# Patient Record
Sex: FEMALE | Race: ASIAN | NOT HISPANIC OR LATINO | Employment: STUDENT | URBAN - METROPOLITAN AREA
[De-identification: names, ages, dates, MRNs, and addresses within clinical notes are randomized per-mention and may not be internally consistent; named-entity substitution may affect disease eponyms.]

---

## 2017-06-13 ENCOUNTER — OFFICE VISIT (OUTPATIENT)
Dept: INTERNAL MEDICINE | Facility: CLINIC | Age: 22
End: 2017-06-13

## 2017-06-13 VITALS
DIASTOLIC BLOOD PRESSURE: 68 MMHG | WEIGHT: 148 LBS | SYSTOLIC BLOOD PRESSURE: 104 MMHG | BODY MASS INDEX: 27.23 KG/M2 | HEIGHT: 62 IN

## 2017-06-13 DIAGNOSIS — Z00.00 HEALTH CARE MAINTENANCE: Primary | ICD-10-CM

## 2017-06-13 DIAGNOSIS — Z23 NEED FOR TETANUS BOOSTER: ICD-10-CM

## 2017-06-13 PROBLEM — E78.6 LOW HDL (UNDER 40): Status: ACTIVE | Noted: 2017-06-13

## 2017-06-13 PROBLEM — M21.612 BUNION OF GREAT TOE OF LEFT FOOT: Status: ACTIVE | Noted: 2017-06-13

## 2017-06-13 LAB
ALBUMIN SERPL-MCNC: 3.82 G/DL (ref 3.4–4.6)
ALBUMIN/GLOB SERPL: 1.2 G/DL
ALP SERPL-CCNC: 40 U/L (ref 46–116)
ALT SERPL W P-5'-P-CCNC: 28 U/L (ref 14–59)
ANION GAP SERPL CALCULATED.3IONS-SCNC: 9 MMOL/L
AST SERPL-CCNC: 27 U/L (ref 7–37)
BILIRUB SERPL-MCNC: 0.4 MG/DL (ref 0.2–1)
BUN BLD-MCNC: 8 MG/DL (ref 6–22)
BUN/CREAT SERPL: 11 (ref 7–25)
CALCIUM SPEC-SCNC: 8.5 MG/DL (ref 8.6–10.5)
CHLORIDE SERPL-SCNC: 102 MMOL/L (ref 95–107)
CHOLEST SERPL-MCNC: 118 MG/DL (ref 0–200)
CO2 SERPL-SCNC: 29 MMOL/L (ref 23–32)
CREAT BLD-MCNC: 0.73 MG/DL (ref 0.55–1.02)
GFR SERPL CREATININE-BSD FRML MDRD: 100 ML/MIN/1.73
GFR SERPL CREATININE-BSD FRML MDRD: 121 ML/MIN/1.73
GLOBULIN UR ELPH-MCNC: 3.1 GM/DL
GLUCOSE BLD-MCNC: 80 MG/DL (ref 70–100)
HDLC SERPL-MCNC: 36 MG/DL (ref 40–81)
LDLC SERPL CALC-MCNC: 69 MG/DL (ref 0–100)
LDLC/HDLC SERPL: 1.92 {RATIO}
POTASSIUM BLD-SCNC: 4 MMOL/L (ref 3.3–5.3)
PROT SERPL-MCNC: 6.9 G/DL (ref 6.3–8.4)
SODIUM BLD-SCNC: 140 MMOL/L (ref 136–145)
TRIGL SERPL-MCNC: 65 MG/DL (ref 0–150)
TSH SERPL DL<=0.05 MIU/L-ACNC: 1.27 MIU/ML (ref 0.4–4.2)
VLDLC SERPL-MCNC: 13 MG/DL

## 2017-06-13 PROCEDURE — 80053 COMPREHEN METABOLIC PANEL: CPT | Performed by: INTERNAL MEDICINE

## 2017-06-13 PROCEDURE — 84443 ASSAY THYROID STIM HORMONE: CPT | Performed by: INTERNAL MEDICINE

## 2017-06-13 PROCEDURE — 99385 PREV VISIT NEW AGE 18-39: CPT | Performed by: INTERNAL MEDICINE

## 2017-06-13 PROCEDURE — 93000 ELECTROCARDIOGRAM COMPLETE: CPT | Performed by: INTERNAL MEDICINE

## 2017-06-13 PROCEDURE — 80061 LIPID PANEL: CPT | Performed by: INTERNAL MEDICINE

## 2017-06-13 RX ORDER — TETANUS AND DIPHTHERIA TOXOIDS ADSORBED 2; 2 [LF]/.5ML; [LF]/.5ML
0.5 INJECTION INTRAMUSCULAR ONCE
Qty: 0.5 ML | Refills: 0 | Status: SHIPPED | OUTPATIENT
Start: 2017-06-13 | End: 2017-06-13

## 2017-06-13 NOTE — PATIENT INSTRUCTIONS
Risk evaluation:  1. Cardiovascular risk factors: none. Regular exercise recommended.  2. Diabetes risk factors: FH of diabetes, overweight (BMI 27.5). Regular exercise and low starch/low carb diet recommended. Ideally needs to loose 20 lbs, but at least 10 lbs.  3. Cancer risk factors: no risk factors for cancer.  4. Risky behavior: none. Use of seat belts: regular. Use of sunscreens: regular. SPF 30+.   Tattoos: none. H/o blood transfusions/organ transplants before 1992 or clotting factor transfusion before 1987: none.     Prevention:  Cholesterol test  will check.  Blood sugar test will check.   Hep C testing (for patients born [birth year]): not needed, patient is not in the age group, and has no risk factors..  Mammogram not recommended yet.. Breast self exams recommended once a month.  Colonoscopy: not recommended till the age of 50.  PAP smear : no recommended till the age of 26.  DEXA : not indicated yet..       Bunion left foot - recommended to wear wide shoes and to try to use spacer between great and 2nd toes.

## 2017-06-13 NOTE — PROGRESS NOTES
"Subjective   Vikki Durham is a 22 y.o. female. Here to establish care, as a new patient. Here for CPE    History of Present Illness   Vikki Durham 22 y.o. female who is here to establish as a new patient.  Past medical and surgical history, family and social history was obtained by me in the interview and recorded in the EHR./68  Ht 61.5\" (156.2 cm)  Wt 148 lb (67.1 kg)  LMP 06/12/2017  BMI 27.51 kg/m2     Patient is here for yearly CPE.    Patient rates her own health as: good.    Tobacco use: none.  Alcohol use: none.  Recreational drugs use: none  Medication list : none.  Diet: well balanced.  Exercise: 2 times a week, aerobic exercise and swimming.  Marital status: single.   Employment: full time student.  Patient rates her stress level as: usuyally high during the school year.  Dental health: good. Brushes teeth 2 times a day, flosses 1 time a day . Dental visits: 2 times a year .  Vision correction: glasses  Hearing: normal.    Recent vaccinations:   Flu  is up to date and recommended yearly  Tdap  is up to date  Zostavax not recommended at this age    Patient is premenopausal. Past pregnancies: none. Currently patient is on no contraception.    No current outpatient prescriptions on file.            /68  Ht 61.5\" (156.2 cm)  Wt 148 lb (67.1 kg)  LMP 06/12/2017  BMI 27.51 kg/m2    No current outpatient prescriptions on file.  The following portions of the patient's history were reviewed and updated as appropriate: allergies, current medications, past family history, past medical history, past social history, past surgical history and problem list.    Review of Systems   Constitutional: Negative for chills and fever.   HENT: Negative for postnasal drip, sinus pressure and sore throat.    Eyes: Negative for pain and itching.   Respiratory: Positive for shortness of breath. Negative for cough and chest tightness.    Cardiovascular: Negative for chest pain and leg swelling. "   Gastrointestinal: Positive for diarrhea (while traveling). Negative for abdominal pain and blood in stool.   Endocrine: Negative for cold intolerance and heat intolerance.   Genitourinary: Negative for difficulty urinating and flank pain.   Musculoskeletal: Negative for back pain and neck pain.   Skin: Negative for color change and rash.   Neurological: Negative for dizziness, weakness and headaches.   Hematological: Negative for adenopathy. Does not bruise/bleed easily.   Psychiatric/Behavioral: Negative for sleep disturbance. The patient is not nervous/anxious.        Objective   Physical Exam   Constitutional: She is oriented to person, place, and time. She appears well-developed and well-nourished. No distress.   HENT:   Head: Normocephalic and atraumatic.   Right Ear: External ear normal.   Left Ear: External ear normal.   Nose: Nose normal.   Mouth/Throat: Oropharynx is clear and moist. No oropharyngeal exudate.   Eyes: Conjunctivae and EOM are normal. Pupils are equal, round, and reactive to light. Right eye exhibits no discharge. Left eye exhibits no discharge. No scleral icterus.   Neck: Normal range of motion. Neck supple. No JVD present. No thyromegaly present.   Cardiovascular: Normal rate, regular rhythm, S1 normal, S2 normal, normal heart sounds and intact distal pulses.  Exam reveals no gallop and no friction rub.    No murmur heard.  Pulmonary/Chest: Effort normal and breath sounds normal. No respiratory distress. She has no decreased breath sounds. She has no wheezes. She has no rhonchi. She has no rales. Right breast exhibits no inverted nipple, no mass, no nipple discharge, no skin change and no tenderness. Left breast exhibits no inverted nipple, no mass, no nipple discharge, no skin change and no tenderness. Breasts are symmetrical.   Abdominal: Soft. Bowel sounds are normal. She exhibits no distension and no mass. There is no tenderness. There is no rebound and no guarding.   Musculoskeletal:  She exhibits no edema.      Foot Structure and Biomechanics -   Her left foot exhibits hallux valgus.     Lymphadenopathy:        Head (right side): No submental, no submandibular, no preauricular, no posterior auricular and no occipital adenopathy present.        Head (left side): No submental, no submandibular, no preauricular, no posterior auricular and no occipital adenopathy present.     She has no cervical adenopathy.        Right cervical: No superficial cervical, no deep cervical and no posterior cervical adenopathy present.       Left cervical: No superficial cervical, no deep cervical and no posterior cervical adenopathy present.     She has no axillary adenopathy.        Right: No supraclavicular adenopathy present.        Left: No supraclavicular adenopathy present.   Neurological: She is alert and oriented to person, place, and time. She has normal reflexes. She displays normal reflexes. No cranial nerve deficit. She exhibits normal muscle tone. Coordination normal.   Reflex Scores:       Bicep reflexes are 2+ on the right side and 2+ on the left side.       Patellar reflexes are 2+ on the right side and 2+ on the left side.  Skin: Skin is warm. No rash noted. She is not diaphoretic. No erythema.   Psychiatric: She has a normal mood and affect. Her behavior is normal. Thought content normal.   Vitals reviewed.    Reason for ECG:  screening  Rhythm: sinus  Arterial rate:55  AR interval: nl  P waves:nl  QRS:nl  ST: nl  QTc:440   Comparison: unavailable   Impression: normal ECG    Assessment/Plan   Diagnoses and all orders for this visit:    Health care maintenance      Risk evaluation:  1. Cardiovascular risk factors: none. Regular exercise recommended.  2. Diabetes risk factors: FH of diabetes, overweight (BMI 27.5). Regular exercise and low starch/low carb diet recommended. Ideally needs to loose 20 lbs, but at least 10 lbs.  3. Cancer risk factors: no risk factors for cancer.  4. Risky behavior: none.  Use of seat belts: regular. Use of sunscreens: regular. SPF 30+.   Tattoos: none. H/o blood transfusions/organ transplants before 1992 or clotting factor transfusion before 1987: none.     Prevention:  Cholesterol test  will check.  Blood sugar test will check.   Hep C testing (for patients born [birth year]65[birth year]71): not needed, patient is not in the age group, and has no risk factors..  Mammogram not recommended yet.. Breast self exams recommended once a month.  Colonoscopy: not recommended till the age of 50.  PAP smear : no recommended till the age of 26.  DEXA : not indicated yet..                      Bunion left foot - recommended to wear wide shoes and to try to use spacer between great and 2nd toes.

## 2017-06-14 LAB
BASOPHILS # BLD AUTO: 0.1 X10E3/UL (ref 0–0.2)
BASOPHILS NFR BLD AUTO: 3 %
EOSINOPHIL # BLD AUTO: 0 X10E3/UL (ref 0–0.4)
EOSINOPHIL # BLD AUTO: 1 %
ERYTHROCYTE [DISTWIDTH] IN BLOOD BY AUTOMATED COUNT: 12.6 % (ref 12.3–15.4)
HCT VFR BLD AUTO: 41.6 % (ref 34–46.6)
HGB BLD-MCNC: 13.8 G/DL (ref 11.1–15.9)
IMM GRANULOCYTES # BLD: 0 X10E3/UL (ref 0–0.1)
IMM GRANULOCYTES NFR BLD: 0 %
LYMPHOCYTES # BLD AUTO: 1.2 X10E3/UL (ref 0.7–3.1)
LYMPHOCYTES NFR BLD AUTO: 38 %
MCH RBC QN AUTO: 30.9 PG (ref 26.6–33)
MCHC RBC AUTO-ENTMCNC: 33.2 G/DL (ref 31.5–35.7)
MCV RBC AUTO: 93 FL (ref 79–97)
MONOCYTES # BLD AUTO: 0.5 X10E3/UL (ref 0.1–0.9)
MONOCYTES NFR BLD AUTO: 15 %
MORPHOLOGY BLD-IMP: ABNORMAL
NEUTROPHILS # BLD AUTO: 1.4 X10E3/UL (ref 1.4–7)
NEUTROPHILS NFR BLD AUTO: 43 %
PLATELET # BLD AUTO: 198 X10E3/UL (ref 150–379)
RBC # BLD AUTO: 4.47 X10E6/UL (ref 3.77–5.28)
WBC # BLD AUTO: 3.3 X10E3/UL (ref 3.4–10.8)

## 2018-03-15 ENCOUNTER — TELEPHONE (OUTPATIENT)
Dept: INTERNAL MEDICINE | Facility: CLINIC | Age: 23
End: 2018-03-15

## 2018-03-15 NOTE — TELEPHONE ENCOUNTER
----- Message from Roopa Gottlieb sent at 3/15/2018  2:29 PM EDT -----  Contact: Pt  Pt is not feeling well, wanted to be seen next week, could not find an opening for next week.  Would you like to work Ms Durham in, or call something in.    Symptom list:  Fever/Achy? No   Sore throat? yes  Coughing? yes  Productive/color? No dry cough  Chest congestion?  Yes   Sinus pressure?  no  Nausea/diarrhea?  no  How long has this been going on? Over a month  Have you tried taking anything?   Has tried OTC cough medication    Caller#[phone]    Please advise, recommended acute or urgent care.     Pharmacy    [facility] - [facility] [address] - [phone] [facility] [phone] FX

## 2018-03-20 ENCOUNTER — OFFICE VISIT (OUTPATIENT)
Dept: INTERNAL MEDICINE | Facility: CLINIC | Age: 23
End: 2018-03-20

## 2018-03-20 VITALS
SYSTOLIC BLOOD PRESSURE: 106 MMHG | BODY MASS INDEX: 27.79 KG/M2 | WEIGHT: 151 LBS | HEART RATE: 58 BPM | DIASTOLIC BLOOD PRESSURE: 62 MMHG | TEMPERATURE: 98.1 F | HEIGHT: 62 IN | OXYGEN SATURATION: 97 %

## 2018-03-20 DIAGNOSIS — J30.9 ALLERGIC SINUSITIS: ICD-10-CM

## 2018-03-20 DIAGNOSIS — R05.3 PERSISTENT COUGH FOR 3 WEEKS OR LONGER: Primary | ICD-10-CM

## 2018-03-20 PROCEDURE — 99213 OFFICE O/P EST LOW 20 MIN: CPT | Performed by: INTERNAL MEDICINE

## 2018-03-20 RX ORDER — LEVOCETIRIZINE DIHYDROCHLORIDE 5 MG/1
5 TABLET, FILM COATED ORAL EVERY EVENING
Qty: 30 TABLET | Refills: 5 | Status: SHIPPED | OUTPATIENT
Start: 2018-03-20 | End: 2018-06-11

## 2018-03-20 RX ORDER — MONTELUKAST SODIUM 10 MG/1
10 TABLET ORAL NIGHTLY
Qty: 30 TABLET | Refills: 5 | Status: SHIPPED | OUTPATIENT
Start: 2018-03-20 | End: 2018-06-08 | Stop reason: SDUPTHER

## 2018-03-20 NOTE — PATIENT INSTRUCTIONS
1. Persistent cough - due mendoza postanasal drainage from allergic sinusitis. Reassured.  2. Allergic sinusitis - will treat with Xyzal and Montelukast, both to be taken at bedtime. If Xyzal too expensive, take Allegra or Claritin over the counter once a day.

## 2018-03-20 NOTE — PROGRESS NOTES
"Janine Durham is a 23 y.o. female.     History of Present Illness   /62 (BP Location: Right arm, Patient Position: Sitting, Cuff Size: Adult)   Pulse 58   Temp 98.1 °F (36.7 °C) (Oral)   Ht 156.2 cm (61.5\")   Wt 68.5 kg (151 lb)   SpO2 97%   BMI 28.07 kg/m²   Patient complains of nasal congestion. S-ms started a month ago ago.Patient describes cough as productive of white sputum. Associated symptoms include post nasal drip and sneezing. Symptoms get worse  laying down. Patient has fatigue. Patient has had no wheezing.   Patient has had no ID contacts.   Overall s-ms had been fluctuating a bit. Patient had tried to use homeoapathic medicine w/o improvement.     No current outpatient prescriptions on file.  The following portions of the patient's history were reviewed and updated as appropriate: allergies, current medications, past family history, past medical history, past social history, past surgical history and problem list.    Review of Systems   Constitutional: Negative for chills and fever.   Eyes: Negative for pain and redness.   Respiratory: Positive for cough. Negative for shortness of breath.    Cardiovascular: Negative for chest pain and leg swelling.   Neurological: Positive for headaches. Negative for dizziness.       Objective   Physical Exam   Constitutional: She is oriented to person, place, and time. She appears well-developed and well-nourished.   HENT:   Head: Normocephalic and atraumatic.   Right Ear: Tympanic membrane, external ear and ear canal normal.   Left Ear: Tympanic membrane, external ear and ear canal normal.   Nose: Mucosal edema present. Right sinus exhibits no maxillary sinus tenderness and no frontal sinus tenderness. Left sinus exhibits no maxillary sinus tenderness and no frontal sinus tenderness.   Mouth/Throat: Uvula is midline, oropharynx is clear and moist and mucous membranes are normal.   Eyes: Conjunctivae and EOM are normal. Pupils are equal, round, " and reactive to light. Right eye exhibits no discharge. Left eye exhibits no discharge. No scleral icterus.   Neck: Neck supple. No JVD present.   Cardiovascular: Normal rate, regular rhythm and normal heart sounds.  Exam reveals no gallop and no friction rub.    No murmur heard.  Pulmonary/Chest: Effort normal and breath sounds normal. She has no wheezes. She has no rales.   Musculoskeletal: She exhibits no edema.   Lymphadenopathy:     She has no cervical adenopathy.   Neurological: She is alert and oriented to person, place, and time. No cranial nerve deficit.   Skin: Skin is warm and dry. No rash noted.   Psychiatric: She has a normal mood and affect. Her behavior is normal.   Vitals reviewed.      Assessment/Plan   Vikki was seen today for cough.    Diagnoses and all orders for this visit:    Persistent cough for 3 weeks or longer    Allergic sinusitis  -     montelukast (SINGULAIR) 10 MG tablet; Take 1 tablet by mouth Every Night.  -     levocetirizine (XYZAL) 5 MG tablet; Take 1 tablet by mouth Every Evening.      1. Persistent cough - due mendoza postanasal drainage from allergic sinusitis. Reassured.  2. Allergic sinusitis - will treat with Xyzal and Montelukast, both to be taken at bedtime. If Xyzal too expensive, take Allegra or Claritin over the counter once a day.

## 2018-06-08 ENCOUNTER — HOSPITAL ENCOUNTER (OUTPATIENT)
Dept: CARDIOLOGY | Facility: HOSPITAL | Age: 23
Discharge: HOME | End: 2018-06-08
Admitting: NURSE PRACTITIONER

## 2018-06-08 ENCOUNTER — OFFICE VISIT (OUTPATIENT)
Dept: FAMILY MEDICINE CLINIC | Facility: CLINIC | Age: 23
End: 2018-06-08

## 2018-06-08 ENCOUNTER — HOSPITAL ENCOUNTER (OUTPATIENT)
Dept: CT IMAGING | Facility: HOSPITAL | Age: 23
Discharge: HOME | End: 2018-06-08
Attending: INTERNAL MEDICINE

## 2018-06-08 ENCOUNTER — HOSPITAL ENCOUNTER (OUTPATIENT)
Dept: CARDIOLOGY | Facility: HOSPITAL | Age: 23
Discharge: HOME | End: 2018-06-08
Attending: INTERNAL MEDICINE

## 2018-06-08 VITALS
DIASTOLIC BLOOD PRESSURE: 64 MMHG | SYSTOLIC BLOOD PRESSURE: 96 MMHG | HEART RATE: 74 BPM | OXYGEN SATURATION: 95 % | RESPIRATION RATE: 16 BRPM

## 2018-06-08 VITALS
WEIGHT: 147 LBS | TEMPERATURE: 98.7 F | HEIGHT: 62 IN | HEART RATE: 75 BPM | BODY MASS INDEX: 27.05 KG/M2 | SYSTOLIC BLOOD PRESSURE: 110 MMHG | OXYGEN SATURATION: 96 % | DIASTOLIC BLOOD PRESSURE: 64 MMHG

## 2018-06-08 DIAGNOSIS — Z76.89 ENCOUNTER TO ESTABLISH CARE: ICD-10-CM

## 2018-06-08 DIAGNOSIS — J30.2 SEASONAL ALLERGIC RHINITIS, UNSPECIFIED CHRONICITY, UNSPECIFIED TRIGGER: ICD-10-CM

## 2018-06-08 DIAGNOSIS — R59.1 LYMPHADENOPATHY: Primary | ICD-10-CM

## 2018-06-08 DIAGNOSIS — R06.02 SHORTNESS OF BREATH: ICD-10-CM

## 2018-06-08 DIAGNOSIS — R91.8 LUNG MASS: ICD-10-CM

## 2018-06-08 DIAGNOSIS — R93.89 ABNORMAL CHEST X-RAY: ICD-10-CM

## 2018-06-08 DIAGNOSIS — R05.9 COUGH: ICD-10-CM

## 2018-06-08 DIAGNOSIS — J30.9 ALLERGIC SINUSITIS: ICD-10-CM

## 2018-06-08 DIAGNOSIS — I31.39 PERICARDIAL EFFUSION: ICD-10-CM

## 2018-06-08 DIAGNOSIS — R93.89 ABNORMAL CT OF THE CHEST: ICD-10-CM

## 2018-06-08 DIAGNOSIS — R05.3 PERSISTENT COUGH FOR 3 WEEKS OR LONGER: Primary | ICD-10-CM

## 2018-06-08 DIAGNOSIS — R93.89 ABNORMAL CXR: ICD-10-CM

## 2018-06-08 DIAGNOSIS — R06.02 SOB (SHORTNESS OF BREATH): ICD-10-CM

## 2018-06-08 DIAGNOSIS — R06.02 SHORTNESS OF BREATH: Primary | ICD-10-CM

## 2018-06-08 LAB
25(OH)D3 SERPL-MCNC: 30 NG/ML (ref 30–100)
ALBUMIN SERPL-MCNC: 3.7 G/DL (ref 3.5–5.2)
ALBUMIN SERPL-MCNC: 4 G/DL (ref 3.5–5.2)
ALBUMIN/GLOB SERPL: 1.1 G/DL
ALBUMIN/GLOB SERPL: 1.2 G/DL
ALP SERPL-CCNC: 55 U/L (ref 39–117)
ALP SERPL-CCNC: 57 U/L (ref 39–117)
ALT SERPL W P-5'-P-CCNC: 25 U/L (ref 1–33)
ALT SERPL W P-5'-P-CCNC: 26 U/L (ref 1–33)
ANION GAP SERPL CALCULATED.3IONS-SCNC: 13.4 MMOL/L
ANION GAP SERPL CALCULATED.3IONS-SCNC: 14.1 MMOL/L
ASCENDING AORTA: 2.5 CM
AST SERPL-CCNC: 31 U/L (ref 1–32)
AST SERPL-CCNC: 35 U/L (ref 1–32)
BASOPHILS # BLD AUTO: 0.02 10*3/MM3 (ref 0–0.2)
BASOPHILS NFR BLD AUTO: 0.6 % (ref 0–1.5)
BH CV ECHO MEAS - ACS: 1.7 CM
BH CV ECHO MEAS - AO MAX PG (FULL): 3.6 MMHG
BH CV ECHO MEAS - AO MAX PG: 6.4 MMHG
BH CV ECHO MEAS - AO MEAN PG (FULL): 1.9 MMHG
BH CV ECHO MEAS - AO MEAN PG: 3.5 MMHG
BH CV ECHO MEAS - AO ROOT AREA (BSA CORRECTED): 1.7
BH CV ECHO MEAS - AO ROOT AREA: 6.4 CM^2
BH CV ECHO MEAS - AO ROOT DIAM: 2.9 CM
BH CV ECHO MEAS - AO V2 MAX: 126.7 CM/SEC
BH CV ECHO MEAS - AO V2 MEAN: 87.7 CM/SEC
BH CV ECHO MEAS - AO V2 VTI: 24.5 CM
BH CV ECHO MEAS - AVA(I,A): 1.4 CM^2
BH CV ECHO MEAS - AVA(I,D): 1.4 CM^2
BH CV ECHO MEAS - AVA(V,A): 1.5 CM^2
BH CV ECHO MEAS - AVA(V,D): 1.5 CM^2
BH CV ECHO MEAS - BSA(HAYCOCK): 1.7 M^2
BH CV ECHO MEAS - BSA: 1.7 M^2
BH CV ECHO MEAS - BZI_BMI: 26.9 KILOGRAMS/M^2
BH CV ECHO MEAS - BZI_METRIC_HEIGHT: 157.5 CM
BH CV ECHO MEAS - BZI_METRIC_WEIGHT: 66.7 KG
BH CV ECHO MEAS - CONTRAST EF (2CH): 60.6 ML/M^2
BH CV ECHO MEAS - CONTRAST EF 4CH: 54.7 ML/M^2
BH CV ECHO MEAS - EDV(MOD-SP2): 66 ML
BH CV ECHO MEAS - EDV(MOD-SP4): 75 ML
BH CV ECHO MEAS - EDV(TEICH): 98.2 ML
BH CV ECHO MEAS - EF(CUBED): 62.5 %
BH CV ECHO MEAS - EF(MOD-BP): 58 %
BH CV ECHO MEAS - EF(MOD-SP2): 60.6 %
BH CV ECHO MEAS - EF(MOD-SP4): 54.7 %
BH CV ECHO MEAS - EF(TEICH): 54.1 %
BH CV ECHO MEAS - ESV(MOD-SP2): 26 ML
BH CV ECHO MEAS - ESV(MOD-SP4): 34 ML
BH CV ECHO MEAS - ESV(TEICH): 45.1 ML
BH CV ECHO MEAS - FS: 27.9 %
BH CV ECHO MEAS - IVS/LVPW: 1
BH CV ECHO MEAS - IVSD: 1 CM
BH CV ECHO MEAS - LAT PEAK E' VEL: 14 CM/SEC
BH CV ECHO MEAS - LV DIASTOLIC VOL/BSA (35-75): 44.7 ML/M^2
BH CV ECHO MEAS - LV MASS(C)D: 164.2 GRAMS
BH CV ECHO MEAS - LV MASS(C)DI: 97.9 GRAMS/M^2
BH CV ECHO MEAS - LV MAX PG: 2.8 MMHG
BH CV ECHO MEAS - LV MEAN PG: 1.6 MMHG
BH CV ECHO MEAS - LV SYSTOLIC VOL/BSA (12-30): 20.3 ML/M^2
BH CV ECHO MEAS - LV V1 MAX: 83.9 CM/SEC
BH CV ECHO MEAS - LV V1 MEAN: 58.6 CM/SEC
BH CV ECHO MEAS - LV V1 VTI: 15.9 CM
BH CV ECHO MEAS - LVIDD: 4.6 CM
BH CV ECHO MEAS - LVIDS: 3.3 CM
BH CV ECHO MEAS - LVLD AP2: 7.6 CM
BH CV ECHO MEAS - LVLD AP4: 7.1 CM
BH CV ECHO MEAS - LVLS AP2: 6.2 CM
BH CV ECHO MEAS - LVLS AP4: 6.1 CM
BH CV ECHO MEAS - LVOT AREA (M): 2.3 CM^2
BH CV ECHO MEAS - LVOT AREA: 2.2 CM^2
BH CV ECHO MEAS - LVOT DIAM: 1.7 CM
BH CV ECHO MEAS - LVPWD: 1 CM
BH CV ECHO MEAS - MED PEAK E' VEL: 12 CM/SEC
BH CV ECHO MEAS - MV A DUR: 0.08 SEC
BH CV ECHO MEAS - MV A MAX VEL: 44.6 CM/SEC
BH CV ECHO MEAS - MV DEC SLOPE: 579.8 CM/SEC^2
BH CV ECHO MEAS - MV DEC TIME: 0.15 SEC
BH CV ECHO MEAS - MV E MAX VEL: 82.5 CM/SEC
BH CV ECHO MEAS - MV E/A: 1.8
BH CV ECHO MEAS - MV MAX PG: 3.3 MMHG
BH CV ECHO MEAS - MV MEAN PG: 0.87 MMHG
BH CV ECHO MEAS - MV P1/2T MAX VEL: 81 CM/SEC
BH CV ECHO MEAS - MV P1/2T: 40.9 MSEC
BH CV ECHO MEAS - MV V2 MAX: 90.5 CM/SEC
BH CV ECHO MEAS - MV V2 MEAN: 42.4 CM/SEC
BH CV ECHO MEAS - MV V2 VTI: 24.6 CM
BH CV ECHO MEAS - MVA P1/2T LCG: 2.7 CM^2
BH CV ECHO MEAS - MVA(P1/2T): 5.4 CM^2
BH CV ECHO MEAS - MVA(VTI): 1.4 CM^2
BH CV ECHO MEAS - PA ACC TIME: 0.13 SEC
BH CV ECHO MEAS - PA PR(ACCEL): 20.8 MMHG
BH CV ECHO MEAS - PI END-D VEL: 134.7 CM/SEC
BH CV ECHO MEAS - PULM A REVS DUR: 0.08 SEC
BH CV ECHO MEAS - PULM A REVS VEL: 28.6 CM/SEC
BH CV ECHO MEAS - PULM DIAS VEL: 57.7 CM/SEC
BH CV ECHO MEAS - PULM S/D: 1
BH CV ECHO MEAS - PULM SYS VEL: 60.1 CM/SEC
BH CV ECHO MEAS - QP/QS: 0.63
BH CV ECHO MEAS - RAP SYSTOLE: 8 MMHG
BH CV ECHO MEAS - RV MAX PG: 1.9 MMHG
BH CV ECHO MEAS - RV MEAN PG: 1.2 MMHG
BH CV ECHO MEAS - RV V1 MAX: 68.4 CM/SEC
BH CV ECHO MEAS - RV V1 MEAN: 52.8 CM/SEC
BH CV ECHO MEAS - RV V1 VTI: 16 CM
BH CV ECHO MEAS - RVOT AREA: 1.4 CM^2
BH CV ECHO MEAS - RVOT DIAM: 1.3 CM
BH CV ECHO MEAS - RVSP: 28 MMHG
BH CV ECHO MEAS - SI(AO): 93.4 ML/M^2
BH CV ECHO MEAS - SI(CUBED): 36.7 ML/M^2
BH CV ECHO MEAS - SI(LVOT): 20.8 ML/M^2
BH CV ECHO MEAS - SI(MOD-SP2): 23.9 ML/M^2
BH CV ECHO MEAS - SI(MOD-SP4): 24.4 ML/M^2
BH CV ECHO MEAS - SI(TEICH): 31.7 ML/M^2
BH CV ECHO MEAS - SUP REN AO DIAM: 1.7 CM
BH CV ECHO MEAS - SV(AO): 156.7 ML
BH CV ECHO MEAS - SV(CUBED): 61.6 ML
BH CV ECHO MEAS - SV(LVOT): 34.9 ML
BH CV ECHO MEAS - SV(MOD-SP2): 40 ML
BH CV ECHO MEAS - SV(MOD-SP4): 41 ML
BH CV ECHO MEAS - SV(RVOT): 21.9 ML
BH CV ECHO MEAS - SV(TEICH): 53.1 ML
BH CV ECHO MEAS - TAPSE (>1.6): 1.6 CM2
BH CV ECHO MEAS - TR MAX VEL: 222 CM/SEC
BH CV ECHO MEASUREMENTS AVERAGE E/E' RATIO: 6.35
BH CV XLRA - RV BASE: 2.4 CM
BH CV XLRA - TDI S': 11 CM/SEC
BILIRUB SERPL-MCNC: 0.3 MG/DL (ref 0.1–1.2)
BILIRUB SERPL-MCNC: 0.3 MG/DL (ref 0.1–1.2)
BUN BLD-MCNC: 10 MG/DL (ref 6–20)
BUN BLD-MCNC: 11 MG/DL (ref 6–20)
BUN/CREAT SERPL: 13.2 (ref 7–25)
BUN/CREAT SERPL: 13.6 (ref 7–25)
CALCIUM SPEC-SCNC: 9.5 MG/DL (ref 8.6–10.5)
CALCIUM SPEC-SCNC: 9.6 MG/DL (ref 8.6–10.5)
CHLORIDE SERPL-SCNC: 101 MMOL/L (ref 98–107)
CHLORIDE SERPL-SCNC: 102 MMOL/L (ref 98–107)
CHOLEST SERPL-MCNC: 114 MG/DL (ref 0–200)
CO2 SERPL-SCNC: 24.6 MMOL/L (ref 22–29)
CO2 SERPL-SCNC: 26.9 MMOL/L (ref 22–29)
CREAT BLD-MCNC: 0.76 MG/DL (ref 0.57–1)
CREAT BLD-MCNC: 0.81 MG/DL (ref 0.57–1)
D DIMER PPP FEU-MCNC: 0.73 MCGFEU/ML (ref 0–0.49)
DEPRECATED RDW RBC AUTO: 46.4 FL (ref 37–54)
EOSINOPHIL # BLD AUTO: 0.05 10*3/MM3 (ref 0–0.7)
EOSINOPHIL NFR BLD AUTO: 1.5 % (ref 0.3–6.2)
ERYTHROCYTE [DISTWIDTH] IN BLOOD BY AUTOMATED COUNT: 13.4 % (ref 4.5–15)
ERYTHROCYTE [DISTWIDTH] IN BLOOD BY AUTOMATED COUNT: 13.8 % (ref 11.7–13)
GFR SERPL CREATININE-BSD FRML MDRD: 106 ML/MIN/1.73
GFR SERPL CREATININE-BSD FRML MDRD: 114 ML/MIN/1.73
GFR SERPL CREATININE-BSD FRML MDRD: 88 ML/MIN/1.73
GFR SERPL CREATININE-BSD FRML MDRD: 94 ML/MIN/1.73
GLOBULIN UR ELPH-MCNC: 3.3 GM/DL
GLOBULIN UR ELPH-MCNC: 3.5 GM/DL
GLUCOSE BLD-MCNC: 91 MG/DL (ref 65–99)
GLUCOSE BLD-MCNC: 96 MG/DL (ref 65–99)
HCT VFR BLD AUTO: 38.5 % (ref 31–42)
HCT VFR BLD AUTO: 38.7 % (ref 35.6–45.5)
HDLC SERPL-MCNC: 19 MG/DL (ref 40–60)
HGB BLD-MCNC: 12.6 G/DL (ref 11.9–15.5)
HGB BLD-MCNC: 13.3 G/DL (ref 12–18)
IMM GRANULOCYTES # BLD: 0 10*3/MM3 (ref 0–0.03)
IMM GRANULOCYTES NFR BLD: 0 % (ref 0–0.5)
LDLC SERPL CALC-MCNC: 77 MG/DL (ref 0–100)
LDLC/HDLC SERPL: 4.05 {RATIO}
LEFT ATRIUM VOLUME INDEX: 14 ML/M2
LV EF 2D ECHO EST: 58 %
LYMPHOCYTES # BLD AUTO: 0.6 10*3/MM3 (ref 1.2–3.4)
LYMPHOCYTES # BLD AUTO: 0.8 10*3/MM3 (ref 0.9–4.8)
LYMPHOCYTES NFR BLD AUTO: 14.4 % (ref 21–51)
LYMPHOCYTES NFR BLD AUTO: 23.5 % (ref 19.6–45.3)
MCH RBC QN AUTO: 30.1 PG (ref 26.9–32)
MCH RBC QN AUTO: 30.2 PG (ref 26.1–33.1)
MCHC RBC AUTO-ENTMCNC: 32.6 G/DL (ref 32.4–36.3)
MCHC RBC AUTO-ENTMCNC: 34.6 G/DL (ref 33–37)
MCV RBC AUTO: 87.1 FL (ref 80–99)
MCV RBC AUTO: 92.6 FL (ref 80.5–98.2)
MONOCYTES # BLD AUTO: 0.17 10*3/MM3 (ref 0.2–1.2)
MONOCYTES # BLD AUTO: 0.5 10*3/MM3 (ref 0.1–0.6)
MONOCYTES NFR BLD AUTO: 11.1 % (ref 2–9)
MONOCYTES NFR BLD AUTO: 5 % (ref 5–12)
NEUTROPHILS # BLD AUTO: 2.37 10*3/MM3 (ref 1.9–8.1)
NEUTROPHILS # BLD AUTO: 3.2 10*3/MM3 (ref 1.4–6.5)
NEUTROPHILS NFR BLD AUTO: 69.4 % (ref 42.7–76)
NEUTROPHILS NFR BLD AUTO: 74.5 % (ref 42–75)
NRBC BLD MANUAL-RTO: 0 /100 WBC (ref 0–0)
NT-PROBNP SERPL-MCNC: 61 PG/ML (ref 0–450)
NT-PROBNP SERPL-MCNC: 62.9 PG/ML (ref 0–450)
PLATELET # BLD AUTO: 115 10*3/MM3 (ref 140–500)
PLATELET # BLD AUTO: 123 10*3/MM3 (ref 150–450)
PMV BLD AUTO: 11.4 FL (ref 6–12)
PMV BLD AUTO: 8.1 FL (ref 7.1–10.5)
POTASSIUM BLD-SCNC: 4.3 MMOL/L (ref 3.5–5.2)
POTASSIUM BLD-SCNC: 4.4 MMOL/L (ref 3.5–5.2)
PROT SERPL-MCNC: 7.2 G/DL (ref 6–8.5)
PROT SERPL-MCNC: 7.3 G/DL (ref 6–8.5)
RBC # BLD AUTO: 4.18 10*6/MM3 (ref 3.9–5.2)
RBC # BLD AUTO: 4.42 10*6/MM3 (ref 4–6)
SINUS: 2.6 CM
SODIUM BLD-SCNC: 140 MMOL/L (ref 136–145)
SODIUM BLD-SCNC: 142 MMOL/L (ref 136–145)
STJ: 1.9 CM
TRIGL SERPL-MCNC: 90 MG/DL (ref 0–150)
TROPONIN T SERPL-MCNC: <0.01 NG/ML (ref 0–0.03)
TSH SERPL DL<=0.05 MIU/L-ACNC: 1.18 MIU/ML (ref 0.27–4.2)
VLDLC SERPL-MCNC: 18 MG/DL (ref 5–40)
WBC NRBC COR # BLD: 3.41 10*3/MM3 (ref 4.5–10.7)
WBC NRBC COR # BLD: 4.3 10*3/MM3 (ref 4.5–10)

## 2018-06-08 PROCEDURE — 83880 ASSAY OF NATRIURETIC PEPTIDE: CPT | Performed by: INTERNAL MEDICINE

## 2018-06-08 PROCEDURE — 84443 ASSAY THYROID STIM HORMONE: CPT | Performed by: NURSE PRACTITIONER

## 2018-06-08 PROCEDURE — 84484 ASSAY OF TROPONIN QUANT: CPT | Performed by: INTERNAL MEDICINE

## 2018-06-08 PROCEDURE — 93306 TTE W/DOPPLER COMPLETE: CPT | Performed by: INTERNAL MEDICINE

## 2018-06-08 PROCEDURE — 80053 COMPREHEN METABOLIC PANEL: CPT | Performed by: NURSE PRACTITIONER

## 2018-06-08 PROCEDURE — 80061 LIPID PANEL: CPT | Performed by: NURSE PRACTITIONER

## 2018-06-08 PROCEDURE — 0 IOPAMIDOL PER 1 ML: Performed by: INTERNAL MEDICINE

## 2018-06-08 PROCEDURE — 82306 VITAMIN D 25 HYDROXY: CPT | Performed by: NURSE PRACTITIONER

## 2018-06-08 PROCEDURE — 85379 FIBRIN DEGRADATION QUANT: CPT | Performed by: INTERNAL MEDICINE

## 2018-06-08 PROCEDURE — 93306 TTE W/DOPPLER COMPLETE: CPT

## 2018-06-08 PROCEDURE — 93000 ELECTROCARDIOGRAM COMPLETE: CPT | Performed by: NURSE PRACTITIONER

## 2018-06-08 PROCEDURE — 36415 COLL VENOUS BLD VENIPUNCTURE: CPT | Performed by: NURSE PRACTITIONER

## 2018-06-08 PROCEDURE — 94760 N-INVAS EAR/PLS OXIMETRY 1: CPT

## 2018-06-08 PROCEDURE — 80053 COMPREHEN METABOLIC PANEL: CPT | Performed by: INTERNAL MEDICINE

## 2018-06-08 PROCEDURE — 93005 ELECTROCARDIOGRAM TRACING: CPT

## 2018-06-08 PROCEDURE — 99203 OFFICE O/P NEW LOW 30 MIN: CPT | Performed by: NURSE PRACTITIONER

## 2018-06-08 PROCEDURE — 83880 ASSAY OF NATRIURETIC PEPTIDE: CPT | Performed by: NURSE PRACTITIONER

## 2018-06-08 PROCEDURE — 71275 CT ANGIOGRAPHY CHEST: CPT

## 2018-06-08 PROCEDURE — 85025 COMPLETE CBC W/AUTO DIFF WBC: CPT | Performed by: NURSE PRACTITIONER

## 2018-06-08 PROCEDURE — 85025 COMPLETE CBC W/AUTO DIFF WBC: CPT | Performed by: INTERNAL MEDICINE

## 2018-06-08 PROCEDURE — 99204 OFFICE O/P NEW MOD 45 MIN: CPT | Performed by: INTERNAL MEDICINE

## 2018-06-08 PROCEDURE — 36415 COLL VENOUS BLD VENIPUNCTURE: CPT

## 2018-06-08 RX ORDER — NITROGLYCERIN 0.4 MG/1
0.4 TABLET SUBLINGUAL
Status: DISCONTINUED | OUTPATIENT
Start: 2018-06-08 | End: 2018-06-11

## 2018-06-08 RX ORDER — SODIUM CHLORIDE 0.9 % (FLUSH) 0.9 %
10 SYRINGE (ML) INJECTION AS NEEDED
Status: DISCONTINUED | OUTPATIENT
Start: 2018-06-08 | End: 2018-06-21 | Stop reason: HOSPADM

## 2018-06-08 RX ORDER — MONTELUKAST SODIUM 10 MG/1
10 TABLET ORAL NIGHTLY
Qty: 30 TABLET | Refills: 5 | Status: SHIPPED | OUTPATIENT
Start: 2018-06-08 | End: 2018-06-11

## 2018-06-08 RX ORDER — FLUTICASONE PROPIONATE 50 MCG
2 SPRAY, SUSPENSION (ML) NASAL DAILY
COMMUNITY
End: 2018-06-11

## 2018-06-08 RX ADMIN — IOPAMIDOL 95 ML: 755 INJECTION, SOLUTION INTRAVENOUS at 14:00

## 2018-06-08 NOTE — PATIENT INSTRUCTIONS
Food Choices for Gastroesophageal Reflux Disease, Adult  When you have gastroesophageal reflux disease (GERD), the foods you eat and your eating habits are very important. Choosing the right foods can help ease your discomfort.  What guidelines do I need to follow?  · Choose fruits, vegetables, whole grains, and low-fat dairy products.  · Choose low-fat meat, fish, and poultry.  · Limit fats such as oils, salad dressings, butter, nuts, and avocado.  · Keep a food diary. This helps you identify foods that cause symptoms.  · Avoid foods that cause symptoms. These may be different for everyone.  · Eat small meals often instead of 3 large meals a day.  · Eat your meals slowly, in a place where you are relaxed.  · Limit fried foods.  · Cook foods using methods other than frying.  · Avoid drinking alcohol.  · Avoid drinking large amounts of liquids with your meals.  · Avoid bending over or lying down until 2-3 hours after eating.  What foods are not recommended?  These are some foods and drinks that may make your symptoms worse:  Vegetables   Tomatoes. Tomato juice. Tomato and spaghetti sauce. Chili peppers. Onion and garlic. Horseradish.  Fruits   Oranges, grapefruit, and lemon (fruit and juice).  Meats   High-fat meats, fish, and poultry. This includes hot dogs, ribs, ham, sausage, salami, and florence.  Dairy   Whole milk and chocolate milk. Sour cream. Cream. Butter. Ice cream. Cream cheese.  Drinks   Coffee and tea. Bubbly (carbonated) drinks or energy drinks.  Condiments   Hot sauce. Barbecue sauce.  Sweets/Desserts   Chocolate and cocoa. Donuts. Peppermint and spearmint.  Fats and Oils   High-fat foods. This includes French fries and potato chips.  Other   Vinegar. Strong spices. This includes black pepper, white pepper, red pepper, cayenne, ibarra powder, cloves, ginger, and chili powder.  The items listed above may not be a complete list of foods and drinks to avoid. Contact your dietitian for more information.    This information is not intended to replace advice given to you by your health care provider. Make sure you discuss any questions you have with your health care provider.  Document Released: 06/18/2013 Document Revised: 05/25/2017 Document Reviewed: 10/22/2014  [employer] Patient Education © 2017 [employer]        Stat cxr today,   Cbc,cmp, tsh today will call with results.   ekg in office WNL.   Patient sent to chest pain unit at [facility] for abnormal cxr, SOA,copy of ekg given ,report called.   Low acid diet,sit upright 1 hour after meals.   Recommend pap, over due, she will call her mother's GYN for appt.   Cont f/u with allergist.   Increase fluid intake, get plenty of rest.   Patient agrees with plan of care and understands instructions. Call if worsening symptoms or any problems or concerns.

## 2018-06-08 NOTE — PROGRESS NOTES
"Patient Name: Vikki Durham  :1995  23 y.o.    Date of Admission: 2018  Encounter Provider: Arina Cohen MD  Date of Encounter Visit: 18  Place of Service: [facility] CARDIOLOGY  Referring Provider: BRANNON Maier  Patient Care Team:  Carla Zhao MD as PCP - General (Internal Medicine)      Chief complaint :Patient has had cough since February, no chest pain.  States some soa with exertion.  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 She has noted a cough since February. She was initially told it was allergies. She did go see an allergist and he did diagnose her with allergies but no asthma. The cough has persisted. It is worse if she tries to lie down flat. In the last few weeks, she has been sleeping upright. She did have 1 episode where she woke from sleep coughing. Her mother said she was \"suffocating\" but the patient says she was just coughing. She does exercise on a regular basis and she feels like running is a little bit harder. She has not had any chest pain. She denies fevers or chills. She generally seems to be feeling okay with the exception of cough. She went to see her primary provider today and they checked a chest x-ray which was markedly abnormal with what appeared to be cardiomegaly, and she was referred here for further evaluation. The radiology read is concerning for non-Hodgkin's lymphoma.      Past Medical History:   Diagnosis Date   • Fracture of lower leg     L       History reviewed. No pertinent surgical history.      Prior to Admission medications    Medication Sig Start Date End Date Taking? Authorizing Provider   fluticasone (FLONASE) 50 MCG/ACT nasal spray 2 sprays into each nostril Daily.   Yes Historical Provider, MD   levocetirizine (XYZAL) 5 MG tablet Take 1 tablet by mouth Every Evening. 3/20/18  Yes Carla PUGA " MD Pavel   montelukast (SINGULAIR) 10 MG tablet Take 1 tablet by mouth Every Night. 6/8/18   BRANNON Maier   montelukast (SINGULAIR) 10 MG tablet Take 1 tablet by mouth Every Night. 3/20/18 6/8/18  Carla Zhao MD       No Known Allergies    Social History     Social History   • Marital status: Single     Social History Main Topics   • Smoking status: Never Smoker   • Smokeless tobacco: Never Used   • Alcohol use No   • Drug use: No     Other Topics Concern   • Not on file       Family History   Problem Relation Age of Onset   • Thyroid disease Mother    • Glaucoma Mother    • Lung cancer Maternal Grandmother    • Hypertension Paternal Grandmother    • Diabetes Paternal Grandmother        REVIEW OF SYSTEMS:   All other systems reviewed and negative.        Objective:     Vitals:    06/08/18 1025 06/08/18 1027   BP: 98/58 96/64   BP Location: Right arm Left arm   Pulse: 74    Resp:  16   SpO2: 95%      There is no height or weight on file to calculate BMI.  No intake or output data in the 24 hours ending 06/08/18 1159    Constitutional: She is oriented to person, place, and time. She appears well-developed. She does not appear ill.   HENT:   Head: Normocephalic and atraumatic. Head is without contusion.   Right Ear: Hearing normal. No drainage.   Left Ear: Hearing normal. No drainage.   Nose: No nasal deformity. No epistaxis.   Eyes: Lids are normal. Right eye exhibits no exudate. Left eye exhibits no exudate.  Neck: No JVD present. Carotid bruit is not present. No tracheal deviation present. No thyroid mass and no thyromegaly present.   Cardiovascular: Normal rate, regular rhythm and normal heart sounds.    Pulses:       Posterior tibial pulses are 2+ on the right side, and 2+ on the left side.   Pulmonary/Chest: Effort normal and breath sounds normal.   Abdominal: Soft. Normal appearance and bowel sounds are normal. There is no tenderness.   Musculoskeletal: Normal range of motion.        Right  shoulder: She exhibits no deformity.        Left shoulder: She exhibits no deformity.   Neurological: She is alert and oriented to person, place, and time. She has normal strength.   Skin: Skin is warm, dry and intact. No rash noted.   Psychiatric: She has a normal mood and affect. Her behavior is normal. Thought content normal.   Vitals reviewed      Lab Review:       Results from last 7 days  Lab Units 06/08/18  1039   SODIUM mmol/L 140   POTASSIUM mmol/L 4.3   CHLORIDE mmol/L 102   CO2 mmol/L 24.6   BUN mg/dL 10   CREATININE mg/dL 0.76   CALCIUM mg/dL 9.5   BILIRUBIN mg/dL 0.3   ALK PHOS U/L 55   ALT (SGPT) U/L 26   AST (SGOT) U/L 35*   GLUCOSE mg/dL 91       Results from last 7 days  Lab Units 06/08/18  1039   TROPONIN T ng/mL <0.010       Results from last 7 days  Lab Units 06/08/18  1039   WBC 10*3/mm3 3.41*   HEMOGLOBIN g/dL 12.6   HEMATOCRIT % 38.7   PLATELETS 10*3/mm3 115*               Results from last 7 days  Lab Units 06/08/18  0903   CHOLESTEROL mg/dL 114   TRIGLYCERIDES mg/dL 90   HDL CHOL mg/dL 19*   LDL CHOL mg/dL 77       EKG from her primary care provider shows normal sinus rhythm and is a normal EKG      Assessment and Plan:       1.  Cough.  I reviewed her echocardiogram.  She has a large pericardial effusion but no tamponade physiology.  Otherwise her heart is normal.  Her CT scan shows a large lung mass consistent with non-Hodgkin's lymphoma.  I spoke with the radiologist about it.  I had her come back over to the office and spoke with her about it.  Her nurse practitioner is going to try to get her in to see an oncologist as soon as possible.  I spoke with Clarita Cox about it as well.  2.  Pericardial effusion  3.  Lung mass    I don't think that the pericardial effusion needs to be drained at this point in time.  If she becomes symptomatic though we could attempted.  The heart is pretty well encased in this mass or the pericardium is thickened.  I believe this would make accessing the  fluid challenging.    Addendum: I spoke with Dr. Barlow and she recommends that she be admitted to the hospital for inpatient workup to expedite care.  I spoke with the patient and she is agreeable.  I will call A to admit.     Apparently there is some problem and she has not going to be admitted.  I told her to come to the emergency room if she has symptoms.  She will see Dr. Barlow on Monday    Arina Cohen MD  06/08/18  11:59 AM

## 2018-06-08 NOTE — NURSING NOTE
Randy Boo read the CT Chest.  He spoke with Dr. Cohen and patient was advised to go back to Dr. Sandoval office.  IV pulled.

## 2018-06-08 NOTE — PROGRESS NOTES
Procedure     ECG 12 Lead  Date/Time: 6/8/2018 10:12 AM  Performed by: EUGENIO TREVIÑO  Authorized by: EUGENIO TREVIÑO   Comparison: compared with previous ECG from 6/13/2017  Comparison to previous ECG: Sinus melissa  Rhythm: sinus rhythm  Rate: normal  QRS axis: normal  Clinical impression: normal ECG

## 2018-06-08 NOTE — PROGRESS NOTES
Subjective   Vikki Durham is a 23 y.o. female.     History of Present Illness   Here today to establish care, she has prev seen pcp Dr. Zhao, changing to this office. She c/o cough, started in  has gotten worse, she states cough loud, cough more at night, she has had trouble sleeping and breathing d/t cough, she has noticed wheezing, she denies asthma, she has seen allergist Dr. Lux, states asthma tests were normal, she has not yet followed up for cough, she saw allergist 1 month ago. She has not yet had cxr. She takes flonase and xyzal, takes daily, she was started on singular but did not begin. She has not be given inhaler. She denies sinus pressure, sore throat, eat pain, she does have HA, she does have seasonal allergies. She did have allergy testing. She has allergies to trees. She has productive cough, clear in color. She has not tried abx. She denies fever. She has had sweating. She is fasting today would like labs checked. She denies smoking. She does have night sweats at times. She is . She has noticed increasing belching.   She states menses is normal. She has never had pap, she does not take OCP. 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     The following portions of the patient's history were reviewed and updated as appropriate: allergies, current medications, past family history, past medical history, past social history, past surgical history and problem list.    Review of Systems   Constitutional: Positive for diaphoresis. Negative for chills and fever.   HENT: Negative for congestion, ear pain, sinus pressure and sore throat.    Eyes: Negative for photophobia and visual disturbance.   Respiratory: Positive for cough, shortness of breath and wheezing.    Cardiovascular: Negative for chest pain.   Musculoskeletal: Negative for arthralgias and myalgias.   Skin: Negative for pallor.   Allergic/Immunologic: Negative for environmental allergies.   Neurological: Negative for dizziness,  seizures, syncope, weakness, light-headedness and headache.   All other systems reviewed and are negative.      Objective   Physical Exam   Constitutional: She is oriented to person, place, and time. She appears well-developed and well-nourished.   HENT:   Head: Normocephalic.   Right Ear: Tympanic membrane and ear canal normal.   Left Ear: Tympanic membrane and ear canal normal.   Nose: Nose normal.   Mouth/Throat: Uvula is midline, oropharynx is clear and moist and mucous membranes are normal.   Eyes: Pupils are equal, round, and reactive to light.   Neck: Normal range of motion.   Cardiovascular: Normal rate, regular rhythm, normal heart sounds and intact distal pulses.    Pulses:       Radial pulses are 2+ on the right side, and 2+ on the left side.        Dorsalis pedis pulses are 2+ on the right side, and 2+ on the left side.        Posterior tibial pulses are 2+ on the right side, and 2+ on the left side.   Pulmonary/Chest: Effort normal and breath sounds normal. No respiratory distress. She has no decreased breath sounds. She has no wheezes. She has no rhonchi.   Abdominal: Soft. Normal appearance and bowel sounds are normal. There is no hepatosplenomegaly. There is no tenderness.   Musculoskeletal: Normal range of motion.   Lymphadenopathy:     She has no cervical adenopathy.     She has no axillary adenopathy.   Neurological: She is alert and oriented to person, place, and time.   Skin: Skin is warm and dry.   Psychiatric: She has a normal mood and affect. Her behavior is normal.   Nursing note and vitals reviewed.    cxr 2v in office for persistent cough, shows obscurity of left lung, possible cardiomegaly, no comparison, awaiting stat radiology over read.     Assessment/Plan   Vikki was seen today for cough.    Diagnoses and all orders for this visit:    Persistent cough for 3 weeks or longer  -     CBC & Differential  -     Comprehensive Metabolic Panel  -     TSH  -     Lipid Panel  -     Vitamin D  25 Hydroxy  -     XR Chest 2 View  -     CBC Auto Differential  -     proBNP  -     CT chest w contrast; Future  -     ECG 12 Lead    Encounter to establish care  -     CBC & Differential  -     Comprehensive Metabolic Panel  -     TSH  -     Lipid Panel  -     Vitamin D 25 Hydroxy  -     XR Chest 2 View  -     CBC Auto Differential    Seasonal allergic rhinitis, unspecified chronicity, unspecified trigger  -     CBC & Differential  -     Comprehensive Metabolic Panel  -     TSH  -     Lipid Panel  -     Vitamin D 25 Hydroxy  -     XR Chest 2 View  -     CBC Auto Differential    Allergic sinusitis  -     montelukast (SINGULAIR) 10 MG tablet; Take 1 tablet by mouth Every Night.    Abnormal CXR  -     Adult Transthoracic Echo Limited W/ Cont if Necessary Per Protocol; Future  -     proBNP  -     CT chest w contrast; Future  -     ECG 12 Lead    SOB (shortness of breath)  -     CT chest w contrast; Future  -     ECG 12 Lead      Stat cxr today,   Cbc,cmp, tsh today will call with results.   ekg in office WNL.   CT chest with contrast will call with appt.   Patient sent to chest pain unit at [facility] for abnormal cxr, SOA,copy of ekg given ,report called.   Low acid diet,sit upright 1 hour after meals.   Recommend pap, over due, she will call her mother's GYN for appt.   Cont f/u with allergist.   Increase fluid intake, get plenty of rest.   She was advised to go to the ER with any worsening symptoms, cough, SOA.   Patient agrees with plan of care and understands instructions. Call if worsening symptoms or any problems or concerns.       Call from Dr. Padilla ,radiologist cxr shows lymphadenopathy, recommend stat CT chest. Patient already at chest pain unit, Called Dr. Noel durán cardiology informed about patient's cxr prelim, she will make CT chest stat order, pending results will refer to oncology if needed.

## 2018-06-11 ENCOUNTER — APPOINTMENT (OUTPATIENT)
Dept: MRI IMAGING | Facility: HOSPITAL | Age: 23
End: 2018-06-11

## 2018-06-11 ENCOUNTER — TELEPHONE (OUTPATIENT)
Dept: FAMILY MEDICINE CLINIC | Facility: CLINIC | Age: 23
End: 2018-06-11

## 2018-06-11 ENCOUNTER — HOSPITAL ENCOUNTER (INPATIENT)
Facility: HOSPITAL | Age: 23
LOS: 10 days | Discharge: HOME | End: 2018-06-21
Attending: INTERNAL MEDICINE | Admitting: INTERNAL MEDICINE

## 2018-06-11 ENCOUNTER — APPOINTMENT (OUTPATIENT)
Dept: CT IMAGING | Facility: HOSPITAL | Age: 23
End: 2018-06-11

## 2018-06-11 ENCOUNTER — APPOINTMENT (OUTPATIENT)
Dept: ONCOLOGY | Facility: CLINIC | Age: 23
End: 2018-06-11

## 2018-06-11 ENCOUNTER — LAB (OUTPATIENT)
Dept: LAB | Facility: HOSPITAL | Age: 23
End: 2018-06-11

## 2018-06-11 ENCOUNTER — CONSULT (OUTPATIENT)
Dept: ONCOLOGY | Facility: CLINIC | Age: 23
End: 2018-06-11

## 2018-06-11 VITALS
DIASTOLIC BLOOD PRESSURE: 52 MMHG | TEMPERATURE: 98.3 F | HEART RATE: 72 BPM | HEIGHT: 62 IN | OXYGEN SATURATION: 96 % | WEIGHT: 146.4 LBS | BODY MASS INDEX: 26.94 KG/M2 | RESPIRATION RATE: 16 BRPM | SYSTOLIC BLOOD PRESSURE: 98 MMHG

## 2018-06-11 DIAGNOSIS — J98.59 MEDIASTINAL MASS: Primary | ICD-10-CM

## 2018-06-11 DIAGNOSIS — R79.89 ABNORMAL CBC: Primary | ICD-10-CM

## 2018-06-11 DIAGNOSIS — C85.28 MEDIASTINAL LARGE B-CELL LYMPHOMA OF LYMPH NODES OF MULTIPLE REGIONS (HCC): ICD-10-CM

## 2018-06-11 DIAGNOSIS — R59.1 LYMPHADENOPATHY: Primary | ICD-10-CM

## 2018-06-11 PROBLEM — R91.8 LUNG MASS: Status: ACTIVE | Noted: 2018-06-11

## 2018-06-11 PROBLEM — M54.6 THORACIC BACK PAIN: Status: ACTIVE | Noted: 2018-06-11

## 2018-06-11 PROBLEM — R06.09 DYSPNEA ON EXERTION: Status: ACTIVE | Noted: 2018-06-11

## 2018-06-11 PROBLEM — R00.2 PALPITATION: Status: ACTIVE | Noted: 2018-06-11

## 2018-06-11 LAB
ALBUMIN SERPL-MCNC: 3.9 G/DL (ref 3.5–5.2)
ALBUMIN/GLOB SERPL: 1.3 G/DL
ALP SERPL-CCNC: 58 U/L (ref 39–117)
ALT SERPL W P-5'-P-CCNC: 24 U/L (ref 1–33)
ANION GAP SERPL CALCULATED.3IONS-SCNC: 15 MMOL/L
AST SERPL-CCNC: 28 U/L (ref 1–32)
BASOPHILS # BLD AUTO: 0.02 10*3/MM3 (ref 0–0.2)
BASOPHILS # BLD AUTO: 0.03 10*3/MM3 (ref 0–0.1)
BASOPHILS NFR BLD AUTO: 0.4 % (ref 0–1.5)
BASOPHILS NFR BLD AUTO: 0.6 % (ref 0–1.1)
BILIRUB SERPL-MCNC: 0.4 MG/DL (ref 0.1–1.2)
BUN BLD-MCNC: 15 MG/DL (ref 6–20)
BUN/CREAT SERPL: 20 (ref 7–25)
CALCIUM SPEC-SCNC: 10.3 MG/DL (ref 8.6–10.5)
CHLORIDE SERPL-SCNC: 99 MMOL/L (ref 98–107)
CO2 SERPL-SCNC: 26 MMOL/L (ref 22–29)
CREAT BLD-MCNC: 0.75 MG/DL (ref 0.57–1)
DEPRECATED RDW RBC AUTO: 43 FL (ref 37–49)
DEPRECATED RDW RBC AUTO: 44.7 FL (ref 37–54)
EOSINOPHIL # BLD AUTO: 0.07 10*3/MM3 (ref 0–0.7)
EOSINOPHIL # BLD AUTO: 0.11 10*3/MM3 (ref 0–0.36)
EOSINOPHIL NFR BLD AUTO: 1.5 % (ref 0.3–6.2)
EOSINOPHIL NFR BLD AUTO: 2.3 % (ref 1–5)
ERYTHROCYTE [DISTWIDTH] IN BLOOD BY AUTOMATED COUNT: 13.2 % (ref 11.7–14.5)
ERYTHROCYTE [DISTWIDTH] IN BLOOD BY AUTOMATED COUNT: 13.6 % (ref 11.7–13)
GFR SERPL CREATININE-BSD FRML MDRD: 116 ML/MIN/1.73
GFR SERPL CREATININE-BSD FRML MDRD: 96 ML/MIN/1.73
GLOBULIN UR ELPH-MCNC: 3 GM/DL
GLUCOSE BLD-MCNC: 82 MG/DL (ref 65–99)
HCT VFR BLD AUTO: 40.8 % (ref 34–45)
HCT VFR BLD AUTO: 41.9 % (ref 35.6–45.5)
HGB BLD-MCNC: 13.7 G/DL (ref 11.5–14.9)
HGB BLD-MCNC: 13.7 G/DL (ref 11.9–15.5)
IMM GRANULOCYTES # BLD: 0.02 10*3/MM3 (ref 0–0.03)
IMM GRANULOCYTES # BLD: 0.04 10*3/MM3 (ref 0–0.03)
IMM GRANULOCYTES NFR BLD: 0.4 % (ref 0–0.5)
IMM GRANULOCYTES NFR BLD: 0.8 % (ref 0–0.5)
INR PPP: 1.06 (ref 0.9–1.1)
LYMPHOCYTES # BLD AUTO: 0.28 10*3/MM3 (ref 1–3.5)
LYMPHOCYTES # BLD AUTO: 0.61 10*3/MM3 (ref 0.9–4.8)
LYMPHOCYTES NFR BLD AUTO: 13.2 % (ref 19.6–45.3)
LYMPHOCYTES NFR BLD AUTO: 5.8 % (ref 20–49)
MCH RBC QN AUTO: 29.5 PG (ref 26.9–32)
MCH RBC QN AUTO: 29.7 PG (ref 27–33)
MCHC RBC AUTO-ENTMCNC: 32.7 G/DL (ref 32.4–36.3)
MCHC RBC AUTO-ENTMCNC: 33.6 G/DL (ref 32–35)
MCV RBC AUTO: 88.5 FL (ref 83–97)
MCV RBC AUTO: 90.3 FL (ref 80.5–98.2)
MONOCYTES # BLD AUTO: 0.53 10*3/MM3 (ref 0.2–1.2)
MONOCYTES # BLD AUTO: 0.8 10*3/MM3 (ref 0.25–0.8)
MONOCYTES NFR BLD AUTO: 11.5 % (ref 5–12)
MONOCYTES NFR BLD AUTO: 16.6 % (ref 4–12)
NEUTROPHILS # BLD AUTO: 3.39 10*3/MM3 (ref 1.9–8.1)
NEUTROPHILS # BLD AUTO: 3.56 10*3/MM3 (ref 1.5–7)
NEUTROPHILS NFR BLD AUTO: 73.4 % (ref 42.7–76)
NEUTROPHILS NFR BLD AUTO: 73.9 % (ref 39–75)
NRBC BLD MANUAL-RTO: 0 /100 WBC (ref 0–0)
PLATELET # BLD AUTO: 110 10*3/MM3 (ref 150–375)
PLATELET # BLD AUTO: 125 10*3/MM3 (ref 140–500)
PMV BLD AUTO: 11.2 FL (ref 6–12)
PMV BLD AUTO: 11.5 FL (ref 8.9–12.1)
POTASSIUM BLD-SCNC: 3.9 MMOL/L (ref 3.5–5.2)
PROT SERPL-MCNC: 6.9 G/DL (ref 6–8.5)
PROTHROMBIN TIME: 13.6 SECONDS (ref 11.7–14.2)
RBC # BLD AUTO: 4.61 10*6/MM3 (ref 3.9–5)
RBC # BLD AUTO: 4.64 10*6/MM3 (ref 3.9–5.2)
SODIUM BLD-SCNC: 140 MMOL/L (ref 136–145)
TROPONIN T SERPL-MCNC: <0.01 NG/ML (ref 0–0.03)
WBC NRBC COR # BLD: 4.62 10*3/MM3 (ref 4.5–10.7)
WBC NRBC COR # BLD: 4.82 10*3/MM3 (ref 4–10)

## 2018-06-11 PROCEDURE — 93010 ELECTROCARDIOGRAM REPORT: CPT | Performed by: INTERNAL MEDICINE

## 2018-06-11 PROCEDURE — 85610 PROTHROMBIN TIME: CPT | Performed by: INTERNAL MEDICINE

## 2018-06-11 PROCEDURE — 80053 COMPREHEN METABOLIC PANEL: CPT | Performed by: INTERNAL MEDICINE

## 2018-06-11 PROCEDURE — 0 GADOBENATE DIMEGLUMINE 529 MG/ML SOLUTION: Performed by: INTERNAL MEDICINE

## 2018-06-11 PROCEDURE — 72157 MRI CHEST SPINE W/O & W/DYE: CPT

## 2018-06-11 PROCEDURE — 99223 1ST HOSP IP/OBS HIGH 75: CPT | Performed by: NURSE PRACTITIONER

## 2018-06-11 PROCEDURE — 99214 OFFICE O/P EST MOD 30 MIN: CPT | Performed by: INTERNAL MEDICINE

## 2018-06-11 PROCEDURE — 0WBC3ZX EXCISION OF MEDIASTINUM, PERCUTANEOUS APPROACH, DIAGNOSTIC: ICD-10-PCS | Performed by: RADIOLOGY

## 2018-06-11 PROCEDURE — 25010000002 IOPAMIDOL 61 % SOLUTION: Performed by: INTERNAL MEDICINE

## 2018-06-11 PROCEDURE — 74177 CT ABD & PELVIS W/CONTRAST: CPT

## 2018-06-11 PROCEDURE — 36415 COLL VENOUS BLD VENIPUNCTURE: CPT | Performed by: INTERNAL MEDICINE

## 2018-06-11 PROCEDURE — 93005 ELECTROCARDIOGRAM TRACING: CPT | Performed by: INTERNAL MEDICINE

## 2018-06-11 PROCEDURE — A9577 INJ MULTIHANCE: HCPCS | Performed by: INTERNAL MEDICINE

## 2018-06-11 PROCEDURE — 85025 COMPLETE CBC W/AUTO DIFF WBC: CPT | Performed by: INTERNAL MEDICINE

## 2018-06-11 PROCEDURE — 84484 ASSAY OF TROPONIN QUANT: CPT | Performed by: INTERNAL MEDICINE

## 2018-06-11 RX ORDER — ONDANSETRON 2 MG/ML
4 INJECTION INTRAMUSCULAR; INTRAVENOUS EVERY 6 HOURS PRN
Status: DISCONTINUED | OUTPATIENT
Start: 2018-06-11 | End: 2018-06-21 | Stop reason: HOSPADM

## 2018-06-11 RX ORDER — DIAZEPAM 5 MG/1
5 TABLET ORAL
Status: DISCONTINUED | OUTPATIENT
Start: 2018-06-11 | End: 2018-06-14

## 2018-06-11 RX ORDER — DIAZEPAM 5 MG/ML
5 INJECTION, SOLUTION INTRAMUSCULAR; INTRAVENOUS ONCE
Status: DISCONTINUED | OUTPATIENT
Start: 2018-06-11 | End: 2018-06-11

## 2018-06-11 RX ORDER — ONDANSETRON 4 MG/1
4 TABLET, ORALLY DISINTEGRATING ORAL EVERY 6 HOURS PRN
Status: DISCONTINUED | OUTPATIENT
Start: 2018-06-11 | End: 2018-06-21 | Stop reason: HOSPADM

## 2018-06-11 RX ORDER — ALBUTEROL SULFATE 2.5 MG/3ML
2.5 SOLUTION RESPIRATORY (INHALATION) EVERY 6 HOURS PRN
Status: DISCONTINUED | OUTPATIENT
Start: 2018-06-11 | End: 2018-06-21 | Stop reason: HOSPADM

## 2018-06-11 RX ORDER — SODIUM CHLORIDE 0.9 % (FLUSH) 0.9 %
1-10 SYRINGE (ML) INJECTION AS NEEDED
Status: DISCONTINUED | OUTPATIENT
Start: 2018-06-11 | End: 2018-06-21 | Stop reason: HOSPADM

## 2018-06-11 RX ORDER — SENNA AND DOCUSATE SODIUM 50; 8.6 MG/1; MG/1
2 TABLET, FILM COATED ORAL 2 TIMES DAILY PRN
Status: DISCONTINUED | OUTPATIENT
Start: 2018-06-11 | End: 2018-06-21 | Stop reason: HOSPADM

## 2018-06-11 RX ORDER — NITROGLYCERIN 0.4 MG/1
0.4 TABLET SUBLINGUAL
Status: DISCONTINUED | OUTPATIENT
Start: 2018-06-11 | End: 2018-06-13

## 2018-06-11 RX ORDER — SODIUM CHLORIDE 9 MG/ML
100 INJECTION, SOLUTION INTRAVENOUS CONTINUOUS
Status: DISCONTINUED | OUTPATIENT
Start: 2018-06-12 | End: 2018-06-12

## 2018-06-11 RX ORDER — HYDROCODONE BITARTRATE AND ACETAMINOPHEN 5; 325 MG/1; MG/1
1 TABLET ORAL EVERY 4 HOURS PRN
Status: DISCONTINUED | OUTPATIENT
Start: 2018-06-11 | End: 2018-06-21 | Stop reason: HOSPADM

## 2018-06-11 RX ORDER — ONDANSETRON 4 MG/1
4 TABLET, FILM COATED ORAL EVERY 6 HOURS PRN
Status: DISCONTINUED | OUTPATIENT
Start: 2018-06-11 | End: 2018-06-21 | Stop reason: HOSPADM

## 2018-06-11 RX ORDER — ACETAMINOPHEN 325 MG/1
650 TABLET ORAL EVERY 4 HOURS PRN
Status: DISCONTINUED | OUTPATIENT
Start: 2018-06-11 | End: 2018-06-21 | Stop reason: HOSPADM

## 2018-06-11 RX ADMIN — DIAZEPAM 5 MG: 5 TABLET ORAL at 15:36

## 2018-06-11 RX ADMIN — GADOBENATE DIMEGLUMINE 13 ML: 529 INJECTION, SOLUTION INTRAVENOUS at 16:27

## 2018-06-11 RX ADMIN — IOPAMIDOL 85 ML: 612 INJECTION, SOLUTION INTRAVENOUS at 17:10

## 2018-06-11 NOTE — CONSULTS
Consults    Patient Care Team:  Carla Zhao MD as PCP - General (Internal Medicine)  BRANNON Maier as Nurse Practitioner (Nurse Practitioner)  Millie Padilla MD as Consulting Physician (Hematology and Oncology)  Arina Cohen MD as Referring Physician (Cardiology)    No chief complaint on file.      Subjective     History of Present Illness     Patient is a 23-year-old female with a history of progressive dyspnea with exertion.  She was directly admitted to [facility] today by Dr. Millie Padilla from [facility] outpatient clinic, after a CT scan demonstrated large mediastinal mass with left bronchus narrowing.  She reports dyspnea on a on exertion that has been worsening and a nonproductive cough.  She denies a fever, but reports night sweats and chills.  She has occasional heart palpitations which occur with exertion.  She also complains of occasional chest pressure which resolves spontaneously and is not related to dyspnea or diaphoresis.  She denies arm or neck pain.  She does report back pain in her thoracic region that does not radiate.  There is no numbness tingling or weakness in her arms and legs.    We have been consulted to see this patient and evaluate best possible method for tissue diagnosis.    Review of Systems   Constitutional: Positive for chills. Negative for activity change, appetite change, diaphoresis, fever and unexpected weight change.   HENT: Negative.    Eyes: Negative.    Respiratory: Positive for cough, chest tightness and shortness of breath. Negative for apnea, choking and wheezing.    Cardiovascular: Positive for chest pain and palpitations. Negative for leg swelling.   Gastrointestinal: Negative.    Genitourinary: Negative.    Musculoskeletal: Negative.    Skin: Negative.    Neurological: Negative.    Psychiatric/Behavioral: Negative.         Patient Active Problem List   Diagnosis   • Health care maintenance   • Bunion of great toe of left foot   • Low  HDL (under 40)   • Persistent cough for 3 weeks or longer   • Allergic sinusitis   • Mediastinal mass   • Lymphadenopathy   • Thoracic back pain   • Dyspnea on exertion   • Palpitation     Past Medical History:   Diagnosis Date   • Fracture of lower leg 2000    L     History reviewed. No pertinent surgical history.  Family History   Problem Relation Age of Onset   • Thyroid disease Mother    • Glaucoma Mother    • Lung cancer Maternal Grandmother    • Hypertension Paternal Grandmother    • Diabetes Paternal Grandmother      Social History     Social History   • Marital status: Single     Spouse name: N/A   • Number of children: N/A   • Years of education: N/A     Occupational History   • Not on file.     Social History Main Topics   • Smoking status: Never Smoker   • Smokeless tobacco: Never Used   • Alcohol use No   • Drug use: No   • Sexual activity: No     Other Topics Concern   • Not on file     Social History Narrative   • No narrative on file     Facility-Administered Medications Prior to Admission   Medication Dose Route Frequency Provider Last Rate Last Dose   • sodium chloride 0.9 % flush 10 mL  10 mL Intravenous PRN Arina Cohen MD       • [DISCONTINUED] nitroglycerin (NITROSTAT) SL tablet 0.4 mg  0.4 mg Sublingual Q5 Min PRN Arina Cohen MD         No prescriptions prior to admission.     No Known Allergies    Objective      Vital Signs  Temp:  [98.2 °F (36.8 °C)-98.3 °F (36.8 °C)] 98.2 °F (36.8 °C)  Heart Rate:  [72-79] 79  Resp:  [16] 16  BP: ()/(52-71) 117/71    Intake & Output (last day)     None          Physical Exam   Constitutional: She is oriented to person, place, and time. She appears well-developed and well-nourished. No distress.   HENT:   Head: Normocephalic and atraumatic.   Eyes: Conjunctivae and EOM are normal. Pupils are equal, round, and reactive to light.   Neck: Normal range of motion. Neck supple.   Cardiovascular: Normal rate, regular rhythm, normal heart sounds and  intact distal pulses.    No murmur heard.  Pulmonary/Chest: Effort normal. No respiratory distress. She has decreased breath sounds in the left upper field and the left lower field. She has no wheezes. She has no rhonchi. She has no rales.   Abdominal: Soft. She exhibits no distension and no mass.   Musculoskeletal: Normal range of motion.   Neurological: She is alert and oriented to person, place, and time.   Skin: Skin is warm and dry. She is not diaphoretic.   Psychiatric: She has a normal mood and affect. Her behavior is normal. Judgment and thought content normal.       Results Review:    I reviewed the patient's new clinical results.  I reviewed the patient's new imaging results and agree with the interpretation.  Discussed with the patient and her mother, the RN at the bedside and Dr. Aragon.    Imaging Results (last 24 hours)     ** No results found for the last 24 hours. **          Lab Results:  Lab Results (last 24 hours)     Procedure Component Value Units Date/Time    Protime-INR [[id number]]  (Normal) Collected:  06/11/18 1508    Specimen:  Blood Updated:  06/11/18 1533     Protime 13.6 Seconds      INR 1.06    CBC Auto Differential [[id number]]  (Abnormal) Collected:  06/11/18 1508    Specimen:  Blood Updated:  06/11/18 1523     WBC 4.62 10*3/mm3      RBC 4.64 10*6/mm3      Hemoglobin 13.7 g/dL      Hematocrit 41.9 %      MCV 90.3 fL      MCH 29.5 pg      MCHC 32.7 g/dL      RDW 13.6 (H) %      RDW-SD 44.7 fl      MPV 11.2 fL      Platelets 125 (L) 10*3/mm3      Neutrophil % 73.4 %      Lymphocyte % 13.2 (L) %      Monocyte % 11.5 %      Eosinophil % 1.5 %      Basophil % 0.4 %      Immature Grans % 0.4 %      Neutrophils, Absolute 3.39 10*3/mm3      Lymphocytes, Absolute 0.61 (L) 10*3/mm3      Monocytes, Absolute 0.53 10*3/mm3      Eosinophils, Absolute 0.07 10*3/mm3      Basophils, Absolute 0.02 10*3/mm3      Immature Grans, Absolute 0.02 10*3/mm3     Comprehensive Metabolic Panel [[id number]]  Collected:  06/11/18 1508    Specimen:  Blood Updated:  06/11/18 1522    Troponin [[id number]] Collected:  06/11/18 1508    Specimen:  Blood Updated:  06/11/18 1522              Assessment/Plan     Principal Problem:    Mediastinal mass  Active Problems:    Thoracic back pain    Dyspnea on exertion    Palpitation      Assessment:    Condition: In stable condition.       Plan:   CT abdomen (CT guided needle biopsy of mediastinum, left lung).  Administer medications as ordered.       Patient appears comfortable and is sitting up in bed, but does exhibit some anxiety, as does her mother at the bedside.    I have discussed the CT chest imaging with Dr. Aragon, and I have ordered a CT-guided needle biopsy of the mediastinum left lung, for further evaluation, labs and tissue pathology.      I discussed the patients findings and our recommendations with patient, family, nursing staff and Dr. Aragon.    Thank you for this consult and allowing us to participate in the care of your patient.  We will follow along with you during this hospitalization.       BRANNON Walton  [facility]  06/11/18  3:36 PM

## 2018-06-11 NOTE — H&P
Name: Vikki Durham ADMIT: 2018   : 1995  PCP: Malorie Zhao MD    MRN: [id number] LOS: 0 days   AGE/SEX: 23 y.o. female  ROOM: [facility]/     No chief complaint on file.  lung mass/dyspnea    Subjective   Ms. Durham is a 23 y.o. female with a history of progressive dyspnea with exertion and found to have large mediastinal mass with left bronchus narrowing who is directly admitted from [facility] clinic to [facility]. She reports dyspnea when walking about 5 minutes and has been worsening. She has nonproductive cough. No orthpnea, pnd, or edema. She has not had measured fever but has had night sweats and chills. No LAD noticed and no weight loss. She reports occasional brief chest pressure which resolves spontaneously and is no related with dyspnea or diaphoresis. She has no arm or neck pain. SHe has occasional palpitations which occur when she exerts herself. She also reports back pain in thoracic area, non-radiating. No arm or leg numbness/paresthesias. No weakness.    History of Present Illness    Past Medical History:   Diagnosis Date   • Fracture of lower leg     L     History reviewed. No pertinent surgical history.  Family History   Problem Relation Age of Onset   • Thyroid disease Mother    • Glaucoma Mother    • Lung cancer Maternal Grandmother    • Hypertension Paternal Grandmother    • Diabetes Paternal Grandmother      Social History   Substance Use Topics   • Smoking status: Never Smoker   • Smokeless tobacco: Never Used   • Alcohol use No     Facility-Administered Medications Prior to Admission   Medication Dose Route Frequency Provider Last Rate Last Dose   • sodium chloride 0.9 % flush 10 mL  10 mL Intravenous PRN Arina Cohen MD       • [DISCONTINUED] nitroglycerin (NITROSTAT) SL tablet 0.4 mg  0.4 mg Sublingual Q5 Min PRN Arina Cohen MD         No prescriptions prior to admission.     Allergies:  No Known Allergies    Review of Systems   Constitutional: Positive for  chills. Negative for fever.   HENT: Negative for sore throat and trouble swallowing.    Eyes: Negative for pain and visual disturbance.   Respiratory: Positive for cough and shortness of breath. Negative for wheezing.    Cardiovascular: Positive for chest pain and palpitations. Negative for leg swelling.   Gastrointestinal: Negative for abdominal distention, abdominal pain, constipation, diarrhea, nausea and vomiting.   Endocrine: Negative for cold intolerance and heat intolerance.   Genitourinary: Negative for difficulty urinating and dysuria.   Musculoskeletal: Negative for neck pain and neck stiffness.   Skin: Negative for pallor and rash.   Allergic/Immunologic: Negative for environmental allergies and food allergies.   Neurological: Negative for seizures and syncope.   Hematological: Negative for adenopathy. Does not bruise/bleed easily.   Psychiatric/Behavioral: Negative for agitation and confusion.        Objective    Vital Signs  Temp:  [98.2 °F (36.8 °C)-98.3 °F (36.8 °C)] 98.2 °F (36.8 °C)  Heart Rate:  [72-79] 79  Resp:  [16] 16  BP: ()/(52-71) 117/71  SpO2:  [95 %-96 %] 95 %  on   ;      There is no height or weight on file to calculate BMI.    Physical Exam   Constitutional: She is oriented to person, place, and time. She appears well-developed. No distress.   HENT:   Head: Normocephalic and atraumatic.   Nose: Nose normal.   Eyes: Conjunctivae and EOM are normal. No scleral icterus.   Neck: Normal range of motion. Neck supple.   Cardiovascular: Normal rate, regular rhythm and intact distal pulses.    No murmur heard.  Pulmonary/Chest: Effort normal. She has no wheezes. She has rhonchi. She has rales (left sided).   Abdominal: Soft. Bowel sounds are normal. She exhibits no distension and no mass. There is no tenderness.   Musculoskeletal: Normal range of motion. She exhibits no edema.   Neurological: She is alert and oriented to person, place, and time. No cranial nerve deficit.   Skin: Skin is  warm and dry. She is not diaphoretic.   Psychiatric: She has a normal mood and affect. Her behavior is normal.   Nursing note and vitals reviewed.      Results Review:   I reviewed the patient's new clinical results. Reviewed imaging, agree with interpretation. Reviewed telemetry, sinus rhythm. Reviewed prior records.    Lab Results (last 24 hours)     Procedure Component Value Units Date/Time    CBC & Differential [[id number]] Collected:  06/11/18 1112    Specimen:  Blood Updated:  06/11/18 1126    Narrative:       The following orders were created for panel order CBC & Differential.  Procedure                               Abnormality         Status                     ---------                               -----------         ------                     CBC Auto Differential[[id number]]        Abnormal            Final result                 Please view results for these tests on the individual orders.    CBC Auto Differential [[id number]]  (Abnormal) Collected:  06/11/18 1112    Specimen:  Blood Updated:  06/11/18 1126     WBC 4.82 10*3/mm3      RBC 4.61 10*6/mm3      Hemoglobin 13.7 g/dL      Hematocrit 40.8 %      MCV 88.5 fL      MCH 29.7 pg      MCHC 33.6 g/dL      RDW 13.2 %      RDW-SD 43.0 fl      MPV 11.5 fL      Platelets 110 (L) 10*3/mm3      Neutrophil % 73.9 %      Lymphocyte % 5.8 (L) %      Monocyte % 16.6 (H) %      Eosinophil % 2.3 %      Basophil % 0.6 %      Immature Grans % 0.8 (H) %      Neutrophils, Absolute 3.56 10*3/mm3      Lymphocytes, Absolute 0.28 (L) 10*3/mm3      Monocytes, Absolute 0.80 10*3/mm3      Eosinophils, Absolute 0.11 10*3/mm3      Basophils, Absolute 0.03 10*3/mm3      Immature Grans, Absolute 0.04 (H) 10*3/mm3      nRBC 0.0 /100 WBC           CT Abdomen Pelvis With Contrast    (Results Pending)   MRI Thoracic Spine With & Without Contrast    (Results Pending)     Assessment/Plan      Active Hospital Problems (** Indicates Principal Problem)    Diagnosis Date Noted   •  **Mediastinal mass [J98.59] 06/11/2018   • Thoracic back pain [M54.6] 06/11/2018   • Dyspnea on exertion [R06.09] 06/11/2018   • Palpitation [R00.2] 06/11/2018      Resolved Hospital Problems    Diagnosis Date Noted Date Resolved   No resolved problems to display.     - Mediastinal Mass: Left bronchial compression and LAD on CT chest. Discussed with Dr Padilla and will obtain CT abdomen/pelvis for staging and MRI thoracic spine to investigate her back pain. Monitor on telemetry atleast overnight. Consult Oncology and Thoracic Surgery.  - Palpitations/CP: She has pericardial effusion but no tamponade which could be contributing. Will check EKG and check troponin. Consult Cardiology.  - PPx MADDY/SCD  - Full Code    I discussed the patients findings and my recommendations with patient, family, nursing staff and consulting provider.    Miguel Cerrato MD  [facility]  06/11/18  2:35 PM

## 2018-06-11 NOTE — TELEPHONE ENCOUNTER
----- Message from BRANNON Maier sent at 6/11/2018  9:28 AM EDT -----  Please call patient with results. Tried to call patient this morning, I can speak with her if she would like, BNP normal. Vit D low end of normal, begin MV daily OTC, thyroid normal, BS, kidney and liver func is normal, CHOL normal, blood count low, cont [facility] at [facility]. F/u as needed and call with any questions.    LMTRC

## 2018-06-12 ENCOUNTER — APPOINTMENT (OUTPATIENT)
Dept: CT IMAGING | Facility: HOSPITAL | Age: 23
End: 2018-06-12

## 2018-06-12 ENCOUNTER — DOCUMENTATION (OUTPATIENT)
Dept: ONCOLOGY | Facility: CLINIC | Age: 23
End: 2018-06-12

## 2018-06-12 LAB
ANION GAP SERPL CALCULATED.3IONS-SCNC: 14 MMOL/L
B-HCG UR QL: NEGATIVE
BASOPHILS # BLD AUTO: 0.01 10*3/MM3 (ref 0–0.2)
BASOPHILS NFR BLD AUTO: 0.2 % (ref 0–1.5)
BUN BLD-MCNC: 14 MG/DL (ref 6–20)
BUN/CREAT SERPL: 17.3 (ref 7–25)
CALCIUM SPEC-SCNC: 10.1 MG/DL (ref 8.6–10.5)
CHLORIDE SERPL-SCNC: 99 MMOL/L (ref 98–107)
CO2 SERPL-SCNC: 27 MMOL/L (ref 22–29)
CREAT BLD-MCNC: 0.81 MG/DL (ref 0.57–1)
CRP SERPL-MCNC: 1.23 MG/DL (ref 0–0.5)
DEPRECATED RDW RBC AUTO: 45.1 FL (ref 37–54)
EOSINOPHIL # BLD AUTO: 0.11 10*3/MM3 (ref 0–0.7)
EOSINOPHIL NFR BLD AUTO: 2.7 % (ref 0.3–6.2)
ERYTHROCYTE [DISTWIDTH] IN BLOOD BY AUTOMATED COUNT: 13.4 % (ref 11.7–13)
ERYTHROCYTE [SEDIMENTATION RATE] IN BLOOD: 12 MM/HR (ref 0–20)
GFR SERPL CREATININE-BSD FRML MDRD: 106 ML/MIN/1.73
GFR SERPL CREATININE-BSD FRML MDRD: 88 ML/MIN/1.73
GLUCOSE BLD-MCNC: 91 MG/DL (ref 65–99)
HCT VFR BLD AUTO: 40.9 % (ref 35.6–45.5)
HGB BLD-MCNC: 13.2 G/DL (ref 11.9–15.5)
IMM GRANULOCYTES # BLD: 0 10*3/MM3 (ref 0–0.03)
IMM GRANULOCYTES NFR BLD: 0 % (ref 0–0.5)
LDH SERPL-CCNC: 1099 U/L (ref 135–214)
LYMPHOCYTES # BLD AUTO: 0.92 10*3/MM3 (ref 0.9–4.8)
LYMPHOCYTES NFR BLD AUTO: 22.3 % (ref 19.6–45.3)
MCH RBC QN AUTO: 29.7 PG (ref 26.9–32)
MCHC RBC AUTO-ENTMCNC: 32.3 G/DL (ref 32.4–36.3)
MCV RBC AUTO: 92.1 FL (ref 80.5–98.2)
MONOCYTES # BLD AUTO: 0.32 10*3/MM3 (ref 0.2–1.2)
MONOCYTES NFR BLD AUTO: 7.7 % (ref 5–12)
NEUTROPHILS # BLD AUTO: 2.77 10*3/MM3 (ref 1.9–8.1)
NEUTROPHILS NFR BLD AUTO: 67.1 % (ref 42.7–76)
PLATELET # BLD AUTO: 119 10*3/MM3 (ref 140–500)
PMV BLD AUTO: 11.2 FL (ref 6–12)
POTASSIUM BLD-SCNC: 4.4 MMOL/L (ref 3.5–5.2)
RBC # BLD AUTO: 4.44 10*6/MM3 (ref 3.9–5.2)
SODIUM BLD-SCNC: 140 MMOL/L (ref 136–145)
URATE SERPL-MCNC: 7 MG/DL (ref 2.4–5.7)
WBC NRBC COR # BLD: 4.13 10*3/MM3 (ref 4.5–10.7)

## 2018-06-12 PROCEDURE — 88305 TISSUE EXAM BY PATHOLOGIST: CPT | Performed by: NURSE PRACTITIONER

## 2018-06-12 PROCEDURE — 99233 SBSQ HOSP IP/OBS HIGH 50: CPT | Performed by: INTERNAL MEDICINE

## 2018-06-12 PROCEDURE — 88342 IMHCHEM/IMCYTCHM 1ST ANTB: CPT | Performed by: NURSE PRACTITIONER

## 2018-06-12 PROCEDURE — 77012 CT SCAN FOR NEEDLE BIOPSY: CPT

## 2018-06-12 PROCEDURE — 80048 BASIC METABOLIC PNL TOTAL CA: CPT | Performed by: INTERNAL MEDICINE

## 2018-06-12 PROCEDURE — 86140 C-REACTIVE PROTEIN: CPT | Performed by: INTERNAL MEDICINE

## 2018-06-12 PROCEDURE — 84550 ASSAY OF BLOOD/URIC ACID: CPT | Performed by: INTERNAL MEDICINE

## 2018-06-12 PROCEDURE — 85652 RBC SED RATE AUTOMATED: CPT | Performed by: INTERNAL MEDICINE

## 2018-06-12 PROCEDURE — 88341 IMHCHEM/IMCYTCHM EA ADD ANTB: CPT | Performed by: NURSE PRACTITIONER

## 2018-06-12 PROCEDURE — 85025 COMPLETE CBC W/AUTO DIFF WBC: CPT | Performed by: INTERNAL MEDICINE

## 2018-06-12 PROCEDURE — 83615 LACTATE (LD) (LDH) ENZYME: CPT | Performed by: INTERNAL MEDICINE

## 2018-06-12 PROCEDURE — 81025 URINE PREGNANCY TEST: CPT | Performed by: INTERNAL MEDICINE

## 2018-06-12 PROCEDURE — 99232 SBSQ HOSP IP/OBS MODERATE 35: CPT | Performed by: NURSE PRACTITIONER

## 2018-06-12 PROCEDURE — 99232 SBSQ HOSP IP/OBS MODERATE 35: CPT | Performed by: INTERNAL MEDICINE

## 2018-06-12 RX ORDER — SODIUM CHLORIDE 0.9 % (FLUSH) 0.9 %
1-10 SYRINGE (ML) INJECTION AS NEEDED
Status: DISCONTINUED | OUTPATIENT
Start: 2018-06-12 | End: 2018-06-13

## 2018-06-12 RX ORDER — DIAZEPAM 5 MG/1
5 TABLET ORAL ONCE
Status: COMPLETED | OUTPATIENT
Start: 2018-06-12 | End: 2018-06-12

## 2018-06-12 RX ORDER — LIDOCAINE HYDROCHLORIDE 10 MG/ML
10 INJECTION, SOLUTION INFILTRATION; PERINEURAL ONCE
Status: COMPLETED | OUTPATIENT
Start: 2018-06-12 | End: 2018-06-12

## 2018-06-12 RX ADMIN — DIAZEPAM 5 MG: 5 TABLET ORAL at 09:22

## 2018-06-12 RX ADMIN — LIDOCAINE HYDROCHLORIDE 10 ML: 10 INJECTION, SOLUTION INFILTRATION; PERINEURAL at 10:50

## 2018-06-12 RX ADMIN — SODIUM CHLORIDE 100 ML/HR: 9 INJECTION, SOLUTION INTRAVENOUS at 01:26

## 2018-06-12 NOTE — PLAN OF CARE
Problem: Patient Care Overview  Goal: Plan of Care Review  Outcome: Ongoing (interventions implemented as appropriate)   [id number]   Coping/Psychosocial   Plan of Care Reviewed With patient   Plan of Care Review   Progress no change   OTHER   Outcome Summary Pt w/ no c/o of pain or SOB. Some mild anxiety about procedures, able to cope and relax. CT guided biopsy scheduled for this AM. Mother at bedside. Rested well during night. NS @ 100ml/hr. Will continue to monitor.       Problem: Anxiety (Adult)  Goal: Reduction/Resolution  Outcome: Ongoing (interventions implemented as appropriate)      Problem: Coping, Compromised Family (Adult,NICU,[place],Obstetrics,Pediatric)  Goal: Effective Family Coping  Outcome: Ongoing (interventions implemented as appropriate)      Problem: Pain, Acute (Adult)  Goal: Acceptable Pain Control/Comfort Level  Outcome: Ongoing (interventions implemented as appropriate)

## 2018-06-12 NOTE — PROGRESS NOTES
Subjective HUGE MEDIASTINAL MASS, ALMOST STRIDOR, TREPOPNEA, LOW GRADE FEVER, NIGHT SWEATS, FATIGUE, LIKELY HODGKIN'S DISEASE.        History of Present Illness  This patient was admitted yesterday to the hospital in almost trepopnea and stridor, even a huge mediastinal mass that is arising and compromising the heart. She has been having nocturnal sweats for the last month associated with low-grade temperature, profound fatigue and persistent cough and dyspnea with minimal exertion. When the patient lies in bed, she feels like she is asphyxiating herself; that is the main reason for admission. When she feels in this way, her O2 saturation drops. In any event, the patient has undergone in the interim, an MRI scan of the spine and a CT scan of the abdomen and pelvis that will be discussed below. Also the patient had a CT-guided biopsy of the mass yesterday and we are hopefully pending for pathological analysis tomorrow.     Physically, the patient remains about the same with above symptomatology and I discussed this in length with her and her mother. The patient has not too much of an appetite. Her bowel activity is normal. Urination is normal. She has no pain. She denies any neurological symptomatology and she has not had any swelling in her face, neck, mouth or upper extremities.        Past Medical History:   Diagnosis Date   • Fracture of lower leg 2000    L     Social History     Social History   • Marital status: Single     Spouse name: N/A   • Number of children: N/A   • Years of education: N/A     Occupational History   • Not on file.     Social History Main Topics   • Smoking status: Never Smoker   • Smokeless tobacco: Never Used   • Alcohol use No   • Drug use: No   • Sexual activity: No     Other Topics Concern   • Not on file     Social History Narrative   • No narrative on file     Family History   Problem Relation Age of Onset   • Thyroid disease Mother    • Glaucoma Mother    • Lung cancer Maternal  Grandmother    • Hypertension Paternal Grandmother    • Diabetes Paternal Grandmother      Review of Systems   General: low grade fever, chills, fatigue, weight changes, or lack of appetite.  Eyes: no epiphora, xerophthalmia,conjunctivitis, pain, glaucoma, blurred vision, blindness, secretion, photophobia, proptosis, diplopia.  Ears: no otorrhea, tinnitus, otorrhagia, deafness, pain, vertigo.  Nose: no rhinorrhea, epistaxis, alteration in perception of odors, sinuses pressure.  Mouth: no alteration in gums or teeth,  ulcers, no difficulty with mastication or deglut ion, no odynophagia.  Neck: no masses or pain, no thyroid alterations, no pain in muscles or arteries, no carotid odynia, no crepitation.  Respiratory: chronic cough,no sputum production, significant dyspnea,and  trepopnea, no pleuritic pain, hemoptysis.  Heart: no syncope, irregularity, palpitations, angina, orthopnea, paroxysmal nocturnal dyspnea.  Vascular Venous: no tenderness,edema, palpable cords, postphlebitic syndrome, skin changes or ulcerations.  Vascular Arterial: no distal ischemia, claudication, gangrene, neuropathic ischemic pain, skin ulcers, paleness or cyanosis.  GI: no dysphagia, odynophagia, no regurgitation, no heartburn,no indigestion,no nausea,no vomiting,no hematemesis ,no melena,no jaundice,no distention, no obstipation,no enterorrhagia,no proctalgia,no anal  lesions, nochanges in bowel habits.  : no frequency, hesitancy, hematuria, discharge, pain.  Musculoskeletal: no muscle or tendon pain or inflammation, joint pain, edema, functional limitation, fasciculations, mass.  Neurologic: no headache, seizures, alterations on Craneal nerves, no motor or senssory deficit, normal coordination, no alteration in memory, orientation, calculation,writting, verbal or written language.  Skin: no rashes, pruritus or localized lesions.  Psychiatric: no anxiety, depression, agitation, delusions, proper insight.  A comprehensive 14 point review  of systems was performed and was negative except as mentioned.    Medications:  The current medication list was reviewed in the EMR    ALLERGIES:  No Known Allergies    Objective      Vitals:    06/12/18 1110 06/12/18 1155 06/12/18 1335 06/12/18 1503   BP: 107/67 106/63 112/65    BP Location: Left arm Right arm Right arm    Patient Position: Lying Lying Lying    Pulse: 72 74 77    Resp: 16 16 16    Temp:  97.7 °F (36.5 °C) 97.8 °F (36.6 °C)    TempSrc:  Oral Oral    SpO2: 97% 96% 95% 94%   Weight:       Height:         Current Status 6/11/2018   ECOG score 0       Physical Exam    GENERAL:  Well-developed, well-nourished  Patient  in no acute distress.   SKIN:  Warm, dry without rashes, purpura or petechiae.  HEENT:  Pupils were equal and reactive to light and accomodation, conjunctivas non injected, no pterigion, normal extraocular movements, normal visual acuity.   Mouth mucosa was moist, no exudates in oropharynx, normal gum line, normal roof of the mouth and pillars, normal papillations of the tongue.  NECK:  Supple with good range of motion; no thyromegaly or masses, no JVD or bruits, no cervical adenopathies.No carotid arteries pain, no carotid abnormal pulsation or arterial dance.  LYMPHATICS:  No cervical, supraclavicular, axillary, epitrochlear or inguinal adenopathy.  CHEST:  Normal excursion of both sarah beth thoraces, normal voice fremitus, no subcutaneous emphysema, normal axillas, no rashes or acanthosis nigricans. Lungs clear to percussion and auscultation, normal breath sounds bilaterally, no wheezing, crackles or ronchi, suggestion of  Stridor  Upon inspiration, no rubs.no breath sounds anteriorly on the left hemithorax  CARDIAC AND VASCULAR: normal rate and regular rhythm, without murmurs, rubs or S3 or S4 right or left sided gallops. Normal femoral, popliteal, pedis, brachial and carotid pulses.  ABDOMEN:  Soft, nontender with no organomegaly or masses, no ascites, no collateral circulation,no  distention,no Bristow sign, no abdominal pain, no inguinal hernias,no umbilical hernias, no abdominal bruits. Normal bowel sounds.  GENITAL: Not  Performed.  EXTREMITIES  AND SPINE:  No clubbing, cyanosis or edema, no deformities or pain .No kyphosis, scoliosis, deformities or pain in spine, ribs or pelvic bone.  NEUROLOGICAL:  Patient was awake, alert, oriented to time, person and place,normal gait and coordination,     RECENT LABS:  Hematology WBC   Date Value Ref Range Status   06/12/2018 4.13 (L) 4.50 - 10.70 10*3/mm3 Final     RBC   Date Value Ref Range Status   06/12/2018 4.44 3.90 - 5.20 10*6/mm3 Final     Hemoglobin   Date Value Ref Range Status   06/12/2018 13.2 11.9 - 15.5 g/dL Final     Hematocrit   Date Value Ref Range Status   06/12/2018 40.9 35.6 - 45.5 % Final     Platelets   Date Value Ref Range Status   06/12/2018 119 (L) 140 - 500 10*3/mm3 Final      ABDOMEN AND PELVIS CT WITH CONTRAST     HISTORY: Large chest mass. Evaluate for tumor in the abdomen or pelvis.     TECHNIQUE: Radiation dose reduction techniques were utilized, including  automated exposure control and exposure modulation based on body size.  Abdomen and pelvis CT was performed using IV contrast. This is  correlated with CT arteriogram chest performed a few days ago. Thoracic  MRI was performed today and is reported separately.     FINDINGS: There is a small left pleural effusion and a pericardial  effusion as seen previously. The spleen measures 10.3 x 6.6 cm axial and  10.7 cm long. The liver appears normal. Parenchyma of the pancreas,  adrenals, and kidneys appears normal. Uterus and ovaries are in situ and  have a normal CT appearance.     The stomach, small bowel, and large bowel appears normal. No bowel wall  thickening or bowel mass is identified.     There is no lymphadenopathy or mass identified anywhere in the abdomen  or pelvis. There is a tiny, normal volume of pelvic free fluid in the  cul-de-sac. Urinary bladder has a  normal CT appearance. The bones appear  normal. No retroperitoneal or body wall mass is present.     IMPRESSION:  Negative abdomen and pelvis CT.     This report was finalized on 6/12/2018 12:01 PM by Dr. Rodger Zhao M.D.  THORACIC SPINE MRI WITH AND WITHOUT CONTRAST     HISTORY: Back pain. Lung mass.     TECHNIQUE: Thoracic MRI with and without contrast is provided and  correlated with chest CT performed a few days ago.     FINDINGS: Thoracic vertebral body height, marrow signal, alignment, disc  height, and disc signal are normal. Axial images partially demonstrate  the large tumor in the mediastinum centered to the left of midline and  surrounding the aortic arch. The posterior edge of the lesion abuts  anterior left side of the spinal column in the upper thoracic region but  the cortex maintains normal low signal is no loss of distinctness of the  anterior longitudinal ligament. No abnormal enhancement is appreciated  in any of the vertebrae or the discs. The areas that had appeared as  sclerotic along the anterior and posterior edges of the lower cervical  and upper thoracic vertebrae, fairly uniform in appearance at multiple  levels, does not have a typical morphologic appearance for tumor on CT  and there is no corresponding abnormality on MRI. There is no marrow  replacement or enhancement at those levels.     The spinal cord is normal in caliber and signal. There is no abnormal  enhancement within the spinal canal. The discs appear normal at every  level in the canal and foramina are patent at all levels. There is no  tumor extending into the foramina.     IMPRESSION:  No abnormality is identified in the thoracic spine. Large  mediastinal mass is again observed.     This report was finalized on 6/11/2018 9:18 PM by Dr. Rodger Zhao  Assessment/Plan  I do believe that the clinical picture in this patient is consistent with Hodgkin disease. Obviously, impossible to rule out like mediastinal non-Hodgkin  lymphoma for example. A 3rd differential diagnosis would be a huge thymoma. The patient has mild degree of anemia, mild degree of thrombocytopenia, and this is not surprising. The patient has significant degree of fatigue and most importantly, she has trepopnea and almost stridor. When she lies in certain positions in bed, she becomes almost asphyxiated and this is not surprising. This tumor is also wrapping around her heart and producing almost a cardiac tamponade as well. She has no typical symptoms of this and she is not improving symptoms lying forward but it is almost to the point that that is where she feels in regard to symptomatology. There is no way that she can go home under these circumstances without a diagnosis and having the respiratory and cardiac complaints that we are facing.     I think we need to wait for definitive pathological analysis. Hopefully will be happening as soon as tomorrow and then we will decide what to do. I have discussed the case with Dr. Padilla. I have discussed the case with Dr. Aragon. The patient has indeed Hodgkin disease or a non-Hodgkin lymphoma; a port will be placed and appropriate chemotherapy will be initiated while the patient is in the hospital.     I had a lengthy discussion with the patient and her mother today in regard to all these issues and they are ready to proceed.     I am also going to order a PET scan to be done tomorrow if possible to be able to put this altogether. If insurance will not approve this to be done as an inpatient, we will do it after she receives the 1st round of chemotherapy as an outpatient.     I am going to go ahead and order a uric acid, an LDH and I am going to order as well a C-reactive protein and sedimentation rate. The pregnancy test on this patient was negative. I discussed with the mother and the patient the negative MRI scan of the spine for any metastasis or compromise of this anatomical structure and also I discussed with  them after reviewing the CT scan of the abdomen and pelvis that it shows no intraabdominal or pelvic abnormality.     I personally reviewed the CT scans in the PACS [facility].                        6/12/2018      CC:

## 2018-06-12 NOTE — PROGRESS NOTES
Name: Vikki Durham ADMIT: 2018   : 1995  PCP: Malorie Zhao MD    MRN: [id number] LOS: 1 days   AGE/SEX: 23 y.o. female  ROOM: [facility]   Subjective   Tolerated the procedure well. Still with dyspnea and nonproductive cough but that is unchanged. Having migraine which is typical for her at home. Says she remains in dark and usually does not need medication. No NVD. No CP or palpitations.    Objective   Vital Signs  Temp:  [97.7 °F (36.5 °C)-98.2 °F (36.8 °C)] 97.7 °F (36.5 °C)  Heart Rate:  [72-79] 74  Resp:  [16-18] 16  BP: (103-117)/(63-71) 106/63  SpO2:  [93 %-97 %] 96 %  on  Flow (L/min):  [1-2] 2;   Device (Oxygen Therapy): nasal cannula  Body mass index is 26.7 kg/m².    Physical Exam   Constitutional: She is oriented to person, place, and time. She appears well-developed. No distress.   HENT:   Head: Normocephalic and atraumatic.   Nose: Nose normal.   Eyes: Conjunctivae and EOM are normal.   Neck: Neck supple.   Cardiovascular: Normal rate, regular rhythm and intact distal pulses.    Pulmonary/Chest: Effort normal. No stridor. She has decreased breath sounds (Left sided). She has no wheezes. She has rales.   Abdominal: Soft. She exhibits no distension. There is no tenderness.   Musculoskeletal: Normal range of motion. She exhibits no edema.   Neurological: She is alert and oriented to person, place, and time.   Skin: Skin is warm and dry. She is not diaphoretic.   Psychiatric: She has a normal mood and affect. Her behavior is normal.   Nursing note and vitals reviewed.      Results Review:       I reviewed the patient's new clinical results. Reviewed imaging, agree with interpretation. Reviewed telemetry, sinus rhythm.    Results from last 7 days  Lab Units 18  0605 18  1508 18  1112 18  1039   WBC 10*3/mm3 4.13* 4.62 4.82 3.41*   HEMOGLOBIN g/dL 13.2 13.7 13.7 12.6   PLATELETS 10*3/mm3 119* 125* 110* 115*     Results from last 7 days  Lab Units 18  0605  06/11/18  1508 06/08/18  1039 06/08/18  0903   SODIUM mmol/L 140 140 140 142   POTASSIUM mmol/L 4.4 3.9 4.3 4.4   CHLORIDE mmol/L 99 99 102 101   CO2 mmol/L 27.0 26.0 24.6 26.9   BUN mg/dL 14 15 10 11   CREATININE mg/dL 0.81 0.75 0.76 0.81   GLUCOSE mg/dL 91 82 91 96   Estimated Creatinine Clearance: 96.3 mL/min (by C-G formula based on SCr of 0.81 mg/dL).  Results from last 7 days  Lab Units 06/12/18  0605 06/11/18  1508 06/08/18  1039 06/08/18  0903   CALCIUM mg/dL 10.1 10.3 9.5 9.6   ALBUMIN g/dL  --  3.90 3.70 4.00         diazePAM 5 mg Oral 30 Min Pre-Op       sodium chloride 100 mL/hr Last Rate: 100 mL/hr (06/12/18 0126)   Diet Regular      Assessment/Plan      Active Hospital Problems (** Indicates Principal Problem)    Diagnosis Date Noted   • **Mediastinal mass [J98.59] 06/11/2018   • Thoracic back pain [M54.6] 06/11/2018   • Dyspnea on exertion [R06.09] 06/11/2018   • Palpitation [R00.2] 06/11/2018      Resolved Hospital Problems    Diagnosis Date Noted Date Resolved   No resolved problems to display.       - Mediastinal Mass: sp CT guided biopsy today. Respiratory status is unchanged and she has remained CV stable overnight. Thoracic MRI and CT Abdomen/Pelvis were negative for metastatic process. Biopsy results pending. Thoracic Surgery following. Oncology consulted.  - Palpitations: Stable overnight. Cardiology following.  - Disposition: Oncology to see and establish plan. We can probably transfer her to [facility] and monitor with continuous pulse ox if she is going to remain inpatient.    Miguel Cerrato MD  [facility]  06/12/18  12:28 PM

## 2018-06-12 NOTE — PLAN OF CARE
Problem: Patient Care Overview  Goal: Plan of Care Review  Outcome: Ongoing (interventions implemented as appropriate)   18 1800   Coping/Psychosocial   Plan of Care Reviewed With patient;mother   Plan of Care Review   Progress no change   OTHER   Outcome Summary Admitted to 4S Telemetry w/ worsening SOA over past few months--put on 2L upon arrival d/t SpO2 dropping. Dx: Mediastinal Lung Mass. CT chest on  shows very large mass displacing trachea & left bronchus, small pericardial effusions. CT-guided needle biopsy to be performed  per Dr. Aragon (thoracic sx). : MRI w/ contrast & CT abd/pelvis results pending. [facility] admitting. Oncology following--bone marrow biopsy TBD. Comfort & reassurance provided to pt, mother--both experiencing mild anxiety, worry. Pt given x1 Valium for anxiety prior to MRI--this dose is to be repeated 30min before biopsy tomorrow.      Goal: Individualization and Mutuality  Outcome: Ongoing (interventions implemented as appropriate)    Goal: Discharge Needs Assessment  Outcome: Ongoing (interventions implemented as appropriate)    Goal: Interprofessional Rounds/Family Conf  Outcome: Ongoing (interventions implemented as appropriate)      Problem: Anxiety (Adult)  Goal: Identify Related Risk Factors and Signs and Symptoms  Outcome: Ongoing (interventions implemented as appropriate)    Goal: Reduction/Resolution  Outcome: Ongoing (interventions implemented as appropriate)      Problem: Coping, Compromised Family (Adult,NICU,,Obstetrics,Pediatric)  Goal: Identify Related Risk Factors and Signs and Symptoms  Outcome: Ongoing (interventions implemented as appropriate)    Goal: Effective Family Coping  Outcome: Ongoing (interventions implemented as appropriate)      Problem: Pain, Acute (Adult)  Goal: Identify Related Risk Factors and Signs and Symptoms  Outcome: Ongoing (interventions implemented as appropriate)    Goal: Acceptable Pain Control/Comfort Level  Outcome:  Ongoing (interventions implemented as appropriate)

## 2018-06-12 NOTE — PROGRESS NOTES
"    Chief Complaint: Progressive Dyspnea with cough  S/P: directed needle biopsy of the left lung mediastinum  POD # 0    Subjective:  Symptoms:  Stable.  She reports shortness of breath.  No chest pain or chest pressure.    Diet:  Adequate intake.  No nausea or vomiting.    Activity level: Normal.    Pain:  She complains of pain that is mild.  Pain is well controlled.        Vital Signs:  Temp:  [97.7 °F (36.5 °C)-98.1 °F (36.7 °C)] 97.8 °F (36.6 °C)  Heart Rate:  [72-78] 77  Resp:  [16-18] 16  BP: (103-112)/(63-67) 112/65    Intake & Output (last day)       06/11 0701 - 06/12 0700 06/12 0701 - 06/13 0700    I.V. (mL/kg) 1000 (15.1)     Total Intake(mL/kg) 1000 (15.1)     Net +1000                  Objective:  General Appearance:  Comfortable, well-appearing, in no acute distress and not in pain.    Vital signs: (most recent): Blood pressure 112/65, pulse 77, temperature 97.8 °F (36.6 °C), temperature source Oral, resp. rate 16, height 157.5 cm (62\"), weight 66.2 kg (146 lb), SpO2 95 %.  Vital signs are normal.  No fever.    Output: Producing urine and producing stool.    HEENT: Normal HEENT exam.    Lungs:  Normal effort and normal respiratory rate.  She is not in respiratory distress.  There are decreased breath sounds.  (Diminished breath sounds in left lung fields)  Heart: Normal rate.  Regular rhythm.  S1 normal and S2 normal.  No murmur.   Abdomen: Abdomen is soft and non-distended.  Bowel sounds are normal.   There is no abdominal tenderness.   There is no mass.   Extremities: Normal range of motion.    Pulses: Distal pulses are intact.    Neurological: Patient is alert and oriented to person, place and time.  Normal strength.    Pupils:  Pupils are equal, round, and reactive to light.    Skin:  Warm and dry.          Results Review:     I reviewed the patient's new clinical results.  I reviewed the patient's new imaging results and agree with the interpretation.  Discussed with Patient and with " Linker.    Imaging Results (last 24 hours)     Procedure Component Value Units Date/Time    CT Biopsy Lung Mediastinum [[id number]] Collected:  06/12/18 1413     Updated:  06/12/18 1413    Narrative:       CT-GUIDED BIOPSY OF MEDIASTINAL MASS. 06/12/2018      HISTORY: Mediastinal mass.     After signed informed consent was obtained patient was prepped and  draped in the supine position. Lidocaine was used for local anesthesia..     An 18-gauge biopsy device was used to obtain several core specimens of  the large mediastinal mass through an anterior approach through the left  hemithorax.     Confirmatory images were obtained. Patient tolerated the procedure well  with no complications.     Radiation dose reduction techniques were utilized, including automated  exposure control and exposure modulation based on body size.          CT Abdomen Pelvis With Contrast [[id number]] Collected:  06/11/18 2121     Updated:  06/12/18 1204    Narrative:       ABDOMEN AND PELVIS CT WITH CONTRAST     HISTORY: Large chest mass. Evaluate for tumor in the abdomen or pelvis.     TECHNIQUE: Radiation dose reduction techniques were utilized, including  automated exposure control and exposure modulation based on body size.  Abdomen and pelvis CT was performed using IV contrast. This is  correlated with CT arteriogram chest performed a few days ago. Thoracic  MRI was performed today and is reported separately.     FINDINGS: There is a small left pleural effusion and a pericardial  effusion as seen previously. The spleen measures 10.3 x 6.6 cm axial and  10.7 cm long. The liver appears normal. Parenchyma of the pancreas,  adrenals, and kidneys appears normal. Uterus and ovaries are in situ and  have a normal CT appearance.     The stomach, small bowel, and large bowel appears normal. No bowel wall  thickening or bowel mass is identified.     There is no lymphadenopathy or mass identified anywhere in the abdomen  or pelvis. There is a tiny,  normal volume of pelvic free fluid in the  cul-de-sac. Urinary bladder has a normal CT appearance. The bones appear  normal. No retroperitoneal or body wall mass is present.       Impression:       Negative abdomen and pelvis CT.     This report was finalized on 6/12/2018 12:01 PM by Dr. Rodger Zhao M.D.       MRI Thoracic Spine With & Without Contrast [[id number]] Collected:  06/11/18 2106     Updated:  06/11/18 2121    Narrative:       THORACIC SPINE MRI WITH AND WITHOUT CONTRAST     HISTORY: Back pain. Lung mass.     TECHNIQUE: Thoracic MRI with and without contrast is provided and  correlated with chest CT performed a few days ago.     FINDINGS: Thoracic vertebral body height, marrow signal, alignment, disc  height, and disc signal are normal. Axial images partially demonstrate  the large tumor in the mediastinum centered to the left of midline and  surrounding the aortic arch. The posterior edge of the lesion abuts  anterior left side of the spinal column in the upper thoracic region but  the cortex maintains normal low signal is no loss of distinctness of the  anterior longitudinal ligament. No abnormal enhancement is appreciated  in any of the vertebrae or the discs. The areas that had appeared as  sclerotic along the anterior and posterior edges of the lower cervical  and upper thoracic vertebrae, fairly uniform in appearance at multiple  levels, does not have a typical morphologic appearance for tumor on CT  and there is no corresponding abnormality on MRI. There is no marrow  replacement or enhancement at those levels.     The spinal cord is normal in caliber and signal. There is no abnormal  enhancement within the spinal canal. The discs appear normal at every  level in the canal and foramina are patent at all levels. There is no  tumor extending into the foramina.       Impression:       No abnormality is identified in the thoracic spine. Large  mediastinal mass is again observed.     This report was  finalized on 6/11/2018 9:18 PM by Dr. Rodger Zhao M.D.             Lab Results:     Lab Results (last 24 hours)     Procedure Component Value Units Date/Time    Tissue Pathology Exam [[id number]] Collected:  06/12/18 1055    Specimen:  Tissue from Lung, Left Upper Lobe Updated:  06/12/18 1210    Pregnancy, Urine - Urine, Clean Catch [[id number]]  (Normal) Collected:  06/12/18 0922    Specimen:  Urine from Urine, Clean Catch Updated:  06/12/18 0942     HCG, Urine QL Negative    Basic Metabolic Panel [[id number]] Collected:  06/12/18 0605    Specimen:  Blood Updated:  06/12/18 0702     Glucose 91 mg/dL      BUN 14 mg/dL      Creatinine 0.81 mg/dL      Sodium 140 mmol/L      Potassium 4.4 mmol/L      Chloride 99 mmol/L      CO2 27.0 mmol/L      Calcium 10.1 mg/dL      eGFR   Amer 106 mL/min/1.73      eGFR Non African Amer 88 mL/min/1.73      BUN/Creatinine Ratio 17.3     Anion Gap 14.0 mmol/L     Narrative:       GFR Normal >60  Chronic Kidney Disease <60  Kidney Failure <15    CBC Auto Differential [[id number]]  (Abnormal) Collected:  06/12/18 0605    Specimen:  Blood Updated:  06/12/18 0639     WBC 4.13 (L) 10*3/mm3      RBC 4.44 10*6/mm3      Hemoglobin 13.2 g/dL      Hematocrit 40.9 %      MCV 92.1 fL      MCH 29.7 pg      MCHC 32.3 (L) g/dL      RDW 13.4 (H) %      RDW-SD 45.1 fl      MPV 11.2 fL      Platelets 119 (L) 10*3/mm3      Neutrophil % 67.1 %      Lymphocyte % 22.3 %      Monocyte % 7.7 %      Eosinophil % 2.7 %      Basophil % 0.2 %      Immature Grans % 0.0 %      Neutrophils, Absolute 2.77 10*3/mm3      Lymphocytes, Absolute 0.92 10*3/mm3      Monocytes, Absolute 0.32 10*3/mm3      Eosinophils, Absolute 0.11 10*3/mm3      Basophils, Absolute 0.01 10*3/mm3      Immature Grans, Absolute 0.00 10*3/mm3     Comprehensive Metabolic Panel [[id number]] Collected:  06/11/18 1508    Specimen:  Blood Updated:  06/11/18 1559     Glucose 82 mg/dL      BUN 15 mg/dL      Creatinine 0.75 mg/dL      Sodium 140  mmol/L      Potassium 3.9 mmol/L      Chloride 99 mmol/L      CO2 26.0 mmol/L      Calcium 10.3 mg/dL      Total Protein 6.9 g/dL      Albumin 3.90 g/dL      ALT (SGPT) 24 U/L      AST (SGOT) 28 U/L      Alkaline Phosphatase 58 U/L      Total Bilirubin 0.4 mg/dL      eGFR Non African Amer 96 mL/min/1.73      eGFR  African Amer 116 mL/min/1.73      Globulin 3.0 gm/dL      A/G Ratio 1.3 g/dL      BUN/Creatinine Ratio 20.0     Anion Gap 15.0 mmol/L     Troponin [[id number]]  (Normal) Collected:  06/11/18 1508    Specimen:  Blood Updated:  06/11/18 1558     Troponin T <0.010 ng/mL     Narrative:       Troponin T Reference Ranges:  Less than 0.03 ng/mL:    Negative for AMI  0.03 to 0.09 ng/mL:      Indeterminant for AMI  Greater than 0.09 ng/mL: Positive for AMI    Protime-INR [[id number]]  (Normal) Collected:  06/11/18 1508    Specimen:  Blood Updated:  06/11/18 1533     Protime 13.6 Seconds      INR 1.06    CBC Auto Differential [[id number]]  (Abnormal) Collected:  06/11/18 1508    Specimen:  Blood Updated:  06/11/18 1523     WBC 4.62 10*3/mm3      RBC 4.64 10*6/mm3      Hemoglobin 13.7 g/dL      Hematocrit 41.9 %      MCV 90.3 fL      MCH 29.5 pg      MCHC 32.7 g/dL      RDW 13.6 (H) %      RDW-SD 44.7 fl      MPV 11.2 fL      Platelets 125 (L) 10*3/mm3      Neutrophil % 73.4 %      Lymphocyte % 13.2 (L) %      Monocyte % 11.5 %      Eosinophil % 1.5 %      Basophil % 0.4 %      Immature Grans % 0.4 %      Neutrophils, Absolute 3.39 10*3/mm3      Lymphocytes, Absolute 0.61 (L) 10*3/mm3      Monocytes, Absolute 0.53 10*3/mm3      Eosinophils, Absolute 0.07 10*3/mm3      Basophils, Absolute 0.02 10*3/mm3      Immature Grans, Absolute 0.02 10*3/mm3            Assessment/Plan     Principal Problem:    Mediastinal mass  Active Problems:    Thoracic back pain    Dyspnea on exertion    Palpitation       Assessment:    Condition: In stable condition.       Plan:   Regular diet.  Administer medications as ordered.        Mediastinal Mass: Concerning for non-Hodgkin's lymphoma.  Patient underwent CT needle biopsy today.  Pathology is still pending.  No micro labs as tissue was not collected.     Pericardial effusion: Cardiology following.  Presently no evidence of tamponade.    Patient appears to be resting comfortably in bed and is ambulating ad rikki.  Plan of care will be further developed once pathology is back.    We will continue to follow along during this patient's hospitalization.    BRANNON Walton  [facility]  06/12/18  2:15 PM

## 2018-06-12 NOTE — PLAN OF CARE
Problem: Patient Care Overview  Goal: Plan of Care Review  Outcome: Ongoing (interventions implemented as appropriate)   18 8001   Coping/Psychosocial   Plan of Care Reviewed With patient   Plan of Care Review   Progress no change   OTHER   Outcome Summary ct guided biopsy today- awaiting results, pt has had no c/o pain or sob, may be d/c later today or trasnfered to [facility] depending on oncology recs, iv fluids d/c, will continue to monitor closely      Goal: Individualization and Mutuality  Outcome: Ongoing (interventions implemented as appropriate)    Goal: Discharge Needs Assessment  Outcome: Ongoing (interventions implemented as appropriate)    Goal: Interprofessional Rounds/Family Conf  Outcome: Ongoing (interventions implemented as appropriate)      Problem: Anxiety (Adult)  Goal: Identify Related Risk Factors and Signs and Symptoms  Outcome: Outcome(s) achieved Date Met: 18    Goal: Reduction/Resolution  Outcome: Ongoing (interventions implemented as appropriate)      Problem: Coping, Compromised Family (Adult,NICU,[place],Obstetrics,Pediatric)  Goal: Identify Related Risk Factors and Signs and Symptoms  Outcome: Outcome(s) achieved Date Met: 18    Goal: Effective Family Coping  Outcome: Ongoing (interventions implemented as appropriate)      Problem: Pain, Acute (Adult)  Goal: Identify Related Risk Factors and Signs and Symptoms  Outcome: Outcome(s) achieved Date Met: 18    Goal: Acceptable Pain Control/Comfort Level  Outcome: Ongoing (interventions implemented as appropriate)

## 2018-06-12 NOTE — PROGRESS NOTES
On 6/11/18, BAMBIW met with the patient and her mother prior to her consultation with Dr. Padilla. Patient learned last Friday that she possibly has lymphoma. She has had a cough for 3 months and thought it was due to allergies.     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 She lives at home with her mother and father. Her mother recently completed chemotherapy for liver cancer, which was Stage 1. Mom reports doing well. She is now a stay-at-home mother and is available to bring the patient to her appointments.     Pt states that her mother is her main support at this time. She has a few close friends from school that she can confide in also. Their Christianity group will be praying for her, as they did for her mother.     Pt was rather quiet, and seems somewhat shy. She likely is quite overwhelmed with her very recent news. She is alert and oriented x 3, and likely above average in intelligence. SW discussed support groups and provided her with [employer] information. SW services were explained and assistance offered as needed in the future.

## 2018-06-12 NOTE — CONSULTS
Patient Name: Vikki Durham  :1995  23 y.o.    Date of Admission: 2018  Date of Consultation:  18  Encounter Provider: Shena Fitzgerald MD  Place of Service: [facility] CARDIOLOGY  Referring Provider: Miguel Cerrato MD  Patient Care Team:  Carla Zhao MD as PCP - General (Internal Medicine)  BRANNON Maier as Nurse Practitioner (Nurse Practitioner)  Millie Padilla MD as Consulting Physician (Hematology and Oncology)  Arina Cohen MD as Referring Physician (Cardiology)      Chief complaint: Dyspnea     History of Present Illness: Ms. Vikki Durham is a 23 year old female patient of Dr. Arina Cohen with no significant past medical history who is admitted with a mediastinal mass.      She presented to her primary care provider after noting a cough since February. The cough worsened while lying flat. She had seen an allergist and was diagnosed with allergies but the cough persisted. She then underwent a CXR that showed a large mass concerning for non-Hodgkin's lymphoma and evidence of cardiomegaly.  She was then sent to our office for an echocardiogram on 2018 that showed normal LV systolic function with an EF of 58%, large (>2cm) pericardial effusion with no tamponade physiology, right ventricular systolic pressure from tricuspid regurgitation was 28 mmHg and a thickened pericardium.  Following her echocardiogram she was evaluated by Dr. Cohen the same day.  Her main symptom at that time was cough and some dyspnea with walking upstairs or walking for 5 minutes.  Due to the lack of tamponade or apparent symptoms, drainage was not recommended at that time unless she became symptomatic from effusion.  Urgent evaluation with Oncology was arranged at that office visit.       She was evaluated by Dr. Padilla yesterday after being evaluated in the office and having a CT scan that showed a large mediastinal mass (19.8 cm in size) with compression  on surrounding structures. Since admission she has had a CT abdomen/pelvis showed a small left pleural effusion, pericardial effusion and splenomegaly.   MRI of the thoracic spine was negative. Thoracic surgery was consulted, CT-guided needle biopsy of the mediastinum left lung is planned for today.    She reports that she is breathing comfortably at rest.  She reports intermittent palpitations that have worsened some lately since she has been undergoing all this work up.  She reports episodes of chest heaviness that occur randomly and resolve on its own.  She has had none today.  Following admission she was placed on 1 L nasal cannula in order to keep or O2 sats up above 90%.  She is understandably anxious about upcoming testing.      Previous Testing:    Echo 6/8/18      Past Medical History:   Diagnosis Date   • Fracture of lower leg 2000    L       History reviewed. No pertinent surgical history.      Prior to Admission medications    Not on File       No Known Allergies    Social History     Social History   • Marital status: Single     Social History Main Topics   • Smoking status: Never Smoker   • Smokeless tobacco: Never Used   • Alcohol use No   • Drug use: No   • Sexual activity: No     Other Topics Concern   • Not on file       Family History   Problem Relation Age of Onset   • Thyroid disease Mother    • Glaucoma Mother    • Lung cancer Maternal Grandmother    • Hypertension Paternal Grandmother    • Diabetes Paternal Grandmother        REVIEW OF SYSTEMS:   All systems reviewed.  Pertinent positives identified in HPI.  All other systems are negative.      Objective:     Vitals:    06/11/18 1407 06/11/18 1900 06/12/18 0100 06/12/18 0809   BP: 117/71  106/65 107/64   BP Location: Right arm  Right arm Right arm   Patient Position: Lying  Lying Lying   Pulse: 79   78   Resp: 16 18 18   Temp: 98.2 °F (36.8 °C)  97.7 °F (36.5 °C) 98.1 °F (36.7 °C)   TempSrc: Oral  Oral Oral   SpO2: 95% 93%  94%   Weight: 66.2  "kg (146 lb)      Height: 157.5 cm (62\")        Body mass index is 26.7 kg/m².    General Appearance:    Alert, cooperative, in no acute distress   Head:    Normocephalic, without obvious abnormality, atraumatic   Eyes:            Lids and lashes normal, conjunctivae and sclerae normal, no   icterus, no pallor, corneas clear, PERRLA   Ears:    Ears appear intact with no abnormalities noted   Neck:   No adenopathy, supple, trachea midline, no thyromegaly, no   carotid bruit, no JVD   Lungs:     Decreased breath sound on left,     Heart:    Regular rhythm and normal rate, normal S1 and S2, no murmur, no gallop, no rub, no click   Chest Wall:    No abnormalities observed   Abdomen:     Normal bowel sounds, no masses, no organomegaly, soft        non-tender, non-distended, no guarding, no rebound  tenderness   Extremities:   Moves all extremities well, no edema, no cyanosis, no redness   Pulses:   Pulses palpable and equal bilaterally. Normal radial, carotid, femoral, dorsalis pedis and posterior tibial pulses bilaterally. Normal abdominal aorta   Skin:  Psychiatric:   No bleeding, bruising or rash    Alert and oriented x 3, normal mood and affect   Lab Review:       Results from last 7 days  Lab Units 06/12/18  0605 06/11/18  1508   SODIUM mmol/L 140 140   POTASSIUM mmol/L 4.4 3.9   CHLORIDE mmol/L 99 99   CO2 mmol/L 27.0 26.0   BUN mg/dL 14 15   CREATININE mg/dL 0.81 0.75   CALCIUM mg/dL 10.1 10.3   BILIRUBIN mg/dL  --  0.4   ALK PHOS U/L  --  58   ALT (SGPT) U/L  --  24   AST (SGOT) U/L  --  28   GLUCOSE mg/dL 91 82       Results from last 7 days  Lab Units 06/11/18  1508 06/08/18  1039   TROPONIN T ng/mL <0.010 <0.010       Results from last 7 days  Lab Units 06/12/18  0605   WBC 10*3/mm3 4.13*   HEMOGLOBIN g/dL 13.2   HEMATOCRIT % 40.9   PLATELETS 10*3/mm3 119*       Results from last 7 days  Lab Units 06/11/18  1508   INR  1.06           Results from last 7 days  Lab Units 06/08/18  0903   CHOLESTEROL mg/dL 114 "   TRIGLYCERIDES mg/dL 90   HDL CHOL mg/dL 19*   LDL CHOL mg/dL 77       EKG 6/11/2018      EKG 6/8/2018          I personally viewed and interpreted the patient's EKG/Telemetry data.        Assessment and Plan:       1. Pericardial effusion.  I reviewed both the echocardiogram and CT of the chest images.  On echocardiogram it appears the majority of the effusion is posterior and appears large primarily near left ventricle and apex and only small to moderate near anteriorly near right ventricle.  There is no evidence of tamponade.  Her pericardium appears thickened pointing to involvement of the pericardium with the mediastinal process.  Her CT images show that the mediastinal mass partially covers the anterior aspect of the heart which would make percutaneous access to the pericardial space for a pericardiocentesis difficult in addition to the fact that most of the effusion again is posterior rather than anterior.  At this time she is not exhibiting any symptoms or findings that I can attribute to the pericardial effusion.   2. Mediastinal mass.  Concerning for non-hodgkin's lymphoma.  Appears to be encasing the left main pulmonary artery, the SVC and pulmonary veins.  RV appears normal in size and function.  No evidence of SVC syndrome.  For CT guided biopsy today.   3. Thrombocytopenia.  Mild.  Plan for bone marrow biopsy per Hematology once tissue diagnosis from biopsy obtained.    4.  Splenomegaly    -  Will continue to follow along with you    Shena Fitzgerald MD  06/12/18  9:08 AM

## 2018-06-12 NOTE — CONSULTS
Oncology Social Work    Consult received to see for patient who presents with extensive lymphadenopathy in the mediastinum. Patient is undergoing workup for possible lymphoma - type and staging pending; awaiting tissue diagnosis.     Spoke to patient on [address] in room 413.  Patient is a 23 yr old  female, alert and oriented x 3.  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  Patient obtained her undergraduate degree at .  Patient lives at home with her parents and 21 yr old brother.  She has [facility] health insurance through her [employer] student resources.  Patient with concerns over how long her insurance will be effective if she is not actively in school.  Discussed other health insurance options.  Patient engaged in conversation.     Explored emotional responses to cancer diagnosis and appropriateness to all that she is currently experiencing.    OSW reviewed role of OSW and services we can assist with.  Explained that there are many community resources for her once final diagnosis is determined.  My contact information given.  Will cont to follow and offer practical and emotional support while in the hospital.    Thank you,  Faina Santiago, MSSW, CSW, OSW-C

## 2018-06-13 ENCOUNTER — APPOINTMENT (OUTPATIENT)
Dept: PET IMAGING | Facility: HOSPITAL | Age: 23
End: 2018-06-13

## 2018-06-13 LAB
DEPRECATED RDW RBC AUTO: 45.7 FL (ref 37–54)
ERYTHROCYTE [DISTWIDTH] IN BLOOD BY AUTOMATED COUNT: 13.5 % (ref 11.7–13)
GLUCOSE BLDC GLUCOMTR-MCNC: 81 MG/DL (ref 70–130)
HCT VFR BLD AUTO: 40.4 % (ref 35.6–45.5)
HGB BLD-MCNC: 12.8 G/DL (ref 11.9–15.5)
MCH RBC QN AUTO: 29.3 PG (ref 26.9–32)
MCHC RBC AUTO-ENTMCNC: 31.7 G/DL (ref 32.4–36.3)
MCV RBC AUTO: 92.4 FL (ref 80.5–98.2)
PLATELET # BLD AUTO: 118 10*3/MM3 (ref 140–500)
PMV BLD AUTO: 11 FL (ref 6–12)
RBC # BLD AUTO: 4.37 10*6/MM3 (ref 3.9–5.2)
WBC NRBC COR # BLD: 4.29 10*3/MM3 (ref 4.5–10.7)

## 2018-06-13 PROCEDURE — 0 FLUDEOXYGLUCOSE F18 SOLUTION: Performed by: HOSPITALIST

## 2018-06-13 PROCEDURE — 99232 SBSQ HOSP IP/OBS MODERATE 35: CPT | Performed by: INTERNAL MEDICINE

## 2018-06-13 PROCEDURE — A9552 F18 FDG: HCPCS | Performed by: HOSPITALIST

## 2018-06-13 PROCEDURE — 78815 PET IMAGE W/CT SKULL-THIGH: CPT

## 2018-06-13 PROCEDURE — 99233 SBSQ HOSP IP/OBS HIGH 50: CPT | Performed by: INTERNAL MEDICINE

## 2018-06-13 PROCEDURE — 85027 COMPLETE CBC AUTOMATED: CPT | Performed by: INTERNAL MEDICINE

## 2018-06-13 PROCEDURE — 82962 GLUCOSE BLOOD TEST: CPT

## 2018-06-13 RX ORDER — ALLOPURINOL 300 MG/1
300 TABLET ORAL DAILY
Status: DISCONTINUED | OUTPATIENT
Start: 2018-06-13 | End: 2018-06-13

## 2018-06-13 RX ORDER — SODIUM CHLORIDE 9 MG/ML
125 INJECTION, SOLUTION INTRAVENOUS CONTINUOUS
Status: DISCONTINUED | OUTPATIENT
Start: 2018-06-13 | End: 2018-06-16

## 2018-06-13 RX ORDER — ALLOPURINOL 300 MG/1
300 TABLET ORAL 2 TIMES DAILY
Status: DISCONTINUED | OUTPATIENT
Start: 2018-06-13 | End: 2018-06-21 | Stop reason: HOSPADM

## 2018-06-13 RX ADMIN — FLUDEOXYGLUCOSE F18 1 DOSE: 300 INJECTION INTRAVENOUS at 13:37

## 2018-06-13 RX ADMIN — SODIUM CHLORIDE 125 ML/HR: 9 INJECTION, SOLUTION INTRAVENOUS at 15:48

## 2018-06-13 RX ADMIN — ALLOPURINOL 300 MG: 300 TABLET ORAL at 15:48

## 2018-06-13 RX ADMIN — ALLOPURINOL 300 MG: 300 TABLET ORAL at 20:42

## 2018-06-13 NOTE — PROGRESS NOTES
Discharge Planning Assessment  [facility]     Patient Name: Vikki Durham  MRN: [id number]  Today's Date: 6/13/2018    Admit Date: 6/11/2018          Discharge Needs Assessment     Row Name 06/13/18 1428       Living Environment    Lives With parent(s)    Name(s) of Who Lives With Patient Emma Durham/scout  [phone]     Current Living Arrangements home/apartment/condo    Primary Care Provided by self;parent(s)    Provides Primary Care For no one    Family Caregiver if Needed parent(s)    Family Caregiver Names Emma Durham/parents  [phone]     Quality of Family Relationships helpful;involved;supportive    Able to Return to Prior Arrangements yes    Living Arrangement Comments Lives with parents in a multi-level house (3 steps to enter/handrail right side). Bed & bath on 2nd floor (10-12 steps/handrail right side.       Resource/Environmental Concerns    Resource/Environmental Concerns none       Transition Planning    Patient/Family Anticipates Transition to home with family    Patient/Family Anticipated Services at Transition medical specialist;outpatient care    Transportation Anticipated family or friend will provide       Discharge Needs Assessment    Readmission Within the Last 30 Days no previous admission in last 30 days    Concerns to be Addressed discharge planning;adjustment to diagnosis/illness;employment/school;coping/stress    Equipment Currently Used at Home none    Anticipated Changes Related to Illness inability to return to school    Equipment Needed After Discharge none    Outpatient/Agency/Support Group Needs support group(s);clinic(s)    Offered/Gave Vendor List no    Patient's Choice of Community Agency(s) Denies using HH or SNF in the past.    Discharge Coordination/Progress Spoke with patient/family at bedside. Confirmed information on facesheet.            Discharge Plan     Row Name 06/13/18 143       Plan    Plan Plans dc home with parents.     Patient/Family in  Agreement with Plan yes    Plan Comments No needs identified at this time. [facility] following for psychosocial support and resources. Continue to follow.        Destination     No service coordination in this encounter.      Durable Medical Equipment     No service coordination in this encounter.      Dialysis/Infusion     No service coordination in this encounter.      Home Medical Care     No service coordination in this encounter.      Social Care     No service coordination in this encounter.                Demographic Summary     Row Name 06/13/18 1428       General Information    Admission Type inpatient    Referral Source admission list    Reason for Consult discharge planning            Functional Status     Row Name 06/13/18 1428       Functional Status    Usual Activity Tolerance good    Current Activity Tolerance good       Functional Status, IADL    Medications independent    Meal Preparation independent    Housekeeping independent    Laundry independent    Shopping independent            Psychosocial    No documentation.           Abuse/Neglect    No documentation.           Legal    No documentation.           Substance Abuse    No documentation.           Patient Forms    No documentation.         Lien Tate RN

## 2018-06-13 NOTE — PROGRESS NOTES
Hospital Follow Up        Chief Complaint: Follow up pericardial effusion, mediastinal mass    Interval History: On and off chest pressure yesterday.  Breathing normally at rest in an upright position.  Still with cough that seemed worse yesterday.  Able to ambulate yesterday without significant issues.     Objective:     Objective:  Temp:  [97.7 °F (36.5 °C)-98.7 °F (37.1 °C)] 98.7 °F (37.1 °C)  Heart Rate:  [72-92] 76  Resp:  [16-18] 16  BP: (103-115)/(63-68) 115/65   No intake or output data in the 24 hours ending 06/13/18 0725  Body mass index is 26.7 kg/m².  1    06/11/18  1407   Weight: 66.2 kg (146 lb)     Weight change:       Physical Exam:   General : Alert, cooperative, in no acute distress.  Neuro: alert,cooperative and oriented  Lungs: decreased breath sounds on the left, especially upper and lower lobes  CV:: Regular rate and rhythm, normal S1 and S2, no murmurs, gallops or rubs.  ABD: Soft, nontender, non-distended. positive bowel sounds  Extr: No edema or cyanosis, moves all extremities    Lab Review:     Results from last 7 days  Lab Units 06/12/18  0605 06/11/18  1508 06/08/18  1039   SODIUM mmol/L 140 140 140   POTASSIUM mmol/L 4.4 3.9 4.3   CHLORIDE mmol/L 99 99 102   CO2 mmol/L 27.0 26.0 24.6   BUN mg/dL 14 15 10   CREATININE mg/dL 0.81 0.75 0.76   GLUCOSE mg/dL 91 82 91   CALCIUM mg/dL 10.1 10.3 9.5   AST (SGOT) U/L  --  28 35*   ALT (SGPT) U/L  --  24 26       Results from last 7 days  Lab Units 06/11/18  1508 06/08/18  1039   TROPONIN T ng/mL <0.010 <0.010       Results from last 7 days  Lab Units 06/13/18  0637 06/12/18  0605   WBC 10*3/mm3 4.29* 4.13*   HEMOGLOBIN g/dL 12.8 13.2   HEMATOCRIT % 40.4 40.9   PLATELETS 10*3/mm3 118* 119*       Results from last 7 days  Lab Units 06/11/18  1508   INR  1.06           Results from last 7 days  Lab Units 06/08/18  0903   CHOLESTEROL mg/dL 114   TRIGLYCERIDES mg/dL 90   HDL CHOL mg/dL 19*       Results from last 7 days  Lab Units 06/08/18  1039  06/08/18  0903   PROBNP pg/mL 61.0 62.9       Results from last 7 days  Lab Units 06/08/18  0903   TSH mIU/mL 1.180     I reviewed the patient's new clinical results.  I personally viewed and interpreted the patient's EKG  Current Medications:   Scheduled Meds:  diazePAM 5 mg Oral 30 Min Pre-Op     Continuous Infusions:     Allergies:  No Known Allergies    Assessment/Plan:     1. Pericardial effusion.  I reviewed both the echocardiogram and CT of the chest images from 6/8. On echocardiogram it appears the majority of the effusion is posterior and appears large primarily near left ventricle and apex and only small to moderate near anteriorly near right ventricle.  There is no evidence of tamponade.  Her pericardium appears thickened pointing to involvement of the pericardium with the mediastinal process.  Her CT images show that the mediastinal mass partially covers the anterior aspect of the heart which would make percutaneous access to the pericardial space and effusion for a pericardiocentesis difficult along with the fact that most of the effusion is posterior rather than anterior.  At this time she is not exhibiting any symptoms or findings that I can attribute to the pericardial effusion.   2. Mediastinal mass.  Concerning for Hodgkin's disease or non-hodgkin's lymphoma per Oncology.  Awaiting path results from CT guided biopsy from yesterday.  Appears to be encasing the left main pulmonary artery, the SVC and pulmonary veins.  RV appears normal in size and function.  No evidence of SVC syndrome or again tamponade.  3.  Trepopnea.  Due to narrowing of trachea and left mainstem bronchus by mediastinal mass.  Asymptomatic as long as lays at an upright angle.  4. Thrombocytopenia.  Mild.  Plan for bone marrow biopsy per Hematology once tissue diagnosis from biopsy obtained.    5.  Leukopenia  6.  Splenomegaly      -  Will continue to follow along with you and monitor for cardiac compromise from pericardial  effusion or mass.    Shena Fitzgerald MD  06/13/18  7:25 AM

## 2018-06-13 NOTE — PLAN OF CARE
Problem: Patient Care Overview  Goal: Plan of Care Review  Outcome: Ongoing (interventions implemented as appropriate)   18 8073   Coping/Psychosocial   Plan of Care Reviewed With patient   Plan of Care Review   Progress no change   OTHER   Outcome Summary VSS. PET Scan today. IVF & Allopurinol Started. No c/o pain or SOA throughout shift. Intermittent Dry Hoarse Cough. Bone Marrow Biopsy - informed consent signed and in chart. Plan is to await path report, place port and start chemo during hospitalization.      Goal: Individualization and Mutuality  Outcome: Ongoing (interventions implemented as appropriate)    Goal: Discharge Needs Assessment  Outcome: Ongoing (interventions implemented as appropriate)    Goal: Interprofessional Rounds/Family Conf  Outcome: Ongoing (interventions implemented as appropriate)      Problem: Anxiety (Adult)  Goal: Reduction/Resolution  Outcome: Ongoing (interventions implemented as appropriate)      Problem: Coping, Compromised Family (Adult,NICU,,Obstetrics,Pediatric)  Goal: Effective Family Coping  Outcome: Ongoing (interventions implemented as appropriate)      Problem: Pain, Acute (Adult)  Goal: Acceptable Pain Control/Comfort Level  Outcome: Ongoing (interventions implemented as appropriate)

## 2018-06-13 NOTE — CONSULTS
Spiritual Care:  Saw pt Mica,  and mother Lizzeth.  Pt's amanda, prayer practice and Sikh family with a very caring and inspiring  are significant resources for her and her mother.  Used prayer and conscious deep breathing paired with spiritual imagery to deepen pt's sense of hope and connection to a loving experience of God.  mitchell Gross

## 2018-06-13 NOTE — PLAN OF CARE
Problem: Patient Care Overview  Goal: Plan of Care Review  Outcome: Ongoing (interventions implemented as appropriate)   18 0600   Coping/Psychosocial   Plan of Care Reviewed With patient;mother   Plan of Care Review   Progress no change   OTHER   Outcome Summary Intermittant dry hoarse cough through shift. Mother at bedside. No c/o pain. Sleeps in a 40-50 degree incline.        Problem: Anxiety (Adult)  Goal: Reduction/Resolution  Outcome: Ongoing (interventions implemented as appropriate)      Problem: Coping, Compromised Family (Adult,NICU,[place],Obstetrics,Pediatric)  Goal: Effective Family Coping  Outcome: Ongoing (interventions implemented as appropriate)      Problem: Pain, Acute (Adult)  Goal: Acceptable Pain Control/Comfort Level  Outcome: Ongoing (interventions implemented as appropriate)

## 2018-06-13 NOTE — PROGRESS NOTES
Subjective     CHIEF COMPLAINT:     Mediastinal mass    HISTORY OF PRESENT ILLNESS:    The patient continues to have SOB. It is more when she is in the supine position. Some right sided chest pain. No LE swelling.       Past Medical History:   Diagnosis Date   • Fracture of lower leg 2000    L       History reviewed. No pertinent surgical history.    SCHEDULED MEDS:    diazePAM 5 mg Oral 30 Min Pre-Op     INFUSIONS:     PRN MEDS:  •  acetaminophen  •  albuterol  •  HYDROcodone-acetaminophen  •  ondansetron **OR** ondansetron ODT **OR** ondansetron  •  sennosides-docusate sodium  •  sodium chloride    REVIEW OF SYSTEMS:  GENERAL:  No Fever or chills. No weight change.  No Fatigue.   SKIN:  No skin rashes or lesions.  HEME/LYMPH: Low platelets.  EYES:  No Vision changes. No Diplopia.  ENT:  No Mouth bleeding. No Epistaxis. No Hoarseness.  RESPIRATORY: Shortness of breath. Dry Cough. No Hemoptysis.   CVS:  Chest pain. No Lower extremity swelling.   GI:  No Abdominal pain. No Nausea. No Vomiting.   MUSCULOSKELETAL:  No Back pain. No Joint pain or stiffness.  NEUROLOGICAL:  No Headaches. No Dizziness.   PSYCHIATRIC: Anxiety. No Depression.        Objective   VITAL SIGNS:  Temp:  [97.5 °F (36.4 °C)-98.7 °F (37.1 °C)] 97.5 °F (36.4 °C)  Heart Rate:  [74-92] 83  Resp:  [16] 16  BP: (104-115)/(63-68) 115/65    Wt Readings from Last 3 Encounters:   06/11/18 66.2 kg (146 lb)   06/11/18 66.4 kg (146 lb 6.4 oz)   06/08/18 66.7 kg (147 lb)       PHYSICAL EXAMINATION:  GENERAL:  The patient appears anxious but not in acute distress.  SKIN:  No skin rashes or lesions. No ecchymosis or petechiae.  HEAD:  Normocephalic.  EYES:  No Jaundice. No Pallor.   NECK:  Supple with Good ROM. No Thyromegaly. No Masses.  LYMPHATICS:  No cervical or supraclavicular Lymphadenopathy.  CHEST:  Decreased breath sounds at left lung.  CARDIAC:  Normal S1 & S2. No murmurs.  ABDOMEN:  Soft. No tenderness. No Hepatomegaly. No Splenomegaly. No  masses.  EXTREMITIES:  No Edema. No Cyanosis. No Calf tenderness.  NEUROLOGICAL:  No Focal neurological deficits.      RESULT REVIEW:     Results from last 7 days  Lab Units 06/13/18  0637 06/12/18  0605 06/11/18  1508 06/11/18  1112 06/08/18  1039 06/08/18  0903   WBC 10*3/mm3 4.29* 4.13* 4.62 4.82 3.41* 4.30*   NEUTROS ABS 10*3/mm3  --  2.77 3.39 3.56 2.37 3.20   HEMOGLOBIN g/dL 12.8 13.2 13.7 13.7 12.6 13.3   HEMATOCRIT % 40.4 40.9 41.9 40.8 38.7 38.5   PLATELETS 10*3/mm3 118* 119* 125* 110* 115* 123*       Results from last 7 days  Lab Units 06/12/18  0605 06/11/18  1508 06/08/18  1039 06/08/18  0903   SODIUM mmol/L 140 140 140 142   POTASSIUM mmol/L 4.4 3.9 4.3 4.4   CHLORIDE mmol/L 99 99 102 101   CO2 mmol/L 27.0 26.0 24.6 26.9   BUN mg/dL 14 15 10 11   CREATININE mg/dL 0.81 0.75 0.76 0.81   CALCIUM mg/dL 10.1 10.3 9.5 9.6   ALBUMIN g/dL  --  3.90 3.70 4.00   BILIRUBIN mg/dL  --  0.4 0.3 0.3   ALK PHOS U/L  --  58 55 57   ALT (SGPT) U/L  --  24 26 25   AST (SGOT) U/L  --  28 35* 31     LDH 1,099  ESR 12  Uric acid 7.0      ABDOMEN AND PELVIS CT WITH CONTRAST     HISTORY: Large chest mass. Evaluate for tumor in the abdomen or pelvis.     TECHNIQUE: Radiation dose reduction techniques were utilized, including  automated exposure control and exposure modulation based on body size.  Abdomen and pelvis CT was performed using IV contrast. This is  correlated with CT arteriogram chest performed a few days ago. Thoracic  MRI was performed today and is reported separately.     FINDINGS: There is a small left pleural effusion and a pericardial  effusion as seen previously. The spleen measures 10.3 x 6.6 cm axial and  10.7 cm long. The liver appears normal. Parenchyma of the pancreas,  adrenals, and kidneys appears normal. Uterus and ovaries are in situ and  have a normal CT appearance.     The stomach, small bowel, and large bowel appears normal. No bowel wall  thickening or bowel mass is identified.     There is no  lymphadenopathy or mass identified anywhere in the abdomen  or pelvis. There is a tiny, normal volume of pelvic free fluid in the  cul-de-sac. Urinary bladder has a normal CT appearance. The bones appear  normal. No retroperitoneal or body wall mass is present.     IMPRESSION:  Negative abdomen and pelvis CT.     This report was finalized on 6/12/2018 12:01 PM by Dr. Rodger Zhao M.D.    THORACIC SPINE MRI WITH AND WITHOUT CONTRAST     HISTORY: Back pain. Lung mass.     TECHNIQUE: Thoracic MRI with and without contrast is provided and  correlated with chest CT performed a few days ago.     FINDINGS: Thoracic vertebral body height, marrow signal, alignment, disc  height, and disc signal are normal. Axial images partially demonstrate  the large tumor in the mediastinum centered to the left of midline and  surrounding the aortic arch. The posterior edge of the lesion abuts  anterior left side of the spinal column in the upper thoracic region but  the cortex maintains normal low signal is no loss of distinctness of the  anterior longitudinal ligament. No abnormal enhancement is appreciated  in any of the vertebrae or the discs. The areas that had appeared as  sclerotic along the anterior and posterior edges of the lower cervical  and upper thoracic vertebrae, fairly uniform in appearance at multiple  levels, does not have a typical morphologic appearance for tumor on CT  and there is no corresponding abnormality on MRI. There is no marrow  replacement or enhancement at those levels.     The spinal cord is normal in caliber and signal. There is no abnormal  enhancement within the spinal canal. The discs appear normal at every  level in the canal and foramina are patent at all levels. There is no  tumor extending into the foramina.     IMPRESSION:  No abnormality is identified in the thoracic spine. Large  mediastinal mass is again observed.     This report was finalized on 6/11/2018 9:18 PM by Dr. Soares  JOSEFINA Zhao    CT ANGIOGRAM CHEST W WO CONTRAST-     CLINICAL HISTORY: Cough. Abnormal chest x-ray. Elevated d-dimer.  Dyspnea.     TECHNIQUE: Spiral CT images were obtained through the chest during rapid  IV injection of contrast and were reconstructed in 2 mm thick slices.  Multiple coronal and sagittal and 3-D reconstructions were obtained.     Radiation dose reduction techniques were utilized, including automated  exposure control and exposure modulation based on body size.     COMPARISON: Chest x-ray performed earlier today.     FINDINGS: As suspected on the chest x-ray, there is a large conglomerate  mass encases multiple vascular structures of the mediastinum and left  hilar region that is most likely extensive confluent lymphadenopathy.  The primary differential diagnostic considerations would be lymphoma.  The tumor posteriorly displaces the pulmonary arteries and the left and  moderately narrows the main pulmonary artery. The tumor also posteriorly  displaces the trachea and markedly narrows the left mainstem bronchus.  The pulmonary veins in the left are also encased and narrowed. The mass  encases and markedly narrows the SVC. The large edy mass measures  approximately 19.8 x 13.7 x 14.0 cm in diameter. Enlarged lymph nodes  are also present in the left supraclavicular region where they appear to  produce occlusion of the left internal jugular vein. There is no  axillary lymphadenopathy. There are no filling defects within the  pulmonary arteries. There is no CT evidence of pulmonary embolism. There  is a small left pleural effusion. A small pericardial effusion measuring  8 mm in maximum thickness is also present. There is compressive  atelectasis of the left lung. Patchy airspace opacities are also present  in the superior aspect of the left upper lobe. These are most likely due  to direct tumor infiltration of the lung parenchyma, but could also  represent postobstructive pneumonia. The right  lung is well expanded and  clear. Images through the upper abdomen partially show what could be a  edy mass in the retroperitoneum posterior and inferior to the  pancreas. Further evaluation with a CT scan of the abdomen and pelvis is  recommended. Bone window images demonstrate patchy sclerosis in the C7  and T1 and T2 and T3 and T4 vertebral body suspicious for bone  involvement with lymphoma.     IMPRESSION:  Very large tumor mass in the mediastinum encasing multiple  vascular structures and also moderately narrowing the left mainstem  bronchus as described in more detail above. Direct tumor infiltration of  the left upper lobe is also suspected. Small left pleural effusion and a  small pericardial effusion. There may also be partially visualized  lymphadenopathy in the upper abdomen. Patchy areas of sclerosis are also  noted in the C7 and T1 and T2 and T3 and T4 vertebral bodies suspicious  for osseous metastatic disease. The findings are consistent with  Lymphoma.     This report was finalized on 6/8/2018 2:05 PM by Dr. David Padilla M.D.    · Left ventricular systolic function is normal. Estimated EF = 58%.  · There is a large (>2cm) pericardial effusion. There is no tamponade physiology.  · Calculated right ventricular systolic pressure from tricuspid regurgitation is 28 mmHg.  · Estimated right ventricular systolic pressure from tricuspid regurgitation is normal (<35 mmHg).  · The pericardium appears to be thickened.      Assessment/Plan   1.  Large mediastinal mass. The mass is encasing the vascular structures and narrowing the left mainstem bronchus.  There is suspicion of direct tumor infiltration of the left upper lobe.    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    2.  Pericardial effusion. Patient was seen by Cardiology. She is not considered to be in tamponade.     3.   Hyperuricemia. LDH is very high. 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     PLAN:    1.  PET scan later today    2.  I recommended a bone marrow biopsy to evaluate for bone marrow involvement.  I explained the procedure to the patient and she agrees to proceed.     3.  Start Allopurinol.    4.  Start IV fluids after she returns from the PET scan.          Kimberley Melgar MD  06/13/18

## 2018-06-13 NOTE — PROGRESS NOTES
[facility]     LOS: 2 days     Subjective:  Eating ok  +bm today  +coughing stable  No excessive pain    Objective:    Vital Signs:  Temp:  [97.5 °F (36.4 °C)-98.7 °F (37.1 °C)] 97.5 °F (36.4 °C)  Heart Rate:  [76-92] 83  Resp:  [16] 16  BP: (104-115)/(65-68) 115/65    SpO2:  [94 %-98 %] 94 %  on  Flow (L/min):  [1] 1;   Device (Oxygen Therapy): room air  Body mass index is 26.7 kg/m².    Physical Exam   Constitutional: She appears well-developed and well-nourished.   HENT:   Head: Normocephalic and atraumatic.   Cardiovascular: Normal rate and regular rhythm.    No murmur heard.  Pulmonary/Chest: Effort normal and breath sounds normal.   Abdominal: Soft. Bowel sounds are normal. She exhibits no distension. There is no tenderness.   Neurological: She is alert.   Skin: Skin is warm and dry.       Results Review:    Glucose   Date Value Ref Range Status   06/12/2018 91 65 - 99 mg/dL Final   06/11/2018 82 65 - 99 mg/dL Final       Results from last 7 days  Lab Units 06/13/18  0637   WBC 10*3/mm3 4.29*   HEMOGLOBIN g/dL 12.8   HEMATOCRIT % 40.4   PLATELETS 10*3/mm3 118*       Results from last 7 days  Lab Units 06/12/18  0605 06/11/18  1508   SODIUM mmol/L 140 140   POTASSIUM mmol/L 4.4 3.9   CHLORIDE mmol/L 99 99   CO2 mmol/L 27.0 26.0   BUN mg/dL 14 15   CREATININE mg/dL 0.81 0.75   CALCIUM mg/dL 10.1 10.3   BILIRUBIN mg/dL  --  0.4   ALK PHOS U/L  --  58   ALT (SGPT) U/L  --  24   AST (SGOT) U/L  --  28   GLUCOSE mg/dL 91 82       Results from last 7 days  Lab Units 06/11/18  1508   INR  1.06           Results from last 7 days  Lab Units 06/11/18  1508 06/08/18  1039   TROPONIN T ng/mL <0.010 <0.010     Cultures:       I have reviewed daily medications and changes in CPOE    Scheduled meds    allopurinol 300 mg Oral BID   diazePAM 5 mg Oral 30 Min Pre-Op         sodium chloride 125 mL/hr Last Rate: 125 mL/hr (06/13/18 5374)     PRN meds  •  acetaminophen  •  albuterol  •   HYDROcodone-acetaminophen  •  ondansetron **OR** ondansetron ODT **OR** ondansetron  •  sennosides-docusate sodium  •  sodium chloride      Principal Problem:    Mediastinal mass  Active Problems:    Thoracic back pain    Dyspnea on exertion    Palpitation        Assessment/Plan:  - Mediastinal Mass: sp CT guided biopsy today. PET scan reviewed. Reviewed biopsy  + Thoracic MRI and CT Abdomen/Pelvis were negative for metastatic process.   +Biopsy results prelim is back.  Oncology is following    - Palpitations: Stable overnight. Cardiology following.    - Disposition: Oncology to see and establish plan. We can probably transfer her to [facility] and monitor with continuous pulse ox if she is going to remain inpatient.          Arie Wolf MD  06/13/18  5:14 PM

## 2018-06-14 ENCOUNTER — APPOINTMENT (OUTPATIENT)
Dept: CT IMAGING | Facility: HOSPITAL | Age: 23
End: 2018-06-14
Attending: INTERNAL MEDICINE

## 2018-06-14 LAB
ALBUMIN SERPL-MCNC: 3.6 G/DL (ref 3.5–5.2)
ALBUMIN/GLOB SERPL: 1.3 G/DL
ALP SERPL-CCNC: 55 U/L (ref 39–117)
ALT SERPL W P-5'-P-CCNC: 16 U/L (ref 1–33)
ANION GAP SERPL CALCULATED.3IONS-SCNC: 13.2 MMOL/L
AST SERPL-CCNC: 25 U/L (ref 1–32)
BASOPHILS # BLD AUTO: 0.02 10*3/MM3 (ref 0–0.2)
BASOPHILS NFR BLD AUTO: 0.5 % (ref 0–1.5)
BILIRUB SERPL-MCNC: 0.5 MG/DL (ref 0.1–1.2)
BUN BLD-MCNC: 11 MG/DL (ref 6–20)
BUN/CREAT SERPL: 16.4 (ref 7–25)
CALCIUM SPEC-SCNC: 9.6 MG/DL (ref 8.6–10.5)
CHLORIDE SERPL-SCNC: 99 MMOL/L (ref 98–107)
CO2 SERPL-SCNC: 26.8 MMOL/L (ref 22–29)
CREAT BLD-MCNC: 0.67 MG/DL (ref 0.57–1)
DEPRECATED RDW RBC AUTO: 45 FL (ref 37–54)
EOSINOPHIL # BLD AUTO: 0.1 10*3/MM3 (ref 0–0.7)
EOSINOPHIL NFR BLD AUTO: 2.7 % (ref 0.3–6.2)
ERYTHROCYTE [DISTWIDTH] IN BLOOD BY AUTOMATED COUNT: 13.4 % (ref 11.7–13)
GFR SERPL CREATININE-BSD FRML MDRD: 109 ML/MIN/1.73
GFR SERPL CREATININE-BSD FRML MDRD: 132 ML/MIN/1.73
GLOBULIN UR ELPH-MCNC: 2.8 GM/DL
GLUCOSE BLD-MCNC: 93 MG/DL (ref 65–99)
HCT VFR BLD AUTO: 40.2 % (ref 35.6–45.5)
HGB BLD-MCNC: 12.7 G/DL (ref 11.9–15.5)
IMM GRANULOCYTES # BLD: 0 10*3/MM3 (ref 0–0.03)
IMM GRANULOCYTES NFR BLD: 0 % (ref 0–0.5)
LDH SERPL-CCNC: 982 U/L (ref 135–214)
LYMPHOCYTES # BLD AUTO: 0.74 10*3/MM3 (ref 0.9–4.8)
LYMPHOCYTES NFR BLD AUTO: 20 % (ref 19.6–45.3)
MCH RBC QN AUTO: 29.1 PG (ref 26.9–32)
MCHC RBC AUTO-ENTMCNC: 31.6 G/DL (ref 32.4–36.3)
MCV RBC AUTO: 92.2 FL (ref 80.5–98.2)
MONOCYTES # BLD AUTO: 0.34 10*3/MM3 (ref 0.2–1.2)
MONOCYTES NFR BLD AUTO: 9.2 % (ref 5–12)
NEUTROPHILS # BLD AUTO: 2.5 10*3/MM3 (ref 1.9–8.1)
NEUTROPHILS NFR BLD AUTO: 67.6 % (ref 42.7–76)
PLATELET # BLD AUTO: 109 10*3/MM3 (ref 140–500)
PMV BLD AUTO: 10.8 FL (ref 6–12)
POTASSIUM BLD-SCNC: 4.1 MMOL/L (ref 3.5–5.2)
PROT SERPL-MCNC: 6.4 G/DL (ref 6–8.5)
RBC # BLD AUTO: 4.36 10*6/MM3 (ref 3.9–5.2)
SODIUM BLD-SCNC: 139 MMOL/L (ref 136–145)
URATE SERPL-MCNC: 3.7 MG/DL (ref 2.4–5.7)
WBC NRBC COR # BLD: 3.7 10*3/MM3 (ref 4.5–10.7)

## 2018-06-14 PROCEDURE — 80053 COMPREHEN METABOLIC PANEL: CPT | Performed by: INTERNAL MEDICINE

## 2018-06-14 PROCEDURE — 07DR3ZX EXTRACTION OF ILIAC BONE MARROW, PERCUTANEOUS APPROACH, DIAGNOSTIC: ICD-10-PCS | Performed by: RADIOLOGY

## 2018-06-14 PROCEDURE — 86707 HEPATITIS BE ANTIBODY: CPT | Performed by: INTERNAL MEDICINE

## 2018-06-14 PROCEDURE — 83615 LACTATE (LD) (LDH) ENZYME: CPT | Performed by: INTERNAL MEDICINE

## 2018-06-14 PROCEDURE — 99233 SBSQ HOSP IP/OBS HIGH 50: CPT | Performed by: NURSE PRACTITIONER

## 2018-06-14 PROCEDURE — 85025 COMPLETE CBC W/AUTO DIFF WBC: CPT | Performed by: INTERNAL MEDICINE

## 2018-06-14 PROCEDURE — 77012 CT SCAN FOR NEEDLE BIOPSY: CPT

## 2018-06-14 PROCEDURE — 84550 ASSAY OF BLOOD/URIC ACID: CPT | Performed by: INTERNAL MEDICINE

## 2018-06-14 PROCEDURE — 99233 SBSQ HOSP IP/OBS HIGH 50: CPT | Performed by: INTERNAL MEDICINE

## 2018-06-14 PROCEDURE — 88305 TISSUE EXAM BY PATHOLOGIST: CPT | Performed by: INTERNAL MEDICINE

## 2018-06-14 PROCEDURE — 86706 HEP B SURFACE ANTIBODY: CPT | Performed by: INTERNAL MEDICINE

## 2018-06-14 PROCEDURE — 99232 SBSQ HOSP IP/OBS MODERATE 35: CPT | Performed by: INTERNAL MEDICINE

## 2018-06-14 PROCEDURE — 88311 DECALCIFY TISSUE: CPT | Performed by: INTERNAL MEDICINE

## 2018-06-14 PROCEDURE — 86704 HEP B CORE ANTIBODY TOTAL: CPT | Performed by: INTERNAL MEDICINE

## 2018-06-14 PROCEDURE — 80074 ACUTE HEPATITIS PANEL: CPT | Performed by: INTERNAL MEDICINE

## 2018-06-14 PROCEDURE — 87350 HEPATITIS BE AG IA: CPT | Performed by: INTERNAL MEDICINE

## 2018-06-14 RX ORDER — SODIUM CHLORIDE 9 MG/ML
75 INJECTION, SOLUTION INTRAVENOUS ONCE
Status: COMPLETED | OUTPATIENT
Start: 2018-06-15 | End: 2018-06-14

## 2018-06-14 RX ORDER — GUAIFENESIN AND DEXTROMETHORPHAN HYDROBROMIDE 600; 30 MG/1; MG/1
1 TABLET, EXTENDED RELEASE ORAL 2 TIMES DAILY PRN
Status: DISCONTINUED | OUTPATIENT
Start: 2018-06-14 | End: 2018-06-21 | Stop reason: HOSPADM

## 2018-06-14 RX ORDER — LIDOCAINE HYDROCHLORIDE 10 MG/ML
20 INJECTION, SOLUTION INFILTRATION; PERINEURAL ONCE
Status: COMPLETED | OUTPATIENT
Start: 2018-06-14 | End: 2018-06-14

## 2018-06-14 RX ADMIN — ALLOPURINOL 300 MG: 300 TABLET ORAL at 09:35

## 2018-06-14 RX ADMIN — SODIUM CHLORIDE 75 ML/HR: 9 INJECTION, SOLUTION INTRAVENOUS at 23:56

## 2018-06-14 RX ADMIN — SODIUM CHLORIDE 125 ML/HR: 9 INJECTION, SOLUTION INTRAVENOUS at 12:54

## 2018-06-14 RX ADMIN — ALLOPURINOL 300 MG: 300 TABLET ORAL at 20:49

## 2018-06-14 RX ADMIN — SODIUM CHLORIDE 125 ML/HR: 9 INJECTION, SOLUTION INTRAVENOUS at 04:27

## 2018-06-14 RX ADMIN — LIDOCAINE HYDROCHLORIDE 20 ML: 10 INJECTION, SOLUTION INFILTRATION; PERINEURAL at 09:15

## 2018-06-14 NOTE — PLAN OF CARE
Problem: Patient Care Overview  Goal: Plan of Care Review  Outcome: Ongoing (interventions implemented as appropriate)   18 7980   Coping/Psychosocial   Plan of Care Reviewed With patient;family   Plan of Care Review   Progress no change   OTHER   Outcome Summary intermittent dry hoarse cough; no c/o pain or SOA although 2LPM O2 to maintain O2sats >92%; bone marrow biopsy ; awaiting path report, port placement, probable chemo during hospitalization       Problem: Anxiety (Adult)  Goal: Reduction/Resolution  Outcome: Ongoing (interventions implemented as appropriate)      Problem: Coping, Compromised Family (Adult,NICU,,Obstetrics,Pediatric)  Goal: Effective Family Coping  Outcome: Ongoing (interventions implemented as appropriate)      Problem: Pain, Acute (Adult)  Goal: Acceptable Pain Control/Comfort Level  Outcome: Ongoing (interventions implemented as appropriate)

## 2018-06-14 NOTE — PLAN OF CARE
Problem: Patient Care Overview  Goal: Plan of Care Review  Outcome: Ongoing (interventions implemented as appropriate)   18 1749   Coping/Psychosocial   Plan of Care Reviewed With patient;family   Plan of Care Review   Progress no change   OTHER   Outcome Summary Pt. has continuous cough. Lung sounds are very diminished on left side. Remains on room air. Likely to have a PICC placed tomorrow. Awaiting tissue biopsy. Had a bone marrow biopsy today. Seems to be very blown away by the diagnosis and in somewhat of a shock, which is very understandable.      Goal: Individualization and Mutuality  Outcome: Ongoing (interventions implemented as appropriate)    Goal: Discharge Needs Assessment  Outcome: Ongoing (interventions implemented as appropriate)    Goal: Interprofessional Rounds/Family Conf  Outcome: Ongoing (interventions implemented as appropriate)      Problem: Anxiety (Adult)  Goal: Reduction/Resolution  Outcome: Ongoing (interventions implemented as appropriate)      Problem: Coping, Compromised Family (Adult,NICU,,Obstetrics,Pediatric)  Goal: Effective Family Coping  Outcome: Ongoing (interventions implemented as appropriate)      Problem: Pain, Acute (Adult)  Goal: Acceptable Pain Control/Comfort Level  Outcome: Ongoing (interventions implemented as appropriate)

## 2018-06-14 NOTE — PROGRESS NOTES
[facility]     LOS: 3 days     Subjective:  Eating ok  +coughing stable  No excessive pain    Objective:    Vital Signs:  Temp:  [97.8 °F (36.6 °C)-98.3 °F (36.8 °C)] 98.2 °F (36.8 °C)  Heart Rate:  [73-85] 80  Resp:  [16-19] 19  BP: ()/(58-67) 108/58    SpO2:  [92 %-95 %] 92 %  on   ;   Device (Oxygen Therapy): room air  Body mass index is 26.7 kg/m².    Physical Exam   Constitutional: She appears well-developed and well-nourished.   HENT:   Head: Normocephalic and atraumatic.   Cardiovascular: Normal rate and regular rhythm.    No murmur heard.  Pulmonary/Chest: Effort normal and breath sounds normal.   Abdominal: Soft. Bowel sounds are normal. She exhibits no distension. There is no tenderness.   Neurological: She is alert.   Skin: Skin is warm and dry.       Results Review:    Glucose   Date Value Ref Range Status   06/14/2018 93 65 - 99 mg/dL Final   06/12/2018 91 65 - 99 mg/dL Final   06/11/2018 82 65 - 99 mg/dL Final       Results from last 7 days  Lab Units 06/14/18  0629   WBC 10*3/mm3 3.70*   HEMOGLOBIN g/dL 12.7   HEMATOCRIT % 40.2   PLATELETS 10*3/mm3 109*       Results from last 7 days  Lab Units 06/14/18  0629   SODIUM mmol/L 139   POTASSIUM mmol/L 4.1   CHLORIDE mmol/L 99   CO2 mmol/L 26.8   BUN mg/dL 11   CREATININE mg/dL 0.67   CALCIUM mg/dL 9.6   BILIRUBIN mg/dL 0.5   ALK PHOS U/L 55   ALT (SGPT) U/L 16   AST (SGOT) U/L 25   GLUCOSE mg/dL 93       Results from last 7 days  Lab Units 06/11/18  1508   INR  1.06           Results from last 7 days  Lab Units 06/11/18  1508 06/08/18  1039   TROPONIN T ng/mL <0.010 <0.010     Cultures:       I have reviewed daily medications and changes in CPOE    Scheduled meds    allopurinol 300 mg Oral BID   diazePAM 5 mg Oral 30 Min Pre-Op         sodium chloride 125 mL/hr Last Rate: 125 mL/hr (06/14/18 0427)     PRN meds  •  acetaminophen  •  albuterol  •  HYDROcodone-acetaminophen  •  ondansetron **OR** ondansetron ODT **OR**  ondansetron  •  sennosides-docusate sodium  •  sodium chloride      Principal Problem:    Mediastinal mass  Active Problems:    Thoracic back pain    Dyspnea on exertion    Palpitation        Assessment/Plan:  - Mediastinal Mass: sp CT guided biopsy today. PET scan reviewed. Reviewed biopsy  + Thoracic MRI and CT Abdomen/Pelvis were negative for metastatic process.   +Biopsy results prelim is back.  Oncology is following    - Palpitations: Stable overnight. Cardiology following.    - Disposition: Oncology Cardiothoracic surgery following     Plan:  Will transfer care to hematology/oncology  Please call if needed    >25 minutes spent, > 1/2 time spent counseling and coordination of care   Arie Wolf MD  06/14/18  11:55 AM

## 2018-06-14 NOTE — PROGRESS NOTES
"    Chief Complaint:  follow-up for mediastinal mass  S/P: directed needle biopsy of the left lung mediastinum  POD # 2    Subjective:  Symptoms:  Stable.  She reports shortness of breath, chest pain, chest pressure and anxiety.    Diet:  Adequate intake.  No nausea or vomiting.    Activity level: Normal.    Pain:  She complains of pain that is mild.        Vital Signs:  Temp:  [97.5 °F (36.4 °C)-98.9 °F (37.2 °C)] 97.5 °F (36.4 °C)  Heart Rate:  [73-95] 95  Resp:  [16-19] 18  BP: ()/(58-74) 106/65    Intake & Output (last day)       06/13 0701 - 06/14 0700 06/14 0701 - 06/15 0700    P.O. 480 360    I.V. (mL/kg) 1416 (21.4)     Total Intake(mL/kg) 1896 (28.6) 360 (5.4)    Urine (mL/kg/hr) 2000 (1.3) 650 (1)    Stool  0 (0)    Total Output 2000 650    Net -104 -290          Unmeasured Urine Occurrence  2 x    Unmeasured Stool Occurrence  1 x          Objective:  General Appearance:  Comfortable, well-appearing and in no acute distress.    Vital signs: (most recent): Blood pressure 106/65, pulse 95, temperature 97.5 °F (36.4 °C), temperature source Oral, resp. rate 18, height 157.5 cm (62\"), weight 66.2 kg (146 lb), SpO2 95 %.  Vital signs are normal.  No fever.    Output: Producing urine and producing stool.    HEENT: Normal HEENT exam.    Lungs:  Normal effort and normal respiratory rate.  She is not in respiratory distress.  There are decreased breath sounds.  (Diminished breath sounds on the left)  Heart: Normal rate.  Regular rhythm.  S1 normal.  No murmur.   Abdomen: Abdomen is soft and non-distended.  Bowel sounds are normal.   There is no abdominal tenderness.   There is no mass.   Extremities: Normal range of motion.  There is no dependent edema.    Pulses: Distal pulses are intact.    Neurological: Patient is alert and oriented to person, place and time.  Normal strength.    Pupils:  Pupils are equal, round, and reactive to light.    Skin:  Warm and dry.          Results Review:     I reviewed the " patient's new clinical results.  I reviewed the patient's new imaging results and agree with the interpretation.  Discussed with the patient, Dr. Luevano, Dr. Melgar and Dr. Aragon.    Imaging Results (last 24 hours)     Procedure Component Value Units Date/Time    CT Guided Biopsy Bone Marrow [[id number]] Collected:  06/14/18 1605     Updated:  06/14/18 1605    Narrative:       CT-GUIDED BONE MARROW ASPIRATION AND CT-GUIDED CORE BIOPSY OF POSTERIOR  RIGHT ILIAC BONE 06/14/2018     HISTORY: Lymphoma.     After signed informed consent was obtained, patient was prepped and  draped in the prone position. Lidocaine was used for local anesthesia.     The Futuretec biopsy device was introduced into the posterior right  iliac bone. Bone marrow aspiration was performed. This was followed by  core biopsy of the posterior right iliac bone.     Confirmatory images were obtained.     Patient tolerated the procedure well with no complications.     Radiation dose reduction techniques were utilized, including automated  exposure control and exposure modulation based on body size.          NM Pet Skull Base To Mid Thigh [[id number]] Collected:  06/13/18 1913     Updated:  06/13/18 1924    Narrative:        FDG PET/CT IMAGING SKULL BASE TO MID THIGH      HISTORY: 22-year-old female with newly diagnosed lymphoma. Initial  staging.     TECHNIQUE: Blood glucose level at time of injection of FDG was 84 mg/dl.   5.8 mCi of FDG was injected. Approximately 90 minutes later, PET images  were obtained. CT images were acquired for attenuation correction and  anatomic localization.     COMPARISON: CTA of the chest dated 6/8/2018. CT scan of the abdomen and  pelvis dated 6/11/2018.     FINDINGS: The very large infiltrative edy mass involving the anterior  mediastinum and left hilar region that nearly completely fills the left  hemithorax shows intense hypermetabolism with a maximum measured SUV in  the range of 19.8 g/mL. No foci of pathologic  hypermetabolism are  identified elsewhere within the chest. There is physiologic distribution  of the radiopharmaceutical within the neck, abdomen and pelvis. In  particular, there is no hypermetabolic lymphadenopathy.       Impression:       Large chest mass showing intense hypermetabolism. There is  no metabolic evidence of metastatic lymphoma elsewhere within the neck,  chest, abdomen and pelvis.     This report was finalized on 6/13/2018 7:21 PM by Dr. David Padilla M.D.             Lab Results:     Lab Results (last 24 hours)     Procedure Component Value Units Date/Time    Bone Marrow Comprehensive (Int Onc) [[id number]] Collected:  06/14/18 0840    Specimen:  Bone Marrow from Iliac Crest, Right - Aspirate Updated:  06/14/18 1230    Tissue Pathology Exam [[id number]] Collected:  06/14/18 0840    Specimen:  Bone Marrow Aspirate from Iliac Crest, Right - Biopsy Updated:  06/14/18 0940    Comprehensive Metabolic Panel [[id number]] Collected:  06/14/18 0629    Specimen:  Blood Updated:  06/14/18 0739     Glucose 93 mg/dL      BUN 11 mg/dL      Creatinine 0.67 mg/dL      Sodium 139 mmol/L      Potassium 4.1 mmol/L      Chloride 99 mmol/L      CO2 26.8 mmol/L      Calcium 9.6 mg/dL      Total Protein 6.4 g/dL      Albumin 3.60 g/dL      ALT (SGPT) 16 U/L      AST (SGOT) 25 U/L      Alkaline Phosphatase 55 U/L      Total Bilirubin 0.5 mg/dL      eGFR Non African Amer 109 mL/min/1.73      eGFR  African Amer 132 mL/min/1.73      Globulin 2.8 gm/dL      A/G Ratio 1.3 g/dL      BUN/Creatinine Ratio 16.4     Anion Gap 13.2 mmol/L     Lactate Dehydrogenase [[id number]]  (Abnormal) Collected:  06/14/18 0629    Specimen:  Blood Updated:  06/14/18 0739      (H) U/L     Uric Acid [[id number]]  (Normal) Collected:  06/14/18 0629    Specimen:  Blood Updated:  06/14/18 0739     Uric Acid 3.7 mg/dL     CBC & Differential [[id number]] Collected:  06/14/18 0629    Specimen:  Blood Updated:  06/14/18 0659    Narrative:       The  following orders were created for panel order CBC & Differential.  Procedure                               Abnormality         Status                     ---------                               -----------         ------                     CBC Auto Differential[[id number]]        Abnormal            Final result                 Please view results for these tests on the individual orders.    CBC Auto Differential [[id number]]  (Abnormal) Collected:  06/14/18 0629    Specimen:  Blood Updated:  06/14/18 0659     WBC 3.70 (L) 10*3/mm3      RBC 4.36 10*6/mm3      Hemoglobin 12.7 g/dL      Hematocrit 40.2 %      MCV 92.2 fL      MCH 29.1 pg      MCHC 31.6 (L) g/dL      RDW 13.4 (H) %      RDW-SD 45.0 fl      MPV 10.8 fL      Platelets 109 (L) 10*3/mm3      Neutrophil % 67.6 %      Lymphocyte % 20.0 %      Monocyte % 9.2 %      Eosinophil % 2.7 %      Basophil % 0.5 %      Immature Grans % 0.0 %      Neutrophils, Absolute 2.50 10*3/mm3      Lymphocytes, Absolute 0.74 (L) 10*3/mm3      Monocytes, Absolute 0.34 10*3/mm3      Eosinophils, Absolute 0.10 10*3/mm3      Basophils, Absolute 0.02 10*3/mm3      Immature Grans, Absolute 0.00 10*3/mm3     Hepatitis B & C Profile [[id number]] Collected:  06/14/18 0629    Specimen:  Blood Updated:  06/14/18 0646           Assessment/Plan     Principal Problem:    Mediastinal mass  Active Problems:    Thoracic back pain    Dyspnea on exertion    Palpitation       Assessment:    Condition: In stable condition.       Plan:   Encourage ambulation.  Start/continue incentive spirometry.  Regular diet and NPO (Nothing by mouth at midnight).  X-rays as ordered.  Administer medications as ordered.       This patient's case was presented at our thoracic multidisciplinary conference this morning.  Dr. Luevano and Dr. Aragon had an extensive discussion around obtaining the patient's tissue pathology as quickly as possible so that she could begin treatment.  In light of this, Dr. Aragon plans a  Bryant procedure and mediastinotomy with biopsy tomorrow.  We will confirm that tissue pathology report has not been received by [facility] prior to the procedure.  Both Dr. Aragon and myself have discussed the procedure at length with the patient and answered any questions that she may have.  She agrees to proceed with the biopsy procedure tomorrow.  Preoperative orders have been placed.    BRANNON Walton  [facility]  06/14/18  5:16 PM

## 2018-06-14 NOTE — PROGRESS NOTES
Hospital Follow Up        Chief Complaint: Follow up pericardial effusion, mediastinal mass    Interval History: Cough and breathing are stable.  She is status post bone marrow biopsy today.  She reports she was dyspneic and uncomfortable during bone  biopsy since they had to lay her flat on her stomach.  She reports she felt better as soon as they sat her up.     Objective:     Objective:  Temp:  [97.8 °F (36.6 °C)-98.3 °F (36.8 °C)] 98.2 °F (36.8 °C)  Heart Rate:  [73-85] 80  Resp:  [16-19] 19  BP: ()/(58-67) 108/58     Intake/Output Summary (Last 24 hours) at 06/14/18 1220  Last data filed at 06/14/18 1030   Gross per 24 hour   Intake             1896 ml   Output             2000 ml   Net             -104 ml     Body mass index is 26.7 kg/m².  1    06/11/18  1407   Weight: 66.2 kg (146 lb)     Weight change:       Physical Exam:   General : Alert, cooperative, in no acute distress.  Neuro: alert,cooperative and oriented  Lungs: CTAB. Normal respiratory effort and rate.  CV:: Regular rate and rhythm, normal S1 and S2, no murmurs, gallops or rubs.  ABD: Soft, nontender, non-distended. positive bowel sounds  Extr: No edema or cyanosis, moves all extremities    Lab Review:     Results from last 7 days  Lab Units 06/14/18  0629 06/12/18  0605 06/11/18  1508   SODIUM mmol/L 139 140 140   POTASSIUM mmol/L 4.1 4.4 3.9   CHLORIDE mmol/L 99 99 99   CO2 mmol/L 26.8 27.0 26.0   BUN mg/dL 11 14 15   CREATININE mg/dL 0.67 0.81 0.75   GLUCOSE mg/dL 93 91 82   CALCIUM mg/dL 9.6 10.1 10.3   AST (SGOT) U/L 25  --  28   ALT (SGPT) U/L 16  --  24       Results from last 7 days  Lab Units 06/11/18  1508 06/08/18  1039   TROPONIN T ng/mL <0.010 <0.010       Results from last 7 days  Lab Units 06/14/18  0629 06/13/18  0637   WBC 10*3/mm3 3.70* 4.29*   HEMOGLOBIN g/dL 12.7 12.8   HEMATOCRIT % 40.2 40.4   PLATELETS 10*3/mm3 109* 118*       Results from last 7 days  Lab Units 06/11/18  1508   INR  1.06           Results  from last 7 days  Lab Units 06/08/18  0903   CHOLESTEROL mg/dL 114   TRIGLYCERIDES mg/dL 90   HDL CHOL mg/dL 19*       Results from last 7 days  Lab Units 06/08/18  1039 06/08/18  0903   PROBNP pg/mL 61.0 62.9       Results from last 7 days  Lab Units 06/08/18  0903   TSH mIU/mL 1.180     I reviewed the patient's new clinical results.  I personally viewed and interpreted the patient's EKG  Current Medications:   Scheduled Meds:  allopurinol 300 mg Oral BID   diazePAM 5 mg Oral 30 Min Pre-Op     Continuous Infusions:  sodium chloride 125 mL/hr Last Rate: 125 mL/hr (06/14/18 0427)       Allergies:  No Known Allergies    Assessment/Plan:     1. Pericardial effusion.  I reviewed both the echocardiogram and CT of the chest images from 6/8. On echocardiogram it appears the majority of the effusion is posterior and appears large primarily near left ventricle and apex and only small to moderate near anteriorly near right ventricle.  There is no evidence of tamponade.  Her pericardium appears thickened pointing to involvement of the pericardium with the mediastinal process.  Her CT images show that the mediastinal mass partially covers the anterior aspect of the heart which would make percutaneous access to the pericardial space and effusion for a pericardiocentesis difficult along with the fact that most of the effusion is posterior rather than anterior.  At this time she is not exhibiting any symptoms or findings that I can attribute to the pericardial effusion.   2. Mediastinal mass.  Concerning for Hodgkin's disease or non-hodgkin's lymphoma per Oncology.  Awaiting path results from CT guided biopsy from 6/12.  Appears to be encasing the left main pulmonary artery, the SVC and pulmonary veins.  RV appears normal in size and function.  No evidence of SVC syndrome or again tamponade.  3.  Trepopnea.  Due to narrowing of trachea and left mainstem bronchus by mediastinal mass.  Asymptomatic as long as lays at an upright  angle.  4. Thrombocytopenia.  Mild.  Status post bone marrow biopsy to look for involvement today.   5.  Leukopenia  6.  Splenomegaly       -  Will continue to follow along with you and monitor for cardiac compromise from pericardial effusion or mass.          Shena Fitzgerald MD  06/14/18  12:20 PM

## 2018-06-14 NOTE — PROGRESS NOTES
Subjective     CHIEF COMPLAINT:     Mediastinal mass    HISTORY OF PRESENT ILLNESS:    Shortness of breath. Cough is dry but occasionally feels there are thick secretions.      Past Medical History:   Diagnosis Date   • Fracture of lower leg 2000    L       History reviewed. No pertinent surgical history.    SCHEDULED MEDS:    allopurinol 300 mg Oral BID   diazePAM 5 mg Oral 30 Min Pre-Op     INFUSIONS:    sodium chloride 125 mL/hr Last Rate: 125 mL/hr (06/14/18 0427)     PRN MEDS:  •  acetaminophen  •  albuterol  •  HYDROcodone-acetaminophen  •  ondansetron **OR** ondansetron ODT **OR** ondansetron  •  sennosides-docusate sodium  •  sodium chloride    REVIEW OF SYSTEMS:  GENERAL:  No Fever or chills. No weight change.  No Fatigue.   SKIN:  No skin rashes or lesions.  HEME/LYMPH:  No Easy bruisability.   RESPIRATORY:  Shortness of breath. Cough.   CVS:  Right sided chest pain.   GI:  No Abdominal pain. No Nausea. No Vomiting. .  MUSCULOSKELETAL:  No Back pain.   NEUROLOGICAL:  No Headaches. No Dizziness.   PSYCHIATRIC:  No Anxiety. No Depression.        Objective   VITAL SIGNS:  Temp:  [97.8 °F (36.6 °C)-98.3 °F (36.8 °C)] 98.2 °F (36.8 °C)  Heart Rate:  [73-85] 80  Resp:  [16-19] 19  BP: ()/(58-67) 108/58    Wt Readings from Last 3 Encounters:   06/11/18 66.2 kg (146 lb)   06/11/18 66.4 kg (146 lb 6.4 oz)   06/08/18 66.7 kg (147 lb)       PHYSICAL EXAMINATION:  GENERAL:  The patient appears in good general condition, not in acute distress.  SKIN:  No skin rashes or lesions. No Ecchymosis or Petechiae.  HEAD:  Normocephalic.  EYES:  No Pallor. No Jaundice.   NECK: Left neck base mass/lymphadenopathy.  LYMPHATICS:  Left supraclavicular/scalene lymphadenopathy.  CHEST:  Left lung with decreased breath sounds.   CARDIAC:  Normal S1 & S2. No Murmurs.  ABDOMEN:  Soft. No tenderness. No Hepatomegaly. No Splenomegaly. No masses.  EXTREMITIES:  No Edema. Biopsy site over the right posterior iliac crest is benign.    NEUROLOGICAL:  No Focal neurological deficits.       RESULT REVIEW:     Results from last 7 days  Lab Units 06/14/18  0629 06/13/18  0637 06/12/18  0605 06/11/18  1508 06/11/18  1112 06/08/18  1039   WBC 10*3/mm3 3.70* 4.29* 4.13* 4.62 4.82 3.41*   NEUTROS ABS 10*3/mm3 2.50  --  2.77 3.39 3.56 2.37   HEMOGLOBIN g/dL 12.7 12.8 13.2 13.7 13.7 12.6   HEMATOCRIT % 40.2 40.4 40.9 41.9 40.8 38.7   PLATELETS 10*3/mm3 109* 118* 119* 125* 110* 115*       Results from last 7 days  Lab Units 06/14/18  0629 06/12/18  0605 06/11/18  1508 06/08/18  1039   SODIUM mmol/L 139 140 140 140   POTASSIUM mmol/L 4.1 4.4 3.9 4.3   CHLORIDE mmol/L 99 99 99 102   CO2 mmol/L 26.8 27.0 26.0 24.6   BUN mg/dL 11 14 15 10   CREATININE mg/dL 0.67 0.81 0.75 0.76   CALCIUM mg/dL 9.6 10.1 10.3 9.5   ALBUMIN g/dL 3.60  --  3.90 3.70   BILIRUBIN mg/dL 0.5  --  0.4 0.3   ALK PHOS U/L 55  --  58 55   ALT (SGPT) U/L 16  --  24 26   AST (SGOT) U/L 25  --  28 35*     LDH 1,099  ESR 12  Uric acid 7.0      FDG PET/CT IMAGING SKULL BASE TO MID THIGH 6/13/18:     HISTORY: 22-year-old female with newly diagnosed lymphoma. Initial  staging.     TECHNIQUE: Blood glucose level at time of injection of FDG was 84 mg/dl.   5.8 mCi of FDG was injected. Approximately 90 minutes later, PET images  were obtained. CT images were acquired for attenuation correction and  anatomic localization.     COMPARISON: CTA of the chest dated 6/8/2018. CT scan of the abdomen and  pelvis dated 6/11/2018.     FINDINGS: The very large infiltrative edy mass involving the anterior  mediastinum and left hilar region that nearly completely fills the left  hemithorax shows intense hypermetabolism with a maximum measured SUV in  the range of 19.8 g/mL. No foci of pathologic hypermetabolism are  identified elsewhere within the chest. There is physiologic distribution  of the radiopharmaceutical within the neck, abdomen and pelvis. In  particular, there is no hypermetabolic lymphadenopathy.      IMPRESSION:  Large chest mass showing intense hypermetabolism. There is  no metabolic evidence of metastatic lymphoma elsewhere within the neck,  chest, abdomen and pelvis.    I personally reviewed the PET scan with the patient and I concur with the findings.    Left lung mass   Preliminary Diagnosis   1: LEFT UPPER LUNG MASS BIOPSY:               ATYPICAL MONONUCLEAR INFILTRATE SUSPICIOUS FOR LYMPHOPROLIFERATIVE DISORDER.                 2: LEFT UPPER LUNG MASS:               ATYPICAL MONONUCLEAR INFILTRATE SUSPICIOUS FOR LYMPHOPROLIFERATIVE DISORDER.     COMMENT: Immunoperoxidase stains have been performed.  Lesional cells are positive for CD45 and negative for Pancytokeratin.  Both blocks have been forwarded to [facility] for additional studies to further classify the infiltrate.  Final report will follow.              Assessment/Plan   1.  Large mediastinal mass. The mass is encasing the vascular structures and narrowing the left mainstem bronchus.  There is suspicion of direct tumor infiltration of the left upper lobe. 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 Prelim path shows lymphoma. Additional testing being done at [facility]. Pathologist will notify us in AM and if not adequate, Dr. Aragon will obtain additional biopsy.     I explained to the patient the initial findings of the pathology exam and the imaging studies. BM is pending. If it is negative, the state will be Bulky stage II disease.     2.  Pericardial effusion. Patient was seen by Cardiology. She is not considered to be in tamponade.     3.  Hyperuricemia. LDH is very high. Uric and LDH improved with Fluids and Allopurinol.     4.  Fertility preservation. The patient is 23 years old. I discussed the options of harvesting eggs vs. Zoladex + OCCP vs. Zoladex alone. Due to the very large size of the tumor and need for rapid initiation of treatment, patient will not be able to have egg harvest done. She is at increased risk  for thrombosis with the use of OCCP (due to malignancy and vascular encasement by the tumor) and I did not recommend this approach. I recommended Zoladex alone. I explained the side effects including hot flashes and cessation of menses. The patient is interested in pursuing this.     5.  IV access. The patient will be at a high risk for thrombosis if she has a port placed. I recommended a PICC line to be placed with cycles 1 and 2. The patient agrees with this plan.    PLAN:    1.  Start Zoladex tomorrow 3.6 mg SQ.    2.  PICC line placement tomorrow.    3.  Mucinex DM.    4.  Continue IVF and Allopurinol.       Kimberley Melgar MD  06/14/18

## 2018-06-15 ENCOUNTER — APPOINTMENT (OUTPATIENT)
Dept: GENERAL RADIOLOGY | Facility: HOSPITAL | Age: 23
End: 2018-06-15

## 2018-06-15 ENCOUNTER — ANESTHESIA (OUTPATIENT)
Dept: PERIOP | Facility: HOSPITAL | Age: 23
End: 2018-06-15

## 2018-06-15 ENCOUNTER — ANESTHESIA EVENT (OUTPATIENT)
Dept: PERIOP | Facility: HOSPITAL | Age: 23
End: 2018-06-15

## 2018-06-15 LAB
ABO GROUP BLD: NORMAL
ALBUMIN SERPL-MCNC: 3.9 G/DL (ref 3.5–5.2)
ALBUMIN/GLOB SERPL: 1.4 G/DL
ALP SERPL-CCNC: 57 U/L (ref 39–117)
ALT SERPL W P-5'-P-CCNC: 17 U/L (ref 1–33)
ANION GAP SERPL CALCULATED.3IONS-SCNC: 14.5 MMOL/L
APTT PPP: 26.7 SECONDS (ref 22.7–35.4)
AST SERPL-CCNC: 30 U/L (ref 1–32)
BASOPHILS # BLD AUTO: 0.01 10*3/MM3 (ref 0–0.2)
BASOPHILS NFR BLD AUTO: 0.3 % (ref 0–1.5)
BILIRUB SERPL-MCNC: 0.6 MG/DL (ref 0.1–1.2)
BILIRUB UR QL STRIP: NEGATIVE
BLD GP AB SCN SERPL QL: NEGATIVE
BUN BLD-MCNC: 10 MG/DL (ref 6–20)
BUN/CREAT SERPL: 16.7 (ref 7–25)
CALCIUM SPEC-SCNC: 9.8 MG/DL (ref 8.6–10.5)
CHLORIDE SERPL-SCNC: 102 MMOL/L (ref 98–107)
CLARITY UR: CLEAR
CO2 SERPL-SCNC: 26.5 MMOL/L (ref 22–29)
COLOR UR: YELLOW
CREAT BLD-MCNC: 0.6 MG/DL (ref 0.57–1)
DEPRECATED RDW RBC AUTO: 44.4 FL (ref 37–54)
EOSINOPHIL # BLD AUTO: 0.08 10*3/MM3 (ref 0–0.7)
EOSINOPHIL NFR BLD AUTO: 2.6 % (ref 0.3–6.2)
ERYTHROCYTE [DISTWIDTH] IN BLOOD BY AUTOMATED COUNT: 13.3 % (ref 11.7–13)
GFR SERPL CREATININE-BSD FRML MDRD: 124 ML/MIN/1.73
GFR SERPL CREATININE-BSD FRML MDRD: 150 ML/MIN/1.73
GLOBULIN UR ELPH-MCNC: 2.8 GM/DL
GLUCOSE BLD-MCNC: 91 MG/DL (ref 65–99)
GLUCOSE UR STRIP-MCNC: NEGATIVE MG/DL
HBV CORE AB SER DONR QL IA: NEGATIVE
HBV CORE IGM SERPL QL IA: NEGATIVE
HBV E AB SERPL QL IA: NEGATIVE
HBV E AG SERPL QL IA: NEGATIVE
HBV SURFACE AB SER QL: REACTIVE
HBV SURFACE AG SERPL QL IA: NEGATIVE
HCT VFR BLD AUTO: 39.4 % (ref 35.6–45.5)
HCV AB S/CO SERPL IA: <0.1 S/CO RATIO (ref 0–0.9)
HGB BLD-MCNC: 12.7 G/DL (ref 11.9–15.5)
HGB UR QL STRIP.AUTO: NEGATIVE
IMM GRANULOCYTES # BLD: 0 10*3/MM3 (ref 0–0.03)
IMM GRANULOCYTES NFR BLD: 0 % (ref 0–0.5)
INR PPP: 1.05 (ref 0.9–1.1)
KETONES UR QL STRIP: NEGATIVE
LABORATORY COMMENT REPORT: NORMAL
LDH SERPL-CCNC: 1085 U/L (ref 135–214)
LEUKOCYTE ESTERASE UR QL STRIP.AUTO: NEGATIVE
LYMPHOCYTES # BLD AUTO: 0.55 10*3/MM3 (ref 0.9–4.8)
LYMPHOCYTES NFR BLD AUTO: 17.9 % (ref 19.6–45.3)
MCH RBC QN AUTO: 29.5 PG (ref 26.9–32)
MCHC RBC AUTO-ENTMCNC: 32.2 G/DL (ref 32.4–36.3)
MCV RBC AUTO: 91.4 FL (ref 80.5–98.2)
MONOCYTES # BLD AUTO: 0.3 10*3/MM3 (ref 0.2–1.2)
MONOCYTES NFR BLD AUTO: 9.8 % (ref 5–12)
NEUTROPHILS # BLD AUTO: 2.13 10*3/MM3 (ref 1.9–8.1)
NEUTROPHILS NFR BLD AUTO: 69.4 % (ref 42.7–76)
NITRITE UR QL STRIP: NEGATIVE
PH UR STRIP.AUTO: 6 [PH] (ref 5–8)
PLATELET # BLD AUTO: 117 10*3/MM3 (ref 140–500)
PMV BLD AUTO: 11.4 FL (ref 6–12)
POTASSIUM BLD-SCNC: 3.9 MMOL/L (ref 3.5–5.2)
PROT SERPL-MCNC: 6.7 G/DL (ref 6–8.5)
PROT UR QL STRIP: NEGATIVE
PROTHROMBIN TIME: 13.5 SECONDS (ref 11.7–14.2)
RBC # BLD AUTO: 4.31 10*6/MM3 (ref 3.9–5.2)
REF LAB TEST METHOD: NORMAL
RH BLD: POSITIVE
SODIUM BLD-SCNC: 143 MMOL/L (ref 136–145)
SP GR UR STRIP: 1.01 (ref 1–1.03)
T&S EXPIRATION DATE: NORMAL
URATE SERPL-MCNC: 2.2 MG/DL (ref 2.4–5.7)
UROBILINOGEN UR QL STRIP: NORMAL
WBC NRBC COR # BLD: 3.07 10*3/MM3 (ref 4.5–10.7)

## 2018-06-15 PROCEDURE — 81003 URINALYSIS AUTO W/O SCOPE: CPT | Performed by: INTERNAL MEDICINE

## 2018-06-15 PROCEDURE — 86901 BLOOD TYPING SEROLOGIC RH(D): CPT | Performed by: NURSE PRACTITIONER

## 2018-06-15 PROCEDURE — 84550 ASSAY OF BLOOD/URIC ACID: CPT | Performed by: INTERNAL MEDICINE

## 2018-06-15 PROCEDURE — 99233 SBSQ HOSP IP/OBS HIGH 50: CPT | Performed by: INTERNAL MEDICINE

## 2018-06-15 PROCEDURE — 86850 RBC ANTIBODY SCREEN: CPT | Performed by: NURSE PRACTITIONER

## 2018-06-15 PROCEDURE — 99232 SBSQ HOSP IP/OBS MODERATE 35: CPT | Performed by: INTERNAL MEDICINE

## 2018-06-15 PROCEDURE — 02HV33Z INSERTION OF INFUSION DEVICE INTO SUPERIOR VENA CAVA, PERCUTANEOUS APPROACH: ICD-10-PCS | Performed by: INTERNAL MEDICINE

## 2018-06-15 PROCEDURE — 86900 BLOOD TYPING SEROLOGIC ABO: CPT | Performed by: NURSE PRACTITIONER

## 2018-06-15 PROCEDURE — 99233 SBSQ HOSP IP/OBS HIGH 50: CPT | Performed by: NURSE PRACTITIONER

## 2018-06-15 PROCEDURE — 83615 LACTATE (LD) (LDH) ENZYME: CPT | Performed by: INTERNAL MEDICINE

## 2018-06-15 PROCEDURE — 85610 PROTHROMBIN TIME: CPT | Performed by: NURSE PRACTITIONER

## 2018-06-15 PROCEDURE — 80053 COMPREHEN METABOLIC PANEL: CPT | Performed by: INTERNAL MEDICINE

## 2018-06-15 PROCEDURE — 87040 BLOOD CULTURE FOR BACTERIA: CPT | Performed by: INTERNAL MEDICINE

## 2018-06-15 PROCEDURE — 85025 COMPLETE CBC W/AUTO DIFF WBC: CPT | Performed by: INTERNAL MEDICINE

## 2018-06-15 PROCEDURE — C1751 CATH, INF, PER/CENT/MIDLINE: HCPCS

## 2018-06-15 PROCEDURE — 71045 X-RAY EXAM CHEST 1 VIEW: CPT

## 2018-06-15 PROCEDURE — 25010000002 GOSERELIN PER 3.6 MG: Performed by: INTERNAL MEDICINE

## 2018-06-15 PROCEDURE — 25010000002 FONDAPARINUX PER 0.5 MG: Performed by: INTERNAL MEDICINE

## 2018-06-15 PROCEDURE — 85730 THROMBOPLASTIN TIME PARTIAL: CPT | Performed by: NURSE PRACTITIONER

## 2018-06-15 RX ORDER — FONDAPARINUX SODIUM 2.5 MG/.5ML
2.5 INJECTION SUBCUTANEOUS
Status: DISCONTINUED | OUTPATIENT
Start: 2018-06-15 | End: 2018-06-20

## 2018-06-15 RX ORDER — SODIUM CHLORIDE 0.9 % (FLUSH) 0.9 %
20 SYRINGE (ML) INJECTION AS NEEDED
Status: DISCONTINUED | OUTPATIENT
Start: 2018-06-15 | End: 2018-06-21 | Stop reason: HOSPADM

## 2018-06-15 RX ORDER — SODIUM CHLORIDE 0.9 % (FLUSH) 0.9 %
10 SYRINGE (ML) INJECTION AS NEEDED
Status: DISCONTINUED | OUTPATIENT
Start: 2018-06-15 | End: 2018-06-21 | Stop reason: HOSPADM

## 2018-06-15 RX ADMIN — FONDAPARINUX SODIUM 2.5 MG: 2.5 INJECTION, SOLUTION SUBCUTANEOUS at 21:04

## 2018-06-15 RX ADMIN — SODIUM CHLORIDE 125 ML/HR: 9 INJECTION, SOLUTION INTRAVENOUS at 21:48

## 2018-06-15 RX ADMIN — GUAIFENESIN AND DEXTROMETHORPHAN HYDROBROMIDE 1 TABLET: 600; 30 TABLET, EXTENDED RELEASE ORAL at 18:06

## 2018-06-15 RX ADMIN — ALLOPURINOL 300 MG: 300 TABLET ORAL at 11:56

## 2018-06-15 RX ADMIN — SODIUM CHLORIDE 125 ML/HR: 9 INJECTION, SOLUTION INTRAVENOUS at 12:56

## 2018-06-15 RX ADMIN — ALLOPURINOL 300 MG: 300 TABLET ORAL at 21:04

## 2018-06-15 RX ADMIN — GOSERELIN ACETATE 3.6 MG: 3.6 IMPLANT SUBCUTANEOUS at 16:55

## 2018-06-15 NOTE — PROGRESS NOTES
Oncology Social Work    Supportive counseling with patient and her mother this morning.  Will cont to follow and assist with practical needs, community resources and emotional needs.    Thank you,  Faina Santiago, EZEKIELW, CSW, OSW-C

## 2018-06-15 NOTE — PROGRESS NOTES
Subjective     CHIEF COMPLAINT:     Mediastinal mass    HISTORY OF PRESENT ILLNESS:    Shortness of breath. intermittent pain in the left side of the chest.       Past Medical History:   Diagnosis Date   • Fracture of lower leg 2000    L       History reviewed. No pertinent surgical history.    SCHEDULED MEDS:    allopurinol 300 mg Oral BID   goserelin 3.6 mg Subcutaneous Once     INFUSIONS:    sodium chloride 125 mL/hr Last Rate: 125 mL/hr (06/14/18 1429)     PRN MEDS:  •  acetaminophen  •  albuterol  •  guaifenesin-dextromethorphan  •  HYDROcodone-acetaminophen  •  ondansetron **OR** ondansetron ODT **OR** ondansetron  •  sennosides-docusate sodium  •  sodium chloride    REVIEW OF SYSTEMS:  GENERAL:  No Fever or chills. No weight change.  No Fatigue.   SKIN:  No skin rashes or lesions.  HEME/LYMPH: No Anemia. No Easy bruisability.   RESPIRATORY:  Shortness of breath. Cough. No Hemoptysis.   CVS:  Chest pain.    GI:  No Abdominal pain. No Nausea. No Vomiting.   MUSCULOSKELETAL:  No Back pain.   NEUROLOGICAL:  No Headaches. No Dizziness.   PSYCHIATRIC:  No Anxiety. No Depression.        Objective   VITAL SIGNS:  Temp:  [97.2 °F (36.2 °C)-98.9 °F (37.2 °C)] 97.2 °F (36.2 °C)  Heart Rate:  [70-95] 70  Resp:  [16-18] 18  BP: ()/(57-74) 102/66    Wt Readings from Last 3 Encounters:   06/11/18 66.2 kg (146 lb)   06/11/18 66.4 kg (146 lb 6.4 oz)   06/08/18 66.7 kg (147 lb)       PHYSICAL EXAMINATION:  GENERAL:  The patient appears in good general condition, not in acute distress.  SKIN:  No skin rashes or lesions. No Ecchymosis or Petechiae.  HEAD:  Normocephalic.  EYES:  No Pallor. No Jaundice.   NECK:  Supple with no Thyromegaly or Masses.  LYMPHATICS:  Left supraclavicular/scalene Lymphadenopathy.  CHEST:  Decreased breath sounds on the left.    CARDIAC:  Normal S1 & S2. No Murmurs.  ABDOMEN:  Soft. No tenderness. No Hepatomegaly. No Splenomegaly. No masses.  EXTREMITIES:  No Edema. No Calf tenderness. No  Cyanosis.   NEUROLOGICAL:  No Focal neurological deficits.       RESULT REVIEW:     Results from last 7 days  Lab Units 06/15/18  0717 06/14/18  0629 06/13/18  0637 06/12/18  0605 06/11/18  1508 06/11/18  1112   WBC 10*3/mm3 3.07* 3.70* 4.29* 4.13* 4.62 4.82   NEUTROS ABS 10*3/mm3 2.13 2.50  --  2.77 3.39 3.56   HEMOGLOBIN g/dL 12.7 12.7 12.8 13.2 13.7 13.7   HEMATOCRIT % 39.4 40.2 40.4 40.9 41.9 40.8   PLATELETS 10*3/mm3 117* 109* 118* 119* 125* 110*       Results from last 7 days  Lab Units 06/15/18  0717 06/14/18  0629 06/12/18  0605 06/11/18  1508   SODIUM mmol/L 143 139 140 140   POTASSIUM mmol/L 3.9 4.1 4.4 3.9   CHLORIDE mmol/L 102 99 99 99   CO2 mmol/L 26.5 26.8 27.0 26.0   BUN mg/dL 10 11 14 15   CREATININE mg/dL 0.60 0.67 0.81 0.75   CALCIUM mg/dL 9.8 9.6 10.1 10.3   ALBUMIN g/dL 3.90 3.60  --  3.90   BILIRUBIN mg/dL 0.6 0.5  --  0.4   ALK PHOS U/L 57 55  --  58   ALT (SGPT) U/L 17 16  --  24   AST (SGOT) U/L 30 25  --  28     LDH 1,085  ESR 12  Uric acid 2.2    Bone marrow flow - Negative.       Assessment/Plan   1.  Large mediastinal mass. The mass is encasing the vascular structures and narrowing the left mainstem bronchus.  There is suspicion of direct tumor infiltration of the left upper lobe. 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 Prelim path shows lymphoma. Additional path from [facility] is pending.     Preliminary result of bone marrow is negative.     2.  Pericardial effusion. Patient was seen by Cardiology. No evidence of cardiac tamponade.      3.  Hyperuricemia. LDH is very high. Uric and LDH improved with Fluids and Allopurinol.     4.  Fertility preservation. The patient is 23 years old. I discussed the options of harvesting eggs vs. Zoladex + OCCP vs. Zoladex alone. Due to the very large size of the tumor and need for rapid initiation of treatment, patient will not be able to have egg harvest done. She is at increased risk for thrombosis with the use of OCCP (due to  malignancy and vascular encasement by the tumor) and I did not recommend this approach. I recommended Zoladex alone. I explained the side effects including hot flashes and cessation of menses. The patient is interested in pursuing this. We will start Zoladex today.    5.  IV access.     PLAN:    1.  Zoladex today 3.6 mg SQ today.    2.  PICC line today.    3.  Continue IV Fluids and Allopurinol.     If pathology exam of the CT guided Bx specimen does not result reaching the diagnosis, Dr. Aragon will preform Effie procedure on Monday 6/18/18.    Discussed with the patient's mother at bedside.    Discussed with Dr. Luevano and BRANNON Koroma.           Kimberley Melgar MD  06/15/18

## 2018-06-15 NOTE — PROGRESS NOTES
"    Chief Complaint: Follow-up mediastinal mass  S/P: CT Directed needle biopsy of the left lung mediastinum  POD # 3    Subjective:  Symptoms:  Stable.  She reports shortness of breath, cough and anxiety.  No chest pain or chest pressure.    Diet:  Adequate intake.  No nausea or vomiting.    Activity level: Normal with assistance.    Pain:  She complains of pain that is mild.  Pain is well controlled.        Vital Signs:  Temp:  [97.2 °F (36.2 °C)-100.2 °F (37.9 °C)] 100.2 °F (37.9 °C)  Heart Rate:  [70-97] 97  Resp:  [16-18] 18  BP: ()/(57-67) 100/60    Intake & Output (last day)       06/14 0701 - 06/15 0700 06/15 0701 - 06/16 0700    P.O. 720 360    I.V. (mL/kg) 2400 (36.3)     Total Intake(mL/kg) 3120 (47.1) 360 (5.4)    Urine (mL/kg/hr) 1150 (0.7) 350 (0.5)    Stool 0 (0) 0 (0)    Total Output 1150 350    Net +1970 +10          Unmeasured Urine Occurrence 4 x     Unmeasured Stool Occurrence 2 x 1 x          Objective:  General Appearance:  Comfortable, well-appearing, in no acute distress and not in pain.    Vital signs: (most recent): Blood pressure 100/60, pulse 97, temperature 100.2 °F (37.9 °C), temperature source Oral, resp. rate 18, height 157.5 cm (62\"), weight 66.2 kg (146 lb), SpO2 95 %.  Vital signs are normal.  No fever.    Output: Producing urine and producing stool.    HEENT: Normal HEENT exam.    Lungs:  Normal effort and normal respiratory rate.  She is not in respiratory distress.  There are decreased breath sounds.  (Diminished breath sounds in left lung fields)  Heart: Normal rate.  Regular rhythm.  S1 normal.  No murmur.   Chest: No chest wall tenderness.    Abdomen: Abdomen is soft and non-distended.  Bowel sounds are normal.   There is no abdominal tenderness.   There is no mass.   Extremities: Normal range of motion.    Pulses: Distal pulses are intact.    Neurological: Patient is alert and oriented to person, place and time.  Normal strength.    Pupils:  Pupils are equal, round, " and reactive to light.    Skin:  Warm and dry.          Results Review:     I reviewed the patient's new clinical results.  I reviewed the patient's new imaging results and agree with the interpretation.  Discussed with Patient, her mother, RN, Dr. Aragon, and Dr. Melgar.    Imaging Results (last 24 hours)     Procedure Component Value Units Date/Time    XR Chest 1 View [[id number]] Collected:  06/15/18 0721     Updated:  06/15/18 0721    Narrative:       PORTABLE CHEST X-RAY     HISTORY: Preop testing; J98.59-Other diseases of mediastinum, not  elsewhere classified.     COMPARISON: None.     FINDINGS: Portable AP view of the chest was obtained. The lungs are well  inflated. There is a very large mediastinal mass which occupies the  majority of the left hemithorax. It is compatible with neoplastic  process and better delineated on previous chest CT. There is some left  lung atelectasis along its periphery. Right lung grossly clear. Cardiac  margins are obscured. Vascularity is normal.             Impression:       Very large mediastinal mass with some adjacent atelectasis  on the left side, please see previous chest CT.                Lab Results:     Lab Results (last 24 hours)     Procedure Component Value Units Date/Time    Hepatitis B & C Profile [[id number]] Collected:  06/14/18 0629    Specimen:  Blood Updated:  06/15/18 1517     Hepatitis B Surface Ag Negative     Hep B E Ag Negative     Hep B Core IgM Negative     Hep B Core Total Ab Negative     Hep B E Ab Negative     Hep B S Ab Reactive     Comment:               Non Reactive: Inconsistent with immunity,                              less than 10 mIU/mL                Reactive:     Consistent with immunity,                              greater than 9.9 mIU/mL        Hepatitis C Ab <0.1 s/co ratio     Narrative:       Performed at:  [address] [place] [address]  [id number]  : Nikunj Parisi PhD, Phone:  [phone]     "Comment: [[id number]] Collected:  06/14/18 0629    Specimen:  Blood Updated:  06/15/18 1517     Comment Comment     Comment: Non reactive HCV antibody screen is consistent with no HCV infection,  unless recent infection is suspected or other evidence exists to  indicate HCV infection.       Narrative:       Performed at:  [address]  [id number]  : Nikunj Parisi PhD, Phone:  [phone]    Tissue Pathology Exam [[id number]] Collected:  06/14/18 0840    Specimen:  Bone Marrow Aspirate from Iliac Crest, Right - Biopsy Updated:  06/15/18 1401     Case Report --     Surgical Pathology Report                         Case: [id number]                                  Authorizing Provider:  Kimberley Melgar MD      Collected:           06/14/2018 08:40 AM          Ordering Location:     [facility]  Received:            06/14/2018 09:40 AM                                 [address]                                                                       Pathologist:           Eliseo Simon MD                                                       Specimens:   1) - Iliac Crest, Right - Biopsy, right illiac                                                      2) - Iliac Crest, Right - Aspirate, bm asp recd, 5 smears made and slides sent to                   [facility]                                                                                      3) - Iliac Crest, Right - Aspirate, recd 2 green, 1 purple top tubes, sent all tubes                to [facility] for flow cytometry.                                                                 Final Diagnosis --     1. \"RIGHT ILIAC CREST\", CORE BIOPSY:          PRELIMINARY H&E IMPRESSION:   CELLULAR MARROW WITH EVIDENCE OF TRILINEAGE MATURATION.               NO EVIDENCE OF METASTATIC CARCINOMA, GRANULOMA, OR OVERT LYMPHOMA.     PENDING: IMMUNOHISTOCHEMISTRY ON FORMALIN FIXED PARAFFIN EMBEDDED CORE BIOPSY,        " "                 FLOW CYTOMETRY, CYTOGENETICS, ADDITIONAL STUDIES.    COMMENT: The final diagnosis will be represented by the Comprehensive Case Summary as reported by [facility] upon completion of special studies.    2.  \"RIGHT ILIAC CREST\", ASPIRATE:   SUBMITTED TO [facility].    3. \"RIGHT ILIAC CREST\", ASPIRATE:   SUBMITTED TO [facility].    THM/pkm IHC/a/CMK    CPT CODES:  1. 88806, 01826         Intradepartmental Consult --     Dr. JODEE Hargrove who concurs.         Gross Description --     Received in formalin labeled \"right iliac bone marrow biopsy\" is a 1.0 cm long and up to 0.3 cm across tan to red needle core biopsy of bone that is submitted entirely in a single block labeled 1A after decalcification. Also received with this case are two green top tubes and one purple top tube of blood each labeled with the patient's name, identifying information, along with right iliac bone marrow biopsy. The tubes of blood are forwarded for further study. [facility]/        Microscopic Description --     Performed, incorporated in diagnosis.       Embedded Images --    Bone Marrow Comprehensive ([facility]) [[id number]] Collected:  06/14/18 0840    Specimen:  Bone Marrow from Iliac Crest, Right - Aspirate Updated:  06/15/18 1109     Reference Lab Report See addendum Pathology report    Lactate Dehydrogenase [[id number]]  (Abnormal) Collected:  06/15/18 0717    Specimen:  Blood Updated:  06/15/18 0951     LDH 1,085 (H) U/L      Comment: Specimen hemolyzed.  Results may be affected.       Comprehensive Metabolic Panel [[id number]] Collected:  06/15/18 0717    Specimen:  Blood Updated:  06/15/18 0932     Glucose 91 mg/dL      BUN 10 mg/dL      Creatinine 0.60 mg/dL      Sodium 143 mmol/L      Potassium 3.9 mmol/L      Chloride 102 mmol/L      CO2 26.5 mmol/L      Calcium 9.8 mg/dL      Total Protein 6.7 g/dL      Albumin 3.90 g/dL      ALT (SGPT) 17 U/L      AST (SGOT) 30 U/L      Alkaline Phosphatase " 57 U/L      Total Bilirubin 0.6 mg/dL      eGFR Non African Amer 124 mL/min/1.73      eGFR  African Amer 150 mL/min/1.73      Globulin 2.8 gm/dL      A/G Ratio 1.4 g/dL      BUN/Creatinine Ratio 16.7     Anion Gap 14.5 mmol/L     Uric Acid [[id number]]  (Abnormal) Collected:  06/15/18 0717    Specimen:  Blood Updated:  06/15/18 0932     Uric Acid 2.2 (L) mg/dL     Protime-INR [[id number]]  (Normal) Collected:  06/15/18 0717    Specimen:  Blood Updated:  06/15/18 0904     Protime 13.5 Seconds      INR 1.05    aPTT [[id number]]  (Normal) Collected:  06/15/18 0717    Specimen:  Blood Updated:  06/15/18 0904     PTT 26.7 seconds     CBC & Differential [[id number]] Collected:  06/15/18 0717    Specimen:  Blood Updated:  06/15/18 0852    Narrative:       The following orders were created for panel order CBC & Differential.  Procedure                               Abnormality         Status                     ---------                               -----------         ------                     CBC Auto Differential[[id number]]        Abnormal            Final result                 Please view results for these tests on the individual orders.    CBC Auto Differential [[id number]]  (Abnormal) Collected:  06/15/18 0717    Specimen:  Blood Updated:  06/15/18 0852     WBC 3.07 (L) 10*3/mm3      RBC 4.31 10*6/mm3      Hemoglobin 12.7 g/dL      Hematocrit 39.4 %      MCV 91.4 fL      MCH 29.5 pg      MCHC 32.2 (L) g/dL      RDW 13.3 (H) %      RDW-SD 44.4 fl      MPV 11.4 fL      Platelets 117 (L) 10*3/mm3      Neutrophil % 69.4 %      Lymphocyte % 17.9 (L) %      Monocyte % 9.8 %      Eosinophil % 2.6 %      Basophil % 0.3 %      Immature Grans % 0.0 %      Neutrophils, Absolute 2.13 10*3/mm3      Lymphocytes, Absolute 0.55 (L) 10*3/mm3      Monocytes, Absolute 0.30 10*3/mm3      Eosinophils, Absolute 0.08 10*3/mm3      Basophils, Absolute 0.01 10*3/mm3      Immature Grans, Absolute 0.00 10*3/mm3            Assessment/Plan      Principal Problem:    Mediastinal mass  Active Problems:    Thoracic back pain    Dyspnea on exertion    Palpitation       Assessment:    Condition: In stable condition.       Plan:   Regular diet.  Administer medications as ordered.       Still awaiting final pathology from [facility], so that patient may begin treatment regimen per medical oncology.    In speaking with Dr. Melgar, and the patient and her mother, it has become apparent that the patient would like to forego the planned incisional biopsy with Dr. Aragon today, in favor of waiting for the final pathology results.  We have postponed the surgical procedure until Monday, until we determine if there is adequate tissue for the final path report from [facility].    I have discussed with the family and length, as has Dr. Aragon.    We will continue to follow along, and plan accordingly based upon the pathology results.    I have resumed the patient's regular diet, and discontinued unnecessary preoperative orders, although I will place her nothing by mouth at midnight on Sunday.    BRANNON Walton  [facility]  06/15/18  4:38 PM

## 2018-06-15 NOTE — PROGRESS NOTES
Hospital Follow Up      Chief Complaint: Follow up pericardial effusion, mediastinal mass    Interval History:  Plans for PICC and possible open biopsy today.  No new complaints this morning.  Dyspnea and cough are unchanged.    Objective:     Objective:  Temp:  [97.5 °F (36.4 °C)-98.9 °F (37.2 °C)] 97.9 °F (36.6 °C)  Heart Rate:  [73-95] 81  Resp:  [16-19] 16  BP: ()/(57-74) 120/67     Intake/Output Summary (Last 24 hours) at 06/15/18 0717  Last data filed at 06/15/18 0536   Gross per 24 hour   Intake             3120 ml   Output             1150 ml   Net             1970 ml     Body mass index is 26.7 kg/m².  1    06/11/18  1407   Weight: 66.2 kg (146 lb)     Weight change:       Physical Exam:   General : Alert, cooperative, in no acute distress.  Neuro: alert,cooperative and oriented  Lungs: CTAB. Normal respiratory effort and rate.  CV:: Regular rate and rhythm, normal S1 and S2, no murmurs, gallops or rubs.  ABD: Soft, nontender, non-distended. positive bowel sounds  Extr: No edema or cyanosis, moves all extremities    Lab Review:     Results from last 7 days  Lab Units 06/14/18  0629 06/12/18  0605 06/11/18  1508   SODIUM mmol/L 139 140 140   POTASSIUM mmol/L 4.1 4.4 3.9   CHLORIDE mmol/L 99 99 99   CO2 mmol/L 26.8 27.0 26.0   BUN mg/dL 11 14 15   CREATININE mg/dL 0.67 0.81 0.75   GLUCOSE mg/dL 93 91 82   CALCIUM mg/dL 9.6 10.1 10.3   AST (SGOT) U/L 25  --  28   ALT (SGPT) U/L 16  --  24       Results from last 7 days  Lab Units 06/11/18  1508 06/08/18  1039   TROPONIN T ng/mL <0.010 <0.010       Results from last 7 days  Lab Units 06/14/18  0629 06/13/18  0637   WBC 10*3/mm3 3.70* 4.29*   HEMOGLOBIN g/dL 12.7 12.8   HEMATOCRIT % 40.2 40.4   PLATELETS 10*3/mm3 109* 118*       Results from last 7 days  Lab Units 06/11/18  1508   INR  1.06           Results from last 7 days  Lab Units 06/08/18  0903   CHOLESTEROL mg/dL 114   TRIGLYCERIDES mg/dL 90   HDL CHOL mg/dL 19*       Results from last 7  days  Lab Units 06/08/18  1039 06/08/18  0903   PROBNP pg/mL 61.0 62.9       Results from last 7 days  Lab Units 06/08/18  0903   TSH mIU/mL 1.180     I reviewed the patient's new clinical results.  I personally viewed and interpreted the patient's EKG  Current Medications:   Scheduled Meds:  allopurinol 300 mg Oral BID   goserelin 3.6 mg Subcutaneous Once     Continuous Infusions:  sodium chloride 125 mL/hr Last Rate: 125 mL/hr (06/14/18 1429)       Allergies:  No Known Allergies    Assessment/Plan:     1. Pericardial effusion.  I reviewed both the echocardiogram and CT of the chest images from 6/8. On echocardiogram it appears the majority of the effusion is posterior and appears large primarily near left ventricle and apex and only small to moderate near anteriorly near right ventricle.  There is no evidence of tamponade.  Her pericardium appears thickened pointing to involvement of the pericardium with the mediastinal process.  Her CT images show that the mediastinal mass partially covers the anterior aspect of the heart which would make percutaneous access to the pericardial space and effusion for a pericardiocentesis difficult along with the fact that most of the effusion is posterior rather than anterior.  At this time she is not exhibiting any symptoms or findings that I can attribute to the pericardial effusion.   2. Mediastinal mass.  Appears to be encasing the left main pulmonary artery, the SVC and pulmonary veins.  RV appears normal in size and function.  No evidence of SVC syndrome or again tamponade.  Biopsy shows evidence of lymphoma but may need more tissue for further testing.  Plan for left mediastinotomy with biopsy today.  PICC line to be placed today and initiation of treatment per Oncology.  3. Thrombocytopenia.  Mild.  Bone marrow biopsy results pending.   5.  Leukopenia  6.  Splenomegaly        -  Will follow peripherally over the weekend.    Shena Fitzgerald MD  06/15/18  7:17 AM

## 2018-06-15 NOTE — PLAN OF CARE
Supportive Oncology Services    Supportive visit to pt given new dx and prior interactions with Faina Santiago OSW, of supportive oncology team. I discussed with pt my role in cancer resource center and ability to meet with her as necessary. Pt does have hx of anxiety and panic attacks in undergraduate program, untreated with medication. Pt was interested in discussing sx of depression, as well as understanding of when medication would be appropriate. At this time, patient and I agree awaiting ca dx and treatment plan is best plan of action. Pt and mother have my card and agree to call clinic for any assistance.

## 2018-06-16 PROBLEM — C85.28: Status: ACTIVE | Noted: 2018-06-16

## 2018-06-16 LAB
ALBUMIN SERPL-MCNC: 3.9 G/DL (ref 3.5–5.2)
ALBUMIN/GLOB SERPL: 1.4 G/DL
ALP SERPL-CCNC: 57 U/L (ref 39–117)
ALT SERPL W P-5'-P-CCNC: 12 U/L (ref 1–33)
ANION GAP SERPL CALCULATED.3IONS-SCNC: 15.5 MMOL/L
AST SERPL-CCNC: 29 U/L (ref 1–32)
BASOPHILS # BLD AUTO: 0.01 10*3/MM3 (ref 0–0.2)
BASOPHILS NFR BLD AUTO: 0.2 % (ref 0–1.5)
BILIRUB SERPL-MCNC: 0.7 MG/DL (ref 0.1–1.2)
BUN BLD-MCNC: 14 MG/DL (ref 6–20)
BUN/CREAT SERPL: 18.9 (ref 7–25)
CALCIUM SPEC-SCNC: 9.7 MG/DL (ref 8.6–10.5)
CHLORIDE SERPL-SCNC: 96 MMOL/L (ref 98–107)
CO2 SERPL-SCNC: 24.5 MMOL/L (ref 22–29)
CREAT BLD-MCNC: 0.74 MG/DL (ref 0.57–1)
DEPRECATED RDW RBC AUTO: 44.5 FL (ref 37–54)
EOSINOPHIL # BLD AUTO: 0.02 10*3/MM3 (ref 0–0.7)
EOSINOPHIL NFR BLD AUTO: 0.4 % (ref 0.3–6.2)
ERYTHROCYTE [DISTWIDTH] IN BLOOD BY AUTOMATED COUNT: 13.4 % (ref 11.7–13)
GFR SERPL CREATININE-BSD FRML MDRD: 118 ML/MIN/1.73
GFR SERPL CREATININE-BSD FRML MDRD: 97 ML/MIN/1.73
GLOBULIN UR ELPH-MCNC: 2.7 GM/DL
GLUCOSE BLD-MCNC: 95 MG/DL (ref 65–99)
HCT VFR BLD AUTO: 37.6 % (ref 35.6–45.5)
HGB BLD-MCNC: 12.4 G/DL (ref 11.9–15.5)
IMM GRANULOCYTES # BLD: 0 10*3/MM3 (ref 0–0.03)
IMM GRANULOCYTES NFR BLD: 0 % (ref 0–0.5)
LDH SERPL-CCNC: 990 U/L (ref 135–214)
LYMPHOCYTES # BLD AUTO: 0.79 10*3/MM3 (ref 0.9–4.8)
LYMPHOCYTES NFR BLD AUTO: 17.2 % (ref 19.6–45.3)
MAGNESIUM SERPL-MCNC: 1.7 MG/DL (ref 1.6–2.6)
MCH RBC QN AUTO: 29.9 PG (ref 26.9–32)
MCHC RBC AUTO-ENTMCNC: 33 G/DL (ref 32.4–36.3)
MCV RBC AUTO: 90.6 FL (ref 80.5–98.2)
MONOCYTES # BLD AUTO: 0.34 10*3/MM3 (ref 0.2–1.2)
MONOCYTES NFR BLD AUTO: 7.4 % (ref 5–12)
NEUTROPHILS # BLD AUTO: 3.43 10*3/MM3 (ref 1.9–8.1)
NEUTROPHILS NFR BLD AUTO: 74.8 % (ref 42.7–76)
PHOSPHATE SERPL-MCNC: 3.4 MG/DL (ref 2.5–4.5)
PLATELET # BLD AUTO: 123 10*3/MM3 (ref 140–500)
PMV BLD AUTO: 11.4 FL (ref 6–12)
POTASSIUM BLD-SCNC: 3.8 MMOL/L (ref 3.5–5.2)
PROT SERPL-MCNC: 6.6 G/DL (ref 6–8.5)
RBC # BLD AUTO: 4.15 10*6/MM3 (ref 3.9–5.2)
SODIUM BLD-SCNC: 136 MMOL/L (ref 136–145)
URATE SERPL-MCNC: 1.4 MG/DL (ref 2.4–5.7)
WBC NRBC COR # BLD: 4.59 10*3/MM3 (ref 4.5–10.7)

## 2018-06-16 PROCEDURE — 25010000002 ETOPOSIDE 100 MG/5ML SOLUTION 25 ML VIAL: Performed by: INTERNAL MEDICINE

## 2018-06-16 PROCEDURE — 83735 ASSAY OF MAGNESIUM: CPT | Performed by: INTERNAL MEDICINE

## 2018-06-16 PROCEDURE — 25010000002 HYDROCORTISONE SODIUM SUCCINATE 100 MG RECONSTITUTED SOLUTION: Performed by: INTERNAL MEDICINE

## 2018-06-16 PROCEDURE — 83615 LACTATE (LD) (LDH) ENZYME: CPT | Performed by: INTERNAL MEDICINE

## 2018-06-16 PROCEDURE — 25010000002 RITUXIMAB 10 MG/ML SOLUTION 50 ML VIAL: Performed by: INTERNAL MEDICINE

## 2018-06-16 PROCEDURE — 25010000002 FONDAPARINUX PER 0.5 MG: Performed by: INTERNAL MEDICINE

## 2018-06-16 PROCEDURE — 84550 ASSAY OF BLOOD/URIC ACID: CPT | Performed by: INTERNAL MEDICINE

## 2018-06-16 PROCEDURE — 80053 COMPREHEN METABOLIC PANEL: CPT | Performed by: INTERNAL MEDICINE

## 2018-06-16 PROCEDURE — 25010000002 ONDANSETRON PER 1 MG: Performed by: INTERNAL MEDICINE

## 2018-06-16 PROCEDURE — 63710000001 PREDNISONE PER 5 MG: Performed by: INTERNAL MEDICINE

## 2018-06-16 PROCEDURE — 94799 UNLISTED PULMONARY SVC/PX: CPT

## 2018-06-16 PROCEDURE — 25010000002 DOXORUBICIN PER 10 MG: Performed by: INTERNAL MEDICINE

## 2018-06-16 PROCEDURE — 25010000002 RITUXIMAB 10 MG/ML SOLUTION 10 ML VIAL: Performed by: INTERNAL MEDICINE

## 2018-06-16 PROCEDURE — 85025 COMPLETE CBC W/AUTO DIFF WBC: CPT | Performed by: INTERNAL MEDICINE

## 2018-06-16 PROCEDURE — 84100 ASSAY OF PHOSPHORUS: CPT | Performed by: INTERNAL MEDICINE

## 2018-06-16 PROCEDURE — 99232 SBSQ HOSP IP/OBS MODERATE 35: CPT | Performed by: INTERNAL MEDICINE

## 2018-06-16 PROCEDURE — 25010000002 DIPHENHYDRAMINE PER 50 MG: Performed by: INTERNAL MEDICINE

## 2018-06-16 PROCEDURE — 25010000002 VINCRISTINE PER 1 MG: Performed by: INTERNAL MEDICINE

## 2018-06-16 RX ORDER — DIPHENHYDRAMINE HYDROCHLORIDE 50 MG/ML
50 INJECTION INTRAMUSCULAR; INTRAVENOUS AS NEEDED
Status: COMPLETED | OUTPATIENT
Start: 2018-06-16 | End: 2018-06-16

## 2018-06-16 RX ORDER — FAMOTIDINE 10 MG/ML
20 INJECTION, SOLUTION INTRAVENOUS AS NEEDED
Status: COMPLETED | OUTPATIENT
Start: 2018-06-16 | End: 2018-06-16

## 2018-06-16 RX ORDER — MEPERIDINE HYDROCHLORIDE 50 MG/ML
25 INJECTION INTRAMUSCULAR; INTRAVENOUS; SUBCUTANEOUS
Status: DISPENSED | OUTPATIENT
Start: 2018-06-16 | End: 2018-06-17

## 2018-06-16 RX ORDER — ONDANSETRON 2 MG/ML
4 INJECTION INTRAMUSCULAR; INTRAVENOUS EVERY 6 HOURS PRN
Status: DISCONTINUED | OUTPATIENT
Start: 2018-06-16 | End: 2018-06-21 | Stop reason: HOSPADM

## 2018-06-16 RX ORDER — ACETAMINOPHEN 325 MG/1
650 TABLET ORAL ONCE
Status: COMPLETED | OUTPATIENT
Start: 2018-06-16 | End: 2018-06-16

## 2018-06-16 RX ORDER — PROCHLORPERAZINE MALEATE 10 MG
10 TABLET ORAL EVERY 6 HOURS PRN
Status: DISCONTINUED | OUTPATIENT
Start: 2018-06-16 | End: 2018-06-21 | Stop reason: HOSPADM

## 2018-06-16 RX ORDER — PREDNISONE 50 MG/1
100 TABLET ORAL 2 TIMES DAILY
Status: COMPLETED | OUTPATIENT
Start: 2018-06-16 | End: 2018-06-20

## 2018-06-16 RX ORDER — PANTOPRAZOLE SODIUM 40 MG/1
40 TABLET, DELAYED RELEASE ORAL
Status: DISCONTINUED | OUTPATIENT
Start: 2018-06-16 | End: 2018-06-21 | Stop reason: HOSPADM

## 2018-06-16 RX ADMIN — FAMOTIDINE 20 MG: 10 INJECTION INTRAVENOUS at 16:45

## 2018-06-16 RX ADMIN — ALLOPURINOL 300 MG: 300 TABLET ORAL at 10:56

## 2018-06-16 RX ADMIN — ETOPOSIDE: 20 INJECTION, SOLUTION, CONCENTRATE INTRAVENOUS at 20:55

## 2018-06-16 RX ADMIN — SODIUM CHLORIDE 125 ML/HR: 9 INJECTION, SOLUTION INTRAVENOUS at 23:23

## 2018-06-16 RX ADMIN — SODIUM CHLORIDE 125 ML/HR: 9 INJECTION, SOLUTION INTRAVENOUS at 05:36

## 2018-06-16 RX ADMIN — PREDNISONE 100 MG: 50 TABLET ORAL at 14:15

## 2018-06-16 RX ADMIN — HYDROCORTISONE SODIUM SUCCINATE 100 MG: 100 INJECTION, POWDER, FOR SOLUTION INTRAMUSCULAR; INTRAVENOUS at 16:38

## 2018-06-16 RX ADMIN — DIPHENHYDRAMINE HYDROCHLORIDE 50 MG: 50 INJECTION, SOLUTION INTRAMUSCULAR; INTRAVENOUS at 14:17

## 2018-06-16 RX ADMIN — PANTOPRAZOLE SODIUM 40 MG: 40 TABLET, DELAYED RELEASE ORAL at 14:15

## 2018-06-16 RX ADMIN — FONDAPARINUX SODIUM 2.5 MG: 2.5 INJECTION, SOLUTION SUBCUTANEOUS at 10:56

## 2018-06-16 RX ADMIN — DIPHENHYDRAMINE HYDROCHLORIDE 50 MG: 50 INJECTION, SOLUTION INTRAMUSCULAR; INTRAVENOUS at 16:41

## 2018-06-16 RX ADMIN — SODIUM CHLORIDE 125 ML/HR: 9 INJECTION, SOLUTION INTRAVENOUS at 14:30

## 2018-06-16 RX ADMIN — SODIUM CHLORIDE 125 ML/HR: 9 INJECTION, SOLUTION INTRAVENOUS at 14:43

## 2018-06-16 RX ADMIN — ACETAMINOPHEN 650 MG: 325 TABLET ORAL at 14:15

## 2018-06-16 RX ADMIN — ALLOPURINOL 300 MG: 300 TABLET ORAL at 20:43

## 2018-06-16 RX ADMIN — ONDANSETRON 16 MG: 2 INJECTION INTRAMUSCULAR; INTRAVENOUS at 20:41

## 2018-06-16 RX ADMIN — RITUXIMAB 630 MG: 10 INJECTION, SOLUTION INTRAVENOUS at 15:00

## 2018-06-16 RX ADMIN — PREDNISONE 100 MG: 50 TABLET ORAL at 20:43

## 2018-06-16 NOTE — PLAN OF CARE
Problem: Patient Care Overview  Goal: Plan of Care Review  Outcome: Ongoing (interventions implemented as appropriate)   18 0649   Coping/Psychosocial   Plan of Care Reviewed With patient   Plan of Care Review   Progress no change   OTHER   Outcome Summary Pt has strong dry cough. Breath sounds extremely diminished through the whole left lung. Pt is overwhelmed. Low grade fever. Blood cultures and urine cultures done last night. C/o palpitations with ambulation. Left sided mild chest and back pain. BP in 90s/low hundreds. Possible chemo today. Continue to monitor.       Problem: Anxiety (Adult)  Goal: Reduction/Resolution  Outcome: Ongoing (interventions implemented as appropriate)      Problem: Coping, Compromised Family (Adult,NICU,,Obstetrics,Pediatric)  Goal: Effective Family Coping  Outcome: Ongoing (interventions implemented as appropriate)      Problem: Pain, Acute (Adult)  Goal: Acceptable Pain Control/Comfort Level  Outcome: Ongoing (interventions implemented as appropriate)

## 2018-06-16 NOTE — PROGRESS NOTES
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  A large cells are positive for CD20, CD30, PAX 5, BCL 6.  It's negative for BCL 2 and MUM1.  Bone marrow flow cytometry is negative.  Morphology is pending.    HISTORY OF PRESENT ILLNESS:    Shortness of breath. intermittent pain in the left side of the chest.  Patient had a low-grade fever of 100.9 yesterday.  This morning the temperature is 98.5.  Her blood pressure has been stable around 95 and 59.  She is slightly tachycardic.  Cardiology is on the case.  So far urine analysis is normal.  Blood cultures are pending.    Patient today continues to have cough and shortness of breath.  She feels a little tight in her right upper extremity likely because of superior vena caval syndrome.  She has a PICC line in place and we will plan to start her on chemotherapy with R EPOCH .  I have explained the side effects of chemotherapy in length with the patient.  In order to preserve her fertility she has received Zoladex injection yesterday.  She is now here to start chemotherapy today.    We will have pharmacy educate her about chemotherapy and get started.      Past Medical History:   Diagnosis Date   • Fracture of lower leg 2000    L       History reviewed. No pertinent surgical history.    SCHEDULED MEDS:    allopurinol 300 mg Oral BID   fondaparinux 2.5 mg Subcutaneous Q24H     INFUSIONS:    sodium chloride 125 mL/hr Last Rate: 125 mL/hr (06/16/18 0536)     PRN MEDS:  •  acetaminophen  •  albuterol  •  guaifenesin-dextromethorphan  •  HYDROcodone-acetaminophen  •  ondansetron **OR** ondansetron ODT **OR** ondansetron  •  sennosides-docusate sodium  •  sodium chloride  •  sodium chloride  •  sodium chloride  •  sodium chloride  •  sodium chloride  •  sodium chloride  •  sodium chloride    REVIEW OF SYSTEMS:  GENERAL:  No Fever or  chills. No weight change.  No Fatigue.   SKIN:  No skin rashes or lesions.  HEME/LYMPH: No Anemia. No Easy bruisability.   RESPIRATORY:  Shortness of breath. Cough. No Hemoptysis.   CVS:  Chest pain.    GI:  No Abdominal pain. No Nausea. No Vomiting.   MUSCULOSKELETAL:  No Back pain.   NEUROLOGICAL:  No Headaches. No Dizziness.   PSYCHIATRIC:  No Anxiety. No Depression.      Patient continues to have cough and shortness of breath.  She slightly tachycardic.    Objective   VITAL SIGNS:  Temp:  [98.4 °F (36.9 °C)-100.9 °F (38.3 °C)] 98.5 °F (36.9 °C)  Heart Rate:  [] 101  Resp:  [16-18] 16  BP: ()/(44-78) 95/59    Wt Readings from Last 3 Encounters:   06/11/18 66.2 kg (146 lb)   06/11/18 66.4 kg (146 lb 6.4 oz)   06/08/18 66.7 kg (147 lb)       PHYSICAL EXAMINATION:  GENERAL:  The patient appears in good general condition, not in acute distress.  SKIN:  No skin rashes or lesions. No Ecchymosis or Petechiae.  HEAD:  Normocephalic.  EYES:  No Pallor. No Jaundice.   NECK:  Supple with no Thyromegaly or Masses.  LYMPHATICS:  Left supraclavicular/scalene Lymphadenopathy.  CHEST:  Decreased breath sounds on the left.    CARDIAC:  Normal S1 & S2. No Murmurs.Tachycardia with heart rate of 109   ABDOMEN:  Soft. No tenderness. No Hepatomegaly. No Splenomegaly. No masses.  EXTREMITIES:  No Edema. No Calf tenderness. No Cyanosis. Status post right sided PICC line.    NEUROLOGICAL:  No Focal neurological deficits.       RESULT REVIEW:     Results from last 7 days  Lab Units 06/16/18  0414 06/15/18  0717 06/14/18  0629 06/13/18  0637 06/12/18  0605 06/11/18  1508   WBC 10*3/mm3 4.59 3.07* 3.70* 4.29* 4.13* 4.62   NEUTROS ABS 10*3/mm3 3.43 2.13 2.50  --  2.77 3.39   HEMOGLOBIN g/dL 12.4 12.7 12.7 12.8 13.2 13.7   HEMATOCRIT % 37.6 39.4 40.2 40.4 40.9 41.9   PLATELETS 10*3/mm3 123* 117* 109* 118* 119* 125*       Results from last 7 days  Lab Units 06/16/18  0414 06/15/18  0717 06/14/18  0629   SODIUM mmol/L 136 143 139    POTASSIUM mmol/L 3.8 3.9 4.1   CHLORIDE mmol/L 96* 102 99   CO2 mmol/L 24.5 26.5 26.8   BUN mg/dL 14 10 11   CREATININE mg/dL 0.74 0.60 0.67   CALCIUM mg/dL 9.7 9.8 9.6   ALBUMIN g/dL 3.90 3.90 3.60   BILIRUBIN mg/dL 0.7 0.6 0.5   ALK PHOS U/L 57 57 55   ALT (SGPT) U/L 12 17 16   AST (SGOT) U/L 29 30 25   MAGNESIUM mg/dL 1.7  --   --      LDH 1,085  ESR 12  Uric acid 2.2    Bone marrow flow - Negative.       Assessment/Plan   1.  Large mediastinal mass. The mass is encasing the vascular structures and narrowing the left mainstem bronchus.  There is suspicion of direct tumor infiltration of the left upper lobe. 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 Prelim path shows lymphoma. Additional path from [facility] is pending.     Preliminary result of bone marrow is negative.   · Review of pathology is consistent with diffuse large B-cell lymphoma with features suggestive of primary mediastinal B-cell lymphoma.  · We'll plan to start EPOCH/R  today    2.  Pericardial effusion. Patient was seen by Cardiology. No evidence of cardiac tamponade.      3.  Hyperuricemia. LDH is very high. Uric and LDH improved with Fluids and Allopurinol.     4.  Fertility preservation. The patient is 23 years old. I discussed the options of harvesting eggs vs. Zoladex + OCCP vs. Zoladex alone. Due to the very large size of the tumor and need for rapid initiation of treatment, patient will not be able to have egg harvest done. She is at increased risk for thrombosis with the use of OCCP (due to malignancy and vascular encasement by the tumor) and I did not recommend this approach. I recommended Zoladex alone. I explained the side effects including hot flashes and cessation of menses. The patient is interested in pursuing this.  Patient received Zoladex injection yesterday for fertility preservation.      5.  IV access.     PLAN:    · 1.  Start chemotherapy with EPOCH/R  Today  · We will check daily labs  · Continue IV  fluids  · Continue allopurinol   ·  pharmacy to educate her regarding chemotherapy prior to starting.        Millie Padilla MD  06/16/18

## 2018-06-16 NOTE — PLAN OF CARE
Problem: Patient Care Overview  Goal: Plan of Care Review   06/16/18 6497   Coping/Psychosocial   Plan of Care Reviewed With patient   OTHER   Outcome Summary started 1st cycle chemotherapy; Rituxan started 45 late due to drug not available and then at 150mgm/hr; developed reaction with itching throat, ears, back and top of head; Rituxan stopped and Pepcid, solucortef and benadryl administered with immediate resolution of all complaints. Ritaxan restatrted after 45 mins and is to infuse at this rate until complete. To  kishore, etoposide, and vincristine this pm. Denies pain, Still with periodic dry hacky cough; js soa.

## 2018-06-17 LAB
ALBUMIN SERPL-MCNC: 3.5 G/DL (ref 3.5–5.2)
ALBUMIN/GLOB SERPL: 1.4 G/DL
ALP SERPL-CCNC: 49 U/L (ref 39–117)
ALT SERPL W P-5'-P-CCNC: 22 U/L (ref 1–33)
ANION GAP SERPL CALCULATED.3IONS-SCNC: 12.9 MMOL/L
AST SERPL-CCNC: 30 U/L (ref 1–32)
BASOPHILS # BLD AUTO: 0 10*3/MM3 (ref 0–0.2)
BASOPHILS NFR BLD AUTO: 0 % (ref 0–1.5)
BILIRUB SERPL-MCNC: 0.5 MG/DL (ref 0.1–1.2)
BUN BLD-MCNC: 13 MG/DL (ref 6–20)
BUN/CREAT SERPL: 21.3 (ref 7–25)
CALCIUM SPEC-SCNC: 8.8 MG/DL (ref 8.6–10.5)
CHLORIDE SERPL-SCNC: 104 MMOL/L (ref 98–107)
CO2 SERPL-SCNC: 24.1 MMOL/L (ref 22–29)
CREAT BLD-MCNC: 0.61 MG/DL (ref 0.57–1)
DEPRECATED RDW RBC AUTO: 45.2 FL (ref 37–54)
EOSINOPHIL # BLD AUTO: 0 10*3/MM3 (ref 0–0.7)
EOSINOPHIL NFR BLD AUTO: 0 % (ref 0.3–6.2)
ERYTHROCYTE [DISTWIDTH] IN BLOOD BY AUTOMATED COUNT: 13.6 % (ref 11.7–13)
GFR SERPL CREATININE-BSD FRML MDRD: 122 ML/MIN/1.73
GFR SERPL CREATININE-BSD FRML MDRD: 147 ML/MIN/1.73
GLOBULIN UR ELPH-MCNC: 2.5 GM/DL
GLUCOSE BLD-MCNC: 139 MG/DL (ref 65–99)
HCT VFR BLD AUTO: 34.7 % (ref 35.6–45.5)
HGB BLD-MCNC: 11.2 G/DL (ref 11.9–15.5)
IMM GRANULOCYTES # BLD: 0 10*3/MM3 (ref 0–0.03)
IMM GRANULOCYTES NFR BLD: 0 % (ref 0–0.5)
LDH SERPL-CCNC: 953 U/L (ref 135–214)
LYMPHOCYTES # BLD AUTO: 0.18 10*3/MM3 (ref 0.9–4.8)
LYMPHOCYTES NFR BLD AUTO: 5.2 % (ref 19.6–45.3)
MAGNESIUM SERPL-MCNC: 1.8 MG/DL (ref 1.6–2.6)
MCH RBC QN AUTO: 29.6 PG (ref 26.9–32)
MCHC RBC AUTO-ENTMCNC: 32.3 G/DL (ref 32.4–36.3)
MCV RBC AUTO: 91.6 FL (ref 80.5–98.2)
MONOCYTES # BLD AUTO: 0.35 10*3/MM3 (ref 0.2–1.2)
MONOCYTES NFR BLD AUTO: 10 % (ref 5–12)
NEUTROPHILS # BLD AUTO: 2.96 10*3/MM3 (ref 1.9–8.1)
NEUTROPHILS NFR BLD AUTO: 84.8 % (ref 42.7–76)
PHOSPHATE SERPL-MCNC: 4 MG/DL (ref 2.5–4.5)
PLATELET # BLD AUTO: 82 10*3/MM3 (ref 140–500)
PMV BLD AUTO: 11.3 FL (ref 6–12)
POTASSIUM BLD-SCNC: 3.5 MMOL/L (ref 3.5–5.2)
PROT SERPL-MCNC: 6 G/DL (ref 6–8.5)
RBC # BLD AUTO: 3.79 10*6/MM3 (ref 3.9–5.2)
SODIUM BLD-SCNC: 141 MMOL/L (ref 136–145)
URATE SERPL-MCNC: 0.8 MG/DL (ref 2.4–5.7)
WBC NRBC COR # BLD: 3.49 10*3/MM3 (ref 4.5–10.7)

## 2018-06-17 PROCEDURE — 63710000001 PREDNISONE PER 5 MG: Performed by: INTERNAL MEDICINE

## 2018-06-17 PROCEDURE — 25010000002 DOXORUBICIN PER 10 MG: Performed by: INTERNAL MEDICINE

## 2018-06-17 PROCEDURE — 80053 COMPREHEN METABOLIC PANEL: CPT | Performed by: INTERNAL MEDICINE

## 2018-06-17 PROCEDURE — 25010000002 VINCRISTINE PER 1 MG: Performed by: INTERNAL MEDICINE

## 2018-06-17 PROCEDURE — 84100 ASSAY OF PHOSPHORUS: CPT | Performed by: INTERNAL MEDICINE

## 2018-06-17 PROCEDURE — 25010000002 ETOPOSIDE 100 MG/5ML SOLUTION 25 ML VIAL: Performed by: INTERNAL MEDICINE

## 2018-06-17 PROCEDURE — 83615 LACTATE (LD) (LDH) ENZYME: CPT | Performed by: INTERNAL MEDICINE

## 2018-06-17 PROCEDURE — 84550 ASSAY OF BLOOD/URIC ACID: CPT | Performed by: INTERNAL MEDICINE

## 2018-06-17 PROCEDURE — 85025 COMPLETE CBC W/AUTO DIFF WBC: CPT | Performed by: INTERNAL MEDICINE

## 2018-06-17 PROCEDURE — 99232 SBSQ HOSP IP/OBS MODERATE 35: CPT | Performed by: INTERNAL MEDICINE

## 2018-06-17 PROCEDURE — 25010000002 ONDANSETRON PER 1 MG: Performed by: INTERNAL MEDICINE

## 2018-06-17 PROCEDURE — 25010000002 FONDAPARINUX PER 0.5 MG: Performed by: INTERNAL MEDICINE

## 2018-06-17 PROCEDURE — 83735 ASSAY OF MAGNESIUM: CPT | Performed by: INTERNAL MEDICINE

## 2018-06-17 RX ORDER — SODIUM CHLORIDE 9 MG/ML
125 INJECTION, SOLUTION INTRAVENOUS CONTINUOUS
Status: DISCONTINUED | OUTPATIENT
Start: 2018-06-17 | End: 2018-06-21 | Stop reason: HOSPADM

## 2018-06-17 RX ADMIN — FONDAPARINUX SODIUM 2.5 MG: 2.5 INJECTION, SOLUTION SUBCUTANEOUS at 08:36

## 2018-06-17 RX ADMIN — DOCUSATE SODIUM -SENNOSIDES 2 TABLET: 50; 8.6 TABLET, COATED ORAL at 20:54

## 2018-06-17 RX ADMIN — SODIUM CHLORIDE 125 ML/HR: 9 INJECTION, SOLUTION INTRAVENOUS at 17:47

## 2018-06-17 RX ADMIN — PANTOPRAZOLE SODIUM 40 MG: 40 TABLET, DELAYED RELEASE ORAL at 05:23

## 2018-06-17 RX ADMIN — SODIUM CHLORIDE 125 ML/HR: 9 INJECTION, SOLUTION INTRAVENOUS at 08:38

## 2018-06-17 RX ADMIN — PREDNISONE 100 MG: 50 TABLET ORAL at 08:35

## 2018-06-17 RX ADMIN — ONDANSETRON 16 MG: 2 INJECTION INTRAMUSCULAR; INTRAVENOUS at 23:06

## 2018-06-17 RX ADMIN — PREDNISONE 100 MG: 50 TABLET ORAL at 23:04

## 2018-06-17 RX ADMIN — ALLOPURINOL 300 MG: 300 TABLET ORAL at 08:35

## 2018-06-17 RX ADMIN — ALLOPURINOL 300 MG: 300 TABLET ORAL at 20:49

## 2018-06-17 RX ADMIN — ETOPOSIDE: 20 INJECTION, SOLUTION, CONCENTRATE INTRAVENOUS at 23:32

## 2018-06-17 NOTE — PLAN OF CARE
Problem: Patient Care Overview  Goal: Plan of Care Review  Outcome: Ongoing (interventions implemented as appropriate)   18 [id number]   Coping/Psychosocial   Plan of Care Reviewed With patient;mother   Plan of Care Review   Progress improving   OTHER   Outcome Summary Pt had much better night. VSS. Tolerating chemo well, going at 23.5 cc/hr. Up ad rikki. Mother at bedside. Continue to monitor.       Problem: Anxiety (Adult)  Goal: Reduction/Resolution  Outcome: Ongoing (interventions implemented as appropriate)      Problem: Coping, Compromised Family (Adult,NICU,,Obstetrics,Pediatric)  Goal: Effective Family Coping  Outcome: Ongoing (interventions implemented as appropriate)      Problem: Pain, Acute (Adult)  Goal: Acceptable Pain Control/Comfort Level  Outcome: Ongoing (interventions implemented as appropriate)      Problem: Oncology Care (Adult)  Goal: Signs and Symptoms of Listed Potential Problems Will be Absent, Minimized or Managed (Oncology Care)  Outcome: Ongoing (interventions implemented as appropriate)

## 2018-06-17 NOTE — NURSING NOTE
"To begin R-EPOCH today and was presented 06- with drug information printed handouts on Rituxan, Etoposide, Adriamycin, Oncovin, Cytoxan, and oral prednisone. Has no previous chemotherapy experience.   Reviewed treatment plan as well as the listed side effects. Discussed the function of the bone marrow, arminda, importance of open communication with the \"team\" with s/s of changes noticed; ie, sore throat, burning with urination, fatigue, infection risk, bleeding risk, n/v.  Educated about hospital policy for environment r/t chemotherapy contaminated waste.    Explained that staff always available for questions. Informed that oncology clinical pharmacist would f/u on Monday.   Consent signed and states ready to proceed with treatment.  "

## 2018-06-17 NOTE — NURSING NOTE
"Nursing Chemotherapy Verification    Chemotherapy Regimen:   Treatment Plans     Name Type Plan dates Plan Provider         Active    IP LYMPHOMA R-EPOCH (Dose Adjusted) RiTUXimab / Etoposide / DOXOrubicin / VinCRIStine / Cyclophosphamide / PredniSONE ONCOLOGY TREATMENT  6/16/2018 - Present Kimberley Melgar MD                     Current height and weight: 157.5 cm (62\") 66.2 kg (146 lb)  Calculated BSA from current height and weight (Zahira): 1.67    Relevant Labs    Results from last 7 days  Lab Units 06/16/18  0414 06/15/18  0717 06/14/18  0629   WBC 10*3/mm3 4.59 3.07* 3.70*   HEMOGLOBIN g/dL 12.4 12.7 12.7   HEMATOCRIT % 37.6 39.4 40.2   PLATELETS 10*3/mm3 123* 117* 109*     Lab Results   Component Value Date    NEUTROABS 3.43 06/16/2018         Results from last 7 days  Lab Units 06/16/18  0414 06/15/18  0717 06/14/18  0629   CREATININE mg/dL 0.74 0.60 0.67       Serum creatinine: 0.74 mg/dL 06/16/18 0414  Estimated creatinine clearance: 105.5 mL/min    Lab Results   Component Value Date    HEPBCAB Negative 06/14/2018       Verification attestation:  I have personally reviewed the planned regimen and its administration and dosing. I understand the potential side effects.The patient has been instructed on the regimen, potential side effects, and self care measures; the consent form has been completed. I have confirmed that the appropriate premedication, prehydration, post medication and/or emergency medications are ordered in addition to the chemotherapy.    I have independently verified that the height and weight is current and calculated the BSA. I have verified the doses with the planned regimen and have clarified any deviations with the physician (). I have confirmed the route of administration and patient IV access.    Nurse: Marianna Donnelly RN  2nd verification Nurse: Darin Martinez RN    "

## 2018-06-17 NOTE — NURSING NOTE
"Nursing Chemotherapy Verification    Chemotherapy Regimen:   Treatment Plans     Name Type Plan dates Plan Provider         Active    IP LYMPHOMA R-EPOCH (Dose Adjusted) RiTUXimab / Etoposide / DOXOrubicin / VinCRIStine / Cyclophosphamide / PredniSONE ONCOLOGY TREATMENT  6/16/2018 - Present Kimberley Melgar MD                     Current height and weight: 157.5 cm (62\") 66.2 kg (146 lb)  Calculated BSA from current height and weight 1.67.    Relevant Labs    Results from last 7 days  Lab Units 06/16/18  0414 06/15/18  0717 06/14/18  0629   WBC 10*3/mm3 4.59 3.07* 3.70*   HEMOGLOBIN g/dL 12.4 12.7 12.7   HEMATOCRIT % 37.6 39.4 40.2   PLATELETS 10*3/mm3 123* 117* 109*     Lab Results   Component Value Date    NEUTROABS 3.43 06/16/2018         Results from last 7 days  Lab Units 06/16/18  0414 06/15/18  0717 06/14/18  0629   CREATININE mg/dL 0.74 0.60 0.67       Serum creatinine: 0.74 mg/dL 06/16/18 0414  Estimated creatinine clearance: 105.5 mL/min    Lab Results   Component Value Date    HEPBCAB Negative 06/14/2018       Verification attestation:  I have personally reviewed the planned regimen and its administration and dosing. I understand the potential side effects.The patient has been instructed on the regimen, potential side effects, and self care measures; the consent form has been completed. I have confirmed that the appropriate premedication, prehydration, post medication and/or emergency medications are ordered in addition to the chemotherapy.    I have independently verified that the height and weight is current and calculated the BSA. I have verified the doses with the planned regimen and have clarified any deviations with the physician Dr. WIL Padilla. I have confirmed the route of administration and patient IV access. Dual lumen port with excellent blood return . Rituxan to be added to red port.    Nurse: Katherine Dai RN  2nd verification Nurse: Divine Woods R.N.    "

## 2018-06-17 NOTE — PROGRESS NOTES
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  A large cells are positive for CD20, CD30, PAX 5, BCL 6.  It's negative for BCL 2 and MUM1.  Bone marrow flow cytometry is negative.  Morphology is pending.    Day 2 of EPOCH/R    HISTORY OF PRESENT ILLNESS:    June 16, 2018: Shortness of breath. intermittent pain in the left side of the chest.  Patient had a low-grade fever of 100.9 yesterday.  This morning the temperature is 98.5.  Her blood pressure has been stable around 95 and 59.  She is slightly tachycardic.  Cardiology is on the case.  So far urine analysis is normal.  Blood cultures are pending.    Patient today continues to have cough and shortness of breath.  She feels a little tight in her right upper extremity likely because of superior vena caval syndrome.  She has a PICC line in place and we will plan to start her on chemotherapy with R EPOCH .  I have explained the side effects of chemotherapy in length with the patient.  In order to preserve her fertility she has received Zoladex injection yesterday.  She is now here to start chemotherapy today.    We will have pharmacy educate her about chemotherapy and get started.    June 17, 2018: Patient started EPOCH/R yesterday.  Patient had a reaction to Rituxan yesterday.  She started having itching on her years, sore throat and itching on the back.  The nurse stopped the Rituxan and gave her IV steroids, Benadryl and Pepcid following which she did fine.  The restarted but the treatment was at a slower rate and she completed it.  She has no nausea or vomiting.  No fever or chills.  Her white count has slightly decreased to 3.49.  Her platelets are down to 82 today.  Patient is in Arixtra 2.5 mg subcutaneous daily.      Past Medical History:   Diagnosis Date   • Fracture of lower leg 2000    L       History reviewed.  No pertinent surgical history.    SCHEDULED MEDS:    allopurinol 300 mg Oral BID   DOXOrubicin (ADRIAMYCIN), etoposide, and vincristine chemo IVPB  Intravenous Once   fondaparinux 2.5 mg Subcutaneous Q24H   pantoprazole 40 mg Oral Q AM   predniSONE 100 mg Oral BID     INFUSIONS:    custom IV infusion builder + additives 125 mL/hr Last Rate: 125 mL/hr (06/16/18 2323)   sodium chloride 125 mL/hr      PRN MEDS:  •  acetaminophen  •  albuterol  •  guaifenesin-dextromethorphan  •  HYDROcodone-acetaminophen  •  meperidine  •  ondansetron **OR** ondansetron ODT **OR** ondansetron  •  ondansetron  •  prochlorperazine  •  sennosides-docusate sodium  •  sodium chloride  •  sodium chloride  •  sodium chloride  •  sodium chloride  •  sodium chloride  •  sodium chloride  •  sodium chloride    REVIEW OF SYSTEMS:  GENERAL:  No Fever or chills. No weight change.  No Fatigue.   SKIN:  No skin rashes or lesions.  HEME/LYMPH: No Anemia. No Easy bruisability.   RESPIRATORY:  Shortness of breath. Cough. No Hemoptysis.   CVS:  Chest pain.    GI:  No Abdominal pain. No Nausea. No Vomiting.   MUSCULOSKELETAL:  No Back pain.   NEUROLOGICAL:  No Headaches. No Dizziness.   PSYCHIATRIC:  No Anxiety. No Depression.      Patient continues to have cough and shortness of breath.  She slightly tachycardic.  Patient's says  that she feels better .  She continues to have the cough.  She had a reaction yesterday with itching secondary to Rituxan.  No nausea or vomiting.  No change in her rest of the review of systems.    Objective   VITAL SIGNS:  Temp:  [96.8 °F (36 °C)-98.7 °F (37.1 °C)] 98.3 °F (36.8 °C)  Heart Rate:  [] 73  Resp:  [16-28] 16  BP: ()/(56-62) 99/59    Wt Readings from Last 3 Encounters:   06/11/18 66.2 kg (146 lb)   06/11/18 66.4 kg (146 lb 6.4 oz)   06/08/18 66.7 kg (147 lb)       PHYSICAL EXAMINATION:  GENERAL:  The patient appears in good general condition, not in acute distress.But is uncomfortable secondary to the  cough  SKIN:  No skin rashes or lesions. No Ecchymosis or Petechiae.  There is no evidence of any bruising  HEAD:  Normocephalic.  EYES:  No Pallor. No Jaundice.   NECK:  Supple with no Thyromegaly or Masses.  LYMPHATICS:  Left supraclavicular/scalene Lymphadenopathy.  Still appreciated  CHEST:  Decreased breath sounds on the left.    CARDIAC:  Normal S1 & S2. No Murmurs.no evidence of tachycardia today.  ABDOMEN:  Soft. No tenderness. No Hepatomegaly. No Splenomegaly. No masses.  EXTREMITIES:  No Edema. No Calf tenderness. No Cyanosis. Status post right sided PICC line.    NEUROLOGICAL:  No Focal neurological deficits.       RESULT REVIEW:     Results from last 7 days  Lab Units 06/17/18  0450 06/16/18  0414 06/15/18  0717 06/14/18  0629 06/13/18  0637 06/12/18  0605   WBC 10*3/mm3 3.49* 4.59 3.07* 3.70* 4.29* 4.13*   NEUTROS ABS 10*3/mm3 2.96 3.43 2.13 2.50  --  2.77   HEMOGLOBIN g/dL 11.2* 12.4 12.7 12.7 12.8 13.2   HEMATOCRIT % 34.7* 37.6 39.4 40.2 40.4 40.9   PLATELETS 10*3/mm3 82* 123* 117* 109* 118* 119*       Results from last 7 days  Lab Units 06/17/18  0450 06/16/18  0414 06/15/18  0717   SODIUM mmol/L 141 136 143   POTASSIUM mmol/L 3.5 3.8 3.9   CHLORIDE mmol/L 104 96* 102   CO2 mmol/L 24.1 24.5 26.5   BUN mg/dL 13 14 10   CREATININE mg/dL 0.61 0.74 0.60   CALCIUM mg/dL 8.8 9.7 9.8   ALBUMIN g/dL 3.50 3.90 3.90   BILIRUBIN mg/dL 0.5 0.7 0.6   ALK PHOS U/L 49 57 57   ALT (SGPT) U/L 22 12 17   AST (SGOT) U/L 30 29 30   MAGNESIUM mg/dL 1.8 1.7  --    Magnesium 1.8  LDH 1,085-953  ESR 12  Uric acid 0.8  Phosphorus 4.0  Bone marrow flow - Negative.   Bone marrow morphology pending    Assessment/Plan   1.  Large mediastinal mass. The mass is encasing the vascular structures and narrowing the left mainstem bronchus.  There is suspicion of direct tumor infiltration of the left upper lobe. 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 Prelim path shows lymphoma. Additional path from [facility] is pending.      Preliminary result of bone marrow is negative.   · Review of pathology is consistent with diffuse large B-cell lymphoma with features suggestive of primary mediastinal B-cell lymphoma.  · EPOCH/R date 2  · Reaction to Rituxan which improved with steroids, Benadryl and Pepcid    2.  Pericardial effusion. Patient was seen by Cardiology. No evidence of cardiac tamponade.      3.  Hyperuricemia. LDH is very high. Uric and LDH improved with Fluids and Allopurinol.     4.  Fertility preservation. The patient is 23 years old. I discussed the options of harvesting eggs vs. Zoladex + OCCP vs. Zoladex alone. Due to the very large size of the tumor and need for rapid initiation of treatment, patient will not be able to have egg harvest done. She is at increased risk for thrombosis with the use of OCCP (due to malignancy and vascular encasement by the tumor) and I did not recommend this approach. I recommended Zoladex alone. I explained the side effects including hot flashes and cessation of menses. The patient is interested in pursuing this.  Patient received Zoladex injection yesterday for fertility preservation.      5.  IV access.     PLAN:    · 1.  Okay to continue day 2 chemotherapy today  · We will check daily labs  · Continue IV fluids  · Continue allopurinol   · Patient will require IV steroids, Benadryl and Pepcid prior to every Rituxan.  · Okay to continue chemotherapy.      Millie Padilla MD  06/17/18

## 2018-06-18 ENCOUNTER — APPOINTMENT (OUTPATIENT)
Dept: CARDIOLOGY | Facility: HOSPITAL | Age: 23
End: 2018-06-18
Attending: INTERNAL MEDICINE

## 2018-06-18 LAB
ALBUMIN SERPL-MCNC: 3.3 G/DL (ref 3.5–5.2)
ALBUMIN/GLOB SERPL: 1.4 G/DL
ALP SERPL-CCNC: 47 U/L (ref 39–117)
ALT SERPL W P-5'-P-CCNC: 21 U/L (ref 1–33)
ANION GAP SERPL CALCULATED.3IONS-SCNC: 12.4 MMOL/L
AST SERPL-CCNC: 28 U/L (ref 1–32)
BASOPHILS # BLD AUTO: 0 10*3/MM3 (ref 0–0.2)
BASOPHILS NFR BLD AUTO: 0 % (ref 0–1.5)
BH CV UPPER VENOUS LEFT INTERNAL JUGULAR AUGMENT: NORMAL
BH CV UPPER VENOUS LEFT INTERNAL JUGULAR COMPETENT: NORMAL
BH CV UPPER VENOUS LEFT INTERNAL JUGULAR COMPRESS: NORMAL
BH CV UPPER VENOUS LEFT INTERNAL JUGULAR PHASIC: NORMAL
BH CV UPPER VENOUS LEFT INTERNAL JUGULAR SPONT: NORMAL
BH CV UPPER VENOUS LEFT SUBCLAVIAN AUGMENT: NORMAL
BH CV UPPER VENOUS LEFT SUBCLAVIAN COMPETENT: NORMAL
BH CV UPPER VENOUS LEFT SUBCLAVIAN COMPRESS: NORMAL
BH CV UPPER VENOUS LEFT SUBCLAVIAN PHASIC: NORMAL
BH CV UPPER VENOUS LEFT SUBCLAVIAN SPONT: NORMAL
BH CV UPPER VENOUS RIGHT AXILLARY AUGMENT: NORMAL
BH CV UPPER VENOUS RIGHT AXILLARY COMPETENT: NORMAL
BH CV UPPER VENOUS RIGHT AXILLARY COMPRESS: NORMAL
BH CV UPPER VENOUS RIGHT AXILLARY PHASIC: NORMAL
BH CV UPPER VENOUS RIGHT AXILLARY SPONT: NORMAL
BH CV UPPER VENOUS RIGHT BASILIC FOREARM COMPRESS: NORMAL
BH CV UPPER VENOUS RIGHT BASILIC UPPER COLOR: 1
BH CV UPPER VENOUS RIGHT BASILIC UPPER COMPRESS: NORMAL
BH CV UPPER VENOUS RIGHT BASILIC UPPER THROMBUS: NORMAL
BH CV UPPER VENOUS RIGHT BRACHIAL COMPRESS: NORMAL
BH CV UPPER VENOUS RIGHT CEPHALIC FOREARM COMPRESS: NORMAL
BH CV UPPER VENOUS RIGHT CEPHALIC UPPER COMPRESS: NORMAL
BH CV UPPER VENOUS RIGHT INTERNAL JUGULAR AUGMENT: NORMAL
BH CV UPPER VENOUS RIGHT INTERNAL JUGULAR COMPETENT: NORMAL
BH CV UPPER VENOUS RIGHT INTERNAL JUGULAR COMPRESS: NORMAL
BH CV UPPER VENOUS RIGHT INTERNAL JUGULAR PHASIC: NORMAL
BH CV UPPER VENOUS RIGHT INTERNAL JUGULAR SPONT: NORMAL
BH CV UPPER VENOUS RIGHT RADIAL COMPRESS: NORMAL
BH CV UPPER VENOUS RIGHT SUBCLAVIAN AUGMENT: NORMAL
BH CV UPPER VENOUS RIGHT SUBCLAVIAN COMPETENT: NORMAL
BH CV UPPER VENOUS RIGHT SUBCLAVIAN COMPRESS: NORMAL
BH CV UPPER VENOUS RIGHT SUBCLAVIAN PHASIC: NORMAL
BH CV UPPER VENOUS RIGHT SUBCLAVIAN SPONT: NORMAL
BH CV UPPER VENOUS RIGHT ULNAR COMPRESS: NORMAL
BILIRUB SERPL-MCNC: 0.4 MG/DL (ref 0.1–1.2)
BUN BLD-MCNC: 11 MG/DL (ref 6–20)
BUN/CREAT SERPL: 19 (ref 7–25)
CALCIUM SPEC-SCNC: 8.8 MG/DL (ref 8.6–10.5)
CHLORIDE SERPL-SCNC: 105 MMOL/L (ref 98–107)
CO2 SERPL-SCNC: 24.6 MMOL/L (ref 22–29)
CREAT BLD-MCNC: 0.58 MG/DL (ref 0.57–1)
DEPRECATED RDW RBC AUTO: 45.6 FL (ref 37–54)
EOSINOPHIL # BLD AUTO: 0 10*3/MM3 (ref 0–0.7)
EOSINOPHIL NFR BLD AUTO: 0 % (ref 0.3–6.2)
ERYTHROCYTE [DISTWIDTH] IN BLOOD BY AUTOMATED COUNT: 13.7 % (ref 11.7–13)
GFR SERPL CREATININE-BSD FRML MDRD: 129 ML/MIN/1.73
GFR SERPL CREATININE-BSD FRML MDRD: >150 ML/MIN/1.73
GLOBULIN UR ELPH-MCNC: 2.4 GM/DL
GLUCOSE BLD-MCNC: 129 MG/DL (ref 65–99)
HCT VFR BLD AUTO: 33.5 % (ref 35.6–45.5)
HGB BLD-MCNC: 10.8 G/DL (ref 11.9–15.5)
IMM GRANULOCYTES # BLD: 0.02 10*3/MM3 (ref 0–0.03)
IMM GRANULOCYTES NFR BLD: 0.3 % (ref 0–0.5)
LDH SERPL-CCNC: 974 U/L (ref 135–214)
LYMPHOCYTES # BLD AUTO: 0.35 10*3/MM3 (ref 0.9–4.8)
LYMPHOCYTES NFR BLD AUTO: 5 % (ref 19.6–45.3)
MAGNESIUM SERPL-MCNC: 1.9 MG/DL (ref 1.6–2.6)
MCH RBC QN AUTO: 29.8 PG (ref 26.9–32)
MCHC RBC AUTO-ENTMCNC: 32.2 G/DL (ref 32.4–36.3)
MCV RBC AUTO: 92.3 FL (ref 80.5–98.2)
MONOCYTES # BLD AUTO: 0.33 10*3/MM3 (ref 0.2–1.2)
MONOCYTES NFR BLD AUTO: 4.7 % (ref 5–12)
NEUTROPHILS # BLD AUTO: 6.31 10*3/MM3 (ref 1.9–8.1)
NEUTROPHILS NFR BLD AUTO: 90 % (ref 42.7–76)
PHOSPHATE SERPL-MCNC: 2.9 MG/DL (ref 2.5–4.5)
PLATELET # BLD AUTO: 98 10*3/MM3 (ref 140–500)
PMV BLD AUTO: 11.6 FL (ref 6–12)
POTASSIUM BLD-SCNC: 3.5 MMOL/L (ref 3.5–5.2)
PROT SERPL-MCNC: 5.7 G/DL (ref 6–8.5)
RBC # BLD AUTO: 3.63 10*6/MM3 (ref 3.9–5.2)
SODIUM BLD-SCNC: 142 MMOL/L (ref 136–145)
URATE SERPL-MCNC: 0.9 MG/DL (ref 2.4–5.7)
WBC NRBC COR # BLD: 7.01 10*3/MM3 (ref 4.5–10.7)

## 2018-06-18 PROCEDURE — 25010000002 VINCRISTINE PER 1 MG: Performed by: INTERNAL MEDICINE

## 2018-06-18 PROCEDURE — 84550 ASSAY OF BLOOD/URIC ACID: CPT | Performed by: INTERNAL MEDICINE

## 2018-06-18 PROCEDURE — 99233 SBSQ HOSP IP/OBS HIGH 50: CPT | Performed by: INTERNAL MEDICINE

## 2018-06-18 PROCEDURE — 83615 LACTATE (LD) (LDH) ENZYME: CPT | Performed by: INTERNAL MEDICINE

## 2018-06-18 PROCEDURE — 99232 SBSQ HOSP IP/OBS MODERATE 35: CPT | Performed by: NURSE PRACTITIONER

## 2018-06-18 PROCEDURE — 80053 COMPREHEN METABOLIC PANEL: CPT | Performed by: INTERNAL MEDICINE

## 2018-06-18 PROCEDURE — 85025 COMPLETE CBC W/AUTO DIFF WBC: CPT | Performed by: INTERNAL MEDICINE

## 2018-06-18 PROCEDURE — 25010000002 ETOPOSIDE 100 MG/5ML SOLUTION 25 ML VIAL: Performed by: INTERNAL MEDICINE

## 2018-06-18 PROCEDURE — 93971 EXTREMITY STUDY: CPT

## 2018-06-18 PROCEDURE — 25010000002 DOXORUBICIN PER 10 MG: Performed by: INTERNAL MEDICINE

## 2018-06-18 PROCEDURE — 83735 ASSAY OF MAGNESIUM: CPT | Performed by: INTERNAL MEDICINE

## 2018-06-18 PROCEDURE — 63710000001 PREDNISONE PER 5 MG: Performed by: INTERNAL MEDICINE

## 2018-06-18 PROCEDURE — 84100 ASSAY OF PHOSPHORUS: CPT | Performed by: INTERNAL MEDICINE

## 2018-06-18 PROCEDURE — 25010000002 ONDANSETRON PER 1 MG: Performed by: INTERNAL MEDICINE

## 2018-06-18 RX ORDER — ACYCLOVIR 400 MG/1
400 TABLET ORAL 2 TIMES DAILY
Status: DISCONTINUED | OUTPATIENT
Start: 2018-06-18 | End: 2018-06-21 | Stop reason: HOSPADM

## 2018-06-18 RX ORDER — SULFAMETHOXAZOLE AND TRIMETHOPRIM 800; 160 MG/1; MG/1
1 TABLET ORAL 3 TIMES WEEKLY
Status: DISCONTINUED | OUTPATIENT
Start: 2018-06-18 | End: 2018-06-21 | Stop reason: HOSPADM

## 2018-06-18 RX ORDER — FLUCONAZOLE 200 MG/1
400 TABLET ORAL EVERY 24 HOURS
Status: DISCONTINUED | OUTPATIENT
Start: 2018-06-18 | End: 2018-06-21 | Stop reason: HOSPADM

## 2018-06-18 RX ADMIN — SULFAMETHOXAZOLE AND TRIMETHOPRIM 160 MG: 800; 160 TABLET ORAL at 13:23

## 2018-06-18 RX ADMIN — ACYCLOVIR 400 MG: 400 TABLET ORAL at 13:23

## 2018-06-18 RX ADMIN — ACYCLOVIR 400 MG: 400 TABLET ORAL at 21:14

## 2018-06-18 RX ADMIN — SODIUM CHLORIDE 125 ML/HR: 9 INJECTION, SOLUTION INTRAVENOUS at 11:13

## 2018-06-18 RX ADMIN — ONDANSETRON 16 MG: 2 INJECTION, SOLUTION INTRAMUSCULAR; INTRAVENOUS at 22:08

## 2018-06-18 RX ADMIN — SODIUM CHLORIDE 125 ML/HR: 9 INJECTION, SOLUTION INTRAVENOUS at 20:43

## 2018-06-18 RX ADMIN — DOXORUBICIN HYDROCHLORIDE: 2 INJECTION, SOLUTION INTRAVENOUS at 22:35

## 2018-06-18 RX ADMIN — ALLOPURINOL 300 MG: 300 TABLET ORAL at 21:14

## 2018-06-18 RX ADMIN — SODIUM CHLORIDE 125 ML/HR: 9 INJECTION, SOLUTION INTRAVENOUS at 02:10

## 2018-06-18 RX ADMIN — FLUCONAZOLE 400 MG: 200 TABLET ORAL at 13:23

## 2018-06-18 RX ADMIN — PREDNISONE 100 MG: 50 TABLET ORAL at 09:15

## 2018-06-18 RX ADMIN — PANTOPRAZOLE SODIUM 40 MG: 40 TABLET, DELAYED RELEASE ORAL at 06:15

## 2018-06-18 RX ADMIN — PREDNISONE 100 MG: 50 TABLET ORAL at 22:09

## 2018-06-18 RX ADMIN — ALLOPURINOL 300 MG: 300 TABLET ORAL at 09:15

## 2018-06-18 NOTE — PROGRESS NOTES
2018          To Whom It May Concern:      Vikki Durham ( 1995) has been diagnosed with primary mediastinal diffuse large B-cell lymphoma.  This will require the patient to undergo several cycles of multi-agent chemotherapy, each of which will require hospitalization for administration.  The patient is likely to experience fatigue, low blood counts with increased risk of infections, and other side effects of chemotherapy.  She will require frequent office visits for lab monitoring and toxicity checks.    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      Please contact our office if we can provide further information regarding Vikki's care, [phone].      Sincerely,        Ramón Camp  Consulting in Blood Disorders and Cancer  [facility]

## 2018-06-18 NOTE — PROGRESS NOTES
Adult Nutrition  Assessment/PES    Patient Name:  Vikki Durham  YOB: 1995  MRN: [id number]  Admit Date:  6/11/2018    Assessment Date:  6/18/2018    I was asked to see from Pharmacy re: nutritional supplements. Patient and patient's mother not available at time of visit.  Left healing well guide in room. Will follow up 6/19.           Reason for Assessment     Row Name 06/18/18 1502          Reason for Assessment    Reason For Assessment nurse/nurse practitioner consult     Diagnosis cancer diagnosis/related complications   Primary Problem:  Mediastinal mass                Anthropometrics     Row Name 06/18/18 1503          Body Mass Index (BMI)    BMI Assessment BMI 25-29.9: overweight             Labs/Tests/Procedures/Meds     Row Name 06/18/18 1503          Labs/Procedures/Meds    Lab Results Reviewed reviewed, pertinent        Diagnostic Tests/Procedures    Diagnostic Test/Procedure Reviewed reviewed, pertinent        Medications    Pertinent Medications Reviewed reviewed, pertinent             Physical Findings     Row Name 06/18/18 1503          Physical Findings    Overall Physical Appearance --   B=22             Estimated/Assessed Needs     Row Name 06/18/18 1504          Calculation Measurements    Weight Used For Calculations 66.2 kg (145 lb 15.1 oz)        Estimated/Assessed Needs    Additional Documentation KCAL/KG (Group);Protein Requirements (Group);Fluid Requirements (Group)        KCAL/KG                                                                kcal/kg (Specify) --   6552-3321        Rosebud-St. Jeor Equation    RMR (Rosebud-St. Jeor Equation) 1370.25        Protein Requirements    Est Protein Requirement Amount (gms/kg) 1.2 gm protein     Estimated Protein Requirements (gms/day) 79.44        Fluid Requirements    Estimated Fluid Requirements (mL/day) 1650     RDA Method (mL) 1650     Niraj-Segar Method (over 20 kg) 2824             Nutrition Prescription Ordered     Row  Name 06/18/18 1504          Nutrition Prescription PO    Current PO Diet NPO             Evaluation of Received Nutrient/Fluid Intake     Row Name 06/18/18 1504          Calculation Measurements    Weight Used For Calculations 66.2 kg (145 lb 15.1 oz)        PO Evaluation    Number of Meals 5     % PO Intake 75             Evaluation of Prescribed Nutrient/Fluid Intake     Row Name 06/18/18 1504          Calculation Measurements    Weight Used For Calculations 66.2 kg (145 lb 15.1 oz)           Problem/Interventions:        Problem 1     Row Name 06/18/18 1505          Nutrition Diagnoses Problem 1    Problem 1 Nutrition Appropriate for Condition at this Time                     Intervention Goal     Row Name 06/18/18 1505          Intervention Goal    General Maintain nutrition;Meet nutritional needs for age/condition     PO Tolerate PO     Weight Maintain weight             Nutrition Intervention     Row Name 06/18/18 1505          Nutrition Intervention    RD/Tech Action Care plan reviewd;Follow Tx progress               Education/Evaluation     Row Name 06/18/18 1505          Education    Education Provided education regarding     Provided education regarding --   healing guide during treatment        Monitor/Evaluation    Monitor Per protocol     Education Follow-up Reinforce PRN         Electronically signed by:  Sarai Jefferson RD  06/18/18 3:05 PM

## 2018-06-18 NOTE — PROGRESS NOTES
"    Chief Complaint: Follow-up mediastinal mass  S/P: CT Directed needle biopsy of the left lung mediastinum  POD # 6    Subjective:  Symptoms:  Improved.  She reports shortness of breath and chest pain.  No chest pressure or anxiety.    Diet:  Adequate intake.  No nausea or vomiting.    Activity level: Impaired due to weakness.    Pain:  She reports no pain.        Vital Signs:  Temp:  [96.8 °F (36 °C)-97.7 °F (36.5 °C)] 96.8 °F (36 °C)  Heart Rate:  [67-77] 70  Resp:  [18] 18  BP: ()/(52-63) 102/61    Intake & Output (last day)       06/17 0701 - 06/18 0700 06/18 0701 - 06/19 0700    P.O. 1080 240    I.V. (mL/kg) 2556.8 (38.6) 1131.3 (17.1)    IV Piggyback 118.7 620    Total Intake(mL/kg) 3755.5 (56.7) 1991.3 (30.1)    Urine (mL/kg/hr) 3200 (2) 2600 (4)    Stool  0 (0)    Total Output 3200 2600    Net +555.5 -608.8          Unmeasured Stool Occurrence  1 x          Objective:  General Appearance:  Comfortable, well-appearing, in no acute distress and not in pain.    Vital signs: (most recent): Blood pressure 102/61, pulse 70, temperature 96.8 °F (36 °C), temperature source Oral, resp. rate 18, height 157.5 cm (62\"), weight 66.2 kg (146 lb), SpO2 94 %.  Vital signs are normal.  No fever.    Output: Producing urine and producing stool.    HEENT: Normal HEENT exam.    Lungs:  Normal effort and normal respiratory rate.  She is not in respiratory distress.  There are decreased breath sounds.  (Breasts sounds diminished in upper, anterior left lung fields)  Heart: Normal rate.  Regular rhythm.  S1 normal and S2 normal.    Chest: No chest wall tenderness.    Abdomen: Abdomen is soft and non-distended.  Bowel sounds are normal.   There is no abdominal tenderness.   There is no mass.   Extremities: Normal range of motion.  There is local swelling.  (RUE swelling)  Pulses: Distal pulses are intact.    Neurological: Patient is alert and oriented to person, place and time.  Normal strength.    Pupils:  Pupils are equal, " round, and reactive to light.    Skin:  Warm and dry.          Results Review:     I reviewed the patient's new clinical results.  I reviewed the patient's new imaging results and agree with the interpretation.  Discussed with patient, RN and Dr. Aragon.    Imaging Results (last 24 hours)     Procedure Component Value Units Date/Time    CT Guided Biopsy Bone Marrow [[id number]] Collected:  06/14/18 1605     Updated:  06/18/18 1442    Narrative:       CT-GUIDED BONE MARROW ASPIRATION AND CT-GUIDED CORE BIOPSY OF POSTERIOR  RIGHT ILIAC BONE 06/14/2018     HISTORY: Lymphoma.     After signed informed consent was obtained, patient was prepped and  draped in the prone position. Lidocaine was used for local anesthesia.     The aWhere biopsy device was introduced into the posterior right  iliac bone. Bone marrow aspiration was performed. This was followed by  core biopsy of the posterior right iliac bone.     Confirmatory images were obtained.     Patient tolerated the procedure well with no complications.     Radiation dose reduction techniques were utilized, including automated  exposure control and exposure modulation based on body size.     This report was finalized on 6/18/2018 2:39 PM by Dr. Eliseo Templeton M.D.             Lab Results:     Lab Results (last 24 hours)     Procedure Component Value Units Date/Time    Tissue Pathology Exam [[id number]] Collected:  06/12/18 1055    Specimen:  Tissue from Lung, Left Upper Lobe Updated:  06/18/18 1032     Case Report --     Surgical Pathology Report                         Case: [id number]                                  Authorizing Provider:  BRANNON Walton       Collected:           06/12/2018 10:55 AM          Ordering Location:     [facility]  Received:            06/12/2018 12:10 PM                                 [address] [place] [address]                                                                      Pathologist:           Alex Hargrove       "                                                                      MD                                                                           Specimens:   1) - Lung, Left Upper Lobe, CT Guided Biopsy Left Upper Lung Mass                                   2) - Lung, Left Upper Lobe, CT Guided Biopsy Left Upper Lung Mass                           Clinical Information --     Left lung mass       Final Diagnosis --     1: LEFT UPPER LUNG MASS BIOPSY:   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    COMMENT: This case has been referred to [facility].  [facility] has rendered the above diagnosis.  Please see their entire scanned report including comment section.      CMK/pkm    CPT CODES:  1: 37608, 79867, 68330            Preliminary Diagnosis --     1: LEFT UPPER LUNG MASS BIOPSY:   ATYPICAL MONONUCLEAR INFILTRATE SUSPICIOUS FOR LYMPHOPROLIFERATIVE DISORDER.     2: LEFT UPPER LUNG MASS:   ATYPICAL MONONUCLEAR INFILTRATE SUSPICIOUS FOR LYMPHOPROLIFERATIVE DISORDER.    COMMENT: Immunoperoxidase stains have been performed.  Lesional cells are positive for CD45 and negative for Pancytokeratin.  Both blocks have been forwarded to [facility] for additional studies to further classify the infiltrate.  Final report will follow.      IHC/THM  CMK/jse     CPT CODES:  1: 67741, 04105, 87812          Gross Description --     1. Received in formalin labeled \"CT guided biopsy left upper lung mass\" are multiple tan needle core biopsies up to 1 cm long and less than 0.1 cm across.  The tissue is entirely submitted in a single block labeled 1A.  CC/USO/MAGDALENE/pkm     2. Received in transport media labeled \"CT guided biopsy left upper lung mass\" is a 1.1 cm long and less than 0.1 cm across tan " "friable needle core biopsy that is entirely submitted in a single block labeled 2A for routine histiologic examination.  CC/USO/MAGDALENE/pkm        Microscopic Description --     Performed, incorporated in diagnosis.        Embedded Images --    Tissue Pathology Exam [[id number]] Collected:  06/14/18 0840    Specimen:  Bone Marrow Aspirate from Iliac Crest, Right - Biopsy Updated:  06/18/18 0714     Addendum --     Please see the completely scanned Flow Cytometry Analysis from [facility] below.        Case Report --     Surgical Pathology Report                         Case: [id number]                                  Authorizing Provider:  Kimberley Melgar MD      Collected:           06/14/2018 08:40 AM          Ordering Location:     [facility]  Received:            06/14/2018 09:40 AM                                 [address]                                                                       Pathologist:           Eliseo Simon MD                                                       Specimens:   1) - Iliac Crest, Right - Biopsy, right illiac                                                      2) - Iliac Crest, Right - Aspirate, bm asp recd, 5 smears made and slides sent to                   [facility]                                                                                      3) - Iliac Crest, Right - Aspirate, recd 2 green, 1 purple top tubes, sent all tubes                to I.[facility]. for flow cytometry.                                                                 Final Diagnosis --     1. \"RIGHT ILIAC CREST\", CORE BIOPSY:          PRELIMINARY H&E IMPRESSION:   CELLULAR MARROW WITH EVIDENCE OF TRILINEAGE MATURATION.               NO EVIDENCE OF METASTATIC CARCINOMA, GRANULOMA, OR OVERT LYMPHOMA.     PENDING: IMMUNOHISTOCHEMISTRY ON FORMALIN FIXED PARAFFIN EMBEDDED CORE BIOPSY,                         FLOW CYTOMETRY, CYTOGENETICS, ADDITIONAL STUDIES.    COMMENT: The final " "diagnosis will be represented by the Comprehensive Case Summary as reported by [facility] upon completion of special studies.    2.  \"RIGHT ILIAC CREST\", ASPIRATE:   SUBMITTED TO [facility].    3. \"RIGHT ILIAC CREST\", ASPIRATE:   SUBMITTED TO [facility].    THM/pkm IHC/a/CMK    CPT CODES:  1. 23764, 36966         Intradepartmental Consult --     Dr. JODEE Hargrove who concurs.         Gross Description --     Received in formalin labeled \"right iliac bone marrow biopsy\" is a 1.0 cm long and up to 0.3 cm across tan to red needle core biopsy of bone that is submitted entirely in a single block labeled 1A after decalcification. Also received with this case are two green top tubes and one purple top tube of blood each labeled with the patient's name, identifying information, along with right iliac bone marrow biopsy. The tubes of blood are forwarded for further study. CC/USO/M/        Microscopic Description --     Performed, incorporated in diagnosis.       Embedded Images --    Phosphorus [[id number]]  (Normal) Collected:  06/18/18 0405    Specimen:  Blood Updated:  06/18/18 0600     Phosphorus 2.9 mg/dL     Lactate Dehydrogenase [[id number]]  (Abnormal) Collected:  06/18/18 0405    Specimen:  Blood Updated:  06/18/18 0600      (H) U/L      Comment: Specimen hemolyzed.  Results may be affected.       Comprehensive Metabolic Panel [[id number]]  (Abnormal) Collected:  06/18/18 0405    Specimen:  Blood Updated:  06/18/18 0552     Glucose 129 (H) mg/dL      BUN 11 mg/dL      Creatinine 0.58 mg/dL      Sodium 142 mmol/L      Potassium 3.5 mmol/L      Chloride 105 mmol/L      CO2 24.6 mmol/L      Calcium 8.8 mg/dL      Total Protein 5.7 (L) g/dL      Albumin 3.30 (L) g/dL      ALT (SGPT) 21 U/L      AST (SGOT) 28 U/L      Alkaline Phosphatase 47 U/L      Total Bilirubin 0.4 mg/dL      eGFR Non African Amer 129 mL/min/1.73      eGFR  African Amer >150 mL/min/1.73      Globulin 2.4 gm/dL      " A/G Ratio 1.4 g/dL      BUN/Creatinine Ratio 19.0     Anion Gap 12.4 mmol/L     Uric Acid [[id number]]  (Abnormal) Collected:  06/18/18 0405    Specimen:  Blood Updated:  06/18/18 0552     Uric Acid 0.9 (L) mg/dL     Magnesium [[id number]]  (Normal) Collected:  06/18/18 0405    Specimen:  Blood from Cannula Updated:  06/18/18 0544     Magnesium 1.9 mg/dL     CBC & Differential [[id number]] Collected:  06/18/18 0405    Specimen:  Blood Updated:  06/18/18 0531    Narrative:       The following orders were created for panel order CBC & Differential.  Procedure                               Abnormality         Status                     ---------                               -----------         ------                     CBC Auto Differential[[id number]]        Abnormal            Final result                 Please view results for these tests on the individual orders.    CBC Auto Differential [[id number]]  (Abnormal) Collected:  06/18/18 0405    Specimen:  Blood Updated:  06/18/18 0531     WBC 7.01 10*3/mm3      RBC 3.63 (L) 10*6/mm3      Hemoglobin 10.8 (L) g/dL      Hematocrit 33.5 (L) %      MCV 92.3 fL      MCH 29.8 pg      MCHC 32.2 (L) g/dL      RDW 13.7 (H) %      RDW-SD 45.6 fl      MPV 11.6 fL      Platelets 98 (L) 10*3/mm3      Neutrophil % 90.0 (H) %      Lymphocyte % 5.0 (L) %      Monocyte % 4.7 (L) %      Eosinophil % 0.0 (L) %      Basophil % 0.0 %      Immature Grans % 0.3 %      Neutrophils, Absolute 6.31 10*3/mm3      Lymphocytes, Absolute 0.35 (L) 10*3/mm3      Monocytes, Absolute 0.33 10*3/mm3      Eosinophils, Absolute 0.00 10*3/mm3      Basophils, Absolute 0.00 10*3/mm3      Immature Grans, Absolute 0.02 10*3/mm3     Blood Culture - Blood, [[id number]]  (Normal) Collected:  06/15/18 2052    Specimen:  Blood from Arm, Left Updated:  06/17/18 2130     Blood Culture No growth at 2 days    Blood Culture - Blood, [[id number]]  (Normal) Collected:  06/15/18 0070    Specimen:  Blood from Blood, Venous Line  Updated:  06/17/18 1815     Blood Culture No growth at 2 days           Assessment/Plan     Principal Problem:    Mediastinal mass  Active Problems:    Thoracic back pain    Dyspnea on exertion    Palpitation    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  She is stable and states she feels better since beginning treatment.     Her biopsy report was received late Friday and she was able to begin chemotherapy over the weekend.     At this point there is no need for thoracic surgical intervention.  We will be available if needed in the future.    BRANNON Walton  [facility]  06/18/18  4:56 PM

## 2018-06-18 NOTE — PROGRESS NOTES
Subjective     CHIEF COMPLAINT:   Primary mediastinal DLBCL; Day 3 of DA-EPOCH/R    HISTORY OF PRESENT ILLNESS:  The patient is doing well with chemotherapy so far.  She denies nausea, vomiting or diarrhea.  She complains of constipation.  The patient notices mild swelling of the right arm which is been present since the PICC line was placed.  She denies significant changes in chest pain or shortness of breath.  She complains of mild swelling around both knees.        Past Medical History:   Diagnosis Date   • Fracture of lower leg 2000    L       History reviewed. No pertinent surgical history.    SCHEDULED MEDS:    allopurinol 300 mg Oral BID   DOXOrubicin (ADRIAMYCIN), etoposide and vinCRIStine chemo IVPB (pediatric)  Intravenous Once   fondaparinux 2.5 mg Subcutaneous Q24H   pantoprazole 40 mg Oral Q AM   predniSONE 100 mg Oral BID     INFUSIONS:    custom IV infusion builder + additives 125 mL/hr Last Rate: 125 mL/hr (06/16/18 5833)   sodium chloride 125 mL/hr Last Rate: 125 mL/hr (06/18/18 0210)     PRN MEDS:  •  acetaminophen  •  albuterol  •  guaifenesin-dextromethorphan  •  HYDROcodone-acetaminophen  •  ondansetron **OR** ondansetron ODT **OR** ondansetron  •  ondansetron  •  prochlorperazine  •  sennosides-docusate sodium  •  sodium chloride  •  sodium chloride  •  sodium chloride  •  sodium chloride  •  sodium chloride  •  sodium chloride  •  sodium chloride    REVIEW OF SYSTEMS:  GENERAL:  No Fever or chills. No weight change.  No Fatigue.   SKIN:  No skin rashes or lesions.  HEME/LYMPH: No Anemia. No Easy bruisability.   RESPIRATORY:  Stable shortness of breath, mild cough nonproductive  CVS:  Chest pain.    GI:  No Abdominal pain. No Nausea. No Vomiting.   MUSCULOSKELETAL:  No Back pain.  Swelling right upper extremity  NEUROLOGICAL:  No Headaches. No Dizziness.   PSYCHIATRIC:  No Anxiety. No Depression.          Objective   VITAL SIGNS:  Temp:  [96.8 °F (36 °C)-98.2 °F (36.8 °C)] 96.8 °F (36  °C)  Heart Rate:  [67-80] 70  Resp:  [18] 18  BP: ()/(52-72) 102/61    Wt Readings from Last 3 Encounters:   06/11/18 66.2 kg (146 lb)   06/11/18 66.4 kg (146 lb 6.4 oz)   06/08/18 66.7 kg (147 lb)       PHYSICAL EXAMINATION:  GENERAL:  Well-developed young woman  SKIN:  No skin rashes or lesions. No Ecchymosis or Petechiae.  There is no evidence of any bruising  HEAD:  Normocephalic.  EYES:  No Pallor. No Jaundice.   NECK:  Supple with no Thyromegaly or Masses.  LYMPHATICS:  Left supraclavicular/scalene Lymphadenopathy.   CHEST:  Decreased breath sounds on the left.    CARDIAC:  Normal S1 & S2. No Murmurs. regular rate  ABDOMEN:  Soft. No tenderness. No Hepatomegaly. No Splenomegaly. No masses.  EXTREMITIES:  No Edema. No Calf tenderness. No Cyanosis.  Is a right upper extremity PICC line with mild right arm swelling noted  NEUROLOGICAL:  No Focal neurological deficits.   SKIN: no petechiae or bruising      RESULT REVIEW:     Results from last 7 days  Lab Units 06/18/18  0405 06/17/18  0450 06/16/18  0414 06/15/18  0717 06/14/18  0629   WBC 10*3/mm3 7.01 3.49* 4.59 3.07* 3.70*   NEUTROS ABS 10*3/mm3 6.31 2.96 3.43 2.13 2.50   HEMOGLOBIN g/dL 10.8* 11.2* 12.4 12.7 12.7   HEMATOCRIT % 33.5* 34.7* 37.6 39.4 40.2   PLATELETS 10*3/mm3 98* 82* 123* 117* 109*       Results from last 7 days  Lab Units 06/18/18  0405 06/17/18  0450 06/16/18  0414   SODIUM mmol/L 142 141 136   POTASSIUM mmol/L 3.5 3.5 3.8   CHLORIDE mmol/L 105 104 96*   CO2 mmol/L 24.6 24.1 24.5   BUN mg/dL 11 13 14   CREATININE mg/dL 0.58 0.61 0.74   CALCIUM mg/dL 8.8 8.8 9.7   ALBUMIN g/dL 3.30* 3.50 3.90   BILIRUBIN mg/dL 0.4 0.5 0.7   ALK PHOS U/L 47 49 57   ALT (SGPT) U/L 21 22 12   AST (SGOT) U/L 28 30 29   MAGNESIUM mg/dL 1.9 1.8 1.7       Assessment/Plan   1.  Primary mediastinal large B-cell lymphoma: The patient has initiated systemic chemotherapy with DA-EPOCH-R, day 3 today.  She had an infusion reaction to rituximab but otherwise  tolerating chemotherapy well.  Patient will require IV steroids, Benadryl and Pepcid prior to every Rituxan.  Final bone marrow biopsy report is pending.    · Proceed with day 3 chemotherapy today    2.  Pericardial effusion. Patient was seen by Cardiology. No evidence of cardiac tamponade.      3.  The patient is being monitored closely for tumor lysis syndrome.  Creatinine is currently normal.  The uric acid is low 0.9 and magnesium and phosphorus are normal.  Continue IV fluids and allopurinol.  Monitor    4.  Fertility preservation:  Patient received Zoladex injection  for fertility preservation.      5.  Right upper extremity swelling: This may be from central tumor compression but I recommended and ordered a right upper extremity Doppler ultrasound to make sure there has not been a PICC line associated thrombosis    6.  Consitpation:  maxine-s prn    7.  DVT prophylaxis:  Receiving Arixtra 2.5 mg daily    7.  The patient will need twice weekly labs and Neulasta after discharge      Ramón Camp MD  06/18/18

## 2018-06-18 NOTE — NURSING NOTE
Discussed fatigue, when to notify CBC service of fever 100.5 or >, neutropenia. Informed of Cancer Center and services. Appropriate questions; verbalized understanding. Will f/u tomorrow with  Cancer Center to ascertain where and how to donate hair as per her request.

## 2018-06-18 NOTE — PLAN OF CARE
Problem: Patient Care Overview  Goal: Plan of Care Review  Outcome: Ongoing (interventions implemented as appropriate)   06/18/18 7938   Coping/Psychosocial   Plan of Care Reviewed With patient   Plan of Care Review   Progress no change   OTHER   Outcome Summary Tolerating chemotherapy. BM this am. doppler of edematous rt. arm + for new superficial vein thrombosis around PICC with ne extension into deep system. Dr. EDD Camp notified; instructions to keep rue elevated on two pillows; Instructions followed and pt. informed.

## 2018-06-18 NOTE — PLAN OF CARE
Problem: Patient Care Overview  Goal: Plan of Care Review  Outcome: Ongoing (interventions implemented as appropriate)   18 0354   Coping/Psychosocial   Plan of Care Reviewed With patient   Plan of Care Review   Progress improving   OTHER   Outcome Summary Chemo infusing at 25 cc hour. New chemo hung at 2300. zofran and po Steriod given premed. Good blood return from Picc line. Up ad rikki in room. Family at bedside. Denies pain. Did verbalize night sweat x 1       Problem: Anxiety (Adult)  Goal: Reduction/Resolution  Outcome: Ongoing (interventions implemented as appropriate)      Problem: Coping, Compromised Family (Adult,NICU,,Obstetrics,Pediatric)  Goal: Effective Family Coping  Outcome: Ongoing (interventions implemented as appropriate)      Problem: Pain, Acute (Adult)  Goal: Acceptable Pain Control/Comfort Level  Outcome: Ongoing (interventions implemented as appropriate)      Problem: Oncology Care (Adult)  Goal: Signs and Symptoms of Listed Potential Problems Will be Absent, Minimized or Managed (Oncology Care)  Outcome: Ongoing (interventions implemented as appropriate)

## 2018-06-18 NOTE — PROGRESS NOTES
Vikki Durham is a 23 y.o. y.o. female admitted with a mediastinal mass, here to start treatment with DA-EPOCH-R. Reviewed regimen with patient and mother. Pt to receive cycle during this admission. Pt without previous experience of chemotherapy, however did start infusion on Sat 6/16.    D/W patient expected effects including but not limited to significant risk of pancytopenia, nausea/vomiting, mucositis, hair loss, cardiotoxicity, hemorraghic cystitis, neuropathy, and constipation. Pt had previously reviewed written materials regarding chemotherapy and d/w nursing staff - reviewed her specific questions regarding infection prevention, bleeding/bruising risk, exercise tolerance, hemorrhagic cystitis, constipation, hair loss, oral hygiene, nausea management, skin care (sunscreen). Pt's mother with questions regarding nutritional supplements - reviewed the risk/benefits of these with concurrent chemo and encouraged whole food dietary provision of vitamins/minerals when possible - have consulted dietary to also speak with patient regarding this topic.    Patient very receptive to counseling; very participatory in counseling and asked appropriate questions to confirm understanding. Patient with mother at bedside - she too confirmed understanding. Educational handouts regarding medications provided; patient also given calendar outlining chemotherapy scheduled. Patient with no further questions at this time.     Thank you for this opportunity to  this patient and her family,    Nevaeh Tillman, Pharm.D., [facility]  Oncology Pharmacy Specialist  [phone]

## 2018-06-18 NOTE — PROGRESS NOTES
MARY doppler completed.  Preliminary results:  RT arm is positive for new superficial vein thrombosis around PICC line with no extension into the deep system.  Results given to PAUL Augustin.

## 2018-06-19 LAB
ALBUMIN SERPL-MCNC: 3.5 G/DL (ref 3.5–5.2)
ALBUMIN/GLOB SERPL: 1.3 G/DL
ALP SERPL-CCNC: 48 U/L (ref 39–117)
ALT SERPL W P-5'-P-CCNC: 19 U/L (ref 1–33)
ANION GAP SERPL CALCULATED.3IONS-SCNC: 12.7 MMOL/L
AST SERPL-CCNC: 27 U/L (ref 1–32)
BASOPHILS # BLD AUTO: 0 10*3/MM3 (ref 0–0.2)
BASOPHILS NFR BLD AUTO: 0 % (ref 0–1.5)
BILIRUB SERPL-MCNC: 0.5 MG/DL (ref 0.1–1.2)
BUN BLD-MCNC: 16 MG/DL (ref 6–20)
BUN/CREAT SERPL: 25 (ref 7–25)
CALCIUM SPEC-SCNC: 9.1 MG/DL (ref 8.6–10.5)
CHLORIDE SERPL-SCNC: 103 MMOL/L (ref 98–107)
CO2 SERPL-SCNC: 27.3 MMOL/L (ref 22–29)
CREAT BLD-MCNC: 0.64 MG/DL (ref 0.57–1)
DEPRECATED RDW RBC AUTO: 44.5 FL (ref 37–54)
EOSINOPHIL # BLD AUTO: 0 10*3/MM3 (ref 0–0.7)
EOSINOPHIL NFR BLD AUTO: 0 % (ref 0.3–6.2)
ERYTHROCYTE [DISTWIDTH] IN BLOOD BY AUTOMATED COUNT: 13.5 % (ref 11.7–13)
GFR SERPL CREATININE-BSD FRML MDRD: 115 ML/MIN/1.73
GFR SERPL CREATININE-BSD FRML MDRD: 139 ML/MIN/1.73
GLOBULIN UR ELPH-MCNC: 2.6 GM/DL
GLUCOSE BLD-MCNC: 122 MG/DL (ref 65–99)
HCT VFR BLD AUTO: 34 % (ref 35.6–45.5)
HGB BLD-MCNC: 10.9 G/DL (ref 11.9–15.5)
IMM GRANULOCYTES # BLD: 0 10*3/MM3 (ref 0–0.03)
IMM GRANULOCYTES NFR BLD: 0 % (ref 0–0.5)
LDH SERPL-CCNC: 1078 U/L (ref 135–214)
LYMPHOCYTES # BLD AUTO: 0.37 10*3/MM3 (ref 0.9–4.8)
LYMPHOCYTES NFR BLD AUTO: 6.1 % (ref 19.6–45.3)
MAGNESIUM SERPL-MCNC: 2.3 MG/DL (ref 1.6–2.6)
MCH RBC QN AUTO: 29.1 PG (ref 26.9–32)
MCHC RBC AUTO-ENTMCNC: 32.1 G/DL (ref 32.4–36.3)
MCV RBC AUTO: 90.9 FL (ref 80.5–98.2)
MONOCYTES # BLD AUTO: 0.12 10*3/MM3 (ref 0.2–1.2)
MONOCYTES NFR BLD AUTO: 2 % (ref 5–12)
NEUTROPHILS # BLD AUTO: 5.57 10*3/MM3 (ref 1.9–8.1)
NEUTROPHILS NFR BLD AUTO: 91.9 % (ref 42.7–76)
PHOSPHATE SERPL-MCNC: 3.7 MG/DL (ref 2.5–4.5)
PLATELET # BLD AUTO: 133 10*3/MM3 (ref 140–500)
PMV BLD AUTO: 11.3 FL (ref 6–12)
POTASSIUM BLD-SCNC: 3.2 MMOL/L (ref 3.5–5.2)
PROT SERPL-MCNC: 6.1 G/DL (ref 6–8.5)
RBC # BLD AUTO: 3.74 10*6/MM3 (ref 3.9–5.2)
SODIUM BLD-SCNC: 143 MMOL/L (ref 136–145)
URATE SERPL-MCNC: 1 MG/DL (ref 2.4–5.7)
WBC NRBC COR # BLD: 6.06 10*3/MM3 (ref 4.5–10.7)

## 2018-06-19 PROCEDURE — 25010000002 DOXORUBICIN PER 10 MG: Performed by: INTERNAL MEDICINE

## 2018-06-19 PROCEDURE — 25010000002 ETOPOSIDE 100 MG/5ML SOLUTION 25 ML VIAL: Performed by: INTERNAL MEDICINE

## 2018-06-19 PROCEDURE — 99233 SBSQ HOSP IP/OBS HIGH 50: CPT | Performed by: INTERNAL MEDICINE

## 2018-06-19 PROCEDURE — 63710000001 PREDNISONE PER 5 MG: Performed by: INTERNAL MEDICINE

## 2018-06-19 PROCEDURE — 84100 ASSAY OF PHOSPHORUS: CPT | Performed by: INTERNAL MEDICINE

## 2018-06-19 PROCEDURE — 84550 ASSAY OF BLOOD/URIC ACID: CPT | Performed by: INTERNAL MEDICINE

## 2018-06-19 PROCEDURE — 25010000002 FONDAPARINUX PER 0.5 MG: Performed by: INTERNAL MEDICINE

## 2018-06-19 PROCEDURE — 83735 ASSAY OF MAGNESIUM: CPT | Performed by: INTERNAL MEDICINE

## 2018-06-19 PROCEDURE — 25010000002 ONDANSETRON PER 1 MG: Performed by: INTERNAL MEDICINE

## 2018-06-19 PROCEDURE — 80053 COMPREHEN METABOLIC PANEL: CPT | Performed by: INTERNAL MEDICINE

## 2018-06-19 PROCEDURE — 25010000002 VINCRISTINE PER 1 MG: Performed by: INTERNAL MEDICINE

## 2018-06-19 PROCEDURE — 85025 COMPLETE CBC W/AUTO DIFF WBC: CPT | Performed by: INTERNAL MEDICINE

## 2018-06-19 PROCEDURE — 83615 LACTATE (LD) (LDH) ENZYME: CPT | Performed by: INTERNAL MEDICINE

## 2018-06-19 RX ORDER — POTASSIUM CHLORIDE 750 MG/1
40 CAPSULE, EXTENDED RELEASE ORAL ONCE
Status: COMPLETED | OUTPATIENT
Start: 2018-06-19 | End: 2018-06-19

## 2018-06-19 RX ADMIN — ACYCLOVIR 400 MG: 400 TABLET ORAL at 20:36

## 2018-06-19 RX ADMIN — POTASSIUM CHLORIDE 40 MEQ: 750 CAPSULE, EXTENDED RELEASE ORAL at 13:29

## 2018-06-19 RX ADMIN — ONDANSETRON 16 MG: 2 INJECTION, SOLUTION INTRAMUSCULAR; INTRAVENOUS at 20:33

## 2018-06-19 RX ADMIN — FLUCONAZOLE 400 MG: 200 TABLET ORAL at 11:10

## 2018-06-19 RX ADMIN — SODIUM CHLORIDE 125 ML/HR: 9 INJECTION, SOLUTION INTRAVENOUS at 13:25

## 2018-06-19 RX ADMIN — PREDNISONE 100 MG: 50 TABLET ORAL at 11:09

## 2018-06-19 RX ADMIN — SODIUM CHLORIDE 125 ML/HR: 9 INJECTION, SOLUTION INTRAVENOUS at 23:08

## 2018-06-19 RX ADMIN — PREDNISONE 100 MG: 50 TABLET ORAL at 20:36

## 2018-06-19 RX ADMIN — ALLOPURINOL 300 MG: 300 TABLET ORAL at 20:35

## 2018-06-19 RX ADMIN — SODIUM CHLORIDE 125 ML/HR: 9 INJECTION, SOLUTION INTRAVENOUS at 05:16

## 2018-06-19 RX ADMIN — ALLOPURINOL 300 MG: 300 TABLET ORAL at 09:31

## 2018-06-19 RX ADMIN — PANTOPRAZOLE SODIUM 40 MG: 40 TABLET, DELAYED RELEASE ORAL at 05:19

## 2018-06-19 RX ADMIN — ACYCLOVIR 400 MG: 400 TABLET ORAL at 09:31

## 2018-06-19 RX ADMIN — DOXORUBICIN HYDROCHLORIDE: 2 INJECTION, SOLUTION INTRAVENOUS at 20:58

## 2018-06-19 RX ADMIN — ONDANSETRON 4 MG: 2 INJECTION INTRAMUSCULAR; INTRAVENOUS at 09:31

## 2018-06-19 RX ADMIN — FONDAPARINUX SODIUM 2.5 MG: 2.5 INJECTION, SOLUTION SUBCUTANEOUS at 09:31

## 2018-06-19 NOTE — PLAN OF CARE
Problem: Patient Care Overview  Goal: Plan of Care Review  Outcome: Ongoing (interventions implemented as appropriate)   18 161   Coping/Psychosocial   Plan of Care Reviewed With patient;mother   Plan of Care Review   Progress no change   OTHER   Outcome Summary Pt cont. current course of chemotherapy, tolerating well. c/o slight nausea this AM, took IV zofran, effective. Pt planning on meeting with rudi tomorrow to discuss further school plans, Dr. Prince note given to mother. potassium replaced with 40meq. R arm remains elevated. VSS, will continue to monitor.      Goal: Individualization and Mutuality  Outcome: Ongoing (interventions implemented as appropriate)   18   Individualization   Patient Specific Preferences goes by liam   Patient Specific Goals (Include Timeframe) cont. chemo, visit with people from school   Patient Specific Interventions multiple visitors, plan to meet with school tomorrow       Problem: Coping, Compromised Family (Adult,NICU,,Obstetrics,Pediatric)  Goal: Identify Related Risk Factors and Signs and Symptoms  Outcome: Ongoing (interventions implemented as appropriate)    Goal: Effective Family Coping  Outcome: Ongoing (interventions implemented as appropriate)      Problem: Oncology Care (Adult)  Goal: Signs and Symptoms of Listed Potential Problems Will be Absent, Minimized or Managed (Oncology Care)  Outcome: Ongoing (interventions implemented as appropriate)   18 161   Goal/Outcome Evaluation   Problems Assessed (Oncology Care) all   Problems Present (Oncology Care) situational response

## 2018-06-19 NOTE — PROGRESS NOTES
Oncology Social Work    OSW saw patient and her mother at bedside today - Supportive counseling provided along with folder of local and national resources.  Encouraged patient to connect with the [facility] program through [facility] where she can be matched with a young adult who has the same type of Lymphoma that she has.  Also encouraged her to contact OSW for continued psychosocial needs.  Offered outpatient counseling along with integrative therapies in the cancer resource center.  Patient aware and will contact OSW when needed.    Thank you,  Faina Santiago, EZEKIELW, CSW, OSW-C

## 2018-06-19 NOTE — PROGRESS NOTES
Subjective     CHIEF COMPLAINT:   Primary mediastinal DLBCL; Day 4 of DA-EPOCH/R    HISTORY OF PRESENT ILLNESS:  She is doing well this morning.  She denies uncontrolled nausea, vomiting, no diarrhea.  No fever reported.  She has no worsening shortness of breath or cough.  The right arm remains mildly swollen.  She has increased urine output secondary to IV fluids.        Past Medical History:   Diagnosis Date   • Fracture of lower leg 2000    L       History reviewed. No pertinent surgical history.    SCHEDULED MEDS:    acyclovir 400 mg Oral BID   allopurinol 300 mg Oral BID   DOXOrubicin (ADRIAMYCIN), etoposide and vinCRIStine chemo IVPB (pediatric)  Intravenous Once   fluconazole 400 mg Oral Q24H   fondaparinux 2.5 mg Subcutaneous Q24H   pantoprazole 40 mg Oral Q AM   predniSONE 100 mg Oral BID   sulfamethoxazole-trimethoprim 1 tablet Oral Once per day on Mon Wed Fri     INFUSIONS:    custom IV infusion builder + additives 125 mL/hr Last Rate: 125 mL/hr (06/16/18 1683)   sodium chloride 125 mL/hr Last Rate: 125 mL/hr (06/19/18 4237)     PRN MEDS:  •  acetaminophen  •  albuterol  •  guaifenesin-dextromethorphan  •  HYDROcodone-acetaminophen  •  ondansetron **OR** ondansetron ODT **OR** ondansetron  •  ondansetron  •  prochlorperazine  •  sennosides-docusate sodium  •  sodium chloride  •  sodium chloride  •  sodium chloride  •  sodium chloride  •  sodium chloride  •  sodium chloride  •  sodium chloride    REVIEW OF SYSTEMS:  GENERAL:  No Fever or chills. No weight change.  No Fatigue.   SKIN:  No skin rashes or lesions.  HEME/LYMPH: No Anemia. No Easy bruisability.   RESPIRATORY:  Stable shortness of breath, mild cough nonproductive  CVS:  Chest pain.    GI:  No Abdominal pain. No Nausea. No Vomiting.   MUSCULOSKELETAL:  No Back pain.  Swelling right upper extremity  NEUROLOGICAL:  No Headaches. No Dizziness.   PSYCHIATRIC:  No Anxiety. No Depression.   ROS performed and unchanged from above-6/19/18       Objective    VITAL SIGNS:  Temp:  [97.2 °F (36.2 °C)-98.2 °F (36.8 °C)] 97.4 °F (36.3 °C)  Heart Rate:  [53-68] 58  Resp:  [16-18] 18  BP: (100-105)/(61-66) 105/63    Wt Readings from Last 3 Encounters:   06/11/18 66.2 kg (146 lb)   06/11/18 66.4 kg (146 lb 6.4 oz)   06/08/18 66.7 kg (147 lb)       PHYSICAL EXAMINATION:  GENERAL:  Well-developed young woman  SKIN:  No skin rashes or lesions. No Ecchymosis or Petechiae.  There is no evidence of any bruising  HEAD:  Normocephalic.  EYES:  No Pallor. No Jaundice.   NECK:  Supple with no Thyromegaly or Masses.  LYMPHATICS:  Left supraclavicular/scalene Lymphadenopathy.   CHEST:  Decreased breath sounds on the left.    CARDIAC:  Normal S1 & S2. No Murmurs. regular rate  ABDOMEN:  Soft. No tenderness. No Hepatomegaly. No Splenomegaly. No masses.  EXTREMITIES:  No Edema. No Calf tenderness. No Cyanosis.  Is a right upper extremity PICC line with mild right arm swelling noted  NEUROLOGICAL:  No Focal neurological deficits.   SKIN: no petechiae or bruising  Exam performed and unchanged from above-6/19/18    RESULT REVIEW:     Results from last 7 days  Lab Units 06/19/18  0514 06/18/18  0405 06/17/18  0450 06/16/18  0414 06/15/18  0717   WBC 10*3/mm3 6.06 7.01 3.49* 4.59 3.07*   NEUTROS ABS 10*3/mm3 5.57 6.31 2.96 3.43 2.13   HEMOGLOBIN g/dL 10.9* 10.8* 11.2* 12.4 12.7   HEMATOCRIT % 34.0* 33.5* 34.7* 37.6 39.4   PLATELETS 10*3/mm3 133* 98* 82* 123* 117*       Results from last 7 days  Lab Units 06/19/18  0514 06/18/18  0405 06/17/18  0450   SODIUM mmol/L 143 142 141   POTASSIUM mmol/L 3.2* 3.5 3.5   CHLORIDE mmol/L 103 105 104   CO2 mmol/L 27.3 24.6 24.1   BUN mg/dL 16 11 13   CREATININE mg/dL 0.64 0.58 0.61   CALCIUM mg/dL 9.1 8.8 8.8   ALBUMIN g/dL 3.50 3.30* 3.50   BILIRUBIN mg/dL 0.5 0.4 0.5   ALK PHOS U/L 48 47 49   ALT (SGPT) U/L 19 21 22   AST (SGOT) U/L 27 28 30   MAGNESIUM mg/dL 2.3 1.9 1.8     Doppler ultrasound of the right upper extremity 6/18/18: Acute right upper  extremity superficial thrombophlebitis in the basilic vein associated with catheter    Assessment/Plan   1.  Primary mediastinal large B-cell lymphoma: The patient has initiated systemic chemotherapy with DA-EPOCH-R, day 3 today.  She had an infusion reaction to rituximab but otherwise tolerating chemotherapy well.  Patient will require IV steroids, Benadryl and Pepcid prior to every Rituxan.  Bone marrow 6/14/18 morphology report reviewed, negative for lymphoma    · Proceed with day 4 chemotherapy today    2.  Pericardial effusion:   Patient was seen by Cardiology. No evidence of cardiac tamponade.      3.  The patient is being monitored closely for tumor lysis syndrome.  Creatinine is currently normal.  The uric acid is low 1.0 and magnesium and phosphorus are normal.  Continue IV fluids and allopurinol.      4.  Fertility preservation:  Patient received Zoladex injection  for fertility preservation.      5.  Right upper extremity swelling: Doppler ultrasound showed a superficial thrombophlebitis in the basilic vein associated with a PICC line.  We will continue prophylactic dose Arixtra, keep the arm elevated, repeat the Doppler tomorrow to make sure no extension and remove the PICC line as soon as chemotherapy is complete    6.  Consitpation:  maxine-s prn    7.  DVT prophylaxis:  Receiving Arixtra 2.5 mg daily    8.  Mild hypokalemia: I will give the patient 40 mEq of by mouth potassium today and repeat in the morning    9.  Prophylaxis: The patient is receiving acyclovir, fluconazole, and Bactrim    10.  The patient will need twice weekly labs and Neulasta after discharge      Ramón Camp MD  06/19/18

## 2018-06-20 ENCOUNTER — APPOINTMENT (OUTPATIENT)
Dept: CARDIOLOGY | Facility: HOSPITAL | Age: 23
End: 2018-06-20
Attending: INTERNAL MEDICINE

## 2018-06-20 DIAGNOSIS — C85.28 MEDIASTINAL LARGE B-CELL LYMPHOMA OF LYMPH NODES OF MULTIPLE REGIONS (HCC): Primary | ICD-10-CM

## 2018-06-20 LAB
ALBUMIN SERPL-MCNC: 3.1 G/DL (ref 3.5–5.2)
ALBUMIN/GLOB SERPL: 1.1 G/DL
ALP SERPL-CCNC: 43 U/L (ref 39–117)
ALT SERPL W P-5'-P-CCNC: 18 U/L (ref 1–33)
ANION GAP SERPL CALCULATED.3IONS-SCNC: 10.3 MMOL/L
AST SERPL-CCNC: 25 U/L (ref 1–32)
BACTERIA SPEC AEROBE CULT: NORMAL
BACTERIA SPEC AEROBE CULT: NORMAL
BASOPHILS # BLD AUTO: 0 10*3/MM3 (ref 0–0.2)
BASOPHILS NFR BLD AUTO: 0 % (ref 0–1.5)
BH CV UPPER VENOUS LEFT INTERNAL JUGULAR AUGMENT: NORMAL
BH CV UPPER VENOUS LEFT INTERNAL JUGULAR COMPETENT: NORMAL
BH CV UPPER VENOUS LEFT INTERNAL JUGULAR COMPRESS: NORMAL
BH CV UPPER VENOUS LEFT INTERNAL JUGULAR PHASIC: NORMAL
BH CV UPPER VENOUS LEFT INTERNAL JUGULAR SPONT: NORMAL
BH CV UPPER VENOUS LEFT SUBCLAVIAN AUGMENT: NORMAL
BH CV UPPER VENOUS LEFT SUBCLAVIAN COMPETENT: NORMAL
BH CV UPPER VENOUS LEFT SUBCLAVIAN COMPRESS: NORMAL
BH CV UPPER VENOUS LEFT SUBCLAVIAN PHASIC: NORMAL
BH CV UPPER VENOUS LEFT SUBCLAVIAN SPONT: NORMAL
BH CV UPPER VENOUS RIGHT AXILLARY AUGMENT: NORMAL
BH CV UPPER VENOUS RIGHT AXILLARY COLOR: 1
BH CV UPPER VENOUS RIGHT AXILLARY COMPETENT: NORMAL
BH CV UPPER VENOUS RIGHT AXILLARY COMPRESS: NORMAL
BH CV UPPER VENOUS RIGHT AXILLARY PHASIC: NORMAL
BH CV UPPER VENOUS RIGHT AXILLARY SPONT: NORMAL
BH CV UPPER VENOUS RIGHT AXILLARY THROMBUS: NORMAL
BH CV UPPER VENOUS RIGHT BASILIC FOREARM COLOR: 1
BH CV UPPER VENOUS RIGHT BASILIC FOREARM COMPRESS: NORMAL
BH CV UPPER VENOUS RIGHT BASILIC FOREARM THROMBUS: NORMAL
BH CV UPPER VENOUS RIGHT BASILIC UPPER COLOR: 1
BH CV UPPER VENOUS RIGHT BASILIC UPPER COMPRESS: NORMAL
BH CV UPPER VENOUS RIGHT BASILIC UPPER THROMBUS: NORMAL
BH CV UPPER VENOUS RIGHT BRACHIAL COLOR: 1
BH CV UPPER VENOUS RIGHT BRACHIAL COMPRESS: NORMAL
BH CV UPPER VENOUS RIGHT BRACHIAL THROMBUS: NORMAL
BH CV UPPER VENOUS RIGHT CEPHALIC FOREARM COMPRESS: NORMAL
BH CV UPPER VENOUS RIGHT CEPHALIC UPPER COMPRESS: NORMAL
BH CV UPPER VENOUS RIGHT INTERNAL JUGULAR AUGMENT: NORMAL
BH CV UPPER VENOUS RIGHT INTERNAL JUGULAR COMPETENT: NORMAL
BH CV UPPER VENOUS RIGHT INTERNAL JUGULAR COMPRESS: NORMAL
BH CV UPPER VENOUS RIGHT INTERNAL JUGULAR PHASIC: NORMAL
BH CV UPPER VENOUS RIGHT INTERNAL JUGULAR SPONT: NORMAL
BH CV UPPER VENOUS RIGHT RADIAL COMPRESS: NORMAL
BH CV UPPER VENOUS RIGHT SUBCLAVIAN AUGMENT: NORMAL
BH CV UPPER VENOUS RIGHT SUBCLAVIAN COMPETENT: NORMAL
BH CV UPPER VENOUS RIGHT SUBCLAVIAN COMPRESS: NORMAL
BH CV UPPER VENOUS RIGHT SUBCLAVIAN PHASIC: NORMAL
BH CV UPPER VENOUS RIGHT SUBCLAVIAN SPONT: NORMAL
BH CV UPPER VENOUS RIGHT ULNAR COMPRESS: NORMAL
BILIRUB SERPL-MCNC: 0.6 MG/DL (ref 0.1–1.2)
BUN BLD-MCNC: 13 MG/DL (ref 6–20)
BUN/CREAT SERPL: 23.6 (ref 7–25)
CALCIUM SPEC-SCNC: 8.9 MG/DL (ref 8.6–10.5)
CHLORIDE SERPL-SCNC: 100 MMOL/L (ref 98–107)
CO2 SERPL-SCNC: 28.7 MMOL/L (ref 22–29)
CREAT BLD-MCNC: 0.55 MG/DL (ref 0.57–1)
DEPRECATED RDW RBC AUTO: 43.4 FL (ref 37–54)
EOSINOPHIL # BLD AUTO: 0 10*3/MM3 (ref 0–0.7)
EOSINOPHIL NFR BLD AUTO: 0 % (ref 0.3–6.2)
ERYTHROCYTE [DISTWIDTH] IN BLOOD BY AUTOMATED COUNT: 13.2 % (ref 11.7–13)
GFR SERPL CREATININE-BSD FRML MDRD: 137 ML/MIN/1.73
GFR SERPL CREATININE-BSD FRML MDRD: >150 ML/MIN/1.73
GLOBULIN UR ELPH-MCNC: 2.7 GM/DL
GLUCOSE BLD-MCNC: 126 MG/DL (ref 65–99)
HCT VFR BLD AUTO: 33.5 % (ref 35.6–45.5)
HGB BLD-MCNC: 10.8 G/DL (ref 11.9–15.5)
IMM GRANULOCYTES # BLD: 0 10*3/MM3 (ref 0–0.03)
IMM GRANULOCYTES NFR BLD: 0 % (ref 0–0.5)
LDH SERPL-CCNC: 856 U/L (ref 135–214)
LYMPHOCYTES # BLD AUTO: 0.18 10*3/MM3 (ref 0.9–4.8)
LYMPHOCYTES NFR BLD AUTO: 5.3 % (ref 19.6–45.3)
MAGNESIUM SERPL-MCNC: 2.2 MG/DL (ref 1.6–2.6)
MCH RBC QN AUTO: 29 PG (ref 26.9–32)
MCHC RBC AUTO-ENTMCNC: 32.2 G/DL (ref 32.4–36.3)
MCV RBC AUTO: 90.1 FL (ref 80.5–98.2)
MONOCYTES # BLD AUTO: 0.12 10*3/MM3 (ref 0.2–1.2)
MONOCYTES NFR BLD AUTO: 3.5 % (ref 5–12)
NEUTROPHILS # BLD AUTO: 3.12 10*3/MM3 (ref 1.9–8.1)
NEUTROPHILS NFR BLD AUTO: 91.2 % (ref 42.7–76)
PHOSPHATE SERPL-MCNC: 3.6 MG/DL (ref 2.5–4.5)
PLATELET # BLD AUTO: 127 10*3/MM3 (ref 140–500)
PMV BLD AUTO: 11.6 FL (ref 6–12)
POTASSIUM BLD-SCNC: 2.8 MMOL/L (ref 3.5–5.2)
PROT SERPL-MCNC: 5.8 G/DL (ref 6–8.5)
RBC # BLD AUTO: 3.72 10*6/MM3 (ref 3.9–5.2)
SODIUM BLD-SCNC: 139 MMOL/L (ref 136–145)
URATE SERPL-MCNC: 1.1 MG/DL (ref 2.4–5.7)
WBC NRBC COR # BLD: 3.42 10*3/MM3 (ref 4.5–10.7)

## 2018-06-20 PROCEDURE — 25010000003 POTASSIUM CHLORIDE 10 MEQ/100ML SOLUTION: Performed by: INTERNAL MEDICINE

## 2018-06-20 PROCEDURE — 99232 SBSQ HOSP IP/OBS MODERATE 35: CPT | Performed by: INTERNAL MEDICINE

## 2018-06-20 PROCEDURE — 83735 ASSAY OF MAGNESIUM: CPT | Performed by: INTERNAL MEDICINE

## 2018-06-20 PROCEDURE — 25010000002 FONDAPARINUX PER 0.5 MG: Performed by: INTERNAL MEDICINE

## 2018-06-20 PROCEDURE — 36592 COLLECT BLOOD FROM PICC: CPT

## 2018-06-20 PROCEDURE — 84100 ASSAY OF PHOSPHORUS: CPT | Performed by: INTERNAL MEDICINE

## 2018-06-20 PROCEDURE — 80053 COMPREHEN METABOLIC PANEL: CPT | Performed by: INTERNAL MEDICINE

## 2018-06-20 PROCEDURE — 84550 ASSAY OF BLOOD/URIC ACID: CPT | Performed by: INTERNAL MEDICINE

## 2018-06-20 PROCEDURE — 25010000002 CYCLOPHOSPHAMIDE PER 100 MG: Performed by: INTERNAL MEDICINE

## 2018-06-20 PROCEDURE — 25010000002 ONDANSETRON PER 1 MG: Performed by: INTERNAL MEDICINE

## 2018-06-20 PROCEDURE — 85025 COMPLETE CBC W/AUTO DIFF WBC: CPT | Performed by: INTERNAL MEDICINE

## 2018-06-20 PROCEDURE — 83615 LACTATE (LD) (LDH) ENZYME: CPT | Performed by: INTERNAL MEDICINE

## 2018-06-20 PROCEDURE — 63710000001 PREDNISONE PER 5 MG: Performed by: INTERNAL MEDICINE

## 2018-06-20 PROCEDURE — 93971 EXTREMITY STUDY: CPT

## 2018-06-20 RX ORDER — POTASSIUM CHLORIDE 7.45 MG/ML
10 INJECTION INTRAVENOUS
Status: DISCONTINUED | OUTPATIENT
Start: 2018-06-20 | End: 2018-06-21 | Stop reason: HOSPADM

## 2018-06-20 RX ADMIN — POTASSIUM CHLORIDE 10 MEQ: 10 INJECTION, SOLUTION INTRAVENOUS at 10:48

## 2018-06-20 RX ADMIN — FONDAPARINUX SODIUM 2.5 MG: 2.5 INJECTION, SOLUTION SUBCUTANEOUS at 09:11

## 2018-06-20 RX ADMIN — ALLOPURINOL 300 MG: 300 TABLET ORAL at 09:11

## 2018-06-20 RX ADMIN — POTASSIUM CHLORIDE 10 MEQ: 10 INJECTION, SOLUTION INTRAVENOUS at 14:42

## 2018-06-20 RX ADMIN — POTASSIUM CHLORIDE 10 MEQ: 10 INJECTION, SOLUTION INTRAVENOUS at 17:45

## 2018-06-20 RX ADMIN — SODIUM CHLORIDE 125 ML/HR: 9 INJECTION, SOLUTION INTRAVENOUS at 11:58

## 2018-06-20 RX ADMIN — POTASSIUM CHLORIDE 10 MEQ: 10 INJECTION, SOLUTION INTRAVENOUS at 16:25

## 2018-06-20 RX ADMIN — CYCLOPHOSPHAMIDE 1250 MG: 500 INJECTION, POWDER, FOR SOLUTION INTRAVENOUS; ORAL at 21:17

## 2018-06-20 RX ADMIN — POTASSIUM CHLORIDE 10 MEQ: 10 INJECTION, SOLUTION INTRAVENOUS at 13:40

## 2018-06-20 RX ADMIN — PREDNISONE 100 MG: 50 TABLET ORAL at 20:44

## 2018-06-20 RX ADMIN — ONDANSETRON 16 MG: 2 INJECTION, SOLUTION INTRAMUSCULAR; INTRAVENOUS at 20:35

## 2018-06-20 RX ADMIN — ACYCLOVIR 400 MG: 400 TABLET ORAL at 09:10

## 2018-06-20 RX ADMIN — PREDNISONE 100 MG: 50 TABLET ORAL at 09:10

## 2018-06-20 RX ADMIN — SULFAMETHOXAZOLE AND TRIMETHOPRIM 160 MG: 800; 160 TABLET ORAL at 09:11

## 2018-06-20 RX ADMIN — FLUCONAZOLE 400 MG: 200 TABLET ORAL at 11:58

## 2018-06-20 RX ADMIN — PANTOPRAZOLE SODIUM 40 MG: 40 TABLET, DELAYED RELEASE ORAL at 06:45

## 2018-06-20 RX ADMIN — ALLOPURINOL 300 MG: 300 TABLET ORAL at 20:42

## 2018-06-20 RX ADMIN — ACYCLOVIR 400 MG: 400 TABLET ORAL at 20:42

## 2018-06-20 RX ADMIN — RIVAROXABAN 15 MG: 15 TABLET, FILM COATED ORAL at 23:27

## 2018-06-20 RX ADMIN — RIVAROXABAN 15 MG: 15 TABLET, FILM COATED ORAL at 14:41

## 2018-06-20 RX ADMIN — POTASSIUM CHLORIDE 10 MEQ: 10 INJECTION, SOLUTION INTRAVENOUS at 18:59

## 2018-06-20 NOTE — PROGRESS NOTES
Continued Stay Note  [facility]     Patient Name: Vikki Durham  MRN: [id number]  Today's Date: 6/20/2018    Admit Date: 6/11/2018          Discharge Plan     Row Name 06/20/18 1652       Plan    Plan Plans home with assist of family.    Patient/Family in Agreement with Plan yes    Plan Comments Received request to check cost of Arixtra 2.5 mg daily/30 days. Call placed to [facility] pharmacy ([phone]). Spoke with pharmacist who quoted $15.00. Informed patient at bedside. Continue to follow.              Discharge Codes    No documentation.           Lien Tate RN

## 2018-06-20 NOTE — PROGRESS NOTES
Subjective     CHIEF COMPLAINT:   Primary mediastinal DLBCL; Day 5 of DA-EPOCH/R    HISTORY OF PRESENT ILLNESS:  She continues to do well with chemotherapy.  She denies nausea, vomiting or diarrhea.  Her night sweats have resolved and shortness of breath has improved.  Her right arm swelling has also improved after elevation.        Past Medical History:   Diagnosis Date   • Fracture of lower leg 2000    L       History reviewed. No pertinent surgical history.    SCHEDULED MEDS:    acyclovir 400 mg Oral BID   allopurinol 300 mg Oral BID   DOXOrubicin (ADRIAMYCIN), etoposide and vinCRIStine chemo IVPB (pediatric)  Intravenous Once   fluconazole 400 mg Oral Q24H   fondaparinux 2.5 mg Subcutaneous Q24H   pantoprazole 40 mg Oral Q AM   predniSONE 100 mg Oral BID   sulfamethoxazole-trimethoprim 1 tablet Oral Once per day on Mon Wed Fri     INFUSIONS:    custom IV infusion builder + additives 125 mL/hr Last Rate: 125 mL/hr (06/16/18 4364)   sodium chloride 125 mL/hr Last Rate: 125 mL/hr (06/19/18 0404)     PRN MEDS:  •  acetaminophen  •  albuterol  •  guaifenesin-dextromethorphan  •  HYDROcodone-acetaminophen  •  ondansetron **OR** ondansetron ODT **OR** ondansetron  •  ondansetron  •  potassium chloride  •  prochlorperazine  •  sennosides-docusate sodium  •  sodium chloride  •  sodium chloride  •  sodium chloride  •  sodium chloride  •  sodium chloride  •  sodium chloride  •  sodium chloride    REVIEW OF SYSTEMS:  GENERAL:  No Fever or chills. No weight change.  No Fatigue.   SKIN:  No skin rashes or lesions.  HEME/LYMPH: No Anemia. No Easy bruisability.   RESPIRATORY:  Improved soa  CVS:  No cp  GI:  No Abdominal pain. No Nausea. No Vomiting.   MUSCULOSKELETAL:  No Back pain.  Swelling right upper extremity--improved  NEUROLOGICAL:  No Headaches. No Dizziness.   PSYCHIATRIC:  No Anxiety. No Depression.          Objective   VITAL SIGNS:  Temp:  [96.6 °F (35.9 °C)-97.5 °F (36.4 °C)] 97.2 °F (36.2 °C)  Heart Rate:  [52-76]  54  Resp:  [16-18] 16  BP: (105-108)/(56-65) 105/61    Wt Readings from Last 3 Encounters:   06/11/18 66.2 kg (146 lb)   06/11/18 66.4 kg (146 lb 6.4 oz)   06/08/18 66.7 kg (147 lb)       PHYSICAL EXAMINATION:  GENERAL:  Well-developed young woman  SKIN:  No skin rashes or lesions. No Ecchymosis or Petechiae.  Mild bruising right arm  HEAD:  Normocephalic.  EYES:  No Pallor. No Jaundice.   NECK:  Supple with no Thyromegaly or Masses.  LYMPHATICS:  Left supraclavicular/scalene Lymphadenopathy.   CHEST:  Improved bs   CARDIAC:  Normal S1 & S2. No Murmurs. regular rate  ABDOMEN:  Soft. No tenderness. No Hepatomegaly. No Splenomegaly. No masses.  EXTREMITIES:  No Edema. No Calf tenderness. No Cyanosis.   right upper extremity PICC line with mild right arm swelling noted--improved  NEUROLOGICAL:  No Focal neurological deficits.   SKIN: no petechiae     RESULT REVIEW:     Results from last 7 days  Lab Units 06/20/18  0406 06/19/18  0514 06/18/18  0405 06/17/18  0450 06/16/18  0414   WBC 10*3/mm3 3.42* 6.06 7.01 3.49* 4.59   NEUTROS ABS 10*3/mm3 3.12 5.57 6.31 2.96 3.43   HEMOGLOBIN g/dL 10.8* 10.9* 10.8* 11.2* 12.4   HEMATOCRIT % 33.5* 34.0* 33.5* 34.7* 37.6   PLATELETS 10*3/mm3 127* 133* 98* 82* 123*       Results from last 7 days  Lab Units 06/20/18  0406 06/19/18  0514 06/18/18  0405   SODIUM mmol/L 139 143 142   POTASSIUM mmol/L 2.8* 3.2* 3.5   CHLORIDE mmol/L 100 103 105   CO2 mmol/L 28.7 27.3 24.6   BUN mg/dL 13 16 11   CREATININE mg/dL 0.55* 0.64 0.58   CALCIUM mg/dL 8.9 9.1 8.8   ALBUMIN g/dL 3.10* 3.50 3.30*   BILIRUBIN mg/dL 0.6 0.5 0.4   ALK PHOS U/L 43 48 47   ALT (SGPT) U/L 18 19 21   AST (SGOT) U/L 25 27 28   MAGNESIUM mg/dL 2.2 2.3 1.9     Doppler ultrasound of the right upper extremity 6/18/18: Acute right upper extremity superficial thrombophlebitis in the basilic vein associated with catheter    Assessment/Plan   1.  Primary mediastinal large B-cell lymphoma: The patient has initiated systemic  chemotherapy with DA-EPOCH-R, day 3 today.  She had an infusion reaction to rituximab but otherwise tolerating chemotherapy well.  Patient will require IV steroids, Benadryl and Pepcid prior to every Rituxan.  Bone marrow 6/14/18 morphology report reviewed, negative for lymphoma    · Proceed with day 5 chemotherapy today    2.  Pericardial effusion:   Patient was seen by Cardiology. No evidence of cardiac tamponade.      3.  The patient is being monitored closely for tumor lysis syndrome.  Creatinine is currently normal.  The uric acid is low 1.0 and magnesium and phosphorus are normal.  Continue IV fluids and allopurinol.      4.  Fertility preservation:  Patient received Zoladex injection  for fertility preservation.      5.  Right upper extremity swelling: Doppler ultrasound showed a superficial thrombophlebitis in the basilic vein associated with a PICC line.  We will continue prophylactic dose Arixtra, keep the arm elevated, repeat the Doppler today to make sure no extension and remove the PICC line at d/c    6.  Consitpation:  maxine-s prn    7.  DVT prophylaxis:  Receiving Arixtra 2.5 mg daily    8.  Hypokalemia:  Worse.  Begin IV replacement protocol    9.  Prophylaxis: The patient is receiving acyclovir, fluconazole, and Bactrim    10.  The patient will need twice weekly labs and Neulasta after discharge      Ramón Camp MD  06/20/18

## 2018-06-20 NOTE — PROGRESS NOTES
MARY doppler completed.  Preliminary results:  RT arm is positive for new deep and superficial vein thrombosis around PICC line.  Results given to PAUL Crenshaw.

## 2018-06-20 NOTE — PLAN OF CARE
Problem: Patient Care Overview  Goal: Plan of Care Review  Outcome: Ongoing (interventions implemented as appropriate)   18 0625   Coping/Psychosocial   Plan of Care Reviewed With patient   Plan of Care Review   Progress no change   OTHER   Outcome Summary tolerating chemo treatment with minimal side effects; no c/o of nausea overnight; voiding without difficulty; coping effectively and remaining positive re: new diagnosis/treatment; [employer] to visit with patient today to discuss school options; continue to keep right arm elevated       Problem: Anxiety (Adult)  Goal: Reduction/Resolution  Outcome: Ongoing (interventions implemented as appropriate)      Problem: Coping, Compromised Family (Adult,NICU,[facility],Obstetrics,Pediatric)  Goal: Effective Family Coping  Outcome: Ongoing (interventions implemented as appropriate)      Problem: Pain, Acute (Adult)  Goal: Acceptable Pain Control/Comfort Level  Outcome: Ongoing (interventions implemented as appropriate)      Problem: Oncology Care (Adult)  Goal: Signs and Symptoms of Listed Potential Problems Will be Absent, Minimized or Managed (Oncology Care)  Outcome: Ongoing (interventions implemented as appropriate)

## 2018-06-20 NOTE — PLAN OF CARE
Problem: Patient Care Overview  Goal: Plan of Care Review   18 1701   Coping/Psychosocial   Plan of Care Reviewed With patient;mother   Plan of Care Review   Progress no change   OTHER   Outcome Summary tolerating chemo, cytoxan due after 24 hr infusion complete, PICC must be removed asap after chemo completed, doppler indicated DVT in rue, arixtra dc'd, started on xarelto bid, keeping arm elevated, replaced potassium this shift     Goal: Individualization and Mutuality  Outcome: Ongoing (interventions implemented as appropriate)    Goal: Discharge Needs Assessment  Outcome: Ongoing (interventions implemented as appropriate)    Goal: Interprofessional Rounds/Family Conf  Outcome: Ongoing (interventions implemented as appropriate)      Problem: Anxiety (Adult)  Goal: Reduction/Resolution  Outcome: Ongoing (interventions implemented as appropriate)      Problem: Coping, Compromised Family (Adult,NICU,,Obstetrics,Pediatric)  Goal: Identify Related Risk Factors and Signs and Symptoms  Outcome: Outcome(s) achieved Date Met: 18    Goal: Effective Family Coping  Outcome: Ongoing (interventions implemented as appropriate)      Problem: Pain, Acute (Adult)  Goal: Acceptable Pain Control/Comfort Level  Outcome: Ongoing (interventions implemented as appropriate)      Problem: Oncology Care (Adult)  Goal: Signs and Symptoms of Listed Potential Problems Will be Absent, Minimized or Managed (Oncology Care)  Outcome: Ongoing (interventions implemented as appropriate)

## 2018-06-21 VITALS
SYSTOLIC BLOOD PRESSURE: 117 MMHG | DIASTOLIC BLOOD PRESSURE: 78 MMHG | WEIGHT: 146 LBS | RESPIRATION RATE: 16 BRPM | OXYGEN SATURATION: 94 % | HEIGHT: 62 IN | HEART RATE: 61 BPM | TEMPERATURE: 97.8 F | BODY MASS INDEX: 26.87 KG/M2

## 2018-06-21 LAB
ALBUMIN SERPL-MCNC: 3.5 G/DL (ref 3.5–5.2)
ALBUMIN/GLOB SERPL: 1.3 G/DL
ALP SERPL-CCNC: 46 U/L (ref 39–117)
ALT SERPL W P-5'-P-CCNC: 30 U/L (ref 1–33)
ANION GAP SERPL CALCULATED.3IONS-SCNC: 14.3 MMOL/L
AST SERPL-CCNC: 33 U/L (ref 1–32)
BASOPHILS # BLD AUTO: 0 10*3/MM3 (ref 0–0.2)
BASOPHILS NFR BLD AUTO: 0 % (ref 0–1.5)
BILIRUB SERPL-MCNC: 0.7 MG/DL (ref 0.1–1.2)
BUN BLD-MCNC: 14 MG/DL (ref 6–20)
BUN/CREAT SERPL: 27.5 (ref 7–25)
CALCIUM SPEC-SCNC: 9.3 MG/DL (ref 8.6–10.5)
CHLORIDE SERPL-SCNC: 98 MMOL/L (ref 98–107)
CO2 SERPL-SCNC: 27.7 MMOL/L (ref 22–29)
CREAT BLD-MCNC: 0.51 MG/DL (ref 0.57–1)
DEPRECATED RDW RBC AUTO: 40.9 FL (ref 37–54)
EOSINOPHIL # BLD AUTO: 0 10*3/MM3 (ref 0–0.7)
EOSINOPHIL NFR BLD AUTO: 0 % (ref 0.3–6.2)
ERYTHROCYTE [DISTWIDTH] IN BLOOD BY AUTOMATED COUNT: 12.7 % (ref 11.7–13)
GFR SERPL CREATININE-BSD FRML MDRD: 149 ML/MIN/1.73
GFR SERPL CREATININE-BSD FRML MDRD: >150 ML/MIN/1.73
GLOBULIN UR ELPH-MCNC: 2.7 GM/DL
GLUCOSE BLD-MCNC: 117 MG/DL (ref 65–99)
HCT VFR BLD AUTO: 35.6 % (ref 35.6–45.5)
HGB BLD-MCNC: 11.9 G/DL (ref 11.9–15.5)
IMM GRANULOCYTES # BLD: 0 10*3/MM3 (ref 0–0.03)
IMM GRANULOCYTES NFR BLD: 0 % (ref 0–0.5)
LDH SERPL-CCNC: 751 U/L (ref 135–214)
LYMPHOCYTES # BLD AUTO: 0.17 10*3/MM3 (ref 0.9–4.8)
LYMPHOCYTES NFR BLD AUTO: 4.9 % (ref 19.6–45.3)
MAGNESIUM SERPL-MCNC: 2.4 MG/DL (ref 1.6–2.6)
MCH RBC QN AUTO: 29.2 PG (ref 26.9–32)
MCHC RBC AUTO-ENTMCNC: 33.4 G/DL (ref 32.4–36.3)
MCV RBC AUTO: 87.5 FL (ref 80.5–98.2)
MONOCYTES # BLD AUTO: 0.08 10*3/MM3 (ref 0.2–1.2)
MONOCYTES NFR BLD AUTO: 2.3 % (ref 5–12)
NEUTROPHILS # BLD AUTO: 3.22 10*3/MM3 (ref 1.9–8.1)
NEUTROPHILS NFR BLD AUTO: 92.8 % (ref 42.7–76)
PHOSPHATE SERPL-MCNC: 4 MG/DL (ref 2.5–4.5)
PLATELET # BLD AUTO: 150 10*3/MM3 (ref 140–500)
PMV BLD AUTO: 10.6 FL (ref 6–12)
POTASSIUM BLD-SCNC: 3.3 MMOL/L (ref 3.5–5.2)
PROT SERPL-MCNC: 6.2 G/DL (ref 6–8.5)
RBC # BLD AUTO: 4.07 10*6/MM3 (ref 3.9–5.2)
SODIUM BLD-SCNC: 140 MMOL/L (ref 136–145)
URATE SERPL-MCNC: 1.2 MG/DL (ref 2.4–5.7)
WBC NRBC COR # BLD: 3.47 10*3/MM3 (ref 4.5–10.7)

## 2018-06-21 PROCEDURE — 80053 COMPREHEN METABOLIC PANEL: CPT | Performed by: INTERNAL MEDICINE

## 2018-06-21 PROCEDURE — 83615 LACTATE (LD) (LDH) ENZYME: CPT | Performed by: INTERNAL MEDICINE

## 2018-06-21 PROCEDURE — 83735 ASSAY OF MAGNESIUM: CPT | Performed by: INTERNAL MEDICINE

## 2018-06-21 PROCEDURE — 25010000003 POTASSIUM CHLORIDE 10 MEQ/100ML SOLUTION: Performed by: INTERNAL MEDICINE

## 2018-06-21 PROCEDURE — 99239 HOSP IP/OBS DSCHRG MGMT >30: CPT | Performed by: INTERNAL MEDICINE

## 2018-06-21 PROCEDURE — 84100 ASSAY OF PHOSPHORUS: CPT | Performed by: INTERNAL MEDICINE

## 2018-06-21 PROCEDURE — 84550 ASSAY OF BLOOD/URIC ACID: CPT | Performed by: INTERNAL MEDICINE

## 2018-06-21 PROCEDURE — 85025 COMPLETE CBC W/AUTO DIFF WBC: CPT | Performed by: INTERNAL MEDICINE

## 2018-06-21 RX ORDER — POTASSIUM CHLORIDE 750 MG/1
20 CAPSULE, EXTENDED RELEASE ORAL DAILY
Status: DISCONTINUED | OUTPATIENT
Start: 2018-06-21 | End: 2018-06-21 | Stop reason: HOSPADM

## 2018-06-21 RX ORDER — POTASSIUM CHLORIDE 1500 MG/1
20 TABLET, FILM COATED, EXTENDED RELEASE ORAL DAILY
Qty: 30 TABLET | Refills: 0 | Status: SHIPPED | OUTPATIENT
Start: 2018-06-21 | End: 2018-07-15 | Stop reason: HOSPADM

## 2018-06-21 RX ORDER — SENNA AND DOCUSATE SODIUM 50; 8.6 MG/1; MG/1
2 TABLET, FILM COATED ORAL 2 TIMES DAILY PRN
Qty: 60 TABLET | Refills: 1 | Status: SHIPPED | OUTPATIENT
Start: 2018-06-21 | End: 2019-12-17

## 2018-06-21 RX ORDER — SULFAMETHOXAZOLE AND TRIMETHOPRIM 800; 160 MG/1; MG/1
1 TABLET ORAL 3 TIMES WEEKLY
Qty: 12 TABLET | Refills: 3 | Status: ON HOLD | OUTPATIENT
Start: 2018-06-22 | End: 2018-08-04

## 2018-06-21 RX ORDER — ALLOPURINOL 300 MG/1
300 TABLET ORAL 2 TIMES DAILY
Qty: 60 TABLET | Refills: 0 | Status: SHIPPED | OUTPATIENT
Start: 2018-06-21 | End: 2018-08-06 | Stop reason: SDUPTHER

## 2018-06-21 RX ORDER — ACYCLOVIR 400 MG/1
400 TABLET ORAL 2 TIMES DAILY
Qty: 60 TABLET | Refills: 3 | Status: SHIPPED | OUTPATIENT
Start: 2018-06-21 | End: 2018-09-25

## 2018-06-21 RX ORDER — ONDANSETRON 4 MG/1
4 TABLET, FILM COATED ORAL EVERY 6 HOURS PRN
Qty: 30 TABLET | Refills: 2 | Status: SHIPPED | OUTPATIENT
Start: 2018-06-21 | End: 2019-05-07

## 2018-06-21 RX ORDER — FLUCONAZOLE 200 MG/1
400 TABLET ORAL EVERY 24 HOURS
Qty: 60 TABLET | Refills: 3 | Status: SHIPPED | OUTPATIENT
Start: 2018-06-21 | End: 2018-09-25

## 2018-06-21 RX ADMIN — SODIUM CHLORIDE 125 ML/HR: 9 INJECTION, SOLUTION INTRAVENOUS at 04:08

## 2018-06-21 RX ADMIN — POTASSIUM CHLORIDE 10 MEQ: 10 INJECTION, SOLUTION INTRAVENOUS at 09:55

## 2018-06-21 RX ADMIN — POTASSIUM CHLORIDE 20 MEQ: 750 CAPSULE, EXTENDED RELEASE ORAL at 09:55

## 2018-06-21 RX ADMIN — RIVAROXABAN 15 MG: 15 TABLET, FILM COATED ORAL at 08:42

## 2018-06-21 RX ADMIN — ALLOPURINOL 300 MG: 300 TABLET ORAL at 08:42

## 2018-06-21 RX ADMIN — ACYCLOVIR 400 MG: 400 TABLET ORAL at 08:41

## 2018-06-21 RX ADMIN — PANTOPRAZOLE SODIUM 40 MG: 40 TABLET, DELAYED RELEASE ORAL at 06:39

## 2018-06-21 NOTE — DISCHARGE SUMMARY
Date of Discharge:  6/21/2018    Discharge Diagnosis:   1.  Primary mediastinal diffuse large B-cell lymphoma  2.  Administration of systemic chemotherapy with cycle 1 dose adjusted EPOCH-R  3.  Right upper extremity/axillary deep venous thrombosis, PICC associated  4.  Hypokalemia  5.  Pericardial effusion    Presenting Problem/History of Present Illness  Lung mass [R91.8]  Mediastinal mass [J98.59]   Ms. Durham is a 23 y.o. female with a history of progressive dyspnea with exertion and found to have large mediastinal mass with left bronchus narrowing who is directly admitted from [facility] clinic to [facility]. She reports dyspnea when walking about 5 minutes and has been worsening. She has nonproductive cough. No orthpnea, pnd, or edema. She has not had measured fever but has had night sweats and chills. No LAD noticed and no weight loss. She reports occasional brief chest pressure which resolves spontaneously and is no related with dyspnea or diaphoresis. She has no arm or neck pain. SHe has occasional palpitations which occur when she exerts herself. She also reports back pain in thoracic area, non-radiating. No arm or leg numbness/paresthesias. No weakness    Hospital Course   1.  Primary mediastinal large B-cell lymphoma: The patient presented with dyspnea, cough, and chest pain.  A CT angiogram of the chest 6/8/18 showed a very large tumor mass in the mediastinum encasing multiple vascular structures and narrowing the left mainstem bronchus.  There was direct tumor infiltration in the left upper lobe.  There was a small left pleural effusion.  She underwent a CT-guided biopsy of the mediastinal mass on 6/12/18 and pathology reviewed at [facility] oncology showed diffuse large B-cell lymphoma consistent with a primary diffuse large B-cell lymphoma.  A bone marrow exam was performed on 6/14/18 which was negative for lymphoma.  She underwent a PET scan 6/13/18 which showed a large hypermetabolic mass in  the chest involving the anterior mediastinum, left hilum, nearly completely filling the left hemothorax with SUV 19.8.  There was physiologic distribution elsewhere.    The patient was started on IV fluid hydration and allopurinol for prevention of hyperuricemia.  She initiated systemic chemotherapy with DA-EPOCH-R on 6/16/18 and completed the evening of 6/21/18.  She had a infusion reaction to rituximab but was able to complete with additional medications and otherwise tolerated chemotherapy well.  She will require additional premedications with additional rituximab.  Plan is to monitor her blood counts twice per week outpatient and proceed with cycle 2 of chemotherapy day 21.    The patient had significant improvement in shortness of breath, cough, and chest pain by the time of discharge.       2.  Pericardial effusion:   a 2-D echocardiogram 6/8/18 showed a left ventricular systolic function 58%.  There was a 2 cm pericardial effusion with no tamponade.  The pericardium appeared thickened.  She had no evidence of volume overload or Nod throughout the hospital stay     3.   FEN: She was monitored very carefully for any evidence of tumor lysis.  She was maintained on IV fluids and allopurinol throughout hospitalization.    her uric acid at discharge was 1.2 but I recommended that she stay on allopurinol twice daily until her next cycle of chemotherapy to prevent hyperuricemia.  She has mild hypokalemia and will be discharged on potassium 20 mEq once per day.  Have ordered an outpatient BMP weekly for monitoring of her renal function and electrolytes     4.  Fertility preservation:  Patient received Zoladex injection  for fertility preservation; this should be continued once monthly during her chemotherapy.  Next dose due 7/12/18     5.  Right upper extremity swelling:  The patient had a PIC line placed in the right upper extremity for administration of chemotherapy.  Her arm was noted to be swollen.  A Doppler  ultrasound initially showed superficial thrombophlebitis.  She was on prophylactic Arixtra.  A Doppler was repeated 2 days later on 6/20/18  which showed extension of thrombus into the axillary and brachial veins.  She was therefore started on Xarelto 15 mg one every 12 hours and the PICC line was removed as soon as chemotherapy completed on 6/20/18.  She will take 15 mg of Xarelto every 12 hours for 21 days and then transition to 20 mg once a day.  The patient will have CBC performed twice a week.  If the platelet count drops below 50,000, anticoagulation will need to be temporarily held     6.  ID: The patient will receive Neulasta 24 hours after completion of chemotherapy for reduction of neutropenic infection.  She will be discharged on prophylactic antibiotics including acyclovir, Diflucan, and Bactrim.  She may require bacterial prophylaxis if the ANC drops below 1000.    Procedures Performed  Procedure(s):  CHAMBERLAIN PROCEDURE AND MEDIASTINOTOMY  MEDIASTINOTOMY       Consults:   Consults     Date and Time Order Name Status Description    6/11/2018 1435 Inpatient Cardiology Consult Completed     6/11/2018 1431 Inpatient Thoracic Surgery Consult Completed     6/11/2018 1431 Hematology & Oncology Inpatient Consult            Pertinent Test Results: see imaging studies and path above    Condition on Discharge:  stable    Review of Systems    Physical Exam      Discharge Disposition  Home or Self Care    Discharge Medications     Discharge Medications      New Medications      Instructions Start Date   acyclovir 400 MG tablet  Commonly known as:  ZOVIRAX   400 mg, Oral, 2 Times Daily, Take no more than 5 doses a day.      allopurinol 300 MG tablet  Commonly known as:  ZYLOPRIM   300 mg, Oral, 2 Times Daily      fluconazole 200 MG tablet  Commonly known as:  DIFLUCAN   400 mg, Oral, Every 24 Hours      ondansetron 4 MG tablet  Commonly known as:  ZOFRAN   4 mg, Oral, Every 6 Hours PRN      potassium chloride ER  20 MEQ tablet controlled-release ER tablet  Commonly known as:  K-TAB   20 mEq, Oral, Daily      rivaroxaban 15 MG tablet  Commonly known as:  XARELTO   15 mg, Oral, 2 Times Daily With Meals      rivaroxaban 15 MG tablet  Commonly known as:  XARELTO   15 mg, Oral, 2 Times Daily With Meals      sennosides-docusate sodium 8.6-50 MG tablet  Commonly known as:  SENOKOT-S   2 tablets, Oral, 2 Times Daily PRN      sulfamethoxazole-trimethoprim 800-160 MG per tablet  Commonly known as:  BACTRIM DS,SEPTRA DS   1 tablet, Oral, 3 Times Weekly   Start Date:  6/22/2018            Discharge Diet: regular    Activity at Discharge: as tolerated    Follow-up Appointments  Future Appointments  Date Time Provider Department Center   6/22/2018 10:10 AM LAB CHAIR 3 [facility]  LAB [facility]   6/22/2018 10:40 AM BRANNON Wade [facility]   6/22/2018 11:30 AM INJECTION CHAIR [facility]  INFUS [facility]   6/25/2018 12:30 PM LAB [facility]IR [facility]   6/25/2018 1:00 PM RN [facility] INFUS [facility]   6/27/2018 10:30 AM LAB CHAIR 4 [facility]  LAB [facility]   6/27/2018 11:00 AM BRANNON Serna [facility]   6/29/2018 12:30 PM LAB CHAIR 4 [facility]  LAB [facility]   6/29/2018 1:00 PM CHAIR 17 [facility] - SM BH INFUS [facility]   7/2/2018 7:30 AM LAB CHAIR 5 [facility]  LAB [facility]   7/2/2018 8:00 AM Millie Padilla MD [facility]  [facility]         Test Results Pending at Discharge       Ramón Camp MD  06/21/18  9:36 AM    Time: 45 minutes spent discharging the patient including writing prescriptions, discussing follow-up testing and care etc.

## 2018-06-21 NOTE — NURSING NOTE
"Nursing Chemotherapy Verification    Chemotherapy Regimen:   Treatment Plans     Name Type Plan dates Plan Provider         Active    OP Goserelin 3.6 mg Q28D ONCOLOGY SUPPORTIVE CARE 1  6/20/2018 - Present Ramón Camp MD     IP LYMPHOMA R-EPOCH (Dose Adjusted) RiTUXimab / Etoposide / DOXOrubicin / VinCRIStine / Cyclophosphamide / PredniSONE ONCOLOGY TREATMENT  6/16/2018 - Present Kimberley Melgar MD                     Current height and weight: 157.5 cm (62\") 66.2 kg (146 lb)  Calculated BSA from current height and weight (Zahira): 1.67    Relevant Labs    Results from last 7 days  Lab Units 06/20/18  0406 06/19/18  0514 06/18/18  0405   WBC 10*3/mm3 3.42* 6.06 7.01   HEMOGLOBIN g/dL 10.8* 10.9* 10.8*   HEMATOCRIT % 33.5* 34.0* 33.5*   PLATELETS 10*3/mm3 127* 133* 98*     Lab Results   Component Value Date    NEUTROABS 3.12 06/20/2018         Results from last 7 days  Lab Units 06/20/18  0406 06/19/18  0514 06/18/18  0405   CREATININE mg/dL 0.55* 0.64 0.58       Serum creatinine: 0.55 mg/dL (L) 06/20/18 0406  Estimated creatinine clearance: 141.9 mL/min (A)    Lab Results   Component Value Date    HEPBCAB Negative 06/14/2018       Verification attestation:  I have personally reviewed the planned regimen and its administration and dosing. I understand the potential side effects.The patient has been instructed on the regimen, potential side effects, and self care measures; the consent form has been completed. I have confirmed that the appropriate premedication, prehydration, post medication and/or emergency medications are ordered in addition to the chemotherapy.    I have independently verified that the height and weight is current and calculated the BSA. I have verified the doses with the planned regimen and have clarified any deviations with the physician (Dr Ramón Camp). I have confirmed the route of administration and patient IV access.    Nurse: Belle Andrew RN  2nd verification Nurse:Christopher " PAUL Willoughby

## 2018-06-21 NOTE — PROGRESS NOTES
Continued Stay Note  [facility]     Patient Name: Vikki Durham  MRN: [id number]  Today's Date: 6/21/2018    Admit Date: 6/11/2018          Discharge Plan     Row Name 06/21/18 1006       Plan    Plan Comments Ramón/pharmacist informed CCP patient to receive Xarelto 15 mg one every 12 hours x21 days. He called [facility] and was quoted co-pay $30.00. No additional needs noted.              Discharge Codes    No documentation.       Expected Discharge Date and Time     Expected Discharge Date Expected Discharge Time    Jun 21, 2018             Lien Tate RN

## 2018-06-21 NOTE — PROGRESS NOTES
Case Management Discharge Note    Final Note: Orders received to dc home. Family to transport per private auto.     Destination     No service coordination in this encounter.      Durable Medical Equipment     No service coordination in this encounter.      Dialysis/Infusion     No service coordination in this encounter.      Home Medical Care     No service coordination in this encounter.      Social Care     No service coordination in this encounter.             Final Discharge Disposition Code: 01 - home or self-care

## 2018-06-21 NOTE — NURSING NOTE
"Nursing Chemotherapy Verification    Chemotherapy Regimen:   Treatment Plans     Name Type Plan dates Plan Provider         Active    OP Goserelin 3.6 mg Q28D ONCOLOGY SUPPORTIVE CARE 1  6/20/2018 - Present Ramón Camp MD     IP LYMPHOMA R-EPOCH (Dose Adjusted) RiTUXimab / Etoposide / DOXOrubicin / VinCRIStine / Cyclophosphamide / PredniSONE ONCOLOGY TREATMENT  6/16/2018 - Present Kimberley Melgar MD                     Current height and weight: 157.5 cm (62\") 66.2 kg (146 lb)  Calculated BSA from current height and weight (Zahira):1.67  Relevant Labs    Results from last 7 days  Lab Units 06/20/18  0406 06/19/18  0514 06/18/18  0405   WBC 10*3/mm3 3.42* 6.06 7.01   HEMOGLOBIN g/dL 10.8* 10.9* 10.8*   HEMATOCRIT % 33.5* 34.0* 33.5*   PLATELETS 10*3/mm3 127* 133* 98*     Lab Results   Component Value Date    NEUTROABS 3.12 06/20/2018         Results from last 7 days  Lab Units 06/20/18  0406 06/19/18  0514 06/18/18  0405   CREATININE mg/dL 0.55* 0.64 0.58       Serum creatinine: 0.55 mg/dL (L) 06/20/18 0406  Estimated creatinine clearance: 141.9 mL/min (A)    Lab Results   Component Value Date    HEPBCAB Negative 06/14/2018       Verification attestation:  I have personally reviewed the planned regimen and its administration and dosing. I understand the potential side effects.The patient has been instructed on the regimen, potential side effects, and self care measures; the consent form has been completed. I have confirmed that the appropriate premedication, prehydration, post medication and/or emergency medications are ordered in addition to the chemotherapy.    I have independently verified that the height and weight is current and calculated the BSA. I have verified the doses with the planned regimen and have clarified any deviations with the physician (Dr Ramón Camp. I have confirmed the route of administration and patient IV access.    Nurse: Belle Andrew RN  2nd verification Nurse: Christopher " PAUL Willoughby

## 2018-06-21 NOTE — PLAN OF CARE
Problem: Patient Care Overview  Goal: Plan of Care Review  Outcome: Ongoing (interventions implemented as appropriate)   18 0611   Coping/Psychosocial   Plan of Care Reviewed With patient;mother   Plan of Care Review   Progress improving   OTHER   Outcome Summary Cytoxan completed and picc line DC (tip intact); IVF continue via peripheral line; monitoring labs; Xarelto BID for DVT in right upper arm; cough Much improved       Problem: Anxiety (Adult)  Goal: Reduction/Resolution  Outcome: Ongoing (interventions implemented as appropriate)      Problem: Coping, Compromised Family (Adult,NICU,,Obstetrics,Pediatric)  Goal: Effective Family Coping  Outcome: Ongoing (interventions implemented as appropriate)      Problem: Pain, Acute (Adult)  Goal: Acceptable Pain Control/Comfort Level  Outcome: Ongoing (interventions implemented as appropriate)      Problem: Oncology Care (Adult)  Goal: Signs and Symptoms of Listed Potential Problems Will be Absent, Minimized or Managed (Oncology Care)  Outcome: Ongoing (interventions implemented as appropriate)

## 2018-06-22 ENCOUNTER — INFUSION (OUTPATIENT)
Dept: ONCOLOGY | Facility: HOSPITAL | Age: 23
End: 2018-06-22

## 2018-06-22 ENCOUNTER — LAB (OUTPATIENT)
Dept: LAB | Facility: HOSPITAL | Age: 23
End: 2018-06-22

## 2018-06-22 ENCOUNTER — OFFICE VISIT (OUTPATIENT)
Dept: ONCOLOGY | Facility: CLINIC | Age: 23
End: 2018-06-22

## 2018-06-22 VITALS
WEIGHT: 141.4 LBS | HEIGHT: 62 IN | DIASTOLIC BLOOD PRESSURE: 64 MMHG | RESPIRATION RATE: 12 BRPM | SYSTOLIC BLOOD PRESSURE: 98 MMHG | OXYGEN SATURATION: 98 % | BODY MASS INDEX: 26.02 KG/M2 | HEART RATE: 66 BPM | TEMPERATURE: 98.6 F

## 2018-06-22 DIAGNOSIS — J98.59 MEDIASTINAL MASS: ICD-10-CM

## 2018-06-22 DIAGNOSIS — C85.28 MEDIASTINAL LARGE B-CELL LYMPHOMA OF LYMPH NODES OF MULTIPLE REGIONS (HCC): Primary | ICD-10-CM

## 2018-06-22 DIAGNOSIS — R06.09 DYSPNEA ON EXERTION: ICD-10-CM

## 2018-06-22 DIAGNOSIS — C85.28 MEDIASTINAL LARGE B-CELL LYMPHOMA OF LYMPH NODES OF MULTIPLE REGIONS (HCC): ICD-10-CM

## 2018-06-22 LAB
BASOPHILS # BLD AUTO: 0.01 10*3/MM3 (ref 0–0.1)
BASOPHILS NFR BLD AUTO: 0.3 % (ref 0–1.1)
DEPRECATED RDW RBC AUTO: 40.1 FL (ref 37–49)
EOSINOPHIL # BLD AUTO: 0.17 10*3/MM3 (ref 0–0.36)
EOSINOPHIL NFR BLD AUTO: 5.6 % (ref 1–5)
ERYTHROCYTE [DISTWIDTH] IN BLOOD BY AUTOMATED COUNT: 12.7 % (ref 11.7–14.5)
HCT VFR BLD AUTO: 38.4 % (ref 34–45)
HGB BLD-MCNC: 13 G/DL (ref 11.5–14.9)
IMM GRANULOCYTES # BLD: 0.03 10*3/MM3 (ref 0–0.03)
IMM GRANULOCYTES NFR BLD: 1 % (ref 0–0.5)
LYMPHOCYTES # BLD AUTO: 0.14 10*3/MM3 (ref 1–3.5)
LYMPHOCYTES NFR BLD AUTO: 4.7 % (ref 20–49)
MCH RBC QN AUTO: 29.3 PG (ref 27–33)
MCHC RBC AUTO-ENTMCNC: 33.9 G/DL (ref 32–35)
MCV RBC AUTO: 86.7 FL (ref 83–97)
MONOCYTES # BLD AUTO: 0.03 10*3/MM3 (ref 0.25–0.8)
MONOCYTES NFR BLD AUTO: 1 % (ref 4–12)
NEUTROPHILS # BLD AUTO: 2.63 10*3/MM3 (ref 1.5–7)
NEUTROPHILS NFR BLD AUTO: 87.4 % (ref 39–75)
NRBC BLD MANUAL-RTO: 0 /100 WBC (ref 0–0)
PLATELET # BLD AUTO: 186 10*3/MM3 (ref 150–375)
PMV BLD AUTO: 10.7 FL (ref 8.9–12.1)
RBC # BLD AUTO: 4.43 10*6/MM3 (ref 3.9–5)
WBC NRBC COR # BLD: 3.01 10*3/MM3 (ref 4–10)

## 2018-06-22 PROCEDURE — 99214 OFFICE O/P EST MOD 30 MIN: CPT | Performed by: NURSE PRACTITIONER

## 2018-06-22 PROCEDURE — 85025 COMPLETE CBC W/AUTO DIFF WBC: CPT | Performed by: INTERNAL MEDICINE

## 2018-06-22 PROCEDURE — 96372 THER/PROPH/DIAG INJ SC/IM: CPT | Performed by: NURSE PRACTITIONER

## 2018-06-22 PROCEDURE — 36415 COLL VENOUS BLD VENIPUNCTURE: CPT | Performed by: INTERNAL MEDICINE

## 2018-06-22 PROCEDURE — 25010000002 PEGFILGRASTIM 6 MG/0.6ML SOLUTION PREFILLED SYRINGE: Performed by: INTERNAL MEDICINE

## 2018-06-22 RX ADMIN — PEGFILGRASTIM 6 MG: 6 INJECTION SUBCUTANEOUS at 11:38

## 2018-06-22 NOTE — PROGRESS NOTES
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    Mrs. Durham is a 23-year-old female recently diagnosed with B-cell lymphoma.  She did initiate EPOCH-R  in the hospital on 06/16/2018.  She is here today for scheduled lab review and Neulasta injection.    He reports feeling relatively well today.  She does report body aches particularly of the back and chest.  This is been ongoing since diagnosis.  She does feel that her shortness of breath is improved.  She denies cough, wheezing, stridor.  She does report a mild scratchy throat but denies pain or congestion.     She denies fever or chills.  She denies swelling.    Her CBC is stable.  Appetite is fair.  Vital signs stable.    Her bowels are without issue.  She denies dysuria.  Denies bleeding or bruising.    Past Medical History, Past Surgical History, Social History, Family History have been reviewed and are without significant changes except as mentioned.    Review of Systems   Constitutional: Positive for fatigue.   HENT: Sore throat: ee history of present illness.    Respiratory:        See history of present illness   Cardiovascular: Negative.    Gastrointestinal: Negative.    Endocrine: Negative.    Genitourinary: Negative.    Musculoskeletal: Positive for back pain and myalgias.   Skin: Negative.    Allergic/Immunologic: Negative.    Neurological: Negative.    Hematological: Negative.       A comprehensive 14 point review of systems was performed and was negative except as mentioned.    Medications:  The current medication list was reviewed in the EMR    ALLERGIES:  No Known Allergies    Objective      Vitals:    06/22/18 1047   BP: 98/64   Pulse: 66   Resp: 12   Temp: 98.6 °F (37 °C)   TempSrc: Oral   SpO2: 98%   Weight: 64.1 kg (141 lb 6.4 oz)   Height: 157 cm (61.81\")   PainSc: 2  Comment: LT back and front chest pain     Current Status 6/22/2018   ECOG score " 0       Physical Exam   Constitutional: She is oriented to person, place, and time. She appears well-developed and well-nourished.   She is accompanied by her mother today.   HENT:   Head: Normocephalic.   Eyes: Pupils are equal, round, and reactive to light.   Neck: Normal range of motion.   Cardiovascular: Normal rate, regular rhythm and normal heart sounds.    Pulmonary/Chest: Effort normal and breath sounds normal. No respiratory distress. She has no wheezes. She has no rales.   Abdominal: Soft.   Neurological: She is alert and oriented to person, place, and time.   Skin: Skin is warm and dry.   Psychiatric: She has a normal mood and affect.         RECENT LABS:  Hematology WBC   Date Value Ref Range Status   06/22/2018 3.01 (L) 4.00 - 10.00 10*3/mm3 Final     RBC   Date Value Ref Range Status   06/22/2018 4.43 3.90 - 5.00 10*6/mm3 Final     Hemoglobin   Date Value Ref Range Status   06/22/2018 13.0 11.5 - 14.9 g/dL Final     Hematocrit   Date Value Ref Range Status   06/22/2018 38.4 34.0 - 45.0 % Final     Platelets   Date Value Ref Range Status   06/22/2018 186 150 - 375 10*3/mm3 Final              Assessment/Plan   1.  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 She started EPOCH-R in the hospital on 6/16/2018 and has tolerated relatively well. She is here today for scheduled Neulasta.    2.  Chest and back pain related to tumor.  This has improved since treatment.      PLAN:  1.  We will proceed with Neulasta as scheduled today.  I did review labs with the patient and her mother and educate them regarding potential side effects of Neulasta.  I did advise that she start to take Claritin 2-3 days before Neulasta injection in 2-3 days following.  We did also review the expected side effects of her chemotherapy regimen and proper management.    2.  He is scheduled to return next week 06/27/2018 to see BRANNON Tirado, we'll then see Dr. Padilla as already scheduled on  07/02/2018.    3.  I have instructed the patient to call the office should she experience increased chest or back pain.  I have advised the patient to seek immediate medical attention should she experience tachycardia, shortness of breath, chest pain.  He and her mother verbalize understanding.                6/22/2018      CC:

## 2018-06-24 LAB
CYTO UR: NORMAL
LAB AP CASE REPORT: NORMAL
LAB AP INTRADEPARTMENTAL CONSULT: NORMAL
Lab: NORMAL
PATH REPORT.ADDENDUM SPEC: NORMAL
PATH REPORT.FINAL DX SPEC: NORMAL
PATH REPORT.GROSS SPEC: NORMAL

## 2018-06-25 ENCOUNTER — CLINICAL SUPPORT (OUTPATIENT)
Dept: ONCOLOGY | Facility: HOSPITAL | Age: 23
End: 2018-06-25

## 2018-06-25 ENCOUNTER — LAB (OUTPATIENT)
Dept: LAB | Facility: HOSPITAL | Age: 23
End: 2018-06-25

## 2018-06-25 DIAGNOSIS — C85.28 MEDIASTINAL LARGE B-CELL LYMPHOMA OF LYMPH NODES OF MULTIPLE REGIONS (HCC): ICD-10-CM

## 2018-06-25 LAB
ANION GAP SERPL CALCULATED.3IONS-SCNC: 12.5 MMOL/L
BASOPHILS # BLD AUTO: 0.01 10*3/MM3 (ref 0–0.1)
BASOPHILS NFR BLD AUTO: 0.7 % (ref 0–1.1)
BUN BLD-MCNC: 19 MG/DL (ref 6–20)
BUN/CREAT SERPL: 27.1 (ref 7.3–30)
CALCIUM SPEC-SCNC: 10.1 MG/DL (ref 8.5–10.2)
CHLORIDE SERPL-SCNC: 98 MMOL/L (ref 98–107)
CO2 SERPL-SCNC: 28.5 MMOL/L (ref 22–29)
CREAT BLD-MCNC: 0.7 MG/DL (ref 0.6–1.1)
DEPRECATED RDW RBC AUTO: 40.6 FL (ref 37–49)
EOSINOPHIL # BLD AUTO: 0.07 10*3/MM3 (ref 0–0.36)
EOSINOPHIL NFR BLD AUTO: 4.6 % (ref 1–5)
ERYTHROCYTE [DISTWIDTH] IN BLOOD BY AUTOMATED COUNT: 12.4 % (ref 11.7–14.5)
GFR SERPL CREATININE-BSD FRML MDRD: 104 ML/MIN/1.73
GFR SERPL CREATININE-BSD FRML MDRD: 126 ML/MIN/1.73
GLUCOSE BLD-MCNC: 95 MG/DL (ref 74–124)
HCT VFR BLD AUTO: 38.5 % (ref 34–45)
HGB BLD-MCNC: 12.8 G/DL (ref 11.5–14.9)
IMM GRANULOCYTES # BLD: 0.21 10*3/MM3 (ref 0–0.03)
IMM GRANULOCYTES NFR BLD: 13.7 % (ref 0–0.5)
LYMPHOCYTES # BLD AUTO: 0.17 10*3/MM3 (ref 1–3.5)
LYMPHOCYTES NFR BLD AUTO: 11.1 % (ref 20–49)
MCH RBC QN AUTO: 29.5 PG (ref 27–33)
MCHC RBC AUTO-ENTMCNC: 33.2 G/DL (ref 32–35)
MCV RBC AUTO: 88.7 FL (ref 83–97)
MONOCYTES # BLD AUTO: 0.06 10*3/MM3 (ref 0.25–0.8)
MONOCYTES NFR BLD AUTO: 3.9 % (ref 4–12)
NEUTROPHILS # BLD AUTO: 1.01 10*3/MM3 (ref 1.5–7)
NEUTROPHILS NFR BLD AUTO: 66 % (ref 39–75)
NRBC BLD MANUAL-RTO: 0 /100 WBC (ref 0–0)
PLATELET # BLD AUTO: 181 10*3/MM3 (ref 150–375)
PMV BLD AUTO: 9.9 FL (ref 8.9–12.1)
POTASSIUM BLD-SCNC: 4.4 MMOL/L (ref 3.5–4.7)
RBC # BLD AUTO: 4.34 10*6/MM3 (ref 3.9–5)
SODIUM BLD-SCNC: 139 MMOL/L (ref 134–145)
WBC NRBC COR # BLD: 1.53 10*3/MM3 (ref 4–10)

## 2018-06-25 PROCEDURE — 85025 COMPLETE CBC W/AUTO DIFF WBC: CPT | Performed by: INTERNAL MEDICINE

## 2018-06-25 PROCEDURE — 36415 COLL VENOUS BLD VENIPUNCTURE: CPT | Performed by: INTERNAL MEDICINE

## 2018-06-25 PROCEDURE — 80048 BASIC METABOLIC PNL TOTAL CA: CPT | Performed by: INTERNAL MEDICINE

## 2018-06-25 NOTE — PROGRESS NOTES
CBC reviewed with pt today.  She denies complaints.  ANC dropped to 1.01, but pt on preventative abx already.  Had 1st cycle of chemo 6/16/18.  She is scheduled to return on Wednesday for CBC and NP.  CMP ordered today bc of low K level.  She is currently taking KCL 20meq daily.  This lab is running stat and I informed her I would watch for results and call her if oral KCL needs to be increased.  Pt and mother v/u.  Copy of labs given and she will call with any questions or concerns.

## 2018-06-27 ENCOUNTER — LAB (OUTPATIENT)
Dept: LAB | Facility: HOSPITAL | Age: 23
End: 2018-06-27

## 2018-06-27 ENCOUNTER — OFFICE VISIT (OUTPATIENT)
Dept: ONCOLOGY | Facility: CLINIC | Age: 23
End: 2018-06-27

## 2018-06-27 VITALS
HEART RATE: 74 BPM | OXYGEN SATURATION: 97 % | RESPIRATION RATE: 16 BRPM | TEMPERATURE: 99.1 F | BODY MASS INDEX: 25.76 KG/M2 | DIASTOLIC BLOOD PRESSURE: 58 MMHG | SYSTOLIC BLOOD PRESSURE: 96 MMHG | WEIGHT: 140 LBS | HEIGHT: 62 IN

## 2018-06-27 DIAGNOSIS — C85.28 MEDIASTINAL LARGE B-CELL LYMPHOMA OF LYMPH NODES OF MULTIPLE REGIONS (HCC): ICD-10-CM

## 2018-06-27 DIAGNOSIS — C85.28 MEDIASTINAL LARGE B-CELL LYMPHOMA OF LYMPH NODES OF MULTIPLE REGIONS (HCC): Primary | ICD-10-CM

## 2018-06-27 DIAGNOSIS — D70.1 CHEMOTHERAPY INDUCED NEUTROPENIA (HCC): ICD-10-CM

## 2018-06-27 DIAGNOSIS — T45.1X5A CHEMOTHERAPY INDUCED NEUTROPENIA (HCC): ICD-10-CM

## 2018-06-27 LAB
BASOPHILS # BLD AUTO: 0.03 10*3/MM3 (ref 0–0.1)
BASOPHILS NFR BLD AUTO: 3.8 % (ref 0–1.1)
DEPRECATED RDW RBC AUTO: 38.5 FL (ref 37–49)
EOSINOPHIL # BLD AUTO: 0.03 10*3/MM3 (ref 0–0.36)
EOSINOPHIL NFR BLD AUTO: 3.8 % (ref 1–5)
ERYTHROCYTE [DISTWIDTH] IN BLOOD BY AUTOMATED COUNT: 12.1 % (ref 11.7–14.5)
HCT VFR BLD AUTO: 35.6 % (ref 34–45)
HGB BLD-MCNC: 12.2 G/DL (ref 11.5–14.9)
IMM GRANULOCYTES # BLD: 0.03 10*3/MM3 (ref 0–0.03)
IMM GRANULOCYTES NFR BLD: 3.8 % (ref 0–0.5)
LYMPHOCYTES # BLD AUTO: 0.19 10*3/MM3 (ref 1–3.5)
LYMPHOCYTES NFR BLD AUTO: 23.8 % (ref 20–49)
MCH RBC QN AUTO: 29.5 PG (ref 27–33)
MCHC RBC AUTO-ENTMCNC: 34.3 G/DL (ref 32–35)
MCV RBC AUTO: 86.2 FL (ref 83–97)
MONOCYTES # BLD AUTO: 0.38 10*3/MM3 (ref 0.25–0.8)
MONOCYTES NFR BLD AUTO: 47.5 % (ref 4–12)
NEUTROPHILS # BLD AUTO: 0.14 10*3/MM3 (ref 1.5–7)
NEUTROPHILS NFR BLD AUTO: 17.3 % (ref 39–75)
NRBC BLD MANUAL-RTO: 0 /100 WBC (ref 0–0)
PLATELET # BLD AUTO: 148 10*3/MM3 (ref 150–375)
PMV BLD AUTO: 10.5 FL (ref 8.9–12.1)
RBC # BLD AUTO: 4.13 10*6/MM3 (ref 3.9–5)
WBC NRBC COR # BLD: 0.8 10*3/MM3 (ref 4–10)

## 2018-06-27 PROCEDURE — 36416 COLLJ CAPILLARY BLOOD SPEC: CPT | Performed by: INTERNAL MEDICINE

## 2018-06-27 PROCEDURE — 99213 OFFICE O/P EST LOW 20 MIN: CPT | Performed by: NURSE PRACTITIONER

## 2018-06-27 PROCEDURE — 85025 COMPLETE CBC W/AUTO DIFF WBC: CPT | Performed by: INTERNAL MEDICINE

## 2018-06-27 RX ORDER — LEVOFLOXACIN 500 MG/1
500 TABLET, FILM COATED ORAL DAILY
Qty: 7 TABLET | Refills: 0 | Status: SHIPPED | OUTPATIENT
Start: 2018-06-27 | End: 2018-08-04 | Stop reason: HOSPADM

## 2018-06-27 NOTE — PROGRESS NOTES
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 She did initiate EPOCH-R  in the hospital on 06/16/2018.    History of Present Illness      The patient is a 23 y.o. female with the above-mentioned history, who returns today for scheduled follow-up and lab review.  She was recently discharged from the hospital after receiving her first cycle of EPOCH-R, initiated 6/16/2018.  She did receive growth factor support with Neulasta 6/22/2018.   Additionally, the patient reports tolerating chemotherapy reasonably well.  She denies nausea or vomiting.  She denies any changes in her bowel or bladder habits.  She notes overall improvement in her shortness of breath and cough since beginning chemotherapy.  She has not experienced significant arthralgias related to Neulasta.  Currently, she denies fevers or chills, signs or symptoms of infection.  She has noted improvement in her night sweats since initiation of chemotherapy.  She denies dysuria, productive sputum, head congestion, headaches.  She does continue on prophylaxis including acyclovir, fluconazole and Bactrim.  She denies signs or symptoms of bleeding aside from menstrual spotting.  She did initiate Lupron, though has continued to have menstrual irregularities since beginning chemotherapy.    Past Medical History, Past Surgical History, Social History, Family History have been reviewed and are without significant changes except as mentioned.    I have reviewed the patient's medical history in detail and updated the computerized patient record.    Review of Systems   Constitutional: Positive for fatigue. Negative for activity change, appetite change, chills and fever.   HENT: Negative for mouth sores.    Eyes: Negative for visual disturbance.   Respiratory: Positive for cough (improved) and shortness of breath (improved).    Cardiovascular: Negative.    Gastrointestinal: Negative.  Negative for abdominal  "pain, diarrhea, nausea and vomiting.   Endocrine: Negative.    Genitourinary: Positive for menstrual problem. Negative for dysuria and frequency.   Musculoskeletal: Negative for arthralgias, back pain, joint swelling and myalgias.   Skin: Negative.    Allergic/Immunologic: Negative.    Neurological: Negative.  Negative for dizziness and weakness.   Hematological: Negative.  Does not bruise/bleed easily.   Psychiatric/Behavioral: The patient is not nervous/anxious.    All other systems reviewed and are negative.  Review of systems unchanged except as updated.    Medications:  The current medication list was reviewed in the EMR    ALLERGIES:  No Known Allergies    Objective      Vitals:    06/27/18 1047   BP: 96/58   Pulse: 74   Resp: 16   Temp: 99.1 °F (37.3 °C)   TempSrc: Oral   SpO2: 97%   Weight: 63.5 kg (140 lb)   Height: 157 cm (61.81\")   PainSc: 1  Comment: Aching all over.   PainLoc: Generalized     Current Status 6/27/2018   ECOG score 0       Physical Exam   Constitutional: She is oriented to person, place, and time. She appears well-developed and well-nourished.   She is accompanied by her mother today.   HENT:   Head: Normocephalic.   Eyes: Pupils are equal, round, and reactive to light.   Neck: Normal range of motion.   Cardiovascular: Normal rate, regular rhythm and normal heart sounds.    Pulmonary/Chest: Effort normal and breath sounds normal. No respiratory distress. She has no wheezes. She has no rales.   Abdominal: Soft.   Lymphadenopathy:     She has no cervical adenopathy.   Neurological: She is alert and oriented to person, place, and time.   Skin: Skin is warm and dry.   Psychiatric: She has a normal mood and affect.   physical exam unchanged from previous except as updated.    RECENT LABS:    Results from last 7 days  Lab Units 06/27/18  1039 06/25/18  1237 06/22/18  1034   WBC 10*3/mm3 0.80* 1.53* 3.01*   NEUTROS ABS 10*3/mm3 0.14* 1.01* 2.63   HEMOGLOBIN g/dL 12.2 12.8 13.0   HEMATOCRIT % " 35.6 38.5 38.4   PLATELETS 10*3/mm3 148* 181 186       Results from last 7 days  Lab Units 06/25/18  1237 06/21/18  0745   SODIUM mmol/L 139 140   POTASSIUM mmol/L 4.4 3.3*   CHLORIDE mmol/L 98 98   CO2 mmol/L 28.5 27.7   BUN mg/dL 19 14   CREATININE mg/dL 0.70 0.51*   CALCIUM mg/dL 10.1 9.3   ALBUMIN g/dL  --  3.50   BILIRUBIN mg/dL  --  0.7   ALK PHOS U/L  --  46   ALT (SGPT) U/L  --  30   AST (SGOT) U/L  --  33*   GLUCOSE mg/dL 95 117*   MAGNESIUM mg/dL  --  2.4           Assessment/Plan   1. 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 She started EPOCH-R in the hospital on 6/16/2018 and has tolerated relatively well. She did recieve Neulasta support.    2.  Chest and back pain related to tumor.  This has improved since treatment. Cough and shortness of breath are imp    3.  Neutropenia secondary to chemotherapy despite Neulasta.  Afebrile, without signs or symptoms of infection.  Begin prophylaxis with Levaquin 500 mg daily.    4. Prophylaxis.  The patient continues on acyclovir, fluconazole, and Bactrim for PCP prophylaxis.  She also continues on allopurinol.    5.  Hypokalemia.  Taking potassium 20 mEq daily.  Repeat BMP on Monday, 7/2/2018.    6.  Menstrual irregularities.  The patient did receive Lupron injection, though continues to have irregular bleeding.  We have reviewed this is normal following cycle 1 of chemotherapy.  Typically, menstrual cycles, slow, even stop following chemotherapy and Lupron.    PLAN:  1. Begin Levaquin 500 mg daily for prophylaxis.  2. Continue acyclovir, allopurinol, fluconazole and Bactrim.  3. Return Friday, 6/29/2018 for CBC with nurse review to evaluate neutropenia. If ANC >1000 we can stop Levaquin.  4. M.D. follow up with Dr. Padilla 7/2/2018 with CBC, BMP.  5. We have extensively reviewed the importance of monitoring for signs or symptoms of infection including fever greater than or equal to 100.5 or chills.  Patient understands to call if she  were to develop new issues.    Linda Varela, APRN   6/27/2018      CC:

## 2018-06-29 ENCOUNTER — INFUSION (OUTPATIENT)
Dept: ONCOLOGY | Facility: HOSPITAL | Age: 23
End: 2018-06-29

## 2018-06-29 ENCOUNTER — LAB (OUTPATIENT)
Dept: LAB | Facility: HOSPITAL | Age: 23
End: 2018-06-29

## 2018-06-29 DIAGNOSIS — C85.28 MEDIASTINAL LARGE B-CELL LYMPHOMA OF LYMPH NODES OF MULTIPLE REGIONS (HCC): ICD-10-CM

## 2018-06-29 LAB
BASOPHILS # BLD AUTO: 0.08 10*3/MM3 (ref 0–0.1)
BASOPHILS NFR BLD AUTO: 0.5 % (ref 0–1.1)
DEPRECATED RDW RBC AUTO: 39 FL (ref 37–49)
EOSINOPHIL # BLD AUTO: 0.07 10*3/MM3 (ref 0–0.36)
EOSINOPHIL NFR BLD AUTO: 0.4 % (ref 1–5)
ERYTHROCYTE [DISTWIDTH] IN BLOOD BY AUTOMATED COUNT: 12.2 % (ref 11.7–14.5)
HCT VFR BLD AUTO: 35.2 % (ref 34–45)
HGB BLD-MCNC: 11.9 G/DL (ref 11.5–14.9)
IMM GRANULOCYTES # BLD: 3.48 10*3/MM3 (ref 0–0.03)
IMM GRANULOCYTES NFR BLD: 22 % (ref 0–0.5)
LYMPHOCYTES # BLD AUTO: 0.32 10*3/MM3 (ref 1–3.5)
LYMPHOCYTES NFR BLD AUTO: 2 % (ref 20–49)
MCH RBC QN AUTO: 29.5 PG (ref 27–33)
MCHC RBC AUTO-ENTMCNC: 33.8 G/DL (ref 32–35)
MCV RBC AUTO: 87.1 FL (ref 83–97)
MONOCYTES # BLD AUTO: 3.35 10*3/MM3 (ref 0.25–0.8)
MONOCYTES NFR BLD AUTO: 21.2 % (ref 4–12)
NEUTROPHILS # BLD AUTO: 8.51 10*3/MM3 (ref 1.5–7)
NEUTROPHILS NFR BLD AUTO: 53.9 % (ref 39–75)
NRBC BLD MANUAL-RTO: 1.9 /100 WBC (ref 0–0)
PLATELET # BLD AUTO: 130 10*3/MM3 (ref 150–375)
PMV BLD AUTO: 10.7 FL (ref 8.9–12.1)
RBC # BLD AUTO: 4.04 10*6/MM3 (ref 3.9–5)
WBC NRBC COR # BLD: 15.81 10*3/MM3 (ref 4–10)

## 2018-06-29 PROCEDURE — 85025 COMPLETE CBC W/AUTO DIFF WBC: CPT | Performed by: INTERNAL MEDICINE

## 2018-06-29 PROCEDURE — 36415 COLL VENOUS BLD VENIPUNCTURE: CPT | Performed by: INTERNAL MEDICINE

## 2018-06-29 NOTE — PROGRESS NOTES
Lab Results   Component Value Date    WBC 15.81 (H) 06/29/2018    HGB 11.9 06/29/2018    HCT 35.2 06/29/2018    MCV 87.1 06/29/2018     (L) 06/29/2018   afebrile no complaints. Instructed to stop levaquin per NP notes. Verbalized understanding.

## 2018-07-02 ENCOUNTER — OFFICE VISIT (OUTPATIENT)
Dept: ONCOLOGY | Facility: CLINIC | Age: 23
End: 2018-07-02

## 2018-07-02 ENCOUNTER — LAB (OUTPATIENT)
Dept: LAB | Facility: HOSPITAL | Age: 23
End: 2018-07-02

## 2018-07-02 VITALS
DIASTOLIC BLOOD PRESSURE: 60 MMHG | OXYGEN SATURATION: 96 % | SYSTOLIC BLOOD PRESSURE: 90 MMHG | HEART RATE: 63 BPM | WEIGHT: 138.8 LBS | TEMPERATURE: 97.9 F | BODY MASS INDEX: 25.54 KG/M2 | HEIGHT: 62 IN | RESPIRATION RATE: 16 BRPM

## 2018-07-02 DIAGNOSIS — C85.28 MEDIASTINAL LARGE B-CELL LYMPHOMA OF LYMPH NODES OF MULTIPLE REGIONS (HCC): ICD-10-CM

## 2018-07-02 DIAGNOSIS — R59.1 LYMPHADENOPATHY: Primary | ICD-10-CM

## 2018-07-02 LAB
ANION GAP SERPL CALCULATED.3IONS-SCNC: 12.3 MMOL/L
BASOPHILS # BLD AUTO: 0.04 10*3/MM3 (ref 0–0.1)
BASOPHILS NFR BLD AUTO: 0.3 % (ref 0–1.1)
BUN BLD-MCNC: 13 MG/DL (ref 6–20)
BUN/CREAT SERPL: 17.8 (ref 7.3–30)
CALCIUM SPEC-SCNC: 9.8 MG/DL (ref 8.5–10.2)
CHLORIDE SERPL-SCNC: 100 MMOL/L (ref 98–107)
CO2 SERPL-SCNC: 26.7 MMOL/L (ref 22–29)
CREAT BLD-MCNC: 0.73 MG/DL (ref 0.6–1.1)
DEPRECATED RDW RBC AUTO: 41.1 FL (ref 37–49)
EOSINOPHIL # BLD AUTO: 0.08 10*3/MM3 (ref 0–0.36)
EOSINOPHIL NFR BLD AUTO: 0.6 % (ref 1–5)
ERYTHROCYTE [DISTWIDTH] IN BLOOD BY AUTOMATED COUNT: 13.1 % (ref 11.7–14.5)
GFR SERPL CREATININE-BSD FRML MDRD: 120 ML/MIN/1.73
GFR SERPL CREATININE-BSD FRML MDRD: 99 ML/MIN/1.73
GLUCOSE BLD-MCNC: 94 MG/DL (ref 74–124)
HCT VFR BLD AUTO: 38.7 % (ref 34–45)
HGB BLD-MCNC: 12.7 G/DL (ref 11.5–14.9)
IMM GRANULOCYTES # BLD: 3.85 10*3/MM3 (ref 0–0.03)
IMM GRANULOCYTES NFR BLD: 26.6 % (ref 0–0.5)
LYMPHOCYTES # BLD AUTO: 0.33 10*3/MM3 (ref 1–3.5)
LYMPHOCYTES NFR BLD AUTO: 2.3 % (ref 20–49)
MCH RBC QN AUTO: 29.4 PG (ref 27–33)
MCHC RBC AUTO-ENTMCNC: 32.8 G/DL (ref 32–35)
MCV RBC AUTO: 89.6 FL (ref 83–97)
MONOCYTES # BLD AUTO: 2.83 10*3/MM3 (ref 0.25–0.8)
MONOCYTES NFR BLD AUTO: 19.5 % (ref 4–12)
NEUTROPHILS # BLD AUTO: 7.36 10*3/MM3 (ref 1.5–7)
NEUTROPHILS NFR BLD AUTO: 50.7 % (ref 39–75)
NRBC BLD MANUAL-RTO: 0.6 /100 WBC (ref 0–0)
PLATELET # BLD AUTO: 136 10*3/MM3 (ref 150–375)
PMV BLD AUTO: 10.8 FL (ref 8.9–12.1)
POTASSIUM BLD-SCNC: 4.3 MMOL/L (ref 3.5–4.7)
RBC # BLD AUTO: 4.32 10*6/MM3 (ref 3.9–5)
SODIUM BLD-SCNC: 139 MMOL/L (ref 134–145)
WBC NRBC COR # BLD: 14.49 10*3/MM3 (ref 4–10)

## 2018-07-02 PROCEDURE — 36415 COLL VENOUS BLD VENIPUNCTURE: CPT | Performed by: INTERNAL MEDICINE

## 2018-07-02 PROCEDURE — 99215 OFFICE O/P EST HI 40 MIN: CPT | Performed by: INTERNAL MEDICINE

## 2018-07-02 PROCEDURE — 85025 COMPLETE CBC W/AUTO DIFF WBC: CPT | Performed by: INTERNAL MEDICINE

## 2018-07-02 PROCEDURE — 80048 BASIC METABOLIC PNL TOTAL CA: CPT | Performed by: INTERNAL MEDICINE

## 2018-07-09 ENCOUNTER — HOSPITAL ENCOUNTER (INPATIENT)
Facility: HOSPITAL | Age: 23
LOS: 6 days | Discharge: HOME | End: 2018-07-15
Attending: INTERNAL MEDICINE | Admitting: INTERNAL MEDICINE

## 2018-07-09 ENCOUNTER — APPOINTMENT (OUTPATIENT)
Dept: CT IMAGING | Facility: HOSPITAL | Age: 23
End: 2018-07-09

## 2018-07-09 ENCOUNTER — APPOINTMENT (OUTPATIENT)
Dept: GENERAL RADIOLOGY | Facility: HOSPITAL | Age: 23
End: 2018-07-09

## 2018-07-09 ENCOUNTER — LAB (OUTPATIENT)
Dept: LAB | Facility: HOSPITAL | Age: 23
End: 2018-07-09

## 2018-07-09 ENCOUNTER — OFFICE VISIT (OUTPATIENT)
Dept: ONCOLOGY | Facility: CLINIC | Age: 23
End: 2018-07-09

## 2018-07-09 VITALS
DIASTOLIC BLOOD PRESSURE: 62 MMHG | TEMPERATURE: 98.9 F | HEIGHT: 62 IN | OXYGEN SATURATION: 97 % | RESPIRATION RATE: 16 BRPM | SYSTOLIC BLOOD PRESSURE: 98 MMHG | WEIGHT: 143 LBS | BODY MASS INDEX: 26.31 KG/M2 | HEART RATE: 63 BPM

## 2018-07-09 DIAGNOSIS — I31.39 PERICARDIAL EFFUSION: ICD-10-CM

## 2018-07-09 DIAGNOSIS — C85.28 MEDIASTINAL LARGE B-CELL LYMPHOMA OF LYMPH NODES OF MULTIPLE REGIONS (HCC): ICD-10-CM

## 2018-07-09 DIAGNOSIS — C85.28 MEDIASTINAL LARGE B-CELL LYMPHOMA OF LYMPH NODES OF MULTIPLE REGIONS (HCC): Primary | ICD-10-CM

## 2018-07-09 PROBLEM — C83.30 DIFFUSE LARGE B-CELL LYMPHOMA (HCC): Status: ACTIVE | Noted: 2018-07-09

## 2018-07-09 LAB
ANION GAP SERPL CALCULATED.3IONS-SCNC: 13.4 MMOL/L
BASOPHILS # BLD AUTO: 0.06 10*3/MM3 (ref 0–0.1)
BASOPHILS NFR BLD AUTO: 1.2 % (ref 0–1.1)
BUN BLD-MCNC: 13 MG/DL (ref 6–20)
BUN/CREAT SERPL: 21.3 (ref 7.3–30)
CALCIUM SPEC-SCNC: 9.7 MG/DL (ref 8.5–10.2)
CHLORIDE SERPL-SCNC: 103 MMOL/L (ref 98–107)
CO2 SERPL-SCNC: 24.6 MMOL/L (ref 22–29)
CREAT BLD-MCNC: 0.61 MG/DL (ref 0.6–1.1)
DEPRECATED RDW RBC AUTO: 40.2 FL (ref 37–49)
EOSINOPHIL # BLD AUTO: 0.09 10*3/MM3 (ref 0–0.36)
EOSINOPHIL NFR BLD AUTO: 1.9 % (ref 1–5)
ERYTHROCYTE [DISTWIDTH] IN BLOOD BY AUTOMATED COUNT: 13.5 % (ref 11.7–14.5)
GFR SERPL CREATININE-BSD FRML MDRD: 122 ML/MIN/1.73
GFR SERPL CREATININE-BSD FRML MDRD: 147 ML/MIN/1.73
GLUCOSE BLD-MCNC: 93 MG/DL (ref 74–124)
HCT VFR BLD AUTO: 34.9 % (ref 34–45)
HGB BLD-MCNC: 11.6 G/DL (ref 11.5–14.9)
IMM GRANULOCYTES # BLD: 0.31 10*3/MM3 (ref 0–0.03)
IMM GRANULOCYTES NFR BLD: 6.4 % (ref 0–0.5)
LYMPHOCYTES # BLD AUTO: 0.49 10*3/MM3 (ref 1–3.5)
LYMPHOCYTES NFR BLD AUTO: 10.1 % (ref 20–49)
MCH RBC QN AUTO: 29.7 PG (ref 27–33)
MCHC RBC AUTO-ENTMCNC: 33.2 G/DL (ref 32–35)
MCV RBC AUTO: 89.5 FL (ref 83–97)
MONOCYTES # BLD AUTO: 0.57 10*3/MM3 (ref 0.25–0.8)
MONOCYTES NFR BLD AUTO: 11.8 % (ref 4–12)
NEUTROPHILS # BLD AUTO: 3.33 10*3/MM3 (ref 1.5–7)
NEUTROPHILS NFR BLD AUTO: 68.6 % (ref 39–75)
NRBC BLD MANUAL-RTO: 0.6 /100 WBC (ref 0–0)
PLATELET # BLD AUTO: 179 10*3/MM3 (ref 150–375)
PMV BLD AUTO: 11 FL (ref 8.9–12.1)
POTASSIUM BLD-SCNC: 4.1 MMOL/L (ref 3.5–4.7)
RBC # BLD AUTO: 3.9 10*6/MM3 (ref 3.9–5)
SODIUM BLD-SCNC: 141 MMOL/L (ref 134–145)
WBC NRBC COR # BLD: 4.85 10*3/MM3 (ref 4–10)

## 2018-07-09 PROCEDURE — 25010000002 IOPAMIDOL 61 % SOLUTION: Performed by: INTERNAL MEDICINE

## 2018-07-09 PROCEDURE — 36415 COLL VENOUS BLD VENIPUNCTURE: CPT | Performed by: INTERNAL MEDICINE

## 2018-07-09 PROCEDURE — 99223 1ST HOSP IP/OBS HIGH 75: CPT | Performed by: NURSE PRACTITIONER

## 2018-07-09 PROCEDURE — 25010000002 DIPHENHYDRAMINE PER 50 MG: Performed by: INTERNAL MEDICINE

## 2018-07-09 PROCEDURE — 71045 X-RAY EXAM CHEST 1 VIEW: CPT

## 2018-07-09 PROCEDURE — 71260 CT THORAX DX C+: CPT

## 2018-07-09 PROCEDURE — 80048 BASIC METABOLIC PNL TOTAL CA: CPT | Performed by: INTERNAL MEDICINE

## 2018-07-09 PROCEDURE — 63710000001 PREDNISONE PER 5 MG: Performed by: INTERNAL MEDICINE

## 2018-07-09 PROCEDURE — 25010000002 RITUXIMAB 10 MG/ML SOLUTION 50 ML VIAL: Performed by: INTERNAL MEDICINE

## 2018-07-09 PROCEDURE — 85025 COMPLETE CBC W/AUTO DIFF WBC: CPT | Performed by: INTERNAL MEDICINE

## 2018-07-09 RX ORDER — FAMOTIDINE 10 MG/ML
20 INJECTION, SOLUTION INTRAVENOUS AS NEEDED
Status: DISCONTINUED | OUTPATIENT
Start: 2018-07-09 | End: 2018-07-15 | Stop reason: HOSPADM

## 2018-07-09 RX ORDER — POTASSIUM CHLORIDE 750 MG/1
20 CAPSULE, EXTENDED RELEASE ORAL DAILY
Status: DISCONTINUED | OUTPATIENT
Start: 2018-07-09 | End: 2018-07-14

## 2018-07-09 RX ORDER — ACYCLOVIR 400 MG/1
400 TABLET ORAL 2 TIMES DAILY
Status: DISCONTINUED | OUTPATIENT
Start: 2018-07-09 | End: 2018-07-15 | Stop reason: HOSPADM

## 2018-07-09 RX ORDER — PANTOPRAZOLE SODIUM 40 MG/1
40 TABLET, DELAYED RELEASE ORAL
Status: COMPLETED | OUTPATIENT
Start: 2018-07-09 | End: 2018-07-13

## 2018-07-09 RX ORDER — ALLOPURINOL 300 MG/1
300 TABLET ORAL 2 TIMES DAILY
Status: DISCONTINUED | OUTPATIENT
Start: 2018-07-09 | End: 2018-07-15 | Stop reason: HOSPADM

## 2018-07-09 RX ORDER — MEPERIDINE HYDROCHLORIDE 50 MG/ML
25 INJECTION INTRAMUSCULAR; INTRAVENOUS; SUBCUTANEOUS
Status: ACTIVE | OUTPATIENT
Start: 2018-07-09 | End: 2018-07-10

## 2018-07-09 RX ORDER — SULFAMETHOXAZOLE AND TRIMETHOPRIM 800; 160 MG/1; MG/1
1 TABLET ORAL 3 TIMES WEEKLY
Status: DISCONTINUED | OUTPATIENT
Start: 2018-07-09 | End: 2018-07-15 | Stop reason: HOSPADM

## 2018-07-09 RX ORDER — ACETAMINOPHEN 325 MG/1
650 TABLET ORAL ONCE
Status: COMPLETED | OUTPATIENT
Start: 2018-07-09 | End: 2018-07-09

## 2018-07-09 RX ORDER — DIPHENHYDRAMINE HYDROCHLORIDE 50 MG/ML
50 INJECTION INTRAMUSCULAR; INTRAVENOUS AS NEEDED
Status: DISCONTINUED | OUTPATIENT
Start: 2018-07-09 | End: 2018-07-15 | Stop reason: HOSPADM

## 2018-07-09 RX ORDER — SENNA AND DOCUSATE SODIUM 50; 8.6 MG/1; MG/1
2 TABLET, FILM COATED ORAL 2 TIMES DAILY PRN
Status: DISCONTINUED | OUTPATIENT
Start: 2018-07-09 | End: 2018-07-15 | Stop reason: HOSPADM

## 2018-07-09 RX ORDER — PREDNISONE 50 MG/1
100 TABLET ORAL 2 TIMES DAILY WITH MEALS
Status: COMPLETED | OUTPATIENT
Start: 2018-07-09 | End: 2018-07-14

## 2018-07-09 RX ORDER — FLUCONAZOLE 200 MG/1
400 TABLET ORAL EVERY 24 HOURS
Status: DISCONTINUED | OUTPATIENT
Start: 2018-07-09 | End: 2018-07-15 | Stop reason: HOSPADM

## 2018-07-09 RX ORDER — ONDANSETRON 4 MG/1
4 TABLET, FILM COATED ORAL EVERY 6 HOURS PRN
Status: DISCONTINUED | OUTPATIENT
Start: 2018-07-09 | End: 2018-07-15 | Stop reason: HOSPADM

## 2018-07-09 RX ADMIN — ACETAMINOPHEN 650 MG: 325 TABLET, FILM COATED ORAL at 17:11

## 2018-07-09 RX ADMIN — PREDNISONE 100 MG: 50 TABLET ORAL at 18:21

## 2018-07-09 RX ADMIN — IOPAMIDOL 75 ML: 612 INJECTION, SOLUTION INTRAVENOUS at 21:36

## 2018-07-09 RX ADMIN — DIPHENHYDRAMINE HYDROCHLORIDE 25 MG: 50 INJECTION, SOLUTION INTRAMUSCULAR; INTRAVENOUS at 17:11

## 2018-07-09 RX ADMIN — FLUCONAZOLE 400 MG: 200 TABLET ORAL at 18:21

## 2018-07-09 RX ADMIN — ACYCLOVIR 400 MG: 400 TABLET ORAL at 20:54

## 2018-07-09 RX ADMIN — PANTOPRAZOLE SODIUM 40 MG: 40 TABLET, DELAYED RELEASE ORAL at 17:11

## 2018-07-09 RX ADMIN — RITUXIMAB 600 MG: 10 INJECTION, SOLUTION INTRAVENOUS at 18:03

## 2018-07-09 RX ADMIN — RIVAROXABAN 15 MG: 15 TABLET, FILM COATED ORAL at 18:21

## 2018-07-09 RX ADMIN — ALLOPURINOL 300 MG: 300 TABLET ORAL at 20:54

## 2018-07-09 NOTE — NURSING NOTE
3 attempts were made to place picc to left arm. I was able to cannulate the vein,and pass the guidewire without any difficulties. Yet when approaching the the clavicle area, PICC would not advance and lose the ability to give blood return or able to flush.Pt will need placement per radiology.

## 2018-07-09 NOTE — PLAN OF CARE
Problem: Patient Care Overview  Goal: Plan of Care Review  Outcome: Ongoing (interventions implemented as appropriate)   07/09/18 1830   Coping/Psychosocial   Plan of Care Reviewed With patient;mother   Plan of Care Review   Progress no change   OTHER   Outcome Summary received pt direct admit for chemo cycle 2. unable to get picc insertion, dr. islas notified. still able to give rituxan through periph. iv site and will do picc under floro. tomorrow to start dox/vincristine/etoposide afterwards. having pain in L back after picc attempts will call dr. islas to see if there are any interventions that need to be done. vss, will continue to monitor.      Goal: Individualization and Mutuality   07/09/18 1830   Individualization   Patient Specific Preferences goes by liam   Patient Specific Goals (Include Timeframe) start chemo   Patient Specific Interventions day one started

## 2018-07-09 NOTE — H&P
Subjective .     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 She did initiate EPOCH-R  in the hospital on 06/16/2018 with Neulasta support.      HISTORY OF PRESENT ILLNESS:  The patient is a 23 y.o. year old female who is here for admission for Cycle 2 EPOCH-R.    History of Present Illness she comes in today ready to proceed with cycle 2 of EPOCH-R.  She states she feels well today.  She denies fevers, nausea, trouble with her bowels, shortness of breath, or pain.  She does report fatigue however it is improved as compared to last week.  She does continue on prophylaxis with fluconazole, Bactrim, and acyclovir.  She also continues on Xarelto 15 mg twice daily.  This was initiated on 6/21/2018.    She is due for her next Zoladex injection on July 12.  Dr. Padilla did discuss with both she and her mother that we do plan a dose escalation with cycle 2 x 20% as discussed with Dr. Fox at [facility].    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  She saw Dr. Arina Cohen on last Friday.  The reason the patient was referred to Dr. Cohen was because they thought she had cardiomegaly on chest x-ray.  However a chest x-ray was ordered which showedThe chest x-ray showed extensive abnormal increased density throughout the anterior mediastinum extending into the left hilar region and left perihilar region and is most likely due to confluent lymphadenopathy.     CT angiogram of the chest did not show pulmonary embolism but showed     there is a large conglomerate  mass encases multiple vascular structures of the mediastinum and left  hilar region that is most likely extensive confluent lymphadenopathy.  The primary  differential diagnostic considerations would be lymphoma.  The tumor posteriorly displaces the pulmonary arteries and the left and  moderately narrows the main pulmonary artery. The tumor also posteriorly  displaces the trachea and markedly narrows the left mainstem bronchus.  The pulmonary veins in the left are also encased and narrowed. The mass  encases and markedly narrows the SVC. The large edy mass measures  approximately 19.8 x 13.7 x 14.0 cm in diameter. Enlarged lymph nodes  are also present in the left supraclavicular region where they appear to  produce occlusion of the left internal jugular vein. There is no  axillary lymphadenopathy. There are no filling defects within the  pulmonary arteries. There is no CT evidence of pulmonary embolism. There  is a small left pleural effusion. A small pericardial effusion measuring  8 mm in maximum thickness is also present. There is compressive  atelectasis of the left lung. Patchy airspace opacities are also present  in the superior aspect of the left upper lobe. These are most likely due  to direct tumor infiltration of the lung parenchyma, but could also  represent postobstructive pneumonia. The right lung is well expanded and  clear. Images through the upper abdomen partially show what could be a  edy mass in the retroperitoneum posterior and inferior to the  pancreas. Further evaluation with a CT scan of the abdomen and pelvis is  recommended. Bone window images demonstrate patchy sclerosis in the C7  and T1 and T2 and T3 and T4 vertebral body suspicious for bone  involvement with lymphoma.     IMPRESSION:  Very large tumor mass in the mediastinum encasing multiple  vascular structures and also moderately narrowing the left mainstem  bronchus as described in more detail above. Direct tumor infiltration of  the left upper lobe is also suspected. Small left pleural effusion and a  small pericardial effusion. There may also be partially  visualized  lymphadenopathy in the upper abdomen. Patchy areas of sclerosis are also  noted in the C7 and T1 and T2 and T3 and T4 vertebral bodies suspicious  for osseous metastatic disease. The findings are consistent with  Lymphoma.     Dr. Cohen felt that she had a small pericardial effusion.  Patient has been symptomatic with cough which is worsening which started back in February 2018 and worsened over the last 4 months.  Patient also has some shortness of breath with walking up the steps.  She has not lost weight.  She say she is reasonable appetite.  She does have fever kind of low-grade fever and night sweats.  She is more fatigued compared to before.  She was referred to us because of concern that this could be lymphoma.  She does not have any tissue diagnosis yet.  Patient does complain of back pain.     Echo done on admission June 12, 2018 by Dr. Fitzgerald showed that the patient's posterior and appears large primary leukemia the left ventricle and apex.  There is no tamponade.  The pericardium appears thickened pointing to involvement of the pericardium into the mediastinal process.  Dr. Fitzgerald felt that it would be difficult to do pericardial synthesis as the mediastinal mass covers the anterior aspect of the heart.  Ejection fraction is 58%  CT-guided needle biopsy June 12, 2018 was negative  LDH is elevated.  Uric acid is high.  MRI thoracic spine, negative  CT abdomen pelvis negative  Scan July 13, 2018 shows large infiltrative edy mass in the anterior mediastinum and left hilar region that nearly completely fills the left hemithorax and shows intense hypermetabolism with an SUV of 19.8.  No foci of pathologic hypermetabolism are identified elsewhere in the chest, neck abdomen or pelvis.     Pathology was consistent with primary mediastinal large B-cell lymphoma.     Patient was seen by Dr. Melgar.  He discussed harvesting eggs versus Zoladex plus oral contraceptives versus Zoladex alone for fertility  preservation.  Due to large size of the tumor and rapid initiation of treatment needed the patient was not able to have aches harvested done.  Because she is at increased risk of thrombosis with the use of oral contraceptives secondary to malignancy he recommended Zoladex alone.  Patient received first dose of Zoladex 3.6 mg subcutaneous on Belia 15, 2018.     Patient started on EPOCH/R on June 16, 2018     Patient had a reaction to Rituxan which improved with steroids, Benadryl and Pepcid.  She had to receive it slow.  She started having itching over her EARS , sore throat.  She was given IV steroids Benadryl and Pepcid and following that she was started at a slower rate and she completed it.     Right upper extremity Doppler ultrasound showed new superficial vein thrombosis around the PICC line with no extension into the deep system.  Repeat Doppler was ordered.    A Doppler ultrasound initially showed superficial thrombophlebitis.  She was on prophylactic Arixtra.  A Doppler was repeated 2 days later on 6/20/18  which showed extension of thrombus into the axillary and brachial veins.  She was therefore started on Xarelto 15 mg one every 12 hours and the PICC line was removed as soon as chemotherapy completed on 6/20/18.  She will take 15 mg of Xarelto every 12 hours for 21 days and then transition to 20 mg once a day.  The patient will have CBC performed twice a week.  If the platelet count drops below 50,000, anticoagulation will need to be temporarily held  Patient was started on acyclovir/fluconazole/Bactrim  Bone marrow done June 14, 2018, morphology was negative for lymphoma     Fertility preservation, Zoladex is next due July 12, 2018.    Current Outpatient Prescriptions on File Prior to Visit   Medication Sig Dispense Refill   • acyclovir (ZOVIRAX) 400 MG tablet Take 1 tablet by mouth 2 (Two) Times a Day for 191 doses. Take no more than 5 doses a day. 60 tablet 3   • allopurinol (ZYLOPRIM) 300 MG tablet  Take 1 tablet by mouth 2 (Two) Times a Day. 60 tablet 0   • fluconazole (DIFLUCAN) 200 MG tablet Take 2 tablets by mouth Daily for 96 doses. 60 tablet 3   • ondansetron (ZOFRAN) 4 MG tablet Take 1 tablet by mouth Every 6 (Six) Hours As Needed for Nausea or Vomiting. 30 tablet 2   • potassium chloride ER (K-TAB) 20 MEQ tablet controlled-release ER tablet Take 1 tablet by mouth Daily. 30 tablet 0   • rivaroxaban (XARELTO) 15 MG tablet Take 1 tablet by mouth 2 (Two) Times a Day With Meals. 42 tablet    • sennosides-docusate sodium (SENOKOT-S) 8.6-50 MG tablet Take 2 tablets by mouth 2 (Two) Times a Day As Needed for Constipation. 60 tablet 1   • sulfamethoxazole-trimethoprim (BACTRIM DS,SEPTRA DS) 800-160 MG per tablet Take 1 tablet by mouth 3 (Three) Times a Week for 40 doses. 12 tablet 3   • levoFLOXacin (LEVAQUIN) 500 MG tablet Take 1 tablet by mouth Daily. 7 tablet 0     No current facility-administered medications on file prior to visit.        ALLERGIES:   No Known Allergies    Social History     Social History   • Marital status: Single     Spouse name: N/A   • Number of children: N/A   • Years of education: N/A     Occupational History   • Dental student      [employer]     Social History Main Topics   • Smoking status: Never Smoker   • Smokeless tobacco: Never Used   • Alcohol use No   • Drug use: No   • Sexual activity: No     Other Topics Concern   • Not on file     Social History Narrative   • No narrative on file         Cancer-related family history includes Lung cancer in her maternal grandmother.     Review of Systems   Constitutional: Positive for fatigue. Negative for activity change, chills and fever.   HENT: Negative for facial swelling, mouth sores, nosebleeds and sore throat.    Respiratory: Negative for cough, shortness of breath and wheezing.    Cardiovascular: Negative for chest pain and leg swelling.   Gastrointestinal: Negative for abdominal pain, blood in stool, constipation,  diarrhea and nausea.   Genitourinary: Negative for difficulty urinating and dysuria.   Skin: Negative for color change and rash.   Neurological: Negative for dizziness, light-headedness and numbness.   Hematological: Negative for adenopathy.   Psychiatric/Behavioral: Negative.        Objective      Vitals:    07/09/18 0910   BP: 98/62   Pulse: 63   Resp: 16   Temp: 98.9 °F (37.2 °C)   SpO2: 97%      Current Status 7/2/2018   ECOG score 0       Physical Exam   Constitutional: She is oriented to person, place, and time. She appears well-developed and well-nourished.   HENT:   Head: Normocephalic.   Nose: Nose normal.   Eyes: Conjunctivae and EOM are normal. Pupils are equal, round, and reactive to light.   Neck: Normal range of motion. Neck supple. No JVD present.   Cardiovascular: Normal rate and regular rhythm.    No murmur heard.  Pulmonary/Chest: Effort normal and breath sounds normal. She has no wheezes.   Abdominal: Soft. Bowel sounds are normal. She exhibits no distension. There is no tenderness.   Musculoskeletal: Normal range of motion. She exhibits no edema or tenderness.   Lymphadenopathy:     She has no cervical adenopathy.   Neurological: She is alert and oriented to person, place, and time.   Skin: Skin is warm and dry. No rash noted.   Psychiatric: She has a normal mood and affect. Her behavior is normal.   Vitals reviewed.      RECENT LABS:  Hematology WBC   Date Value Ref Range Status   07/09/2018 4.85 4.00 - 10.00 10*3/mm3 Final     RBC   Date Value Ref Range Status   07/09/2018 3.90 3.90 - 5.00 10*6/mm3 Final     Hemoglobin   Date Value Ref Range Status   07/09/2018 11.6 11.5 - 14.9 g/dL Final     Hematocrit   Date Value Ref Range Status   07/09/2018 34.9 34.0 - 45.0 % Final     Platelets   Date Value Ref Range Status   07/09/2018 179 150 - 375 10*3/mm3 Final        Assessment/Plan   1.  Primary mediastinal large B-cell lymphoma: The patient presented with dyspnea, cough, and chest pain.  A CT  angiogram 6/8/2018 showed a large tumor mass in the mediastinum encasing multiple vascular structures and narrowing the left mainstem bronchus.  There was direct tumor infiltration of the right upper lobe.  There was a small pleural effusion.  She underwent CT-guided biopsy of the mediastinal mass on 6/12/2018 and pathology reviewed at [facility] oncology showed diffuse large B-cell lymphoma consistent with a primary diffuse large B-cell lymphoma.  A bone marrow exam was performed on 6/14/2018 which was negative for lymphoma.  She noted a PET scan on 6/13/2018 that showed a large hypermetabolic mass in the chest involving the anterior mediastinum, left hilum, nearly complete filling of the left hemothorax with SUV 19.8.  His physiologic distribution elsewhere.    The patient was started on IV fluid hydration and allopurinol for prevention of hyperuricemia.  She initiated systemic chemotherapy with DA-EPOCH-R on 6/16/18 and completed the evening of 6/21/18.  She had a infusion reaction to rituximab but was able to complete with additional medications and otherwise tolerated, this was discussed with Nevaeh Tillman today and no premedication changes necessary.   · She did have improvement in shortness of breath, cough, and chest pain at the time of discharge.    · She received Neulasta support at the day following discharge in our office.    · Her white count did decline to as low as 0.8 she had a low-grade temperature.  She is placed on Levaquin ×5 days starting on June 27, 2018.  · Platelets arminda of 82,000.  · Discussed with Dr. Fox at the [facility] and his suggestion was to do the CT scan and PET scan after 2 cycles and discuss the results with him.  These scans have been ordered.   If she has an excellent response early on she would not require any further treatments after completion of 6 cycles of EPOCH/R.    · We will proceed with cycle 2 of EPOCH- R starting today.  The patient will go to [facility] for  admission.  Dr. Villa is aware and Dr. Padilla has discussed treatment plan with an Nevaeh Tillman pharmacist.  Dose adjustments have been made to increase this cycle by 20%.     2.  Pericardial effusion:   a 2-D echocardiogram 6/8/18 showed a left ventricular systolic function 58%.  There was a 2 cm pericardial effusion with no tamponade.  The pericardium appeared thickened.  She had no evidence of volume overload throughout the hospital stay.  We will repeat an echocardiogram 2 weeks post discharge.    3.   FEN: She was monitored very carefully for any evidence of tumor lysis.  She was maintained on IV fluids and allopurinol throughout hospitalization.   It was recommended that she stay on allopurinol twice daily until her next cycle of chemotherapy to prevent hyperuricemia.  We monitored BMP weekly postdischarge.      4.  Fertility preservation:  Patient received Zoladex injection  for fertility preservation; this should be continued once monthly during her chemotherapy.  Next dose due 7/12/18.     5.  Right upper extremity swelling:  The patient had a PIC line placed in the right upper extremity for administration of chemotherapy.  Her arm was noted to be swollen.  A Doppler ultrasound initially showed superficial thrombophlebitis.  She was on prophylactic Arixtra.  A Doppler was repeated 2 days later on 6/20/18  which showed extension of thrombus into the axillary and brachial veins.  She was therefore started on Xarelto 15 mg one every 12 hours and the PICC line was removed as soon as chemotherapy completed on 6/20/18.  She will take 15 mg of Xarelto every 12 hours for 21 days and then transition to 20 mg once a day, this was I nitiated on 6/21/18.   If the platelet count drops below 50,000, anticoagulation will need to be temporarily held.    6.  ID: The patient will receive Neulasta 24 hours after completion of chemotherapy for reduction of neutropenic infection.  She will be discharged on prophylactic  antibiotics including acyclovir, Diflucan, and Bactrim.  She may require bacterial prophylaxis if the ANC drops below 1000.    7. IV access: The patient did have a PICC line in her right upper extremity during last hospitalization however was found to have the thrombus.  That PICC line was removed at discharge.  We had considered placing a port however with her being on Xarelto, Dr. Padilla does not with this held since it is early in the treatment of her thrombus, unless her platelets were to decline below 50 as discussed above.  Therefore we will continue with PICC line placement with each admission.  I will ask for the PICC line be placed in the left upper extremity upon this admission.               Cc:  Angela Cox, BRANNON

## 2018-07-10 ENCOUNTER — APPOINTMENT (OUTPATIENT)
Dept: GENERAL RADIOLOGY | Facility: HOSPITAL | Age: 23
End: 2018-07-10

## 2018-07-10 LAB
ALBUMIN SERPL-MCNC: 4.4 G/DL (ref 3.5–5.2)
ALBUMIN/GLOB SERPL: 1.7 G/DL
ALP SERPL-CCNC: 62 U/L (ref 39–117)
ALT SERPL W P-5'-P-CCNC: 50 U/L (ref 1–33)
ANION GAP SERPL CALCULATED.3IONS-SCNC: 15.4 MMOL/L
AST SERPL-CCNC: 28 U/L (ref 1–32)
BASOPHILS # BLD AUTO: 0.01 10*3/MM3 (ref 0–0.2)
BASOPHILS NFR BLD AUTO: 0.1 % (ref 0–1.5)
BILIRUB SERPL-MCNC: 0.2 MG/DL (ref 0.1–1.2)
BUN BLD-MCNC: 11 MG/DL (ref 6–20)
BUN/CREAT SERPL: 19 (ref 7–25)
CALCIUM SPEC-SCNC: 9.2 MG/DL (ref 8.6–10.5)
CHLORIDE SERPL-SCNC: 100 MMOL/L (ref 98–107)
CO2 SERPL-SCNC: 22.6 MMOL/L (ref 22–29)
CREAT BLD-MCNC: 0.58 MG/DL (ref 0.57–1)
DEPRECATED RDW RBC AUTO: 41.1 FL (ref 37–54)
EOSINOPHIL # BLD AUTO: 0 10*3/MM3 (ref 0–0.7)
EOSINOPHIL NFR BLD AUTO: 0 % (ref 0.3–6.2)
ERYTHROCYTE [DISTWIDTH] IN BLOOD BY AUTOMATED COUNT: 13.6 % (ref 11.7–13)
GFR SERPL CREATININE-BSD FRML MDRD: 129 ML/MIN/1.73
GFR SERPL CREATININE-BSD FRML MDRD: >150 ML/MIN/1.73
GLOBULIN UR ELPH-MCNC: 2.6 GM/DL
GLUCOSE BLD-MCNC: 118 MG/DL (ref 65–99)
HCT VFR BLD AUTO: 35.3 % (ref 35.6–45.5)
HGB BLD-MCNC: 11.6 G/DL (ref 11.9–15.5)
IMM GRANULOCYTES # BLD: 0.18 10*3/MM3 (ref 0–0.03)
IMM GRANULOCYTES NFR BLD: 2.1 % (ref 0–0.5)
LYMPHOCYTES # BLD AUTO: 0.4 10*3/MM3 (ref 0.9–4.8)
LYMPHOCYTES NFR BLD AUTO: 4.6 % (ref 19.6–45.3)
MCH RBC QN AUTO: 29.2 PG (ref 26.9–32)
MCHC RBC AUTO-ENTMCNC: 32.9 G/DL (ref 32.4–36.3)
MCV RBC AUTO: 88.9 FL (ref 80.5–98.2)
MONOCYTES # BLD AUTO: 0.28 10*3/MM3 (ref 0.2–1.2)
MONOCYTES NFR BLD AUTO: 3.2 % (ref 5–12)
NEUTROPHILS # BLD AUTO: 7.78 10*3/MM3 (ref 1.9–8.1)
NEUTROPHILS NFR BLD AUTO: 90 % (ref 42.7–76)
PLATELET # BLD AUTO: 209 10*3/MM3 (ref 140–500)
PMV BLD AUTO: 10.8 FL (ref 6–12)
POTASSIUM BLD-SCNC: 3.9 MMOL/L (ref 3.5–5.2)
PROT SERPL-MCNC: 7 G/DL (ref 6–8.5)
RBC # BLD AUTO: 3.97 10*6/MM3 (ref 3.9–5.2)
SODIUM BLD-SCNC: 138 MMOL/L (ref 136–145)
WBC NRBC COR # BLD: 8.65 10*3/MM3 (ref 4.5–10.7)

## 2018-07-10 PROCEDURE — 99233 SBSQ HOSP IP/OBS HIGH 50: CPT | Performed by: INTERNAL MEDICINE

## 2018-07-10 PROCEDURE — C1751 CATH, INF, PER/CENT/MIDLINE: HCPCS

## 2018-07-10 PROCEDURE — 3E04305 INTRODUCTION OF OTHER ANTINEOPLASTIC INTO CENTRAL VEIN, PERCUTANEOUS APPROACH: ICD-10-PCS | Performed by: INTERNAL MEDICINE

## 2018-07-10 PROCEDURE — 25010000002 VINCRISTINE PER 1 MG: Performed by: INTERNAL MEDICINE

## 2018-07-10 PROCEDURE — 02HV33Z INSERTION OF INFUSION DEVICE INTO SUPERIOR VENA CAVA, PERCUTANEOUS APPROACH: ICD-10-PCS | Performed by: RADIOLOGY

## 2018-07-10 PROCEDURE — 25010000002 ETOPOSIDE 100 MG/5ML SOLUTION 5 ML VIAL: Performed by: INTERNAL MEDICINE

## 2018-07-10 PROCEDURE — 80053 COMPREHEN METABOLIC PANEL: CPT | Performed by: INTERNAL MEDICINE

## 2018-07-10 PROCEDURE — 76937 US GUIDE VASCULAR ACCESS: CPT

## 2018-07-10 PROCEDURE — 25010000002 DOXORUBICIN PER 10 MG: Performed by: INTERNAL MEDICINE

## 2018-07-10 PROCEDURE — 63710000001 PREDNISONE PER 5 MG: Performed by: INTERNAL MEDICINE

## 2018-07-10 PROCEDURE — 25010000002 ONDANSETRON PER 1 MG: Performed by: INTERNAL MEDICINE

## 2018-07-10 PROCEDURE — 85025 COMPLETE CBC W/AUTO DIFF WBC: CPT | Performed by: INTERNAL MEDICINE

## 2018-07-10 PROCEDURE — 77001 FLUOROGUIDE FOR VEIN DEVICE: CPT

## 2018-07-10 RX ORDER — LIDOCAINE HYDROCHLORIDE 10 MG/ML
10 INJECTION, SOLUTION INFILTRATION; PERINEURAL ONCE
Status: COMPLETED | OUTPATIENT
Start: 2018-07-10 | End: 2018-07-10

## 2018-07-10 RX ADMIN — VINCRISTINE SULFATE: 1 INJECTION, SOLUTION INTRAVENOUS at 13:13

## 2018-07-10 RX ADMIN — PREDNISONE 100 MG: 50 TABLET ORAL at 17:21

## 2018-07-10 RX ADMIN — ACYCLOVIR 400 MG: 400 TABLET ORAL at 21:04

## 2018-07-10 RX ADMIN — PANTOPRAZOLE SODIUM 40 MG: 40 TABLET, DELAYED RELEASE ORAL at 07:17

## 2018-07-10 RX ADMIN — FLUCONAZOLE 400 MG: 200 TABLET ORAL at 17:21

## 2018-07-10 RX ADMIN — ALLOPURINOL 300 MG: 300 TABLET ORAL at 08:46

## 2018-07-10 RX ADMIN — POTASSIUM CHLORIDE 20 MEQ: 750 CAPSULE, EXTENDED RELEASE ORAL at 08:46

## 2018-07-10 RX ADMIN — PREDNISONE 100 MG: 50 TABLET ORAL at 08:46

## 2018-07-10 RX ADMIN — ACYCLOVIR 400 MG: 400 TABLET ORAL at 08:46

## 2018-07-10 RX ADMIN — RIVAROXABAN 15 MG: 15 TABLET, FILM COATED ORAL at 12:31

## 2018-07-10 RX ADMIN — LIDOCAINE HYDROCHLORIDE 6 ML: 10 INJECTION, SOLUTION INFILTRATION; PERINEURAL at 11:40

## 2018-07-10 RX ADMIN — RIVAROXABAN 15 MG: 15 TABLET, FILM COATED ORAL at 17:21

## 2018-07-10 RX ADMIN — ALLOPURINOL 300 MG: 300 TABLET ORAL at 21:04

## 2018-07-10 RX ADMIN — ONDANSETRON HYDROCHLORIDE 16 MG: 2 SOLUTION INTRAMUSCULAR; INTRAVENOUS at 12:30

## 2018-07-10 NOTE — PLAN OF CARE
Problem: Patient Care Overview  Goal: Plan of Care Review  Outcome: Ongoing (interventions implemented as appropriate)   07/10/18 0327   Coping/Psychosocial   Plan of Care Reviewed With patient   Plan of Care Review   Progress no change   OTHER   Outcome Summary Cycle 2 chemo this admission. Tolerated Rituxan tonight with no reaction. Plan is to get PICC placed today in CT. IV RN was unable to place picc at bedside. Plan is to receive chemo after picc is placed. Dox/ 16 and Vincristine. C/O pain in lumbar spine. Had CT scan tonight. History of blood clot in right arm from old PICC. Up ad rikki. Denies nausea       Problem: Oncology Care (Adult)  Goal: Signs and Symptoms of Listed Potential Problems Will be Absent, Minimized or Managed (Oncology Care)  Outcome: Ongoing (interventions implemented as appropriate)      Problem: Chemotherapy Effects (Adult)  Goal: Signs and Symptoms of Listed Potential Problems Will be Absent, Minimized or Managed (Chemotherapy Effects)  Outcome: Ongoing (interventions implemented as appropriate)

## 2018-07-10 NOTE — PATIENT CARE CONFERENCE
Inpatient 3 [facility] Oncology Interdisciplinary Team Conference Note  Date: 7/10/2018   Time: 2:14 PM       Patient Name:  Vikki Durham       Medical Record Number: [id number]   YOB: 1995  Sex: Female          Room/Bed:  [facility]  Payor Info:  Payor: [employer] / Plan: [employer] STUDENT RESOURCES / Product Type: *No Product type* /      Admitting Diagnosis: Diffuse large B-cell lymphoma (CMS/HCC) [C83.30]   Admit Date/Time:  7/9/2018 10:10 AM  Admission Comments: No comment available     Primary Diagnosis:  <principal problem not specified>  Principal Problem: <principal problem not specified>       Patient Active Problem List    Diagnosis   • Diffuse large B-cell lymphoma (CMS/HCC) [C83.30]   • 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 (CMS/HCC) [C85.28]   • Mediastinal mass [J98.59]   • Lymphadenopathy [R59.1]   • Thoracic back pain [M54.6]   • Dyspnea on exertion [R06.09]   • Palpitation [R00.2]   • Persistent cough for 3 weeks or longer [R05]   • Allergic sinusitis [J30.9]   • Health care maintenance [Z00.00]   • Bunion of great toe of left foot [M21.612]   • Low HDL (under 40) [E78.6]     Overview Note:     36 06/2017           Team Members Present: Paula Esquivel, RN - Oncology Staff Nurse; BRANONN Carrington - Oncology CNS; Faina Santiago - Oncology Social Work; Chema Gross - Pastoral Care; Nevaeh Hamilton - Physical Therapy; Sarai Jefferson - Nutrition Therapy; Nevaeh Tillman - Oncology Pharmacist       Team Discussion/Recommendations: PICC being placed in radiology now.  Plans for EPOCH therapy today.  No anticipated discharge needs.

## 2018-07-10 NOTE — CONSULTS
"Spiritual Care Consult:  Shared poetry and devotional reflection with pt, as well as prayer.  Pt uses her amanda to envision a hopeful future story for her self and her sense of vocation.  Pt able to frame \"this pause, not of my choosing,\" will be used for good things.  Chaplain Chema Gross  "

## 2018-07-10 NOTE — PLAN OF CARE
Problem: Patient Care Overview  Goal: Plan of Care Review  Outcome: Ongoing (interventions implemented as appropriate)   07/10/18 3760   OTHER   Outcome Summary Pt had PICC line placed under fluoro today-had to use right arm in radiology Current DVT in right arm and on xarelt. EPOCH today for 24 hours. VSS, no c/o pain or nausea. UP ad rikki. Will continue to monitlor.     Goal: Individualization and Mutuality  Outcome: Ongoing (interventions implemented as appropriate)    Goal: Discharge Needs Assessment  Outcome: Ongoing (interventions implemented as appropriate)    Goal: Interprofessional Rounds/Family Conf  Outcome: Ongoing (interventions implemented as appropriate)      Problem: Oncology Care (Adult)  Goal: Signs and Symptoms of Listed Potential Problems Will be Absent, Minimized or Managed (Oncology Care)  Outcome: Ongoing (interventions implemented as appropriate)      Problem: Chemotherapy Effects (Adult)  Goal: Signs and Symptoms of Listed Potential Problems Will be Absent, Minimized or Managed (Chemotherapy Effects)  Outcome: Ongoing (interventions implemented as appropriate)

## 2018-07-10 NOTE — PROGRESS NOTES
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  Fertility preservation, Zoladex is next due July 12, 2018.    HISTORY OF PRESENT ILLNESS:    She states she feels well today.  She denies fevers, nausea, trouble with her bowels, shortness of breath, or pain.  She does report fatigue however it is improved as compared to last week.  She does continue on prophylaxis with fluconazole, Bactrim, and acyclovir.  She also continues on Xarelto 15 mg twice daily.  This was initiated on 6/21/2018 due to RUE DVT associated with PICC..     She is due for her next Zoladex injection on July 12.  Dr. Padilla did discuss with both she and her mother that we do plan a dose escalation with cycle 2 x 20% as discussed with Dr. Fox at [facility].    Unfortunately, the IV nurse was unable to place a PICC line at the bedside yesterday.  The patient received Rituxan through a peripheral IV and will be undergoing PICC line placement in interventional radiology this morning to facilitate administration of her further chemotherapy.    History of Present Illness      Past Medical History, Past Surgical History, Social History, Family History have been reviewed and are without significant changes except as mentioned.    Review of Systems   She had back spasms last night which have now resolved.  Chest x-ray and CT chest and not reveal any acute findings.    A comprehensive 14 point review of systems was performed and was negative except as mentioned.    Medications:  The current medication list was reviewed in the EMR    ALLERGIES:  No Known Allergies    Objective      Vitals:    07/09/18 2249 07/10/18 0011 07/10/18 0502 07/10/18 0809   BP: 101/69 109/65 96/56 111/84   BP Location: Right leg Left arm Left arm Left arm   Patient Position: Lying Lying Lying Sitting   Pulse: 58 62 61 58   Resp: 16 16 16 16   Temp: 97.7 °F (36.5 °C) 97 °F (36.1 °C) 97.3 °F  (36.3 °C) 98 °F (36.7 °C)   TempSrc: Oral Oral Oral Oral   SpO2: 98% 98% 96% 96%     Current Status 7/2/2018   ECOG score 0       Physical Exam    Constitutional: She is oriented to person, place, and time. She appears well-developed and well-nourished.   HENT:   Head: Normocephalic.   Nose: Nose normal.   Eyes: Conjunctivae and EOM are normal. Pupils are equal, round, and reactive to light.   Neck: Normal range of motion. Neck supple. No JVD present.   Cardiovascular: Normal rate and regular rhythm.    No murmur heard.  Pulmonary/Chest: Effort normal and breath sounds normal. She has no wheezes.   Abdominal: Soft. Bowel sounds are normal. She exhibits no distension. There is no tenderness.   Musculoskeletal: Normal range of motion. She exhibits no edema or tenderness.   Lymphadenopathy:     She has no cervical adenopathy.   Neurological: She is alert and oriented to person, place, and time.   Skin: Skin is warm and dry. No rash noted.   Psychiatric: She has a normal mood and affect. Her behavior is normal.      RECENT LABS:  Hematology WBC   Date Value Ref Range Status   07/10/2018 8.65 4.50 - 10.70 10*3/mm3 Final     RBC   Date Value Ref Range Status   07/10/2018 3.97 3.90 - 5.20 10*6/mm3 Final     Hemoglobin   Date Value Ref Range Status   07/10/2018 11.6 (L) 11.9 - 15.5 g/dL Final     Hematocrit   Date Value Ref Range Status   07/10/2018 35.3 (L) 35.6 - 45.5 % Final     Platelets   Date Value Ref Range Status   07/10/2018 209 140 - 500 10*3/mm3 Final            Lab Results   Component Value Date    GLUCOSE 118 (H) 07/10/2018    BUN 11 07/10/2018    CREATININE 0.58 07/10/2018    EGFRIFNONA 129 07/10/2018    EGFRIFAFRI >150 07/10/2018    BCR 19.0 07/10/2018    K 3.9 07/10/2018    CO2 22.6 07/10/2018    CALCIUM 9.2 07/10/2018    ALBUMIN 4.40 07/10/2018    LABIL2 1.7 07/10/2018    AST 28 07/10/2018    ALT 50 (H) 07/10/2018     CT CHEST WITH IV CONTRAST 7/9/2018  IMPRESSION:  1.  Redemonstration of a large anterior  mediastinal mass.  2.  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  3.  Atelectasis and volume loss of the left lung with infiltrates in the  left upper lobe.    Assessment/Plan   1.  Primary mediastinal large B-cell lymphoma: Admitted 7/9/2018 for cycle #2 dose adjusted EPOCH + Rituxan    2.  Pericardial effusion:   a 2-D echocardiogram 6/8/18 showed a left ventricular systolic function 58%.  There was a 2 cm pericardial effusion with no tamponade.    3.  Fertility preservation:  Patient received Zoladex injection  for fertility preservation; this should be continued once monthly during her chemotherapy.  Next dose due 7/12/18.    4. IV access: The patient did have a PICC line in her right upper extremity during last hospitalization however was found to have the thrombus.  That PICC line was removed at discharge.  We had considered placing a port however with her being on Xarelto, Dr. Padilla does not wish this held since it is early in the treatment of her thrombus, unless her platelets were to decline below 50 as discussed above.  Therefore we will continue with PICC line placement with each admission.  As noted above, the IV team was unable to get a PICC line placed at the bedside yesterday and she will need to be scheduled for interventional radiology to place the PICC line under fluoroscopy today.    Plan  1.  She will undergo placement of a PICC line in the left upper extremity by interventional radiology under fluoroscopy this morning.  2.  Once the PICC line's in place she will proceed with day #1 EPO CH with dose escalation.  She did well with the Rituxan treatment yesterday.  3.  She will continue on Xarelto 15 mg twice a day for her right upper extremity DVT.  4.  She will be due for another Zoladex injection later this week on 7/12/2018.              7/10/2018      CC:

## 2018-07-10 NOTE — NURSING NOTE
Nursing Chemotherapy Verification    Chemotherapy Regimen:   Treatment Plans     Name Type Hold Status Plan dates Plan Provider       Active    OP Goserelin 3.6 mg Q28D ONCOLOGY SUPPORTIVE CARE 1 On Automatic Hold  6/20/2018 - Present Ramón Camp MD    IP LYMPHOMA R-EPOCH (Dose Adjusted) RiTUXimab / Etoposide / DOXOrubicin / VinCRIStine / Cyclophosphamide / PredniSONE ONCOLOGY TREATMENT Not on Hold  6/16/2018 - Present Kimberley Melgar MD                    Current height and weight:      Calculated BSA from current height and weight (Zahira): 1.67    Relevant Labs    Results from last 7 days  Lab Units 07/10/18  0727 07/09/18  0839   WBC 10*3/mm3 8.65 4.85   HEMOGLOBIN g/dL 11.6* 11.6   HEMATOCRIT % 35.3* 34.9   PLATELETS 10*3/mm3 209 179     Lab Results   Component Value Date    NEUTROABS 7.78 07/10/2018         Results from last 7 days  Lab Units 07/10/18  0727 07/09/18  0839   CREATININE mg/dL 0.58 0.61       Serum creatinine: 0.58 mg/dL 07/10/18 0727  Estimated creatinine clearance: 132.9 mL/min    Lab Results   Component Value Date    HEPBCAB Negative 06/14/2018       Verification attestation:  I have personally reviewed the planned regimen and its administration and dosing. I understand the potential side effects.The patient has been instructed on the regimen, potential side effects, and self care measures; the consent form has been completed. I have confirmed that the appropriate premedication, prehydration, post medication and/or emergency medications are ordered in addition to the chemotherapy.    I have independently verified that the height and weight is current and calculated the BSA. I have verified the doses with the planned regimen and have clarified any deviations with the physician (Allen). I have confirmed the route of administration and patient IV access.    Nurse: Paula Esquivel RN  2nd verification Nurse: Zenaida Love RN

## 2018-07-11 LAB
ALBUMIN SERPL-MCNC: 4.1 G/DL (ref 3.5–5.2)
ALBUMIN/GLOB SERPL: 1.6 G/DL
ALP SERPL-CCNC: 54 U/L (ref 39–117)
ALT SERPL W P-5'-P-CCNC: 53 U/L (ref 1–33)
ANION GAP SERPL CALCULATED.3IONS-SCNC: 12.4 MMOL/L
AST SERPL-CCNC: 29 U/L (ref 1–32)
BASOPHILS # BLD AUTO: 0 10*3/MM3 (ref 0–0.2)
BASOPHILS NFR BLD AUTO: 0 % (ref 0–1.5)
BILIRUB SERPL-MCNC: <0.2 MG/DL (ref 0.1–1.2)
BUN BLD-MCNC: 10 MG/DL (ref 6–20)
BUN/CREAT SERPL: 19.6 (ref 7–25)
CALCIUM SPEC-SCNC: 8.9 MG/DL (ref 8.6–10.5)
CHLORIDE SERPL-SCNC: 104 MMOL/L (ref 98–107)
CO2 SERPL-SCNC: 25.6 MMOL/L (ref 22–29)
CREAT BLD-MCNC: 0.51 MG/DL (ref 0.57–1)
DEPRECATED RDW RBC AUTO: 42.2 FL (ref 37–54)
EOSINOPHIL # BLD AUTO: 0 10*3/MM3 (ref 0–0.7)
EOSINOPHIL NFR BLD AUTO: 0 % (ref 0.3–6.2)
ERYTHROCYTE [DISTWIDTH] IN BLOOD BY AUTOMATED COUNT: 13.9 % (ref 11.7–13)
GFR SERPL CREATININE-BSD FRML MDRD: 149 ML/MIN/1.73
GFR SERPL CREATININE-BSD FRML MDRD: >150 ML/MIN/1.73
GLOBULIN UR ELPH-MCNC: 2.6 GM/DL
GLUCOSE BLD-MCNC: 146 MG/DL (ref 65–99)
HCT VFR BLD AUTO: 31.9 % (ref 35.6–45.5)
HGB BLD-MCNC: 10.5 G/DL (ref 11.9–15.5)
IMM GRANULOCYTES # BLD: 0.1 10*3/MM3 (ref 0–0.03)
IMM GRANULOCYTES NFR BLD: 0.8 % (ref 0–0.5)
LYMPHOCYTES # BLD AUTO: 0.34 10*3/MM3 (ref 0.9–4.8)
LYMPHOCYTES NFR BLD AUTO: 2.7 % (ref 19.6–45.3)
MCH RBC QN AUTO: 29.7 PG (ref 26.9–32)
MCHC RBC AUTO-ENTMCNC: 32.9 G/DL (ref 32.4–36.3)
MCV RBC AUTO: 90.1 FL (ref 80.5–98.2)
MONOCYTES # BLD AUTO: 0.55 10*3/MM3 (ref 0.2–1.2)
MONOCYTES NFR BLD AUTO: 4.3 % (ref 5–12)
NEUTROPHILS # BLD AUTO: 11.75 10*3/MM3 (ref 1.9–8.1)
NEUTROPHILS NFR BLD AUTO: 92.2 % (ref 42.7–76)
PLATELET # BLD AUTO: 191 10*3/MM3 (ref 140–500)
PMV BLD AUTO: 11 FL (ref 6–12)
POTASSIUM BLD-SCNC: 3.8 MMOL/L (ref 3.5–5.2)
PROT SERPL-MCNC: 6.7 G/DL (ref 6–8.5)
RBC # BLD AUTO: 3.54 10*6/MM3 (ref 3.9–5.2)
SODIUM BLD-SCNC: 142 MMOL/L (ref 136–145)
WBC NRBC COR # BLD: 12.74 10*3/MM3 (ref 4.5–10.7)

## 2018-07-11 PROCEDURE — 25010000002 DOXORUBICIN PER 10 MG: Performed by: INTERNAL MEDICINE

## 2018-07-11 PROCEDURE — 25010000002 VINCRISTINE PER 1 MG: Performed by: INTERNAL MEDICINE

## 2018-07-11 PROCEDURE — 25010000002 ONDANSETRON PER 1 MG: Performed by: INTERNAL MEDICINE

## 2018-07-11 PROCEDURE — 63710000001 PREDNISONE PER 5 MG: Performed by: INTERNAL MEDICINE

## 2018-07-11 PROCEDURE — 25010000002 ETOPOSIDE 100 MG/5ML SOLUTION 25 ML VIAL: Performed by: INTERNAL MEDICINE

## 2018-07-11 PROCEDURE — 80053 COMPREHEN METABOLIC PANEL: CPT | Performed by: INTERNAL MEDICINE

## 2018-07-11 PROCEDURE — 99232 SBSQ HOSP IP/OBS MODERATE 35: CPT | Performed by: INTERNAL MEDICINE

## 2018-07-11 PROCEDURE — 85025 COMPLETE CBC W/AUTO DIFF WBC: CPT | Performed by: INTERNAL MEDICINE

## 2018-07-11 RX ORDER — SODIUM CHLORIDE 9 MG/ML
75 INJECTION, SOLUTION INTRAVENOUS CONTINUOUS
Status: DISCONTINUED | OUTPATIENT
Start: 2018-07-11 | End: 2018-07-15 | Stop reason: HOSPADM

## 2018-07-11 RX ADMIN — RIVAROXABAN 15 MG: 15 TABLET, FILM COATED ORAL at 08:53

## 2018-07-11 RX ADMIN — SODIUM CHLORIDE 75 ML/HR: 9 INJECTION, SOLUTION INTRAVENOUS at 13:23

## 2018-07-11 RX ADMIN — ACYCLOVIR 400 MG: 400 TABLET ORAL at 08:53

## 2018-07-11 RX ADMIN — PREDNISONE 100 MG: 50 TABLET ORAL at 17:59

## 2018-07-11 RX ADMIN — DOXORUBICIN HYDROCHLORIDE: 2 INJECTION, SOLUTION INTRAVENOUS at 13:53

## 2018-07-11 RX ADMIN — FLUCONAZOLE 400 MG: 200 TABLET ORAL at 17:59

## 2018-07-11 RX ADMIN — PREDNISONE 100 MG: 50 TABLET ORAL at 08:53

## 2018-07-11 RX ADMIN — SULFAMETHOXAZOLE AND TRIMETHOPRIM 160 MG: 800; 160 TABLET ORAL at 08:52

## 2018-07-11 RX ADMIN — RIVAROXABAN 15 MG: 15 TABLET, FILM COATED ORAL at 17:59

## 2018-07-11 RX ADMIN — POTASSIUM CHLORIDE 20 MEQ: 750 CAPSULE, EXTENDED RELEASE ORAL at 08:53

## 2018-07-11 RX ADMIN — SODIUM CHLORIDE 75 ML/HR: 9 INJECTION, SOLUTION INTRAVENOUS at 18:01

## 2018-07-11 RX ADMIN — ACYCLOVIR 400 MG: 400 TABLET ORAL at 21:04

## 2018-07-11 RX ADMIN — ONDANSETRON 4 MG: 4 TABLET, FILM COATED ORAL at 18:06

## 2018-07-11 RX ADMIN — ALLOPURINOL 300 MG: 300 TABLET ORAL at 21:04

## 2018-07-11 RX ADMIN — PANTOPRAZOLE SODIUM 40 MG: 40 TABLET, DELAYED RELEASE ORAL at 06:04

## 2018-07-11 RX ADMIN — ONDANSETRON 16 MG: 2 INJECTION INTRAMUSCULAR; INTRAVENOUS at 13:23

## 2018-07-11 RX ADMIN — ALLOPURINOL 300 MG: 300 TABLET ORAL at 08:52

## 2018-07-11 NOTE — PROGRESS NOTES
Discharge Planning Assessment  [facility]     Patient Name: Vikki Durham  MRN: [id number]  Today's Date: 7/11/2018    Admit Date: 7/9/2018          Discharge Needs Assessment     Row Name 07/11/18 1545       Living Environment    Lives With parent(s)    Name(s) of Who Lives With Patient Emma Durham/scout  [phone]    Current Living Arrangements home/apartment/condo    Primary Care Provided by self;parent(s)    Provides Primary Care For no one    Family Caregiver if Needed parent(s)    Family Caregiver Names Emma Durham/parents  [phone]    Quality of Family Relationships helpful;involved;supportive    Able to Return to Prior Arrangements yes    Living Arrangement Comments Lives with parents in a multi-level house. Bed & bath 2nd level (12 steps). Three steps to enter residence.       Resource/Environmental Concerns    Resource/Environmental Concerns none       Transition Planning    Patient/Family Anticipates Transition to home with family    Patient/Family Anticipated Services at Transition none    Transportation Anticipated family or friend will provide       Discharge Needs Assessment    Readmission Within the Last 30 Days planned readmission    Concerns to be Addressed no discharge needs identified;denies needs/concerns at this time    Equipment Currently Used at Home none    Anticipated Changes Related to Illness inability to return to school    Equipment Needed After Discharge none    Offered/Gave Vendor List no    Patient's Choice of Community Agency(s) No HH or SNF used previously.    Discharge Coordination/Progress Spoke with patient in [facility]. States no changes since last admission.            Discharge Plan     Row Name 07/11/18 7865       Plan    Plan Plans dc home with parents. No needs identified at this time. Continue to follow.    Patient/Family in Agreement with Plan yes        Destination     No service coordination in this encounter.      Durable Medical Equipment      No service coordination in this encounter.      Dialysis/Infusion     No service coordination in this encounter.      Home Medical Care     No service coordination in this encounter.      Social Care     No service coordination in this encounter.                Demographic Summary     Row Name 07/11/18 1542       General Information    Admission Type inpatient    Referral Source admission list    Reason for Consult discharge planning            Functional Status     Row Name 07/11/18 1542       Functional Status    Usual Activity Tolerance good    Current Activity Tolerance good       Functional Status, IADL    Medications independent    Meal Preparation independent    Housekeeping independent    Laundry independent    Shopping independent            Psychosocial    No documentation.           Abuse/Neglect    No documentation.           Legal    No documentation.           Substance Abuse    No documentation.           Patient Forms    No documentation.         Lien Tate RN

## 2018-07-11 NOTE — PLAN OF CARE
Problem: Patient Care Overview  Goal: Plan of Care Review  Outcome: Ongoing (interventions implemented as appropriate)   07/11/18 1626   Coping/Psychosocial   Plan of Care Reviewed With patient;mother   Plan of Care Review   Progress no change   OTHER   Outcome Summary Pt continued chemo today, day 3 of cycle 2. tolerating well. ambulated in [place]. PICC line with good blood return. checking about q6hr per recommendation of rowan Leon. elevating RUE d/t hx of clot, remains on xarelto. VSS, will continue monitor.      Goal: Individualization and Mutuality  Outcome: Ongoing (interventions implemented as appropriate)   07/11/18 1626   Individualization   Patient Specific Preferences have mom at bedside   Patient Specific Goals (Include Timeframe) walk in halls, get day 3 chemo   Patient Specific Interventions day three started, ambulated in halls with mom       Problem: Oncology Care (Adult)  Goal: Signs and Symptoms of Listed Potential Problems Will be Absent, Minimized or Managed (Oncology Care)  Outcome: Ongoing (interventions implemented as appropriate)      Problem: Chemotherapy Effects (Adult)  Goal: Signs and Symptoms of Listed Potential Problems Will be Absent, Minimized or Managed (Chemotherapy Effects)  Outcome: Ongoing (interventions implemented as appropriate)   07/11/18 1626   Goal/Outcome Evaluation   Problems Assessed (Chemotherapy Effects) all   Problems Present (Chemotherapy Effects) other (see comments)  (neuropathy toes and fingers)

## 2018-07-11 NOTE — PLAN OF CARE
Problem: Patient Care Overview  Goal: Plan of Care Review  Outcome: Ongoing (interventions implemented as appropriate)   07/11/18 0457   Coping/Psychosocial   Plan of Care Reviewed With patient   Plan of Care Review   Progress improving   OTHER   Outcome Summary no c/o pain or nausea; tolerating chemo with minimal difficulty (fatigue); very pleasant; xarelto continues BID r/t prev diagnosis of DVT in R arm; up ad rikki       Problem: Oncology Care (Adult)  Goal: Signs and Symptoms of Listed Potential Problems Will be Absent, Minimized or Managed (Oncology Care)  Outcome: Ongoing (interventions implemented as appropriate)      Problem: Chemotherapy Effects (Adult)  Goal: Signs and Symptoms of Listed Potential Problems Will be Absent, Minimized or Managed (Chemotherapy Effects)  Outcome: Ongoing (interventions implemented as appropriate)

## 2018-07-11 NOTE — NURSING NOTE
"Nursing Chemotherapy Verification    Chemotherapy Regimen:   Treatment Plans     Name Type Hold Status Plan dates Plan Provider       Active    OP Goserelin 3.6 mg Q28D ONCOLOGY SUPPORTIVE CARE 1 On Automatic Hold  6/20/2018 - Present Ramón Camp MD    IP LYMPHOMA R-EPOCH (Dose Adjusted) RiTUXimab / Etoposide / DOXOrubicin / VinCRIStine / Cyclophosphamide / PredniSONE ONCOLOGY TREATMENT Not on Hold  6/16/2018 - Present Kimberley Melgar MD                    Current height and weight: 157.5 cm (62\") 64.5 kg (142 lb 3.2 oz)  Calculated BSA from current height and weight (Zahira): 1.65      Relevant Labs    Results from last 7 days  Lab Units 07/11/18  0409 07/10/18  0727 07/09/18  0839   WBC 10*3/mm3 12.74* 8.65 4.85   HEMOGLOBIN g/dL 10.5* 11.6* 11.6   HEMATOCRIT % 31.9* 35.3* 34.9   PLATELETS 10*3/mm3 191 209 179     Lab Results   Component Value Date    NEUTROABS 11.75 (H) 07/11/2018         Results from last 7 days  Lab Units 07/11/18  0409 07/10/18  0727 07/09/18  0839   CREATININE mg/dL 0.51* 0.58 0.61       Serum creatinine: 0.51 mg/dL (L) 07/11/18 0409  Estimated creatinine clearance: 151.4 mL/min (A)    Lab Results   Component Value Date    HEPBCAB Negative 06/14/2018       Verification attestation:  I have personally reviewed the planned regimen and its administration and dosing. I understand the potential side effects.The patient has been instructed on the regimen, potential side effects, and self care measures; the consent form has been completed. I have confirmed that the appropriate premedication, prehydration, post medication and/or emergency medications are ordered in addition to the chemotherapy.    I have independently verified that the height and weight is current and calculated the BSA. I have verified the doses with the planned regimen and have clarified any deviations with the physician (dr. islas). I have confirmed the route of administration and patient IV access.    Nurse: Rylee" Ebony, RN  2nd verification Nurse: sandra FUNK Rn

## 2018-07-11 NOTE — PROGRESS NOTES
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  Fertility preservation, Zoladex is next due July 12, 2018.    HISTORY OF PRESENT ILLNESS:    She states she feels well today.  She denies fevers, nausea, trouble with her bowels, shortness of breath, or pain.  She does report fatigue however it is improved as compared to last week.  She does continue on prophylaxis with fluconazole, Bactrim, and acyclovir.  She also continues on Xarelto 15 mg twice daily.  This was initiated on 6/21/2018 due to RUE DVT associated with PICC..     She is due for her next Zoladex injection on July 12.  Dr. Padilla did discuss with both she and her mother that we do plan a dose escalation with cycle 2 x 20% as discussed with Dr. Fox at [facility].    Unfortunately, the IV nurse was unable to place a PICC line at the bedside yesterday.  The patient received Rituxan through a peripheral IV and underwent PICC line placement in interventional radiology in the RUE on 7/10/2018.    History of Present Illness      Past Medical History, Past Surgical History, Social History, Family History have been reviewed and are without significant changes except as mentioned.    Review of Systems     A comprehensive 14 point review of systems was performed and was negative except as mentioned.    Medications:  The current medication list was reviewed in the EMR    ALLERGIES:  No Known Allergies    Objective      Vitals:    07/10/18 1654 07/10/18 2042 07/11/18 0406 07/11/18 0731   BP: 99/59 125/86 108/59 94/52   BP Location: Left arm Left arm Left arm Left arm   Patient Position: Sitting Lying Lying Lying   Pulse: 59 61 57 50   Resp: 18 18 16 16   Temp: 97 °F (36.1 °C) 97.8 °F (36.6 °C) 98 °F (36.7 °C) 97.9 °F (36.6 °C)   TempSrc: Oral Oral Oral Oral   SpO2: 94% 96% 98% 97%   Weight:   64.5 kg (142 lb 3.2 oz)    Height:   157.5 cm (62\")      Current Status 7/2/2018 "   ECOG score 0       Physical Exam    Constitutional: She is oriented to person, place, and time. She appears well-developed and well-nourished.   HENT:   Head: Normocephalic.   Nose: Nose normal.   Eyes: Conjunctivae and EOM are normal. Pupils are equal, round, and reactive to light.   Neck: Normal range of motion. Neck supple. No JVD present.   Cardiovascular: Normal rate and regular rhythm.    No murmur heard.  Pulmonary/Chest: Effort normal and breath sounds normal. She has no wheezes.   Abdominal: Soft. Bowel sounds are normal. She exhibits no distension. There is no tenderness.   Musculoskeletal: Normal range of motion. She exhibits no edema or tenderness.   Lymphadenopathy:     She has no cervical adenopathy.   Neurological: She is alert and oriented to person, place, and time.   Skin: Skin is warm and dry. No rash noted.   Psychiatric: She has a normal mood and affect. Her behavior is normal.      RECENT LABS:  Hematology WBC   Date Value Ref Range Status   07/11/2018 12.74 (H) 4.50 - 10.70 10*3/mm3 Final     RBC   Date Value Ref Range Status   07/11/2018 3.54 (L) 3.90 - 5.20 10*6/mm3 Final     Hemoglobin   Date Value Ref Range Status   07/11/2018 10.5 (L) 11.9 - 15.5 g/dL Final     Hematocrit   Date Value Ref Range Status   07/11/2018 31.9 (L) 35.6 - 45.5 % Final     Platelets   Date Value Ref Range Status   07/11/2018 191 140 - 500 10*3/mm3 Final            Lab Results   Component Value Date    GLUCOSE 146 (H) 07/11/2018    BUN 10 07/11/2018    CREATININE 0.51 (L) 07/11/2018    EGFRIFNONA 149 07/11/2018    EGFRIFAFRI >150 07/11/2018    BCR 19.6 07/11/2018    K 3.8 07/11/2018    CO2 25.6 07/11/2018    CALCIUM 8.9 07/11/2018    ALBUMIN 4.10 07/11/2018    LABIL2 1.6 07/11/2018    AST 29 07/11/2018    ALT 53 (H) 07/11/2018     CT CHEST WITH IV CONTRAST 7/9/2018  IMPRESSION:  1.  Redemonstration of a large anterior mediastinal mass.  2.  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  3.  Atelectasis and volume loss of the left lung with infiltrates in the  left upper lobe.    Assessment/Plan   1.  Primary mediastinal large B-cell lymphoma: Admitted 7/9/2018 for cycle #2 dose adjusted EPOCH + Rituxan    2.  Pericardial effusion:   a 2-D echocardiogram 6/8/18 showed a left ventricular systolic function 58%.  There was a 2 cm pericardial effusion with no tamponade.    3.  Fertility preservation:  Patient received Zoladex injection  for fertility preservation; this should be continued once monthly during her chemotherapy.  Next dose due 7/12/18.    4. IV access: The patient did have a PICC line in her right upper extremity during last hospitalization however was found to have the thrombus.  That PICC line was removed at discharge.  We had considered placing a port however with her being on Xarelto, Dr. Padilla does not wish this held since it is early in the treatment of her thrombus, unless her platelets were to decline below 50 as discussed above.  Therefore we will continue with PICC line placement with each admission.  As noted above, the IV team was unable to get a PICC line placed at the bedside on admission and she required interventional radiology to place the PICC line under fluoroscopy 7/10/2018.    Plan  1.  She will undergo placement of a PICC line in the left upper extremity by interventional radiology under fluoroscopy this morning.  2.  Once the PICC line's in place she will proceed with day #1 EPO CH with dose escalation.  She did well with the Rituxan treatment 7/9/2018.  3.  She will continue on Xarelto 15 mg twice a day for her right upper extremity DVT.  4.  She will be due for another Zoladex injection on 7/12/2018.              7/11/2018      CC:

## 2018-07-11 NOTE — PROGRESS NOTES
Continued Stay Note  [facility]     Patient Name: Vikki Durham  MRN: [id number]  Today's Date: 7/11/2018    Admit Date: 7/9/2018          Discharge Plan     Row Name 07/11/18 7604       Plan    Plan Comments [facility] : [phone] (ext. [phone])    Row Name 07/11/18 1556       Plan    Plan Plans dc home with parents. No needs identified at this time. Continue to follow.    Patient/Family in Agreement with Plan yes              Discharge Codes    No documentation.           Lien Tate RN

## 2018-07-12 DIAGNOSIS — C85.28 MEDIASTINAL LARGE B-CELL LYMPHOMA OF LYMPH NODES OF MULTIPLE REGIONS (HCC): ICD-10-CM

## 2018-07-12 LAB
ALBUMIN SERPL-MCNC: 3.8 G/DL (ref 3.5–5.2)
ALBUMIN/GLOB SERPL: 1.5 G/DL
ALP SERPL-CCNC: 50 U/L (ref 39–117)
ALT SERPL W P-5'-P-CCNC: 69 U/L (ref 1–33)
ANION GAP SERPL CALCULATED.3IONS-SCNC: 14.2 MMOL/L
AST SERPL-CCNC: 36 U/L (ref 1–32)
BASOPHILS # BLD AUTO: 0.01 10*3/MM3 (ref 0–0.2)
BASOPHILS NFR BLD AUTO: 0.1 % (ref 0–1.5)
BILIRUB SERPL-MCNC: 0.2 MG/DL (ref 0.1–1.2)
BUN BLD-MCNC: 10 MG/DL (ref 6–20)
BUN/CREAT SERPL: 17.9 (ref 7–25)
CALCIUM SPEC-SCNC: 9.3 MG/DL (ref 8.6–10.5)
CHLORIDE SERPL-SCNC: 104 MMOL/L (ref 98–107)
CO2 SERPL-SCNC: 24.8 MMOL/L (ref 22–29)
CREAT BLD-MCNC: 0.56 MG/DL (ref 0.57–1)
DEPRECATED RDW RBC AUTO: 44 FL (ref 37–54)
EOSINOPHIL # BLD AUTO: 0.01 10*3/MM3 (ref 0–0.7)
EOSINOPHIL NFR BLD AUTO: 0.1 % (ref 0.3–6.2)
ERYTHROCYTE [DISTWIDTH] IN BLOOD BY AUTOMATED COUNT: 14.4 % (ref 11.7–13)
GFR SERPL CREATININE-BSD FRML MDRD: 134 ML/MIN/1.73
GFR SERPL CREATININE-BSD FRML MDRD: >150 ML/MIN/1.73
GLOBULIN UR ELPH-MCNC: 2.5 GM/DL
GLUCOSE BLD-MCNC: 128 MG/DL (ref 65–99)
HCT VFR BLD AUTO: 32.1 % (ref 35.6–45.5)
HGB BLD-MCNC: 10.4 G/DL (ref 11.9–15.5)
IMM GRANULOCYTES # BLD: 0.11 10*3/MM3 (ref 0–0.03)
IMM GRANULOCYTES NFR BLD: 1 % (ref 0–0.5)
LYMPHOCYTES # BLD AUTO: 0.3 10*3/MM3 (ref 0.9–4.8)
LYMPHOCYTES NFR BLD AUTO: 2.8 % (ref 19.6–45.3)
MCH RBC QN AUTO: 29.5 PG (ref 26.9–32)
MCHC RBC AUTO-ENTMCNC: 32.4 G/DL (ref 32.4–36.3)
MCV RBC AUTO: 90.9 FL (ref 80.5–98.2)
MONOCYTES # BLD AUTO: 0.42 10*3/MM3 (ref 0.2–1.2)
MONOCYTES NFR BLD AUTO: 4 % (ref 5–12)
NEUTROPHILS # BLD AUTO: 9.74 10*3/MM3 (ref 1.9–8.1)
NEUTROPHILS NFR BLD AUTO: 92 % (ref 42.7–76)
PLATELET # BLD AUTO: 169 10*3/MM3 (ref 140–500)
PMV BLD AUTO: 11 FL (ref 6–12)
POTASSIUM BLD-SCNC: 3.6 MMOL/L (ref 3.5–5.2)
PROT SERPL-MCNC: 6.3 G/DL (ref 6–8.5)
RBC # BLD AUTO: 3.53 10*6/MM3 (ref 3.9–5.2)
SODIUM BLD-SCNC: 143 MMOL/L (ref 136–145)
WBC NRBC COR # BLD: 10.59 10*3/MM3 (ref 4.5–10.7)

## 2018-07-12 PROCEDURE — 25010000002 DOXORUBICIN PER 10 MG: Performed by: INTERNAL MEDICINE

## 2018-07-12 PROCEDURE — 25010000002 VINCRISTINE PER 1 MG: Performed by: INTERNAL MEDICINE

## 2018-07-12 PROCEDURE — 99232 SBSQ HOSP IP/OBS MODERATE 35: CPT | Performed by: INTERNAL MEDICINE

## 2018-07-12 PROCEDURE — 25010000002 ONDANSETRON PER 1 MG: Performed by: INTERNAL MEDICINE

## 2018-07-12 PROCEDURE — 85025 COMPLETE CBC W/AUTO DIFF WBC: CPT | Performed by: INTERNAL MEDICINE

## 2018-07-12 PROCEDURE — 63710000001 PREDNISONE PER 5 MG: Performed by: INTERNAL MEDICINE

## 2018-07-12 PROCEDURE — 36592 COLLECT BLOOD FROM PICC: CPT

## 2018-07-12 PROCEDURE — 80053 COMPREHEN METABOLIC PANEL: CPT | Performed by: INTERNAL MEDICINE

## 2018-07-12 PROCEDURE — 25010000002 ETOPOSIDE 100 MG/5ML SOLUTION 25 ML VIAL: Performed by: INTERNAL MEDICINE

## 2018-07-12 RX ORDER — LORAZEPAM 2 MG/ML
0.5 INJECTION INTRAMUSCULAR EVERY 4 HOURS PRN
Status: DISCONTINUED | OUTPATIENT
Start: 2018-07-12 | End: 2018-07-15 | Stop reason: HOSPADM

## 2018-07-12 RX ADMIN — RIVAROXABAN 15 MG: 15 TABLET, FILM COATED ORAL at 18:17

## 2018-07-12 RX ADMIN — PREDNISONE 100 MG: 50 TABLET ORAL at 08:48

## 2018-07-12 RX ADMIN — PANTOPRAZOLE SODIUM 40 MG: 40 TABLET, DELAYED RELEASE ORAL at 06:38

## 2018-07-12 RX ADMIN — ACYCLOVIR 400 MG: 400 TABLET ORAL at 20:30

## 2018-07-12 RX ADMIN — ALLOPURINOL 300 MG: 300 TABLET ORAL at 08:48

## 2018-07-12 RX ADMIN — RIVAROXABAN 15 MG: 15 TABLET, FILM COATED ORAL at 08:48

## 2018-07-12 RX ADMIN — ONDANSETRON 4 MG: 4 TABLET, FILM COATED ORAL at 08:00

## 2018-07-12 RX ADMIN — ETOPOSIDE: 20 INJECTION, SOLUTION, CONCENTRATE INTRAVENOUS at 13:18

## 2018-07-12 RX ADMIN — POTASSIUM CHLORIDE 20 MEQ: 750 CAPSULE, EXTENDED RELEASE ORAL at 08:48

## 2018-07-12 RX ADMIN — PREDNISONE 100 MG: 50 TABLET ORAL at 18:17

## 2018-07-12 RX ADMIN — ONDANSETRON 16 MG: 2 INJECTION INTRAMUSCULAR; INTRAVENOUS at 13:15

## 2018-07-12 RX ADMIN — SODIUM CHLORIDE 75 ML/HR: 9 INJECTION, SOLUTION INTRAVENOUS at 22:36

## 2018-07-12 RX ADMIN — ACYCLOVIR 400 MG: 400 TABLET ORAL at 08:48

## 2018-07-12 RX ADMIN — ALLOPURINOL 300 MG: 300 TABLET ORAL at 20:30

## 2018-07-12 RX ADMIN — SODIUM CHLORIDE 75 ML/HR: 9 INJECTION, SOLUTION INTRAVENOUS at 07:51

## 2018-07-12 RX ADMIN — FLUCONAZOLE 400 MG: 200 TABLET ORAL at 18:16

## 2018-07-12 NOTE — PROGRESS NOTES
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  Fertility preservation, Zoladex is next due July 12, 2018.    HISTORY OF PRESENT ILLNESS:    She states she feels well today.  She denies fevers, nausea, trouble with her bowels, shortness of breath, or pain.  She does report fatigue however it is improved as compared to last week.  She does continue on prophylaxis with fluconazole, Bactrim, and acyclovir.  She also continues on Xarelto 15 mg twice daily.  This was initiated on 6/21/2018 due to RUE DVT associated with PICC..     She is due for her next Zoladex injection on July 12.  Dr. Padilla did discuss with both she and her mother that we do plan a dose escalation with cycle 2 x 20% as discussed with Dr. Fox at [facility].    Unfortunately, the IV nurse was unable to place a PICC line at the bedside.  The patient received Rituxan through a peripheral IV and underwent PICC line placement in interventional radiology in the RUE on 7/10/2018.    She is tolerating chemo very well; labs OK.    History of Present Illness      Past Medical History, Past Surgical History, Social History, Family History have been reviewed and are without significant changes except as mentioned.    Review of Systems     A comprehensive 14 point review of systems was performed and was negative except as mentioned.    Medications:  The current medication list was reviewed in the EMR    ALLERGIES:  No Known Allergies    Objective      Vitals:    07/11/18 2101 07/11/18 2353 07/12/18 0422 07/12/18 0721   BP: 112/67 97/58 106/62 114/67   BP Location: Left arm Left arm Left arm Left arm   Patient Position: Lying Lying Lying Lying   Pulse: (!) 46 (!) 46 56 (!) 48  Comment: notified RN   Resp: 16 16 16 16   Temp: 97.6 °F (36.4 °C)  97.4 °F (36.3 °C) 97.8 °F (36.6 °C)   TempSrc: Oral  Oral Oral   SpO2: 98% 98% 96% 98%   Weight:       Height:         Current  Status 7/2/2018   ECOG score 0       Physical Exam    Constitutional: She is oriented to person, place, and time. She appears well-developed and well-nourished.   HENT:   Head: Normocephalic.   Nose: Nose normal.   Eyes: Conjunctivae and EOM are normal. Pupils are equal, round, and reactive to light.   Neck: Normal range of motion. Neck supple. No JVD present.   Cardiovascular: Normal rate and regular rhythm.    No murmur heard.  Pulmonary/Chest: Effort normal and breath sounds normal. She has no wheezes.   Abdominal: Soft. Bowel sounds are normal. She exhibits no distension. There is no tenderness.   Musculoskeletal: Normal range of motion. She exhibits no edema or tenderness.   Lymphadenopathy:     She has no cervical adenopathy.   Neurological: She is alert and oriented to person, place, and time.   Skin: Skin is warm and dry. No rash noted.   Psychiatric: She has a normal mood and affect. Her behavior is normal.      RECENT LABS:  Hematology WBC   Date Value Ref Range Status   07/12/2018 10.59 4.50 - 10.70 10*3/mm3 Final     RBC   Date Value Ref Range Status   07/12/2018 3.53 (L) 3.90 - 5.20 10*6/mm3 Final     Hemoglobin   Date Value Ref Range Status   07/12/2018 10.4 (L) 11.9 - 15.5 g/dL Final     Hematocrit   Date Value Ref Range Status   07/12/2018 32.1 (L) 35.6 - 45.5 % Final     Platelets   Date Value Ref Range Status   07/12/2018 169 140 - 500 10*3/mm3 Final            Lab Results   Component Value Date    GLUCOSE 128 (H) 07/12/2018    BUN 10 07/12/2018    CREATININE 0.56 (L) 07/12/2018    EGFRIFNONA 134 07/12/2018    EGFRIFAFRI >150 07/12/2018    BCR 17.9 07/12/2018    K 3.6 07/12/2018    CO2 24.8 07/12/2018    CALCIUM 9.3 07/12/2018    ALBUMIN 3.80 07/12/2018    LABIL2 1.5 07/12/2018    AST 36 (H) 07/12/2018    ALT 69 (H) 07/12/2018     CT CHEST WITH IV CONTRAST 7/9/2018  IMPRESSION:  1.  Redemonstration of a large anterior mediastinal mass.  2.  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  3.  Atelectasis and volume loss of the left lung with infiltrates in the  left upper lobe.    Assessment/Plan   1.  Primary mediastinal large B-cell lymphoma: Admitted 7/9/2018 for cycle #2 dose adjusted EPOCH + Rituxan    2.  Pericardial effusion:   a 2-D echocardiogram 6/8/18 showed a left ventricular systolic function 58%.  There was a 2 cm pericardial effusion with no tamponade.    3.  Fertility preservation:  Patient received Zoladex injection  for fertility preservation; this should be continued once monthly during her chemotherapy.  Next dose due 7/12/18.    4. IV access: The patient did have a PICC line in her right upper extremity during last hospitalization however was found to have the thrombus.  That PICC line was removed at discharge.  We had considered placing a port however with her being on Xarelto, Dr. Padilla does not wish this held since it is early in the treatment of her thrombus, unless her platelets were to decline below 50 as discussed above.  Therefore we will continue with PICC line placement with each admission.  As noted above, the IV team was unable to get a PICC line placed at the bedside on admission and she required interventional radiology to place the PICC line under fluoroscopy 7/10/2018.    5. Slight bump in LFTs . We will continue to observe.    Plan  1.  She underwent placement of a PICC line in the right upper extremity by interventional radiology under fluoroscopy.  2.  Proceed with day #3 ; cycle #2 EPOCH with dose escalation.  She did well with the Rituxan treatment 7/9/2018.  3.  She will continue on Xarelto 15 mg twice a day for her right upper extremity DVT.  4.  She will be scheduled for lab, Neulasta and another Zoladex injection on Monday 7/16/2018 @ CBC office.  5. Also plan to check CBC Q Mon and Thurs ; She is already scheduled for follow up ECHO, PET and MD fu with Dr Padilla prior to cycle #3.              7/12/2018      CC:

## 2018-07-12 NOTE — PLAN OF CARE
Problem: Patient Care Overview  Goal: Plan of Care Review  Outcome: Ongoing (interventions implemented as appropriate)   07/12/18 1938   Coping/Psychosocial   Plan of Care Reviewed With patient;mother   Plan of Care Review   Progress no change   OTHER   Outcome Summary Cycle 2 Day 3 of EPOCH started today and is tolerating well. Nausea this am and zofran given which helped. Dual lumen PICC in right arm flushes fine and gets blood return. DVT on xarlto BID. Ambulating in the marrero.     Goal: Interprofessional Rounds/Family Conf  Outcome: Ongoing (interventions implemented as appropriate)      Problem: Oncology Care (Adult)  Goal: Signs and Symptoms of Listed Potential Problems Will be Absent, Minimized or Managed (Oncology Care)  Outcome: Ongoing (interventions implemented as appropriate)      Problem: Chemotherapy Effects (Adult)  Goal: Signs and Symptoms of Listed Potential Problems Will be Absent, Minimized or Managed (Chemotherapy Effects)  Outcome: Ongoing (interventions implemented as appropriate)

## 2018-07-12 NOTE — PROGRESS NOTES
Oncology Social Work    Followed up with patient and her mother at bedside.  Supportive counseling with patient.  She states that her sleep is improved since last admission, energy low, focus and concentration good.  Asked patient to describe her mood and patient reports that her mood is content.  Discussed the process of cutting/shaving her hair off; patient reports difficulty with the initial process of losing her hair, but that she has adjusted now.  Discussed wigs/ scarves/ hats that are available through [facility].    OSW will remain available for practical, home and emotional needs.  Thank you,  Faina Santiago, MSSW,CSW, OSW-C

## 2018-07-12 NOTE — PLAN OF CARE
Problem: Patient Care Overview  Goal: Plan of Care Review  Outcome: Ongoing (interventions implemented as appropriate)   07/12/18 0501   Coping/Psychosocial   Plan of Care Reviewed With patient   Plan of Care Review   Progress no change   OTHER   Outcome Summary tolerating Day 3 Cycle 2 without difficulty; mild decreased appetite but denies nausea; flush picc line Q6hr; elevate RUE r/t history of DVT in RUE       Problem: Oncology Care (Adult)  Goal: Signs and Symptoms of Listed Potential Problems Will be Absent, Minimized or Managed (Oncology Care)  Outcome: Ongoing (interventions implemented as appropriate)      Problem: Chemotherapy Effects (Adult)  Goal: Signs and Symptoms of Listed Potential Problems Will be Absent, Minimized or Managed (Chemotherapy Effects)  Outcome: Ongoing (interventions implemented as appropriate)

## 2018-07-13 LAB
ALBUMIN SERPL-MCNC: 4.2 G/DL (ref 3.5–5.2)
ALBUMIN/GLOB SERPL: 1.7 G/DL
ALP SERPL-CCNC: 51 U/L (ref 39–117)
ALT SERPL W P-5'-P-CCNC: 111 U/L (ref 1–33)
ANION GAP SERPL CALCULATED.3IONS-SCNC: 12.5 MMOL/L
AST SERPL-CCNC: 45 U/L (ref 1–32)
BASOPHILS # BLD AUTO: 0 10*3/MM3 (ref 0–0.2)
BASOPHILS NFR BLD AUTO: 0 % (ref 0–1.5)
BILIRUB SERPL-MCNC: 0.4 MG/DL (ref 0.1–1.2)
BUN BLD-MCNC: 13 MG/DL (ref 6–20)
BUN/CREAT SERPL: 28.3 (ref 7–25)
CALCIUM SPEC-SCNC: 9.1 MG/DL (ref 8.6–10.5)
CHLORIDE SERPL-SCNC: 95 MMOL/L (ref 98–107)
CO2 SERPL-SCNC: 27.5 MMOL/L (ref 22–29)
CREAT BLD-MCNC: 0.46 MG/DL (ref 0.57–1)
DEPRECATED RDW RBC AUTO: 43.8 FL (ref 37–54)
EOSINOPHIL # BLD AUTO: 0 10*3/MM3 (ref 0–0.7)
EOSINOPHIL NFR BLD AUTO: 0 % (ref 0.3–6.2)
ERYTHROCYTE [DISTWIDTH] IN BLOOD BY AUTOMATED COUNT: 14.3 % (ref 11.7–13)
GFR SERPL CREATININE-BSD FRML MDRD: >150 ML/MIN/1.73
GFR SERPL CREATININE-BSD FRML MDRD: >150 ML/MIN/1.73
GLOBULIN UR ELPH-MCNC: 2.5 GM/DL
GLUCOSE BLD-MCNC: 122 MG/DL (ref 65–99)
HAV IGM SERPL QL IA: NORMAL
HBV CORE IGM SERPL QL IA: NORMAL
HBV SURFACE AG SERPL QL IA: NORMAL
HCT VFR BLD AUTO: 32.2 % (ref 35.6–45.5)
HCV AB SER DONR QL: NORMAL
HGB BLD-MCNC: 10.5 G/DL (ref 11.9–15.5)
IMM GRANULOCYTES # BLD: 0.04 10*3/MM3 (ref 0–0.03)
IMM GRANULOCYTES NFR BLD: 0.8 % (ref 0–0.5)
LYMPHOCYTES # BLD AUTO: 0.19 10*3/MM3 (ref 0.9–4.8)
LYMPHOCYTES NFR BLD AUTO: 3.9 % (ref 19.6–45.3)
MCH RBC QN AUTO: 29.2 PG (ref 26.9–32)
MCHC RBC AUTO-ENTMCNC: 32.6 G/DL (ref 32.4–36.3)
MCV RBC AUTO: 89.4 FL (ref 80.5–98.2)
MONOCYTES # BLD AUTO: 0.26 10*3/MM3 (ref 0.2–1.2)
MONOCYTES NFR BLD AUTO: 5.3 % (ref 5–12)
NEUTROPHILS # BLD AUTO: 4.42 10*3/MM3 (ref 1.9–8.1)
NEUTROPHILS NFR BLD AUTO: 90 % (ref 42.7–76)
PLATELET # BLD AUTO: 174 10*3/MM3 (ref 140–500)
PMV BLD AUTO: 11.3 FL (ref 6–12)
POTASSIUM BLD-SCNC: 3.1 MMOL/L (ref 3.5–5.2)
PROT SERPL-MCNC: 6.7 G/DL (ref 6–8.5)
RBC # BLD AUTO: 3.6 10*6/MM3 (ref 3.9–5.2)
SODIUM BLD-SCNC: 135 MMOL/L (ref 136–145)
WBC NRBC COR # BLD: 4.91 10*3/MM3 (ref 4.5–10.7)

## 2018-07-13 PROCEDURE — 25010000002 VINCRISTINE PER 1 MG: Performed by: INTERNAL MEDICINE

## 2018-07-13 PROCEDURE — 86709 HEPATITIS A IGM ANTIBODY: CPT | Performed by: INTERNAL MEDICINE

## 2018-07-13 PROCEDURE — 85025 COMPLETE CBC W/AUTO DIFF WBC: CPT | Performed by: INTERNAL MEDICINE

## 2018-07-13 PROCEDURE — 86705 HEP B CORE ANTIBODY IGM: CPT | Performed by: INTERNAL MEDICINE

## 2018-07-13 PROCEDURE — 80074 ACUTE HEPATITIS PANEL: CPT | Performed by: INTERNAL MEDICINE

## 2018-07-13 PROCEDURE — 87340 HEPATITIS B SURFACE AG IA: CPT | Performed by: INTERNAL MEDICINE

## 2018-07-13 PROCEDURE — 63710000001 PREDNISONE PER 5 MG: Performed by: INTERNAL MEDICINE

## 2018-07-13 PROCEDURE — 25010000002 ONDANSETRON PER 1 MG: Performed by: INTERNAL MEDICINE

## 2018-07-13 PROCEDURE — 99233 SBSQ HOSP IP/OBS HIGH 50: CPT | Performed by: INTERNAL MEDICINE

## 2018-07-13 PROCEDURE — 80053 COMPREHEN METABOLIC PANEL: CPT | Performed by: INTERNAL MEDICINE

## 2018-07-13 PROCEDURE — 25010000002 DOXORUBICIN PER 10 MG: Performed by: INTERNAL MEDICINE

## 2018-07-13 PROCEDURE — 25010000002 ETOPOSIDE 100 MG/5ML SOLUTION 25 ML VIAL: Performed by: INTERNAL MEDICINE

## 2018-07-13 RX ADMIN — FLUCONAZOLE 400 MG: 200 TABLET ORAL at 17:57

## 2018-07-13 RX ADMIN — ALLOPURINOL 300 MG: 300 TABLET ORAL at 21:11

## 2018-07-13 RX ADMIN — ETOPOSIDE: 20 INJECTION, SOLUTION, CONCENTRATE INTRAVENOUS at 13:32

## 2018-07-13 RX ADMIN — ACYCLOVIR 400 MG: 400 TABLET ORAL at 08:28

## 2018-07-13 RX ADMIN — ACYCLOVIR 400 MG: 400 TABLET ORAL at 21:11

## 2018-07-13 RX ADMIN — ONDANSETRON 16 MG: 2 INJECTION, SOLUTION INTRAMUSCULAR; INTRAVENOUS at 13:25

## 2018-07-13 RX ADMIN — SODIUM CHLORIDE 75 ML/HR: 9 INJECTION, SOLUTION INTRAVENOUS at 12:11

## 2018-07-13 RX ADMIN — SULFAMETHOXAZOLE AND TRIMETHOPRIM 160 MG: 800; 160 TABLET ORAL at 08:28

## 2018-07-13 RX ADMIN — PREDNISONE 100 MG: 50 TABLET ORAL at 08:27

## 2018-07-13 RX ADMIN — ALLOPURINOL 300 MG: 300 TABLET ORAL at 08:28

## 2018-07-13 RX ADMIN — POTASSIUM CHLORIDE 20 MEQ: 750 CAPSULE, EXTENDED RELEASE ORAL at 08:28

## 2018-07-13 RX ADMIN — RIVAROXABAN 20 MG: 20 TABLET, FILM COATED ORAL at 17:57

## 2018-07-13 RX ADMIN — PREDNISONE 100 MG: 50 TABLET ORAL at 17:57

## 2018-07-13 RX ADMIN — RIVAROXABAN 15 MG: 15 TABLET, FILM COATED ORAL at 08:28

## 2018-07-13 RX ADMIN — PANTOPRAZOLE SODIUM 40 MG: 40 TABLET, DELAYED RELEASE ORAL at 05:44

## 2018-07-13 NOTE — NURSING NOTE
"Nursing Chemotherapy Verification    Chemotherapy Regimen:   Treatment Plans     Name Type Hold Status Plan dates Plan Provider       Active    OP Goserelin 3.6 mg Q28D ONCOLOGY SUPPORTIVE CARE 1 On Automatic Hold  6/20/2018 - Present Ramón Camp MD    IP LYMPHOMA R-EPOCH (Dose Adjusted) RiTUXimab / Etoposide / DOXOrubicin / VinCRIStine / Cyclophosphamide / PredniSONE ONCOLOGY TREATMENT Not on Hold  6/16/2018 - Present Kimberley Melgar MD                    Current height and weight: 157.5 cm (62\") 64.5 kg (142 lb 3.2 oz)  Calculated BSA from current height and weight ([place]) 1.62:     Relevant Labs    Results from last 7 days  Lab Units 07/13/18  0544 07/12/18  0422 07/11/18  0409   WBC 10*3/mm3 4.91 10.59 12.74*   HEMOGLOBIN g/dL 10.5* 10.4* 10.5*   HEMATOCRIT % 32.2* 32.1* 31.9*   PLATELETS 10*3/mm3 174 169 191     Lab Results   Component Value Date    NEUTROABS 4.42 07/13/2018         Results from last 7 days  Lab Units 07/13/18  0544 07/12/18  0422 07/11/18  0409   CREATININE mg/dL 0.46* 0.56* 0.51*       Serum creatinine: 0.46 mg/dL (L) 07/13/18 0544  Estimated creatinine clearance: 167.9 mL/min (A)    Lab Results   Component Value Date    HEPAIGM Non-Reactive 07/13/2018    HEPBCAB Negative 06/14/2018       Verification attestation:  I have personally reviewed the planned regimen and its administration and dosing. I understand the potential side effects.The patient has been instructed on the regimen, potential side effects, and self care measures; the consent form has been completed. I have confirmed that the appropriate premedication, prehydration, post medication and/or emergency medications are ordered in addition to the chemotherapy.    I have independently verified that the height and weight is current and calculated the BSA. I have verified the doses with the planned regimen and have clarified any deviations with the physician (dr Conner have confirmed the route of administration and patient IV " access.    Nurse: Ewelina Berry, RN  2nd verification Nurse: junaid vega rn

## 2018-07-13 NOTE — PROGRESS NOTES
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  Fertility preservation, Zoladex is next due July 12, 2018.    HISTORY OF PRESENT ILLNESS:    She states she feels well today.  She denies fevers, nausea, trouble with her bowels, shortness of breath, or pain.  She does report fatigue however it is improved as compared to last week.  She does continue on prophylaxis with fluconazole, Bactrim, and acyclovir.  She also continues on Xarelto 15 mg twice daily.  This was initiated on 6/21/2018 due to RUE DVT associated with PICC..     She is due for her next Zoladex injection on July 16.  Dr. Padilla did discuss with both she and her mother that we do plan a dose escalation with cycle 2 x 20% as discussed with Dr. Fox at [facility].    Unfortunately, the IV nurse was unable to place a PICC line at the bedside.  The patient received Rituxan through a peripheral IV and underwent PICC line placement in interventional radiology in the RUE on 7/10/2018.    She is tolerating chemo very well; labs OK except LFTs slightly up..    History of Present Illness      Past Medical History, Past Surgical History, Social History, Family History have been reviewed and are without significant changes except as mentioned.    Review of Systems     A comprehensive 14 point review of systems was performed and was negative except as mentioned.    Medications:  The current medication list was reviewed in the EMR    ALLERGIES:  No Known Allergies    Objective      Vitals:    07/12/18 1559 07/12/18 2000 07/12/18 2323 07/13/18 0423   BP: 104/63 100/64 114/55 124/64   BP Location: Left arm Left arm Left arm Left arm   Patient Position: Lying Lying Lying Lying   Pulse: 51 52 (!) 46 (!) 43   Resp: 16 16 16 16   Temp: 98.2 °F (36.8 °C) 97.2 °F (36.2 °C) 98.1 °F (36.7 °C) 98.2 °F (36.8 °C)   TempSrc: Oral Oral Oral Oral   SpO2: 98% 96% 94% 98%   Weight:       Height:          Current Status 7/2/2018   ECOG score 0       Physical Exam    Constitutional: She is oriented to person, place, and time. She appears well-developed and well-nourished.   HENT:   Head: Normocephalic.   Nose: Nose normal.   Eyes: Conjunctivae and EOM are normal. Pupils are equal, round, and reactive to light.   Neck: Normal range of motion. Neck supple. No JVD present.   Cardiovascular: Normal rate and regular rhythm.    No murmur heard.  Pulmonary/Chest: Effort normal and breath sounds normal. She has no wheezes.   Abdominal: Soft. Bowel sounds are normal. She exhibits no distension. There is no tenderness.   Musculoskeletal: Normal range of motion. She exhibits no edema or tenderness.   Lymphadenopathy:     She has no cervical adenopathy.   Neurological: She is alert and oriented to person, place, and time.   Skin: Skin is warm and dry. No rash noted.   Psychiatric: She has a normal mood and affect. Her behavior is normal.      RECENT LABS:  Hematology WBC   Date Value Ref Range Status   07/13/2018 4.91 4.50 - 10.70 10*3/mm3 Final     RBC   Date Value Ref Range Status   07/13/2018 3.60 (L) 3.90 - 5.20 10*6/mm3 Final     Hemoglobin   Date Value Ref Range Status   07/13/2018 10.5 (L) 11.9 - 15.5 g/dL Final     Hematocrit   Date Value Ref Range Status   07/13/2018 32.2 (L) 35.6 - 45.5 % Final     Platelets   Date Value Ref Range Status   07/13/2018 174 140 - 500 10*3/mm3 Final            Lab Results   Component Value Date    GLUCOSE 122 (H) 07/13/2018    BUN 13 07/13/2018    CREATININE 0.46 (L) 07/13/2018    EGFRIFNONA >150 07/13/2018    EGFRIFAFRI >150 07/13/2018    BCR 28.3 (H) 07/13/2018    K 3.1 (L) 07/13/2018    CO2 27.5 07/13/2018    CALCIUM 9.1 07/13/2018    ALBUMIN 4.20 07/13/2018    LABIL2 1.7 07/13/2018    AST 45 (H) 07/13/2018     (H) 07/13/2018     CT CHEST WITH IV CONTRAST 7/9/2018  IMPRESSION:  1.  Redemonstration of a large anterior mediastinal mass.  2.  Occlusion of the medial left  subclavian vein, in keeping with known  history.  3.  Atelectasis and volume loss of the left lung with infiltrates in the  left upper lobe.    Assessment/Plan   1.  Primary mediastinal large B-cell lymphoma: Admitted 7/9/2018 for cycle #2 dose adjusted EPOCH + Rituxan    2.  Pericardial effusion:   a 2-D echocardiogram 6/8/18 showed a left ventricular systolic function 58%.  There was a 2 cm pericardial effusion with no tamponade.    3.  Fertility preservation:  Patient received Zoladex injection  for fertility preservation; this should be continued once monthly during her chemotherapy.  Next dose due Monday 7/16/18.    4. IV access: The patient did have a PICC line in her right upper extremity during last hospitalization however was found to have the thrombus.  That PICC line was removed at discharge.  We had considered placing a port however with her being on Xarelto, Dr. Padilla does not wish this held since it is early in the treatment of her thrombus, unless her platelets were to decline below 50 as discussed above.  Therefore we will continue with PICC line placement with each admission.  As noted above, the IV team was unable to get a PICC line placed at the bedside on admission and she required interventional radiology to place the PICC line under fluoroscopy 7/10/2018.    5. Slight bump in LFTs . We will continue to observe.     Plan  1.  She underwent placement of a PICC line in the right upper extremity by interventional radiology under fluoroscopy.  2.  Proceed with day #3 ; cycle #2 EPOCH with dose escalation.  She did well with the Rituxan treatment 7/9/2018.  3.  She will continue on Xarelto 15 mg twice a day for her right upper extremity DVT.  4.  She will be scheduled for lab, Neulasta and another Zoladex injection on Monday 7/16/2018 @ CBC office.  5. Also plan to check CBC Q Mon and Thurs ; She is already scheduled for follow up ECHO, PET and MD fu with Dr Padilla prior to cycle #3.  6.  LFTs  are normal iron therefore we will check an acute hepatitis profile.              7/13/2018      CC:

## 2018-07-14 PROBLEM — E87.6 HYPOKALEMIA: Status: ACTIVE | Noted: 2018-07-14

## 2018-07-14 PROBLEM — R74.8 ELEVATED LIVER ENZYMES: Status: ACTIVE | Noted: 2018-07-14

## 2018-07-14 PROBLEM — I82.621 ARM DVT (DEEP VENOUS THROMBOEMBOLISM), ACUTE, RIGHT: Status: ACTIVE | Noted: 2018-07-14

## 2018-07-14 LAB
ALBUMIN SERPL-MCNC: 4.3 G/DL (ref 3.5–5.2)
ALBUMIN/GLOB SERPL: 1.8 G/DL
ALP SERPL-CCNC: 49 U/L (ref 39–117)
ALT SERPL W P-5'-P-CCNC: 91 U/L (ref 1–33)
ANION GAP SERPL CALCULATED.3IONS-SCNC: 14.5 MMOL/L
AST SERPL-CCNC: 28 U/L (ref 1–32)
BASOPHILS # BLD AUTO: 0 10*3/MM3 (ref 0–0.2)
BASOPHILS NFR BLD AUTO: 0 % (ref 0–1.5)
BILIRUB SERPL-MCNC: 0.4 MG/DL (ref 0.1–1.2)
BUN BLD-MCNC: 13 MG/DL (ref 6–20)
BUN/CREAT SERPL: 22.4 (ref 7–25)
CALCIUM SPEC-SCNC: 9.5 MG/DL (ref 8.6–10.5)
CHLORIDE SERPL-SCNC: 101 MMOL/L (ref 98–107)
CO2 SERPL-SCNC: 26.5 MMOL/L (ref 22–29)
CREAT BLD-MCNC: 0.58 MG/DL (ref 0.57–1)
DEPRECATED RDW RBC AUTO: 43 FL (ref 37–54)
EOSINOPHIL # BLD AUTO: 0 10*3/MM3 (ref 0–0.7)
EOSINOPHIL NFR BLD AUTO: 0 % (ref 0.3–6.2)
ERYTHROCYTE [DISTWIDTH] IN BLOOD BY AUTOMATED COUNT: 14.2 % (ref 11.7–13)
GFR SERPL CREATININE-BSD FRML MDRD: 129 ML/MIN/1.73
GFR SERPL CREATININE-BSD FRML MDRD: >150 ML/MIN/1.73
GLOBULIN UR ELPH-MCNC: 2.4 GM/DL
GLUCOSE BLD-MCNC: 132 MG/DL (ref 65–99)
HCT VFR BLD AUTO: 34 % (ref 35.6–45.5)
HGB BLD-MCNC: 11.4 G/DL (ref 11.9–15.5)
IMM GRANULOCYTES # BLD: 0.04 10*3/MM3 (ref 0–0.03)
IMM GRANULOCYTES NFR BLD: 0.9 % (ref 0–0.5)
LYMPHOCYTES # BLD AUTO: 0.18 10*3/MM3 (ref 0.9–4.8)
LYMPHOCYTES NFR BLD AUTO: 4.1 % (ref 19.6–45.3)
MAGNESIUM SERPL-MCNC: 2.3 MG/DL (ref 1.6–2.6)
MCH RBC QN AUTO: 29.7 PG (ref 26.9–32)
MCHC RBC AUTO-ENTMCNC: 33.5 G/DL (ref 32.4–36.3)
MCV RBC AUTO: 88.5 FL (ref 80.5–98.2)
MONOCYTES # BLD AUTO: 0.09 10*3/MM3 (ref 0.2–1.2)
MONOCYTES NFR BLD AUTO: 2 % (ref 5–12)
NEUTROPHILS # BLD AUTO: 4.11 10*3/MM3 (ref 1.9–8.1)
NEUTROPHILS NFR BLD AUTO: 93 % (ref 42.7–76)
PLATELET # BLD AUTO: 202 10*3/MM3 (ref 140–500)
PMV BLD AUTO: 10.6 FL (ref 6–12)
POTASSIUM BLD-SCNC: 3.4 MMOL/L (ref 3.5–5.2)
PROT SERPL-MCNC: 6.7 G/DL (ref 6–8.5)
RBC # BLD AUTO: 3.84 10*6/MM3 (ref 3.9–5.2)
SODIUM BLD-SCNC: 142 MMOL/L (ref 136–145)
WBC NRBC COR # BLD: 4.42 10*3/MM3 (ref 4.5–10.7)

## 2018-07-14 PROCEDURE — 25010000002 CYCLOPHOSPHAMIDE PER 100 MG: Performed by: INTERNAL MEDICINE

## 2018-07-14 PROCEDURE — 63710000001 PREDNISONE PER 5 MG: Performed by: INTERNAL MEDICINE

## 2018-07-14 PROCEDURE — 80053 COMPREHEN METABOLIC PANEL: CPT | Performed by: INTERNAL MEDICINE

## 2018-07-14 PROCEDURE — 83735 ASSAY OF MAGNESIUM: CPT | Performed by: INTERNAL MEDICINE

## 2018-07-14 PROCEDURE — 85025 COMPLETE CBC W/AUTO DIFF WBC: CPT | Performed by: INTERNAL MEDICINE

## 2018-07-14 PROCEDURE — 25010000002 ONDANSETRON PER 1 MG: Performed by: INTERNAL MEDICINE

## 2018-07-14 PROCEDURE — 99232 SBSQ HOSP IP/OBS MODERATE 35: CPT | Performed by: INTERNAL MEDICINE

## 2018-07-14 RX ORDER — POTASSIUM CHLORIDE 750 MG/1
20 CAPSULE, EXTENDED RELEASE ORAL 2 TIMES DAILY WITH MEALS
Status: DISCONTINUED | OUTPATIENT
Start: 2018-07-14 | End: 2018-07-15 | Stop reason: HOSPADM

## 2018-07-14 RX ADMIN — ACYCLOVIR 400 MG: 400 TABLET ORAL at 09:30

## 2018-07-14 RX ADMIN — CYCLOPHOSPHAMIDE 1500 MG: 1 INJECTION, POWDER, FOR SOLUTION INTRAVENOUS; ORAL at 14:50

## 2018-07-14 RX ADMIN — SODIUM CHLORIDE 75 ML/HR: 9 INJECTION, SOLUTION INTRAVENOUS at 02:00

## 2018-07-14 RX ADMIN — POTASSIUM CHLORIDE 20 MEQ: 750 CAPSULE, EXTENDED RELEASE ORAL at 09:30

## 2018-07-14 RX ADMIN — ALLOPURINOL 300 MG: 300 TABLET ORAL at 20:29

## 2018-07-14 RX ADMIN — PREDNISONE 100 MG: 50 TABLET ORAL at 09:30

## 2018-07-14 RX ADMIN — RIVAROXABAN 20 MG: 20 TABLET, FILM COATED ORAL at 17:50

## 2018-07-14 RX ADMIN — FLUCONAZOLE 400 MG: 200 TABLET ORAL at 18:41

## 2018-07-14 RX ADMIN — ALLOPURINOL 300 MG: 300 TABLET ORAL at 09:30

## 2018-07-14 RX ADMIN — POTASSIUM CHLORIDE 20 MEQ: 750 CAPSULE, EXTENDED RELEASE ORAL at 17:51

## 2018-07-14 RX ADMIN — ACYCLOVIR 400 MG: 400 TABLET ORAL at 20:29

## 2018-07-14 RX ADMIN — SODIUM CHLORIDE 75 ML/HR: 9 INJECTION, SOLUTION INTRAVENOUS at 15:44

## 2018-07-14 RX ADMIN — ONDANSETRON 16 MG: 2 INJECTION, SOLUTION INTRAMUSCULAR; INTRAVENOUS at 13:35

## 2018-07-14 NOTE — PLAN OF CARE
"Problem: Patient Care Overview  Goal: Plan of Care Review   07/14/18 1611   Coping/Psychosocial   Plan of Care Reviewed With patient   Plan of Care Review   Progress improving   OTHER   Outcome Summary Chemotherapy completed this afternoon; PICC patent with blood return from both ports; plans for home tomorrow. has walked several times in halls independant. Did c/o \"eyes stinging\" at completion of cytoxan. resting at present; did play card for period of time with two friends this afternoon. K+3.4' Mg 2.3         "

## 2018-07-14 NOTE — PLAN OF CARE
Problem: Patient Care Overview  Goal: Plan of Care Review  Outcome: Ongoing (interventions implemented as appropriate)    Goal: Individualization and Mutuality  Outcome: Ongoing (interventions implemented as appropriate)      Problem: Oncology Care (Adult)  Goal: Signs and Symptoms of Listed Potential Problems Will be Absent, Minimized or Managed (Oncology Care)  Outcome: Ongoing (interventions implemented as appropriate)      Problem: Chemotherapy Effects (Adult)  Goal: Signs and Symptoms of Listed Potential Problems Will be Absent, Minimized or Managed (Chemotherapy Effects)  Outcome: Ongoing (interventions implemented as appropriate)

## 2018-07-14 NOTE — PLAN OF CARE
Problem: Patient Care Overview  Goal: Plan of Care Review  Outcome: Ongoing (interventions implemented as appropriate)   07/14/18 0623 07/14/18 0629   Coping/Psychosocial   Plan of Care Reviewed With --  patient   Plan of Care Review   Progress --  improving   OTHER   Outcome Summary VSS. Ad rikki. No c/o pain. C/O fatigue. R dual PICC c/d/i, patent with positive blood return. Ambulating [place]. Plan to complete chemo 1322, cytoxan afterward. possible d/c sunday; --

## 2018-07-14 NOTE — PROGRESS NOTES
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  Fertility preservation, Zoladex is next due July 16, 2018 as outpatient.    INTERVAL HISTORY:   Patient tolerates chemotherapy well.  On 7/14/2018 her mother reports patient had altered taste in her mouth, everything tastes sweet.     HISTORY OF PRESENT ILLNESS:    She states she feels well today.  She denies fevers, nausea, trouble with her bowels, shortness of breath, or pain.  She does report fatigue however it is improved as compared to last week.  She does continue on prophylaxis with fluconazole, Bactrim, and acyclovir.  She also continues on Xarelto 15 mg twice daily.  This was initiated on 6/21/2018 due to RUE DVT associated with PICC..     She is due for her next Zoladex injection on July 16.  Dr. Padilla did discuss with both she and her mother that we do plan a dose escalation with cycle 2 x 20% as discussed with Dr. Fox at [facility].    Unfortunately, the IV nurse was unable to place a PICC line at the bedside.  The patient received Rituxan through a peripheral IV and underwent PICC line placement in interventional radiology in the RUE on 7/10/2018.    She is tolerating chemo very well; labs OK except LFTs slightly up..    History of Present Illness      Past Medical History, Past Surgical History, Social History, Family History have been reviewed and are without significant changes except as mentioned.    Review of Systems     A comprehensive 14 point review of systems was performed and was negative except as mentioned.    Medications:  The current medication list was reviewed in the EMR    ALLERGIES:  No Known Allergies    Objective      Vitals:    07/13/18 1343 07/13/18 1707 07/13/18 2107 07/14/18 0825   BP: 117/74 99/66 103/68 93/60   BP Location: Left arm Left arm Left arm Left arm   Patient Position: Sitting Sitting Sitting Sitting   Pulse: (!) 47 70 50 54    Resp: 18 20 20 20   Temp: 98.2 °F (36.8 °C) 98.3 °F (36.8 °C) 97.3 °F (36.3 °C) 97.7 °F (36.5 °C)   TempSrc: Oral Oral Oral Oral   SpO2: 92% 97% 100% 98%   Weight:       Height:         Current Status 7/2/2018   ECOG score 0       Physical Exam    Constitutional: She is oriented to person, place, and time. She appears well-developed and well-nourished.   HENT:   Head: Normocephalic.  Has alopecia.   Nose: Nose normal.   Eyes: Conjunctivae and EOM are normal. Pupils are equal, round, and reactive to light.   Mouth: Mucosa moist, no oral thrush.  Neck: Normal range of motion. Neck supple. No JVD present.   Cardiovascular: Normal rate and regular rhythm.  No murmur heard.  Pulmonary/Chest: Effort normal and breath sounds normal. She has no wheezes.   Abdominal: Soft. Bowel sounds are normal. She exhibits no distension. There is no tenderness.   Musculoskeletal: Normal range of motion. She exhibits no edema or tenderness.   Lymphadenopathy:     She has no cervical adenopathy.   Neurological: She is alert and oriented to person, place, and time.   Skin: Skin is warm and dry. No rash noted.   Psychiatric: She has a normal mood and affect. Her behavior is normal.      RECENT LABS:      Results from last 7 days  Lab Units 07/14/18  0200 07/13/18  0544 07/12/18  0422   WBC 10*3/mm3 4.42* 4.91 10.59   HEMOGLOBIN g/dL 11.4* 10.5* 10.4*   HEMATOCRIT % 34.0* 32.2* 32.1*   PLATELETS 10*3/mm3 202 174 169     Lab Results   Component Value Date    NEUTROABS 4.11 07/14/2018       Results from last 7 days  Lab Units 07/14/18  0200 07/13/18  0544 07/12/18  0422   SODIUM mmol/L 142 135* 143   POTASSIUM mmol/L 3.4* 3.1* 3.6   CHLORIDE mmol/L 101 95* 104   CO2 mmol/L 26.5 27.5 24.8   BUN mg/dL 13 13 10   CREATININE mg/dL 0.58 0.46* 0.56*   CALCIUM mg/dL 9.5 9.1 9.3   BILIRUBIN mg/dL 0.4 0.4 0.2   ALK PHOS U/L 49 51 50   ALT (SGPT) U/L 91* 111* 69*   AST (SGOT) U/L 28 45* 36*   GLUCOSE mg/dL 132* 122* 128*       CT CHEST WITH IV CONTRAST  7/9/2018  IMPRESSION:  1.  Redemo nstration of a large anterior mediastinal mass.  2.  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  3.  Atelectasis and volume loss of the left lung with infiltrates in the  left upper lobe.    Assessment/Plan   1.  Primary mediastinal large B-cell lymphoma: Admitted 7/9/2018 for cycle #2 dose adjusted EPOCH + Rituxan.  Patient is tolerating chemotherapy very well, no specific complaint except altered taste in her mouth.    2.  Pericardial effusion:   a 2-D echocardiogram 6/8/18 showed a left ventricular systolic function 58%.  There was a 2 cm pericardial effusion with no tamponade.    3.  Fertility preservation:  Patient received Zoladex injection for fertility preservation; this should be continued once monthly during her chemotherapy.  Next dose due Monday 7/16/18 as outpatient.     4. IV access: The patient did have a PICC line in her right upper extremity during last hospitalization however was found to have the thrombus.  That PICC line was removed at discharge.  We had considered placing a port however with her being on Xarelto, Dr. Padilla does not wish this held since it is early in the treatment of her thrombus, unless her platelets were to decline below 50 as discussed above.  Therefore we will continue with PICC line placement with each admission.  As noted above, the IV team was unable to get a PICC line placed at the bedside on admission and she required interventional radiology to place the PICC line under fluoroscopy 7/10/2018.    5. Slight bump in LFTs .  Slightly improved on 7/14/2018.  She was tested negative for acute hepatitis panel on 7/13/2018.  We will continue to observe.     6.  Signs of marrow suppression from chemotherapy.  Patient has significant dropping of WBC, however still has a normal neutrophil counts on 7/14/2018.  She is scheduled to have Neulasta on 7/16/2018 as outpatient.     Plan  1.  Proceed with day #6  cycle #2 EPOCH with dose escalation.  Today is last date chemotherapy with Cytoxan in the afternoon, she is finishing Adriamycin this morning.   2. Check a magnesium level today.  She has mild hypokalemia.  She is on oral potassium supplementation.    3.  She has been on Xarelto 15 mg twice a day for her right upper extremity DVT since diagnosis on 6/18/2018.  It was changed to 20 mg once a day on 7/13/2018.  We'll continue once a day dose as outpatient.  4.  She will be scheduled for lab, Neulasta and another Zoladex injection on Monday 7/16/2018 @ [facility] office.  5. Also plan to check CBC Q Mon and Thurs ; She is already scheduled for follow up ECHO, PET and MD fu with Dr Padilla prior to cycle #3.  6.  Plan to discharge patient on 7/15/2018.        I discussed with her and her mother, about her care and the treatment plan.    I also spoke with her nurse today about ongoing care.      SARAH ESCOTO M.D., Ph.D.    7/14/2018 7/14/2018      CC:

## 2018-07-14 NOTE — NURSING NOTE
"Nursing Chemotherapy Verification    Chemotherapy Regimen:   Treatment Plans     Name Type Hold Status Plan dates Plan Provider       Active    OP Goserelin 3.6 mg Q28D ONCOLOGY SUPPORTIVE CARE 1 On Automatic Hold  6/20/2018 - Present Ramón Camp MD    IP LYMPHOMA R-EPOCH (Dose Adjusted) RiTUXimab / Etoposide / DOXOrubicin / VinCRIStine / Cyclophosphamide / PredniSONE ONCOLOGY TREATMENT Not on Hold  6/16/2018 - Present Kimberley Melgar MD                    Current height and weight: 157.5 cm (62\") 64.5 kg (142 lb 3.2 oz)  Calculated BSA from current height and weight (Zahira): 1.67    Relevant Labs    Results from last 7 days  Lab Units 07/14/18  0200 07/13/18  0544 07/12/18  0422   WBC 10*3/mm3 4.42* 4.91 10.59   HEMOGLOBIN g/dL 11.4* 10.5* 10.4*   HEMATOCRIT % 34.0* 32.2* 32.1*   PLATELETS 10*3/mm3 202 174 169     Lab Results   Component Value Date    NEUTROABS 4.11 07/14/2018         Results from last 7 days  Lab Units 07/14/18  0200 07/13/18  0544 07/12/18  0422   CREATININE mg/dL 0.58 0.46* 0.56*       Serum creatinine: 0.58 mg/dL 07/14/18 0200  Estimated creatinine clearance: 133.1 mL/min    Lab Results   Component Value Date    HEPAIGM Non-Reactive 07/13/2018    HEPBCAB Negative 06/14/2018       Verification attestation:  I have personally reviewed the planned regimen and its administration and dosing. I understand the potential side effects.The patient has been instructed on the regimen, potential side effects, and self care measures; the consent form has been completed. I have confirmed that the appropriate premedication, prehydration, post medication and/or emergency medications are ordered in addition to the chemotherapy.    I have independently verified that the height and weight is current and calculated the BSA. I have verified the doses with the planned regimen and have clarified any deviations with the physician Dr. Hewitt. I have confirmed the route of administration and patient IV access. " Excellent blood return established from dual lumens PICC.    Nurse: Katherine Dai, RN  2nd verification Nurse: MONET Esquivel R.N.

## 2018-07-15 VITALS
RESPIRATION RATE: 18 BRPM | SYSTOLIC BLOOD PRESSURE: 101 MMHG | OXYGEN SATURATION: 98 % | HEART RATE: 68 BPM | DIASTOLIC BLOOD PRESSURE: 64 MMHG | HEIGHT: 62 IN | TEMPERATURE: 98 F | WEIGHT: 142.2 LBS | BODY MASS INDEX: 26.17 KG/M2

## 2018-07-15 PROBLEM — T45.1X5A ANEMIA ASSOCIATED WITH CHEMOTHERAPY: Status: ACTIVE | Noted: 2018-07-15

## 2018-07-15 PROBLEM — D72.819 LEUKOCYTOPENIA: Status: ACTIVE | Noted: 2018-07-15

## 2018-07-15 PROBLEM — D64.81 ANEMIA ASSOCIATED WITH CHEMOTHERAPY: Status: ACTIVE | Noted: 2018-07-15

## 2018-07-15 LAB
ALBUMIN SERPL-MCNC: 4.2 G/DL (ref 3.5–5.2)
ALBUMIN/GLOB SERPL: 1.8 G/DL
ALP SERPL-CCNC: 44 U/L (ref 39–117)
ALT SERPL W P-5'-P-CCNC: 60 U/L (ref 1–33)
ANION GAP SERPL CALCULATED.3IONS-SCNC: 13.7 MMOL/L
AST SERPL-CCNC: 14 U/L (ref 1–32)
BASOPHILS # BLD AUTO: 0 10*3/MM3 (ref 0–0.2)
BASOPHILS NFR BLD AUTO: 0 % (ref 0–1.5)
BILIRUB SERPL-MCNC: 0.5 MG/DL (ref 0.1–1.2)
BUN BLD-MCNC: 16 MG/DL (ref 6–20)
BUN/CREAT SERPL: 27.1 (ref 7–25)
CALCIUM SPEC-SCNC: 9.2 MG/DL (ref 8.6–10.5)
CHLORIDE SERPL-SCNC: 98 MMOL/L (ref 98–107)
CO2 SERPL-SCNC: 26.3 MMOL/L (ref 22–29)
CREAT BLD-MCNC: 0.59 MG/DL (ref 0.57–1)
DEPRECATED RDW RBC AUTO: 42.9 FL (ref 37–54)
EOSINOPHIL # BLD AUTO: 0.02 10*3/MM3 (ref 0–0.7)
EOSINOPHIL NFR BLD AUTO: 0.7 % (ref 0.3–6.2)
ERYTHROCYTE [DISTWIDTH] IN BLOOD BY AUTOMATED COUNT: 14.3 % (ref 11.7–13)
GFR SERPL CREATININE-BSD FRML MDRD: 126 ML/MIN/1.73
GFR SERPL CREATININE-BSD FRML MDRD: >150 ML/MIN/1.73
GLOBULIN UR ELPH-MCNC: 2.3 GM/DL
GLUCOSE BLD-MCNC: 83 MG/DL (ref 65–99)
HCT VFR BLD AUTO: 31.3 % (ref 35.6–45.5)
HGB BLD-MCNC: 10.8 G/DL (ref 11.9–15.5)
IMM GRANULOCYTES # BLD: 0.01 10*3/MM3 (ref 0–0.03)
IMM GRANULOCYTES NFR BLD: 0.3 % (ref 0–0.5)
LYMPHOCYTES # BLD AUTO: 0.27 10*3/MM3 (ref 0.9–4.8)
LYMPHOCYTES NFR BLD AUTO: 9.3 % (ref 19.6–45.3)
MCH RBC QN AUTO: 30 PG (ref 26.9–32)
MCHC RBC AUTO-ENTMCNC: 34.5 G/DL (ref 32.4–36.3)
MCV RBC AUTO: 86.9 FL (ref 80.5–98.2)
MONOCYTES # BLD AUTO: 0.08 10*3/MM3 (ref 0.2–1.2)
MONOCYTES NFR BLD AUTO: 2.8 % (ref 5–12)
NEUTROPHILS # BLD AUTO: 2.52 10*3/MM3 (ref 1.9–8.1)
NEUTROPHILS NFR BLD AUTO: 87.2 % (ref 42.7–76)
PLATELET # BLD AUTO: 208 10*3/MM3 (ref 140–500)
PMV BLD AUTO: 11.1 FL (ref 6–12)
POTASSIUM BLD-SCNC: 3.1 MMOL/L (ref 3.5–5.2)
PROT SERPL-MCNC: 6.5 G/DL (ref 6–8.5)
RBC # BLD AUTO: 3.6 10*6/MM3 (ref 3.9–5.2)
SODIUM BLD-SCNC: 138 MMOL/L (ref 136–145)
WBC NRBC COR # BLD: 2.89 10*3/MM3 (ref 4.5–10.7)

## 2018-07-15 PROCEDURE — 85025 COMPLETE CBC W/AUTO DIFF WBC: CPT | Performed by: INTERNAL MEDICINE

## 2018-07-15 PROCEDURE — 25010000003 POTASSIUM CHLORIDE 10 MEQ/100ML SOLUTION: Performed by: INTERNAL MEDICINE

## 2018-07-15 PROCEDURE — 36591 DRAW BLOOD OFF VENOUS DEVICE: CPT

## 2018-07-15 PROCEDURE — 99239 HOSP IP/OBS DSCHRG MGMT >30: CPT | Performed by: INTERNAL MEDICINE

## 2018-07-15 PROCEDURE — 80053 COMPREHEN METABOLIC PANEL: CPT | Performed by: INTERNAL MEDICINE

## 2018-07-15 RX ORDER — POTASSIUM CHLORIDE 750 MG/1
20 CAPSULE, EXTENDED RELEASE ORAL 2 TIMES DAILY WITH MEALS
Qty: 60 CAPSULE | Refills: 5 | Status: SHIPPED | OUTPATIENT
Start: 2018-07-15 | End: 2019-05-07

## 2018-07-15 RX ORDER — POTASSIUM CHLORIDE 7.45 MG/ML
10 INJECTION INTRAVENOUS
Status: COMPLETED | OUTPATIENT
Start: 2018-07-15 | End: 2018-07-15

## 2018-07-15 RX ADMIN — POTASSIUM CHLORIDE 20 MEQ: 750 CAPSULE, EXTENDED RELEASE ORAL at 08:24

## 2018-07-15 RX ADMIN — POTASSIUM CHLORIDE 10 MEQ: 7.46 INJECTION, SOLUTION INTRAVENOUS at 09:45

## 2018-07-15 RX ADMIN — POTASSIUM CHLORIDE 10 MEQ: 7.46 INJECTION, SOLUTION INTRAVENOUS at 08:24

## 2018-07-15 RX ADMIN — ACYCLOVIR 400 MG: 400 TABLET ORAL at 08:25

## 2018-07-15 RX ADMIN — POTASSIUM CHLORIDE 10 MEQ: 7.46 INJECTION, SOLUTION INTRAVENOUS at 11:00

## 2018-07-15 RX ADMIN — ALLOPURINOL 300 MG: 300 TABLET ORAL at 08:24

## 2018-07-15 RX ADMIN — SODIUM CHLORIDE 75 ML/HR: 9 INJECTION, SOLUTION INTRAVENOUS at 05:39

## 2018-07-15 RX ADMIN — POTASSIUM CHLORIDE 10 MEQ: 7.46 INJECTION, SOLUTION INTRAVENOUS at 12:10

## 2018-07-15 NOTE — DISCHARGE SUMMARY
Admission date: 7/9/2018  Discharge date: 07/15/18    Admission diagnosis:  Diffuse large B-cell lymphoma (CMS/HCC) [C83.30]  Right arm acute DVT secondary to PICC line     Discharge diagnosis:  Diffuse large B-cell lymphoma (CMS/HCC) [C83.30]   Leukocytopenia secondary to chemotherapy   Right arm acute DVT secondary to PICC line    Hypokalemia   Elevated liver enzymes secondary to chemotherapy   Anemia secondary to chemotherapy     Consultants: PICC line nurse     Procedure: Right arm PICC line placement placed by interventional radiology for chemotherapy.  This will be removed at the time of discharge.       History of current illness:   1.  Primary mediastinal large B-cell lymphoma: The patient presented with dyspnea, cough, and chest pain.  A CT angiogram 6/8/2018 showed a large tumor mass in the mediastinum encasing multiple vascular structures and narrowing the left mainstem bronchus.  There was direct tumor infiltration of the right upper lobe.  There was a small pleural effusion.  She underwent CT-guided biopsy of the mediastinal mass on 6/12/2018 and pathology reviewed at [facility] oncology showed diffuse large B-cell lymphoma consistent with a primary diffuse large B-cell lymphoma.  A bone marrow exam was performed on 6/14/2018 which was negative for lymphoma.  She noted a PET scan on 6/13/2018 that showed a large hypermetabolic mass in the chest involving the anterior mediastinum, left hilum, nearly complete filling of the left hemothorax with SUV 19.8.  His physiologic distribution elsewhere.     The patient was started on IV fluid hydration and allopurinol for prevention of hyperuricemia.  She initiated systemic chemotherapy with DA-EPOCH-R on 6/16/18 and completed the evening of 6/21/18.  She had a infusion reaction to rituximab but was able to complete with additional medications and otherwise tolerated, this was discussed with Nevaeh Tillman today and no premedication changes necessary.   · She did  have improvement in shortness of breath, cough, and chest pain at the time of discharge.    · She received Neulasta support at the day following discharge in our office.    · Her white count did decline to as low as 0.8 she had a low-grade temperature.  She is placed on Levaquin ×5 days starting on June 27, 2018.  · Platelets arminda of 82,000.  · Discussed with Dr. Fox at the [facility] and his suggestion was to do the CT scan and PET scan after 2 cycles and discuss the results with him.  These scans have been ordered.   If she has an excellent response early on she would not require any further treatments after completion of 6 cycles of EPOCH/R.    · We will proceed with cycle 2 of EPOCH- R starting today.  The patient will go to [facility] for admission.  Dr. Villa is aware and Dr. Padilla has discussed treatment plan with an Nevaeh Tillman, pharmacist.  Dose adjustments have been made to increase this cycle by 20%.      Hospital course:  After admission patient had PICC line placement.  We initially request PICC line nurse however after 2 attempts on the left arm, the attempts at the bedside failed.  Patient was consented to radiology department, an interventional radiologist eventually try to put a PICC line in the right arm where she did have previous acute DVT.  Patient has been on Xarelto 15 mg twice a day for anticoagulation.  During this hospitalization, Xarelto was changed her to 20 mg daily on 7/13/2018, and will be continued as outpatient and we issued a new prescription.  There was no evidence of bleeding or bruising.      Patient was started on chemotherapy and tolerated very well.  Denies nausea vomiting no chest pain or dyspnea.  She did have worsening leukocytes and the neutrophils, however she has normal neutrophils are 2520 on day of discharge despite total WBC of the 2890.  Patient will be given Neulasta as outpatient on 7/16/2018 in our clinic.  She also has mild anemia secondary to  chemotherapy, with hemoglobin 10.8 on day of discharge.  She maintains baseline and normal platelets throughout this hospitalization.    Patient also developed hypokalemia, despite continued oral potassium supplementation, she was given intravenous iron infusion and at times.  Her oral potassium will be increased to 20 mEq twice a day instead of previous once daily.  She had a normal magnesium.  This twice a day potassium will be continued as outpatient.  We issued a new prescription.     Patient will be reevaluated tomorrow in our clinic blindness petition: To start the Neulasta and due for Zoladex to preserve her fertility.      Vitals:    07/15/18 0828   BP: 101/64   Pulse: 68   Resp: 18   Temp: 98 °F (36.7 °C)   SpO2: 98%     GENERAL:  Well-developed, well-nourished female in no acute distress.   SKIN:  Warm, dry without rashes, purpura or petechiae.  Has alopecia.    NECK:  Supple with good range of motion; no thyromegaly or masses, no JVD.  LYMPHATICS:  No cervical, supraclavicular, axillary or inguinal adenopathy.  CHEST:  Lungs clear to auscultation. Good airflow.  CARDIAC:  Regular rate and rhythm without murmurs, rubs or gallops.   ABD: Soft and nontender no guarding or rebound.   EXTREMITIES:  No clubbing, cyanosis or edema.   PSYCHIATRIC:  Normal affect and mood.      Discharge condition: stable  Discharge diet   Dietary Orders     Start     Ordered    07/09/18 1034  Diet Regular  Diet Effective Now     Question:  Diet Texture / Consistency  Answer:  Regular    07/09/18 1033        Additional Instructions: Call with fevers, uncontrolled nausea/vomiting/pain.  Medications:        Discharge Medications      New Medications      Instructions Start Date   potassium chloride 10 MEQ CR capsule  Commonly known as:  MICRO-K  Replaces:  potassium chloride ER 20 MEQ tablet controlled-release ER tablet   20 mEq, Oral, 2 Times Daily With Meals         Changes to Medications      Instructions Start Date   rivaroxaban  20 MG tablet  Commonly known as:  XARELTO  What changed:  · medication strength  · how much to take  · when to take this   20 mg, Oral, Daily With Dinner         Continue These Medications      Instructions Start Date   acyclovir 400 MG tablet  Commonly known as:  ZOVIRAX   400 mg, Oral, 2 Times Daily, Take no more than 5 doses a day.      allopurinol 300 MG tablet  Commonly known as:  ZYLOPRIM   300 mg, Oral, 2 Times Daily      fluconazole 200 MG tablet  Commonly known as:  DIFLUCAN   400 mg, Oral, Every 24 Hours      levoFLOXacin 500 MG tablet  Commonly known as:  LEVAQUIN   500 mg, Oral, Daily      ondansetron 4 MG tablet  Commonly known as:  ZOFRAN   4 mg, Oral, Every 6 Hours PRN      sennosides-docusate sodium 8.6-50 MG tablet  Commonly known as:  SENOKOT-S   2 tablets, Oral, 2 Times Daily PRN      sulfamethoxazole-trimethoprim 800-160 MG per tablet  Commonly known as:  BACTRIM DS,SEPTRA DS   1 tablet, Oral, 3 Times Weekly         Stop These Medications    potassium chloride ER 20 MEQ tablet controlled-release ER tablet  Commonly known as:  K-TAB  Replaced by:  potassium chloride 10 MEQ CR capsule          Disposition:Home or Self Care  Follow up: Future Appointments  Date Time Provider Department Center   7/16/2018 8:30 AM LAB CHAIR 3 [facility]   7/16/2018 9:00 AM BRANNON [facility]   7/16/2018 9:45 AM INJECTION CHAIR [facility]   7/19/2018 9:00 AM LAB CHAIR 4 [facility]   7/19/2018 9:30 AM RN [facility]   7/23/2018 7:45 AM [facility] ECHO/[facility] ECHO [facility]   7/23/2018 9:00 AM GRETCHEN CT 2  [facility]RETCH[facility] CT [facility]   7/23/2018 10:10 AM LAB CHAIR 3 [facility]   7/23/2018 10:30 AM RN [facility] INFUS [facility]   7/25/2018 8:15 AM G[facility] PET ADMIN RM 1  [facility] PET [facility]   7/25/2018 9:45 AM GRETCHEN PET 1  [facility] PET [facility]   7/26/2018 9:00 AM LAB CHAIR 2 [facility]   7/26/2018 9:30 AM RN [facility] INFUS [facility]   7/30/2018  8:00 AM LAB CHAIR 5 [facility]  LAB [facility]   7/30/2018 8:40 AM MD CHARISSE [facility] CBC [facility]RES  [facility] [place]       Over 30 minutes on discharge.  Counseling and coordinating care.

## 2018-07-15 NOTE — PLAN OF CARE
Problem: Patient Care Overview  Goal: Plan of Care Review  Outcome: Ongoing (interventions implemented as appropriate)   07/14/18 1611 07/14/18 2030 07/15/18 0621   Coping/Psychosocial   Plan of Care Reviewed With --  patient;mother --    Plan of Care Review   Progress improving --  --    OTHER   Outcome Summary --  --  VSS. No c/o pain or nausea. BM x 1 07/14. R dual PICC patent with blood return. Possible d/c today.

## 2018-07-15 NOTE — PROGRESS NOTES
I have seen iVkki [place] with Nurse practitioner Irene Martin and agree with her exam and plan. I have personally examined patient also and will start cycle 2 of EPOCH/R chemotherapy.I agree with Irene Vail exam and plan.    Millie Padilla MD

## 2018-07-16 ENCOUNTER — LAB (OUTPATIENT)
Dept: LAB | Facility: HOSPITAL | Age: 23
End: 2018-07-16

## 2018-07-16 ENCOUNTER — OFFICE VISIT (OUTPATIENT)
Dept: ONCOLOGY | Facility: CLINIC | Age: 23
End: 2018-07-16

## 2018-07-16 ENCOUNTER — INFUSION (OUTPATIENT)
Dept: ONCOLOGY | Facility: HOSPITAL | Age: 23
End: 2018-07-16

## 2018-07-16 VITALS
TEMPERATURE: 97.8 F | WEIGHT: 140 LBS | DIASTOLIC BLOOD PRESSURE: 60 MMHG | SYSTOLIC BLOOD PRESSURE: 98 MMHG | RESPIRATION RATE: 14 BRPM | BODY MASS INDEX: 25.76 KG/M2 | HEART RATE: 62 BPM | HEIGHT: 62 IN | OXYGEN SATURATION: 98 %

## 2018-07-16 DIAGNOSIS — C85.28 MEDIASTINAL LARGE B-CELL LYMPHOMA OF LYMPH NODES OF MULTIPLE REGIONS (HCC): Primary | ICD-10-CM

## 2018-07-16 DIAGNOSIS — C85.28 MEDIASTINAL LARGE B-CELL LYMPHOMA OF LYMPH NODES OF MULTIPLE REGIONS (HCC): ICD-10-CM

## 2018-07-16 LAB
ALBUMIN SERPL-MCNC: 4 G/DL (ref 3.5–5.2)
ALBUMIN/GLOB SERPL: 1.5 G/DL (ref 1.1–2.4)
ALP SERPL-CCNC: 47 U/L (ref 38–116)
ALT SERPL W P-5'-P-CCNC: 48 U/L (ref 0–33)
ANION GAP SERPL CALCULATED.3IONS-SCNC: 11.8 MMOL/L
AST SERPL-CCNC: 17 U/L (ref 0–32)
BASOPHILS # BLD AUTO: 0.01 10*3/MM3 (ref 0–0.1)
BASOPHILS NFR BLD AUTO: 0.4 % (ref 0–1.1)
BILIRUB SERPL-MCNC: 0.4 MG/DL (ref 0.1–1.2)
BUN BLD-MCNC: 14 MG/DL (ref 6–20)
BUN/CREAT SERPL: 21.9 (ref 7.3–30)
CALCIUM SPEC-SCNC: 9.3 MG/DL (ref 8.5–10.2)
CHLORIDE SERPL-SCNC: 99 MMOL/L (ref 98–107)
CO2 SERPL-SCNC: 26.2 MMOL/L (ref 22–29)
CREAT BLD-MCNC: 0.64 MG/DL (ref 0.6–1.1)
DEPRECATED RDW RBC AUTO: 43.8 FL (ref 37–49)
EOSINOPHIL # BLD AUTO: 0.17 10*3/MM3 (ref 0–0.36)
EOSINOPHIL NFR BLD AUTO: 6.2 % (ref 1–5)
ERYTHROCYTE [DISTWIDTH] IN BLOOD BY AUTOMATED COUNT: 14.1 % (ref 11.7–14.5)
GFR SERPL CREATININE-BSD FRML MDRD: 115 ML/MIN/1.73
GFR SERPL CREATININE-BSD FRML MDRD: 139 ML/MIN/1.73
GLOBULIN UR ELPH-MCNC: 2.6 GM/DL (ref 1.8–3.5)
GLUCOSE BLD-MCNC: 103 MG/DL (ref 74–124)
HCT VFR BLD AUTO: 36.3 % (ref 34–45)
HGB BLD-MCNC: 11.8 G/DL (ref 11.5–14.9)
IMM GRANULOCYTES # BLD: 0.04 10*3/MM3 (ref 0–0.03)
IMM GRANULOCYTES NFR BLD: 1.4 % (ref 0–0.5)
LYMPHOCYTES # BLD AUTO: 0.2 10*3/MM3 (ref 1–3.5)
LYMPHOCYTES NFR BLD AUTO: 7.2 % (ref 20–49)
MCH RBC QN AUTO: 30 PG (ref 27–33)
MCHC RBC AUTO-ENTMCNC: 32.5 G/DL (ref 32–35)
MCV RBC AUTO: 92.4 FL (ref 83–97)
MONOCYTES # BLD AUTO: 0.03 10*3/MM3 (ref 0.25–0.8)
MONOCYTES NFR BLD AUTO: 1.1 % (ref 4–12)
NEUTROPHILS # BLD AUTO: 2.31 10*3/MM3 (ref 1.5–7)
NEUTROPHILS NFR BLD AUTO: 83.7 % (ref 39–75)
NRBC BLD MANUAL-RTO: 0 /100 WBC (ref 0–0)
PLATELET # BLD AUTO: 243 10*3/MM3 (ref 150–375)
PMV BLD AUTO: 11 FL (ref 8.9–12.1)
POTASSIUM BLD-SCNC: 3.6 MMOL/L (ref 3.5–4.7)
PROT SERPL-MCNC: 6.6 G/DL (ref 6.3–8)
RBC # BLD AUTO: 3.93 10*6/MM3 (ref 3.9–5)
SODIUM BLD-SCNC: 137 MMOL/L (ref 134–145)
WBC NRBC COR # BLD: 2.76 10*3/MM3 (ref 4–10)

## 2018-07-16 PROCEDURE — 96372 THER/PROPH/DIAG INJ SC/IM: CPT | Performed by: INTERNAL MEDICINE

## 2018-07-16 PROCEDURE — 99212-NC PR NO CHARGE CBC OFFICE OUTPATIENT VISIT 10 MINUTES: Performed by: NURSE PRACTITIONER

## 2018-07-16 PROCEDURE — 85025 COMPLETE CBC W/AUTO DIFF WBC: CPT | Performed by: INTERNAL MEDICINE

## 2018-07-16 PROCEDURE — 25010000002 PEGFILGRASTIM 6 MG/0.6ML SOLUTION PREFILLED SYRINGE: Performed by: INTERNAL MEDICINE

## 2018-07-16 PROCEDURE — 25010000002 GOSERELIN PER 3.6 MG: Performed by: INTERNAL MEDICINE

## 2018-07-16 PROCEDURE — 36415 COLL VENOUS BLD VENIPUNCTURE: CPT | Performed by: INTERNAL MEDICINE

## 2018-07-16 PROCEDURE — 80053 COMPREHEN METABOLIC PANEL: CPT | Performed by: INTERNAL MEDICINE

## 2018-07-16 RX ADMIN — GOSERELIN ACETATE 3.6 MG: 3.6 IMPLANT SUBCUTANEOUS at 11:37

## 2018-07-16 RX ADMIN — PEGFILGRASTIM 6 MG: 6 INJECTION SUBCUTANEOUS at 09:57

## 2018-07-17 LAB
HAV IGM SERPL QL IA: NEGATIVE
HBV CORE IGM SERPL QL IA: NEGATIVE
HBV SURFACE AG SERPL QL IA: NEGATIVE

## 2018-07-19 ENCOUNTER — CLINICAL SUPPORT (OUTPATIENT)
Dept: ONCOLOGY | Facility: HOSPITAL | Age: 23
End: 2018-07-19

## 2018-07-19 ENCOUNTER — LAB (OUTPATIENT)
Dept: LAB | Facility: HOSPITAL | Age: 23
End: 2018-07-19

## 2018-07-19 ENCOUNTER — APPOINTMENT (OUTPATIENT)
Dept: ONCOLOGY | Facility: CLINIC | Age: 23
End: 2018-07-19

## 2018-07-19 DIAGNOSIS — C85.28 MEDIASTINAL LARGE B-CELL LYMPHOMA OF LYMPH NODES OF MULTIPLE REGIONS (HCC): ICD-10-CM

## 2018-07-19 LAB
BASOPHILS # BLD AUTO: 0.03 10*3/MM3 (ref 0–0.1)
BASOPHILS NFR BLD AUTO: 0.7 % (ref 0–1.1)
DEPRECATED RDW RBC AUTO: 42.3 FL (ref 37–49)
EOSINOPHIL # BLD AUTO: 0.2 10*3/MM3 (ref 0–0.36)
EOSINOPHIL NFR BLD AUTO: 4.4 % (ref 1–5)
ERYTHROCYTE [DISTWIDTH] IN BLOOD BY AUTOMATED COUNT: 14.1 % (ref 11.7–14.5)
HCT VFR BLD AUTO: 31.4 % (ref 34–45)
HGB BLD-MCNC: 10.7 G/DL (ref 11.5–14.9)
IMM GRANULOCYTES # BLD: 0.89 10*3/MM3 (ref 0–0.03)
IMM GRANULOCYTES NFR BLD: 19.6 % (ref 0–0.5)
LYMPHOCYTES # BLD AUTO: 0.21 10*3/MM3 (ref 1–3.5)
LYMPHOCYTES NFR BLD AUTO: 4.6 % (ref 20–49)
MCH RBC QN AUTO: 29.8 PG (ref 27–33)
MCHC RBC AUTO-ENTMCNC: 34.1 G/DL (ref 32–35)
MCV RBC AUTO: 87.5 FL (ref 83–97)
MONOCYTES # BLD AUTO: 0.09 10*3/MM3 (ref 0.25–0.8)
MONOCYTES NFR BLD AUTO: 2 % (ref 4–12)
NEUTROPHILS # BLD AUTO: 3.12 10*3/MM3 (ref 1.5–7)
NEUTROPHILS NFR BLD AUTO: 68.7 % (ref 39–75)
NRBC BLD MANUAL-RTO: 0 /100 WBC (ref 0–0)
PLATELET # BLD AUTO: 140 10*3/MM3 (ref 150–375)
PMV BLD AUTO: 10.9 FL (ref 8.9–12.1)
RBC # BLD AUTO: 3.59 10*6/MM3 (ref 3.9–5)
WBC NRBC COR # BLD: 4.54 10*3/MM3 (ref 4–10)

## 2018-07-19 PROCEDURE — 85025 COMPLETE CBC W/AUTO DIFF WBC: CPT | Performed by: INTERNAL MEDICINE

## 2018-07-19 PROCEDURE — 36415 COLL VENOUS BLD VENIPUNCTURE: CPT | Performed by: INTERNAL MEDICINE

## 2018-07-19 NOTE — PROGRESS NOTES
Pt presents for RN review. Counts reviewed with pt and are satisfactory for this pt at this time. Pt had PICC line removed on 7/15. She is still having bloody drainage from the insertion spot. Small amount of blood notes on pt's Band-Aid. Sterile gauze and tegaderm placed on site. Will re-assess when pt return for appt on 7/23. Pt had no other questions or concerns. Pt V/U all above.   Lab Results   Component Value Date    WBC 4.54 07/19/2018    HGB 10.7 (L) 07/19/2018    HCT 31.4 (L) 07/19/2018    MCV 87.5 07/19/2018     (L) 07/19/2018

## 2018-07-23 ENCOUNTER — DOCUMENTATION (OUTPATIENT)
Dept: ONCOLOGY | Facility: CLINIC | Age: 23
End: 2018-07-23

## 2018-07-23 ENCOUNTER — CLINICAL SUPPORT (OUTPATIENT)
Dept: ONCOLOGY | Facility: HOSPITAL | Age: 23
End: 2018-07-23

## 2018-07-23 ENCOUNTER — HOSPITAL ENCOUNTER (OUTPATIENT)
Dept: CT IMAGING | Facility: HOSPITAL | Age: 23
Discharge: HOME | End: 2018-07-23
Attending: INTERNAL MEDICINE

## 2018-07-23 ENCOUNTER — HOSPITAL ENCOUNTER (OUTPATIENT)
Dept: CARDIOLOGY | Facility: HOSPITAL | Age: 23
Discharge: HOME | End: 2018-07-23
Attending: INTERNAL MEDICINE | Admitting: INTERNAL MEDICINE

## 2018-07-23 ENCOUNTER — LAB (OUTPATIENT)
Dept: LAB | Facility: HOSPITAL | Age: 23
End: 2018-07-23

## 2018-07-23 VITALS — SYSTOLIC BLOOD PRESSURE: 96 MMHG | DIASTOLIC BLOOD PRESSURE: 41 MMHG

## 2018-07-23 VITALS
HEIGHT: 62 IN | WEIGHT: 140 LBS | DIASTOLIC BLOOD PRESSURE: 58 MMHG | SYSTOLIC BLOOD PRESSURE: 94 MMHG | HEART RATE: 61 BPM | BODY MASS INDEX: 25.76 KG/M2

## 2018-07-23 DIAGNOSIS — C85.28 MEDIASTINAL LARGE B-CELL LYMPHOMA OF LYMPH NODES OF MULTIPLE REGIONS (HCC): ICD-10-CM

## 2018-07-23 DIAGNOSIS — I31.39 PERICARDIAL EFFUSION: ICD-10-CM

## 2018-07-23 LAB
BASOPHILS # BLD AUTO: 0.11 10*3/MM3 (ref 0–0.1)
BASOPHILS NFR BLD AUTO: 0.7 % (ref 0–1.1)
BH CV ECHO MEAS - ACS: 1.9 CM
BH CV ECHO MEAS - AO MAX PG (FULL): 3.6 MMHG
BH CV ECHO MEAS - AO MAX PG: 6.5 MMHG
BH CV ECHO MEAS - AO MEAN PG (FULL): 2.6 MMHG
BH CV ECHO MEAS - AO MEAN PG: 4.3 MMHG
BH CV ECHO MEAS - AO ROOT AREA (BSA CORRECTED): 1.7
BH CV ECHO MEAS - AO ROOT AREA: 6.2 CM^2
BH CV ECHO MEAS - AO ROOT DIAM: 2.8 CM
BH CV ECHO MEAS - AO V2 MAX: 127.6 CM/SEC
BH CV ECHO MEAS - AO V2 MEAN: 99.6 CM/SEC
BH CV ECHO MEAS - AO V2 VTI: 30.2 CM
BH CV ECHO MEAS - AVA(I,A): 2 CM^2
BH CV ECHO MEAS - AVA(I,D): 2 CM^2
BH CV ECHO MEAS - AVA(V,A): 2.2 CM^2
BH CV ECHO MEAS - AVA(V,D): 2.2 CM^2
BH CV ECHO MEAS - BSA(HAYCOCK): 1.7 M^2
BH CV ECHO MEAS - BSA: 1.6 M^2
BH CV ECHO MEAS - BZI_BMI: 25.6 KILOGRAMS/M^2
BH CV ECHO MEAS - BZI_METRIC_HEIGHT: 157.5 CM
BH CV ECHO MEAS - BZI_METRIC_WEIGHT: 63.5 KG
BH CV ECHO MEAS - CONTRAST EF (2CH): 57.1 ML/M^2
BH CV ECHO MEAS - CONTRAST EF 4CH: 60.1 ML/M^2
BH CV ECHO MEAS - EDV(MOD-SP2): 119 ML
BH CV ECHO MEAS - EDV(MOD-SP4): 148 ML
BH CV ECHO MEAS - EDV(TEICH): 137.7 ML
BH CV ECHO MEAS - EF(CUBED): 72.9 %
BH CV ECHO MEAS - EF(MOD-BP): 59 %
BH CV ECHO MEAS - EF(MOD-SP2): 57.1 %
BH CV ECHO MEAS - EF(MOD-SP4): 60.1 %
BH CV ECHO MEAS - EF(TEICH): 64.1 %
BH CV ECHO MEAS - ESV(MOD-SP2): 51 ML
BH CV ECHO MEAS - ESV(MOD-SP4): 59 ML
BH CV ECHO MEAS - ESV(TEICH): 49.4 ML
BH CV ECHO MEAS - FS: 35.3 %
BH CV ECHO MEAS - IVS/LVPW: 0.82
BH CV ECHO MEAS - IVSD: 0.69 CM
BH CV ECHO MEAS - LAT PEAK E' VEL: 17 CM/SEC
BH CV ECHO MEAS - LV DIASTOLIC VOL/BSA (35-75): 90.1 ML/M^2
BH CV ECHO MEAS - LV MASS(C)D: 144.4 GRAMS
BH CV ECHO MEAS - LV MASS(C)DI: 87.9 GRAMS/M^2
BH CV ECHO MEAS - LV MAX PG: 2.9 MMHG
BH CV ECHO MEAS - LV MEAN PG: 1.8 MMHG
BH CV ECHO MEAS - LV SYSTOLIC VOL/BSA (12-30): 35.9 ML/M^2
BH CV ECHO MEAS - LV V1 MAX: 84.9 CM/SEC
BH CV ECHO MEAS - LV V1 MEAN: 62.3 CM/SEC
BH CV ECHO MEAS - LV V1 VTI: 18.6 CM
BH CV ECHO MEAS - LVIDD: 5.3 CM
BH CV ECHO MEAS - LVIDS: 3.5 CM
BH CV ECHO MEAS - LVLD AP2: 7.4 CM
BH CV ECHO MEAS - LVLD AP4: 7.9 CM
BH CV ECHO MEAS - LVLS AP2: 5.9 CM
BH CV ECHO MEAS - LVLS AP4: 6.5 CM
BH CV ECHO MEAS - LVOT AREA (M): 3.1 CM^2
BH CV ECHO MEAS - LVOT AREA: 3.3 CM^2
BH CV ECHO MEAS - LVOT DIAM: 2 CM
BH CV ECHO MEAS - LVPWD: 0.85 CM
BH CV ECHO MEAS - MED PEAK E' VEL: 11 CM/SEC
BH CV ECHO MEAS - MV A DUR: 0.09 SEC
BH CV ECHO MEAS - MV A MAX VEL: 44.1 CM/SEC
BH CV ECHO MEAS - MV DEC SLOPE: 430.1 CM/SEC^2
BH CV ECHO MEAS - MV DEC TIME: 0.17 SEC
BH CV ECHO MEAS - MV E MAX VEL: 73.2 CM/SEC
BH CV ECHO MEAS - MV E/A: 1.7
BH CV ECHO MEAS - MV MAX PG: 3.2 MMHG
BH CV ECHO MEAS - MV MEAN PG: 1.4 MMHG
BH CV ECHO MEAS - MV P1/2T MAX VEL: 74.7 CM/SEC
BH CV ECHO MEAS - MV P1/2T: 50.9 MSEC
BH CV ECHO MEAS - MV V2 MAX: 89.6 CM/SEC
BH CV ECHO MEAS - MV V2 MEAN: 56.5 CM/SEC
BH CV ECHO MEAS - MV V2 VTI: 31.8 CM
BH CV ECHO MEAS - MVA P1/2T LCG: 2.9 CM^2
BH CV ECHO MEAS - MVA(P1/2T): 4.3 CM^2
BH CV ECHO MEAS - MVA(VTI): 1.9 CM^2
BH CV ECHO MEAS - PA MAX PG (FULL): 4.4 MMHG
BH CV ECHO MEAS - PA MAX PG: 6.8 MMHG
BH CV ECHO MEAS - PA V2 MAX: 130.3 CM/SEC
BH CV ECHO MEAS - PULM A REVS DUR: 0.11 SEC
BH CV ECHO MEAS - PULM A REVS VEL: 21.3 CM/SEC
BH CV ECHO MEAS - PULM DIAS VEL: 64.5 CM/SEC
BH CV ECHO MEAS - PULM S/D: 0.9
BH CV ECHO MEAS - PULM SYS VEL: 58.2 CM/SEC
BH CV ECHO MEAS - PVA(V,A): 2.1 CM^2
BH CV ECHO MEAS - PVA(V,D): 2.1 CM^2
BH CV ECHO MEAS - QP/QS: 1.2
BH CV ECHO MEAS - RAP SYSTOLE: 3 MMHG
BH CV ECHO MEAS - RV MAX PG: 2.4 MMHG
BH CV ECHO MEAS - RV MEAN PG: 1.5 MMHG
BH CV ECHO MEAS - RV V1 MAX: 77.6 CM/SEC
BH CV ECHO MEAS - RV V1 MEAN: 59.3 CM/SEC
BH CV ECHO MEAS - RV V1 VTI: 20.5 CM
BH CV ECHO MEAS - RVOT AREA: 3.6 CM^2
BH CV ECHO MEAS - RVOT DIAM: 2.1 CM
BH CV ECHO MEAS - RVSP: 29 MMHG
BH CV ECHO MEAS - SI(AO): 114.7 ML/M^2
BH CV ECHO MEAS - SI(CUBED): 67.5 ML/M^2
BH CV ECHO MEAS - SI(LVOT): 37 ML/M^2
BH CV ECHO MEAS - SI(MOD-SP2): 41.4 ML/M^2
BH CV ECHO MEAS - SI(MOD-SP4): 54.2 ML/M^2
BH CV ECHO MEAS - SI(TEICH): 53.7 ML/M^2
BH CV ECHO MEAS - SUP REN AO DIAM: 1.6 CM
BH CV ECHO MEAS - SV(AO): 188.3 ML
BH CV ECHO MEAS - SV(CUBED): 110.9 ML
BH CV ECHO MEAS - SV(LVOT): 60.7 ML
BH CV ECHO MEAS - SV(MOD-SP2): 68 ML
BH CV ECHO MEAS - SV(MOD-SP4): 89 ML
BH CV ECHO MEAS - SV(RVOT): 73.7 ML
BH CV ECHO MEAS - SV(TEICH): 88.3 ML
BH CV ECHO MEAS - TAPSE (>1.6): 2 CM2
BH CV ECHO MEAS - TR MAX VEL: 255.2 CM/SEC
BH CV ECHO MEASUREMENTS AVERAGE E/E' RATIO: 5.23
BH CV XLRA - RV BASE: 3.2 CM
BH CV XLRA - TDI S': 13 CM/SEC
DEPRECATED RDW RBC AUTO: 43.5 FL (ref 37–49)
EOSINOPHIL # BLD AUTO: 0.13 10*3/MM3 (ref 0–0.36)
EOSINOPHIL NFR BLD AUTO: 0.9 % (ref 1–5)
ERYTHROCYTE [DISTWIDTH] IN BLOOD BY AUTOMATED COUNT: 14.4 % (ref 11.7–14.5)
HCT VFR BLD AUTO: 32.7 % (ref 34–45)
HGB BLD-MCNC: 11 G/DL (ref 11.5–14.9)
IMM GRANULOCYTES # BLD: 4.29 10*3/MM3 (ref 0–0.03)
IMM GRANULOCYTES NFR BLD: 29.2 % (ref 0–0.5)
LEFT ATRIUM VOLUME INDEX: 25 ML/M2
LYMPHOCYTES # BLD AUTO: 0.37 10*3/MM3 (ref 1–3.5)
LYMPHOCYTES NFR BLD AUTO: 2.5 % (ref 20–49)
MAXIMAL PREDICTED HEART RATE: 197 BPM
MCH RBC QN AUTO: 29.9 PG (ref 27–33)
MCHC RBC AUTO-ENTMCNC: 33.6 G/DL (ref 32–35)
MCV RBC AUTO: 88.9 FL (ref 83–97)
MONOCYTES # BLD AUTO: 3.3 10*3/MM3 (ref 0.25–0.8)
MONOCYTES NFR BLD AUTO: 22.5 % (ref 4–12)
NEUTROPHILS # BLD AUTO: 6.47 10*3/MM3 (ref 1.5–7)
NEUTROPHILS NFR BLD AUTO: 44.2 % (ref 39–75)
NRBC BLD MANUAL-RTO: 3.1 /100 WBC (ref 0–0)
PLATELET # BLD AUTO: 107 10*3/MM3 (ref 150–375)
PMV BLD AUTO: 11.3 FL (ref 8.9–12.1)
RBC # BLD AUTO: 3.68 10*6/MM3 (ref 3.9–5)
STRESS TARGET HR: 167 BPM
WBC NRBC COR # BLD: 14.67 10*3/MM3 (ref 4–10)

## 2018-07-23 PROCEDURE — 25010000002 IOPAMIDOL 61 % SOLUTION: Performed by: INTERNAL MEDICINE

## 2018-07-23 PROCEDURE — 93306 TTE W/DOPPLER COMPLETE: CPT

## 2018-07-23 PROCEDURE — 0399T HC MYOCARDL STRAIN IMAG QUAN ASSMT PER SESS: CPT

## 2018-07-23 PROCEDURE — 74177 CT ABD & PELVIS W/CONTRAST: CPT

## 2018-07-23 PROCEDURE — 0399T ADULT TRANSTHORACIC ECHO COMPLETE W/ CONT IF NECESSARY PER PROTOCOL: CPT | Performed by: INTERNAL MEDICINE

## 2018-07-23 PROCEDURE — 25010000002 PERFLUTREN (DEFINITY) 8.476 MG IN SODIUM CHLORIDE 0.9 % 10 ML INJECTION: Performed by: INTERNAL MEDICINE

## 2018-07-23 PROCEDURE — 71260 CT THORAX DX C+: CPT

## 2018-07-23 PROCEDURE — 85025 COMPLETE CBC W/AUTO DIFF WBC: CPT | Performed by: INTERNAL MEDICINE

## 2018-07-23 PROCEDURE — 36415 COLL VENOUS BLD VENIPUNCTURE: CPT | Performed by: INTERNAL MEDICINE

## 2018-07-23 PROCEDURE — 93306 TTE W/DOPPLER COMPLETE: CPT | Performed by: INTERNAL MEDICINE

## 2018-07-23 RX ADMIN — IOPAMIDOL 85 ML: 612 INJECTION, SOLUTION INTRAVENOUS at 09:38

## 2018-07-23 RX ADMIN — PERFLUTREN 1.5 ML: 6.52 INJECTION, SUSPENSION INTRAVENOUS at 08:45

## 2018-07-23 NOTE — PROGRESS NOTES
Pt presents for RN review. She is a little lightheaded today.She states she is eating and drinking well. She states this is normal for her after TX. BP noted in flow sheet. Pressure dressing removed from PICC-line site. Site is clean, dry and intact. It is nor completely scabbed over without any drainage. Platelets 107K. Reviewed S/S of bleeding and when to seek medical attention. Pt will return on Thursday for a CBC and Review. Copy of labs given and Pt V/U.   Lab Results   Component Value Date    WBC 14.67 (H) 07/23/2018    HGB 11.0 (L) 07/23/2018    HCT 32.7 (L) 07/23/2018    MCV 88.9 07/23/2018     (L) 07/23/2018

## 2018-07-23 NOTE — PROGRESS NOTES
BAMBIW met with the patient and her mother when she was here today for lab work, to follow-up with her. 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 They have been very kind and understanding.    Pt said she and her mother are almost constant companions these days. She lives with her mother, who takes her to all of her appointments, and helps with some of her bills. Pt has not received any bills from [facility] yet. BAMBIW explained that claims are filed with insurance, and then pt will be billed for any balance, if there is any. BAMBIW also informed them of the financial assistance program that she can apply to in the future, if needed.     Pt said she was able to breathe so much better after her first chemotherapy. She was relieved and encouraged as she felt the mass was decreasing and relieving the pressure she was having on her lung.     LCSW offered assistance in the future if needed.

## 2018-07-25 ENCOUNTER — APPOINTMENT (OUTPATIENT)
Dept: PET IMAGING | Facility: HOSPITAL | Age: 23
End: 2018-07-25
Attending: INTERNAL MEDICINE

## 2018-07-25 ENCOUNTER — HOSPITAL ENCOUNTER (OUTPATIENT)
Dept: PET IMAGING | Facility: HOSPITAL | Age: 23
End: 2018-07-25
Attending: INTERNAL MEDICINE

## 2018-07-25 ENCOUNTER — TELEPHONE (OUTPATIENT)
Dept: ONCOLOGY | Facility: CLINIC | Age: 23
End: 2018-07-25

## 2018-07-26 ENCOUNTER — HOSPITAL ENCOUNTER (OUTPATIENT)
Dept: PET IMAGING | Facility: HOSPITAL | Age: 23
Discharge: HOME | End: 2018-07-26
Attending: INTERNAL MEDICINE

## 2018-07-26 ENCOUNTER — LAB (OUTPATIENT)
Dept: LAB | Facility: HOSPITAL | Age: 23
End: 2018-07-26

## 2018-07-26 ENCOUNTER — HOSPITAL ENCOUNTER (OUTPATIENT)
Dept: PET IMAGING | Facility: HOSPITAL | Age: 23
Discharge: HOME | End: 2018-07-26
Attending: INTERNAL MEDICINE | Admitting: INTERNAL MEDICINE

## 2018-07-26 ENCOUNTER — APPOINTMENT (OUTPATIENT)
Dept: ONCOLOGY | Facility: HOSPITAL | Age: 23
End: 2018-07-26

## 2018-07-26 ENCOUNTER — APPOINTMENT (OUTPATIENT)
Dept: LAB | Facility: HOSPITAL | Age: 23
End: 2018-07-26

## 2018-07-26 ENCOUNTER — INFUSION (OUTPATIENT)
Dept: ONCOLOGY | Facility: HOSPITAL | Age: 23
End: 2018-07-26

## 2018-07-26 DIAGNOSIS — C85.28 MEDIASTINAL LARGE B-CELL LYMPHOMA OF LYMPH NODES OF MULTIPLE REGIONS (HCC): ICD-10-CM

## 2018-07-26 LAB
BASOPHILS # BLD AUTO: 0.08 10*3/MM3 (ref 0–0.1)
BASOPHILS NFR BLD AUTO: 0.5 % (ref 0–1.1)
DEPRECATED RDW RBC AUTO: 46.9 FL (ref 37–49)
EOSINOPHIL # BLD AUTO: 0.12 10*3/MM3 (ref 0–0.36)
EOSINOPHIL NFR BLD AUTO: 0.7 % (ref 1–5)
ERYTHROCYTE [DISTWIDTH] IN BLOOD BY AUTOMATED COUNT: 16.2 % (ref 11.7–14.5)
HCT VFR BLD AUTO: 33.2 % (ref 34–45)
HGB BLD-MCNC: 11.2 G/DL (ref 11.5–14.9)
IMM GRANULOCYTES # BLD: 4.92 10*3/MM3 (ref 0–0.03)
IMM GRANULOCYTES NFR BLD: 28.4 % (ref 0–0.5)
LYMPHOCYTES # BLD AUTO: 0.52 10*3/MM3 (ref 1–3.5)
LYMPHOCYTES NFR BLD AUTO: 3 % (ref 20–49)
MCH RBC QN AUTO: 30.2 PG (ref 27–33)
MCHC RBC AUTO-ENTMCNC: 33.7 G/DL (ref 32–35)
MCV RBC AUTO: 89.5 FL (ref 83–97)
MONOCYTES # BLD AUTO: 2.4 10*3/MM3 (ref 0.25–0.8)
MONOCYTES NFR BLD AUTO: 13.9 % (ref 4–12)
NEUTROPHILS # BLD AUTO: 9.27 10*3/MM3 (ref 1.5–7)
NEUTROPHILS NFR BLD AUTO: 53.5 % (ref 39–75)
NRBC BLD MANUAL-RTO: 0.6 /100 WBC (ref 0–0)
PLATELET # BLD AUTO: 120 10*3/MM3 (ref 150–375)
PMV BLD AUTO: 11.4 FL (ref 8.9–12.1)
RBC # BLD AUTO: 3.71 10*6/MM3 (ref 3.9–5)
WBC NRBC COR # BLD: 17.31 10*3/MM3 (ref 4–10)

## 2018-07-26 PROCEDURE — 0 FLUDEOXYGLUCOSE F18 SOLUTION: Performed by: INTERNAL MEDICINE

## 2018-07-26 PROCEDURE — A9552 F18 FDG: HCPCS | Performed by: INTERNAL MEDICINE

## 2018-07-26 PROCEDURE — 85025 COMPLETE CBC W/AUTO DIFF WBC: CPT | Performed by: INTERNAL MEDICINE

## 2018-07-26 PROCEDURE — 82962 GLUCOSE BLOOD TEST: CPT

## 2018-07-26 PROCEDURE — 36416 COLLJ CAPILLARY BLOOD SPEC: CPT | Performed by: INTERNAL MEDICINE

## 2018-07-26 PROCEDURE — 78815 PET IMAGE W/CT SKULL-THIGH: CPT

## 2018-07-26 RX ADMIN — FLUDEOXYGLUCOSE F18 1 DOSE: 300 INJECTION INTRAVENOUS at 12:09

## 2018-07-26 NOTE — PROGRESS NOTES
CBC noted and reviewed with pt.  Pt stated she has a headache today and feels weak. PT feels cold.  Denies any fevers or signs of infection.  Pt hasn't eaten anything today due to PET scan.  Instructed to drink and eat today when she leaves office.  Pt is sched to see MD Monday  Pt V/U

## 2018-07-27 LAB — GLUCOSE BLDC GLUCOMTR-MCNC: 94 MG/DL (ref 70–130)

## 2018-07-30 ENCOUNTER — APPOINTMENT (OUTPATIENT)
Dept: GENERAL RADIOLOGY | Facility: HOSPITAL | Age: 23
End: 2018-07-30
Attending: INTERNAL MEDICINE

## 2018-07-30 ENCOUNTER — LAB (OUTPATIENT)
Dept: LAB | Facility: HOSPITAL | Age: 23
End: 2018-07-30

## 2018-07-30 ENCOUNTER — OFFICE VISIT (OUTPATIENT)
Dept: ONCOLOGY | Facility: CLINIC | Age: 23
End: 2018-07-30

## 2018-07-30 ENCOUNTER — HOSPITAL ENCOUNTER (INPATIENT)
Facility: HOSPITAL | Age: 23
LOS: 5 days | Discharge: HOME | End: 2018-08-04
Attending: INTERNAL MEDICINE | Admitting: INTERNAL MEDICINE

## 2018-07-30 ENCOUNTER — APPOINTMENT (OUTPATIENT)
Dept: CARDIOLOGY | Facility: HOSPITAL | Age: 23
End: 2018-07-30
Attending: INTERNAL MEDICINE

## 2018-07-30 VITALS
TEMPERATURE: 97.8 F | DIASTOLIC BLOOD PRESSURE: 54 MMHG | HEART RATE: 75 BPM | SYSTOLIC BLOOD PRESSURE: 86 MMHG | OXYGEN SATURATION: 97 % | BODY MASS INDEX: 26.31 KG/M2 | RESPIRATION RATE: 16 BRPM | WEIGHT: 143 LBS | HEIGHT: 62 IN

## 2018-07-30 DIAGNOSIS — C85.28 MEDIASTINAL LARGE B-CELL LYMPHOMA OF LYMPH NODES OF MULTIPLE REGIONS (HCC): ICD-10-CM

## 2018-07-30 DIAGNOSIS — C85.28 MEDIASTINAL LARGE B-CELL LYMPHOMA OF LYMPH NODES OF MULTIPLE REGIONS (HCC): Primary | ICD-10-CM

## 2018-07-30 PROBLEM — C85.90 LYMPHOMA (HCC): Status: ACTIVE | Noted: 2018-07-30

## 2018-07-30 LAB
ALBUMIN SERPL-MCNC: 4.4 G/DL (ref 3.5–5.2)
ALBUMIN/GLOB SERPL: 2 G/DL
ALP SERPL-CCNC: 64 U/L (ref 39–117)
ALT SERPL W P-5'-P-CCNC: 32 U/L (ref 1–33)
ANION GAP SERPL CALCULATED.3IONS-SCNC: 11.7 MMOL/L
APTT PPP: 26.6 SECONDS (ref 22.7–35.4)
AST SERPL-CCNC: 22 U/L (ref 1–32)
BASOPHILS # BLD AUTO: 0.04 10*3/MM3 (ref 0–0.1)
BASOPHILS NFR BLD AUTO: 0.7 % (ref 0–1.1)
BH CV UPPER VENOUS LEFT INTERNAL JUGULAR AUGMENT: NORMAL
BH CV UPPER VENOUS LEFT INTERNAL JUGULAR COMPETENT: NORMAL
BH CV UPPER VENOUS LEFT INTERNAL JUGULAR COMPRESS: NORMAL
BH CV UPPER VENOUS LEFT INTERNAL JUGULAR PHASIC: NORMAL
BH CV UPPER VENOUS LEFT INTERNAL JUGULAR SPONT: NORMAL
BH CV UPPER VENOUS LEFT SUBCLAVIAN AUGMENT: NORMAL
BH CV UPPER VENOUS LEFT SUBCLAVIAN COMPETENT: NORMAL
BH CV UPPER VENOUS LEFT SUBCLAVIAN PHASIC: NORMAL
BH CV UPPER VENOUS LEFT SUBCLAVIAN SPONT: NORMAL
BH CV UPPER VENOUS RIGHT AXILLARY AUGMENT: NORMAL
BH CV UPPER VENOUS RIGHT AXILLARY COLOR: 1
BH CV UPPER VENOUS RIGHT AXILLARY COMPETENT: NORMAL
BH CV UPPER VENOUS RIGHT AXILLARY COMPRESS: NORMAL
BH CV UPPER VENOUS RIGHT AXILLARY PHASIC: NORMAL
BH CV UPPER VENOUS RIGHT AXILLARY SPONT: NORMAL
BH CV UPPER VENOUS RIGHT AXILLARY THROMBUS: NORMAL
BH CV UPPER VENOUS RIGHT BASILIC FOREARM COMPRESS: NORMAL
BH CV UPPER VENOUS RIGHT BASILIC UPPER COMPRESS: NORMAL
BH CV UPPER VENOUS RIGHT BRACHIAL COMPRESS: NORMAL
BH CV UPPER VENOUS RIGHT CEPHALIC FOREARM COMPRESS: NORMAL
BH CV UPPER VENOUS RIGHT CEPHALIC UPPER COLOR: 1
BH CV UPPER VENOUS RIGHT CEPHALIC UPPER COMPRESS: NORMAL
BH CV UPPER VENOUS RIGHT CEPHALIC UPPER THROMBUS: NORMAL
BH CV UPPER VENOUS RIGHT INTERNAL JUGULAR AUGMENT: NORMAL
BH CV UPPER VENOUS RIGHT INTERNAL JUGULAR COMPETENT: NORMAL
BH CV UPPER VENOUS RIGHT INTERNAL JUGULAR COMPRESS: NORMAL
BH CV UPPER VENOUS RIGHT INTERNAL JUGULAR PHASIC: NORMAL
BH CV UPPER VENOUS RIGHT INTERNAL JUGULAR SPONT: NORMAL
BH CV UPPER VENOUS RIGHT RADIAL COMPRESS: NORMAL
BH CV UPPER VENOUS RIGHT SUBCLAVIAN AUGMENT: NORMAL
BH CV UPPER VENOUS RIGHT SUBCLAVIAN COMPETENT: NORMAL
BH CV UPPER VENOUS RIGHT SUBCLAVIAN PHASIC: NORMAL
BH CV UPPER VENOUS RIGHT SUBCLAVIAN SPONT: NORMAL
BH CV UPPER VENOUS RIGHT ULNAR COMPRESS: NORMAL
BILIRUB SERPL-MCNC: <0.2 MG/DL (ref 0.1–1.2)
BUN BLD-MCNC: 11 MG/DL (ref 6–20)
BUN/CREAT SERPL: 20 (ref 7–25)
CALCIUM SPEC-SCNC: 10 MG/DL (ref 8.6–10.5)
CHLORIDE SERPL-SCNC: 100 MMOL/L (ref 98–107)
CO2 SERPL-SCNC: 28.3 MMOL/L (ref 22–29)
CREAT BLD-MCNC: 0.55 MG/DL (ref 0.57–1)
DEPRECATED RDW RBC AUTO: 52 FL (ref 37–49)
DEPRECATED RDW RBC AUTO: 52.8 FL (ref 37–54)
EOSINOPHIL # BLD AUTO: 0.03 10*3/MM3 (ref 0–0.36)
EOSINOPHIL NFR BLD AUTO: 0.5 % (ref 1–5)
ERYTHROCYTE [DISTWIDTH] IN BLOOD BY AUTOMATED COUNT: 16.7 % (ref 11.7–13)
ERYTHROCYTE [DISTWIDTH] IN BLOOD BY AUTOMATED COUNT: 17 % (ref 11.7–14.5)
GFR SERPL CREATININE-BSD FRML MDRD: 137 ML/MIN/1.73
GFR SERPL CREATININE-BSD FRML MDRD: >150 ML/MIN/1.73
GLOBULIN UR ELPH-MCNC: 2.2 GM/DL
GLUCOSE BLD-MCNC: 84 MG/DL (ref 65–99)
HCT VFR BLD AUTO: 32.4 % (ref 34–45)
HCT VFR BLD AUTO: 34 % (ref 35.6–45.5)
HGB BLD-MCNC: 10.8 G/DL (ref 11.5–14.9)
HGB BLD-MCNC: 11 G/DL (ref 11.9–15.5)
IMM GRANULOCYTES # BLD: 0.6 10*3/MM3 (ref 0–0.03)
IMM GRANULOCYTES NFR BLD: 9.9 % (ref 0–0.5)
INR PPP: 0.96 (ref 0.9–1.1)
LYMPHOCYTES # BLD AUTO: 0.63 10*3/MM3 (ref 1–3.5)
LYMPHOCYTES NFR BLD AUTO: 10.3 % (ref 20–49)
MCH RBC QN AUTO: 30.1 PG (ref 26.9–32)
MCH RBC QN AUTO: 30.5 PG (ref 27–33)
MCHC RBC AUTO-ENTMCNC: 32.4 G/DL (ref 32.4–36.3)
MCHC RBC AUTO-ENTMCNC: 33.3 G/DL (ref 32–35)
MCV RBC AUTO: 91.5 FL (ref 83–97)
MCV RBC AUTO: 92.9 FL (ref 80.5–98.2)
MONOCYTES # BLD AUTO: 0.72 10*3/MM3 (ref 0.25–0.8)
MONOCYTES NFR BLD AUTO: 11.8 % (ref 4–12)
NEUTROPHILS # BLD AUTO: 4.07 10*3/MM3 (ref 1.5–7)
NEUTROPHILS NFR BLD AUTO: 66.8 % (ref 39–75)
NRBC BLD MANUAL-RTO: 0.7 /100 WBC (ref 0–0)
PLATELET # BLD AUTO: 151 10*3/MM3 (ref 150–375)
PLATELET # BLD AUTO: 181 10*3/MM3 (ref 140–500)
PMV BLD AUTO: 11.4 FL (ref 8.9–12.1)
PMV BLD AUTO: 11.5 FL (ref 6–12)
POTASSIUM BLD-SCNC: 3.8 MMOL/L (ref 3.5–5.2)
PROT SERPL-MCNC: 6.6 G/DL (ref 6–8.5)
PROTHROMBIN TIME: 12.6 SECONDS (ref 11.7–14.2)
RBC # BLD AUTO: 3.54 10*6/MM3 (ref 3.9–5)
RBC # BLD AUTO: 3.66 10*6/MM3 (ref 3.9–5.2)
SODIUM BLD-SCNC: 140 MMOL/L (ref 136–145)
URATE SERPL-MCNC: 1.8 MG/DL (ref 2.4–5.7)
WBC NRBC COR # BLD: 5.52 10*3/MM3 (ref 4.5–10.7)
WBC NRBC COR # BLD: 6.09 10*3/MM3 (ref 4–10)

## 2018-07-30 PROCEDURE — 85610 PROTHROMBIN TIME: CPT | Performed by: INTERNAL MEDICINE

## 2018-07-30 PROCEDURE — 36416 COLLJ CAPILLARY BLOOD SPEC: CPT | Performed by: INTERNAL MEDICINE

## 2018-07-30 PROCEDURE — 77001 FLUOROGUIDE FOR VEIN DEVICE: CPT

## 2018-07-30 PROCEDURE — 25010000002 RITUXIMAB 10 MG/ML SOLUTION 50 ML VIAL: Performed by: INTERNAL MEDICINE

## 2018-07-30 PROCEDURE — 76937 US GUIDE VASCULAR ACCESS: CPT

## 2018-07-30 PROCEDURE — 99223 1ST HOSP IP/OBS HIGH 75: CPT | Performed by: INTERNAL MEDICINE

## 2018-07-30 PROCEDURE — 85025 COMPLETE CBC W/AUTO DIFF WBC: CPT | Performed by: INTERNAL MEDICINE

## 2018-07-30 PROCEDURE — 85730 THROMBOPLASTIN TIME PARTIAL: CPT | Performed by: SURGERY

## 2018-07-30 PROCEDURE — 63710000001 PREDNISONE PER 5 MG: Performed by: INTERNAL MEDICINE

## 2018-07-30 PROCEDURE — 85730 THROMBOPLASTIN TIME PARTIAL: CPT | Performed by: INTERNAL MEDICINE

## 2018-07-30 PROCEDURE — 25010000002 DIPHENHYDRAMINE PER 50 MG: Performed by: INTERNAL MEDICINE

## 2018-07-30 PROCEDURE — 80053 COMPREHEN METABOLIC PANEL: CPT | Performed by: INTERNAL MEDICINE

## 2018-07-30 PROCEDURE — 85027 COMPLETE CBC AUTOMATED: CPT | Performed by: INTERNAL MEDICINE

## 2018-07-30 PROCEDURE — 93971 EXTREMITY STUDY: CPT

## 2018-07-30 PROCEDURE — C1751 CATH, INF, PER/CENT/MIDLINE: HCPCS

## 2018-07-30 PROCEDURE — 84550 ASSAY OF BLOOD/URIC ACID: CPT | Performed by: INTERNAL MEDICINE

## 2018-07-30 PROCEDURE — 25010000002 RITUXIMAB 10 MG/ML SOLUTION 10 ML VIAL: Performed by: INTERNAL MEDICINE

## 2018-07-30 PROCEDURE — 25010000002 HEPARIN (PORCINE) PER 1000 UNITS: Performed by: INTERNAL MEDICINE

## 2018-07-30 RX ORDER — PANTOPRAZOLE SODIUM 40 MG/1
40 TABLET, DELAYED RELEASE ORAL
Status: DISPENSED | OUTPATIENT
Start: 2018-07-30 | End: 2018-08-04

## 2018-07-30 RX ORDER — SODIUM CHLORIDE 0.9 % (FLUSH) 0.9 %
1-10 SYRINGE (ML) INJECTION AS NEEDED
Status: DISCONTINUED | OUTPATIENT
Start: 2018-07-30 | End: 2018-08-04 | Stop reason: HOSPADM

## 2018-07-30 RX ORDER — ACETAMINOPHEN 325 MG/1
650 TABLET ORAL ONCE
Status: COMPLETED | OUTPATIENT
Start: 2018-07-30 | End: 2018-07-30

## 2018-07-30 RX ORDER — MEPERIDINE HYDROCHLORIDE 50 MG/ML
25 INJECTION INTRAMUSCULAR; INTRAVENOUS; SUBCUTANEOUS
Status: ACTIVE | OUTPATIENT
Start: 2018-07-30 | End: 2018-07-31

## 2018-07-30 RX ORDER — DIPHENHYDRAMINE HYDROCHLORIDE 50 MG/ML
50 INJECTION INTRAMUSCULAR; INTRAVENOUS AS NEEDED
Status: DISCONTINUED | OUTPATIENT
Start: 2018-07-30 | End: 2018-08-04 | Stop reason: HOSPADM

## 2018-07-30 RX ORDER — SODIUM CHLORIDE 9 MG/ML
75 INJECTION, SOLUTION INTRAVENOUS CONTINUOUS
Status: DISCONTINUED | OUTPATIENT
Start: 2018-07-30 | End: 2018-08-04 | Stop reason: HOSPADM

## 2018-07-30 RX ORDER — FAMOTIDINE 10 MG/ML
20 INJECTION, SOLUTION INTRAVENOUS AS NEEDED
Status: DISCONTINUED | OUTPATIENT
Start: 2018-07-30 | End: 2018-08-04 | Stop reason: HOSPADM

## 2018-07-30 RX ORDER — SODIUM CHLORIDE 0.9 % (FLUSH) 0.9 %
1-10 SYRINGE (ML) INJECTION AS NEEDED
Status: CANCELLED | OUTPATIENT
Start: 2018-07-30 | End: 2019-07-30

## 2018-07-30 RX ORDER — PREDNISONE 50 MG/1
100 TABLET ORAL 2 TIMES DAILY WITH MEALS
Status: DISCONTINUED | OUTPATIENT
Start: 2018-07-30 | End: 2018-08-04 | Stop reason: HOSPADM

## 2018-07-30 RX ORDER — LIDOCAINE HYDROCHLORIDE 10 MG/ML
10 INJECTION, SOLUTION INFILTRATION; PERINEURAL ONCE
Status: COMPLETED | OUTPATIENT
Start: 2018-07-30 | End: 2018-07-30

## 2018-07-30 RX ADMIN — DIPHENHYDRAMINE HYDROCHLORIDE 25 MG: 50 INJECTION, SOLUTION INTRAMUSCULAR; INTRAVENOUS at 17:31

## 2018-07-30 RX ADMIN — LIDOCAINE HYDROCHLORIDE 8 ML: 10 INJECTION, SOLUTION INFILTRATION; PERINEURAL at 15:02

## 2018-07-30 RX ADMIN — ACETAMINOPHEN 650 MG: 325 TABLET, FILM COATED ORAL at 17:31

## 2018-07-30 RX ADMIN — PANTOPRAZOLE SODIUM 40 MG: 40 TABLET, DELAYED RELEASE ORAL at 17:32

## 2018-07-30 RX ADMIN — PREDNISONE 100 MG: 50 TABLET ORAL at 17:32

## 2018-07-30 RX ADMIN — HEPARIN SODIUM 18 UNITS/KG/HR: 10000 INJECTION, SOLUTION INTRAVENOUS at 17:43

## 2018-07-30 RX ADMIN — RITUXIMAB 600 MG: 10 INJECTION, SOLUTION INTRAVENOUS at 18:20

## 2018-07-30 NOTE — PROGRESS NOTES
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 She did initiate EPOCH-R  in the hospital on 06/16/2018.    History of Present Illness      The patient is a 23 y.o. female with the above-mentioned history, with history of mediastinal large B-cell lymphoma status post 2 cycles of chemotherapy with dose adusted EPOCHR, due for her third cycle now.  I have reviewed her CT scan and PET scan.  She has had excellent response.  She has tolerated it very well.  Her platelet was as low as 102 and absolute neutrophil count was 1.2.  She has not had any active bleeding.  Her next Zoladex injection is due on August 13, 2018.  She was given that for preservation of her fertility.    She will now be admitted for cycle 3 of chemotherapy.    Past Medical History, Past Surgical History, Social History, Family History have been reviewed and are without significant changes except as mentioned.    Oncologic history:.   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.  She saw Dr. Arina Cohen on last Friday.  The reason the patient was referred to Dr. Cohen was because they thought she had cardiomegaly on chest x-ray.  However a chest x-ray was ordered which showedThe chest x-ray showed extensive abnormal increased density throughout the anterior mediastinum extending into the left hilar region and left perihilar region and is most likely due to confluent lymphadenopathy.     CT angiogram of the chest did not show pulmonary embolism but showed     there is a large conglomerate  mass encases multiple vascular structures of the mediastinum and left  hilar region that is most likely extensive confluent lymphadenopathy.  The primary differential diagnostic considerations would be lymphoma.  The tumor posteriorly displaces the pulmonary arteries and the left and  moderately narrows the main pulmonary artery. The tumor also  posteriorly  displaces the trachea and markedly narrows the left mainstem bronchus.  The pulmonary veins in the left are also encased and narrowed. The mass  encases and markedly narrows the SVC. The large edy mass measures  approximately 19.8 x 13.7 x 14.0 cm in diameter. Enlarged lymph nodes  are also present in the left supraclavicular region where they appear to  produce occlusion of the left internal jugular vein. There is no  axillary lymphadenopathy. There are no filling defects within the  pulmonary arteries. There is no CT evidence of pulmonary embolism. There  is a small left pleural effusion. A small pericardial effusion measuring  8 mm in maximum thickness is also present. There is compressive  atelectasis of the left lung. Patchy airspace opacities are also present  in the superior aspect of the left upper lobe. These are most likely due  to direct tumor infiltration of the lung parenchyma, but could also  represent postobstructive pneumonia. The right lung is well expanded and  clear. Images through the upper abdomen partially show what could be a  edy mass in the retroperitoneum posterior and inferior to the  pancreas. Further evaluation with a CT scan of the abdomen and pelvis is  recommended. Bone window images demonstrate patchy sclerosis in the C7  and T1 and T2 and T3 and T4 vertebral body suspicious for bone  involvement with lymphoma.     IMPRESSION:  Very large tumor mass in the mediastinum encasing multiple  vascular structures and also moderately narrowing the left mainstem  bronchus as described in more detail above. Direct tumor infiltration of  the left upper lobe is also suspected. Small left pleural effusion and a  small pericardial effusion. There may also be partially visualized  lymphadenopathy in the upper abdomen. Patchy areas of sclerosis are also  noted in the C7 and T1 and T2 and T3 and T4 vertebral bodies suspicious  for osseous metastatic disease. The findings are  consistent with  Lymphoma.     Dr. Cohen felt that she had a small pericardial effusion.  Patient has been symptomatic with cough which is worsening which started back in February 2018 and worsened over the last 4 months.  Patient also has some shortness of breath with walking up the steps.  She has not lost weight.  She say she is reasonable appetite.  She does have fever kind of low-grade fever and night sweats.  She is more fatigued compared to before.  She was referred to us because of concern that this could be lymphoma.  She does not have any tissue diagnosis yet.  Patient does complain of back pain.    Echo done on admission June 12, 2018 by Dr. Fitzgerald showed that the patient's posterior and appears large primary leukemia the left ventricle and apex.  There is no tamponade.  The pericardium appears thickened pointing to involvement of the pericardium into the mediastinal process.  Dr. Fitzgerald felt that it would be difficult to do pericardial synthesis as the mediastinal mass covers the anterior aspect of the heart.  Ejection fraction is 58%  CT-guided needle biopsy June 12, 2018 was negative  LDH is elevated.  Uric acid is high.  MRI thoracic spine, negative  CT abdomen pelvis negative  Scan July 13, 2018 shows large infiltrative edy mass in the anterior mediastinum and left hilar region that nearly completely fills the left hemithorax and shows intense hypermetabolism with an SUV of 19.8.  No foci of pathologic hypermetabolism are identified elsewhere in the chest, neck abdomen or pelvis.    Pathology was consistent with primary mediastinal large B-cell lymphoma.    Patient was seen by Dr. Melgar.  He discussed harvesting eggs versus Zoladex plus oral contraceptives versus Zoladex alone for fertility preservation.  Due to large size of the tumor and rapid initiation of treatment needed the patient was not able to have aches harvested done.  Because she is at increased risk of thrombosis with the use of oral  contraceptives secondary to malignancy he recommended Zoladex alone.  Patient received first dose of Zoladex 3.6 mg subcutaneous on Belia 15, 2018.    Patient started on EPOCH/R on June 16, 2018    Patient had a reaction to Rituxan which improved with steroids, Benadryl and Pepcid.  She had to receive it slow.  She started having itching over her EARS , sore throat.  She was given IV steroids Benadryl and Pepcid and following that she was started at a slower rate and she completed it.    Right upper extremity Doppler ultrasound showed new superficial vein thrombosis around the PICC line with no extension into the deep system.  Repeat Doppler was ordered.    A Doppler ultrasound initially showed superficial thrombophlebitis.  She was on prophylactic Arixtra.  A Doppler was repeated 2 days later on 6/20/18  which showed extension of thrombus into the axillary and brachial veins.  She was therefore started on Xarelto 15 mg one every 12 hours and the PICC line was removed as soon as chemotherapy completed on 6/20/18.  She will take 15 mg of Xarelto every 12 hours for 21 days and then transition to 20 mg once a day.  The patient will have CBC performed twice a week.  If the platelet count drops below 50,000, anticoagulation will need to be temporarily held  Patient was started on acyclovir/fluconazole/Bactrim  Bone marrow done June 14, 2018, morphology was negative for lymphoma    Fertility preservation, Zoladex is next due July 12, 2018.  CT scan and PET scan showing significant response after cycle 2 of chemotherapy  Next dose of Lupron August 13, 2018    Review of Systems   Constitutional: Positive for fatigue. Negative for activity change, appetite change, chills and fever.   HENT: Negative for mouth sores.    Eyes: Negative for visual disturbance.   Respiratory: Positive for cough (improved) and shortness of breath (improved).    Cardiovascular: Negative.    Gastrointestinal: Negative.  Negative for abdominal pain,  "diarrhea, nausea and vomiting.   Endocrine: Negative.    Genitourinary: Positive for menstrual problem. Negative for dysuria and frequency.   Musculoskeletal: Negative for arthralgias, back pain, joint swelling and myalgias.   Skin: Negative.    Allergic/Immunologic: Negative.    Neurological: Negative.  Negative for dizziness and weakness.   Hematological: Negative.  Does not bruise/bleed easily.   Psychiatric/Behavioral: The patient is not nervous/anxious.    All other systems reviewed and are negative.  Review of systems unchanged except as updated.    Medications:  The current medication list was reviewed in the EMR    ALLERGIES:  No Known Allergies    Objective      Vitals:    07/30/18 0834   BP: (!) 86/54   Pulse: 75   Resp: 16   Temp: 97.8 °F (36.6 °C)   TempSrc: Oral   SpO2: 97%   Weight: 64.9 kg (143 lb)   Height: 157.5 cm (62.01\")   PainSc: 2  Comment: Lt arm aching pain.   PainLoc: Arm     Current Status 7/30/2018   ECOG score 0       Physical Exam   Constitutional: She is oriented to person, place, and time. She appears well-developed and well-nourished.   She is accompanied by her mother today.   HENT:   Head: Normocephalic.   Eyes: Pupils are equal, round, and reactive to light.   Neck: Normal range of motion.   Cardiovascular: Normal rate, regular rhythm and normal heart sounds.    Pulmonary/Chest: Effort normal and breath sounds normal. No respiratory distress. She has no wheezes. She has no rales.   Abdominal: Soft.   Lymphadenopathy:     She has no cervical adenopathy.   Neurological: She is alert and oriented to person, place, and time.   Skin: Skin is warm and dry.   Psychiatric: She has a normal mood and affect.   physical exam unchanged from previous except as updated.    RECENT LABS:    Results from last 7 days  Lab Units 07/30/18  0821 07/26/18  1413   WBC 10*3/mm3 6.09 17.31*   NEUTROS ABS 10*3/mm3 4.07 9.27*   HEMOGLOBIN g/dL 10.8* 11.2*   HEMATOCRIT % 32.4* 33.2*   PLATELETS 10*3/mm3 151 " 120*   CT SCAN:  CHEST: There has been interval decrease in the large confluent anterior  mediastinal mass which measures approximately 11 x 8 cm, previously 13.5  x 9.5 cm when measured along the same planes. There has been a decrease  in the mass effect and narrowing of the left upper lobe bronchus and  there has been improved aeration at the left upper lobe. The patchy left  upper lobe airspace opacities have decreased in size and density. There  are no new pulmonary opacities and there are no pleural or pericardial  effusions.     ABDOMEN/PELVIS: Borderline splenic size is stable, measuring  approximately 11 cm. The liver, gallbladder, pancreas, adrenals, and  kidneys appear unremarkable. There is formed stool throughout the colon.  No bowel thickening is seen. The appendix appears within normal limits.  Uterus and adnexa appear unremarkable. There is no free fluid or  lymphadenopathy.     PET   Chest: Pulmonary opacification within the left upper lobe demonstrating  intense FDG uptake has decreased in extent since 7/9/2019. The  background ground glass opacification and interlobular septal thickening  has also improved. The anterior mediastinal soft tissue mass has  decreased in size, measuring 8.5 x 10.7 cm (previously 13 x 16.7 cm) and  FDG uptake with a maximum SUV of 5 (previously 19.8). There is no  pleural effusion or pneumothorax.      Abdomen and pelvis:     The liver, spleen, pancreas, kidneys and adrenal glands have a normal  non contrast CT appearance and demonstrate physiologic FDG uptake.     The gallbladder is unremarkable.     The bowel demonstrates normal physiologic FDG uptake.     There are no FDG avid or enlarged abdominopelvic lymph nodes.     The bladder is unremarkable for its degree of distension.     There is diffusely increased FDG uptake within the marrow.  No  suspicious focal lytic or blastic bony lesions.                Assessment/Plan   1.  Primary mediastinal large B-cell  lymphoma: The patient presented with dyspnea, cough, and chest pain.  A CT angiogram of the chest 6/8/18 showed a very large tumor mass in the mediastinum encasing multiple vascular structures and narrowing the left mainstem bronchus.  There was direct tumor infiltration in the left upper lobe.  There was a small left pleural effusion.  She underwent a CT-guided biopsy of the mediastinal mass on 6/12/18 and pathology reviewed at [facility] oncology showed diffuse large B-cell lymphoma consistent with a primary diffuse large B-cell lymphoma.  A bone marrow exam was performed on 6/14/18 which was negative for lymphoma.  She underwent a PET scan 6/13/18 which showed a large hypermetabolic mass in the chest involving the anterior mediastinum, left hilum, nearly completely filling the left hemothorax with SUV 19.8.  There was physiologic distribution elsewhere.     The patient was started on IV fluid hydration and allopurinol for prevention of hyperuricemia.  She initiated systemic chemotherapy with DA-EPOCH-R on 6/16/18 and completed the evening of 6/21/18.  She had a infusion reaction to rituximab but was able to complete with additional medications and otherwise tolerated chemotherapy well.  She will require additional premedications with additional rituximab.  Plan is to monitor her blood counts twice per week outpatient and proceed with cycle 2 of chemotherapy day 21.     The patient had significant improvement in shortness of breath, cough, and chest pain by the time of discharge.    · Patient received Neulasta support  · Her white count dropped down to as low as 0.8 low-grade temperature and patient was placed on Levaquin ×5 days starting June 27, 2018, neutropenia AT  11 days posttreatment.  Platelets dropped to 82.  · Her next Lupron injection is due July 12.  · She is due to start chemotherapy on July 9.  · Discussed with Dr. Fox at the [facility] and his suggestion was to do the CT scan and PET  scan after 2 cycles and discuss the results with him.  If she has an excellent response early on she would not require any further treatments after completion of 6 cycles of EPOCH/R  · Patient being admitted for cycle 3 of chemotherapy on July 30, 2018.  · She is due for Lupron injection on August 13, 2018        2.  Pericardial effusion:   a 2-D echocardiogram 6/8/18 showed a left ventricular systolic function 58%.  There was a 2 cm pericardial effusion with no tamponade.  The pericardium appeared thickened.  She had no evidence of volume overload or Nod throughout the hospital stay     3.   FEN: She was monitored very carefully for any evidence of tumor lysis.  She was maintained on IV fluids and allopurinol throughout hospitalization.    her uric acid at discharge was 1.2 but I recommended that she stay on allopurinol twice daily until her next cycle of chemotherapy to prevent hyperuricemia.  She has mild hypokalemia and will be discharged on potassium 20 mEq once per day.  Have ordered an outpatient BMP weekly for monitoring of her renal function and electrolytes     4.  Fertility preservation:  Patient received Zoladex injection  for fertility preservation; this should be continued once monthly during her chemotherapy.  Next dose due 7/12/18     5.  Right upper extremity swelling:  The patient had a PIC line placed in the right upper extremity for administration of chemotherapy.  Her arm was noted to be swollen.  A Doppler ultrasound initially showed superficial thrombophlebitis.  She was on prophylactic Arixtra.  A Doppler was repeated 2 days later on 6/20/18  which showed extension of thrombus into the axillary and brachial veins.  She was therefore started on Xarelto 15 mg one every 12 hours and the PICC line was removed as soon as chemotherapy completed on 6/20/18.  She will take 15 mg of Xarelto every 12 hours for 21 days and then transition to 20 mg once a day.  The patient will have CBC performed twice a  week.  If the platelet count drops below 50,000, anticoagulation will need to be temporarily held     6.  ID: The patient will receive Neulasta 24 hours after completion of chemotherapy for reduction of neutropenic infection.  She will be discharged on prophylactic antibiotics including acyclovir, Diflucan, and Bactrim.  She may require bacterial prophylaxis if the ANC drops below 1000.    Plan 1.   admit for cycle 3 of chemotherapy with EPOCH/R. we will give Neulasta support patient has had excellent response after cycle 2.  Plan will be to complete total of 6 cycles of chemotherapy.  I have left a message with Dr. Fox at the [facility] as he wanted to be notified after completion of 2 cycles of chemotherapy.  I believe since she had excellent response or we need to do is 6 cycles of EPOCH/R    2.  Lupron injection is due 3.6 mg subcutaneous on August 13, 2018    3 Continue Xarelto    4.  PICC line to be placed on admission, under ultrasound guidance    5.  IV fluids.   .     Millie Padilla MD   7/30/2018      CC: Dr. Arina Munoz

## 2018-07-31 ENCOUNTER — ANESTHESIA (OUTPATIENT)
Dept: PERIOP | Facility: HOSPITAL | Age: 23
End: 2018-07-31

## 2018-07-31 ENCOUNTER — APPOINTMENT (OUTPATIENT)
Dept: GENERAL RADIOLOGY | Facility: HOSPITAL | Age: 23
End: 2018-07-31

## 2018-07-31 ENCOUNTER — ANESTHESIA EVENT (OUTPATIENT)
Dept: PERIOP | Facility: HOSPITAL | Age: 23
End: 2018-07-31

## 2018-07-31 ENCOUNTER — TELEPHONE (OUTPATIENT)
Dept: GENERAL RADIOLOGY | Facility: HOSPITAL | Age: 23
End: 2018-07-31

## 2018-07-31 LAB
ALBUMIN SERPL-MCNC: 4.5 G/DL (ref 3.5–5.2)
ALBUMIN/GLOB SERPL: 1.9 G/DL
ALP SERPL-CCNC: 66 U/L (ref 39–117)
ALT SERPL W P-5'-P-CCNC: 32 U/L (ref 1–33)
ANION GAP SERPL CALCULATED.3IONS-SCNC: 14.6 MMOL/L
APTT PPP: 136.3 SECONDS (ref 22.7–35.4)
APTT PPP: 55.6 SECONDS (ref 22.7–35.4)
APTT PPP: 68.8 SECONDS (ref 22.7–35.4)
AST SERPL-CCNC: 21 U/L (ref 1–32)
B-HCG UR QL: NEGATIVE
BASOPHILS # BLD AUTO: 0.01 10*3/MM3 (ref 0–0.2)
BASOPHILS NFR BLD AUTO: 0.1 % (ref 0–1.5)
BILIRUB SERPL-MCNC: <0.2 MG/DL (ref 0.1–1.2)
BUN BLD-MCNC: 12 MG/DL (ref 6–20)
BUN/CREAT SERPL: 19.7 (ref 7–25)
CALCIUM SPEC-SCNC: 9.7 MG/DL (ref 8.6–10.5)
CHLORIDE SERPL-SCNC: 105 MMOL/L (ref 98–107)
CO2 SERPL-SCNC: 21.4 MMOL/L (ref 22–29)
CREAT BLD-MCNC: 0.61 MG/DL (ref 0.57–1)
DEPRECATED RDW RBC AUTO: 53.9 FL (ref 37–54)
EOSINOPHIL # BLD AUTO: 0 10*3/MM3 (ref 0–0.7)
EOSINOPHIL NFR BLD AUTO: 0 % (ref 0.3–6.2)
ERYTHROCYTE [DISTWIDTH] IN BLOOD BY AUTOMATED COUNT: 16.5 % (ref 11.7–13)
GFR SERPL CREATININE-BSD FRML MDRD: 122 ML/MIN/1.73
GFR SERPL CREATININE-BSD FRML MDRD: 147 ML/MIN/1.73
GLOBULIN UR ELPH-MCNC: 2.4 GM/DL
GLUCOSE BLD-MCNC: 127 MG/DL (ref 65–99)
HCT VFR BLD AUTO: 35.2 % (ref 35.6–45.5)
HGB BLD-MCNC: 11.3 G/DL (ref 11.9–15.5)
IMM GRANULOCYTES # BLD: 0.24 10*3/MM3 (ref 0–0.03)
IMM GRANULOCYTES NFR BLD: 2.3 % (ref 0–0.5)
LYMPHOCYTES # BLD AUTO: 0.55 10*3/MM3 (ref 0.9–4.8)
LYMPHOCYTES NFR BLD AUTO: 5.3 % (ref 19.6–45.3)
MCH RBC QN AUTO: 29.9 PG (ref 26.9–32)
MCHC RBC AUTO-ENTMCNC: 32.1 G/DL (ref 32.4–36.3)
MCV RBC AUTO: 93.1 FL (ref 80.5–98.2)
MONOCYTES # BLD AUTO: 0.39 10*3/MM3 (ref 0.2–1.2)
MONOCYTES NFR BLD AUTO: 3.8 % (ref 5–12)
NEUTROPHILS # BLD AUTO: 9.13 10*3/MM3 (ref 1.9–8.1)
NEUTROPHILS NFR BLD AUTO: 88.5 % (ref 42.7–76)
PLATELET # BLD AUTO: 207 10*3/MM3 (ref 140–500)
PMV BLD AUTO: 11.2 FL (ref 6–12)
POTASSIUM BLD-SCNC: 4 MMOL/L (ref 3.5–5.2)
PROT SERPL-MCNC: 6.9 G/DL (ref 6–8.5)
RBC # BLD AUTO: 3.78 10*6/MM3 (ref 3.9–5.2)
SODIUM BLD-SCNC: 141 MMOL/L (ref 136–145)
WBC NRBC COR # BLD: 10.32 10*3/MM3 (ref 4.5–10.7)

## 2018-07-31 PROCEDURE — 80053 COMPREHEN METABOLIC PANEL: CPT | Performed by: INTERNAL MEDICINE

## 2018-07-31 PROCEDURE — 85730 THROMBOPLASTIN TIME PARTIAL: CPT | Performed by: INTERNAL MEDICINE

## 2018-07-31 PROCEDURE — C1788 PORT, INDWELLING, IMP: HCPCS | Performed by: SURGERY

## 2018-07-31 PROCEDURE — 25010000002 HEPARIN (PORCINE) PER 1000 UNITS: Performed by: INTERNAL MEDICINE

## 2018-07-31 PROCEDURE — 77001 FLUOROGUIDE FOR VEIN DEVICE: CPT

## 2018-07-31 PROCEDURE — 25010000002 MIDAZOLAM PER 1 MG: Performed by: ANESTHESIOLOGY

## 2018-07-31 PROCEDURE — 81025 URINE PREGNANCY TEST: CPT | Performed by: SURGERY

## 2018-07-31 PROCEDURE — 63710000001 PREDNISONE PER 5 MG: Performed by: INTERNAL MEDICINE

## 2018-07-31 PROCEDURE — 25010000002 HEPARIN (PORCINE) PER 1000 UNITS: Performed by: SURGERY

## 2018-07-31 PROCEDURE — 25010000002 ETOPOSIDE 100 MG/5ML SOLUTION 5 ML VIAL: Performed by: INTERNAL MEDICINE

## 2018-07-31 PROCEDURE — 25010000002 ONDANSETRON PER 1 MG: Performed by: INTERNAL MEDICINE

## 2018-07-31 PROCEDURE — 25010000002 PROPOFOL 10 MG/ML EMULSION: Performed by: ANESTHESIOLOGY

## 2018-07-31 PROCEDURE — 85025 COMPLETE CBC W/AUTO DIFF WBC: CPT | Performed by: INTERNAL MEDICINE

## 2018-07-31 PROCEDURE — 25010000003 CEFAZOLIN IN DEXTROSE 2-4 GM/100ML-% SOLUTION: Performed by: SURGERY

## 2018-07-31 PROCEDURE — 0JH63XZ INSERTION OF TUNNELED VASCULAR ACCESS DEVICE INTO CHEST SUBCUTANEOUS TISSUE AND FASCIA, PERCUTANEOUS APPROACH: ICD-10-PCS | Performed by: SURGERY

## 2018-07-31 PROCEDURE — 25010000002 DOXORUBICIN PER 10 MG: Performed by: INTERNAL MEDICINE

## 2018-07-31 PROCEDURE — 25010000002 VINCRISTINE PER 1 MG: Performed by: INTERNAL MEDICINE

## 2018-07-31 PROCEDURE — 99233 SBSQ HOSP IP/OBS HIGH 50: CPT | Performed by: INTERNAL MEDICINE

## 2018-07-31 DEVICE — POWERPORT ISP M.R.I. IMPLANTABLE PORT WITH ATTACHABLE 8F CHRONOFLEX OPEN-ENDED SINGLE-LUMEN VENOUS CATHETER INTERMEDIATE KIT (WITH SUTURE PLUGS)
Type: IMPLANTABLE DEVICE | Site: CHEST | Status: FUNCTIONAL
Brand: POWERPORT M.R.I., CHRONOFLEX

## 2018-07-31 RX ORDER — PROPOFOL 10 MG/ML
VIAL (ML) INTRAVENOUS AS NEEDED
Status: DISCONTINUED | OUTPATIENT
Start: 2018-07-31 | End: 2018-07-31 | Stop reason: SURG

## 2018-07-31 RX ORDER — ALLOPURINOL 300 MG/1
300 TABLET ORAL 2 TIMES DAILY
Status: DISCONTINUED | OUTPATIENT
Start: 2018-07-31 | End: 2018-08-04 | Stop reason: HOSPADM

## 2018-07-31 RX ORDER — MAGNESIUM HYDROXIDE 1200 MG/15ML
LIQUID ORAL AS NEEDED
Status: DISCONTINUED | OUTPATIENT
Start: 2018-07-31 | End: 2018-07-31 | Stop reason: HOSPADM

## 2018-07-31 RX ORDER — FLUCONAZOLE 200 MG/1
400 TABLET ORAL EVERY 24 HOURS
Status: DISCONTINUED | OUTPATIENT
Start: 2018-07-31 | End: 2018-08-04 | Stop reason: HOSPADM

## 2018-07-31 RX ORDER — SENNA AND DOCUSATE SODIUM 50; 8.6 MG/1; MG/1
2 TABLET, FILM COATED ORAL 2 TIMES DAILY PRN
Status: DISCONTINUED | OUTPATIENT
Start: 2018-07-31 | End: 2018-08-04 | Stop reason: HOSPADM

## 2018-07-31 RX ORDER — SODIUM CHLORIDE 0.9 % (FLUSH) 0.9 %
1-10 SYRINGE (ML) INJECTION AS NEEDED
Status: DISCONTINUED | OUTPATIENT
Start: 2018-07-31 | End: 2018-07-31 | Stop reason: HOSPADM

## 2018-07-31 RX ORDER — FAMOTIDINE 10 MG/ML
20 INJECTION, SOLUTION INTRAVENOUS ONCE
Status: COMPLETED | OUTPATIENT
Start: 2018-07-31 | End: 2018-07-31

## 2018-07-31 RX ORDER — CEFAZOLIN SODIUM 2 G/100ML
2 INJECTION, SOLUTION INTRAVENOUS ONCE
Status: COMPLETED | OUTPATIENT
Start: 2018-07-31 | End: 2018-07-31

## 2018-07-31 RX ORDER — SULFAMETHOXAZOLE AND TRIMETHOPRIM 800; 160 MG/1; MG/1
1 TABLET ORAL 3 TIMES WEEKLY
Status: DISCONTINUED | OUTPATIENT
Start: 2018-08-01 | End: 2018-08-04 | Stop reason: HOSPADM

## 2018-07-31 RX ORDER — ONDANSETRON 4 MG/1
4 TABLET, FILM COATED ORAL EVERY 6 HOURS PRN
Status: DISCONTINUED | OUTPATIENT
Start: 2018-07-31 | End: 2018-08-04 | Stop reason: HOSPADM

## 2018-07-31 RX ORDER — MIDAZOLAM HYDROCHLORIDE 1 MG/ML
1 INJECTION INTRAMUSCULAR; INTRAVENOUS
Status: DISCONTINUED | OUTPATIENT
Start: 2018-07-31 | End: 2018-07-31 | Stop reason: HOSPADM

## 2018-07-31 RX ORDER — LIDOCAINE HYDROCHLORIDE 20 MG/ML
INJECTION, SOLUTION INFILTRATION; PERINEURAL AS NEEDED
Status: DISCONTINUED | OUTPATIENT
Start: 2018-07-31 | End: 2018-07-31 | Stop reason: SURG

## 2018-07-31 RX ORDER — HEPARIN SODIUM 1000 [USP'U]/ML
INJECTION, SOLUTION INTRAVENOUS; SUBCUTANEOUS AS NEEDED
Status: DISCONTINUED | OUTPATIENT
Start: 2018-07-31 | End: 2018-07-31 | Stop reason: HOSPADM

## 2018-07-31 RX ORDER — MIDAZOLAM HYDROCHLORIDE 1 MG/ML
INJECTION INTRAMUSCULAR; INTRAVENOUS AS NEEDED
Status: DISCONTINUED | OUTPATIENT
Start: 2018-07-31 | End: 2018-07-31 | Stop reason: SURG

## 2018-07-31 RX ORDER — POTASSIUM CHLORIDE 750 MG/1
20 CAPSULE, EXTENDED RELEASE ORAL 2 TIMES DAILY WITH MEALS
Status: DISCONTINUED | OUTPATIENT
Start: 2018-07-31 | End: 2018-08-04 | Stop reason: HOSPADM

## 2018-07-31 RX ORDER — MIDAZOLAM HYDROCHLORIDE 1 MG/ML
2 INJECTION INTRAMUSCULAR; INTRAVENOUS
Status: DISCONTINUED | OUTPATIENT
Start: 2018-07-31 | End: 2018-07-31 | Stop reason: HOSPADM

## 2018-07-31 RX ORDER — SODIUM CHLORIDE, SODIUM LACTATE, POTASSIUM CHLORIDE, CALCIUM CHLORIDE 600; 310; 30; 20 MG/100ML; MG/100ML; MG/100ML; MG/100ML
9 INJECTION, SOLUTION INTRAVENOUS CONTINUOUS
Status: DISCONTINUED | OUTPATIENT
Start: 2018-07-31 | End: 2018-07-31

## 2018-07-31 RX ORDER — PROPOFOL 10 MG/ML
VIAL (ML) INTRAVENOUS CONTINUOUS PRN
Status: DISCONTINUED | OUTPATIENT
Start: 2018-07-31 | End: 2018-07-31 | Stop reason: SURG

## 2018-07-31 RX ORDER — ACYCLOVIR 400 MG/1
400 TABLET ORAL 2 TIMES DAILY
Status: DISCONTINUED | OUTPATIENT
Start: 2018-07-31 | End: 2018-08-04 | Stop reason: HOSPADM

## 2018-07-31 RX ORDER — DABIGATRAN ETEXILATE 150 MG/1
150 CAPSULE ORAL EVERY 12 HOURS SCHEDULED
Status: DISCONTINUED | OUTPATIENT
Start: 2018-08-04 | End: 2018-08-01

## 2018-07-31 RX ORDER — HYDROCODONE BITARTRATE AND ACETAMINOPHEN 7.5; 325 MG/1; MG/1
1 TABLET ORAL EVERY 6 HOURS PRN
Status: DISCONTINUED | OUTPATIENT
Start: 2018-07-31 | End: 2018-08-04 | Stop reason: HOSPADM

## 2018-07-31 RX ADMIN — ACYCLOVIR 400 MG: 400 TABLET ORAL at 21:23

## 2018-07-31 RX ADMIN — Medication 1 MG: at 07:06

## 2018-07-31 RX ADMIN — PREDNISONE 100 MG: 50 TABLET ORAL at 17:52

## 2018-07-31 RX ADMIN — SODIUM CHLORIDE, POTASSIUM CHLORIDE, SODIUM LACTATE AND CALCIUM CHLORIDE 9 ML/HR: 600; 310; 30; 20 INJECTION, SOLUTION INTRAVENOUS at 06:42

## 2018-07-31 RX ADMIN — Medication 1 MG: at 07:30

## 2018-07-31 RX ADMIN — HEPARIN SODIUM 22 UNITS/KG/HR: 10000 INJECTION, SOLUTION INTRAVENOUS at 11:33

## 2018-07-31 RX ADMIN — SODIUM CHLORIDE, POTASSIUM CHLORIDE, SODIUM LACTATE AND CALCIUM CHLORIDE: 600; 310; 30; 20 INJECTION, SOLUTION INTRAVENOUS at 07:25

## 2018-07-31 RX ADMIN — FLUCONAZOLE 400 MG: 200 TABLET ORAL at 11:30

## 2018-07-31 RX ADMIN — PROPOFOL 75 MCG/KG/MIN: 10 INJECTION, EMULSION INTRAVENOUS at 07:32

## 2018-07-31 RX ADMIN — CEFAZOLIN SODIUM 2 G: 2 INJECTION, SOLUTION INTRAVENOUS at 07:28

## 2018-07-31 RX ADMIN — Medication 1 MG: at 06:41

## 2018-07-31 RX ADMIN — DOXORUBICIN HYDROCHLORIDE: 2 INJECTION, SOLUTION INTRAVENOUS at 13:37

## 2018-07-31 RX ADMIN — ALLOPURINOL 300 MG: 300 TABLET ORAL at 21:23

## 2018-07-31 RX ADMIN — POTASSIUM CHLORIDE 20 MEQ: 750 CAPSULE, EXTENDED RELEASE ORAL at 11:30

## 2018-07-31 RX ADMIN — HEPARIN SODIUM 19 UNITS/KG/HR: 10000 INJECTION, SOLUTION INTRAVENOUS at 21:22

## 2018-07-31 RX ADMIN — ALLOPURINOL 300 MG: 300 TABLET ORAL at 11:30

## 2018-07-31 RX ADMIN — ONDANSETRON 16 MG: 2 INJECTION INTRAMUSCULAR; INTRAVENOUS at 11:57

## 2018-07-31 RX ADMIN — LIDOCAINE HYDROCHLORIDE 40 MG: 20 INJECTION, SOLUTION INFILTRATION; PERINEURAL at 07:31

## 2018-07-31 RX ADMIN — FAMOTIDINE 20 MG: 10 INJECTION INTRAVENOUS at 06:41

## 2018-07-31 RX ADMIN — ACYCLOVIR 400 MG: 400 TABLET ORAL at 11:29

## 2018-07-31 RX ADMIN — POTASSIUM CHLORIDE 20 MEQ: 750 CAPSULE, EXTENDED RELEASE ORAL at 17:52

## 2018-07-31 RX ADMIN — PROPOFOL 30 MG: 10 INJECTION, EMULSION INTRAVENOUS at 07:31

## 2018-07-31 RX ADMIN — Medication 1 MG: at 07:27

## 2018-07-31 NOTE — ANESTHESIA POSTPROCEDURE EVALUATION
"Patient: Vikki Durham    Procedure Summary     Date:  07/31/18 Room / Location:  [facility] OR  / [facility] MAIN OR    Anesthesia Start:  0725 Anesthesia Stop:      Procedure:  RIGHT MEDIPORT PLACEMENT (Right ) Diagnosis:      Surgeon:  Farhan Hurt MD Provider:  Ayan Alex MD    Anesthesia Type:  MAC ASA Status:  3          Anesthesia Type: MAC  Last vitals  BP   110/69 (07/31/18 0835)   Temp   36.6 °C (97.8 °F) (07/31/18 0818)   Pulse   51 (07/31/18 0835)   Resp   14 (07/31/18 0835)     SpO2   96 % (07/31/18 0835)     Post Anesthesia Care and Evaluation    Patient location during evaluation: PACU  Patient participation: complete - patient participated  Level of consciousness: awake and alert  Pain management: adequate  Airway patency: patent  Anesthetic complications: No anesthetic complications    Cardiovascular status: acceptable  Respiratory status: acceptable  Hydration status: acceptable    Comments: /69   Pulse 51   Temp 36.6 °C (97.8 °F) (Oral)   Resp 14   Ht 157.5 cm (62.01\")   Wt 64.9 kg (143 lb)   LMP 06/01/2018 (Within Weeks)   SpO2 96%   BMI 26.15 kg/m²         "

## 2018-07-31 NOTE — ANESTHESIA PREPROCEDURE EVALUATION
Anesthesia Evaluation     no history of anesthetic complications:               Airway   Mallampati: I  TM distance: >3 FB  Neck ROM: full  Dental - normal exam     Pulmonary    (+) shortness of breath,     ROS comment: Mediastinal large cell lymphoma  Cardiovascular     (+) pericardial effusion,   (-) hypertension, dysrhythmias, angina, hyperlipidemia    ROS comment: Large mediastial mass overling Dupont L ventrical.     Neuro/Psych  (+) dizziness/light headedness,     (-) seizures, syncope  GI/Hepatic/Renal/Endo    (-) hepatitis, no renal disease, diabetes    Musculoskeletal     Abdominal    Substance History      OB/GYN          Other                      Anesthesia Plan    ASA 3     MAC     intravenous induction   Anesthetic plan and risks discussed with patient.

## 2018-07-31 NOTE — TELEPHONE ENCOUNTER
----- Message from Ramón Bell II, MD sent at 7/31/2018 10:11 AM EDT -----   Please arrange  Neulasta and CHUCK MACIAS 8/6/18  CHUCK MACIAS, RN review 8/9/18, 8/13/18  Appointment with Dr. Padilla, 2 units, with COLLEEN and CMP 8/15/18

## 2018-08-01 LAB
ALBUMIN SERPL-MCNC: 4.1 G/DL (ref 3.5–5.2)
ALBUMIN/GLOB SERPL: 1.8 G/DL
ALP SERPL-CCNC: 53 U/L (ref 39–117)
ALT SERPL W P-5'-P-CCNC: 32 U/L (ref 1–33)
ANION GAP SERPL CALCULATED.3IONS-SCNC: 13.9 MMOL/L
APTT PPP: 194.5 SECONDS (ref 22.7–35.4)
APTT PPP: 197.1 SECONDS (ref 22.7–35.4)
APTT PPP: 48.1 SECONDS (ref 22.7–35.4)
APTT PPP: 78.2 SECONDS (ref 22.7–35.4)
APTT PPP: >200 SECONDS (ref 22.7–35.4)
AST SERPL-CCNC: 20 U/L (ref 1–32)
BASOPHILS # BLD AUTO: 0.01 10*3/MM3 (ref 0–0.2)
BASOPHILS NFR BLD AUTO: 0.1 % (ref 0–1.5)
BILIRUB SERPL-MCNC: <0.2 MG/DL (ref 0.1–1.2)
BUN BLD-MCNC: 13 MG/DL (ref 6–20)
BUN/CREAT SERPL: 21.3 (ref 7–25)
CALCIUM SPEC-SCNC: 9.3 MG/DL (ref 8.6–10.5)
CHLORIDE SERPL-SCNC: 106 MMOL/L (ref 98–107)
CO2 SERPL-SCNC: 24.1 MMOL/L (ref 22–29)
CREAT BLD-MCNC: 0.61 MG/DL (ref 0.57–1)
DEPRECATED RDW RBC AUTO: 55.7 FL (ref 37–54)
EOSINOPHIL # BLD AUTO: 0 10*3/MM3 (ref 0–0.7)
EOSINOPHIL NFR BLD AUTO: 0 % (ref 0.3–6.2)
ERYTHROCYTE [DISTWIDTH] IN BLOOD BY AUTOMATED COUNT: 16.9 % (ref 11.7–13)
GFR SERPL CREATININE-BSD FRML MDRD: 122 ML/MIN/1.73
GFR SERPL CREATININE-BSD FRML MDRD: 147 ML/MIN/1.73
GLOBULIN UR ELPH-MCNC: 2.3 GM/DL
GLUCOSE BLD-MCNC: 160 MG/DL (ref 65–99)
HCT VFR BLD AUTO: 31.1 % (ref 35.6–45.5)
HGB BLD-MCNC: 9.8 G/DL (ref 11.9–15.5)
IMM GRANULOCYTES # BLD: 0.14 10*3/MM3 (ref 0–0.03)
IMM GRANULOCYTES NFR BLD: 1.4 % (ref 0–0.5)
LYMPHOCYTES # BLD AUTO: 0.38 10*3/MM3 (ref 0.9–4.8)
LYMPHOCYTES NFR BLD AUTO: 3.7 % (ref 19.6–45.3)
MCH RBC QN AUTO: 29.7 PG (ref 26.9–32)
MCHC RBC AUTO-ENTMCNC: 31.5 G/DL (ref 32.4–36.3)
MCV RBC AUTO: 94.2 FL (ref 80.5–98.2)
MONOCYTES # BLD AUTO: 0.13 10*3/MM3 (ref 0.2–1.2)
MONOCYTES NFR BLD AUTO: 1.3 % (ref 5–12)
NEUTROPHILS # BLD AUTO: 9.5 10*3/MM3 (ref 1.9–8.1)
NEUTROPHILS NFR BLD AUTO: 93.5 % (ref 42.7–76)
PLATELET # BLD AUTO: 170 10*3/MM3 (ref 140–500)
PMV BLD AUTO: 10.9 FL (ref 6–12)
POTASSIUM BLD-SCNC: 3.9 MMOL/L (ref 3.5–5.2)
PROT SERPL-MCNC: 6.4 G/DL (ref 6–8.5)
RBC # BLD AUTO: 3.3 10*6/MM3 (ref 3.9–5.2)
SODIUM BLD-SCNC: 144 MMOL/L (ref 136–145)
WBC NRBC COR # BLD: 10.16 10*3/MM3 (ref 4.5–10.7)

## 2018-08-01 PROCEDURE — 25010000002 ONDANSETRON PER 1 MG: Performed by: INTERNAL MEDICINE

## 2018-08-01 PROCEDURE — 99233 SBSQ HOSP IP/OBS HIGH 50: CPT | Performed by: INTERNAL MEDICINE

## 2018-08-01 PROCEDURE — 25010000002 DOXORUBICIN PER 10 MG: Performed by: INTERNAL MEDICINE

## 2018-08-01 PROCEDURE — 36591 DRAW BLOOD OFF VENOUS DEVICE: CPT

## 2018-08-01 PROCEDURE — 85730 THROMBOPLASTIN TIME PARTIAL: CPT | Performed by: INTERNAL MEDICINE

## 2018-08-01 PROCEDURE — 80053 COMPREHEN METABOLIC PANEL: CPT | Performed by: INTERNAL MEDICINE

## 2018-08-01 PROCEDURE — 85025 COMPLETE CBC W/AUTO DIFF WBC: CPT | Performed by: INTERNAL MEDICINE

## 2018-08-01 PROCEDURE — 25010000002 ETOPOSIDE 100 MG/5ML SOLUTION 5 ML VIAL: Performed by: INTERNAL MEDICINE

## 2018-08-01 PROCEDURE — 25010000002 VINCRISTINE PER 1 MG: Performed by: INTERNAL MEDICINE

## 2018-08-01 PROCEDURE — 63710000001 PREDNISONE PER 5 MG: Performed by: INTERNAL MEDICINE

## 2018-08-01 RX ORDER — DABIGATRAN ETEXILATE 150 MG/1
150 CAPSULE ORAL EVERY 12 HOURS SCHEDULED
Status: DISCONTINUED | OUTPATIENT
Start: 2018-08-01 | End: 2018-08-04 | Stop reason: HOSPADM

## 2018-08-01 RX ADMIN — POTASSIUM CHLORIDE 20 MEQ: 750 CAPSULE, EXTENDED RELEASE ORAL at 18:16

## 2018-08-01 RX ADMIN — ACYCLOVIR 400 MG: 400 TABLET ORAL at 21:17

## 2018-08-01 RX ADMIN — POTASSIUM CHLORIDE 20 MEQ: 750 CAPSULE, EXTENDED RELEASE ORAL at 08:28

## 2018-08-01 RX ADMIN — ALLOPURINOL 300 MG: 300 TABLET ORAL at 21:17

## 2018-08-01 RX ADMIN — ALLOPURINOL 300 MG: 300 TABLET ORAL at 08:28

## 2018-08-01 RX ADMIN — SODIUM CHLORIDE 75 ML/HR: 9 INJECTION, SOLUTION INTRAVENOUS at 16:16

## 2018-08-01 RX ADMIN — ACYCLOVIR 400 MG: 400 TABLET ORAL at 08:31

## 2018-08-01 RX ADMIN — ONDANSETRON 16 MG: 2 INJECTION INTRAMUSCULAR; INTRAVENOUS at 13:01

## 2018-08-01 RX ADMIN — DOXORUBICIN HYDROCHLORIDE: 2 INJECTION, SOLUTION INTRAVENOUS at 13:28

## 2018-08-01 RX ADMIN — PREDNISONE 100 MG: 50 TABLET ORAL at 18:16

## 2018-08-01 RX ADMIN — DABIGATRAN ETEXILATE MESYLATE 150 MG: 150 CAPSULE ORAL at 21:17

## 2018-08-01 RX ADMIN — PANTOPRAZOLE SODIUM 40 MG: 40 TABLET, DELAYED RELEASE ORAL at 05:34

## 2018-08-01 RX ADMIN — SULFAMETHOXAZOLE AND TRIMETHOPRIM 160 MG: 800; 160 TABLET ORAL at 08:29

## 2018-08-01 RX ADMIN — SODIUM CHLORIDE 75 ML/HR: 9 INJECTION, SOLUTION INTRAVENOUS at 02:42

## 2018-08-01 RX ADMIN — PREDNISONE 100 MG: 50 TABLET ORAL at 08:28

## 2018-08-01 RX ADMIN — FLUCONAZOLE 400 MG: 200 TABLET ORAL at 08:29

## 2018-08-01 NOTE — PLAN OF CARE
Problem: Patient Care Overview  Goal: Plan of Care Review  Outcome: Ongoing (interventions implemented as appropriate)   08/01/18 0503   Coping/Psychosocial   Plan of Care Reviewed With patient   Plan of Care Review   Progress no change   OTHER   Outcome Summary VSS. Ad rikki. Tolerating day 1 of 4 of EPOCH. New MP site tender. Port patent with positive blood return. PIV heparin gtt infusing. Difficulty obtaining therapeutic dose. Currently going at 16 units/kg/h. Cont to monitor PTT.       Problem: Chemotherapy Effects (Adult)  Goal: Signs and Symptoms of Listed Potential Problems Will be Absent, Minimized or Managed (Chemotherapy Effects)  Outcome: Ongoing (interventions implemented as appropriate)      Problem: Infection, Risk/Actual (Adult)  Goal: Infection Prevention/Resolution  Outcome: Ongoing (interventions implemented as appropriate)

## 2018-08-01 NOTE — PROGRESS NOTES
Port functioned well overnight. No issues, no bleeding.  Incision is clean, dry, and intact.      Will see as needed to remove the port when done with chemo.     Please call with questions.

## 2018-08-01 NOTE — PROGRESS NOTES
REASON FOR FOLLOWUP/CHIEF COMPLAINT:  Lymphoma.  Cytopenias.  DVT.    HISTORY OF PRESENT ILLNESS:   No new problems overnight.  Eating.  No significant nausea.  Having bowel movements.  No complaints of pain.    No change in bilateral fingertip numbness    Past Medical History, Past Surgical History, Social History, Family History have been reviewed and are without significant changes except as mentioned.    Review of Systems   Review of Systems   Constitutional: Negative for activity change.   HENT: Negative for nosebleeds and trouble swallowing.    Respiratory: Negative for shortness of breath and wheezing.    Cardiovascular: Negative for chest pain and palpitations.   Gastrointestinal: Negative for constipation, diarrhea and nausea.   Genitourinary: Negative for dysuria and hematuria.   Musculoskeletal: Negative for arthralgias and myalgias.   Skin: Negative for rash and wound.   Neurological: Positive for numbness. Negative for seizures and syncope.   Hematological: Negative for adenopathy. Does not bruise/bleed easily.   Psychiatric/Behavioral: Negative for confusion.       Medications:  The current medication list was reviewed in the EMR    ALLERGIES:  No Known Allergies           Vitals:    07/31/18 0850 07/31/18 1625 07/31/18 2027 08/01/18 0336   BP: 111/68 90/58 99/69 111/70   BP Location:  Left arm Left arm Left arm   Patient Position:  Sitting Lying Lying   Pulse: (!) 48 64 72 72   Resp: 16 16 16 16   Temp: 98 °F (36.7 °C) 97.8 °F (36.6 °C) 97.5 °F (36.4 °C) 97.3 °F (36.3 °C)   TempSrc:  Oral Oral Oral   SpO2: 98% 98% 98% 98%   Weight:       Height:         Physical Exam    CONSTITUTIONAL:  Vital signs reviewed.  No distress, looks comfortable.  EYES:  Conjunctiva and lids unremarkable.  PERRLA  EARS,NOSE,MOUTH,THROAT:  Ears and nose appear unremarkable.  Lips, teeth, gums appear unremarkable.  RESPIRATORY:  Normal respiratory effort.  Lungs clear to auscultation bilaterally.  CARDIOVASCULAR:   Normal S1, S2.  No murmurs rubs or gallops.  No significant lower extremity edema.  GASTROINTESTINAL: Abdomen appears unremarkable.  Nontender.  No hepatomegaly.  No splenomegaly.  LYMPHATIC:  No cervical, supraclavicular, axillary lymphadenopathy.  SKIN:  Warm.  No rashes.  PSYCHIATRIC:  Normal judgment and insight.  Normal mood and affect.     RECENT LABS:  WBC   Date Value Ref Range Status   08/01/2018 10.16 4.50 - 10.70 10*3/mm3 Final   07/31/2018 10.32 4.50 - 10.70 10*3/mm3 Final   07/30/2018 5.52 4.50 - 10.70 10*3/mm3 Final   07/30/2018 6.09 4.00 - 10.00 10*3/mm3 Final     Hemoglobin   Date Value Ref Range Status   08/01/2018 9.8 (L) 11.9 - 15.5 g/dL Final   07/31/2018 11.3 (L) 11.9 - 15.5 g/dL Final   07/30/2018 11.0 (L) 11.9 - 15.5 g/dL Final   07/30/2018 10.8 (L) 11.5 - 14.9 g/dL Final     Platelets   Date Value Ref Range Status   08/01/2018 170 140 - 500 10*3/mm3 Final   07/31/2018 207 140 - 500 10*3/mm3 Final   07/30/2018 181 140 - 500 10*3/mm3 Final   07/30/2018 151 150 - 375 10*3/mm3 Final                 ASSESSMENT/PLAN:  *Primary mediastinal large B-cell lymphoma  Cycle 3 DA-EPOCH-R (planning 6 cycles total) cycles every 21 days.  · Day 0: rituxan  · Day 1-4: doxorubicin, etoposide, vincristine  · Day 5 (8/3/18): cytoxan (discharge after this)  · Day 7 (monday, 8/6/18): neulasta (as outpatient)  · Plan 2x/week cbc, cmp as outpatient  Day 2 today.  Tolerating therapy well.  She is on drug therapy requiring extensive monitoring for toxicity.    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  Constant bilateral fingertip numbness.  Not interfering with function.  Maintaining chemotherapy without dose reduction for now.  Might require dose reduction of vincristine in the future.    *Ovarian suppression with a goal of fertility preservation.  Lupron due 8/13/18.     *Acute right basilic (upper arm) superficial thrombophlebitis associated with venous catheter 6/18/18.  *Acute right axillary, brachial DVT and  acute right basilic (upper arm and forearm) superficial thrombophlebitis associated with venous catheter 6/20/18.  *Chronic right axillary DVT and acute right cephalic (upper arm) thrombophlebitis 7/30/18.  (Occurred while on Xarelto)  Discussed with Dr. Padilla.  We both agree with changing to Pradaxa 150 mg twice per day.  During hospital stay, continue heparin.  We will do this today she is discharged (Saturday)    *Venous access.  Radiology unable to place PICC on left side (previously unable to place PICC on right side).  Port, right side by Dr. Hurt, 7/31/18.    *Prophylaxis.  Acyclovir, Diflucan, Bactrim.  Dr. Padilla also plans Levaquin if ANC <1000.    *Anemia.  Likely due to chemotherapy.  Monitor.  Hb mildly worse today.  Monitor.    *Follow-up I have asked our office to arrange, :  Neulasta and CBC, CMP 8/6/18  CBC, CMP, RN review 8/9/18, 8/13/18  Lupron 8/13/18.    Appointment with Dr. Padilla, 2 units, with CBC and CMP 8/15/18  If PLT <50, hold Pradaxa    Today she requested that I re-review her PET scan with her.  I did this.  I personally viewed images.

## 2018-08-01 NOTE — PLAN OF CARE
Problem: Patient Care Overview  Goal: Plan of Care Review  Outcome: Ongoing (interventions implemented as appropriate)   08/01/18 1535   Coping/Psychosocial   Plan of Care Reviewed With patient   Plan of Care Review   Progress no change   OTHER   Outcome Summary VSS tolerating day 2 of EPOCH, ptt has been high overnight and today, titraing schedlued for hourly until less than 120 then switch to Pradaxa       Problem: Chemotherapy Effects (Adult)  Goal: Signs and Symptoms of Listed Potential Problems Will be Absent, Minimized or Managed (Chemotherapy Effects)  Outcome: Ongoing (interventions implemented as appropriate)   08/01/18 1535   Goal/Outcome Evaluation   Problems Assessed (Chemotherapy Effects) all   Problems Present (Chemotherapy Effects) situational response;fatigue       Problem: Infection, Risk/Actual (Adult)  Goal: Infection Prevention/Resolution  Outcome: Ongoing (interventions implemented as appropriate)

## 2018-08-02 ENCOUNTER — DOCUMENTATION (OUTPATIENT)
Dept: ONCOLOGY | Facility: CLINIC | Age: 23
End: 2018-08-02

## 2018-08-02 LAB
ALBUMIN SERPL-MCNC: 4.4 G/DL (ref 3.5–5.2)
ALBUMIN/GLOB SERPL: 2.1 G/DL
ALP SERPL-CCNC: 49 U/L (ref 39–117)
ALT SERPL W P-5'-P-CCNC: 28 U/L (ref 1–33)
ANION GAP SERPL CALCULATED.3IONS-SCNC: 12.9 MMOL/L
APTT PPP: 25.8 SECONDS (ref 22.7–35.4)
AST SERPL-CCNC: 12 U/L (ref 1–32)
BASOPHILS # BLD AUTO: 0 10*3/MM3 (ref 0–0.2)
BASOPHILS NFR BLD AUTO: 0 % (ref 0–1.5)
BILIRUB SERPL-MCNC: 0.2 MG/DL (ref 0.1–1.2)
BUN BLD-MCNC: 10 MG/DL (ref 6–20)
BUN/CREAT SERPL: 17.5 (ref 7–25)
CALCIUM SPEC-SCNC: 9.2 MG/DL (ref 8.6–10.5)
CHLORIDE SERPL-SCNC: 103 MMOL/L (ref 98–107)
CO2 SERPL-SCNC: 25.1 MMOL/L (ref 22–29)
CREAT BLD-MCNC: 0.57 MG/DL (ref 0.57–1)
DEPRECATED RDW RBC AUTO: 56.8 FL (ref 37–54)
EOSINOPHIL # BLD AUTO: 0 10*3/MM3 (ref 0–0.7)
EOSINOPHIL NFR BLD AUTO: 0 % (ref 0.3–6.2)
ERYTHROCYTE [DISTWIDTH] IN BLOOD BY AUTOMATED COUNT: 17.1 % (ref 11.7–13)
GFR SERPL CREATININE-BSD FRML MDRD: 131 ML/MIN/1.73
GFR SERPL CREATININE-BSD FRML MDRD: >150 ML/MIN/1.73
GLOBULIN UR ELPH-MCNC: 2.1 GM/DL
GLUCOSE BLD-MCNC: 128 MG/DL (ref 65–99)
HCT VFR BLD AUTO: 30.2 % (ref 35.6–45.5)
HGB BLD-MCNC: 9.8 G/DL (ref 11.9–15.5)
IMM GRANULOCYTES # BLD: 0.1 10*3/MM3 (ref 0–0.03)
IMM GRANULOCYTES NFR BLD: 1 % (ref 0–0.5)
LYMPHOCYTES # BLD AUTO: 0.25 10*3/MM3 (ref 0.9–4.8)
LYMPHOCYTES NFR BLD AUTO: 2.4 % (ref 19.6–45.3)
MCH RBC QN AUTO: 30.5 PG (ref 26.9–32)
MCHC RBC AUTO-ENTMCNC: 32.5 G/DL (ref 32.4–36.3)
MCV RBC AUTO: 94.1 FL (ref 80.5–98.2)
MONOCYTES # BLD AUTO: 0.29 10*3/MM3 (ref 0.2–1.2)
MONOCYTES NFR BLD AUTO: 2.8 % (ref 5–12)
NEUTROPHILS # BLD AUTO: 9.6 10*3/MM3 (ref 1.9–8.1)
NEUTROPHILS NFR BLD AUTO: 93.8 % (ref 42.7–76)
PLATELET # BLD AUTO: 174 10*3/MM3 (ref 140–500)
PMV BLD AUTO: 11.2 FL (ref 6–12)
POTASSIUM BLD-SCNC: 3.6 MMOL/L (ref 3.5–5.2)
PROT SERPL-MCNC: 6.5 G/DL (ref 6–8.5)
RBC # BLD AUTO: 3.21 10*6/MM3 (ref 3.9–5.2)
SODIUM BLD-SCNC: 141 MMOL/L (ref 136–145)
WBC NRBC COR # BLD: 10.24 10*3/MM3 (ref 4.5–10.7)

## 2018-08-02 PROCEDURE — 25010000002 ETOPOSIDE 100 MG/5ML SOLUTION 5 ML VIAL: Performed by: INTERNAL MEDICINE

## 2018-08-02 PROCEDURE — 25010000002 DOXORUBICIN PER 10 MG: Performed by: INTERNAL MEDICINE

## 2018-08-02 PROCEDURE — 63710000001 PREDNISONE PER 5 MG: Performed by: INTERNAL MEDICINE

## 2018-08-02 PROCEDURE — 85730 THROMBOPLASTIN TIME PARTIAL: CPT | Performed by: INTERNAL MEDICINE

## 2018-08-02 PROCEDURE — 80053 COMPREHEN METABOLIC PANEL: CPT | Performed by: INTERNAL MEDICINE

## 2018-08-02 PROCEDURE — 25010000002 VINCRISTINE PER 1 MG: Performed by: INTERNAL MEDICINE

## 2018-08-02 PROCEDURE — 99233 SBSQ HOSP IP/OBS HIGH 50: CPT | Performed by: INTERNAL MEDICINE

## 2018-08-02 PROCEDURE — 25010000002 ONDANSETRON PER 1 MG: Performed by: INTERNAL MEDICINE

## 2018-08-02 PROCEDURE — 85025 COMPLETE CBC W/AUTO DIFF WBC: CPT | Performed by: INTERNAL MEDICINE

## 2018-08-02 PROCEDURE — 36591 DRAW BLOOD OFF VENOUS DEVICE: CPT

## 2018-08-02 RX ORDER — DABIGATRAN ETEXILATE 150 MG/1
150 CAPSULE ORAL EVERY 12 HOURS SCHEDULED
Qty: 60 CAPSULE | Refills: 6 | Status: SHIPPED | OUTPATIENT
Start: 2018-08-02 | End: 2018-08-04

## 2018-08-02 RX ADMIN — ALLOPURINOL 300 MG: 300 TABLET ORAL at 08:18

## 2018-08-02 RX ADMIN — SODIUM CHLORIDE 75 ML/HR: 9 INJECTION, SOLUTION INTRAVENOUS at 05:13

## 2018-08-02 RX ADMIN — DABIGATRAN ETEXILATE MESYLATE 150 MG: 150 CAPSULE ORAL at 08:18

## 2018-08-02 RX ADMIN — POTASSIUM CHLORIDE 20 MEQ: 750 CAPSULE, EXTENDED RELEASE ORAL at 18:47

## 2018-08-02 RX ADMIN — PREDNISONE 100 MG: 50 TABLET ORAL at 08:18

## 2018-08-02 RX ADMIN — DOXORUBICIN HYDROCHLORIDE: 2 INJECTION, SOLUTION INTRAVENOUS at 14:00

## 2018-08-02 RX ADMIN — PANTOPRAZOLE SODIUM 40 MG: 40 TABLET, DELAYED RELEASE ORAL at 05:13

## 2018-08-02 RX ADMIN — POTASSIUM CHLORIDE 20 MEQ: 750 CAPSULE, EXTENDED RELEASE ORAL at 08:18

## 2018-08-02 RX ADMIN — FLUCONAZOLE 400 MG: 200 TABLET ORAL at 08:18

## 2018-08-02 RX ADMIN — SODIUM CHLORIDE 75 ML/HR: 9 INJECTION, SOLUTION INTRAVENOUS at 18:47

## 2018-08-02 RX ADMIN — DABIGATRAN ETEXILATE MESYLATE 150 MG: 150 CAPSULE ORAL at 20:41

## 2018-08-02 RX ADMIN — ACYCLOVIR 400 MG: 400 TABLET ORAL at 08:18

## 2018-08-02 RX ADMIN — ALLOPURINOL 300 MG: 300 TABLET ORAL at 20:41

## 2018-08-02 RX ADMIN — PREDNISONE 100 MG: 50 TABLET ORAL at 18:47

## 2018-08-02 RX ADMIN — ONDANSETRON 16 MG: 2 INJECTION INTRAMUSCULAR; INTRAVENOUS at 13:43

## 2018-08-02 RX ADMIN — ACYCLOVIR 400 MG: 400 TABLET ORAL at 20:42

## 2018-08-02 NOTE — PLAN OF CARE
Problem: Patient Care Overview  Goal: Plan of Care Review  Outcome: Ongoing (interventions implemented as appropriate)   08/02/18 9200   Coping/Psychosocial   Plan of Care Reviewed With patient   Plan of Care Review   Progress no change   OTHER   Outcome Summary tolerating EPOCH; heparin DC and Pradaxa intitiated; port with +blood return, infusing chemo and NS       Problem: Chemotherapy Effects (Adult)  Goal: Signs and Symptoms of Listed Potential Problems Will be Absent, Minimized or Managed (Chemotherapy Effects)  Outcome: Ongoing (interventions implemented as appropriate)      Problem: Infection, Risk/Actual (Adult)  Goal: Infection Prevention/Resolution  Outcome: Ongoing (interventions implemented as appropriate)

## 2018-08-02 NOTE — PROGRESS NOTES
Discharge Planning Assessment  [facility]     Patient Name: Vikki Durham  MRN: [id number]  Today's Date: 8/2/2018    Admit Date: 7/30/2018          Discharge Needs Assessment     Row Name 08/02/18 1041       Living Environment    Lives With parent(s)    Current Living Arrangements home/apartment/condo    Primary Care Provided by self    Family Caregiver if Needed parent(s)    Quality of Family Relationships helpful;supportive    Able to Return to Prior Arrangements yes       Resource/Environmental Concerns    Transportation Concerns car, none       Transition Planning    Patient/Family Anticipates Transition to home with family    Transportation Anticipated family or friend will provide       Discharge Needs Assessment    Readmission Within the Last 30 Days planned readmission    Concerns to be Addressed no discharge needs identified    Equipment Currently Used at Home none    Equipment Needed After Discharge none            Discharge Plan     Row Name 08/02/18 1104       Plan    Plan Comments Spoke with patient at bedside, face sheet verified. Patient states she is independent with ADL's does not use any medical equipment and denies any home health history. Patient plans to return home to with her parents denies any discharge needs. Gave patient Pradaxa card for 0 co-pay and discussed with the patient  . Casi Moody RN    Row Name 08/02/18 1040       Plan    Plan Plans to return home with parents. Denies any discharge needs.         Destination     No service coordination in this encounter.      Durable Medical Equipment     No service coordination in this encounter.      Dialysis/Infusion     No service coordination in this encounter.      Home Medical Care     No service coordination in this encounter.      Social Care     No service coordination in this encounter.                Demographic Summary     Row Name 08/02/18 1045       General Information    Admission Type inpatient    Arrived From emergency  department    Referral Source admission list    Reason for Consult discharge planning    Preferred Language English            Functional Status     Row Name 08/02/18 1040       Functional Status    Usual Activity Tolerance excellent    Current Activity Tolerance good       Functional Status, IADL    Medications independent    Meal Preparation independent    Housekeeping independent    Laundry independent    Shopping independent            Psychosocial    No documentation.           Abuse/Neglect    No documentation.           Legal    No documentation.           Substance Abuse    No documentation.           Patient Forms    No documentation.         Casi Moody, RN

## 2018-08-02 NOTE — PROGRESS NOTES
On 7/31/18 I rec a staff message from Dr Bell asking me to check cost for Pradaxa for pt. See below.    Arabella, Ramón SCHMIDT II, MD sent to [facility].     Can you please check on out of pocket expense for:     Pradaxa 150 mg twice per day      Pt has a commercial plan and can utilize the copay card for $0 for Pradaxa. I sent Dr Bell a staff message back to notify him.    I rec a call today from Lien ELLSWORTH CCP at [facility]-She states Dr Bell asked her about this patient the other day and asked her to get a Pradaxa copay card. She called me as she did not see a note in pts chart. I was waiting for a response from Dr Bell. Per Lien-Pt is to start on the Pradaxa. I have activated the Pradaxa copay card and escribed the rx to pts local [facility] pharmacy on [address] (this is where previous rx's have been sent to).

## 2018-08-02 NOTE — PROGRESS NOTES
REASON FOR FOLLOWUP/CHIEF COMPLAINT:  Lymphoma.  Cytopenias.  DVT.    HISTORY OF PRESENT ILLNESS:     She clarifies today, she has been having bilateral fingertip tingling, not numbness.  States sensation is intact.    Bowel movements regular.  No significant nausea.  Eating well.  Walking.  No new problems overnight.    Past Medical History, Past Surgical History, Social History, Family History have been reviewed and are without significant changes except as mentioned.    Review of Systems   Review of Systems   Constitutional: Negative for activity change.   HENT: Negative for nosebleeds and trouble swallowing.    Respiratory: Negative for shortness of breath and wheezing.    Cardiovascular: Negative for chest pain and palpitations.   Gastrointestinal: Negative for constipation, diarrhea and nausea.   Genitourinary: Negative for dysuria and hematuria.   Musculoskeletal: Negative for arthralgias and myalgias.   Skin: Negative for rash and wound.   Neurological: Negative for seizures, syncope and numbness.   Hematological: Negative for adenopathy. Does not bruise/bleed easily.   Psychiatric/Behavioral: Negative for confusion.       Medications:  The current medication list was reviewed in the EMR    ALLERGIES:  No Known Allergies           Vitals:    08/01/18 1524 08/01/18 1934 08/01/18 2345 08/02/18 0329   BP: 92/55 112/69 107/68 103/60   BP Location: Left arm Left arm Left arm Left arm   Patient Position: Sitting Lying Lying Lying   Pulse: 57 70 67 57   Resp: 18 18 16 16   Temp: 98 °F (36.7 °C) 98.3 °F (36.8 °C) 97.3 °F (36.3 °C) 97.7 °F (36.5 °C)   TempSrc: Oral Oral Oral Oral   SpO2: 99% 97% 98% 99%   Weight:       Height:         Physical Exam    CONSTITUTIONAL:  Vital signs reviewed.  No distress, looks comfortable.  EYES:  Conjunctiva and lids unremarkable.  PERRLA  EARS,NOSE,MOUTH,THROAT:  Ears and nose appear unremarkable.  Lips, teeth, gums appear unremarkable.  RESPIRATORY:  Normal respiratory  effort.  Lungs clear to auscultation bilaterally.  CARDIOVASCULAR:  Normal S1, S2.  No murmurs rubs or gallops.  No significant lower extremity edema.  GASTROINTESTINAL: Abdomen appears unremarkable.  Nontender.  No hepatomegaly.  No splenomegaly.  LYMPHATIC:  No cervical, supraclavicular, axillary lymphadenopathy.  SKIN:  Warm.  No rashes.  PSYCHIATRIC:  Normal judgment and insight.  Normal mood and affect.     RECENT LABS:  WBC   Date Value Ref Range Status   08/02/2018 10.24 4.50 - 10.70 10*3/mm3 Final   08/01/2018 10.16 4.50 - 10.70 10*3/mm3 Final   07/31/2018 10.32 4.50 - 10.70 10*3/mm3 Final   07/30/2018 5.52 4.50 - 10.70 10*3/mm3 Final   07/30/2018 6.09 4.00 - 10.00 10*3/mm3 Final     Hemoglobin   Date Value Ref Range Status   08/02/2018 9.8 (L) 11.9 - 15.5 g/dL Final   08/01/2018 9.8 (L) 11.9 - 15.5 g/dL Final   07/31/2018 11.3 (L) 11.9 - 15.5 g/dL Final   07/30/2018 11.0 (L) 11.9 - 15.5 g/dL Final   07/30/2018 10.8 (L) 11.5 - 14.9 g/dL Final     Platelets   Date Value Ref Range Status   08/02/2018 174 140 - 500 10*3/mm3 Final   08/01/2018 170 140 - 500 10*3/mm3 Final   07/31/2018 207 140 - 500 10*3/mm3 Final   07/30/2018 181 140 - 500 10*3/mm3 Final   07/30/2018 151 150 - 375 10*3/mm3 Final                 ASSESSMENT/PLAN:  *Primary mediastinal large B-cell lymphoma  Cycle 3 DA-EPOCH-R (planning 6 cycles total) cycles every 21 days.  · Day 0 (7/30): rituxan  · Day 1-4 (7/31-8/3): doxorubicin, etoposide, vincristine  · Day 5 (saturday, 8/4/18): cytoxan (discharge after this)  · Day 7 (monday, 8/6/18): neulasta (as outpatient)  · Plan 2x/week cbc, cmp as outpatient  Day 3 today.  Tolerating therapy well.  She is on drug therapy requiring intensive monitoring for toxicity.    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  Constant bilateral fingertip tingling.  Not interfering with function.  Maintaining chemotherapy without dose reduction for now.  Might require dose reduction of vincristine in the  future.  She clarifies today what she is having his tingling, not numbness.  States sensation is intact.  Tingling not worsening.    *Ovarian suppression with a goal of fertility preservation.  Lupron due 8/13/18.     *Acute right basilic (upper arm) superficial thrombophlebitis associated with venous catheter 6/18/18.  *Acute right axillary, brachial DVT and acute right basilic (upper arm and forearm) superficial thrombophlebitis associated with venous catheter 6/20/18.  *Chronic right axillary DVT and acute right cephalic (upper arm) thrombophlebitis 7/30/18.  (Occurred while on Xarelto)  Discussed with Dr. Padilla.  We both agree with changing to Pradaxa 150 mg twice per day.  Initially managed with heparin as an inpatient.  However, difficult to control PTT.  Changed to Pradaxa (which was her outpatient plan).    *Venous access.  Radiology unable to place PICC on left side (previously unable to place PICC on right side).  Port, right side by Dr. Hurt, 7/31/18.    *Prophylaxis.  Acyclovir, Diflucan, Bactrim.  Dr. Padilla also plans Levaquin if ANC <1000.    *Anemia.  Likely due to chemotherapy.  Monitor.  Hb stable today.  Monitor.    *Follow-up I have asked our office to arrange, :  Neulasta and CBC, CMP 8/6/18  CBC, CMP, RN review 8/9/18, 8/13/18  Lupron 8/13/18.    Appointment with Dr. Padilla, 2 units, with CBC and CMP 8/15/18  If PLT <50, hold Pradaxa

## 2018-08-02 NOTE — PLAN OF CARE
Problem: Patient Care Overview  Goal: Plan of Care Review  Outcome: Ongoing (interventions implemented as appropriate)   08/02/18 1533   Coping/Psychosocial   Plan of Care Reviewed With patient   Plan of Care Review   Progress no change   OTHER   Outcome Summary A&Ox4. Up adlib. EPOCH infusing, port flushing with blood return. VSS. Family at bedside. Will continue to monitor.,     Goal: Individualization and Mutuality  Outcome: Ongoing (interventions implemented as appropriate)    Goal: Discharge Needs Assessment  Outcome: Ongoing (interventions implemented as appropriate)    Goal: Interprofessional Rounds/Family Conf  Outcome: Ongoing (interventions implemented as appropriate)      Problem: Chemotherapy Effects (Adult)  Goal: Signs and Symptoms of Listed Potential Problems Will be Absent, Minimized or Managed (Chemotherapy Effects)  Outcome: Ongoing (interventions implemented as appropriate)      Problem: Infection, Risk/Actual (Adult)  Goal: Infection Prevention/Resolution  Outcome: Ongoing (interventions implemented as appropriate)

## 2018-08-03 LAB
ALBUMIN SERPL-MCNC: 4.4 G/DL (ref 3.5–5.2)
ALBUMIN/GLOB SERPL: 2.1 G/DL
ALP SERPL-CCNC: 46 U/L (ref 39–117)
ALT SERPL W P-5'-P-CCNC: 40 U/L (ref 1–33)
ANION GAP SERPL CALCULATED.3IONS-SCNC: 13.1 MMOL/L
APTT PPP: 27.2 SECONDS (ref 22.7–35.4)
AST SERPL-CCNC: 21 U/L (ref 1–32)
BASOPHILS # BLD AUTO: 0 10*3/MM3 (ref 0–0.2)
BASOPHILS NFR BLD AUTO: 0 % (ref 0–1.5)
BILIRUB SERPL-MCNC: 0.3 MG/DL (ref 0.1–1.2)
BUN BLD-MCNC: 13 MG/DL (ref 6–20)
BUN/CREAT SERPL: 21 (ref 7–25)
CALCIUM SPEC-SCNC: 9.1 MG/DL (ref 8.6–10.5)
CHLORIDE SERPL-SCNC: 102 MMOL/L (ref 98–107)
CO2 SERPL-SCNC: 26.9 MMOL/L (ref 22–29)
CREAT BLD-MCNC: 0.62 MG/DL (ref 0.57–1)
DEPRECATED RDW RBC AUTO: 56.4 FL (ref 37–54)
EOSINOPHIL # BLD AUTO: 0 10*3/MM3 (ref 0–0.7)
EOSINOPHIL NFR BLD AUTO: 0 % (ref 0.3–6.2)
ERYTHROCYTE [DISTWIDTH] IN BLOOD BY AUTOMATED COUNT: 17 % (ref 11.7–13)
GFR SERPL CREATININE-BSD FRML MDRD: 119 ML/MIN/1.73
GFR SERPL CREATININE-BSD FRML MDRD: 145 ML/MIN/1.73
GLOBULIN UR ELPH-MCNC: 2.1 GM/DL
GLUCOSE BLD-MCNC: 107 MG/DL (ref 65–99)
HCT VFR BLD AUTO: 31.3 % (ref 35.6–45.5)
HGB BLD-MCNC: 10.1 G/DL (ref 11.9–15.5)
IMM GRANULOCYTES # BLD: 0.08 10*3/MM3 (ref 0–0.03)
IMM GRANULOCYTES NFR BLD: 1.2 % (ref 0–0.5)
LYMPHOCYTES # BLD AUTO: 0.4 10*3/MM3 (ref 0.9–4.8)
LYMPHOCYTES NFR BLD AUTO: 5.8 % (ref 19.6–45.3)
MCH RBC QN AUTO: 30.3 PG (ref 26.9–32)
MCHC RBC AUTO-ENTMCNC: 32.3 G/DL (ref 32.4–36.3)
MCV RBC AUTO: 94 FL (ref 80.5–98.2)
MONOCYTES # BLD AUTO: 0.11 10*3/MM3 (ref 0.2–1.2)
MONOCYTES NFR BLD AUTO: 1.6 % (ref 5–12)
NEUTROPHILS # BLD AUTO: 6.36 10*3/MM3 (ref 1.9–8.1)
NEUTROPHILS NFR BLD AUTO: 91.4 % (ref 42.7–76)
PLATELET # BLD AUTO: 183 10*3/MM3 (ref 140–500)
PMV BLD AUTO: 10.5 FL (ref 6–12)
POTASSIUM BLD-SCNC: 3.3 MMOL/L (ref 3.5–5.2)
PROT SERPL-MCNC: 6.5 G/DL (ref 6–8.5)
RBC # BLD AUTO: 3.33 10*6/MM3 (ref 3.9–5.2)
SODIUM BLD-SCNC: 142 MMOL/L (ref 136–145)
WBC NRBC COR # BLD: 6.95 10*3/MM3 (ref 4.5–10.7)

## 2018-08-03 PROCEDURE — 94799 UNLISTED PULMONARY SVC/PX: CPT

## 2018-08-03 PROCEDURE — 25010000002 ONDANSETRON PER 1 MG: Performed by: INTERNAL MEDICINE

## 2018-08-03 PROCEDURE — 85730 THROMBOPLASTIN TIME PARTIAL: CPT | Performed by: INTERNAL MEDICINE

## 2018-08-03 PROCEDURE — 99233 SBSQ HOSP IP/OBS HIGH 50: CPT | Performed by: INTERNAL MEDICINE

## 2018-08-03 PROCEDURE — 25010000002 DOXORUBICIN PER 10 MG: Performed by: INTERNAL MEDICINE

## 2018-08-03 PROCEDURE — 80053 COMPREHEN METABOLIC PANEL: CPT | Performed by: INTERNAL MEDICINE

## 2018-08-03 PROCEDURE — 63710000001 PREDNISONE PER 5 MG: Performed by: INTERNAL MEDICINE

## 2018-08-03 PROCEDURE — 25010000002 VINCRISTINE PER 1 MG: Performed by: INTERNAL MEDICINE

## 2018-08-03 PROCEDURE — 85025 COMPLETE CBC W/AUTO DIFF WBC: CPT | Performed by: INTERNAL MEDICINE

## 2018-08-03 PROCEDURE — 25010000002 ETOPOSIDE 100 MG/5ML SOLUTION 5 ML VIAL: Performed by: INTERNAL MEDICINE

## 2018-08-03 RX ORDER — POTASSIUM CHLORIDE 750 MG/1
60 CAPSULE, EXTENDED RELEASE ORAL ONCE
Status: COMPLETED | OUTPATIENT
Start: 2018-08-03 | End: 2018-08-03

## 2018-08-03 RX ADMIN — ACYCLOVIR 400 MG: 400 TABLET ORAL at 08:07

## 2018-08-03 RX ADMIN — POTASSIUM CHLORIDE 20 MEQ: 750 CAPSULE, EXTENDED RELEASE ORAL at 08:06

## 2018-08-03 RX ADMIN — SULFAMETHOXAZOLE AND TRIMETHOPRIM 160 MG: 800; 160 TABLET ORAL at 08:08

## 2018-08-03 RX ADMIN — PREDNISONE 100 MG: 50 TABLET ORAL at 08:07

## 2018-08-03 RX ADMIN — SODIUM CHLORIDE 75 ML/HR: 9 INJECTION, SOLUTION INTRAVENOUS at 21:27

## 2018-08-03 RX ADMIN — POTASSIUM CHLORIDE 60 MEQ: 750 CAPSULE, EXTENDED RELEASE ORAL at 11:55

## 2018-08-03 RX ADMIN — FLUCONAZOLE 400 MG: 200 TABLET ORAL at 08:32

## 2018-08-03 RX ADMIN — PREDNISONE 100 MG: 50 TABLET ORAL at 18:04

## 2018-08-03 RX ADMIN — DABIGATRAN ETEXILATE MESYLATE 150 MG: 150 CAPSULE ORAL at 08:07

## 2018-08-03 RX ADMIN — POTASSIUM CHLORIDE 20 MEQ: 750 CAPSULE, EXTENDED RELEASE ORAL at 18:04

## 2018-08-03 RX ADMIN — SODIUM CHLORIDE 75 ML/HR: 9 INJECTION, SOLUTION INTRAVENOUS at 08:32

## 2018-08-03 RX ADMIN — DOXORUBICIN HYDROCHLORIDE: 2 INJECTION, SOLUTION INTRAVENOUS at 13:52

## 2018-08-03 RX ADMIN — PANTOPRAZOLE SODIUM 40 MG: 40 TABLET, DELAYED RELEASE ORAL at 06:15

## 2018-08-03 RX ADMIN — ALLOPURINOL 300 MG: 300 TABLET ORAL at 21:27

## 2018-08-03 RX ADMIN — DABIGATRAN ETEXILATE MESYLATE 150 MG: 150 CAPSULE ORAL at 21:27

## 2018-08-03 RX ADMIN — ACYCLOVIR 400 MG: 400 TABLET ORAL at 21:27

## 2018-08-03 RX ADMIN — ALLOPURINOL 300 MG: 300 TABLET ORAL at 08:07

## 2018-08-03 RX ADMIN — ONDANSETRON 16 MG: 2 INJECTION INTRAMUSCULAR; INTRAVENOUS at 13:49

## 2018-08-03 NOTE — PLAN OF CARE
Problem: Patient Care Overview  Goal: Plan of Care Review  Outcome: Ongoing (interventions implemented as appropriate)   08/03/18 5656   Coping/Psychosocial   Plan of Care Reviewed With patient   Plan of Care Review   Progress no change   OTHER   Outcome Summary EPOCH day 5 infusing over 24 hrs, port with blood return, vss, ambulated in halls, discharge tomorrow after chemo     Goal: Individualization and Mutuality  Outcome: Ongoing (interventions implemented as appropriate)    Goal: Discharge Needs Assessment  Outcome: Ongoing (interventions implemented as appropriate)    Goal: Interprofessional Rounds/Family Conf  Outcome: Ongoing (interventions implemented as appropriate)      Problem: Chemotherapy Effects (Adult)  Goal: Signs and Symptoms of Listed Potential Problems Will be Absent, Minimized or Managed (Chemotherapy Effects)  Outcome: Ongoing (interventions implemented as appropriate)      Problem: Infection, Risk/Actual (Adult)  Goal: Infection Prevention/Resolution  Outcome: Ongoing (interventions implemented as appropriate)

## 2018-08-03 NOTE — NURSING NOTE
"Nursing Chemotherapy Verification    Chemotherapy Regimen:   Treatment Plans     Name Type Hold Status Plan dates Plan Provider       Active    OP Goserelin 3.6 mg Q28D ONCOLOGY SUPPORTIVE CARE 1 On Automatic Hold  6/20/2018 - Present Ramón Camp MD    IP LYMPHOMA R-EPOCH (Dose Adjusted) RiTUXimab / Etoposide / DOXOrubicin / VinCRIStine / Cyclophosphamide / PredniSONE ONCOLOGY TREATMENT Not on Hold  6/16/2018 - Present Kimberley Melgar MD                    Current height and weight: 157.5 cm (62.01\") 64.9 kg (143 lb)  Calculated BSA from current height and weight (Zahira): 1.6    Relevant Labs    Results from last 7 days  Lab Units 08/03/18  0604 08/02/18  0434 08/01/18  0247   WBC 10*3/mm3 6.95 10.24 10.16   HEMOGLOBIN g/dL 10.1* 9.8* 9.8*   HEMATOCRIT % 31.3* 30.2* 31.1*   PLATELETS 10*3/mm3 183 174 170     Lab Results   Component Value Date    NEUTROABS 6.36 08/03/2018         Results from last 7 days  Lab Units 08/03/18  0604 08/02/18  0434 08/01/18  0247   CREATININE mg/dL 0.62 0.57 0.61       Serum creatinine: 0.62 mg/dL 08/03/18 0604  Estimated creatinine clearance: 124.8 mL/min    Lab Results   Component Value Date    HEPAIGM Negative 07/13/2018    HEPAIGM Non-Reactive 07/13/2018    HEPBCAB Negative 06/14/2018       Verification attestation:  I have personally reviewed the planned regimen and its administration and dosing. I understand the potential side effects.The patient has been instructed on the regimen, potential side effects, and self care measures; the consent form has been completed. I have confirmed that the appropriate premedication, prehydration, post medication and/or emergency medications are ordered in addition to the chemotherapy.    I have independently verified that the height and weight is current and calculated the BSA. I have verified the doses with the planned regimen and have clarified any deviations with the physician (Dr rocha). I have confirmed the route of administration and " patient IV access.    Nurse: Ewelina Berry, RN  2nd verification Nurse: nurys rae rn

## 2018-08-03 NOTE — CONSULTS
"Spiritual Care:  Pt shared of her deep amanda, friends e-mail about sunflowers and the power of poetry.  Assessment reveals pt is resilient and hopeful, full of gratitude and able to present to the moment and live in union with the Holy Spirit who for [place] is the \"Lord and giver of life.\"  Read several poems and prayed.  Chaplain Chema Gross  "

## 2018-08-03 NOTE — SIGNIFICANT NOTE
Chemotherapy review DA-EPOCH-R    Pt currently to start day #5 chemo with DA-EPOCH-R at ~1400 8/3. Several changes made to the current treatment plan, so note entered per pharmacy to summarize events regarding chemotherapy ordering.     Up to this point patient has received the following:    Day #1 - Rituximab on 7/30; remainder of regimen, including continuous infusion Etoposide, Doxorubicin and Vincristine deferred to 7/31 (Day #2) to begin 2/2 need for implanted central line    Day #2 - Continuous infusion etoposide/doxorubicin/vincristine bag #1 started appropriately on 7/31. Will run for total of 96 hrs (4 bags)    Day #3 (8/1) - Continuous infusion etoposide/doxorubicin/vincristine bag #2 started appropriately on 8/1. Will run for total of 72 more hrs    Day #4 (8/2) - Continuous infusion etoposide/doxorubicin/vincristine completed in EPIC prior to release; medications planned for 8/3 (same agents and doses) were released and administered. Bag #3 started appropriately on 8/2    Day #5 (8/3) - Continuous infusion etoposide/doxorubicin/vincristine planned for Day #5 already released. New Day #5 entered into treatment plan, signed and released. 2 day #5 are reflected in the treatment plan since unable to rename days.  Bag #4 appropriately started on 8/3.    Day #6 (8/4) - Cyclophosphamide due. Upon the completion of this, patient will have appropriately received all planned doses of chemotherapy for DA-EPOCH-R Cycle 3       Nevaeh Tillman, Pharm.D., [facility]  Oncology Pharmacy Specialist  [phone]

## 2018-08-03 NOTE — PROGRESS NOTES
REASON FOR FOLLOWUP/CHIEF COMPLAINT:  Lymphoma.  Cytopenias.  DVT.    HISTORY OF PRESENT ILLNESS:     Having regular bowel movements.  Minimal nausea which is relieved with antiemetics.  Eating well.  Walking.  No new problems overnight.    Past Medical History, Past Surgical History, Social History, Family History have been reviewed and are without significant changes except as mentioned.    Review of Systems   Review of Systems   Constitutional: Negative for activity change.   HENT: Negative for nosebleeds and trouble swallowing.    Respiratory: Negative for shortness of breath and wheezing.    Cardiovascular: Negative for chest pain and palpitations.   Gastrointestinal: Negative for constipation, diarrhea and nausea.   Genitourinary: Negative for dysuria and hematuria.   Musculoskeletal: Negative for arthralgias and myalgias.   Skin: Negative for rash and wound.   Neurological: Negative for seizures, syncope and numbness.   Hematological: Negative for adenopathy. Does not bruise/bleed easily.   Psychiatric/Behavioral: Negative for confusion.       Medications:  The current medication list was reviewed in the EMR    ALLERGIES:  No Known Allergies           Vitals:    08/02/18 1201 08/02/18 1607 08/02/18 2035 08/03/18 0504   BP: 104/71 104/61 105/65 128/66   BP Location: Left arm Left arm Left arm Left arm   Patient Position: Lying Lying Sitting Lying   Pulse: 55 66 59 50   Resp: 16 16 16 16   Temp: 97.6 °F (36.4 °C) 98.3 °F (36.8 °C) 97.9 °F (36.6 °C) 97.6 °F (36.4 °C)   TempSrc: Oral Oral Oral Oral   SpO2: 96% 95% 99% 98%   Weight:       Height:         Physical Exam    CONSTITUTIONAL:  Vital signs reviewed.  No distress, looks comfortable.  EYES:  Conjunctiva and lids unremarkable.  PERRLA  EARS,NOSE,MOUTH,THROAT:  Ears and nose appear unremarkable.  Lips, teeth, gums appear unremarkable.  RESPIRATORY:  Normal respiratory effort.  Lungs clear to auscultation bilaterally.  CARDIOVASCULAR:  Normal S1, S2.   No murmurs rubs or gallops.  No significant lower extremity edema.  GASTROINTESTINAL: Abdomen appears unremarkable.  Nontender.  No hepatomegaly.  No splenomegaly.  LYMPHATIC:  No cervical, supraclavicular, axillary lymphadenopathy.  SKIN:  Warm.  No rashes.  PSYCHIATRIC:  Normal judgment and insight.  Normal mood and affect.     RECENT LABS:  WBC   Date Value Ref Range Status   08/03/2018 6.95 4.50 - 10.70 10*3/mm3 Final   08/02/2018 10.24 4.50 - 10.70 10*3/mm3 Final   08/01/2018 10.16 4.50 - 10.70 10*3/mm3 Final     Hemoglobin   Date Value Ref Range Status   08/03/2018 10.1 (L) 11.9 - 15.5 g/dL Final   08/02/2018 9.8 (L) 11.9 - 15.5 g/dL Final   08/01/2018 9.8 (L) 11.9 - 15.5 g/dL Final     Platelets   Date Value Ref Range Status   08/03/2018 183 140 - 500 10*3/mm3 Final   08/02/2018 174 140 - 500 10*3/mm3 Final   08/01/2018 170 140 - 500 10*3/mm3 Final                 ASSESSMENT/PLAN:  *Primary mediastinal large B-cell lymphoma  Cycle 3 DA-EPOCH-R (planning 6 cycles total) cycles every 21 days.  · Day 0 (7/30): rituxan  · Day 1-4 (7/31-8/3): doxorubicin, etoposide, vincristine  · Day 5 (saturday, 8/4/18): cytoxan (discharge after this)  · Day 7 (monday, 8/6/18): neulasta (as outpatient)  · Plan 2x/week cbc, cmp as outpatient  Day 4 today.  Tolerating therapy well.  She is on drug therapy requiring Extensive monitoring for toxicity.    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  Constant bilateral fingertip tingling.  Not interfering with function.  Maintaining chemotherapy without dose reduction for now.  Might require dose reduction of vincristine in the future.    *Ovarian suppression with a goal of fertility preservation.  Lupron due 8/13/18.     *Acute right basilic (upper arm) superficial thrombophlebitis associated with venous catheter 6/18/18.  *Acute right axillary, brachial DVT and acute right basilic (upper arm and forearm) superficial thrombophlebitis associated with venous catheter  6/20/18.  *Chronic right axillary DVT and acute right cephalic (upper arm) thrombophlebitis 7/30/18.  (Occurred while on Xarelto)  Discussed with Dr. Padilla.  We both agreed with changing to Pradaxa 150 mg twice per day.  Initially managed with heparin as an inpatient.  However, difficult to control PTT.  Changed to Pradaxa (which was her outpatient plan).    *Venous access.  Radiology unable to place PICC on left side (previously unable to place PICC on right side).  Port, right side by Dr. Hurt, 7/31/18.    *Prophylaxis.  Acyclovir, Diflucan, Bactrim.  Dr. Padilla also plans Levaquin if ANC <1000.    *Anemia.  Likely due to chemotherapy.  Monitor.  Hb remains stable.  Monitor.    *Hypokalemia.  Potassium today.    *Follow-up I have asked our office to arrange, :  Neulasta and CBC, CMP 8/6/18  CBC, CMP, RN review 8/9/18, 8/13/18  Lupron 8/13/18.    Appointment with Dr. Padilla, 2 units, with CBC and CMP 8/15/18  If PLT <50, hold Pradaxa

## 2018-08-04 VITALS
HEIGHT: 62 IN | SYSTOLIC BLOOD PRESSURE: 89 MMHG | DIASTOLIC BLOOD PRESSURE: 55 MMHG | RESPIRATION RATE: 16 BRPM | TEMPERATURE: 98.4 F | OXYGEN SATURATION: 98 % | BODY MASS INDEX: 26.31 KG/M2 | HEART RATE: 64 BPM | WEIGHT: 143 LBS

## 2018-08-04 LAB
ALBUMIN SERPL-MCNC: 4.5 G/DL (ref 3.5–5.2)
ALBUMIN/GLOB SERPL: 2 G/DL
ALP SERPL-CCNC: 45 U/L (ref 39–117)
ALT SERPL W P-5'-P-CCNC: 60 U/L (ref 1–33)
ANION GAP SERPL CALCULATED.3IONS-SCNC: 14.7 MMOL/L
APTT PPP: 25.3 SECONDS (ref 22.7–35.4)
AST SERPL-CCNC: 26 U/L (ref 1–32)
BASOPHILS # BLD AUTO: 0 10*3/MM3 (ref 0–0.2)
BASOPHILS NFR BLD AUTO: 0 % (ref 0–1.5)
BILIRUB SERPL-MCNC: 0.4 MG/DL (ref 0.1–1.2)
BUN BLD-MCNC: 12 MG/DL (ref 6–20)
BUN/CREAT SERPL: 23.5 (ref 7–25)
CALCIUM SPEC-SCNC: 10.1 MG/DL (ref 8.6–10.5)
CHLORIDE SERPL-SCNC: 101 MMOL/L (ref 98–107)
CO2 SERPL-SCNC: 26.3 MMOL/L (ref 22–29)
CREAT BLD-MCNC: 0.51 MG/DL (ref 0.57–1)
DEPRECATED RDW RBC AUTO: 55.1 FL (ref 37–54)
EOSINOPHIL # BLD AUTO: 0 10*3/MM3 (ref 0–0.7)
EOSINOPHIL NFR BLD AUTO: 0 % (ref 0.3–6.2)
ERYTHROCYTE [DISTWIDTH] IN BLOOD BY AUTOMATED COUNT: 16.5 % (ref 11.7–13)
GFR SERPL CREATININE-BSD FRML MDRD: 149 ML/MIN/1.73
GFR SERPL CREATININE-BSD FRML MDRD: >150 ML/MIN/1.73
GLOBULIN UR ELPH-MCNC: 2.2 GM/DL
GLUCOSE BLD-MCNC: 107 MG/DL (ref 65–99)
HCT VFR BLD AUTO: 31.4 % (ref 35.6–45.5)
HGB BLD-MCNC: 10.2 G/DL (ref 11.9–15.5)
IMM GRANULOCYTES # BLD: 0.04 10*3/MM3 (ref 0–0.03)
IMM GRANULOCYTES NFR BLD: 0.9 % (ref 0–0.5)
LYMPHOCYTES # BLD AUTO: 0.23 10*3/MM3 (ref 0.9–4.8)
LYMPHOCYTES NFR BLD AUTO: 5.3 % (ref 19.6–45.3)
MCH RBC QN AUTO: 30 PG (ref 26.9–32)
MCHC RBC AUTO-ENTMCNC: 32.5 G/DL (ref 32.4–36.3)
MCV RBC AUTO: 92.4 FL (ref 80.5–98.2)
MONOCYTES # BLD AUTO: 0.14 10*3/MM3 (ref 0.2–1.2)
MONOCYTES NFR BLD AUTO: 3.2 % (ref 5–12)
NEUTROPHILS # BLD AUTO: 3.93 10*3/MM3 (ref 1.9–8.1)
NEUTROPHILS NFR BLD AUTO: 90.6 % (ref 42.7–76)
PLATELET # BLD AUTO: 198 10*3/MM3 (ref 140–500)
PMV BLD AUTO: 10.6 FL (ref 6–12)
POTASSIUM BLD-SCNC: 3.3 MMOL/L (ref 3.5–5.2)
PROT SERPL-MCNC: 6.7 G/DL (ref 6–8.5)
RBC # BLD AUTO: 3.4 10*6/MM3 (ref 3.9–5.2)
SODIUM BLD-SCNC: 142 MMOL/L (ref 136–145)
WBC NRBC COR # BLD: 4.34 10*3/MM3 (ref 4.5–10.7)

## 2018-08-04 PROCEDURE — 80053 COMPREHEN METABOLIC PANEL: CPT | Performed by: INTERNAL MEDICINE

## 2018-08-04 PROCEDURE — 85025 COMPLETE CBC W/AUTO DIFF WBC: CPT | Performed by: INTERNAL MEDICINE

## 2018-08-04 PROCEDURE — 36591 DRAW BLOOD OFF VENOUS DEVICE: CPT

## 2018-08-04 PROCEDURE — 63710000001 PREDNISONE PER 5 MG: Performed by: INTERNAL MEDICINE

## 2018-08-04 PROCEDURE — 25010000002 CYCLOPHOSPHAMIDE PER 100 MG: Performed by: INTERNAL MEDICINE

## 2018-08-04 PROCEDURE — 99239 HOSP IP/OBS DSCHRG MGMT >30: CPT | Performed by: INTERNAL MEDICINE

## 2018-08-04 PROCEDURE — 25010000002 ONDANSETRON PER 1 MG: Performed by: INTERNAL MEDICINE

## 2018-08-04 PROCEDURE — 85730 THROMBOPLASTIN TIME PARTIAL: CPT | Performed by: INTERNAL MEDICINE

## 2018-08-04 PROCEDURE — 25010000002 HEPARIN FLUSH (PORCINE) 100 UNIT/ML SOLUTION: Performed by: INTERNAL MEDICINE

## 2018-08-04 RX ORDER — DABIGATRAN ETEXILATE 150 MG/1
150 CAPSULE ORAL EVERY 12 HOURS SCHEDULED
Qty: 60 CAPSULE | Refills: 6 | Status: SHIPPED | OUTPATIENT
Start: 2018-08-04 | End: 2018-10-22 | Stop reason: HOSPADM

## 2018-08-04 RX ORDER — SULFAMETHOXAZOLE AND TRIMETHOPRIM 800; 160 MG/1; MG/1
1 TABLET ORAL 3 TIMES WEEKLY
Qty: 12 TABLET | Refills: 3 | Status: SHIPPED | OUTPATIENT
Start: 2018-08-06 | End: 2018-12-20

## 2018-08-04 RX ORDER — POTASSIUM CHLORIDE 750 MG/1
40 CAPSULE, EXTENDED RELEASE ORAL ONCE
Status: COMPLETED | OUTPATIENT
Start: 2018-08-04 | End: 2018-08-04

## 2018-08-04 RX ADMIN — DABIGATRAN ETEXILATE MESYLATE 150 MG: 150 CAPSULE ORAL at 08:42

## 2018-08-04 RX ADMIN — PREDNISONE 100 MG: 50 TABLET ORAL at 08:42

## 2018-08-04 RX ADMIN — ALLOPURINOL 300 MG: 300 TABLET ORAL at 08:42

## 2018-08-04 RX ADMIN — FLUCONAZOLE 400 MG: 200 TABLET ORAL at 08:42

## 2018-08-04 RX ADMIN — CYCLOPHOSPHAMIDE 1800 MG: 500 INJECTION, POWDER, FOR SOLUTION INTRAVENOUS; ORAL at 14:18

## 2018-08-04 RX ADMIN — POTASSIUM CHLORIDE 20 MEQ: 750 CAPSULE, EXTENDED RELEASE ORAL at 08:42

## 2018-08-04 RX ADMIN — ONDANSETRON 16 MG: 2 INJECTION INTRAMUSCULAR; INTRAVENOUS at 13:54

## 2018-08-04 RX ADMIN — ACYCLOVIR 400 MG: 400 TABLET ORAL at 08:42

## 2018-08-04 RX ADMIN — POTASSIUM CHLORIDE 40 MEQ: 750 CAPSULE, EXTENDED RELEASE ORAL at 13:54

## 2018-08-04 RX ADMIN — Medication 500 UNITS: at 15:31

## 2018-08-04 NOTE — PLAN OF CARE
Problem: Patient Care Overview  Goal: Plan of Care Review  Outcome: Ongoing (interventions implemented as appropriate)   08/04/18 0523   Coping/Psychosocial   Plan of Care Reviewed With patient   Plan of Care Review   Progress no change   OTHER   Outcome Summary VSS. Bradycardia baseline. Ad rikki. No c/o pain or n/v. C/O of taste changes. EPOCH day 5 infusing. Cytoxan to be completed afterwards. Plan to be d/c after cytoxan.

## 2018-08-04 NOTE — DISCHARGE SUMMARY
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    *Ovarian suppression with a goal of fertility preservation.  *Acute right basilic (upper arm) superficial thrombophlebitis associated with venous catheter 6/18/18.  *Acute right axillary, brachial DVT and acute right basilic (upper arm and forearm) superficial thrombophlebitis associated with venous catheter 6/20/18.  *Chronic right axillary DVT and acute right cephalic (upper arm) thrombophlebitis 7/30/18.  (Occurred while on Xarelto)  *Anemia.  Likely due to chemotherapy.  Monitor.  Hb remains stable.  Monitor.  *Hypokalemia.  Continue potassium.    Consultants:  Dr. Hurt, vascular surgery (port placement).    Hospital course:  *Primary mediastinal large B-cell lymphoma  Cycle 3 DA-EPOCH-R (planning 6 cycles total) cycles every 21 days.  · Day 0 (7/30): rituxan  · Day 1-4 (7/31-8/3): doxorubicin, etoposide, vincristine  · Day 5 (saturday, 8/4/18): cytoxan (discharge after this)  · Day 7 (monday, 8/6/18): neulasta (as outpatient)  · Plan 2x/week cbc, cmp as outpatient  Day 5 today.  Tolerating therapy well.  She is on drug therapy requiring intensive monitoring for toxicity.    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  Constant bilateral fingertip tingling.  Not interfering with function.  Maintaining chemotherapy without dose reduction for now.  Might require dose reduction of vincristine in the future.    *Ovarian suppression with a goal of fertility preservation.  Lupron due 8/13/18.     *Acute right basilic (upper arm) superficial thrombophlebitis associated with venous catheter 6/18/18.  *Acute right axillary, brachial DVT and acute right basilic (upper arm and forearm) superficial thrombophlebitis associated with venous catheter 6/20/18.  *Chronic right axillary DVT and acute right  cephalic (upper arm) thrombophlebitis 7/30/18.  (Occurred while on Xarelto)  Discussed with Dr. Padilla.  We both agreed with changing to Pradaxa 150 mg twice per day.  Initially managed with heparin as an inpatient.  However, difficult to control PTT.  Changed to Pradaxa (which was her outpatient plan).    *Venous access.  Radiology unable to place PICC on left side (previously unable to place PICC on right side).  Port, right side by Dr. Hurt, 7/31/18.    *Prophylaxis.  Acyclovir, Diflucan, Bactrim.  Dr. Padilla also plans Levaquin if ANC <1000.    *Anemia.  Likely due to chemotherapy.  Monitor.  Hb remains stable.  Monitor.    *Hypokalemia.  Continue potassium.    *Follow-up I have asked our office to arrange, :  Neulasta and CBC, CMP 8/6/18  CBC, CMP, RN review 8/9/18, 8/13/18  Lupron 8/13/18.    Appointment with Dr. Padilla, 2 units, with CBC and CMP 8/15/18  If PLT <50, hold Pradaxa    Vitals:    08/04/18 0942   BP: 92/55   Pulse: 70   Resp: 16   Temp: 97.2 °F (36.2 °C)   SpO2: 100%     GENERAL:  Well-developed, well-nourished in no acute distress.   SKIN:  Warm, dry without rashes, purpura or petechiae.  NECK:  Supple with good range of motion; no thyromegaly or masses, no JVD.  LYMPHATICS:  No cervical, supraclavicular, axillary or inguinal adenopathy.  CHEST:  Lungs clear to percussion and auscultation. Good airflow.  CARDIAC:  Regular rate and rhythm without murmurs, rubs or gallops.   EXTREMITIES:  No clubbing, cyanosis or edema.  PSYCHIATRIC:  Normal affect and mood.      Discharge condition: stable  Discharge diet   Dietary Orders     Start     Ordered    07/31/18 1140  Diet Regular  Diet Effective Now     Question:  Diet Texture / Consistency  Answer:  Regular    07/31/18 8309        Additional Instructions: Call with fevers, uncontrolled nausea/vomiting/pain.  Medications:      Discharge Medications      New Medications      Instructions Start Date   dabigatran etexilate 150 MG capsu  Commonly  known as:  PRADAXA   150 mg, Oral, Every 12 Hours Scheduled         Continue These Medications      Instructions Start Date   acyclovir 400 MG tablet  Commonly known as:  ZOVIRAX   400 mg, Oral, 2 Times Daily, Take no more than 5 doses a day.      allopurinol 300 MG tablet  Commonly known as:  ZYLOPRIM   300 mg, Oral, 2 Times Daily      fluconazole 200 MG tablet  Commonly known as:  DIFLUCAN   400 mg, Oral, Every 24 Hours      ondansetron 4 MG tablet  Commonly known as:  ZOFRAN   4 mg, Oral, Every 6 Hours PRN      potassium chloride 10 MEQ CR capsule  Commonly known as:  MICRO-K   20 mEq, Oral, 2 Times Daily With Meals      sennosides-docusate sodium 8.6-50 MG tablet  Commonly known as:  SENOKOT-S   2 tablets, Oral, 2 Times Daily PRN      sulfamethoxazole-trimethoprim 800-160 MG per tablet  Commonly known as:  BACTRIM DS,SEPTRA DS   1 tablet, Oral, 3 Times Weekly         Stop These Medications    levoFLOXacin 500 MG tablet  Commonly known as:  LEVAQUIN     rivaroxaban 20 MG tablet  Commonly known as:  XARELTO          Disposition:Home or Self Care  Follow up: Future Appointments  Date Time Provider Department Center   8/6/2018 9:20 AM LAB CHAIR 5 [facility] KREE  LAB [facility]   8/6/2018 10:00 AM INJECTION CHAIR [facility] INFUS [facility]   8/9/2018 8:40 [facility]   8/9/2018 9:20 AM Lynda Pritchard APRN [facility] CBC [place]   8/13/2018 9:50 AM LAB CHAIR 6 [facility]   8/13/2018 10:15 AM INJECTION CHAIR [facility] INFUS [facility]   8/15/2018 2:00 PM [facility]   8/15/2018 2:40 PM Millie Padilla MD [facility]  [facility] [place]       Over 30 minutes on discharge.  Counseling and coordinating care.

## 2018-08-04 NOTE — NURSING NOTE
"Nursing Chemotherapy Verification    Chemotherapy Regimen:   Treatment Plans     Name Type Hold Status Plan dates Plan Provider       Active    OP Goserelin 3.6 mg Q28D ONCOLOGY SUPPORTIVE CARE 1 On Automatic Hold  6/20/2018 - Present Ramón Camp MD    IP LYMPHOMA R-EPOCH (Dose Adjusted) RiTUXimab / Etoposide / DOXOrubicin / VinCRIStine / Cyclophosphamide / PredniSONE ONCOLOGY TREATMENT Not on Hold  6/16/2018 - Present Kimberley Melgar MD                    Current height and weight: 157.5 cm (62.01\") 64.9 kg (143 lb)  Calculated BSA from current height and weight ([place]): 1.65    Relevant Labs    Results from last 7 days  Lab Units 08/04/18 0447 08/03/18  0604 08/02/18  0434   WBC 10*3/mm3 4.34* 6.95 10.24   HEMOGLOBIN g/dL 10.2* 10.1* 9.8*   HEMATOCRIT % 31.4* 31.3* 30.2*   PLATELETS 10*3/mm3 198 183 174     Lab Results   Component Value Date    NEUTROABS 3.93 08/04/2018         Results from last 7 days  Lab Units 08/04/18 0447 08/03/18 0604 08/02/18  0434   CREATININE mg/dL 0.51* 0.62 0.57       Serum creatinine: 0.51 mg/dL (L) 08/04/18 0447  Estimated creatinine clearance: 151.7 mL/min (A)    Lab Results   Component Value Date    HEPAIGM Negative 07/13/2018    HEPAIGM Non-Reactive 07/13/2018    HEPBCAB Negative 06/14/2018       Verification attestation:  I have personally reviewed the planned regimen and its administration and dosing. I understand the potential side effects.The patient has been instructed on the regimen, potential side effects, and self care measures; the consent form has been completed. I have confirmed that the appropriate premedication, prehydration, post medication and/or emergency medications are ordered in addition to the chemotherapy.    I have independently verified that the height and weight is current and calculated the BSA. I have verified the doses with the planned regimen and have clarified any deviations with the physician (Dr. Bell). I have confirmed the route of " administration and patient IV access.    Nurse: Belle Rubio, RN  2nd verification Nurse: Paula Esquivel RN

## 2018-08-05 ENCOUNTER — READMISSION MANAGEMENT (OUTPATIENT)
Dept: CALL CENTER | Facility: HOSPITAL | Age: 23
End: 2018-08-05

## 2018-08-06 ENCOUNTER — READMISSION MANAGEMENT (OUTPATIENT)
Dept: CALL CENTER | Facility: HOSPITAL | Age: 23
End: 2018-08-06

## 2018-08-06 ENCOUNTER — LAB (OUTPATIENT)
Dept: LAB | Facility: HOSPITAL | Age: 23
End: 2018-08-06

## 2018-08-06 ENCOUNTER — INFUSION (OUTPATIENT)
Dept: ONCOLOGY | Facility: HOSPITAL | Age: 23
End: 2018-08-06

## 2018-08-06 VITALS — TEMPERATURE: 98.1 F

## 2018-08-06 DIAGNOSIS — R59.1 LYMPHADENOPATHY: Primary | ICD-10-CM

## 2018-08-06 DIAGNOSIS — C85.28 MEDIASTINAL LARGE B-CELL LYMPHOMA OF LYMPH NODES OF MULTIPLE REGIONS (HCC): ICD-10-CM

## 2018-08-06 DIAGNOSIS — R74.8 ELEVATED LIVER ENZYMES: ICD-10-CM

## 2018-08-06 DIAGNOSIS — C85.28 MEDIASTINAL LARGE B-CELL LYMPHOMA OF LYMPH NODES OF MULTIPLE REGIONS (HCC): Primary | ICD-10-CM

## 2018-08-06 LAB
ALBUMIN SERPL-MCNC: 4.5 G/DL (ref 3.5–5.2)
ALBUMIN/GLOB SERPL: 1.7 G/DL (ref 1.1–2.4)
ALP SERPL-CCNC: 46 U/L (ref 38–116)
ALT SERPL W P-5'-P-CCNC: 36 U/L (ref 0–33)
ANION GAP SERPL CALCULATED.3IONS-SCNC: 13.6 MMOL/L
AST SERPL-CCNC: 17 U/L (ref 0–32)
BASOPHILS # BLD AUTO: 0.02 10*3/MM3 (ref 0–0.1)
BASOPHILS NFR BLD AUTO: 0.7 % (ref 0–1.1)
BILIRUB SERPL-MCNC: 0.4 MG/DL (ref 0.1–1.2)
BUN BLD-MCNC: 16 MG/DL (ref 6–20)
BUN/CREAT SERPL: 23.9 (ref 7.3–30)
CALCIUM SPEC-SCNC: 9.8 MG/DL (ref 8.5–10.2)
CHLORIDE SERPL-SCNC: 99 MMOL/L (ref 98–107)
CO2 SERPL-SCNC: 24.4 MMOL/L (ref 22–29)
CREAT BLD-MCNC: 0.67 MG/DL (ref 0.6–1.1)
DEPRECATED RDW RBC AUTO: 53 FL (ref 37–49)
EOSINOPHIL # BLD AUTO: 0.03 10*3/MM3 (ref 0–0.36)
EOSINOPHIL NFR BLD AUTO: 1.1 % (ref 1–5)
ERYTHROCYTE [DISTWIDTH] IN BLOOD BY AUTOMATED COUNT: 16.6 % (ref 11.7–14.5)
GFR SERPL CREATININE-BSD FRML MDRD: 109 ML/MIN/1.73
GFR SERPL CREATININE-BSD FRML MDRD: 132 ML/MIN/1.73
GLOBULIN UR ELPH-MCNC: 2.6 GM/DL (ref 1.8–3.5)
GLUCOSE BLD-MCNC: 121 MG/DL (ref 74–124)
HCT VFR BLD AUTO: 33.5 % (ref 34–45)
HGB BLD-MCNC: 11.3 G/DL (ref 11.5–14.9)
IMM GRANULOCYTES # BLD: 0.01 10*3/MM3 (ref 0–0.03)
IMM GRANULOCYTES NFR BLD: 0.4 % (ref 0–0.5)
LYMPHOCYTES # BLD AUTO: 0.14 10*3/MM3 (ref 1–3.5)
LYMPHOCYTES NFR BLD AUTO: 5 % (ref 20–49)
MCH RBC QN AUTO: 30.5 PG (ref 27–33)
MCHC RBC AUTO-ENTMCNC: 33.7 G/DL (ref 32–35)
MCV RBC AUTO: 90.3 FL (ref 83–97)
MONOCYTES # BLD AUTO: 0.02 10*3/MM3 (ref 0.25–0.8)
MONOCYTES NFR BLD AUTO: 0.7 % (ref 4–12)
NEUTROPHILS # BLD AUTO: 2.58 10*3/MM3 (ref 1.5–7)
NEUTROPHILS NFR BLD AUTO: 92.1 % (ref 39–75)
NRBC BLD MANUAL-RTO: 0 /100 WBC (ref 0–0)
PLATELET # BLD AUTO: 250 10*3/MM3 (ref 150–375)
PMV BLD AUTO: 10.3 FL (ref 8.9–12.1)
POTASSIUM BLD-SCNC: 3.8 MMOL/L (ref 3.5–4.7)
PROT SERPL-MCNC: 7.1 G/DL (ref 6.3–8)
RBC # BLD AUTO: 3.71 10*6/MM3 (ref 3.9–5)
SODIUM BLD-SCNC: 137 MMOL/L (ref 134–145)
WBC NRBC COR # BLD: 2.8 10*3/MM3 (ref 4–10)

## 2018-08-06 PROCEDURE — 80053 COMPREHEN METABOLIC PANEL: CPT | Performed by: INTERNAL MEDICINE

## 2018-08-06 PROCEDURE — 36415 COLL VENOUS BLD VENIPUNCTURE: CPT | Performed by: INTERNAL MEDICINE

## 2018-08-06 PROCEDURE — 25010000002 FILGRASTIM PER 300 MCG: Performed by: INTERNAL MEDICINE

## 2018-08-06 PROCEDURE — 96372 THER/PROPH/DIAG INJ SC/IM: CPT | Performed by: INTERNAL MEDICINE

## 2018-08-06 PROCEDURE — 85025 COMPLETE CBC W/AUTO DIFF WBC: CPT | Performed by: INTERNAL MEDICINE

## 2018-08-06 RX ADMIN — FILGRASTIM 300 MCG: 300 INJECTION, SOLUTION INTRAVENOUS; SUBCUTANEOUS at 10:03

## 2018-08-06 NOTE — PROGRESS NOTES
CBC reviewed with pt and mother. Copy of labs given to pt. No c/o. She states she is eating and drinking well. Port had a small amount of bleeding at the incision site over night but at time of visit, port site is c/d/i with dermabond.     Lab Results   Component Value Date    WBC 2.80 (L) 08/06/2018    HGB 11.3 (L) 08/06/2018    HCT 33.5 (L) 08/06/2018    MCV 90.3 08/06/2018     08/06/2018

## 2018-08-06 NOTE — OUTREACH NOTE
General Surgery Week 1 Survey      Responses   Facility patient discharged from?  [place] [facility]   Does the patient have one of the following disease processes/diagnoses(primary or secondary)?  General Surgery   Is there a successful TCM telephone encounter documented?  No   Week 1 attempt successful?  Yes   Call start time  1632   Call end time  1637   General alerts for this patient  lymphoma on chemo   Discharge diagnosis  port placement,  Medistinal Lymphoma,  Chemo   Meds reviewed with patient/caregiver?  Yes   Is the patient having any side effects they believe may be caused by any medication additions or changes?  No   Does the patient have all medications related to this admission filled (includes all antibiotics, pain medications, etc.)  Yes   Is the patient taking all medications as directed (includes completed medication regime)?  Yes   Does the patient have a follow up appointment scheduled with their surgeon?  Yes   Has the patient kept scheduled appointments due by today?  Yes   Comments  Seen [facility] RN today. States that she doesnt need to see Surgeon as he already dropped by and saw her prior. [facility] APRN appt in 3 days.   Has home health visited the patient within 72 hours of discharge?  N/A   Psychosocial issues?  No   Comments  Reports doing well. Wound looks good. RN checked it today at appt.   Did the patient receive a copy of their discharge instructions?  Yes   Nursing interventions  Reviewed instructions with patient   What is the patient's perception of their health status since discharge?  Improving   Nursing interventions  Nurse provided patient education   Is the patient/caregiver able to teach back signs and symptoms of incisional infection?  Increased redness, swelling or pain at the incisonal site, Increased drainage or bleeding, Fever, Pus or odor from incision   Is the patient/caregiver able to teach back the hierarchy of who to call/visit for symptoms/problems? PCP, Specialist, Home  health nurse, Urgent Care, ED, 911  Yes   Additional teach back comments  Advised pt to call back if she has any questions.   Week 1 call completed?  Yes          David Rome RN

## 2018-08-06 NOTE — OUTREACH NOTE
Prep Survey      Responses   Facility patient discharged from?  [place]   Is patient eligible?  Yes   Discharge diagnosis  port placement,  Medistinal Lymphoma,  Chemo   Does the patient have one of the following disease processes/diagnoses(primary or secondary)?  General Surgery   Does the patient have Home health ordered?  No   Is there a DME ordered?  No   General alerts for this patient  lymphoma on chemo   Prep survey completed?  Yes          Clotilde Alex RN

## 2018-08-06 NOTE — PROGRESS NOTES
Case Management Discharge Note    Final Note: Discharged home with parents per private auto. No needs identified.    Destination     No service has been selected for the patient.      Durable Medical Equipment     No service has been selected for the patient.      Dialysis/Infusion     No service has been selected for the patient.      Home Medical Care     No service has been selected for the patient.      Social Care     No service has been selected for the patient.             Final Discharge Disposition Code: 01 - home or self-care

## 2018-08-07 RX ORDER — ALLOPURINOL 300 MG/1
TABLET ORAL
Qty: 60 TABLET | Refills: 3 | Status: ON HOLD | OUTPATIENT
Start: 2018-08-07 | End: 2018-09-18

## 2018-08-09 ENCOUNTER — LAB (OUTPATIENT)
Dept: LAB | Facility: HOSPITAL | Age: 23
End: 2018-08-09

## 2018-08-09 ENCOUNTER — OFFICE VISIT (OUTPATIENT)
Dept: ONCOLOGY | Facility: CLINIC | Age: 23
End: 2018-08-09

## 2018-08-09 VITALS
HEIGHT: 62 IN | TEMPERATURE: 98.4 F | HEART RATE: 81 BPM | SYSTOLIC BLOOD PRESSURE: 114 MMHG | WEIGHT: 137.8 LBS | BODY MASS INDEX: 25.36 KG/M2 | OXYGEN SATURATION: 99 % | DIASTOLIC BLOOD PRESSURE: 68 MMHG | RESPIRATION RATE: 12 BRPM

## 2018-08-09 DIAGNOSIS — R59.1 LYMPHADENOPATHY: Primary | ICD-10-CM

## 2018-08-09 DIAGNOSIS — C85.28 MEDIASTINAL LARGE B-CELL LYMPHOMA OF LYMPH NODES OF MULTIPLE REGIONS (HCC): ICD-10-CM

## 2018-08-09 DIAGNOSIS — C85.28 MEDIASTINAL LARGE B-CELL LYMPHOMA OF LYMPH NODES OF MULTIPLE REGIONS (HCC): Primary | ICD-10-CM

## 2018-08-09 DIAGNOSIS — I82.621 ARM DVT (DEEP VENOUS THROMBOEMBOLISM), ACUTE, RIGHT (HCC): ICD-10-CM

## 2018-08-09 LAB
ALBUMIN SERPL-MCNC: 4.2 G/DL (ref 3.5–5.2)
ALBUMIN/GLOB SERPL: 1.8 G/DL (ref 1.1–2.4)
ALP SERPL-CCNC: 47 U/L (ref 38–116)
ALT SERPL W P-5'-P-CCNC: 23 U/L (ref 0–33)
ANION GAP SERPL CALCULATED.3IONS-SCNC: 14.5 MMOL/L
AST SERPL-CCNC: 15 U/L (ref 0–32)
BASOPHILS # BLD AUTO: 0.02 10*3/MM3 (ref 0–0.1)
BASOPHILS NFR BLD AUTO: 1.2 % (ref 0–1.1)
BILIRUB SERPL-MCNC: 0.2 MG/DL (ref 0.1–1.2)
BUN BLD-MCNC: 12 MG/DL (ref 6–20)
BUN/CREAT SERPL: 23.1 (ref 7.3–30)
CALCIUM SPEC-SCNC: 9.4 MG/DL (ref 8.5–10.2)
CHLORIDE SERPL-SCNC: 101 MMOL/L (ref 98–107)
CO2 SERPL-SCNC: 24.5 MMOL/L (ref 22–29)
CREAT BLD-MCNC: 0.52 MG/DL (ref 0.6–1.1)
DEPRECATED RDW RBC AUTO: 55.1 FL (ref 37–49)
EOSINOPHIL # BLD AUTO: 0.02 10*3/MM3 (ref 0–0.36)
EOSINOPHIL NFR BLD AUTO: 1.2 % (ref 1–5)
ERYTHROCYTE [DISTWIDTH] IN BLOOD BY AUTOMATED COUNT: 16.2 % (ref 11.7–14.5)
GFR SERPL CREATININE-BSD FRML MDRD: 146 ML/MIN/1.73
GFR SERPL CREATININE-BSD FRML MDRD: >150 ML/MIN/1.73
GLOBULIN UR ELPH-MCNC: 2.4 GM/DL (ref 1.8–3.5)
GLUCOSE BLD-MCNC: 113 MG/DL (ref 74–124)
HCT VFR BLD AUTO: 29.4 % (ref 34–45)
HGB BLD-MCNC: 9.6 G/DL (ref 11.5–14.9)
IMM GRANULOCYTES # BLD: 0.05 10*3/MM3 (ref 0–0.03)
IMM GRANULOCYTES NFR BLD: 3 % (ref 0–0.5)
LYMPHOCYTES # BLD AUTO: 0.15 10*3/MM3 (ref 1–3.5)
LYMPHOCYTES NFR BLD AUTO: 9.1 % (ref 20–49)
MCH RBC QN AUTO: 30.5 PG (ref 27–33)
MCHC RBC AUTO-ENTMCNC: 32.7 G/DL (ref 32–35)
MCV RBC AUTO: 93.3 FL (ref 83–97)
MONOCYTES # BLD AUTO: 0.03 10*3/MM3 (ref 0.25–0.8)
MONOCYTES NFR BLD AUTO: 1.8 % (ref 4–12)
NEUTROPHILS # BLD AUTO: 1.37 10*3/MM3 (ref 1.5–7)
NEUTROPHILS NFR BLD AUTO: 83.7 % (ref 39–75)
NRBC BLD MANUAL-RTO: 0 /100 WBC (ref 0–0)
PLATELET # BLD AUTO: 152 10*3/MM3 (ref 150–375)
PMV BLD AUTO: 10.6 FL (ref 8.9–12.1)
POTASSIUM BLD-SCNC: 3.9 MMOL/L (ref 3.5–4.7)
PROT SERPL-MCNC: 6.6 G/DL (ref 6.3–8)
RBC # BLD AUTO: 3.15 10*6/MM3 (ref 3.9–5)
SODIUM BLD-SCNC: 140 MMOL/L (ref 134–145)
WBC NRBC COR # BLD: 1.64 10*3/MM3 (ref 4–10)

## 2018-08-09 PROCEDURE — 80053 COMPREHEN METABOLIC PANEL: CPT | Performed by: INTERNAL MEDICINE

## 2018-08-09 PROCEDURE — 36415 COLL VENOUS BLD VENIPUNCTURE: CPT | Performed by: INTERNAL MEDICINE

## 2018-08-09 PROCEDURE — 99212-NC PR NO CHARGE CBC OFFICE OUTPATIENT VISIT 10 MINUTES: Performed by: NURSE PRACTITIONER

## 2018-08-09 PROCEDURE — 85025 COMPLETE CBC W/AUTO DIFF WBC: CPT | Performed by: INTERNAL MEDICINE

## 2018-08-09 NOTE — PROGRESS NOTES
Ms. Durham returns today for lab review after recent hospitalization for cycle 3 DA-EPOCH-R (with 6 cycles planned). She received Neulasta support on Monday, 8/6/18.    Lab Results   Component Value Date    WBC 1.64 (L) 08/09/2018    HGB 9.6 (L) 08/09/2018    HCT 29.4 (L) 08/09/2018    MCV 93.3 08/09/2018     08/09/2018     Lab Results   Component Value Date    NEUTROABS 1.37 (L) 08/09/2018     Patient did have a new right Mediport placed during recent hospitalization.  She is having some discomfort in her chest and neck and very gingerly/stiffly turning her body and head.  She has had complications with a DVT in the right arm from prior PICC line and most recently had an attempt at a PICC line in the left arm which was not successful due to tumor present.  Thus the Mediport was placed.  The patient continues on Pradaxa.    We discussed that some discomfort as expected with a new Mediport.  I did encourage her to do some stretching and to try to slowly loosen up her movements as I think her robotic movement is causing her more tension and thus more muscle stiffness in her neck and back.  However, if with all this she continues to have discomfort in her chest or arm, we would need to repeat a Doppler of her right arm.  She verbalized understanding.    For now she is scheduled to return next Monday, 8/13/18 for follow-up with Dr. Padilla.  She will also be due for Lupron injection that day.

## 2018-08-10 DIAGNOSIS — C85.28 MEDIASTINAL LARGE B-CELL LYMPHOMA OF LYMPH NODES OF MULTIPLE REGIONS (HCC): ICD-10-CM

## 2018-08-10 PROBLEM — Z45.2 FITTING AND ADJUSTMENT OF VASCULAR CATHETER: Status: ACTIVE | Noted: 2018-08-10

## 2018-08-10 RX ORDER — SODIUM CHLORIDE 0.9 % (FLUSH) 0.9 %
10 SYRINGE (ML) INJECTION AS NEEDED
Status: CANCELLED | OUTPATIENT
Start: 2018-08-13

## 2018-08-12 NOTE — PROGRESS NOTES
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 She did initiate EPOCH-R  in the hospital on 06/16/2018.    History of Present Illness      The patient is a 23 y.o. female with the above-mentioned history, with history of mediastinal large B-cell lymphoma status post 2 cycles of chemotherapy with dose adusted EPOCHR, due for her third cycle now.  I have reviewed her CT scan and PET scan.  She has had excellent response.  She has tolerated it very well.  Her platelet was as low as 102 and absolute neutrophil count was 1.2.  She has not had any active bleeding.  Her next Zoladex injection is due on August 13, 2018.  She was given that for preservation of her fertility.    Patient states that after cycle 3 she had worse side effects that she felt weak during the first week.  She doesn't have fever.  She doesn't have sore throat or any complaints of bronchitis for recurrent tract infection.  At her absolute neutrophil count today is very low with a total white count of 0.46.    She complains of tingling in her fingers likely secondary to vincristine and he may need to adjust the dose.  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  We will need to keep a close watch on that.        Past Medical History, Past Surgical History, Social History, Family History have been reviewed and are without significant changes except as mentioned.    Oncologic history:.   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  She saw Dr. Arina Cohen on last Friday.  The reason the patient was referred to Dr. Cohen was because they thought she had cardiomegaly on chest x-ray.  However a chest x-ray was ordered which showedThe chest x-ray showed extensive abnormal increased density throughout the anterior mediastinum extending into the left hilar region and left perihilar region and is most likely due to confluent  lymphadenopathy.     CT angiogram of the chest did not show pulmonary embolism but showed     there is a large conglomerate  mass encases multiple vascular structures of the mediastinum and left  hilar region that is most likely extensive confluent lymphadenopathy.  The primary differential diagnostic considerations would be lymphoma.  The tumor posteriorly displaces the pulmonary arteries and the left and  moderately narrows the main pulmonary artery. The tumor also posteriorly  displaces the trachea and markedly narrows the left mainstem bronchus.  The pulmonary veins in the left are also encased and narrowed. The mass  encases and markedly narrows the SVC. The large edy mass measures  approximately 19.8 x 13.7 x 14.0 cm in diameter. Enlarged lymph nodes  are also present in the left supraclavicular region where they appear to  produce occlusion of the left internal jugular vein. There is no  axillary lymphadenopathy. There are no filling defects within the  pulmonary arteries. There is no CT evidence of pulmonary embolism. There  is a small left pleural effusion. A small pericardial effusion measuring  8 mm in maximum thickness is also present. There is compressive  atelectasis of the left lung. Patchy airspace opacities are also present  in the superior aspect of the left upper lobe. These are most likely due  to direct tumor infiltration of the lung parenchyma, but could also  represent postobstructive pneumonia. The right lung is well expanded and  clear. Images through the upper abdomen partially show what could be a  edy mass in the retroperitoneum posterior and inferior to the  pancreas. Further evaluation with a CT scan of the abdomen and pelvis is  recommended. Bone window images demonstrate patchy sclerosis in the C7  and T1 and T2 and T3 and T4 vertebral body suspicious for bone  involvement with lymphoma.     IMPRESSION:  Very large tumor mass in the mediastinum encasing multiple  vascular  structures and also moderately narrowing the left mainstem  bronchus as described in more detail above. Direct tumor infiltration of  the left upper lobe is also suspected. Small left pleural effusion and a  small pericardial effusion. There may also be partially visualized  lymphadenopathy in the upper abdomen. Patchy areas of sclerosis are also  noted in the C7 and T1 and T2 and T3 and T4 vertebral bodies suspicious  for osseous metastatic disease. The findings are consistent with  Lymphoma.     Dr. Cohen felt that she had a small pericardial effusion.  Patient has been symptomatic with cough which is worsening which started back in February 2018 and worsened over the last 4 months.  Patient also has some shortness of breath with walking up the steps.  She has not lost weight.  She say she is reasonable appetite.  She does have fever kind of low-grade fever and night sweats.  She is more fatigued compared to before.  She was referred to us because of concern that this could be lymphoma.  She does not have any tissue diagnosis yet.  Patient does complain of back pain.    Echo done on admission June 12, 2018 by Dr. Fitzgerald showed that the patient's posterior and appears large primary leukemia the left ventricle and apex.  There is no tamponade.  The pericardium appears thickened pointing to involvement of the pericardium into the mediastinal process.  Dr. Fitzgerald felt that it would be difficult to do pericardial synthesis as the mediastinal mass covers the anterior aspect of the heart.  Ejection fraction is 58%  CT-guided needle biopsy June 12, 2018 was negative  LDH is elevated.  Uric acid is high.  MRI thoracic spine, negative  CT abdomen pelvis negative  Scan July 13, 2018 shows large infiltrative edy mass in the anterior mediastinum and left hilar region that nearly completely fills the left hemithorax and shows intense hypermetabolism with an SUV of 19.8.  No foci of pathologic hypermetabolism are identified  elsewhere in the chest, neck abdomen or pelvis.    Pathology was consistent with primary mediastinal large B-cell lymphoma.    Patient was seen by Dr. Melgar.  He discussed harvesting eggs versus Zoladex plus oral contraceptives versus Zoladex alone for fertility preservation.  Due to large size of the tumor and rapid initiation of treatment needed the patient was not able to have aches harvested done.  Because she is at increased risk of thrombosis with the use of oral contraceptives secondary to malignancy he recommended Zoladex alone.  Patient received first dose of Zoladex 3.6 mg subcutaneous on Belia 15, 2018.    Patient started on EPOCH/R on June 16, 2018    Patient had a reaction to Rituxan which improved with steroids, Benadryl and Pepcid.  She had to receive it slow.  She started having itching over her EARS , sore throat.  She was given IV steroids Benadryl and Pepcid and following that she was started at a slower rate and she completed it.    Right upper extremity Doppler ultrasound showed new superficial vein thrombosis around the PICC line with no extension into the deep system.  Repeat Doppler was ordered.    A Doppler ultrasound initially showed superficial thrombophlebitis.  She was on prophylactic Arixtra.  A Doppler was repeated 2 days later on 6/20/18  which showed extension of thrombus into the axillary and brachial veins.  She was therefore started on Xarelto 15 mg one every 12 hours and the PICC line was removed as soon as chemotherapy completed on 6/20/18.  She will take 15 mg of Xarelto every 12 hours for 21 days and then transition to 20 mg once a day.  The patient will have CBC performed twice a week.  If the platelet count drops below 50,000, anticoagulation will need to be temporarily held  Patient was started on acyclovir/fluconazole/Bactrim  Bone marrow done June 14, 2018, morphology was negative for lymphoma    Fertility preservation, Zoladex is next due July 12, 2018.  CT scan and PET  scan showing significant response after cycle 2 of chemotherapy  Next dose of Lupron August 13, 2018    Review of Systems   Constitutional: Positive for fatigue. Negative for activity change, appetite change, chills and fever.   HENT: Negative for mouth sores.    Eyes: Negative for visual disturbance.   Respiratory: Positive for cough (improved) and shortness of breath (improved).    Cardiovascular: Negative.    Gastrointestinal: Negative.  Negative for abdominal pain, diarrhea, nausea and vomiting.   Endocrine: Negative.    Genitourinary: Positive for menstrual problem. Negative for dysuria and frequency.   Musculoskeletal: Negative for arthralgias, back pain, joint swelling and myalgias.   Skin: Negative.    Allergic/Immunologic: Negative.    Neurological: Negative.  Negative for dizziness and weakness.   Hematological: Negative.  Does not bruise/bleed easily.   Psychiatric/Behavioral: The patient is not nervous/anxious.    All other systems reviewed and are negative.  Review of systems unchanged except as updated.    Medications:  The current medication list was reviewed in the EMR    ALLERGIES:  No Known Allergies    Objective      There were no vitals filed for this visit.  Current Status 8/9/2018   ECOG score 0       Physical Exam   Constitutional: She is oriented to person, place, and time. She appears well-developed and well-nourished.   She is accompanied by her mother today.   HENT:   Head: Normocephalic.   Eyes: Pupils are equal, round, and reactive to light.   Neck: Normal range of motion.   Cardiovascular: Normal rate, regular rhythm and normal heart sounds.    Pulmonary/Chest: Effort normal and breath sounds normal. No respiratory distress. She has no wheezes. She has no rales.   Abdominal: Soft.   Lymphadenopathy:     She has no cervical adenopathy.   Neurological: She is alert and oriented to person, place, and time.   Skin: Skin is warm and dry.   Psychiatric: She has a normal mood and affect.    physical exam unchanged from previous except as updated.    RECENT LABS:    Results from last 7 days  Lab Units 08/09/18  0857 08/06/18  0941   WBC 10*3/mm3 1.64* 2.80*   NEUTROS ABS 10*3/mm3 1.37* 2.58   HEMOGLOBIN g/dL 9.6* 11.3*   HEMATOCRIT % 29.4* 33.5*   PLATELETS 10*3/mm3 152 250   CT SCAN:  CHEST: There has been interval decrease in the large confluent anterior  mediastinal mass which measures approximately 11 x 8 cm, previously 13.5  x 9.5 cm when measured along the same planes. There has been a decrease  in the mass effect and narrowing of the left upper lobe bronchus and  there has been improved aeration at the left upper lobe. The patchy left  upper lobe airspace opacities have decreased in size and density. There  are no new pulmonary opacities and there are no pleural or pericardial  effusions.     ABDOMEN/PELVIS: Borderline splenic size is stable, measuring  approximately 11 cm. The liver, gallbladder, pancreas, adrenals, and  kidneys appear unremarkable. There is formed stool throughout the colon.  No bowel thickening is seen. The appendix appears within normal limits.  Uterus and adnexa appear unremarkable. There is no free fluid or  lymphadenopathy.     PET   Chest: Pulmonary opacification within the left upper lobe demonstrating  intense FDG uptake has decreased in extent since 7/9/2019. The  background ground glass opacification and interlobular septal thickening  has also improved. The anterior mediastinal soft tissue mass has  decreased in size, measuring 8.5 x 10.7 cm (previously 13 x 16.7 cm) and  FDG uptake with a maximum SUV of 5 (previously 19.8). There is no  pleural effusion or pneumothorax.      Abdomen and pelvis:     The liver, spleen, pancreas, kidneys and adrenal glands have a normal  non contrast CT appearance and demonstrate physiologic FDG uptake.     The gallbladder is unremarkable.     The bowel demonstrates normal physiologic FDG uptake.     There are no FDG avid or enlarged  abdominopelvic lymph nodes.     The bladder is unremarkable for its degree of distension.     There is diffusely increased FDG uptake within the marrow.  No  suspicious focal lytic or blastic bony lesions.        Results from last 7 days  Lab Units 08/09/18  0857 08/06/18  0941   SODIUM mmol/L 140 137   POTASSIUM mmol/L 3.9 3.8   CHLORIDE mmol/L 101 99   CO2 mmol/L 24.5 24.4   BUN mg/dL 12 16   CREATININE mg/dL 0.52* 0.67   CALCIUM mg/dL 9.4 9.8   ALBUMIN g/dL 4.20 4.50   BILIRUBIN mg/dL 0.2 0.4   ALK PHOS U/L 47 46   ALT (SGPT) U/L 23 36*   AST (SGOT) U/L 15 17   GLUCOSE mg/dL 113 121           Assessment/Plan   1.  Primary mediastinal large B-cell lymphoma: The patient presented with dyspnea, cough, and chest pain.  A CT angiogram of the chest 6/8/18 showed a very large tumor mass in the mediastinum encasing multiple vascular structures and narrowing the left mainstem bronchus.  There was direct tumor infiltration in the left upper lobe.  There was a small left pleural effusion.  She underwent a CT-guided biopsy of the mediastinal mass on 6/12/18 and pathology reviewed at [facility] oncology showed diffuse large B-cell lymphoma consistent with a primary diffuse large B-cell lymphoma.  A bone marrow exam was performed on 6/14/18 which was negative for lymphoma.  She underwent a PET scan 6/13/18 which showed a large hypermetabolic mass in the chest involving the anterior mediastinum, left hilum, nearly completely filling the left hemothorax with SUV 19.8.  There was physiologic distribution elsewhere.     The patient was started on IV fluid hydration and allopurinol for prevention of hyperuricemia.  She initiated systemic chemotherapy with DA-EPOCH-R on 6/16/18 and completed the evening of 6/21/18.  She had a infusion reaction to rituximab but was able to complete with additional medications and otherwise tolerated chemotherapy well.  She will require additional premedications with additional rituximab.  Plan is to  monitor her blood counts twice per week outpatient and proceed with cycle 2 of chemotherapy day 21.     The patient had significant improvement in shortness of breath, cough, and chest pain by the time of discharge.    · Patient received Neulasta support  · Her white count dropped down to as low as 0.8 low-grade temperature and patient was placed on Levaquin ×5 days starting June 27, 2018, neutropenia AT  11 days posttreatment.  Platelets dropped to 82.  · Her next Lupron injection is due July 12.  · She is due to start chemotherapy on July 9.  · Discussed with Dr. Fox at the [facility] and his suggestion was to do the CT scan and PET scan after 2 cycles and discuss the results with him.  If she has an excellent response early on she would not require any further treatments after completion of 6 cycles of EPOCH/R  · Patient being admitted for cycle 3 of chemotherapy on July 30, 2018.  · She is due for Lupron injection on August 13, 2018, patient received        2.  Pericardial effusion:   a 2-D echocardiogram 6/8/18 showed a left ventricular systolic function 58%.  There was a 2 cm pericardial effusion with no tamponade.  The pericardium appeared thickened.  She had no evidence of volume overload or Nod throughout the hospital stay     3.   FEN: She was monitored very carefully for any evidence of tumor lysis.  She was maintained on IV fluids and allopurinol throughout hospitalization.    her uric acid at discharge was 1.2 but I recommended that she stay on allopurinol twice daily until her next cycle of chemotherapy to prevent hyperuricemia.  She has mild hypokalemia and will be discharged on potassium 20 mEq once per day.  Have ordered an outpatient BMP weekly for monitoring of her renal function and electrolytes     4.  Fertility preservation:  Patient received Zoladex injection  for fertility preservation; this should be continued once monthly during her chemotherapy.  Next dose due 7/12/18     5.   Right upper extremity swelling:  The patient had a PIC line placed in the right upper extremity for administration of chemotherapy.  Her arm was noted to be swollen.  A Doppler ultrasound initially showed superficial thrombophlebitis.  She was on prophylactic Arixtra.  A Doppler was repeated 2 days later on 6/20/18  which showed extension of thrombus into the axillary and brachial veins.  She was therefore started on Xarelto 15 mg one every 12 hours and the PICC line was removed as soon as chemotherapy completed on 6/20/18.  She will take 15 mg of Xarelto every 12 hours for 21 days and then transition to 20 mg once a day.  The patient will have CBC performed twice a week.  If the platelet count drops below 50,000, anticoagulation will need to be temporarily held     6.  ID: The patient will receive Neulasta 24 hours after completion of chemotherapy for reduction of neutropenic infection.  She will be discharged on prophylactic antibiotics including acyclovir, Diflucan, and Bactrim.  She may require bacterial prophylaxis if the ANC drops below 1000.    7.  Neutropenia with obstructive count of 0.19.  We will give Levaquin prophylactically.    Plan 1.    Lupron injection is due 3.6 mg subcutaneous on August 13, 2018    3 Continue Xarelto    4.  It is post port placement     5.  Start Levaquin ×5 days    6.  Up in 1 week to be admitted to the hospital for chemotherapy with cycle 4 of EPOCH/R  in which we'll need to obtain a CT scan of the neck chest abdomen and pelvis      Millie Padilla MD   8/12/2018      CC: Dr. Arina Munoz

## 2018-08-13 ENCOUNTER — INFUSION (OUTPATIENT)
Dept: ONCOLOGY | Facility: HOSPITAL | Age: 23
End: 2018-08-13

## 2018-08-13 ENCOUNTER — APPOINTMENT (OUTPATIENT)
Dept: ONCOLOGY | Facility: HOSPITAL | Age: 23
End: 2018-08-13

## 2018-08-13 ENCOUNTER — OFFICE VISIT (OUTPATIENT)
Dept: ONCOLOGY | Facility: CLINIC | Age: 23
End: 2018-08-13

## 2018-08-13 ENCOUNTER — APPOINTMENT (OUTPATIENT)
Dept: LAB | Facility: HOSPITAL | Age: 23
End: 2018-08-13

## 2018-08-13 VITALS
SYSTOLIC BLOOD PRESSURE: 98 MMHG | BODY MASS INDEX: 25.76 KG/M2 | OXYGEN SATURATION: 98 % | DIASTOLIC BLOOD PRESSURE: 62 MMHG | WEIGHT: 140 LBS | HEART RATE: 71 BPM | HEIGHT: 62 IN | RESPIRATION RATE: 16 BRPM | TEMPERATURE: 98.5 F

## 2018-08-13 DIAGNOSIS — R59.1 LYMPHADENOPATHY: Primary | ICD-10-CM

## 2018-08-13 DIAGNOSIS — C85.28 MEDIASTINAL LARGE B-CELL LYMPHOMA OF LYMPH NODES OF MULTIPLE REGIONS (HCC): Primary | ICD-10-CM

## 2018-08-13 DIAGNOSIS — C85.28 MEDIASTINAL LARGE B-CELL LYMPHOMA OF LYMPH NODES OF MULTIPLE REGIONS (HCC): ICD-10-CM

## 2018-08-13 DIAGNOSIS — C83.30 DIFFUSE LARGE B-CELL LYMPHOMA, UNSPECIFIED BODY REGION (HCC): ICD-10-CM

## 2018-08-13 DIAGNOSIS — Z45.2 FITTING AND ADJUSTMENT OF VASCULAR CATHETER: ICD-10-CM

## 2018-08-13 LAB
ALBUMIN SERPL-MCNC: 4.1 G/DL (ref 3.5–5.2)
ALBUMIN/GLOB SERPL: 1.8 G/DL (ref 1.1–2.4)
ALP SERPL-CCNC: 42 U/L (ref 38–116)
ALT SERPL W P-5'-P-CCNC: 37 U/L (ref 0–33)
ANION GAP SERPL CALCULATED.3IONS-SCNC: 9.7 MMOL/L
AST SERPL-CCNC: 26 U/L (ref 0–32)
BASOPHILS # BLD AUTO: 0.02 10*3/MM3 (ref 0–0.1)
BASOPHILS NFR BLD AUTO: 4.3 % (ref 0–1.1)
BILIRUB SERPL-MCNC: 0.2 MG/DL (ref 0.1–1.2)
BUN BLD-MCNC: 13 MG/DL (ref 6–20)
BUN/CREAT SERPL: 20 (ref 7.3–30)
CALCIUM SPEC-SCNC: 9.4 MG/DL (ref 8.5–10.2)
CHLORIDE SERPL-SCNC: 103 MMOL/L (ref 98–107)
CO2 SERPL-SCNC: 28.3 MMOL/L (ref 22–29)
CREAT BLD-MCNC: 0.65 MG/DL (ref 0.6–1.1)
DEPRECATED RDW RBC AUTO: 51 FL (ref 37–49)
EOSINOPHIL # BLD AUTO: 0.01 10*3/MM3 (ref 0–0.36)
EOSINOPHIL NFR BLD AUTO: 2.2 % (ref 1–5)
ERYTHROCYTE [DISTWIDTH] IN BLOOD BY AUTOMATED COUNT: 15.5 % (ref 11.7–14.5)
GFR SERPL CREATININE-BSD FRML MDRD: 113 ML/MIN/1.73
GFR SERPL CREATININE-BSD FRML MDRD: 137 ML/MIN/1.73
GLOBULIN UR ELPH-MCNC: 2.3 GM/DL (ref 1.8–3.5)
GLUCOSE BLD-MCNC: 94 MG/DL (ref 74–124)
HCT VFR BLD AUTO: 24.9 % (ref 34–45)
HGB BLD-MCNC: 8.4 G/DL (ref 11.5–14.9)
IMM GRANULOCYTES # BLD: 0.02 10*3/MM3 (ref 0–0.03)
IMM GRANULOCYTES NFR BLD: 4.3 % (ref 0–0.5)
LYMPHOCYTES # BLD AUTO: 0.14 10*3/MM3 (ref 1–3.5)
LYMPHOCYTES NFR BLD AUTO: 30.4 % (ref 20–49)
MCH RBC QN AUTO: 31 PG (ref 27–33)
MCHC RBC AUTO-ENTMCNC: 33.7 G/DL (ref 32–35)
MCV RBC AUTO: 91.9 FL (ref 83–97)
MONOCYTES # BLD AUTO: 0.08 10*3/MM3 (ref 0.25–0.8)
MONOCYTES NFR BLD AUTO: 17.4 % (ref 4–12)
NEUTROPHILS # BLD AUTO: 0.19 10*3/MM3 (ref 1.5–7)
NEUTROPHILS NFR BLD AUTO: 41.4 % (ref 39–75)
NRBC BLD MANUAL-RTO: 0 /100 WBC (ref 0–0)
PLATELET # BLD AUTO: 102 10*3/MM3 (ref 150–375)
PMV BLD AUTO: 10.8 FL (ref 8.9–12.1)
POTASSIUM BLD-SCNC: 4 MMOL/L (ref 3.5–4.7)
PROT SERPL-MCNC: 6.4 G/DL (ref 6.3–8)
RBC # BLD AUTO: 2.71 10*6/MM3 (ref 3.9–5)
SODIUM BLD-SCNC: 141 MMOL/L (ref 134–145)
WBC NRBC COR # BLD: 0.46 10*3/MM3 (ref 4–10)

## 2018-08-13 PROCEDURE — 25010000002 HEPARIN FLUSH (PORCINE) 100 UNIT/ML SOLUTION: Performed by: INTERNAL MEDICINE

## 2018-08-13 PROCEDURE — 99214 OFFICE O/P EST MOD 30 MIN: CPT | Performed by: INTERNAL MEDICINE

## 2018-08-13 PROCEDURE — 80053 COMPREHEN METABOLIC PANEL: CPT

## 2018-08-13 PROCEDURE — 96372 THER/PROPH/DIAG INJ SC/IM: CPT | Performed by: INTERNAL MEDICINE

## 2018-08-13 PROCEDURE — 85025 COMPLETE CBC W/AUTO DIFF WBC: CPT

## 2018-08-13 PROCEDURE — 25010000002 GOSERELIN PER 3.6 MG: Performed by: INTERNAL MEDICINE

## 2018-08-13 RX ORDER — PYRIDOXINE HCL (VITAMIN B6) 50 MG
50 TABLET ORAL DAILY
Qty: 30 TABLET | Refills: 6 | Status: SHIPPED | OUTPATIENT
Start: 2018-08-13 | End: 2018-10-02 | Stop reason: SDUPTHER

## 2018-08-13 RX ORDER — SODIUM CHLORIDE 0.9 % (FLUSH) 0.9 %
10 SYRINGE (ML) INJECTION AS NEEDED
Status: CANCELLED | OUTPATIENT
Start: 2018-08-13

## 2018-08-13 RX ORDER — LEVOFLOXACIN 500 MG/1
500 TABLET, FILM COATED ORAL DAILY
Qty: 7 TABLET | Refills: 0 | Status: SHIPPED | OUTPATIENT
Start: 2018-08-13 | End: 2018-08-20

## 2018-08-13 RX ORDER — SODIUM CHLORIDE 0.9 % (FLUSH) 0.9 %
10 SYRINGE (ML) INJECTION AS NEEDED
Status: DISCONTINUED | OUTPATIENT
Start: 2018-08-13 | End: 2018-08-13 | Stop reason: HOSPADM

## 2018-08-13 RX ADMIN — GOSERELIN ACETATE 3.6 MG: 3.6 IMPLANT SUBCUTANEOUS at 14:35

## 2018-08-13 RX ADMIN — Medication 10 ML: at 11:33

## 2018-08-13 RX ADMIN — SODIUM CHLORIDE, PRESERVATIVE FREE 500 UNITS: 5 INJECTION INTRAVENOUS at 11:33

## 2018-08-14 ENCOUNTER — READMISSION MANAGEMENT (OUTPATIENT)
Dept: CALL CENTER | Facility: HOSPITAL | Age: 23
End: 2018-08-14

## 2018-08-14 DIAGNOSIS — C85.28 MEDIASTINAL LARGE B-CELL LYMPHOMA OF LYMPH NODES OF MULTIPLE REGIONS (HCC): Primary | ICD-10-CM

## 2018-08-14 NOTE — OUTREACH NOTE
General Surgery Week 2 Survey      Responses   Facility patient discharged from?  [facility]   Does the patient have one of the following disease processes/diagnoses(primary or secondary)?  General Surgery   Week 2 attempt successful?  No   Unsuccessful attempts  Attempt 1          Castillo Salgado RN

## 2018-08-15 ENCOUNTER — APPOINTMENT (OUTPATIENT)
Dept: OTHER | Facility: HOSPITAL | Age: 23
End: 2018-08-15

## 2018-08-15 ENCOUNTER — APPOINTMENT (OUTPATIENT)
Dept: ONCOLOGY | Facility: CLINIC | Age: 23
End: 2018-08-15

## 2018-08-16 ENCOUNTER — READMISSION MANAGEMENT (OUTPATIENT)
Dept: CALL CENTER | Facility: HOSPITAL | Age: 23
End: 2018-08-16

## 2018-08-16 NOTE — OUTREACH NOTE
"General Surgery Week 2 Survey      Responses   Facility patient discharged from?  [facility]   Does the patient have one of the following disease processes/diagnoses(primary or secondary)?  General Surgery   Week 2 attempt successful?  Yes   Call start time  1413   Call end time  1414   General alerts for this patient  lymphoma on chemo   Discharge diagnosis  port placement,  Medistinal Lymphoma,  Chemo   Meds reviewed with patient/caregiver?  Yes   Is the patient having any side effects they believe may be caused by any medication additions or changes?  No   Does the patient have all medications related to this admission filled (includes all antibiotics, pain medications, etc.)  Yes   Is the patient taking all medications as directed (includes completed medication regime)?  Yes   Has the patient kept scheduled appointments due by today?  Yes   Comments  Seen [facility] RN today. States that she doesnt need to see Surgeon as he already dropped by and saw her prior. [facility] APRN appt in 3 days.   Has home health visited the patient within 72 hours of discharge?  N/A   Psychosocial issues?  No   Comments  Pt reports Oncology has already accessed her port and that it \"looks good.\"    Did the patient receive a copy of their discharge instructions?  Yes   Nursing interventions  Reviewed instructions with patient   What is the patient's perception of their health status since discharge?  Returned to baseline/stable   Nursing interventions  Nurse provided patient education   Is the patient/caregiver able to teach back signs and symptoms of incisional infection?  Increased redness, swelling or pain at the incisonal site, Increased drainage or bleeding, Fever, Pus or odor from incision   Is the patient/caregiver able to teach back the hierarchy of who to call/visit for symptoms/problems? PCP, Specialist, Home health nurse, Urgent Care, ED, 911  Yes   Week 2 call completed?  Yes          Babak Clark RN  "

## 2018-08-20 NOTE — PROGRESS NOTES
.     REASON FOR FOLLOWUP :   Primary mediastinal large B-cell lymphoma  Planning admission today for cycle 4 DA-EPOCH-R      HISTORY OF PRESENT ILLNESS:  The patient is a 23 y.o. year old female    Has been slightly short of air with [place] recently.    No problems with bowel movements.  No nausea.    Denies weight loss, night sweats.    Subjective fevers and chills for a few days after discharge with the last cycle of chemotherapy.  No issues with this recently.    Regarding neuropathy, she has not tried to tie shoes or put on earrings.  She has noticed a very slight difficulty buttoning the top button of a button up shirt.    Past Medical History:   Diagnosis Date   • Diffuse large B cell lymphoma (CMS/HCC) 06/2018   • Fracture of lower leg 2000    L   • H/O Lung mass 06/2018   • H/O Pericardial effusion      Past Surgical History:   Procedure Laterality Date   • OTHER SURGICAL HISTORY  06/12/2018    CT-GUIDED BIOPSY OF MEDIASTINAL MASS   • VENOUS ACCESS DEVICE (PORT) INSERTION Right 7/31/2018    Procedure: RIGHT MEDIPORT PLACEMENT;  Surgeon: Farhan Hurt MD;  Location: [facility];  Service: Vascular       HEMATOLOGIC/ONCOLOGIC HISTORY:  (History from previous dates can be found in the separate document.)    MEDICATIONS    Current Outpatient Prescriptions:   •  acyclovir (ZOVIRAX) 400 MG tablet, Take 1 tablet by mouth 2 (Two) Times a Day for 191 doses. Take no more than 5 doses a day., Disp: 60 tablet, Rfl: 3  •  allopurinol (ZYLOPRIM) 300 MG tablet, TAKE ONE TABLET BY MOUTH TWICE A DAY, Disp: 60 tablet, Rfl: 3  •  dabigatran etexilate (PRADAXA) 150 MG capsu, Take 1 capsule by mouth Every 12 (Twelve) Hours., Disp: 60 capsule, Rfl: 6  •  fluconazole (DIFLUCAN) 200 MG tablet, Take 2 tablets by mouth Daily for 96 doses., Disp: 60 tablet, Rfl: 3  •  ondansetron (ZOFRAN) 4 MG tablet, Take 1 tablet by mouth Every 6 (Six) Hours As Needed for Nausea or Vomiting., Disp: 30 tablet, Rfl: 2  •   potassium chloride (MICRO-K) 10 MEQ CR capsule, Take 2 capsules by mouth 2 (Two) Times a Day With Meals., Disp: 60 capsule, Rfl: 5  •  pyridoxine (VITAMIN B-6) 50 MG tablet tablet, Take 1 tablet by mouth Daily., Disp: 30 tablet, Rfl: 6  •  sennosides-docusate sodium (SENOKOT-S) 8.6-50 MG tablet, Take 2 tablets by mouth 2 (Two) Times a Day As Needed for Constipation., Disp: 60 tablet, Rfl: 1  •  sulfamethoxazole-trimethoprim (BACTRIM DS,SEPTRA DS) 800-160 MG per tablet, Take 1 tablet by mouth 3 (Three) Times a Week., Disp: 12 tablet, Rfl: 3  No current facility-administered medications for this visit.     Facility-Administered Medications Ordered in Other Visits:   •  heparin flush (porcine) 100 UNIT/ML injection 500 Units, 500 Units, Intravenous, PRN, Millie Padilla MD, 500 Units at 08/21/18 0751  •  sodium chloride 0.9 % flush 10 mL, 10 mL, Intravenous, PRN, Millie Padilla MD, 10 mL at 08/21/18 0751    ALLERGIES:   No Known Allergies    SOCIAL HISTORY:       Social History     Social History   • Marital status: Single     Spouse name: N/A   • Number of children: N/A   • Years of education: N/A     Occupational History   • Dental student      [employer]     Social History Main Topics   • Smoking status: Never Smoker   • Smokeless tobacco: Never Used   • Alcohol use No   • Drug use: No   • Sexual activity: No     Other Topics Concern   • Not on file     Social History Narrative   • No narrative on file         FAMILY HISTORY:  Family History   Problem Relation Age of Onset   • Thyroid disease Mother    • Glaucoma Mother    • Lung cancer Maternal Grandmother    • Hypertension Paternal Grandmother    • Diabetes Paternal Grandmother        REVIEW OF SYSTEMS:  GENERAL: No change in appetite or weight;   No fevers, chills, sweats.    SKIN: No nonhealing lesions.   No rashes.  HEME/LYMPH: No easy bruising, bleeding.   No swollen nodes.   EYES: No vision changes or diplopia.   ENT: No tinnitus, hearing  "loss, gum bleeding, epistaxis, hoarseness or dysphagia.   RESPIRATORY: No cough, shortness of breath, hemoptysis or wheezing.   CVS: No chest pain, palpitations, orthopnea, dyspnea on exertion or PND.   GI: No melena or hematochezia.   No abdominal pain.  No nausea, vomiting, constipation, diarrhea  : No lower tract obstructive symptoms, dysuria or hematuria.   MUSCULOSKELETAL: No bone pain.  No joint stiffness.   NEUROLOGICAL: No global weakness, loss of consciousness or seizures.   PSYCHIATRIC: No increased nervousness, mood changes or depression.          Vitals:    08/21/18 0802   BP: 102/71   Pulse: 78   Resp: 16   Temp: 98.5 °F (36.9 °C)   TempSrc: Oral   SpO2: 97%   Weight: 64.1 kg (141 lb 4.8 oz)   Height: 157 cm (61.81\")   PainSc:   2   PainLoc: Arm  Comment: lt upper arm     Current Status 8/21/2018   ECOG score 0      PHYSICAL EXAM:    CONSTITUTIONAL:  Vital signs reviewed.  No distress, looks comfortable.  EYES:  Conjunctiva and lids unremarkable.  PERRLA  EARS,NOSE,MOUTH,THROAT:  Ears and nose appear unremarkable.  Lips, teeth, gums appear unremarkable.  RESPIRATORY:  Normal respiratory effort.  Lungs clear to auscultation bilaterally.  CARDIOVASCULAR:  Normal S1, S2.  No murmurs rubs or gallops.  No significant lower extremity edema.  GASTROINTESTINAL: Abdomen appears unremarkable.  Nontender.  No hepatomegaly.  No splenomegaly.  LYMPHATIC:  No cervical, supraclavicular, axillary lymphadenopathy.  SKIN:  Warm.  No rashes.  PSYCHIATRIC:  Normal judgment and insight.  Normal mood and affect.    RECENT LABS:        WBC   Date Value Ref Range Status   08/21/2018 1.35 (C) 4.50 - 10.70 10*3/mm3 Final   08/13/2018 0.46 (C) 4.00 - 10.00 10*3/mm3 Final   08/09/2018 1.64 (L) 4.00 - 10.00 10*3/mm3 Final   08/06/2018 2.80 (L) 4.00 - 10.00 10*3/mm3 Final   08/04/2018 4.34 (L) 4.50 - 10.70 10*3/mm3 Final   08/03/2018 6.95 4.50 - 10.70 10*3/mm3 Final   08/02/2018 10.24 4.50 - 10.70 10*3/mm3 Final   08/01/2018 10.16 " 4.50 - 10.70 10*3/mm3 Final   07/31/2018 10.32 4.50 - 10.70 10*3/mm3 Final   07/30/2018 5.52 4.50 - 10.70 10*3/mm3 Final   07/30/2018 6.09 4.00 - 10.00 10*3/mm3 Final   07/26/2018 17.31 (H) 4.00 - 10.00 10*3/mm3 Final   07/23/2018 14.67 (H) 4.00 - 10.00 10*3/mm3 Final   07/19/2018 4.54 4.00 - 10.00 10*3/mm3 Final   07/16/2018 2.76 (L) 4.00 - 10.00 10*3/mm3 Final   07/15/2018 2.89 (L) 4.50 - 10.70 10*3/mm3 Final   07/14/2018 4.42 (L) 4.50 - 10.70 10*3/mm3 Final   07/13/2018 4.91 4.50 - 10.70 10*3/mm3 Final   07/12/2018 10.59 4.50 - 10.70 10*3/mm3 Final   07/11/2018 12.74 (H) 4.50 - 10.70 10*3/mm3 Final   07/10/2018 8.65 4.50 - 10.70 10*3/mm3 Final   07/09/2018 4.85 4.00 - 10.00 10*3/mm3 Final   07/02/2018 14.49 (H) 4.00 - 10.00 10*3/mm3 Final   06/29/2018 15.81 (H) 4.00 - 10.00 10*3/mm3 Final   06/27/2018 0.80 (C) 4.00 - 10.00 10*3/mm3 Final   06/25/2018 1.53 (L) 4.00 - 10.00 10*3/mm3 Final   06/22/2018 3.01 (L) 4.00 - 10.00 10*3/mm3 Final   06/21/2018 3.47 (L) 4.50 - 10.70 10*3/mm3 Final   06/20/2018 3.42 (L) 4.50 - 10.70 10*3/mm3 Final   06/19/2018 6.06 4.50 - 10.70 10*3/mm3 Final   06/18/2018 7.01 4.50 - 10.70 10*3/mm3 Final   06/17/2018 3.49 (L) 4.50 - 10.70 10*3/mm3 Final   06/16/2018 4.59 4.50 - 10.70 10*3/mm3 Final   06/15/2018 3.07 (L) 4.50 - 10.70 10*3/mm3 Final   06/14/2018 3.70 (L) 4.50 - 10.70 10*3/mm3 Final   06/13/2018 4.29 (L) 4.50 - 10.70 10*3/mm3 Final   06/12/2018 4.13 (L) 4.50 - 10.70 10*3/mm3 Final   06/11/2018 4.62 4.50 - 10.70 10*3/mm3 Final   06/11/2018 4.82 4.00 - 10.00 10*3/mm3 Final   06/08/2018 3.41 (L) 4.50 - 10.70 10*3/mm3 Final   06/08/2018 4.30 (C) 4.50 - 10.00 10*3/mm3 Final   06/13/2017 3.3 (L) 3.4 - 10.8 x10E3/uL Final     Hemoglobin   Date Value Ref Range Status   08/21/2018 10.6 (L) 11.9 - 15.5 g/dL Final   08/13/2018 8.4 (L) 11.5 - 14.9 g/dL Final   08/09/2018 9.6 (L) 11.5 - 14.9 g/dL Final   08/06/2018 11.3 (L) 11.5 - 14.9 g/dL Final   08/04/2018 10.2 (L) 11.9 - 15.5 g/dL Final    08/03/2018 10.1 (L) 11.9 - 15.5 g/dL Final   08/02/2018 9.8 (L) 11.9 - 15.5 g/dL Final   08/01/2018 9.8 (L) 11.9 - 15.5 g/dL Final   07/31/2018 11.3 (L) 11.9 - 15.5 g/dL Final   07/30/2018 11.0 (L) 11.9 - 15.5 g/dL Final   07/30/2018 10.8 (L) 11.5 - 14.9 g/dL Final   07/26/2018 11.2 (L) 11.5 - 14.9 g/dL Final   07/23/2018 11.0 (L) 11.5 - 14.9 g/dL Final   07/19/2018 10.7 (L) 11.5 - 14.9 g/dL Final   07/16/2018 11.8 11.5 - 14.9 g/dL Final   07/15/2018 10.8 (L) 11.9 - 15.5 g/dL Final   07/14/2018 11.4 (L) 11.9 - 15.5 g/dL Final   07/13/2018 10.5 (L) 11.9 - 15.5 g/dL Final   07/12/2018 10.4 (L) 11.9 - 15.5 g/dL Final   07/11/2018 10.5 (L) 11.9 - 15.5 g/dL Final   07/10/2018 11.6 (L) 11.9 - 15.5 g/dL Final   07/09/2018 11.6 11.5 - 14.9 g/dL Final   07/02/2018 12.7 11.5 - 14.9 g/dL Final   06/29/2018 11.9 11.5 - 14.9 g/dL Final   06/27/2018 12.2 11.5 - 14.9 g/dL Final   06/25/2018 12.8 11.5 - 14.9 g/dL Final   06/22/2018 13.0 11.5 - 14.9 g/dL Final   06/21/2018 11.9 11.9 - 15.5 g/dL Final   06/20/2018 10.8 (L) 11.9 - 15.5 g/dL Final   06/19/2018 10.9 (L) 11.9 - 15.5 g/dL Final   06/18/2018 10.8 (L) 11.9 - 15.5 g/dL Final   06/17/2018 11.2 (L) 11.9 - 15.5 g/dL Final   06/16/2018 12.4 11.9 - 15.5 g/dL Final   06/15/2018 12.7 11.9 - 15.5 g/dL Final   06/14/2018 12.7 11.9 - 15.5 g/dL Final   06/13/2018 12.8 11.9 - 15.5 g/dL Final   06/12/2018 13.2 11.9 - 15.5 g/dL Final   06/11/2018 13.7 11.9 - 15.5 g/dL Final   06/11/2018 13.7 11.5 - 14.9 g/dL Final   06/08/2018 12.6 11.9 - 15.5 g/dL Final   06/08/2018 13.3 12.0 - 18.0 g/dL Final   06/13/2017 13.8 11.1 - 15.9 g/dL Final     Platelets   Date Value Ref Range Status   08/21/2018 283 140 - 500 10*3/mm3 Final   08/13/2018 102 (L) 150 - 375 10*3/mm3 Final   08/09/2018 152 150 - 375 10*3/mm3 Final   08/06/2018 250 150 - 375 10*3/mm3 Final   08/04/2018 198 140 - 500 10*3/mm3 Final   08/03/2018 183 140 - 500 10*3/mm3 Final   08/02/2018 174 140 - 500 10*3/mm3 Final   08/01/2018 170  140 - 500 10*3/mm3 Final   07/31/2018 207 140 - 500 10*3/mm3 Final   07/30/2018 181 140 - 500 10*3/mm3 Final   07/30/2018 151 150 - 375 10*3/mm3 Final   07/26/2018 120 (L) 150 - 375 10*3/mm3 Final   07/23/2018 107 (L) 150 - 375 10*3/mm3 Final   07/19/2018 140 (L) 150 - 375 10*3/mm3 Final   07/16/2018 243 150 - 375 10*3/mm3 Final   07/15/2018 208 140 - 500 10*3/mm3 Final   07/14/2018 202 140 - 500 10*3/mm3 Final   07/13/2018 174 140 - 500 10*3/mm3 Final   07/12/2018 169 140 - 500 10*3/mm3 Final   07/11/2018 191 140 - 500 10*3/mm3 Final   07/10/2018 209 140 - 500 10*3/mm3 Final   07/09/2018 179 150 - 375 10*3/mm3 Final   07/02/2018 136 (L) 150 - 375 10*3/mm3 Final   06/29/2018 130 (L) 150 - 375 10*3/mm3 Final   06/27/2018 148 (L) 150 - 375 10*3/mm3 Final   06/25/2018 181 150 - 375 10*3/mm3 Final   06/22/2018 186 150 - 375 10*3/mm3 Final   06/21/2018 150 140 - 500 10*3/mm3 Final   06/20/2018 127 (L) 140 - 500 10*3/mm3 Final   06/19/2018 133 (L) 140 - 500 10*3/mm3 Final   06/18/2018 98 (L) 140 - 500 10*3/mm3 Final   06/17/2018 82 (L) 140 - 500 10*3/mm3 Final   06/16/2018 123 (L) 140 - 500 10*3/mm3 Final   06/15/2018 117 (L) 140 - 500 10*3/mm3 Final   06/14/2018 109 (L) 140 - 500 10*3/mm3 Final   06/13/2018 118 (L) 140 - 500 10*3/mm3 Final   06/12/2018 119 (L) 140 - 500 10*3/mm3 Final   06/11/2018 125 (L) 140 - 500 10*3/mm3 Final   06/11/2018 110 (L) 150 - 375 10*3/mm3 Final   06/08/2018 115 (L) 140 - 500 10*3/mm3 Final   06/08/2018 123 (L) 150 - 450 10*3/mm3 Final   06/13/2017 198 150 - 379 x10E3/uL Final             ASSESSMENT:  *Primary mediastinal large B-cell lymphoma  Planned admission today for cycle 4 DA-EPOCH-R (planning 6 cycles total) cycles every 21 days.  However, could not do this as she remains neutropenic.  She did not have a CBC on 8/15/18 (typically twice per week CBCs with this protocol).  However, since she was neutropenic on 8/13/18 and 8/21/18, it is assumed she was neutropenic on 8/15/18 as well.   This would be 3 instances of neutropenia and therefore doxorubicin, etoposide, and Cytoxan will be dose reduced by 20% as per chemotherapy protocol.  Notably vincristine does not get dose reduced due to neutropenia.  She is on drug therapy requiring intensive monitoring for toxicity.    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  Constant bilateral fingertip tingling.    Some difficulty buttoning the top button of a button up shirt recently.  States this took a little longer than usual.  Has not tried to put on your earrings or tie shoes.  I told her over the next few days to pay attention and try to button her clothing, tie shoes, etc. and let us know when she returns Friday if she is having difficulty doing these things.  If so, we will drop the dose of vincristine.  If not, we will maintain the dose of vincristine.    *Shortness of air with laying flat.  We'll obtain an echo to have a result by Friday morning.  Doubt she is having cardiac toxicity from doxorubicin, but we will check for this.     *Ovarian suppression with a goal of fertility preservation.  Lupron due 8/13/18.     *Acute right basilic (upper arm) superficial thrombophlebitis associated with venous catheter 6/18/18.  *Acute right axillary, brachial DVT and acute right basilic (upper arm and forearm) superficial thrombophlebitis associated with venous catheter 6/20/18.  *Chronic right axillary DVT and acute right cephalic (upper arm) thrombophlebitis 7/30/18.  (Occurred while on Xarelto)  Therefore, changed to Pradaxa 150 mg twice per day.     *Venous access.  Radiology unable to place PICC on left side (previously unable to place PICC on right side).  Port, right side by Dr. Hurt, 7/31/18.     *Prophylaxis.  Acyclovir, Diflucan, Bactrim.  Dr. Padilla also plans Levaquin if ANC <1000.     *Anemia.  Likely due to chemotherapy.  Monitor.  Hb remains stable.  Monitor.     *Hypokalemia.  Continue potassium.         PLAN:  · Hopefully will be  admitted Friday for C4 D1-5  DA-EPOCH-R (planning 6 cycles total) cycles every 21 days.  (If ANC >1000)  · I have discussed with the [facility] pharmacist, Nevaeh Tillman.  She is going to make the 20% dose reduction of doxorubicin, etoposide, and Cytoxan in the treatment plan.  · If she is admitted on Friday, 8/24/18 for chemotherapy, she will be due for cycle 5 on Friday, 9/14/18.  She currently has an appointment with Dr. Padilla the day prior on Thursday, 9/13/18.  · If she is admitted the week of 8/27/18 for cycle #4, then cycle #5 will be due the week of 9/17/18.  Dr. Padilla is working on [address] [place] [address] for that day.  Dr. Padilla is her usual outpatient oncologist.  · She will need Neulasta in the office the day after chemotherapy completes.  · She will need CBC and CMP every Monday and Thursday, or every Tuesday and Friday (twice per week).  · She will need CT CAP with contrast prior to cycle #5.    · If PLT <50, hold Pradaxa    Mother assisted with history.

## 2018-08-21 ENCOUNTER — LAB (OUTPATIENT)
Dept: ONCOLOGY | Facility: HOSPITAL | Age: 23
End: 2018-08-21

## 2018-08-21 ENCOUNTER — OFFICE VISIT (OUTPATIENT)
Dept: ONCOLOGY | Facility: CLINIC | Age: 23
End: 2018-08-21

## 2018-08-21 VITALS
HEIGHT: 62 IN | DIASTOLIC BLOOD PRESSURE: 71 MMHG | HEART RATE: 78 BPM | BODY MASS INDEX: 26 KG/M2 | TEMPERATURE: 98.5 F | OXYGEN SATURATION: 97 % | RESPIRATION RATE: 16 BRPM | WEIGHT: 141.3 LBS | SYSTOLIC BLOOD PRESSURE: 102 MMHG

## 2018-08-21 DIAGNOSIS — Z45.2 FITTING AND ADJUSTMENT OF VASCULAR CATHETER: ICD-10-CM

## 2018-08-21 DIAGNOSIS — C85.28 MEDIASTINAL LARGE B-CELL LYMPHOMA OF LYMPH NODES OF MULTIPLE REGIONS (HCC): Primary | ICD-10-CM

## 2018-08-21 DIAGNOSIS — J98.59 MEDIASTINAL MASS: Primary | ICD-10-CM

## 2018-08-21 LAB
ALBUMIN SERPL-MCNC: 4.6 G/DL (ref 3.5–5.2)
ALBUMIN/GLOB SERPL: 2 G/DL
ALP SERPL-CCNC: 48 U/L (ref 39–117)
ALT SERPL W P-5'-P-CCNC: 22 U/L (ref 1–33)
ANION GAP SERPL CALCULATED.3IONS-SCNC: 13.5 MMOL/L
AST SERPL-CCNC: 20 U/L (ref 1–32)
BASOPHILS # BLD AUTO: 0.02 10*3/MM3 (ref 0–0.2)
BASOPHILS NFR BLD AUTO: 1.5 % (ref 0–1.5)
BILIRUB SERPL-MCNC: 0.2 MG/DL (ref 0.1–1.2)
BUN BLD-MCNC: 13 MG/DL (ref 6–20)
BUN/CREAT SERPL: 18.6 (ref 7–25)
CALCIUM SPEC-SCNC: 10.1 MG/DL (ref 8.6–10.5)
CHLORIDE SERPL-SCNC: 103 MMOL/L (ref 98–107)
CO2 SERPL-SCNC: 25.5 MMOL/L (ref 22–29)
CREAT BLD-MCNC: 0.7 MG/DL (ref 0.57–1)
DACRYOCYTES BLD QL SMEAR: NORMAL
DEPRECATED RDW RBC AUTO: 64.7 FL (ref 37–54)
EOSINOPHIL # BLD AUTO: 0.17 10*3/MM3 (ref 0–0.7)
EOSINOPHIL NFR BLD AUTO: 12.6 % (ref 0.3–6.2)
ERYTHROCYTE [DISTWIDTH] IN BLOOD BY AUTOMATED COUNT: 19.2 % (ref 11.7–13)
GFR SERPL CREATININE-BSD FRML MDRD: 104 ML/MIN/1.73
GFR SERPL CREATININE-BSD FRML MDRD: 126 ML/MIN/1.73
GLOBULIN UR ELPH-MCNC: 2.3 GM/DL
GLUCOSE BLD-MCNC: 97 MG/DL (ref 65–99)
HCT VFR BLD AUTO: 31.2 % (ref 35.6–45.5)
HGB BLD-MCNC: 10.6 G/DL (ref 11.9–15.5)
IMM GRANULOCYTES # BLD: 0.03 10*3/MM3 (ref 0–0.03)
IMM GRANULOCYTES NFR BLD: 2.2 % (ref 0–0.5)
LDH SERPL-CCNC: 185 U/L (ref 135–214)
LYMPHOCYTES # BLD AUTO: 0.36 10*3/MM3 (ref 0.9–4.8)
LYMPHOCYTES NFR BLD AUTO: 26.7 % (ref 19.6–45.3)
MCH RBC QN AUTO: 32.1 PG (ref 26.9–32)
MCHC RBC AUTO-ENTMCNC: 34 G/DL (ref 32.4–36.3)
MCV RBC AUTO: 94.5 FL (ref 80.5–98.2)
MICROCYTES BLD QL: NORMAL
MONOCYTES # BLD AUTO: 0.57 10*3/MM3 (ref 0.2–1.2)
MONOCYTES NFR BLD AUTO: 42.2 % (ref 5–12)
NEUTROPHILS # BLD AUTO: 0.2 10*3/MM3 (ref 1.9–8.1)
NEUTROPHILS NFR BLD AUTO: 14.8 % (ref 42.7–76)
NRBC BLD MANUAL-RTO: 0 /100 WBC (ref 0–0)
OVALOCYTES BLD QL SMEAR: NORMAL
PLAT MORPH BLD: NORMAL
PLATELET # BLD AUTO: 283 10*3/MM3 (ref 140–500)
PMV BLD AUTO: 10 FL (ref 6–12)
POTASSIUM BLD-SCNC: 4 MMOL/L (ref 3.5–5.2)
PROT SERPL-MCNC: 6.9 G/DL (ref 6–8.5)
RBC # BLD AUTO: 3.3 10*6/MM3 (ref 3.9–5.2)
SODIUM BLD-SCNC: 142 MMOL/L (ref 136–145)
URATE SERPL-MCNC: 1.4 MG/DL (ref 2.4–5.7)
WBC MORPH BLD: NORMAL
WBC NRBC COR # BLD: 1.35 10*3/MM3 (ref 4.5–10.7)

## 2018-08-21 PROCEDURE — 36415 COLL VENOUS BLD VENIPUNCTURE: CPT

## 2018-08-21 PROCEDURE — 80053 COMPREHEN METABOLIC PANEL: CPT | Performed by: INTERNAL MEDICINE

## 2018-08-21 PROCEDURE — 99215 OFFICE O/P EST HI 40 MIN: CPT | Performed by: INTERNAL MEDICINE

## 2018-08-21 PROCEDURE — 85025 COMPLETE CBC W/AUTO DIFF WBC: CPT | Performed by: INTERNAL MEDICINE

## 2018-08-21 PROCEDURE — 96523 IRRIG DRUG DELIVERY DEVICE: CPT | Performed by: INTERNAL MEDICINE

## 2018-08-21 PROCEDURE — 85007 BL SMEAR W/DIFF WBC COUNT: CPT | Performed by: INTERNAL MEDICINE

## 2018-08-21 PROCEDURE — 83615 LACTATE (LD) (LDH) ENZYME: CPT | Performed by: INTERNAL MEDICINE

## 2018-08-21 PROCEDURE — 25010000002 HEPARIN FLUSH (PORCINE) 100 UNIT/ML SOLUTION: Performed by: INTERNAL MEDICINE

## 2018-08-21 PROCEDURE — 84550 ASSAY OF BLOOD/URIC ACID: CPT | Performed by: INTERNAL MEDICINE

## 2018-08-21 RX ORDER — SODIUM CHLORIDE 0.9 % (FLUSH) 0.9 %
10 SYRINGE (ML) INJECTION AS NEEDED
Status: DISCONTINUED | OUTPATIENT
Start: 2018-08-21 | End: 2018-08-21 | Stop reason: HOSPADM

## 2018-08-21 RX ORDER — SODIUM CHLORIDE 0.9 % (FLUSH) 0.9 %
10 SYRINGE (ML) INJECTION AS NEEDED
Status: CANCELLED | OUTPATIENT
Start: 2018-08-21

## 2018-08-21 RX ADMIN — Medication 10 ML: at 07:51

## 2018-08-21 RX ADMIN — SODIUM CHLORIDE, PRESERVATIVE FREE 500 UNITS: 5 INJECTION INTRAVENOUS at 07:51

## 2018-08-22 ENCOUNTER — APPOINTMENT (OUTPATIENT)
Dept: CARDIOLOGY | Facility: HOSPITAL | Age: 23
End: 2018-08-22

## 2018-08-22 ENCOUNTER — APPOINTMENT (OUTPATIENT)
Dept: CARDIOLOGY | Facility: HOSPITAL | Age: 23
End: 2018-08-22
Attending: INTERNAL MEDICINE

## 2018-08-22 ENCOUNTER — HOSPITAL ENCOUNTER (OUTPATIENT)
Dept: CARDIOLOGY | Facility: HOSPITAL | Age: 23
Discharge: HOME | End: 2018-08-22
Attending: INTERNAL MEDICINE | Admitting: INTERNAL MEDICINE

## 2018-08-22 LAB
AORTIC DIMENSIONLESS INDEX: 0.7 (DI)
BH CV ECHO MEAS - ACS: 2.1 CM
BH CV ECHO MEAS - AO MAX PG (FULL): 3.6 MMHG
BH CV ECHO MEAS - AO MAX PG: 7.3 MMHG
BH CV ECHO MEAS - AO MEAN PG (FULL): 2 MMHG
BH CV ECHO MEAS - AO MEAN PG: 4 MMHG
BH CV ECHO MEAS - AO ROOT AREA (BSA CORRECTED): 1.6
BH CV ECHO MEAS - AO ROOT AREA: 5.7 CM^2
BH CV ECHO MEAS - AO ROOT DIAM: 2.7 CM
BH CV ECHO MEAS - AO V2 MAX: 135 CM/SEC
BH CV ECHO MEAS - AO V2 MEAN: 92.3 CM/SEC
BH CV ECHO MEAS - AO V2 VTI: 28.2 CM
BH CV ECHO MEAS - ASC AORTA: 2.3 CM
BH CV ECHO MEAS - AVA(I,A): 2.4 CM^2
BH CV ECHO MEAS - AVA(I,D): 2.4 CM^2
BH CV ECHO MEAS - AVA(V,A): 2.5 CM^2
BH CV ECHO MEAS - AVA(V,D): 2.5 CM^2
BH CV ECHO MEAS - BSA(HAYCOCK): 1.7 M^2
BH CV ECHO MEAS - BSA: 1.6 M^2
BH CV ECHO MEAS - BZI_BMI: 25.6 KILOGRAMS/M^2
BH CV ECHO MEAS - BZI_METRIC_HEIGHT: 157.5 CM
BH CV ECHO MEAS - BZI_METRIC_WEIGHT: 63.5 KG
BH CV ECHO MEAS - EDV(CUBED): 110.6 ML
BH CV ECHO MEAS - EDV(MOD-SP2): 147 ML
BH CV ECHO MEAS - EDV(MOD-SP4): 160 ML
BH CV ECHO MEAS - EDV(TEICH): 107.5 ML
BH CV ECHO MEAS - EF(CUBED): 61.2 %
BH CV ECHO MEAS - EF(MOD-BP): 59 %
BH CV ECHO MEAS - EF(MOD-SP2): 61.9 %
BH CV ECHO MEAS - EF(MOD-SP4): 58.8 %
BH CV ECHO MEAS - EF(TEICH): 52.7 %
BH CV ECHO MEAS - ESV(CUBED): 42.9 ML
BH CV ECHO MEAS - ESV(MOD-SP2): 56 ML
BH CV ECHO MEAS - ESV(MOD-SP4): 66 ML
BH CV ECHO MEAS - ESV(TEICH): 50.9 ML
BH CV ECHO MEAS - FS: 27.1 %
BH CV ECHO MEAS - IVS/LVPW: 0.89
BH CV ECHO MEAS - IVSD: 0.8 CM
BH CV ECHO MEAS - LAT PEAK E' VEL: 14 CM/SEC
BH CV ECHO MEAS - LV DIASTOLIC VOL/BSA (35-75): 97.4 ML/M^2
BH CV ECHO MEAS - LV MASS(C)D: 137.1 GRAMS
BH CV ECHO MEAS - LV MASS(C)DI: 83.4 GRAMS/M^2
BH CV ECHO MEAS - LV MAX PG: 3.7 MMHG
BH CV ECHO MEAS - LV MEAN PG: 2 MMHG
BH CV ECHO MEAS - LV SYSTOLIC VOL/BSA (12-30): 40.2 ML/M^2
BH CV ECHO MEAS - LV V1 MAX: 96.1 CM/SEC
BH CV ECHO MEAS - LV V1 MEAN: 61.5 CM/SEC
BH CV ECHO MEAS - LV V1 VTI: 19.9 CM
BH CV ECHO MEAS - LVIDD: 4.8 CM
BH CV ECHO MEAS - LVIDS: 3.5 CM
BH CV ECHO MEAS - LVLD AP2: 9.3 CM
BH CV ECHO MEAS - LVLD AP4: 8.4 CM
BH CV ECHO MEAS - LVLS AP2: 7.6 CM
BH CV ECHO MEAS - LVLS AP4: 6.5 CM
BH CV ECHO MEAS - LVOT AREA (M): 3.5 CM^2
BH CV ECHO MEAS - LVOT AREA: 3.5 CM^2
BH CV ECHO MEAS - LVOT DIAM: 2.1 CM
BH CV ECHO MEAS - LVPWD: 0.9 CM
BH CV ECHO MEAS - MED PEAK E' VEL: 10 CM/SEC
BH CV ECHO MEAS - MV A DUR: 0.12 SEC
BH CV ECHO MEAS - MV A MAX VEL: 48.5 CM/SEC
BH CV ECHO MEAS - MV DEC SLOPE: 464 CM/SEC^2
BH CV ECHO MEAS - MV DEC TIME: 0.15 SEC
BH CV ECHO MEAS - MV E MAX VEL: 59.7 CM/SEC
BH CV ECHO MEAS - MV E/A: 1.2
BH CV ECHO MEAS - MV MAX PG: 2.3 MMHG
BH CV ECHO MEAS - MV MEAN PG: 1 MMHG
BH CV ECHO MEAS - MV P1/2T MAX VEL: 82.8 CM/SEC
BH CV ECHO MEAS - MV P1/2T: 52.3 MSEC
BH CV ECHO MEAS - MV V2 MAX: 76 CM/SEC
BH CV ECHO MEAS - MV V2 MEAN: 50.6 CM/SEC
BH CV ECHO MEAS - MV V2 VTI: 26.1 CM
BH CV ECHO MEAS - MVA P1/2T LCG: 2.7 CM^2
BH CV ECHO MEAS - MVA(P1/2T): 4.2 CM^2
BH CV ECHO MEAS - MVA(VTI): 2.6 CM^2
BH CV ECHO MEAS - PA ACC TIME: 0.22 SEC
BH CV ECHO MEAS - PA MAX PG (FULL): 2.3 MMHG
BH CV ECHO MEAS - PA MAX PG: 4.5 MMHG
BH CV ECHO MEAS - PA PR(ACCEL): -19.6 MMHG
BH CV ECHO MEAS - PA V2 MAX: 106 CM/SEC
BH CV ECHO MEAS - PULM A REVS DUR: 0.1 SEC
BH CV ECHO MEAS - PULM A REVS VEL: 21.8 CM/SEC
BH CV ECHO MEAS - PULM DIAS VEL: 67.4 CM/SEC
BH CV ECHO MEAS - PULM S/D: 0.73
BH CV ECHO MEAS - PULM SYS VEL: 49 CM/SEC
BH CV ECHO MEAS - PVA(V,A): 2 CM^2
BH CV ECHO MEAS - PVA(V,D): 2 CM^2
BH CV ECHO MEAS - QP/QS: 0.75
BH CV ECHO MEAS - RAP SYSTOLE: 3 MMHG
BH CV ECHO MEAS - RV MAX PG: 2.2 MMHG
BH CV ECHO MEAS - RV MEAN PG: 1 MMHG
BH CV ECHO MEAS - RV V1 MAX: 73.9 CM/SEC
BH CV ECHO MEAS - RV V1 MEAN: 55.2 CM/SEC
BH CV ECHO MEAS - RV V1 VTI: 18.2 CM
BH CV ECHO MEAS - RVOT AREA: 2.8 CM^2
BH CV ECHO MEAS - RVOT DIAM: 1.9 CM
BH CV ECHO MEAS - RVSP: 23 MMHG
BH CV ECHO MEAS - SI(AO): 98.3 ML/M^2
BH CV ECHO MEAS - SI(CUBED): 41.2 ML/M^2
BH CV ECHO MEAS - SI(LVOT): 42 ML/M^2
BH CV ECHO MEAS - SI(MOD-SP2): 55.4 ML/M^2
BH CV ECHO MEAS - SI(MOD-SP4): 57.2 ML/M^2
BH CV ECHO MEAS - SI(TEICH): 34.5 ML/M^2
BH CV ECHO MEAS - SV(AO): 161.5 ML
BH CV ECHO MEAS - SV(CUBED): 67.7 ML
BH CV ECHO MEAS - SV(LVOT): 68.9 ML
BH CV ECHO MEAS - SV(MOD-SP2): 91 ML
BH CV ECHO MEAS - SV(MOD-SP4): 94 ML
BH CV ECHO MEAS - SV(RVOT): 51.6 ML
BH CV ECHO MEAS - SV(TEICH): 56.7 ML
BH CV ECHO MEAS - TAPSE (>1.6): 2.2 CM2
BH CV ECHO MEAS - TR MAX VEL: 223 CM/SEC
BH CV ECHO MEASUREMENTS AVERAGE E/E' RATIO: 4.98
BH CV VAS BP RIGHT ARM: NORMAL MMHG
BH CV XLRA - RV BASE: 3.5 CM
BH CV XLRA - TDI S': 9 CM/SEC
LEFT ATRIUM VOLUME INDEX: 27.2 ML/M2
LV EF 2D ECHO EST: 60 %
MAXIMAL PREDICTED HEART RATE: 197 BPM
STRESS TARGET HR: 167 BPM

## 2018-08-22 PROCEDURE — 93306 TTE W/DOPPLER COMPLETE: CPT

## 2018-08-22 PROCEDURE — 0399T HC MYOCARDL STRAIN IMAG QUAN ASSMT PER SESS: CPT

## 2018-08-22 PROCEDURE — 93306 TTE W/DOPPLER COMPLETE: CPT | Performed by: INTERNAL MEDICINE

## 2018-08-22 PROCEDURE — 25010000002 PERFLUTREN (DEFINITY) 8.476 MG IN SODIUM CHLORIDE 0.9 % 10 ML INJECTION: Performed by: INTERNAL MEDICINE

## 2018-08-22 PROCEDURE — 0399T ADULT TRANSTHORACIC ECHO COMPLETE W/ CONT IF NECESSARY PER PROTOCOL: CPT | Performed by: INTERNAL MEDICINE

## 2018-08-22 RX ADMIN — SODIUM CHLORIDE 4 ML: 9 INJECTION INTRAMUSCULAR; INTRAVENOUS; SUBCUTANEOUS at 16:22

## 2018-08-23 ENCOUNTER — READMISSION MANAGEMENT (OUTPATIENT)
Dept: CALL CENTER | Facility: HOSPITAL | Age: 23
End: 2018-08-23

## 2018-08-23 NOTE — OUTREACH NOTE
General Surgery Week 3 Survey      Responses   Facility patient discharged from?  [facility]   Does the patient have one of the following disease processes/diagnoses(primary or secondary)?  General Surgery   Week 3 attempt successful?  No   Unsuccessful attempts  Attempt 1          David Rome RN

## 2018-08-24 ENCOUNTER — OFFICE VISIT (OUTPATIENT)
Dept: ONCOLOGY | Facility: CLINIC | Age: 23
End: 2018-08-24

## 2018-08-24 ENCOUNTER — LAB (OUTPATIENT)
Dept: ONCOLOGY | Facility: HOSPITAL | Age: 23
End: 2018-08-24

## 2018-08-24 VITALS
SYSTOLIC BLOOD PRESSURE: 97 MMHG | OXYGEN SATURATION: 98 % | DIASTOLIC BLOOD PRESSURE: 67 MMHG | BODY MASS INDEX: 26.11 KG/M2 | RESPIRATION RATE: 14 BRPM | WEIGHT: 141.9 LBS | HEIGHT: 62 IN | TEMPERATURE: 98.5 F | HEART RATE: 99 BPM

## 2018-08-24 DIAGNOSIS — Z45.2 FITTING AND ADJUSTMENT OF VASCULAR CATHETER: ICD-10-CM

## 2018-08-24 DIAGNOSIS — C85.28 MEDIASTINAL LARGE B-CELL LYMPHOMA OF LYMPH NODES OF MULTIPLE REGIONS (HCC): Primary | ICD-10-CM

## 2018-08-24 DIAGNOSIS — D70.1 CHEMOTHERAPY INDUCED NEUTROPENIA (HCC): ICD-10-CM

## 2018-08-24 DIAGNOSIS — C85.28 MEDIASTINAL LARGE B-CELL LYMPHOMA OF LYMPH NODES OF MULTIPLE REGIONS (HCC): ICD-10-CM

## 2018-08-24 DIAGNOSIS — C83.30 DIFFUSE LARGE B-CELL LYMPHOMA, UNSPECIFIED BODY REGION (HCC): Primary | ICD-10-CM

## 2018-08-24 DIAGNOSIS — T45.1X5A CHEMOTHERAPY INDUCED NEUTROPENIA (HCC): ICD-10-CM

## 2018-08-24 LAB
ALBUMIN SERPL-MCNC: 4.6 G/DL (ref 3.5–5.2)
ALBUMIN/GLOB SERPL: 1.9 G/DL
ALP SERPL-CCNC: 50 U/L (ref 39–117)
ALT SERPL W P-5'-P-CCNC: 17 U/L (ref 1–33)
ANION GAP SERPL CALCULATED.3IONS-SCNC: 11.8 MMOL/L
ANISOCYTOSIS BLD QL: NORMAL
AST SERPL-CCNC: 19 U/L (ref 1–32)
BASOPHILS # BLD AUTO: 0.02 10*3/MM3 (ref 0–0.2)
BASOPHILS NFR BLD AUTO: 1.1 % (ref 0–1.5)
BILIRUB SERPL-MCNC: <0.2 MG/DL (ref 0.1–1.2)
BUN BLD-MCNC: 14 MG/DL (ref 6–20)
BUN/CREAT SERPL: 24.6 (ref 7–25)
CALCIUM SPEC-SCNC: 9.9 MG/DL (ref 8.6–10.5)
CHLORIDE SERPL-SCNC: 102 MMOL/L (ref 98–107)
CO2 SERPL-SCNC: 26.2 MMOL/L (ref 22–29)
CREAT BLD-MCNC: 0.57 MG/DL (ref 0.57–1)
DEPRECATED RDW RBC AUTO: 61.8 FL (ref 37–54)
EOSINOPHIL # BLD AUTO: 0.11 10*3/MM3 (ref 0–0.7)
EOSINOPHIL NFR BLD AUTO: 6.1 % (ref 0.3–6.2)
ERYTHROCYTE [DISTWIDTH] IN BLOOD BY AUTOMATED COUNT: 18 % (ref 11.7–13)
GFR SERPL CREATININE-BSD FRML MDRD: 131 ML/MIN/1.73
GFR SERPL CREATININE-BSD FRML MDRD: >150 ML/MIN/1.73
GLOBULIN UR ELPH-MCNC: 2.4 GM/DL
GLUCOSE BLD-MCNC: 97 MG/DL (ref 65–99)
HCT VFR BLD AUTO: 32 % (ref 35.6–45.5)
HGB BLD-MCNC: 10.8 G/DL (ref 11.9–15.5)
IMM GRANULOCYTES # BLD: 0.03 10*3/MM3 (ref 0–0.03)
IMM GRANULOCYTES NFR BLD: 1.7 % (ref 0–0.5)
LYMPHOCYTES # BLD AUTO: 0.43 10*3/MM3 (ref 0.9–4.8)
LYMPHOCYTES NFR BLD AUTO: 23.8 % (ref 19.6–45.3)
MCH RBC QN AUTO: 32 PG (ref 26.9–32)
MCHC RBC AUTO-ENTMCNC: 33.8 G/DL (ref 32.4–36.3)
MCV RBC AUTO: 95 FL (ref 80.5–98.2)
MONOCYTES # BLD AUTO: 0.62 10*3/MM3 (ref 0.2–1.2)
MONOCYTES NFR BLD AUTO: 34.3 % (ref 5–12)
NEUTROPHILS # BLD AUTO: 0.6 10*3/MM3 (ref 1.9–8.1)
NEUTROPHILS NFR BLD AUTO: 33 % (ref 42.7–76)
NRBC BLD MANUAL-RTO: 0 /100 WBC (ref 0–0)
PLAT MORPH BLD: NORMAL
PLATELET # BLD AUTO: 279 10*3/MM3 (ref 140–500)
PMV BLD AUTO: 10.6 FL (ref 6–12)
POTASSIUM BLD-SCNC: 4 MMOL/L (ref 3.5–5.2)
PROT SERPL-MCNC: 7 G/DL (ref 6–8.5)
RBC # BLD AUTO: 3.37 10*6/MM3 (ref 3.9–5.2)
SODIUM BLD-SCNC: 140 MMOL/L (ref 136–145)
WBC MORPH BLD: NORMAL
WBC NRBC COR # BLD: 1.81 10*3/MM3 (ref 4.5–10.7)

## 2018-08-24 PROCEDURE — 85007 BL SMEAR W/DIFF WBC COUNT: CPT | Performed by: INTERNAL MEDICINE

## 2018-08-24 PROCEDURE — 25010000002 HEPARIN FLUSH (PORCINE) 100 UNIT/ML SOLUTION: Performed by: INTERNAL MEDICINE

## 2018-08-24 PROCEDURE — 96372 THER/PROPH/DIAG INJ SC/IM: CPT | Performed by: INTERNAL MEDICINE

## 2018-08-24 PROCEDURE — 80053 COMPREHEN METABOLIC PANEL: CPT | Performed by: INTERNAL MEDICINE

## 2018-08-24 PROCEDURE — 36415 COLL VENOUS BLD VENIPUNCTURE: CPT | Performed by: INTERNAL MEDICINE

## 2018-08-24 PROCEDURE — 99214 OFFICE O/P EST MOD 30 MIN: CPT | Performed by: NURSE PRACTITIONER

## 2018-08-24 PROCEDURE — 25010000002 FILGRASTIM 300 MCG/0.5ML SOLUTION PREFILLED SYRINGE: Performed by: INTERNAL MEDICINE

## 2018-08-24 PROCEDURE — 85025 COMPLETE CBC W/AUTO DIFF WBC: CPT | Performed by: INTERNAL MEDICINE

## 2018-08-24 RX ORDER — SODIUM CHLORIDE 0.9 % (FLUSH) 0.9 %
10 SYRINGE (ML) INJECTION AS NEEDED
Status: CANCELLED | OUTPATIENT
Start: 2018-08-24

## 2018-08-24 RX ORDER — SODIUM CHLORIDE 0.9 % (FLUSH) 0.9 %
10 SYRINGE (ML) INJECTION AS NEEDED
Status: DISCONTINUED | OUTPATIENT
Start: 2018-08-24 | End: 2018-08-24 | Stop reason: HOSPADM

## 2018-08-24 RX ADMIN — FILGRASTIM 300 MCG: 300 INJECTION, SOLUTION INTRAVENOUS; SUBCUTANEOUS at 09:56

## 2018-08-24 RX ADMIN — SODIUM CHLORIDE, PRESERVATIVE FREE 500 UNITS: 5 INJECTION INTRAVENOUS at 09:50

## 2018-08-24 RX ADMIN — Medication 10 ML: at 09:50

## 2018-08-24 NOTE — PROGRESS NOTES
.     REASON FOR FOLLOWUP :   Primary mediastinal large B-cell lymphoma  Planning admission today for cycle 4 DA-EPOCH-R      HISTORY OF PRESENT ILLNESS:  The patient is a 23 y.o. year old female with the above mentioned history, here today for revaluation and possible admission for cycle 4 DA EPOCH-R.  She is feeling okay.  She had an echocardiogram this week due to previous complaints of shortness of breath with laying flat, but states that has resolved.    She does continue to report tingling in her fingertips (not numbness or pain), and it is not affecting function. She is able to button buttons with out difficulty.     She denies fevers or chills.  Patient will like to go to a wedding on September 1 if possible.  Dr. Bell felt that this would be okay, and we will schedule the patient to return on 9/2/2018 for her Neulasta injection at [address] [place] [address].      Past Medical History:   Diagnosis Date   • Diffuse large B cell lymphoma (CMS/HCC) 06/2018   • Fracture of lower leg 2000    L   • H/O Lung mass 06/2018   • H/O Pericardial effusion      Past Surgical History:   Procedure Laterality Date   • OTHER SURGICAL HISTORY  06/12/2018    CT-GUIDED BIOPSY OF MEDIASTINAL MASS   • VENOUS ACCESS DEVICE (PORT) INSERTION Right 7/31/2018    Procedure: RIGHT MEDIPORT PLACEMENT;  Surgeon: Farhan Hurt MD;  Location: [facility];  Service: Vascular       HEMATOLOGIC/ONCOLOGIC HISTORY:  (History from previous dates can be found in the separate document.)    MEDICATIONS    Current Outpatient Prescriptions:   •  acyclovir (ZOVIRAX) 400 MG tablet, Take 1 tablet by mouth 2 (Two) Times a Day for 191 doses. Take no more than 5 doses a day., Disp: 60 tablet, Rfl: 3  •  allopurinol (ZYLOPRIM) 300 MG tablet, TAKE ONE TABLET BY MOUTH TWICE A DAY, Disp: 60 tablet, Rfl: 3  •  dabigatran etexilate (PRADAXA) 150 MG capsu, Take 1 capsule by mouth Every 12 (Twelve) Hours., Disp: 60 capsule, Rfl: 6  •  fluconazole (DIFLUCAN) 200  MG tablet, Take 2 tablets by mouth Daily for 96 doses., Disp: 60 tablet, Rfl: 3  •  ondansetron (ZOFRAN) 4 MG tablet, Take 1 tablet by mouth Every 6 (Six) Hours As Needed for Nausea or Vomiting., Disp: 30 tablet, Rfl: 2  •  potassium chloride (MICRO-K) 10 MEQ CR capsule, Take 2 capsules by mouth 2 (Two) Times a Day With Meals., Disp: 60 capsule, Rfl: 5  •  pyridoxine (VITAMIN B-6) 50 MG tablet tablet, Take 1 tablet by mouth Daily., Disp: 30 tablet, Rfl: 6  •  sennosides-docusate sodium (SENOKOT-S) 8.6-50 MG tablet, Take 2 tablets by mouth 2 (Two) Times a Day As Needed for Constipation., Disp: 60 tablet, Rfl: 1  •  sulfamethoxazole-trimethoprim (BACTRIM DS,SEPTRA DS) 800-160 MG per tablet, Take 1 tablet by mouth 3 (Three) Times a Week., Disp: 12 tablet, Rfl: 3  No current facility-administered medications for this visit.     ALLERGIES:   No Known Allergies    SOCIAL HISTORY:       Social History     Social History   • Marital status: Single     Spouse name: N/A   • Number of children: N/A   • Years of education: N/A     Occupational History   • Dental student      [employer]     Social History Main Topics   • Smoking status: Never Smoker   • Smokeless tobacco: Never Used   • Alcohol use No   • Drug use: No   • Sexual activity: No     Other Topics Concern   • Not on file     Social History Narrative   • No narrative on file         FAMILY HISTORY:  Family History   Problem Relation Age of Onset   • Thyroid disease Mother    • Glaucoma Mother    • Lung cancer Maternal Grandmother    • Hypertension Paternal Grandmother    • Diabetes Paternal Grandmother        Review of Systems   Constitutional: Negative for appetite change, chills, fatigue, fever and unexpected weight change.   HENT:   Negative for mouth sores, nosebleeds, sore throat and trouble swallowing.    Respiratory: Negative for cough and shortness of breath.    Cardiovascular: Negative for chest pain and leg swelling.   Gastrointestinal: Negative  "for abdominal pain, constipation, diarrhea, nausea and vomiting.   Endocrine: Negative for hot flashes.   Genitourinary: Negative for difficulty urinating.    Musculoskeletal: Negative for arthralgias and myalgias.   Skin: Negative for rash.   Neurological: Negative for dizziness and extremity weakness.        See HPI   Hematological: Negative for adenopathy. Does not bruise/bleed easily.   Psychiatric/Behavioral: Negative for sleep disturbance.          Vitals:    08/24/18 0842   BP: 97/67   Pulse: 99   Resp: 14   Temp: 98.5 °F (36.9 °C)   TempSrc: Oral   SpO2: 98%   Weight: 64.4 kg (141 lb 14.4 oz)   Height: 157 cm (61.81\")   PainSc: 0-No pain     Current Status 8/24/2018   ECOG score 0     Physical Exam   Constitutional: She is oriented to person, place, and time. She appears well-developed and well-nourished. No distress.   HENT:   Head: Normocephalic and atraumatic.   Mouth/Throat: Oropharynx is clear and moist and mucous membranes are normal. No oropharyngeal exudate.   Eyes: Pupils are equal, round, and reactive to light.   Neck: Normal range of motion.   Cardiovascular: Normal rate, regular rhythm and normal heart sounds.    No murmur heard.  Pulmonary/Chest: Effort normal and breath sounds normal. No respiratory distress. She has no wheezes. She has no rhonchi. She has no rales.   Abdominal: Soft. Normal appearance and bowel sounds are normal. She exhibits no distension. There is no hepatosplenomegaly.   Musculoskeletal: Normal range of motion. She exhibits no edema.   Neurological: She is alert and oriented to person, place, and time.   Skin: Skin is warm and dry. No rash noted.   Psychiatric: She has a normal mood and affect.   Vitals reviewed.      RECENT LABS:        WBC   Date Value Ref Range Status   08/24/2018 1.81 (C) 4.50 - 10.70 10*3/mm3 Final   08/21/2018 1.35 (C) 4.50 - 10.70 10*3/mm3 Final   08/13/2018 0.46 (C) 4.00 - 10.00 10*3/mm3 Final   08/09/2018 1.64 (L) 4.00 - 10.00 10*3/mm3 Final "   08/06/2018 2.80 (L) 4.00 - 10.00 10*3/mm3 Final   08/04/2018 4.34 (L) 4.50 - 10.70 10*3/mm3 Final   08/03/2018 6.95 4.50 - 10.70 10*3/mm3 Final   08/02/2018 10.24 4.50 - 10.70 10*3/mm3 Final   08/01/2018 10.16 4.50 - 10.70 10*3/mm3 Final   07/31/2018 10.32 4.50 - 10.70 10*3/mm3 Final   07/30/2018 5.52 4.50 - 10.70 10*3/mm3 Final   07/30/2018 6.09 4.00 - 10.00 10*3/mm3 Final   07/26/2018 17.31 (H) 4.00 - 10.00 10*3/mm3 Final   07/23/2018 14.67 (H) 4.00 - 10.00 10*3/mm3 Final   07/19/2018 4.54 4.00 - 10.00 10*3/mm3 Final   07/16/2018 2.76 (L) 4.00 - 10.00 10*3/mm3 Final   07/15/2018 2.89 (L) 4.50 - 10.70 10*3/mm3 Final   07/14/2018 4.42 (L) 4.50 - 10.70 10*3/mm3 Final   07/13/2018 4.91 4.50 - 10.70 10*3/mm3 Final   07/12/2018 10.59 4.50 - 10.70 10*3/mm3 Final   07/11/2018 12.74 (H) 4.50 - 10.70 10*3/mm3 Final   07/10/2018 8.65 4.50 - 10.70 10*3/mm3 Final   07/09/2018 4.85 4.00 - 10.00 10*3/mm3 Final   07/02/2018 14.49 (H) 4.00 - 10.00 10*3/mm3 Final   06/29/2018 15.81 (H) 4.00 - 10.00 10*3/mm3 Final   06/27/2018 0.80 (C) 4.00 - 10.00 10*3/mm3 Final   06/25/2018 1.53 (L) 4.00 - 10.00 10*3/mm3 Final   06/22/2018 3.01 (L) 4.00 - 10.00 10*3/mm3 Final   06/21/2018 3.47 (L) 4.50 - 10.70 10*3/mm3 Final   06/20/2018 3.42 (L) 4.50 - 10.70 10*3/mm3 Final   06/19/2018 6.06 4.50 - 10.70 10*3/mm3 Final   06/18/2018 7.01 4.50 - 10.70 10*3/mm3 Final   06/17/2018 3.49 (L) 4.50 - 10.70 10*3/mm3 Final   06/16/2018 4.59 4.50 - 10.70 10*3/mm3 Final   06/15/2018 3.07 (L) 4.50 - 10.70 10*3/mm3 Final   06/14/2018 3.70 (L) 4.50 - 10.70 10*3/mm3 Final   06/13/2018 4.29 (L) 4.50 - 10.70 10*3/mm3 Final   06/12/2018 4.13 (L) 4.50 - 10.70 10*3/mm3 Final   06/11/2018 4.62 4.50 - 10.70 10*3/mm3 Final   06/11/2018 4.82 4.00 - 10.00 10*3/mm3 Final   06/08/2018 3.41 (L) 4.50 - 10.70 10*3/mm3 Final   06/08/2018 4.30 (C) 4.50 - 10.00 10*3/mm3 Final   06/13/2017 3.3 (L) 3.4 - 10.8 x10E3/uL Final     Hemoglobin   Date Value Ref Range Status   08/24/2018  10.8 (L) 11.9 - 15.5 g/dL Final   08/21/2018 10.6 (L) 11.9 - 15.5 g/dL Final   08/13/2018 8.4 (L) 11.5 - 14.9 g/dL Final   08/09/2018 9.6 (L) 11.5 - 14.9 g/dL Final   08/06/2018 11.3 (L) 11.5 - 14.9 g/dL Final   08/04/2018 10.2 (L) 11.9 - 15.5 g/dL Final   08/03/2018 10.1 (L) 11.9 - 15.5 g/dL Final   08/02/2018 9.8 (L) 11.9 - 15.5 g/dL Final   08/01/2018 9.8 (L) 11.9 - 15.5 g/dL Final   07/31/2018 11.3 (L) 11.9 - 15.5 g/dL Final   07/30/2018 11.0 (L) 11.9 - 15.5 g/dL Final   07/30/2018 10.8 (L) 11.5 - 14.9 g/dL Final   07/26/2018 11.2 (L) 11.5 - 14.9 g/dL Final   07/23/2018 11.0 (L) 11.5 - 14.9 g/dL Final   07/19/2018 10.7 (L) 11.5 - 14.9 g/dL Final   07/16/2018 11.8 11.5 - 14.9 g/dL Final   07/15/2018 10.8 (L) 11.9 - 15.5 g/dL Final   07/14/2018 11.4 (L) 11.9 - 15.5 g/dL Final   07/13/2018 10.5 (L) 11.9 - 15.5 g/dL Final   07/12/2018 10.4 (L) 11.9 - 15.5 g/dL Final   07/11/2018 10.5 (L) 11.9 - 15.5 g/dL Final   07/10/2018 11.6 (L) 11.9 - 15.5 g/dL Final   07/09/2018 11.6 11.5 - 14.9 g/dL Final   07/02/2018 12.7 11.5 - 14.9 g/dL Final   06/29/2018 11.9 11.5 - 14.9 g/dL Final   06/27/2018 12.2 11.5 - 14.9 g/dL Final   06/25/2018 12.8 11.5 - 14.9 g/dL Final   06/22/2018 13.0 11.5 - 14.9 g/dL Final   06/21/2018 11.9 11.9 - 15.5 g/dL Final   06/20/2018 10.8 (L) 11.9 - 15.5 g/dL Final   06/19/2018 10.9 (L) 11.9 - 15.5 g/dL Final   06/18/2018 10.8 (L) 11.9 - 15.5 g/dL Final   06/17/2018 11.2 (L) 11.9 - 15.5 g/dL Final   06/16/2018 12.4 11.9 - 15.5 g/dL Final   06/15/2018 12.7 11.9 - 15.5 g/dL Final   06/14/2018 12.7 11.9 - 15.5 g/dL Final   06/13/2018 12.8 11.9 - 15.5 g/dL Final   06/12/2018 13.2 11.9 - 15.5 g/dL Final   06/11/2018 13.7 11.9 - 15.5 g/dL Final   06/11/2018 13.7 11.5 - 14.9 g/dL Final   06/08/2018 12.6 11.9 - 15.5 g/dL Final   06/08/2018 13.3 12.0 - 18.0 g/dL Final   06/13/2017 13.8 11.1 - 15.9 g/dL Final     Platelets   Date Value Ref Range Status   08/24/2018 279 140 - 500 10*3/mm3 Final   08/21/2018 283  140 - 500 10*3/mm3 Final   08/13/2018 102 (L) 150 - 375 10*3/mm3 Final   08/09/2018 152 150 - 375 10*3/mm3 Final   08/06/2018 250 150 - 375 10*3/mm3 Final   08/04/2018 198 140 - 500 10*3/mm3 Final   08/03/2018 183 140 - 500 10*3/mm3 Final   08/02/2018 174 140 - 500 10*3/mm3 Final   08/01/2018 170 140 - 500 10*3/mm3 Final   07/31/2018 207 140 - 500 10*3/mm3 Final   07/30/2018 181 140 - 500 10*3/mm3 Final   07/30/2018 151 150 - 375 10*3/mm3 Final   07/26/2018 120 (L) 150 - 375 10*3/mm3 Final   07/23/2018 107 (L) 150 - 375 10*3/mm3 Final   07/19/2018 140 (L) 150 - 375 10*3/mm3 Final   07/16/2018 243 150 - 375 10*3/mm3 Final   07/15/2018 208 140 - 500 10*3/mm3 Final   07/14/2018 202 140 - 500 10*3/mm3 Final   07/13/2018 174 140 - 500 10*3/mm3 Final   07/12/2018 169 140 - 500 10*3/mm3 Final   07/11/2018 191 140 - 500 10*3/mm3 Final   07/10/2018 209 140 - 500 10*3/mm3 Final   07/09/2018 179 150 - 375 10*3/mm3 Final   07/02/2018 136 (L) 150 - 375 10*3/mm3 Final   06/29/2018 130 (L) 150 - 375 10*3/mm3 Final   06/27/2018 148 (L) 150 - 375 10*3/mm3 Final   06/25/2018 181 150 - 375 10*3/mm3 Final   06/22/2018 186 150 - 375 10*3/mm3 Final   06/21/2018 150 140 - 500 10*3/mm3 Final   06/20/2018 127 (L) 140 - 500 10*3/mm3 Final   06/19/2018 133 (L) 140 - 500 10*3/mm3 Final   06/18/2018 98 (L) 140 - 500 10*3/mm3 Final   06/17/2018 82 (L) 140 - 500 10*3/mm3 Final   06/16/2018 123 (L) 140 - 500 10*3/mm3 Final   06/15/2018 117 (L) 140 - 500 10*3/mm3 Final   06/14/2018 109 (L) 140 - 500 10*3/mm3 Final   06/13/2018 118 (L) 140 - 500 10*3/mm3 Final   06/12/2018 119 (L) 140 - 500 10*3/mm3 Final   06/11/2018 125 (L) 140 - 500 10*3/mm3 Final   06/11/2018 110 (L) 150 - 375 10*3/mm3 Final   06/08/2018 115 (L) 140 - 500 10*3/mm3 Final   06/08/2018 123 (L) 150 - 450 10*3/mm3 Final   06/13/2017 198 150 - 379 x10E3/uL Final             ASSESSMENT:  *Primary mediastinal large B-cell lymphoma  Planned admission today for cycle 4 DA-EPOCH-R  (planning 6 cycles total) cycles every 21 days. Delayed due to persistent neurtopenia. It was noted today that patient actually received Neupogen 300 mg on day 6, as opposed to Neulasta, which is likely why she remains neutropenic.  After discussion with Dr. Bell, we plan to give th aptient Neupogen 300 mcg today, and Granix 300 mcg tomorrow.  She will return on Monday, 8/27/2018 for reevaluation, and hopefully admission that day to begin cycle 4 DA EPOCH-R.  Dr. Bell has discussed with [facility] pharmacist, and inhibitor and, and the patient will likely receive the same doses with cycle 4 as she received with cycle 3.  We will need to reevaluate her neuropathy on Monday as if it has worsened we may need to dose reduce her vincristine.    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  Constant bilateral fingertip tingling (not pain or numbness). She states it is not at all affecting function.  We will closely monitor.  If this worsens we may need to consider reducing her dose of vincristine.     *Shortness of air with laying flat.  Echocardiogram obtained 8/22/18, EF 59%.  Denies any further complaints of shortness of breath.      *Ovarian suppression with a goal of fertility preservation.  Receiving Zoladex- last given 8/13/18, due again 9/10/18.     *Acute right basilic (upper arm) superficial thrombophlebitis associated with venous catheter 6/18/18.  *Acute right axillary, brachial DVT and acute right basilic (upper arm and forearm) superficial thrombophlebitis associated with venous catheter 6/20/18.  *Chronic right axillary DVT and acute right cephalic (upper arm) thrombophlebitis 7/30/18.  (Occurred while on Xarelto)  Therefore, changed to Pradaxa 150 mg twice per day.     *Venous access.  Radiology unable to place PICC on left side (previously unable to place PICC on right side).  Port, right side by Dr. Hurt, 7/31/18.     *Prophylaxis.  Acyclovir, Diflucan, Bactrim.  Dr. Padilla also plans Levaquin if ANC  <1000.     *Anemia.  Likely due to chemotherapy.  Monitor.  Hb remains stable.  Monitor.     *Hypokalemia.  Continue potassium.         PLAN:  · Neupogen 300 mcg today and return tomorrow to [address] [place] [address] for Granix 300 mcg  · Return Monday, 8/27/2018 for reevaluation by BRANNON Godinez for hopefully admission for C4 D1-5  DA-EPOCH-R (planning 6 cycles total) cycles every 21 days.  (If ANC >1000)  · Dr. Bell has discussed with the [facility] pharmacist, Nevaeh Tillman.  Patient will likely receive the same dose that she received with cycle 3., unless nueropathy has worsened then Vincristine dose may need to be reduced.   · If she is admitted the week of 8/27/18 for cycle #4, then cycle #5 will be due the week of 9/17/18.  Dr. Padilla is working on [facility] for that day.  Dr. Padilla is her usual outpatient oncologist.  · If cycle 4 starts 9/27/18, She will need NEULASTA 9/2/2018 after chemotherapy completes.  This is already been scheduled to be given at [address] [place] [address].    · She will need CBC and CMP every Monday and Thursday, or every Tuesday and Friday (twice per week).  · We scheduled her for  CT CAP with contrast prior to cycle #5.    · Patient will return for follow up with Dr. Padilla on 9/13/2018 to review her scan results prior to cycle 5.  · If PLT <50, hold Pradaxa    Mother assisted with history.

## 2018-08-25 ENCOUNTER — INFUSION (OUTPATIENT)
Dept: ONCOLOGY | Facility: HOSPITAL | Age: 23
End: 2018-08-25

## 2018-08-25 VITALS
TEMPERATURE: 98.2 F | SYSTOLIC BLOOD PRESSURE: 93 MMHG | RESPIRATION RATE: 14 BRPM | HEART RATE: 85 BPM | OXYGEN SATURATION: 97 % | DIASTOLIC BLOOD PRESSURE: 56 MMHG

## 2018-08-25 DIAGNOSIS — C85.28 MEDIASTINAL LARGE B-CELL LYMPHOMA OF LYMPH NODES OF MULTIPLE REGIONS (HCC): Primary | ICD-10-CM

## 2018-08-25 PROCEDURE — 25010000002 TBO-FILGRASTIM 300 MCG/0.5ML SOLUTION PREFILLED SYRINGE: Performed by: INTERNAL MEDICINE

## 2018-08-25 PROCEDURE — 96372 THER/PROPH/DIAG INJ SC/IM: CPT

## 2018-08-25 RX ADMIN — TBO-FILGRASTIM 300 MCG: 300 INJECTION, SOLUTION SUBCUTANEOUS at 12:44

## 2018-08-27 ENCOUNTER — HOSPITAL ENCOUNTER (INPATIENT)
Facility: HOSPITAL | Age: 23
LOS: 5 days | Discharge: HOME | End: 2018-09-01
Attending: INTERNAL MEDICINE | Admitting: INTERNAL MEDICINE

## 2018-08-27 ENCOUNTER — OFFICE VISIT (OUTPATIENT)
Dept: ONCOLOGY | Facility: CLINIC | Age: 23
End: 2018-08-27

## 2018-08-27 ENCOUNTER — READMISSION MANAGEMENT (OUTPATIENT)
Dept: CALL CENTER | Facility: HOSPITAL | Age: 23
End: 2018-08-27

## 2018-08-27 ENCOUNTER — LAB (OUTPATIENT)
Dept: LAB | Facility: HOSPITAL | Age: 23
End: 2018-08-27

## 2018-08-27 VITALS
WEIGHT: 143 LBS | SYSTOLIC BLOOD PRESSURE: 98 MMHG | HEIGHT: 62 IN | HEART RATE: 79 BPM | RESPIRATION RATE: 14 BRPM | BODY MASS INDEX: 26.31 KG/M2 | DIASTOLIC BLOOD PRESSURE: 60 MMHG | OXYGEN SATURATION: 100 % | TEMPERATURE: 98.3 F

## 2018-08-27 DIAGNOSIS — C85.28 MEDIASTINAL LARGE B-CELL LYMPHOMA OF LYMPH NODES OF MULTIPLE REGIONS (HCC): ICD-10-CM

## 2018-08-27 DIAGNOSIS — G62.0 CHEMOTHERAPY-INDUCED NEUROPATHY (HCC): ICD-10-CM

## 2018-08-27 DIAGNOSIS — C85.28 MEDIASTINAL LARGE B-CELL LYMPHOMA OF LYMPH NODES OF MULTIPLE REGIONS (HCC): Primary | ICD-10-CM

## 2018-08-27 DIAGNOSIS — T45.1X5A CHEMOTHERAPY-INDUCED NEUROPATHY (HCC): ICD-10-CM

## 2018-08-27 DIAGNOSIS — R59.1 LYMPHADENOPATHY: Primary | ICD-10-CM

## 2018-08-27 LAB
ALBUMIN SERPL-MCNC: 4.2 G/DL (ref 3.5–5.2)
ALBUMIN/GLOB SERPL: 1.8 G/DL
ALP SERPL-CCNC: 63 U/L (ref 39–117)
ALT SERPL W P-5'-P-CCNC: 15 U/L (ref 1–33)
ANION GAP SERPL CALCULATED.3IONS-SCNC: 10.9 MMOL/L
AST SERPL-CCNC: 19 U/L (ref 1–32)
BILIRUB SERPL-MCNC: <0.2 MG/DL (ref 0.1–1.2)
BUN BLD-MCNC: 8 MG/DL (ref 6–20)
BUN/CREAT SERPL: 16.3 (ref 7–25)
CALCIUM SPEC-SCNC: 9.5 MG/DL (ref 8.6–10.5)
CHLORIDE SERPL-SCNC: 105 MMOL/L (ref 98–107)
CO2 SERPL-SCNC: 26.1 MMOL/L (ref 22–29)
CREAT BLD-MCNC: 0.49 MG/DL (ref 0.57–1)
DACRYOCYTES BLD QL SMEAR: ABNORMAL
DEPRECATED RDW RBC AUTO: 65.5 FL (ref 37–54)
EOSINOPHIL # BLD MANUAL: 0.44 10*3/MM3 (ref 0–0.7)
EOSINOPHIL NFR BLD MANUAL: 3 % (ref 0.3–6.2)
ERYTHROCYTE [DISTWIDTH] IN BLOOD BY AUTOMATED COUNT: 18.5 % (ref 11.7–13)
GFR SERPL CREATININE-BSD FRML MDRD: >150 ML/MIN/1.73
GFR SERPL CREATININE-BSD FRML MDRD: >150 ML/MIN/1.73
GLOBULIN UR ELPH-MCNC: 2.4 GM/DL
GLUCOSE BLD-MCNC: 122 MG/DL (ref 65–99)
HCT VFR BLD AUTO: 33.4 % (ref 35.6–45.5)
HGB BLD-MCNC: 10.9 G/DL (ref 11.9–15.5)
HYPOCHROMIA BLD QL: ABNORMAL
LYMPHOCYTES # BLD MANUAL: 1.17 10*3/MM3 (ref 0.9–4.8)
LYMPHOCYTES NFR BLD MANUAL: 5 % (ref 5–12)
LYMPHOCYTES NFR BLD MANUAL: 8 % (ref 19.6–45.3)
MCH RBC QN AUTO: 31.9 PG (ref 26.9–32)
MCHC RBC AUTO-ENTMCNC: 32.6 G/DL (ref 32.4–36.3)
MCV RBC AUTO: 97.7 FL (ref 80.5–98.2)
METAMYELOCYTES NFR BLD MANUAL: 5 % (ref 0–0)
MONOCYTES # BLD AUTO: 0.73 10*3/MM3 (ref 0.2–1.2)
NEUTROPHILS # BLD AUTO: 11.55 10*3/MM3 (ref 1.9–8.1)
NEUTROPHILS NFR BLD MANUAL: 79 % (ref 42.7–76)
PLAT MORPH BLD: NORMAL
PLATELET # BLD AUTO: 236 10*3/MM3 (ref 140–500)
PMV BLD AUTO: 11.2 FL (ref 6–12)
POTASSIUM BLD-SCNC: 3.7 MMOL/L (ref 3.5–5.2)
PROT SERPL-MCNC: 6.6 G/DL (ref 6–8.5)
RBC # BLD AUTO: 3.42 10*6/MM3 (ref 3.9–5.2)
SCAN SLIDE: NORMAL
SODIUM BLD-SCNC: 142 MMOL/L (ref 136–145)
WBC MORPH BLD: NORMAL
WBC NRBC COR # BLD: 14.62 10*3/MM3 (ref 4.5–10.7)

## 2018-08-27 PROCEDURE — 25010000002 DIPHENHYDRAMINE PER 50 MG: Performed by: INTERNAL MEDICINE

## 2018-08-27 PROCEDURE — 25010000002 ETOPOSIDE 100 MG/5ML SOLUTION 25 ML VIAL: Performed by: INTERNAL MEDICINE

## 2018-08-27 PROCEDURE — 63710000001 PREDNISONE PER 5 MG: Performed by: INTERNAL MEDICINE

## 2018-08-27 PROCEDURE — 85025 COMPLETE CBC W/AUTO DIFF WBC: CPT | Performed by: INTERNAL MEDICINE

## 2018-08-27 PROCEDURE — 3E04305 INTRODUCTION OF OTHER ANTINEOPLASTIC INTO CENTRAL VEIN, PERCUTANEOUS APPROACH: ICD-10-PCS | Performed by: INTERNAL MEDICINE

## 2018-08-27 PROCEDURE — 36416 COLLJ CAPILLARY BLOOD SPEC: CPT | Performed by: NURSE PRACTITIONER

## 2018-08-27 PROCEDURE — 25010000002 RITUXIMAB 10 MG/ML SOLUTION 10 ML VIAL: Performed by: INTERNAL MEDICINE

## 2018-08-27 PROCEDURE — 25010000002 ONDANSETRON PER 1 MG: Performed by: INTERNAL MEDICINE

## 2018-08-27 PROCEDURE — 85025 COMPLETE CBC W/AUTO DIFF WBC: CPT | Performed by: NURSE PRACTITIONER

## 2018-08-27 PROCEDURE — 25010000002 DOXORUBICIN PER 10 MG: Performed by: INTERNAL MEDICINE

## 2018-08-27 PROCEDURE — 25010000002 VINCRISTINE PER 1 MG: Performed by: INTERNAL MEDICINE

## 2018-08-27 PROCEDURE — 80053 COMPREHEN METABOLIC PANEL: CPT | Performed by: INTERNAL MEDICINE

## 2018-08-27 PROCEDURE — 85007 BL SMEAR W/DIFF WBC COUNT: CPT | Performed by: INTERNAL MEDICINE

## 2018-08-27 PROCEDURE — 25010000002 RITUXIMAB 10 MG/ML SOLUTION 50 ML VIAL: Performed by: INTERNAL MEDICINE

## 2018-08-27 PROCEDURE — 99223 1ST HOSP IP/OBS HIGH 75: CPT | Performed by: INTERNAL MEDICINE

## 2018-08-27 RX ORDER — SODIUM CHLORIDE 0.9 % (FLUSH) 0.9 %
10 SYRINGE (ML) INJECTION EVERY 12 HOURS SCHEDULED
Status: DISCONTINUED | OUTPATIENT
Start: 2018-08-27 | End: 2018-09-01 | Stop reason: HOSPADM

## 2018-08-27 RX ORDER — FAMOTIDINE 10 MG/ML
20 INJECTION, SOLUTION INTRAVENOUS AS NEEDED
Status: DISCONTINUED | OUTPATIENT
Start: 2018-08-27 | End: 2018-09-01 | Stop reason: HOSPADM

## 2018-08-27 RX ORDER — SODIUM CHLORIDE 0.9 % (FLUSH) 0.9 %
10 SYRINGE (ML) INJECTION AS NEEDED
Status: CANCELLED | OUTPATIENT
Start: 2018-08-27

## 2018-08-27 RX ORDER — SODIUM CHLORIDE 0.9 % (FLUSH) 0.9 %
10 SYRINGE (ML) INJECTION AS NEEDED
Status: DISCONTINUED | OUTPATIENT
Start: 2018-08-27 | End: 2018-09-01 | Stop reason: HOSPADM

## 2018-08-27 RX ORDER — ALLOPURINOL 300 MG/1
300 TABLET ORAL 2 TIMES DAILY
Status: DISCONTINUED | OUTPATIENT
Start: 2018-08-27 | End: 2018-08-30

## 2018-08-27 RX ORDER — SODIUM CHLORIDE 0.9 % (FLUSH) 0.9 %
20 SYRINGE (ML) INJECTION AS NEEDED
Status: CANCELLED | OUTPATIENT
Start: 2018-08-27

## 2018-08-27 RX ORDER — FLUCONAZOLE 200 MG/1
400 TABLET ORAL EVERY 24 HOURS
Status: DISCONTINUED | OUTPATIENT
Start: 2018-08-27 | End: 2018-09-01 | Stop reason: HOSPADM

## 2018-08-27 RX ORDER — ACYCLOVIR 400 MG/1
400 TABLET ORAL 2 TIMES DAILY
Status: DISCONTINUED | OUTPATIENT
Start: 2018-08-27 | End: 2018-09-01 | Stop reason: HOSPADM

## 2018-08-27 RX ORDER — SULFAMETHOXAZOLE AND TRIMETHOPRIM 800; 160 MG/1; MG/1
1 TABLET ORAL 3 TIMES WEEKLY
Status: DISCONTINUED | OUTPATIENT
Start: 2018-08-27 | End: 2018-09-01 | Stop reason: HOSPADM

## 2018-08-27 RX ORDER — SODIUM CHLORIDE 0.9 % (FLUSH) 0.9 %
10 SYRINGE (ML) INJECTION EVERY 12 HOURS SCHEDULED
Status: CANCELLED | OUTPATIENT
Start: 2018-08-27

## 2018-08-27 RX ORDER — SENNA AND DOCUSATE SODIUM 50; 8.6 MG/1; MG/1
1 TABLET, FILM COATED ORAL 2 TIMES DAILY PRN
Status: DISCONTINUED | OUTPATIENT
Start: 2018-08-27 | End: 2018-09-01 | Stop reason: HOSPADM

## 2018-08-27 RX ORDER — ONDANSETRON 4 MG/1
4 TABLET, FILM COATED ORAL EVERY 6 HOURS PRN
Status: DISCONTINUED | OUTPATIENT
Start: 2018-08-27 | End: 2018-09-01 | Stop reason: HOSPADM

## 2018-08-27 RX ORDER — PANTOPRAZOLE SODIUM 40 MG/1
40 TABLET, DELAYED RELEASE ORAL
Status: COMPLETED | OUTPATIENT
Start: 2018-08-27 | End: 2018-08-31

## 2018-08-27 RX ORDER — SENNA AND DOCUSATE SODIUM 50; 8.6 MG/1; MG/1
1 TABLET, FILM COATED ORAL 2 TIMES DAILY PRN
Status: CANCELLED | OUTPATIENT
Start: 2018-08-27

## 2018-08-27 RX ORDER — LANOLIN ALCOHOL/MO/W.PET/CERES
50 CREAM (GRAM) TOPICAL DAILY
Status: DISCONTINUED | OUTPATIENT
Start: 2018-08-27 | End: 2018-09-01 | Stop reason: HOSPADM

## 2018-08-27 RX ORDER — MEPERIDINE HYDROCHLORIDE 50 MG/ML
25 INJECTION INTRAMUSCULAR; INTRAVENOUS; SUBCUTANEOUS
Status: DISPENSED | OUTPATIENT
Start: 2018-08-27 | End: 2018-08-28

## 2018-08-27 RX ORDER — DABIGATRAN ETEXILATE 150 MG/1
150 CAPSULE ORAL EVERY 12 HOURS SCHEDULED
Status: DISCONTINUED | OUTPATIENT
Start: 2018-08-27 | End: 2018-09-01 | Stop reason: HOSPADM

## 2018-08-27 RX ORDER — DIPHENHYDRAMINE HYDROCHLORIDE 50 MG/ML
50 INJECTION INTRAMUSCULAR; INTRAVENOUS AS NEEDED
Status: DISCONTINUED | OUTPATIENT
Start: 2018-08-27 | End: 2018-09-01 | Stop reason: HOSPADM

## 2018-08-27 RX ORDER — SENNA AND DOCUSATE SODIUM 50; 8.6 MG/1; MG/1
2 TABLET, FILM COATED ORAL 2 TIMES DAILY PRN
Status: DISCONTINUED | OUTPATIENT
Start: 2018-08-27 | End: 2018-09-01 | Stop reason: HOSPADM

## 2018-08-27 RX ORDER — POTASSIUM CHLORIDE 750 MG/1
20 CAPSULE, EXTENDED RELEASE ORAL 2 TIMES DAILY WITH MEALS
Status: DISCONTINUED | OUTPATIENT
Start: 2018-08-27 | End: 2018-09-01 | Stop reason: HOSPADM

## 2018-08-27 RX ORDER — ACETAMINOPHEN 325 MG/1
650 TABLET ORAL ONCE
Status: COMPLETED | OUTPATIENT
Start: 2018-08-27 | End: 2018-08-27

## 2018-08-27 RX ORDER — PREDNISONE 50 MG/1
100 TABLET ORAL 2 TIMES DAILY WITH MEALS
Status: COMPLETED | OUTPATIENT
Start: 2018-08-27 | End: 2018-09-01

## 2018-08-27 RX ORDER — SODIUM CHLORIDE 0.9 % (FLUSH) 0.9 %
20 SYRINGE (ML) INJECTION AS NEEDED
Status: DISCONTINUED | OUTPATIENT
Start: 2018-08-27 | End: 2018-09-01 | Stop reason: HOSPADM

## 2018-08-27 RX ORDER — SODIUM CHLORIDE 9 MG/ML
75 INJECTION, SOLUTION INTRAVENOUS CONTINUOUS
Status: DISCONTINUED | OUTPATIENT
Start: 2018-08-27 | End: 2018-08-30

## 2018-08-27 RX ADMIN — SODIUM CHLORIDE 75 ML/HR: 9 INJECTION, SOLUTION INTRAVENOUS at 12:00

## 2018-08-27 RX ADMIN — SODIUM CHLORIDE 250 ML: 0.9 INJECTION, SOLUTION INTRAVENOUS at 14:40

## 2018-08-27 RX ADMIN — RITUXIMAB 600 MG: 10 INJECTION, SOLUTION INTRAVENOUS at 15:21

## 2018-08-27 RX ADMIN — ALLOPURINOL 300 MG: 300 TABLET ORAL at 20:48

## 2018-08-27 RX ADMIN — Medication 10 ML: at 20:00

## 2018-08-27 RX ADMIN — VINCRISTINE SULFATE: 1 INJECTION, SOLUTION INTRAVENOUS at 18:49

## 2018-08-27 RX ADMIN — Medication 10 ML: at 12:22

## 2018-08-27 RX ADMIN — POTASSIUM CHLORIDE 20 MEQ: 750 CAPSULE, EXTENDED RELEASE ORAL at 18:12

## 2018-08-27 RX ADMIN — PANTOPRAZOLE SODIUM 40 MG: 40 TABLET, DELAYED RELEASE ORAL at 13:44

## 2018-08-27 RX ADMIN — ONDANSETRON 16 MG: 2 INJECTION INTRAMUSCULAR; INTRAVENOUS at 18:12

## 2018-08-27 RX ADMIN — DABIGATRAN ETEXILATE MESYLATE 150 MG: 150 CAPSULE ORAL at 20:49

## 2018-08-27 RX ADMIN — ACYCLOVIR 400 MG: 400 TABLET ORAL at 20:48

## 2018-08-27 RX ADMIN — DIPHENHYDRAMINE HYDROCHLORIDE 25 MG: 50 INJECTION, SOLUTION INTRAMUSCULAR; INTRAVENOUS at 13:44

## 2018-08-27 RX ADMIN — SODIUM CHLORIDE 75 ML/HR: 9 INJECTION, SOLUTION INTRAVENOUS at 22:27

## 2018-08-27 RX ADMIN — ACETAMINOPHEN 650 MG: 325 TABLET, FILM COATED ORAL at 13:44

## 2018-08-27 RX ADMIN — PREDNISONE 100 MG: 50 TABLET ORAL at 18:12

## 2018-08-27 NOTE — H&P
Subjective .     REASON FOR ADMISSION: Primary mediastinal large B-cell lymphoma.  Admission for cycle 4DA- EPOCH-R    HISTORY OF PRESENT ILLNESS:  The patient is a 23 y.o. year old female who is here for follow-up with the above-mentioned history.    History of Present Illness  the patient comes in today for evaluation prior to cycle 4 DA- EPOCH-R planning 6 cycles total) cycles every 21 days.  Delayed secondary to persistent neutropenia however was found the patient actually received Neupogen 300 mcg on day 6 as opposed to Neulasta.  She was given Neupogen but her micrograms on Friday and Granix 300 µg on Saturday and returns today with counts recovered.      She is experiencing neuropathy, this is limited to her fingertips on bilateral hands.  She notes that it is not present when she is at rest however when she is touching something she feels discomfort.  This is not affecting function.  She is able to do buttons, and hold items without dropping them.  She denies any sensitivity in her feet except for one day in her toes.  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    She denies any shortness of breath, lightheadedness, dizziness, or pain.  She did have some hot flashes over the weekend though states that she did not take her temperature though denies any known fever.  She did receive her last dose of Zoladex on 9/10/2018.  These on Pradaxa without bleeding issues per her report.  Her port remains sensitive but no edema or erythema surrounding.    Past Medical History:   Diagnosis Date   • Diffuse large B cell lymphoma (CMS/HCC) 06/2018   • Fracture of lower leg 2000    L   • H/O Lung mass 06/2018   • H/O Pericardial effusion        ONCOLOGIC HISTORY:  (History from previous dates can be found in the  separate document.)    Current Outpatient Prescriptions on File Prior to Visit   Medication Sig Dispense Refill   • acyclovir (ZOVIRAX) 400 MG tablet Take 1 tablet by mouth 2 (Two) Times a Day for 191 doses. Take no more than 5 doses a day. 60 tablet 3   • allopurinol (ZYLOPRIM) 300 MG tablet TAKE ONE TABLET BY MOUTH TWICE A DAY 60 tablet 3   • dabigatran etexilate (PRADAXA) 150 MG capsu Take 1 capsule by mouth Every 12 (Twelve) Hours. 60 capsule 6   • fluconazole (DIFLUCAN) 200 MG tablet Take 2 tablets by mouth Daily for 96 doses. 60 tablet 3   • ondansetron (ZOFRAN) 4 MG tablet Take 1 tablet by mouth Every 6 (Six) Hours As Needed for Nausea or Vomiting. 30 tablet 2   • potassium chloride (MICRO-K) 10 MEQ CR capsule Take 2 capsules by mouth 2 (Two) Times a Day With Meals. 60 capsule 5   • pyridoxine (VITAMIN B-6) 50 MG tablet tablet Take 1 tablet by mouth Daily. 30 tablet 6   • sennosides-docusate sodium (SENOKOT-S) 8.6-50 MG tablet Take 2 tablets by mouth 2 (Two) Times a Day As Needed for Constipation. 60 tablet 1   • sulfamethoxazole-trimethoprim (BACTRIM DS,SEPTRA DS) 800-160 MG per tablet Take 1 tablet by mouth 3 (Three) Times a Week. 12 tablet 3     No current facility-administered medications on file prior to visit.        ALLERGIES:   No Known Allergies    Social History     Social History   • Marital status: Single     Spouse name: N/A   • Number of children: N/A   • Years of education: N/A     Occupational History   • Dental student      [employer]     Social History Main Topics   • Smoking status: Never Smoker   • Smokeless tobacco: Never Used   • Alcohol use No   • Drug use: No   • Sexual activity: No     Other Topics Concern   • Not on file     Social History Narrative   • No narrative on file         Cancer-related family history includes Lung cancer in her maternal grandmother.     Review of Systems   Constitutional: Positive for chills, diaphoresis and fatigue. Negative for fever and  unexpected weight change.   HENT: Negative for mouth sores and nosebleeds.    Eyes: Negative for itching and visual disturbance.   Respiratory: Negative for cough, chest tightness, shortness of breath and wheezing.    Cardiovascular: Negative for chest pain, palpitations and leg swelling.   Gastrointestinal: Negative for blood in stool, constipation, diarrhea, nausea and vomiting.   Genitourinary: Negative for dysuria and hematuria.   Musculoskeletal: Negative for arthralgias and joint swelling.   Skin: Negative for color change and rash.   Neurological: Positive for numbness. Negative for dizziness.   Hematological: Negative for adenopathy. Does not bruise/bleed easily.   Psychiatric/Behavioral: Negative for confusion and sleep disturbance.       Objective      Vitals:    08/27/18 0841   BP: 98/60   Pulse: 79   Resp: 14   Temp: 98.3 °F (36.8 °C)   SpO2: 100%      Current Status 8/27/2018   ECOG score 0       Physical Exam   Constitutional: She is oriented to person, place, and time. She appears well-developed and well-nourished. No distress.   HENT:   Head: Normocephalic.   Mouth/Throat: No oropharyngeal exudate.   Eyes: Pupils are equal, round, and reactive to light. EOM are normal.   Neck: Normal range of motion. Neck supple. No JVD present.   Cardiovascular: Normal rate and regular rhythm.    No murmur heard.  Pulmonary/Chest: Effort normal and breath sounds normal. No respiratory distress. She has no wheezes.   Abdominal: Soft. Bowel sounds are normal.   Musculoskeletal: Normal range of motion. She exhibits no edema.   Neurological: She is oriented to person, place, and time.   Skin: Skin is warm and dry. No rash noted.   Psychiatric: She has a normal mood and affect. Her behavior is normal.   Vitals reviewed.        RECENT LABS:  Hematology WBC   Date Value Ref Range Status   08/27/2018 19.08 (H) 4.00 - 10.00 10*3/mm3 Final     RBC   Date Value Ref Range Status   08/27/2018 3.77 (L) 3.90 - 5.00 10*6/mm3 Final      Hemoglobin   Date Value Ref Range Status   08/27/2018 12.0 11.5 - 14.9 g/dL Final     Hematocrit   Date Value Ref Range Status   08/27/2018 36.2 34.0 - 45.0 % Final     Platelets   Date Value Ref Range Status   08/27/2018 249 150 - 375 10*3/mm3 Final        Assessment/Plan     1.  Primary mediastinal large B-cell lymphoma.  Planned admission today for cycle 4 DA-EPOCH-R (planning 6 cycles total) cycles every 21 days. Delayed due to persistent neurtopenia. It was noted today that patient actually received Neupogen 300 mcg on day 6, as opposed to Neulasta.  He received Neupogen 300 µg on Friday and Granix 300 µg on Saturday.  She is ready to proceed with admission today.  Doses will remain the same as cycle 3 per Dr. rocha's previous note.  This was discussed with the inpatient pharmacist, Quique, today.    2.  A secondary to vincristine.  The patient has tingling in her bilateral fingertips, mainly noted with touching items.  She states this is uncomfortable when she touches items.  Is not present at rest.  She has no difficulty with any functions including you've doing buttons are holding items.  She denies any sensitivity in her feet except for one day that was in her toes only.  This is discussed with Dr. Padilla today.  Dr. Padilla did come in to the room and discussed with the patient again.  At this time we will maintain her current vincristine dose.  If she noticed neuropathy worsening and she will alert nursing he will discuss this with Dr. Gagnon who is rounding at Hospital this week.  At that time we can reduce the dose of vincristine however Dr. Padilla wants to maintain current dose.  A consider Neurontin in the future however the patient wished to wait until after treatment this week as she did not want to mask the symptoms.  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    3.  Previous shortness of breath with lying flat.  The patient did have an echocardiogram May 20 218 that  showed an ejection fraction 59%.  She denies any further shortness of breath currently.    4.  .  Suppression with goal of fertility preservation.  The patient is receiving Zoladex.  Next dose due 9/10/2018.  Added this to her schedule.    5.  Acute right basilic (upper arm) superficial thrombophlebitis associated with venous catheter 6/18/18.  &*Acute right axillary, brachial DVT and acute right basilic (upper arm and forearm) superficial thrombophlebitis associated with venous catheter 6/20/18.  *Chronic right axillary DVT and acute right cephalic (upper arm) thrombophlebitis 7/30/18.  (Occurred while on Xarelto)  Therefore, changed to Pradaxa 150 mg twice per day.    6.  Venous access.  Port right upper chest by Dr. Triplett, 7/31/2018.    7.  Prophylaxis.  Acyclovir, Diflucan, Bactrim.  Dr. Padilla also plans Levaquin if ANC <1000.    8.  Anemia.  Likely due to chemotherapy.  Monitor.  Hb remains stable.  Normal today.    9.  Previous hypokalemia.  Maintained on potassium replacement.    PLAN:  · Admission for C4 D1-5  DA-EPOCH-R (planning 6 cycles total) cycles every 21 days.    ? Verified with pharmacist on [address] [place] [address], that cycle 4 will continue with the same doses as cycle 3 including vincristine.   ? cycle #5 will be due the week of 9/17/18.    Dr. Padilla will see 9/13/18 and review scans.  · She will need NEULASTA 9/2/2018 after chemotherapy completes.  This is already been scheduled to be given at [address] [place] [address] on 9/2/18.    · She will need CBC and CMP every Monday and Thursday, or every Tuesday and Friday (twice per week).  · We scheduled her for  CT CAP with contrast prior to cycle #5.    · Due for Zoladex 9/10/2017.  · If PLT <50, hold Pradaxa              Cc:  Angela Cox, BRANNON

## 2018-08-27 NOTE — OUTREACH NOTE
General Surgery Week 3 Survey      Responses   Facility patient discharged from?  [facility]   Does the patient have one of the following disease processes/diagnoses(primary or secondary)?  General Surgery   Week 3 attempt successful?  No   Unsuccessful attempts  Attempt 2   Revoke  Readmitted   Revoked  No further contact(revokes)-requires comment   Graduated/Revoked comments  readmitted          Marilee Leon RN

## 2018-08-28 ENCOUNTER — APPOINTMENT (OUTPATIENT)
Dept: PHYSICAL THERAPY | Facility: HOSPITAL | Age: 23
End: 2018-08-28
Attending: INTERNAL MEDICINE

## 2018-08-28 LAB
ALBUMIN SERPL-MCNC: 3.8 G/DL (ref 3.5–5.2)
ALBUMIN/GLOB SERPL: 1.6 G/DL
ALP SERPL-CCNC: 61 U/L (ref 39–117)
ALT SERPL W P-5'-P-CCNC: 15 U/L (ref 1–33)
ANION GAP SERPL CALCULATED.3IONS-SCNC: 11.6 MMOL/L
ANISOCYTOSIS BLD QL: ABNORMAL
AST SERPL-CCNC: 17 U/L (ref 1–32)
BILIRUB SERPL-MCNC: <0.2 MG/DL (ref 0.1–1.2)
BUN BLD-MCNC: 8 MG/DL (ref 6–20)
BUN/CREAT SERPL: 15.7 (ref 7–25)
CALCIUM SPEC-SCNC: 8.9 MG/DL (ref 8.6–10.5)
CHLORIDE SERPL-SCNC: 107 MMOL/L (ref 98–107)
CO2 SERPL-SCNC: 23.4 MMOL/L (ref 22–29)
CREAT BLD-MCNC: 0.51 MG/DL (ref 0.57–1)
DACRYOCYTES BLD QL SMEAR: ABNORMAL
DEPRECATED RDW RBC AUTO: 63.1 FL (ref 37–54)
ERYTHROCYTE [DISTWIDTH] IN BLOOD BY AUTOMATED COUNT: 17.8 % (ref 11.7–13)
GFR SERPL CREATININE-BSD FRML MDRD: 149 ML/MIN/1.73
GFR SERPL CREATININE-BSD FRML MDRD: >150 ML/MIN/1.73
GLOBULIN UR ELPH-MCNC: 2.4 GM/DL
GLUCOSE BLD-MCNC: 147 MG/DL (ref 65–99)
HCT VFR BLD AUTO: 32.6 % (ref 35.6–45.5)
HGB BLD-MCNC: 10.5 G/DL (ref 11.9–15.5)
HYPOCHROMIA BLD QL: ABNORMAL
LYMPHOCYTES # BLD MANUAL: 1.11 10*3/MM3 (ref 0.9–4.8)
LYMPHOCYTES NFR BLD MANUAL: 4 % (ref 5–12)
LYMPHOCYTES NFR BLD MANUAL: 7 % (ref 19.6–45.3)
MCH RBC QN AUTO: 31.5 PG (ref 26.9–32)
MCHC RBC AUTO-ENTMCNC: 32.2 G/DL (ref 32.4–36.3)
MCV RBC AUTO: 97.9 FL (ref 80.5–98.2)
METAMYELOCYTES NFR BLD MANUAL: 1 % (ref 0–0)
MONOCYTES # BLD AUTO: 0.63 10*3/MM3 (ref 0.2–1.2)
MYELOCYTES NFR BLD MANUAL: 1 % (ref 0–0)
NEUTROPHILS # BLD AUTO: 13.8 10*3/MM3 (ref 1.9–8.1)
NEUTROPHILS NFR BLD MANUAL: 87 % (ref 42.7–76)
OVALOCYTES BLD QL SMEAR: ABNORMAL
PLAT MORPH BLD: NORMAL
PLATELET # BLD AUTO: 212 10*3/MM3 (ref 140–500)
PMV BLD AUTO: 11.1 FL (ref 6–12)
POTASSIUM BLD-SCNC: 3.9 MMOL/L (ref 3.5–5.2)
PROT SERPL-MCNC: 6.2 G/DL (ref 6–8.5)
RBC # BLD AUTO: 3.33 10*6/MM3 (ref 3.9–5.2)
SCAN SLIDE: NORMAL
SODIUM BLD-SCNC: 142 MMOL/L (ref 136–145)
WBC MORPH BLD: NORMAL
WBC NRBC COR # BLD: 15.86 10*3/MM3 (ref 4.5–10.7)

## 2018-08-28 PROCEDURE — 99232 SBSQ HOSP IP/OBS MODERATE 35: CPT | Performed by: INTERNAL MEDICINE

## 2018-08-28 PROCEDURE — 25010000002 ETOPOSIDE 100 MG/5ML SOLUTION 25 ML VIAL: Performed by: INTERNAL MEDICINE

## 2018-08-28 PROCEDURE — 80053 COMPREHEN METABOLIC PANEL: CPT | Performed by: INTERNAL MEDICINE

## 2018-08-28 PROCEDURE — 25010000002 VINCRISTINE PER 1 MG: Performed by: INTERNAL MEDICINE

## 2018-08-28 PROCEDURE — 85025 COMPLETE CBC W/AUTO DIFF WBC: CPT | Performed by: INTERNAL MEDICINE

## 2018-08-28 PROCEDURE — 25010000002 DOXORUBICIN PER 10 MG: Performed by: INTERNAL MEDICINE

## 2018-08-28 PROCEDURE — 85007 BL SMEAR W/DIFF WBC COUNT: CPT | Performed by: INTERNAL MEDICINE

## 2018-08-28 PROCEDURE — 25010000002 ONDANSETRON PER 1 MG: Performed by: INTERNAL MEDICINE

## 2018-08-28 PROCEDURE — 63710000001 PREDNISONE PER 5 MG: Performed by: INTERNAL MEDICINE

## 2018-08-28 RX ADMIN — POTASSIUM CHLORIDE 20 MEQ: 750 CAPSULE, EXTENDED RELEASE ORAL at 08:38

## 2018-08-28 RX ADMIN — ALLOPURINOL 300 MG: 300 TABLET ORAL at 08:37

## 2018-08-28 RX ADMIN — FLUCONAZOLE 400 MG: 200 TABLET ORAL at 11:37

## 2018-08-28 RX ADMIN — PANTOPRAZOLE SODIUM 40 MG: 40 TABLET, DELAYED RELEASE ORAL at 06:38

## 2018-08-28 RX ADMIN — PREDNISONE 100 MG: 50 TABLET ORAL at 17:16

## 2018-08-28 RX ADMIN — DABIGATRAN ETEXILATE MESYLATE 150 MG: 150 CAPSULE ORAL at 08:37

## 2018-08-28 RX ADMIN — PREDNISONE 100 MG: 50 TABLET ORAL at 08:37

## 2018-08-28 RX ADMIN — ACYCLOVIR 400 MG: 400 TABLET ORAL at 20:59

## 2018-08-28 RX ADMIN — Medication 50 MG: at 08:37

## 2018-08-28 RX ADMIN — ALLOPURINOL 300 MG: 300 TABLET ORAL at 20:59

## 2018-08-28 RX ADMIN — POTASSIUM CHLORIDE 20 MEQ: 750 CAPSULE, EXTENDED RELEASE ORAL at 17:16

## 2018-08-28 RX ADMIN — ONDANSETRON 16 MG: 2 INJECTION INTRAMUSCULAR; INTRAVENOUS at 17:49

## 2018-08-28 RX ADMIN — ACYCLOVIR 400 MG: 400 TABLET ORAL at 08:37

## 2018-08-28 RX ADMIN — Medication 10 ML: at 21:00

## 2018-08-28 RX ADMIN — DABIGATRAN ETEXILATE MESYLATE 150 MG: 150 CAPSULE ORAL at 20:59

## 2018-08-28 RX ADMIN — ETOPOSIDE: 20 INJECTION, SOLUTION, CONCENTRATE INTRAVENOUS at 18:31

## 2018-08-28 RX ADMIN — Medication 10 ML: at 08:39

## 2018-08-29 LAB
ALBUMIN SERPL-MCNC: 4.3 G/DL (ref 3.5–5.2)
ALBUMIN/GLOB SERPL: 2.2 G/DL
ALP SERPL-CCNC: 60 U/L (ref 39–117)
ALT SERPL W P-5'-P-CCNC: 14 U/L (ref 1–33)
ANION GAP SERPL CALCULATED.3IONS-SCNC: 10.8 MMOL/L
ANISOCYTOSIS BLD QL: ABNORMAL
AST SERPL-CCNC: 16 U/L (ref 1–32)
BILIRUB SERPL-MCNC: <0.2 MG/DL (ref 0.1–1.2)
BUN BLD-MCNC: 9 MG/DL (ref 6–20)
BUN/CREAT SERPL: 16.7 (ref 7–25)
CALCIUM SPEC-SCNC: 9 MG/DL (ref 8.6–10.5)
CHLORIDE SERPL-SCNC: 105 MMOL/L (ref 98–107)
CO2 SERPL-SCNC: 27.2 MMOL/L (ref 22–29)
CREAT BLD-MCNC: 0.54 MG/DL (ref 0.57–1)
DACRYOCYTES BLD QL SMEAR: ABNORMAL
DEPRECATED RDW RBC AUTO: 63.2 FL (ref 37–54)
ERYTHROCYTE [DISTWIDTH] IN BLOOD BY AUTOMATED COUNT: 17.7 % (ref 11.7–13)
GFR SERPL CREATININE-BSD FRML MDRD: 140 ML/MIN/1.73
GFR SERPL CREATININE-BSD FRML MDRD: >150 ML/MIN/1.73
GLOBULIN UR ELPH-MCNC: 2 GM/DL
GLUCOSE BLD-MCNC: 138 MG/DL (ref 65–99)
HCT VFR BLD AUTO: 33.6 % (ref 35.6–45.5)
HGB BLD-MCNC: 10.6 G/DL (ref 11.9–15.5)
HYPOCHROMIA BLD QL: ABNORMAL
LYMPHOCYTES # BLD MANUAL: 0.59 10*3/MM3 (ref 0.9–4.8)
LYMPHOCYTES NFR BLD MANUAL: 5 % (ref 19.6–45.3)
LYMPHOCYTES NFR BLD MANUAL: 5 % (ref 5–12)
MCH RBC QN AUTO: 30.9 PG (ref 26.9–32)
MCHC RBC AUTO-ENTMCNC: 31.5 G/DL (ref 32.4–36.3)
MCV RBC AUTO: 98 FL (ref 80.5–98.2)
METAMYELOCYTES NFR BLD MANUAL: 1 % (ref 0–0)
MONOCYTES # BLD AUTO: 0.59 10*3/MM3 (ref 0.2–1.2)
MYELOCYTES NFR BLD MANUAL: 2 % (ref 0–0)
NEUTROPHILS # BLD AUTO: 10.23 10*3/MM3 (ref 1.9–8.1)
NEUTROPHILS NFR BLD MANUAL: 87 % (ref 42.7–76)
NRBC SPEC MANUAL: 1 /100 WBC (ref 0–0)
OVALOCYTES BLD QL SMEAR: ABNORMAL
PLAT MORPH BLD: NORMAL
PLATELET # BLD AUTO: 187 10*3/MM3 (ref 140–500)
PMV BLD AUTO: 11 FL (ref 6–12)
POTASSIUM BLD-SCNC: 3.5 MMOL/L (ref 3.5–5.2)
PROT SERPL-MCNC: 6.3 G/DL (ref 6–8.5)
RBC # BLD AUTO: 3.43 10*6/MM3 (ref 3.9–5.2)
SCAN SLIDE: NORMAL
SODIUM BLD-SCNC: 143 MMOL/L (ref 136–145)
WBC MORPH BLD: NORMAL
WBC NRBC COR # BLD: 11.76 10*3/MM3 (ref 4.5–10.7)

## 2018-08-29 PROCEDURE — 80053 COMPREHEN METABOLIC PANEL: CPT | Performed by: INTERNAL MEDICINE

## 2018-08-29 PROCEDURE — 85025 COMPLETE CBC W/AUTO DIFF WBC: CPT | Performed by: INTERNAL MEDICINE

## 2018-08-29 PROCEDURE — 85007 BL SMEAR W/DIFF WBC COUNT: CPT | Performed by: INTERNAL MEDICINE

## 2018-08-29 PROCEDURE — 63710000001 PREDNISONE PER 5 MG: Performed by: INTERNAL MEDICINE

## 2018-08-29 PROCEDURE — 99232 SBSQ HOSP IP/OBS MODERATE 35: CPT | Performed by: INTERNAL MEDICINE

## 2018-08-29 PROCEDURE — 25010000002 VINCRISTINE PER 1 MG: Performed by: INTERNAL MEDICINE

## 2018-08-29 PROCEDURE — 25010000002 ETOPOSIDE 100 MG/5ML SOLUTION 25 ML VIAL: Performed by: INTERNAL MEDICINE

## 2018-08-29 PROCEDURE — 25010000002 DOXORUBICIN PER 10 MG: Performed by: INTERNAL MEDICINE

## 2018-08-29 PROCEDURE — 25010000002 ONDANSETRON PER 1 MG: Performed by: INTERNAL MEDICINE

## 2018-08-29 RX ADMIN — SULFAMETHOXAZOLE AND TRIMETHOPRIM 160 MG: 800; 160 TABLET ORAL at 09:02

## 2018-08-29 RX ADMIN — Medication 10 ML: at 09:02

## 2018-08-29 RX ADMIN — DABIGATRAN ETEXILATE MESYLATE 150 MG: 150 CAPSULE ORAL at 09:02

## 2018-08-29 RX ADMIN — PREDNISONE 100 MG: 50 TABLET ORAL at 09:02

## 2018-08-29 RX ADMIN — SODIUM CHLORIDE 75 ML/HR: 9 INJECTION, SOLUTION INTRAVENOUS at 03:30

## 2018-08-29 RX ADMIN — ALLOPURINOL 300 MG: 300 TABLET ORAL at 09:02

## 2018-08-29 RX ADMIN — FLUCONAZOLE 400 MG: 200 TABLET ORAL at 12:22

## 2018-08-29 RX ADMIN — PANTOPRAZOLE SODIUM 40 MG: 40 TABLET, DELAYED RELEASE ORAL at 05:41

## 2018-08-29 RX ADMIN — Medication 50 MG: at 09:02

## 2018-08-29 RX ADMIN — ALLOPURINOL 300 MG: 300 TABLET ORAL at 22:34

## 2018-08-29 RX ADMIN — ONDANSETRON 16 MG: 2 INJECTION INTRAMUSCULAR; INTRAVENOUS at 18:02

## 2018-08-29 RX ADMIN — DABIGATRAN ETEXILATE MESYLATE 150 MG: 150 CAPSULE ORAL at 22:35

## 2018-08-29 RX ADMIN — VINCRISTINE SULFATE: 1 INJECTION, SOLUTION INTRAVENOUS at 18:42

## 2018-08-29 RX ADMIN — Medication 10 ML: at 22:35

## 2018-08-29 RX ADMIN — PREDNISONE 100 MG: 50 TABLET ORAL at 18:07

## 2018-08-29 RX ADMIN — ACYCLOVIR 400 MG: 400 TABLET ORAL at 09:03

## 2018-08-29 RX ADMIN — POTASSIUM CHLORIDE 20 MEQ: 750 CAPSULE, EXTENDED RELEASE ORAL at 09:02

## 2018-08-29 RX ADMIN — POTASSIUM CHLORIDE 20 MEQ: 750 CAPSULE, EXTENDED RELEASE ORAL at 18:06

## 2018-08-29 RX ADMIN — SODIUM CHLORIDE 75 ML/HR: 9 INJECTION, SOLUTION INTRAVENOUS at 14:41

## 2018-08-29 RX ADMIN — ACYCLOVIR 400 MG: 400 TABLET ORAL at 22:34

## 2018-08-30 LAB
ALBUMIN SERPL-MCNC: 4.4 G/DL (ref 3.5–5.2)
ALBUMIN/GLOB SERPL: 2.4 G/DL
ALP SERPL-CCNC: 53 U/L (ref 39–117)
ALT SERPL W P-5'-P-CCNC: 14 U/L (ref 1–33)
ANION GAP SERPL CALCULATED.3IONS-SCNC: 9.8 MMOL/L
ANISOCYTOSIS BLD QL: ABNORMAL
AST SERPL-CCNC: 15 U/L (ref 1–32)
BILIRUB SERPL-MCNC: 0.2 MG/DL (ref 0.1–1.2)
BUN BLD-MCNC: 9 MG/DL (ref 6–20)
BUN/CREAT SERPL: 19.1 (ref 7–25)
CALCIUM SPEC-SCNC: 9.4 MG/DL (ref 8.6–10.5)
CHLORIDE SERPL-SCNC: 104 MMOL/L (ref 98–107)
CO2 SERPL-SCNC: 27.2 MMOL/L (ref 22–29)
CREAT BLD-MCNC: 0.47 MG/DL (ref 0.57–1)
DACRYOCYTES BLD QL SMEAR: ABNORMAL
DEPRECATED RDW RBC AUTO: 62 FL (ref 37–54)
EOSINOPHIL # BLD MANUAL: 0.06 10*3/MM3 (ref 0–0.7)
EOSINOPHIL NFR BLD MANUAL: 1 % (ref 0.3–6.2)
ERYTHROCYTE [DISTWIDTH] IN BLOOD BY AUTOMATED COUNT: 17.5 % (ref 11.7–13)
GFR SERPL CREATININE-BSD FRML MDRD: >150 ML/MIN/1.73
GFR SERPL CREATININE-BSD FRML MDRD: >150 ML/MIN/1.73
GLOBULIN UR ELPH-MCNC: 1.8 GM/DL
GLUCOSE BLD-MCNC: 122 MG/DL (ref 65–99)
HCT VFR BLD AUTO: 33 % (ref 35.6–45.5)
HGB BLD-MCNC: 10.5 G/DL (ref 11.9–15.5)
HYPOCHROMIA BLD QL: ABNORMAL
LYMPHOCYTES # BLD MANUAL: 0.54 10*3/MM3 (ref 0.9–4.8)
LYMPHOCYTES NFR BLD MANUAL: 4 % (ref 5–12)
LYMPHOCYTES NFR BLD MANUAL: 9 % (ref 19.6–45.3)
MCH RBC QN AUTO: 31.2 PG (ref 26.9–32)
MCHC RBC AUTO-ENTMCNC: 31.8 G/DL (ref 32.4–36.3)
MCV RBC AUTO: 97.9 FL (ref 80.5–98.2)
METAMYELOCYTES NFR BLD MANUAL: 3 % (ref 0–0)
MONOCYTES # BLD AUTO: 0.24 10*3/MM3 (ref 0.2–1.2)
NEUTROPHILS # BLD AUTO: 4.85 10*3/MM3 (ref 1.9–8.1)
NEUTROPHILS NFR BLD MANUAL: 81 % (ref 42.7–76)
PLAT MORPH BLD: NORMAL
PLATELET # BLD AUTO: 172 10*3/MM3 (ref 140–500)
PMV BLD AUTO: 11.5 FL (ref 6–12)
POTASSIUM BLD-SCNC: 3.2 MMOL/L (ref 3.5–5.2)
PROT SERPL-MCNC: 6.2 G/DL (ref 6–8.5)
RBC # BLD AUTO: 3.37 10*6/MM3 (ref 3.9–5.2)
SCAN SLIDE: NORMAL
SODIUM BLD-SCNC: 141 MMOL/L (ref 136–145)
STOMATOCYTES BLD QL SMEAR: ABNORMAL
VARIANT LYMPHS NFR BLD MANUAL: 2 % (ref 0–5)
WBC MORPH BLD: NORMAL
WBC NRBC COR # BLD: 5.99 10*3/MM3 (ref 4.5–10.7)

## 2018-08-30 PROCEDURE — 25010000002 DOXORUBICIN PER 10 MG: Performed by: INTERNAL MEDICINE

## 2018-08-30 PROCEDURE — 80053 COMPREHEN METABOLIC PANEL: CPT | Performed by: INTERNAL MEDICINE

## 2018-08-30 PROCEDURE — 25010000002 ONDANSETRON PER 1 MG: Performed by: INTERNAL MEDICINE

## 2018-08-30 PROCEDURE — 25010000002 ETOPOSIDE 100 MG/5ML SOLUTION 25 ML VIAL: Performed by: INTERNAL MEDICINE

## 2018-08-30 PROCEDURE — 25010000002 VINCRISTINE PER 1 MG: Performed by: INTERNAL MEDICINE

## 2018-08-30 PROCEDURE — 99232 SBSQ HOSP IP/OBS MODERATE 35: CPT | Performed by: INTERNAL MEDICINE

## 2018-08-30 PROCEDURE — 25810000003 SODIUM CHLORIDE 0.9 % WITH KCL 20 MEQ 20-0.9 MEQ/L-% SOLUTION: Performed by: INTERNAL MEDICINE

## 2018-08-30 PROCEDURE — 85007 BL SMEAR W/DIFF WBC COUNT: CPT | Performed by: INTERNAL MEDICINE

## 2018-08-30 PROCEDURE — 63710000001 PREDNISONE PER 5 MG: Performed by: INTERNAL MEDICINE

## 2018-08-30 PROCEDURE — 85025 COMPLETE CBC W/AUTO DIFF WBC: CPT | Performed by: INTERNAL MEDICINE

## 2018-08-30 RX ORDER — SODIUM CHLORIDE AND POTASSIUM CHLORIDE 150; 900 MG/100ML; MG/100ML
75 INJECTION, SOLUTION INTRAVENOUS CONTINUOUS
Status: DISCONTINUED | OUTPATIENT
Start: 2018-08-30 | End: 2018-09-01 | Stop reason: HOSPADM

## 2018-08-30 RX ORDER — ALLOPURINOL 300 MG/1
300 TABLET ORAL DAILY
Status: DISCONTINUED | OUTPATIENT
Start: 2018-08-31 | End: 2018-09-01 | Stop reason: HOSPADM

## 2018-08-30 RX ADMIN — Medication 50 MG: at 08:33

## 2018-08-30 RX ADMIN — VINCRISTINE SULFATE: 1 INJECTION, SOLUTION INTRAVENOUS at 18:02

## 2018-08-30 RX ADMIN — ALLOPURINOL 300 MG: 300 TABLET ORAL at 08:33

## 2018-08-30 RX ADMIN — PANTOPRAZOLE SODIUM 40 MG: 40 TABLET, DELAYED RELEASE ORAL at 06:20

## 2018-08-30 RX ADMIN — PREDNISONE 100 MG: 50 TABLET ORAL at 17:51

## 2018-08-30 RX ADMIN — FLUCONAZOLE 400 MG: 200 TABLET ORAL at 12:06

## 2018-08-30 RX ADMIN — PREDNISONE 100 MG: 50 TABLET ORAL at 08:33

## 2018-08-30 RX ADMIN — Medication 10 ML: at 21:57

## 2018-08-30 RX ADMIN — ONDANSETRON 16 MG: 2 INJECTION INTRAMUSCULAR; INTRAVENOUS at 17:45

## 2018-08-30 RX ADMIN — POTASSIUM CHLORIDE 20 MEQ: 750 CAPSULE, EXTENDED RELEASE ORAL at 08:33

## 2018-08-30 RX ADMIN — Medication 10 ML: at 08:34

## 2018-08-30 RX ADMIN — ACYCLOVIR 400 MG: 400 TABLET ORAL at 21:56

## 2018-08-30 RX ADMIN — DABIGATRAN ETEXILATE MESYLATE 150 MG: 150 CAPSULE ORAL at 08:33

## 2018-08-30 RX ADMIN — POTASSIUM CHLORIDE AND SODIUM CHLORIDE 75 ML/HR: 900; 150 INJECTION, SOLUTION INTRAVENOUS at 14:24

## 2018-08-30 RX ADMIN — DABIGATRAN ETEXILATE MESYLATE 150 MG: 150 CAPSULE ORAL at 21:56

## 2018-08-30 RX ADMIN — POTASSIUM CHLORIDE 20 MEQ: 750 CAPSULE, EXTENDED RELEASE ORAL at 17:51

## 2018-08-30 RX ADMIN — SODIUM CHLORIDE 75 ML/HR: 9 INJECTION, SOLUTION INTRAVENOUS at 04:24

## 2018-08-30 RX ADMIN — ACYCLOVIR 400 MG: 400 TABLET ORAL at 08:33

## 2018-08-31 LAB
ALBUMIN SERPL-MCNC: 4.4 G/DL (ref 3.5–5.2)
ALBUMIN/GLOB SERPL: 1.9 G/DL
ALP SERPL-CCNC: 53 U/L (ref 39–117)
ALT SERPL W P-5'-P-CCNC: 24 U/L (ref 1–33)
ANION GAP SERPL CALCULATED.3IONS-SCNC: 10.7 MMOL/L
AST SERPL-CCNC: 19 U/L (ref 1–32)
BASOPHILS # BLD AUTO: 0 10*3/MM3 (ref 0–0.2)
BASOPHILS NFR BLD AUTO: 0 % (ref 0–1.5)
BILIRUB SERPL-MCNC: 0.3 MG/DL (ref 0.1–1.2)
BUN BLD-MCNC: 11 MG/DL (ref 6–20)
BUN/CREAT SERPL: 21.6 (ref 7–25)
CALCIUM SPEC-SCNC: 9.6 MG/DL (ref 8.6–10.5)
CHLORIDE SERPL-SCNC: 101 MMOL/L (ref 98–107)
CO2 SERPL-SCNC: 28.3 MMOL/L (ref 22–29)
CREAT BLD-MCNC: 0.51 MG/DL (ref 0.57–1)
DEPRECATED RDW RBC AUTO: 58.2 FL (ref 37–54)
EOSINOPHIL # BLD AUTO: 0 10*3/MM3 (ref 0–0.7)
EOSINOPHIL NFR BLD AUTO: 0 % (ref 0.3–6.2)
ERYTHROCYTE [DISTWIDTH] IN BLOOD BY AUTOMATED COUNT: 16.8 % (ref 11.7–13)
GFR SERPL CREATININE-BSD FRML MDRD: 149 ML/MIN/1.73
GFR SERPL CREATININE-BSD FRML MDRD: >150 ML/MIN/1.73
GLOBULIN UR ELPH-MCNC: 2.3 GM/DL
GLUCOSE BLD-MCNC: 106 MG/DL (ref 65–99)
HCT VFR BLD AUTO: 34.9 % (ref 35.6–45.5)
HGB BLD-MCNC: 11.3 G/DL (ref 11.9–15.5)
IMM GRANULOCYTES # BLD: 0.11 10*3/MM3 (ref 0–0.03)
IMM GRANULOCYTES NFR BLD: 4.3 % (ref 0–0.5)
LYMPHOCYTES # BLD AUTO: 0.28 10*3/MM3 (ref 0.9–4.8)
LYMPHOCYTES NFR BLD AUTO: 11 % (ref 19.6–45.3)
MCH RBC QN AUTO: 30.9 PG (ref 26.9–32)
MCHC RBC AUTO-ENTMCNC: 32.4 G/DL (ref 32.4–36.3)
MCV RBC AUTO: 95.4 FL (ref 80.5–98.2)
MONOCYTES # BLD AUTO: 0.19 10*3/MM3 (ref 0.2–1.2)
MONOCYTES NFR BLD AUTO: 7.5 % (ref 5–12)
NEUTROPHILS # BLD AUTO: 1.97 10*3/MM3 (ref 1.9–8.1)
NEUTROPHILS NFR BLD AUTO: 77.2 % (ref 42.7–76)
PLATELET # BLD AUTO: 171 10*3/MM3 (ref 140–500)
PMV BLD AUTO: 11.9 FL (ref 6–12)
POTASSIUM BLD-SCNC: 3.4 MMOL/L (ref 3.5–5.2)
PROT SERPL-MCNC: 6.7 G/DL (ref 6–8.5)
RBC # BLD AUTO: 3.66 10*6/MM3 (ref 3.9–5.2)
SODIUM BLD-SCNC: 140 MMOL/L (ref 136–145)
WBC NRBC COR # BLD: 2.55 10*3/MM3 (ref 4.5–10.7)

## 2018-08-31 PROCEDURE — 25810000003 SODIUM CHLORIDE 0.9 % WITH KCL 20 MEQ 20-0.9 MEQ/L-% SOLUTION: Performed by: INTERNAL MEDICINE

## 2018-08-31 PROCEDURE — 99233 SBSQ HOSP IP/OBS HIGH 50: CPT | Performed by: INTERNAL MEDICINE

## 2018-08-31 PROCEDURE — 85025 COMPLETE CBC W/AUTO DIFF WBC: CPT | Performed by: INTERNAL MEDICINE

## 2018-08-31 PROCEDURE — 25010000002 ONDANSETRON PER 1 MG: Performed by: INTERNAL MEDICINE

## 2018-08-31 PROCEDURE — 25010000002 CYCLOPHOSPHAMIDE PER 100 MG: Performed by: INTERNAL MEDICINE

## 2018-08-31 PROCEDURE — 80053 COMPREHEN METABOLIC PANEL: CPT | Performed by: INTERNAL MEDICINE

## 2018-08-31 PROCEDURE — 63710000001 PREDNISONE PER 5 MG: Performed by: INTERNAL MEDICINE

## 2018-08-31 RX ADMIN — POTASSIUM CHLORIDE AND SODIUM CHLORIDE 75 ML/HR: 900; 150 INJECTION, SOLUTION INTRAVENOUS at 16:45

## 2018-08-31 RX ADMIN — ONDANSETRON 16 MG: 2 INJECTION INTRAMUSCULAR; INTRAVENOUS at 16:45

## 2018-08-31 RX ADMIN — PANTOPRAZOLE SODIUM 40 MG: 40 TABLET, DELAYED RELEASE ORAL at 06:16

## 2018-08-31 RX ADMIN — ACYCLOVIR 400 MG: 400 TABLET ORAL at 21:43

## 2018-08-31 RX ADMIN — ALLOPURINOL 300 MG: 300 TABLET ORAL at 09:37

## 2018-08-31 RX ADMIN — POTASSIUM CHLORIDE 20 MEQ: 750 CAPSULE, EXTENDED RELEASE ORAL at 17:22

## 2018-08-31 RX ADMIN — DABIGATRAN ETEXILATE MESYLATE 150 MG: 150 CAPSULE ORAL at 21:43

## 2018-08-31 RX ADMIN — PREDNISONE 100 MG: 50 TABLET ORAL at 09:37

## 2018-08-31 RX ADMIN — Medication 50 MG: at 09:37

## 2018-08-31 RX ADMIN — POTASSIUM CHLORIDE 20 MEQ: 750 CAPSULE, EXTENDED RELEASE ORAL at 09:38

## 2018-08-31 RX ADMIN — FLUCONAZOLE 400 MG: 200 TABLET ORAL at 12:24

## 2018-08-31 RX ADMIN — POTASSIUM CHLORIDE AND SODIUM CHLORIDE 75 ML/HR: 900; 150 INJECTION, SOLUTION INTRAVENOUS at 03:14

## 2018-08-31 RX ADMIN — PREDNISONE 100 MG: 50 TABLET ORAL at 17:22

## 2018-08-31 RX ADMIN — Medication 10 ML: at 09:38

## 2018-08-31 RX ADMIN — CYCLOPHOSPHAMIDE 1800 MG: 2 INJECTION, POWDER, FOR SOLUTION INTRAVENOUS; ORAL at 17:18

## 2018-08-31 RX ADMIN — ACYCLOVIR 400 MG: 400 TABLET ORAL at 09:37

## 2018-08-31 RX ADMIN — SULFAMETHOXAZOLE AND TRIMETHOPRIM 160 MG: 800; 160 TABLET ORAL at 09:37

## 2018-08-31 RX ADMIN — DABIGATRAN ETEXILATE MESYLATE 150 MG: 150 CAPSULE ORAL at 09:37

## 2018-08-31 RX ADMIN — Medication 10 ML: at 21:46

## 2018-09-01 VITALS
BODY MASS INDEX: 26.2 KG/M2 | RESPIRATION RATE: 17 BRPM | TEMPERATURE: 98.4 F | OXYGEN SATURATION: 99 % | DIASTOLIC BLOOD PRESSURE: 71 MMHG | SYSTOLIC BLOOD PRESSURE: 110 MMHG | HEART RATE: 55 BPM | WEIGHT: 142.4 LBS | HEIGHT: 62 IN

## 2018-09-01 LAB
ALBUMIN SERPL-MCNC: 4.1 G/DL (ref 3.5–5.2)
ALBUMIN/GLOB SERPL: 1.8 G/DL
ALP SERPL-CCNC: 44 U/L (ref 39–117)
ALT SERPL W P-5'-P-CCNC: 51 U/L (ref 1–33)
ANION GAP SERPL CALCULATED.3IONS-SCNC: 13.7 MMOL/L
AST SERPL-CCNC: 29 U/L (ref 1–32)
BASOPHILS # BLD AUTO: 0 10*3/MM3 (ref 0–0.2)
BASOPHILS NFR BLD AUTO: 0 % (ref 0–1.5)
BILIRUB SERPL-MCNC: 0.3 MG/DL (ref 0.1–1.2)
BUN BLD-MCNC: 10 MG/DL (ref 6–20)
BUN/CREAT SERPL: 20.8 (ref 7–25)
CALCIUM SPEC-SCNC: 9.6 MG/DL (ref 8.6–10.5)
CHLORIDE SERPL-SCNC: 98 MMOL/L (ref 98–107)
CO2 SERPL-SCNC: 26.3 MMOL/L (ref 22–29)
CREAT BLD-MCNC: 0.48 MG/DL (ref 0.57–1)
DEPRECATED RDW RBC AUTO: 55.7 FL (ref 37–54)
EOSINOPHIL # BLD AUTO: 0 10*3/MM3 (ref 0–0.7)
EOSINOPHIL NFR BLD AUTO: 0 % (ref 0.3–6.2)
ERYTHROCYTE [DISTWIDTH] IN BLOOD BY AUTOMATED COUNT: 16.4 % (ref 11.7–13)
GFR SERPL CREATININE-BSD FRML MDRD: >150 ML/MIN/1.73
GFR SERPL CREATININE-BSD FRML MDRD: >150 ML/MIN/1.73
GLOBULIN UR ELPH-MCNC: 2.3 GM/DL
GLUCOSE BLD-MCNC: 111 MG/DL (ref 65–99)
HCT VFR BLD AUTO: 32.6 % (ref 35.6–45.5)
HGB BLD-MCNC: 11.1 G/DL (ref 11.9–15.5)
IMM GRANULOCYTES # BLD: 0.02 10*3/MM3 (ref 0–0.03)
IMM GRANULOCYTES NFR BLD: 1.2 % (ref 0–0.5)
LYMPHOCYTES # BLD AUTO: 0.18 10*3/MM3 (ref 0.9–4.8)
LYMPHOCYTES NFR BLD AUTO: 11 % (ref 19.6–45.3)
MCH RBC QN AUTO: 31.4 PG (ref 26.9–32)
MCHC RBC AUTO-ENTMCNC: 34 G/DL (ref 32.4–36.3)
MCV RBC AUTO: 92.1 FL (ref 80.5–98.2)
MONOCYTES # BLD AUTO: 0.08 10*3/MM3 (ref 0.2–1.2)
MONOCYTES NFR BLD AUTO: 4.9 % (ref 5–12)
NEUTROPHILS # BLD AUTO: 1.38 10*3/MM3 (ref 1.9–8.1)
NEUTROPHILS NFR BLD AUTO: 84.1 % (ref 42.7–76)
PLATELET # BLD AUTO: 159 10*3/MM3 (ref 140–500)
PMV BLD AUTO: 11.4 FL (ref 6–12)
POTASSIUM BLD-SCNC: 3.5 MMOL/L (ref 3.5–5.2)
PROT SERPL-MCNC: 6.4 G/DL (ref 6–8.5)
RBC # BLD AUTO: 3.54 10*6/MM3 (ref 3.9–5.2)
SODIUM BLD-SCNC: 138 MMOL/L (ref 136–145)
WBC NRBC COR # BLD: 1.64 10*3/MM3 (ref 4.5–10.7)

## 2018-09-01 PROCEDURE — 80053 COMPREHEN METABOLIC PANEL: CPT | Performed by: INTERNAL MEDICINE

## 2018-09-01 PROCEDURE — 99238 HOSP IP/OBS DSCHRG MGMT 30/<: CPT | Performed by: INTERNAL MEDICINE

## 2018-09-01 PROCEDURE — 25810000003 SODIUM CHLORIDE 0.9 % WITH KCL 20 MEQ 20-0.9 MEQ/L-% SOLUTION: Performed by: INTERNAL MEDICINE

## 2018-09-01 PROCEDURE — 25010000002 HEPARIN FLUSH (PORCINE) 100 UNIT/ML SOLUTION: Performed by: NURSE PRACTITIONER

## 2018-09-01 PROCEDURE — 63710000001 PREDNISONE PER 5 MG: Performed by: INTERNAL MEDICINE

## 2018-09-01 PROCEDURE — 85025 COMPLETE CBC W/AUTO DIFF WBC: CPT | Performed by: INTERNAL MEDICINE

## 2018-09-01 RX ORDER — LEVOFLOXACIN 500 MG/1
500 TABLET, FILM COATED ORAL DAILY
Qty: 7 TABLET | Refills: 0 | Status: SHIPPED | OUTPATIENT
Start: 2018-09-01 | End: 2018-09-08

## 2018-09-01 RX ADMIN — POTASSIUM CHLORIDE 20 MEQ: 750 CAPSULE, EXTENDED RELEASE ORAL at 07:50

## 2018-09-01 RX ADMIN — Medication 50 MG: at 09:00

## 2018-09-01 RX ADMIN — Medication 500 UNITS: at 09:00

## 2018-09-01 RX ADMIN — POTASSIUM CHLORIDE AND SODIUM CHLORIDE 75 ML/HR: 900; 150 INJECTION, SOLUTION INTRAVENOUS at 06:25

## 2018-09-01 RX ADMIN — DABIGATRAN ETEXILATE MESYLATE 150 MG: 150 CAPSULE ORAL at 09:00

## 2018-09-01 RX ADMIN — PREDNISONE 100 MG: 50 TABLET ORAL at 07:50

## 2018-09-01 RX ADMIN — ALLOPURINOL 300 MG: 300 TABLET ORAL at 09:00

## 2018-09-01 RX ADMIN — ACYCLOVIR 400 MG: 400 TABLET ORAL at 09:00

## 2018-09-01 NOTE — PLAN OF CARE
Problem: Patient Care Overview  Goal: Plan of Care Review  Outcome: Ongoing (interventions implemented as appropriate)   09/01/18 0515   OTHER   Outcome Summary Chemo is complete. Plan is to be discharged early today. + Blood return from port. NS with 20K infusing. Has plans to go to a wedding today at 1 pm   Coping/Psychosocial   Plan of Care Reviewed With patient   Plan of Care Review   Progress improving       Problem: Oncology Care (Adult)  Goal: Signs and Symptoms of Listed Potential Problems Will be Absent, Minimized or Managed (Oncology Care)  Outcome: Ongoing (interventions implemented as appropriate)

## 2018-09-01 NOTE — DISCHARGE SUMMARY
Date of Discharge:  9/1/2018    Discharge Diagnosis: Primary mediastinal large B-cell lymphoma.  Admission for cycle 4 DA- EPOCH-R    Presenting Problem/History of Present Illness  Lymphoma (CMS/[facility]) [C85.90]       Hospital Course  Patient is a 23 y.o. female  who was admitted August 27 for cycle 4 DA- EPOCH-R planning 6 cycles total) cycles every 21 days.      She has been experiencing neuropathy, this is limited to her fingertips on bilateral hands.  She notes that it is not present when she is at rest however when she is touching something she feels discomfort.  This is not affecting function.  She is able to do buttons, and hold items without dropping them.  She denies any sensitivity in her feet except for one day in her toes.  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     She denies any shortness of breath, lightheadedness, dizziness, or pain.  She did have some hot flashes over the weekend though states that she did not take her temperature though denies any known fever.  She did receive her last dose of Zoladex on 9/10/2018.  These on Pradaxa without bleeding issues per her report.  Her port remains sensitive but no edema or erythema surrounding.       After admission August 28 this was discussed with physicians and plans were made to reduce her vincristine dosing by 25% anterior this through the remainder of her treatments trying to reduce her neuropathy.   The patient seen August 29 tolerating therapy well without any additional issues thus far and we plan to continue treatment.    She is seen again 8/30 doing well, ambulating in the marrero notes mild nausea which is now controlled and that her neuropathy is possibly improved.    The patient is next seen August 31, 2018.  She is  continuing to tolerate treatment well and we discussed proceeding with her chemotherapy as planned including asked in the treatment room as outpatient on September 2.   remainder of the chemotherapy was able be completed the a.m. of September 01, 2018.  Patient noted to be further neutropenia with Levaquin started at discharge for 7 days            Procedures Performed  Cycle 4 DA- EPOCH-R           Consults:   Consults     Date and Time Order Name Status Description    7/30/2018 1512 Inpatient Vascular Surgery Consult Completed           Pertinent Test Results:     Condition on Discharge:  Stable    Vital Signs  Temp:  [96.6 °F (35.9 °C)-98.4 °F (36.9 °C)] 98.4 °F (36.9 °C)  Heart Rate:  [51-64] 55  Resp:  [16-20] 17  BP: ()/(60-78) 110/71    Physical Exam:  Constitutional: She is oriented to person, place, and time. She appears well-developed and well-nourished. No distress.   HENT:   Head: Normocephalic.   Mouth/Throat: No oropharyngeal exudate.   Eyes: Pupils are equal, round, and reactive to light. EOM are normal.   Neck: Normal range of motion. Neck supple. No JVD present.   Cardiovascular: Normal rate and regular rhythm.    No murmur heard.  Pulmonary/Chest: Effort normal and breath sounds normal. No respiratory distress. She has no wheezes.   Abdominal: Soft. Bowel sounds are normal.   Musculoskeletal: Normal range of motion. She exhibits no edema.   Neurological: She is oriented to person, place, and time.  Ongoing neuropathy noted bilateral fingers, reduction in activities of daily living   Skin: Skin is warm and dry. No rash noted.   Psychiatric: She has a normal mood and affect. Her behavior is normal.      Discharge Disposition  Home or Self Care to home    Discharge Medications     Discharge Medications      New Medications      Instructions Start Date   levoFLOXacin 500 MG tablet  Commonly known as:  LEVAQUIN   500 mg, Oral, Daily, 1 tablet         Continue These Medications      Instructions  Start Date   acyclovir 400 MG tablet  Commonly known as:  ZOVIRAX   400 mg, Oral, 2 Times Daily, Take no more than 5 doses a day.      allopurinol 300 MG tablet  Commonly known as:  ZYLOPRIM   TAKE ONE TABLET BY MOUTH TWICE A DAY      dabigatran etexilate 150 MG capsu  Commonly known as:  PRADAXA   150 mg, Oral, Every 12 Hours Scheduled      fluconazole 200 MG tablet  Commonly known as:  DIFLUCAN   400 mg, Oral, Every 24 Hours      ondansetron 4 MG tablet  Commonly known as:  ZOFRAN   4 mg, Oral, Every 6 Hours PRN      potassium chloride 10 MEQ CR capsule  Commonly known as:  MICRO-K   20 mEq, Oral, 2 Times Daily With Meals      pyridoxine 50 MG tablet tablet  Commonly known as:  VITAMIN B-6   50 mg, Oral, Daily      sennosides-docusate sodium 8.6-50 MG tablet  Commonly known as:  SENOKOT-S   2 tablets, Oral, 2 Times Daily PRN      sulfamethoxazole-trimethoprim 800-160 MG per tablet  Commonly known as:  BACTRIM DS,SEPTRA DS   1 tablet, Oral, 3 Times Weekly             Discharge Diet: As  tolerated    Activity at Discharge:  As tolerated    Follow-up Appointments  Future Appointments  Date Time Provider Department Center   9/2/2018 2:00 PM INJECTION ROOM  [facility]   9/4/2018 9:10 AM LAB CHAIR CBC LAB [facility]  [facility] None   9/5/2018 8:15 AM Faina Choi, PT  [facility] OPT [facility]   9/7/2018 7:45 AM INFU CBC [facility] PORT CHAIR  INFUS [facility]   9/7/2018 8:00 AM CHAIR 16 CBC [facility] - SM  INFUS [facility]AG   9/7/2018 9:30 AM [facility] PET CT  [facility] PET [facility]   9/10/2018 9:20 AM LAB CHAIR CBC LAB [facility]  [facility] None   9/10/2018 10:00 AM INJECTION CHAIR CBC [facility]  INFUS EP [facility]   9/13/2018 7:50 AM LAB CHAIR CBC LAB [facility]  [facility] None   9/13/2018 8:20 AM Millie Padilla MD [facility] CBC [facility]  CBC [facility]         Test Results Pending at Discharge: none       Bam Gagnon MD  09/01/18  7:47 AM    Time: 20 minutes

## 2018-09-02 ENCOUNTER — READMISSION MANAGEMENT (OUTPATIENT)
Dept: CALL CENTER | Facility: HOSPITAL | Age: 23
End: 2018-09-02

## 2018-09-02 ENCOUNTER — INFUSION (OUTPATIENT)
Dept: ONCOLOGY | Facility: HOSPITAL | Age: 23
End: 2018-09-02

## 2018-09-02 VITALS
HEART RATE: 88 BPM | DIASTOLIC BLOOD PRESSURE: 59 MMHG | TEMPERATURE: 98.2 F | SYSTOLIC BLOOD PRESSURE: 91 MMHG | RESPIRATION RATE: 16 BRPM

## 2018-09-02 DIAGNOSIS — C85.28 MEDIASTINAL LARGE B-CELL LYMPHOMA OF LYMPH NODES OF MULTIPLE REGIONS (HCC): Primary | ICD-10-CM

## 2018-09-02 PROCEDURE — 96372 THER/PROPH/DIAG INJ SC/IM: CPT

## 2018-09-02 PROCEDURE — 25010000002 PEGFILGRASTIM 6 MG/0.6ML SOLUTION PREFILLED SYRINGE: Performed by: INTERNAL MEDICINE

## 2018-09-02 RX ADMIN — PEGFILGRASTIM 6 MG: 6 INJECTION SUBCUTANEOUS at 14:30

## 2018-09-03 NOTE — OUTREACH NOTE
Prep Survey      Responses   Facility patient discharged from?  [facility]   Is patient eligible?  Yes   Does the patient have one of the following disease processes/diagnoses(primary or secondary)?  Other   Does the patient have Home health ordered?  No   What is the Home health agency?   Large B-cell lymphoma   Is there a DME ordered?  No   Prep survey completed?  Yes          Clotilde Alex RN

## 2018-09-04 ENCOUNTER — INFUSION (OUTPATIENT)
Dept: ONCOLOGY | Facility: HOSPITAL | Age: 23
End: 2018-09-04

## 2018-09-04 DIAGNOSIS — J98.59 MEDIASTINAL MASS: Primary | ICD-10-CM

## 2018-09-04 DIAGNOSIS — Z45.2 FITTING AND ADJUSTMENT OF VASCULAR CATHETER: ICD-10-CM

## 2018-09-04 LAB
ALBUMIN SERPL-MCNC: 4.3 G/DL (ref 3.5–5.2)
ALBUMIN/GLOB SERPL: 2 G/DL
ALP SERPL-CCNC: 68 U/L (ref 39–117)
ALT SERPL W P-5'-P-CCNC: 28 U/L (ref 1–33)
ANION GAP SERPL CALCULATED.3IONS-SCNC: 11.9 MMOL/L
AST SERPL-CCNC: 16 U/L (ref 1–32)
BILIRUB SERPL-MCNC: 0.5 MG/DL (ref 0.1–1.2)
BUN BLD-MCNC: 16 MG/DL (ref 6–20)
BUN/CREAT SERPL: 26.2 (ref 7–25)
CALCIUM SPEC-SCNC: 9.3 MG/DL (ref 8.6–10.5)
CHLORIDE SERPL-SCNC: 99 MMOL/L (ref 98–107)
CO2 SERPL-SCNC: 29.1 MMOL/L (ref 22–29)
CREAT BLD-MCNC: 0.61 MG/DL (ref 0.57–1)
DACRYOCYTES BLD QL SMEAR: ABNORMAL
DEPRECATED RDW RBC AUTO: 64.8 FL (ref 37–54)
EOSINOPHIL # BLD MANUAL: 0.36 10*3/MM3 (ref 0–0.7)
EOSINOPHIL NFR BLD MANUAL: 2 % (ref 0–7)
ERYTHROCYTE [DISTWIDTH] IN BLOOD BY AUTOMATED COUNT: 16.4 % (ref 11.7–13)
GFR SERPL CREATININE-BSD FRML MDRD: 122 ML/MIN/1.73
GFR SERPL CREATININE-BSD FRML MDRD: 147 ML/MIN/1.73
GLOBULIN UR ELPH-MCNC: 2.1 GM/DL
GLUCOSE BLD-MCNC: 102 MG/DL (ref 65–99)
HCT VFR BLD AUTO: 33.4 % (ref 35.6–45.5)
HGB BLD-MCNC: 10.3 G/DL (ref 11.9–15.5)
LYMPHOCYTES # BLD MANUAL: 0.18 10*3/MM3 (ref 0.8–7)
LYMPHOCYTES NFR BLD MANUAL: 1 % (ref 0–12)
LYMPHOCYTES NFR BLD MANUAL: 1 % (ref 24–44)
MCH RBC QN AUTO: 32.8 PG (ref 26.9–32)
MCHC RBC AUTO-ENTMCNC: 30.8 G/DL (ref 32.4–36.3)
MCV RBC AUTO: 106.4 FL (ref 80.5–98.2)
MICROCYTES BLD QL: ABNORMAL
MONOCYTES # BLD AUTO: 0.18 10*3/MM3 (ref 0–1)
NEUTROPHILS # BLD AUTO: 17.47 10*3/MM3 (ref 1.9–8.1)
NEUTROPHILS NFR BLD MANUAL: 96 % (ref 42.7–76)
PLAT MORPH BLD: NORMAL
PLATELET # BLD AUTO: 118 10*3/MM3 (ref 140–500)
PMV BLD AUTO: 12.1 FL (ref 6–12)
POTASSIUM BLD-SCNC: 3.5 MMOL/L (ref 3.5–5.2)
PROT SERPL-MCNC: 6.4 G/DL (ref 6–8.5)
RBC # BLD AUTO: 3.14 10*6/MM3 (ref 3.9–5.2)
SCAN SLIDE: NORMAL
SODIUM BLD-SCNC: 140 MMOL/L (ref 136–145)
SPHEROCYTES BLD QL SMEAR: ABNORMAL
STOMATOCYTES BLD QL SMEAR: ABNORMAL
WBC MORPH BLD: NORMAL
WBC NRBC COR # BLD: 18.2 10*3/MM3 (ref 4.5–10.7)

## 2018-09-04 PROCEDURE — 85025 COMPLETE CBC W/AUTO DIFF WBC: CPT | Performed by: INTERNAL MEDICINE

## 2018-09-04 PROCEDURE — 85007 BL SMEAR W/DIFF WBC COUNT: CPT | Performed by: INTERNAL MEDICINE

## 2018-09-04 PROCEDURE — 36415 COLL VENOUS BLD VENIPUNCTURE: CPT

## 2018-09-04 PROCEDURE — 80053 COMPREHEN METABOLIC PANEL: CPT | Performed by: INTERNAL MEDICINE

## 2018-09-04 PROCEDURE — 96523 IRRIG DRUG DELIVERY DEVICE: CPT | Performed by: INTERNAL MEDICINE

## 2018-09-04 PROCEDURE — 25010000002 HEPARIN FLUSH (PORCINE) 100 UNIT/ML SOLUTION: Performed by: INTERNAL MEDICINE

## 2018-09-04 RX ORDER — SODIUM CHLORIDE 0.9 % (FLUSH) 0.9 %
10 SYRINGE (ML) INJECTION AS NEEDED
Status: CANCELLED | OUTPATIENT
Start: 2018-09-04

## 2018-09-04 RX ORDER — SODIUM CHLORIDE 0.9 % (FLUSH) 0.9 %
10 SYRINGE (ML) INJECTION AS NEEDED
Status: DISCONTINUED | OUTPATIENT
Start: 2018-09-04 | End: 2018-09-04 | Stop reason: HOSPADM

## 2018-09-04 RX ADMIN — SODIUM CHLORIDE, PRESERVATIVE FREE 500 UNITS: 5 INJECTION INTRAVENOUS at 09:38

## 2018-09-04 RX ADMIN — Medication 10 ML: at 09:38

## 2018-09-04 NOTE — PROGRESS NOTES
Case Management Discharge Note    Final Note: Home--no needs    Destination     No service has been selected for the patient.      Durable Medical Equipment     No service has been selected for the patient.      Dialysis/Infusion     No service has been selected for the patient.      Home Medical Care     No service has been selected for the patient.      Social Care     No service has been selected for the patient.             Final Discharge Disposition Code: 01 - home or self-care

## 2018-09-04 NOTE — PROGRESS NOTES
Pt here for CBC and RN review.  WBC 18.2 after Neulasta support.  Hgb 10.3, Plt 118K.  Pt voices no concerns and instructed to call if any arise.  Pt to return on Friday for repeat CBC and RN review.

## 2018-09-05 ENCOUNTER — HOSPITAL ENCOUNTER (OUTPATIENT)
Dept: PHYSICAL THERAPY | Facility: HOSPITAL | Age: 23
Setting detail: THERAPIES SERIES
Discharge: HOME | End: 2018-09-05
Attending: INTERNAL MEDICINE

## 2018-09-05 ENCOUNTER — READMISSION MANAGEMENT (OUTPATIENT)
Dept: CALL CENTER | Facility: HOSPITAL | Age: 23
End: 2018-09-05

## 2018-09-05 DIAGNOSIS — G89.29 CHRONIC RIGHT SHOULDER PAIN: ICD-10-CM

## 2018-09-05 DIAGNOSIS — R53.1 GENERALIZED WEAKNESS: ICD-10-CM

## 2018-09-05 DIAGNOSIS — T45.1X5A PERIPHERAL NEUROPATHY DUE TO CHEMOTHERAPY (HCC): ICD-10-CM

## 2018-09-05 DIAGNOSIS — M54.6 CHRONIC MIDLINE THORACIC BACK PAIN: ICD-10-CM

## 2018-09-05 DIAGNOSIS — D64.81 ANEMIA ASSOCIATED WITH CHEMOTHERAPY: ICD-10-CM

## 2018-09-05 DIAGNOSIS — R59.1 LYMPHADENOPATHY: ICD-10-CM

## 2018-09-05 DIAGNOSIS — G89.29 CHRONIC MIDLINE THORACIC BACK PAIN: ICD-10-CM

## 2018-09-05 DIAGNOSIS — C85.28 MEDIASTINAL LARGE B-CELL LYMPHOMA OF LYMPH NODES OF MULTIPLE REGIONS (HCC): ICD-10-CM

## 2018-09-05 DIAGNOSIS — C85.22 MEDIASTINAL (THYMIC) LARGE B-CELL LYMPHOMA OF INTRATHORACIC LYMPH NODES (HCC): Primary | ICD-10-CM

## 2018-09-05 DIAGNOSIS — T45.1X5A ANEMIA ASSOCIATED WITH CHEMOTHERAPY: ICD-10-CM

## 2018-09-05 DIAGNOSIS — I82.621 ARM DVT (DEEP VENOUS THROMBOEMBOLISM), ACUTE, RIGHT (HCC): ICD-10-CM

## 2018-09-05 DIAGNOSIS — J98.59 MEDIASTINAL MASS: ICD-10-CM

## 2018-09-05 DIAGNOSIS — C83.30 DIFFUSE LARGE B-CELL LYMPHOMA, UNSPECIFIED BODY REGION (HCC): ICD-10-CM

## 2018-09-05 DIAGNOSIS — G62.0 PERIPHERAL NEUROPATHY DUE TO CHEMOTHERAPY (HCC): ICD-10-CM

## 2018-09-05 DIAGNOSIS — M25.511 CHRONIC RIGHT SHOULDER PAIN: ICD-10-CM

## 2018-09-05 PROCEDURE — 97110 THERAPEUTIC EXERCISES: CPT | Performed by: PHYSICAL THERAPIST

## 2018-09-05 PROCEDURE — 97162 PT EVAL MOD COMPLEX 30 MIN: CPT | Performed by: PHYSICAL THERAPIST

## 2018-09-05 NOTE — OUTREACH NOTE
Medical Week 1 Survey      Responses   Facility patient discharged from?  [place] [facility]   Does the patient have one of the following disease processes/diagnoses(primary or secondary)?  Other   Is there a successful TCM telephone encounter documented?  No   Week 1 attempt successful?  Yes   Call start time  1214   Call end time  1216   General alerts for this patient  lymphoma on chemo   Discharge diagnosis  port placement,  Medistinal Lymphoma,  Chemo   Meds reviewed with patient/caregiver?  Yes   Is the patient having any side effects they believe may be caused by any medication additions or changes?  No   Does the patient have all medications ordered at discharge?  Yes   Is the patient taking all medications as directed (includes completed medication regime)?  Yes   Does the patient have a primary care provider?   Yes   Does the patient have an appointment with their PCP within 7 days of discharge?  Yes   Has the patient kept scheduled appointments due by today?  Yes   Psychosocial issues?  No   Did the patient receive a copy of their discharge instructions?  Yes   Nursing interventions  Reviewed instructions with patient, Educated on MyChart   What is the patient's perception of their health status since discharge?  Same   Is the patient/caregiver able to teach back signs and symptoms related to disease process for when to call PCP?  Yes   Is the patient/caregiver able to teach back signs and symptoms related to disease process for when to call 911?  Yes   Is the patient/caregiver able to teach back the hierarchy of who to call/visit for symptoms/problems? PCP, Specialist, Home health nurse, Urgent Care, ED, 911  Yes   Week 1 call completed?  Yes          Svetlana Vega RN

## 2018-09-05 NOTE — THERAPY EVALUATION
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)   • Diffuse large B-cell lymphoma (CMS/HCC)   • Hypokalemia   • Arm DVT (deep venous thromboembolism), acute, right (CMS/HCC)   • Elevated liver enzymes   • Leukocytopenia   • Anemia associated with chemotherapy   • Lymphoma (CMS/HCC)   • Fitting and adjustment of vascular catheter        Past Medical History:   Diagnosis Date   • Diffuse large B cell lymphoma (CMS/HCC) 2018   • Fracture of lower leg 2000    L   • H/O Lung mass 2018   • H/O Pericardial effusion         Past Surgical History:   Procedure Laterality Date   • OTHER SURGICAL HISTORY  2018    CT-GUIDED BIOPSY OF MEDIASTINAL MASS   • VENOUS ACCESS DEVICE (PORT) INSERTION Right 2018    Procedure: RIGHT MEDIPORT PLACEMENT;  Surgeon: Farhan Hurt MD;  Location: [facility];  Service: Vascular       Visit Dx:     ICD-10-CM ICD-9-CM   1. 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 Anemia associated with chemotherapy D64.81 285.3    T45.1X5A E933.1   3. Arm DVT (deep venous thromboembolism), acute, right (CMS/HCC) I82.621 453.82   4. Diffuse large B-cell lymphoma, unspecified body region (CMS/HCC) C83.30 202.80   5. 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 Mediastinal mass J98.59 786.6   7. Lymphadenopathy R59.1 785.6   8. Chronic midline thoracic back pain  M54.6 724.1    G89.29 338.29   9. Chronic right shoulder pain M25.511 719.41    G89.29 338.29   10. Generalized weakness R53.1 780.79   11. 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 Next scans scheduled for 09/07/2018, tumor is currently shrinking. Patient states up until June 2018 was exercising at a crossfit facility here in [place], going 3x/week. She enjoys exercising. Currently is walking daily and light exercise in home. Her next infusion is scheduled for 09/17/2018. She is admitted to hospital for about a week each infusion. She did have issues with a PICC line right UE and developed a DVT, currently stable, but did have a mediport placed 07/31/2018. 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 She states cough and SOA has resolved and is feeling much improved to start exercising however does get dizzy upon standing and does have mild mid thoracic back pain after infusions. She does report persisting right UE discomfort. She does have mild peripheral neuropathy bilateral fingertips, mostly with gripping/grasping, is not constant. She also have mild anemia and neutropenia induced by chemotherapy. Her goal is to return to exercise safely. She is left handed. She is involved in her Yazidism. She does play the [employer] and enjoys reading.   -SC      Patient/Caregiver Goals Relieve pain;Return to prior level of function;Improve mobility;Improve strength;Know what to do to help the symptoms;Other (comment)    return to school, return to gym exercise program  -SC      Current Tobacco Use none  -SC      Smoking Status no  -SC      Patient's Rating of General Health Good  -SC      Hand Dominance left-handed  -SC      Occupation/sports/leisure activities student  -SC      Patient seeing anyone else for problem(s)? Dr. Padilla primary oncologist  -SC      How has patient tried to help current problem? gentle home exercise program including walking, trampoline  -SC      Related/Recent Hospitalizations Yes  -SC      Date of Hospitalization 08/27/18  -SC      Surgery/Hospitalization infusion  -SC      History of Previous Related Injuries no  -SC      Are you or can you be pregnant No  -SC         Pain     Pain Location Shoulder;Back;Arm  -SC      Pain at Present 1  -SC      Pain at Best 0  -SC      Pain at Worst 5  -SC      Pain Frequency Intermittent  -SC      Pain Description Aching  -SC      What Performance Factors Make the Current Problem(s) WORSE? infusions  -SC      What Performance Factors Make the Current Problem(s) BETTER? rest  -SC         Fall Risk Assessment    Any falls in the past year: No  -SC      Previous Functional Level --   independent  -SC         Services    Are you currently receiving Home Health services No  -SC         Daily Activities    Primary Language English  -SC      Are you able to read Yes  -SC      Are you able to write Yes  -SC      How does patient learn best? Reading  -SC      Teaching needs identified Home Exercise Program;Management of Condition  -SC      Patient is concerned about/has problems with Flexibility;Grasping objects lifting;Performing home management (household chores, shopping, care of dependents);Performing job responsibilities/community activities (work, school,;Performing sports, recreation, and play activities;Reaching over head;Repetitive movements of the hand, arm, shoulder;Writing/grasping items with hand(s);Walking  -SC      Does patient have problems with the  following? None  -SC      Barriers to learning None  -SC      Explanation of Functional Status Problem independent  -SC      Recommended Referrals Other (comment)   [facility], [facility]  -SC      Pt Participated in POC and Goals Yes  -SC         Safety    Are you being hurt, hit, or frightened by anyone at home or in your life? No  -SC      Are you being neglected by a caregiver No  -SC        User Key  (r) = Recorded By, (t) = Taken By, (c) = Cosigned By    Initials Name Provider Type    SC Faina Choi PT Physical Therapist                PT Ortho     Row Name 09/05/18 0822       Precautions and Contraindications    Precautions right mediport  -SC       Posture/Observations    Alignment Options Forward head;Cervical lordosis;Thoracic kyphosis;Rounded shoulders;Scapular elevation;Scapular winging;Scoliosis;Lumbar lordosis;Genu valgus;Foot pronation  -SC    Forward Head Mild  -SC    Cervical Lordosis Mild  -SC    Thoracic Kyphosis Mild  -SC    Rounded Shoulders Mild;Moderate  -SC    Scapular Elevation Right:;Moderate;Left:;Mild  -SC    Scapular winging Right:;Mild  -SC    Scoliosis Mild  -SC    Lumbar lordosis Mild  -SC    Genu valgus Mild  -SC    Foot pronation Mild  -SC    Observations Muscle atrophy   right scapular region  -SC    Posture/Observations Comments mild poor posturing syndrome with stiffness right scapula, muscle guarding in region  -SC       Quarter Clearing    Quarter Clearing Upper Quarter Clearing  -SC       DTR- Upper Quarter Clearing    Biceps (C5/6) Bilateral:;0- No response  -SC       Sensory Screen for Light Touch- Upper Quarter Clearing    C4 (posterior shoulder) Bilateral:;Intact  -SC    C5 (lateral upper arm) Bilateral:;Intact  -SC    C6 (tip of thumb) Bilateral:;Intact  -SC    C7 (tip of 3rd finger) Bilateral:;Intact  -SC    C8 (tip of 5th finger) Bilateral:;Intact  -SC    T1 (medial lower arm) Bilateral:;Intact  -SC       Myotomal Screen- Upper Quarter Clearing    Shoulder flexion  (C5) Bilateral:;4 (Good)  -SC    Elbow flexion/wrist extension (C6) Bilateral:;4+ (Good +)  -SC    Elbow extension/wrist flexion (C7) Bilateral:;4 (Good)  -SC    Finger flexion/ (C8) Right:;4- (Good -);Left:;4 (Good)  -SC       Cervical/Shoulder ROM Screen    Cervical flexion Normal  -SC    Cervical extension Normal  -SC    Cervical lateral flexion Normal  -SC    Cervical rotation Normal  -SC    Shoulder elevation  Normal  -SC       General ROM    GENERAL ROM COMMENTS cervical AROM WNL all planes however noted stiffness/guarded movements, B shoulders WNL however tightness and poor mobility of right shoulder (negative pain) B LEs WNL with tightness noted in bilateral hamstrings  -SC       MMT (Manual Muscle Testing)    General MMT Comments generalized weakness  -SC      User Key  (r) = Recorded By, (t) = Taken By, (c) = Cosigned By    Initials Name Provider Type    Faina Means, PT Physical Therapist                      Therapy Education  Education Details: education of [facility] exercise programs, progression and goals of exercise program  Given: Symptoms/condition management, Other (comment)  Program: New  How Provided: Verbal, Demonstration, Written  Provided to: Patient  Level of Understanding: Teach back education performed, Verbalized, Demonstrated           PT OP Goals     Row Name 09/05/18 0822          Long Term Goals    LTG Date to Achieve 12/05/18  -SC     LTG 1 Initiate introductory level training at [facility] exercise program at [facility] to allow for transition to gym exercise program and self-care of condition.  -SC     LTG 1 Progress New  -SC        Time Calculation    PT Goal Re-Cert Due Date 12/05/18  -SC       User Key  (r) = Recorded By, (t) = Taken By, (c) = Cosigned By    Initials Name Provider Type    Faina Means PT Physical Therapist                PT Assessment/Plan     Row Name 09/05/18 0822          PT Assessment    Functional Limitations  Limitation in home management;Limitations in community activities;Limitations in functional capacity and performance;Performance in leisure activities;Performance in sport activities;Performance in work activities;Other (comment)   performance in school activities  -SC     Impairments Coordination;Endurance;Impaired flexibility;Impaired postural alignment;Muscle strength;Pain;Peripheral nerve integrity;Poor body mechanics;Posture  -SC     Assessment Comments Vikki is a 23 year old female, noted persisting cough initiating insidiously Feb 2018, 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 She does 2 weeks of infusions at home and one week infusion in hospital for 21 day cycles. She is currently responding well to chemotherapy. Next scans 09/07/2018, next hospitilization for infusion 09/17/2018. Her condition is currently evolving. She is unable to attend school due to condition and treatment at this time. 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 She also was very active prior to diagnosis, going to crossfit 3x/week. She has been unable to exercise regularly since June 2018. She would like to start back into a gym exercise program safely, and at this time very appropriate for local gym cancer rehabilitation programs including [facility] at [facility] and [facility] the [facility]. We discussed one on one  care at CARE program then transition to group/social support through [facility] to allow for 6 month rehabilitation to allow for transition safely back to crossfit exercise program and return to school as appropriate and indicated. Patient does have hx of right UE DVT due to issues with PICC line, mediport was placed right 07/31/2018. Doing well with port placement, just need to take care during exercises due to this history, condition.   -SC     Please refer  to paper survey for additional self-reported information Yes  -SC     Rehab Potential Good  -SC     Patient/caregiver participated in establishment of treatment plan and goals Yes  -SC     Patient would benefit from skilled therapy intervention Yes  -SC        PT Plan    PT Frequency Other (comment)  -SC     Predicted Duration of Therapy Intervention (Therapy Eval) [facility]  -SC     Planned CPT's? PT EVAL MOD COMPLELITY: 65223;PT RE-EVAL: 27326;PT THER PROC EA 15 MIN: 72614;PT THER ACT EA 15 MIN: 22787;PT MANUAL THERAPY EA 15 MIN: 49267;PT NEUROMUSC RE-EDUCATION EA 15 MIN: 17077;PT GAIT TRAINING EA 15 MIN: 37522;PT EVAL AQUA: 90191;PT AQUATIC THERAPY EA 15 MIN: 97697;PT SELF CARE/HOME MGMT/TRAIN EA 15: 23584  -SC     PT Plan Comments transition to [facility] unless otherwise indicated.   -SC       User Key  (r) = Recorded By, (t) = Taken By, (c) = Cosigned By    Initials Name Provider Type    Faina Means, PT Physical Therapist                              Outcome Measure Options: Disabilities of the Arm, Shoulder, and Hand (DASH)  DASH  Open a tight or new jar.: No Difficulty  Write: No Difficulty  Turn a key: No Difficulty  Prepare a meal: No Difficulty  Push open a heavy door: No Difficulty  Place an object on a shelf above your head: Mild Difficulty  Do heavy household chores (e.g., wash walls, wash floors): Mild Difficulty  Garden or do yard work: Mild Difficulty  Make a bed: No Difficulty  Carry a shopping bag or briefcase: No Difficulty  Carry a heavy object (over 10 lbs): Mild Difficulty  Change a lightbulb overhead: Mild Difficulty  Wash or blow dry your hair: No Difficulty  Wash your back: No Difficulty  Put on a pullover sweater: No Difficulty  Use a knife to cut food: No Difficulty  Recreational activities in which require little effort (e.g., cardplaying, knitting, etc.): No Difficulty  Recreational activities in which you take some force or impact through your arm, should or hand  (e.g. golf, hammering, tennis, etc.): Moderate Difficulty  Recreational Activities in which you move your arm freely (e.g., frisbee, badminton, etc.): No Difficulty  Manage transportation needs (getting from one place to another): No Difficulty  During the past week, to what extent has your arm, shoulder, or hand problem interfered with your normal social activites with family, friends, neighbors or groups?: Slightly  During the past week, were you limited in your work or other regular daily activities as a result of your arm, shoulder or hand problem?: Not limited at all  DASH Sum : 30  Number of Questions Answered: 22  DASH Score: 9.09  DASH COMMENTS: not valid, did not complete         Time Calculation:     Therapy Suggested Charges     Code   Minutes Charges    None             Start Time: 0822  Stop Time: 0855  Time Calculation (min): 33 min     Therapy Charges for Today     Code Description Service Date Service Provider Modifiers Qty    53729758823 HC PT THER PROC EA 15 MIN 9/5/2018 Faina Choi, PT GP 1    92413441535 HC PT EVAL MOD COMPLEXITY 2 9/5/2018 Faina Choi, PT GP 1          PT G-Codes  Outcome Measure Options: Disabilities of the Arm, Shoulder, and Hand (DASH)         Faina Huang, TAMRA  9/5/2018

## 2018-09-06 DIAGNOSIS — C85.28 MEDIASTINAL LARGE B-CELL LYMPHOMA OF LYMPH NODES OF MULTIPLE REGIONS (HCC): Primary | ICD-10-CM

## 2018-09-06 DIAGNOSIS — C85.28 MEDIASTINAL LARGE B-CELL LYMPHOMA OF LYMPH NODES OF MULTIPLE REGIONS (HCC): ICD-10-CM

## 2018-09-07 ENCOUNTER — HOSPITAL ENCOUNTER (OUTPATIENT)
Dept: PET IMAGING | Facility: HOSPITAL | Age: 23
Discharge: HOME | End: 2018-09-07
Attending: INTERNAL MEDICINE | Admitting: INTERNAL MEDICINE

## 2018-09-07 ENCOUNTER — INFUSION (OUTPATIENT)
Dept: ONCOLOGY | Facility: HOSPITAL | Age: 23
End: 2018-09-07

## 2018-09-07 DIAGNOSIS — Z45.2 FITTING AND ADJUSTMENT OF VASCULAR CATHETER: Primary | ICD-10-CM

## 2018-09-07 DIAGNOSIS — C83.30 DIFFUSE LARGE B-CELL LYMPHOMA, UNSPECIFIED BODY REGION (HCC): ICD-10-CM

## 2018-09-07 DIAGNOSIS — C85.28 MEDIASTINAL LARGE B-CELL LYMPHOMA OF LYMPH NODES OF MULTIPLE REGIONS (HCC): ICD-10-CM

## 2018-09-07 LAB
ALBUMIN SERPL-MCNC: 4.2 G/DL (ref 3.5–5.2)
ALBUMIN/GLOB SERPL: 1.9 G/DL (ref 1.1–2.4)
ALP SERPL-CCNC: 51 U/L (ref 38–116)
ALT SERPL W P-5'-P-CCNC: 31 U/L (ref 0–33)
ANION GAP SERPL CALCULATED.3IONS-SCNC: 11.1 MMOL/L
AST SERPL-CCNC: 19 U/L (ref 0–32)
BASOPHILS # BLD AUTO: 0.01 10*3/MM3 (ref 0–0.1)
BASOPHILS NFR BLD AUTO: 1.4 % (ref 0–1.1)
BILIRUB SERPL-MCNC: 0.3 MG/DL (ref 0.1–1.2)
BUN BLD-MCNC: 13 MG/DL (ref 6–20)
BUN/CREAT SERPL: 25.5 (ref 7.3–30)
CALCIUM SPEC-SCNC: 9.4 MG/DL (ref 8.5–10.2)
CHLORIDE SERPL-SCNC: 102 MMOL/L (ref 98–107)
CO2 SERPL-SCNC: 25.9 MMOL/L (ref 22–29)
CREAT BLD-MCNC: 0.51 MG/DL (ref 0.6–1.1)
DEPRECATED RDW RBC AUTO: 53.6 FL (ref 37–49)
EOSINOPHIL # BLD AUTO: 0.02 10*3/MM3 (ref 0–0.36)
EOSINOPHIL NFR BLD AUTO: 2.8 % (ref 1–5)
ERYTHROCYTE [DISTWIDTH] IN BLOOD BY AUTOMATED COUNT: 15 % (ref 11.7–14.5)
GFR SERPL CREATININE-BSD FRML MDRD: 149 ML/MIN/1.73
GFR SERPL CREATININE-BSD FRML MDRD: >150 ML/MIN/1.73
GLOBULIN UR ELPH-MCNC: 2.2 GM/DL (ref 1.8–3.5)
GLUCOSE BLD-MCNC: 93 MG/DL (ref 74–124)
HCT VFR BLD AUTO: 27.7 % (ref 34–45)
HGB BLD-MCNC: 9.2 G/DL (ref 11.5–14.9)
IMM GRANULOCYTES # BLD: 0.06 10*3/MM3 (ref 0–0.03)
IMM GRANULOCYTES NFR BLD: 8.3 % (ref 0–0.5)
LYMPHOCYTES # BLD AUTO: 0.14 10*3/MM3 (ref 1–3.5)
LYMPHOCYTES NFR BLD AUTO: 19.4 % (ref 20–49)
MCH RBC QN AUTO: 31.9 PG (ref 27–33)
MCHC RBC AUTO-ENTMCNC: 33.2 G/DL (ref 32–35)
MCV RBC AUTO: 96.2 FL (ref 83–97)
MONOCYTES # BLD AUTO: 0.13 10*3/MM3 (ref 0.25–0.8)
MONOCYTES NFR BLD AUTO: 18.1 % (ref 4–12)
NEUTROPHILS # BLD AUTO: 0.36 10*3/MM3 (ref 1.5–7)
NEUTROPHILS NFR BLD AUTO: 50 % (ref 39–75)
NRBC BLD MANUAL-RTO: 0 /100 WBC (ref 0–0)
PLATELET # BLD AUTO: 57 10*3/MM3 (ref 150–375)
PMV BLD AUTO: 12.5 FL (ref 8.9–12.1)
POTASSIUM BLD-SCNC: 4 MMOL/L (ref 3.5–4.7)
PROT SERPL-MCNC: 6.4 G/DL (ref 6.3–8)
RBC # BLD AUTO: 2.88 10*6/MM3 (ref 3.9–5)
SODIUM BLD-SCNC: 139 MMOL/L (ref 134–145)
WBC NRBC COR # BLD: 0.72 10*3/MM3 (ref 4–10)

## 2018-09-07 PROCEDURE — 85025 COMPLETE CBC W/AUTO DIFF WBC: CPT | Performed by: INTERNAL MEDICINE

## 2018-09-07 PROCEDURE — 96523 IRRIG DRUG DELIVERY DEVICE: CPT | Performed by: INTERNAL MEDICINE

## 2018-09-07 PROCEDURE — 25010000002 IOPAMIDOL 61 % SOLUTION: Performed by: INTERNAL MEDICINE

## 2018-09-07 PROCEDURE — 0 DIATRIZOATE MEGLUMINE & SODIUM PER 1 ML: Performed by: INTERNAL MEDICINE

## 2018-09-07 PROCEDURE — 71260 CT THORAX DX C+: CPT

## 2018-09-07 PROCEDURE — 80053 COMPREHEN METABOLIC PANEL: CPT | Performed by: INTERNAL MEDICINE

## 2018-09-07 PROCEDURE — 74177 CT ABD & PELVIS W/CONTRAST: CPT

## 2018-09-07 RX ADMIN — DIATRIZOATE MEGLUMINE AND DIATRIZOATE SODIUM 30 ML: 660; 100 LIQUID ORAL; RECTAL at 09:07

## 2018-09-07 RX ADMIN — IOPAMIDOL 85 ML: 612 INJECTION, SOLUTION INTRAVENOUS at 09:55

## 2018-09-07 NOTE — PROGRESS NOTES
Lab Results   Component Value Date    WBC 0.72 (C) 09/07/2018    HGB 9.2 (L) 09/07/2018    HCT 27.7 (L) 09/07/2018    MCV 96.2 09/07/2018    PLT 57 (L) 09/07/2018     Labs reviewed with pt. Wbc and anc noted as critical. Also platelets. Pt denies fevers or bleeding. Reviewed with Eduin ESRRANO. Pt to hold pradaxa until platelets greater than 60k. Also to continue levaquin until mondays repeat counts. Pt due in Monday for recheck at EP. Pt states she has another weeks worth of levaquin. Neutropenic precautions discussed with patient.

## 2018-09-10 ENCOUNTER — APPOINTMENT (OUTPATIENT)
Dept: OTHER | Facility: HOSPITAL | Age: 23
End: 2018-09-10

## 2018-09-10 ENCOUNTER — INFUSION (OUTPATIENT)
Dept: ONCOLOGY | Facility: HOSPITAL | Age: 23
End: 2018-09-10

## 2018-09-10 VITALS
TEMPERATURE: 98.1 F | SYSTOLIC BLOOD PRESSURE: 99 MMHG | BODY MASS INDEX: 26.08 KG/M2 | DIASTOLIC BLOOD PRESSURE: 66 MMHG | WEIGHT: 142.6 LBS | HEART RATE: 81 BPM

## 2018-09-10 DIAGNOSIS — C85.28 MEDIASTINAL LARGE B-CELL LYMPHOMA OF LYMPH NODES OF MULTIPLE REGIONS (HCC): Primary | ICD-10-CM

## 2018-09-10 DIAGNOSIS — Z45.2 FITTING AND ADJUSTMENT OF VASCULAR CATHETER: ICD-10-CM

## 2018-09-10 LAB
ALBUMIN SERPL-MCNC: 4.5 G/DL (ref 3.5–5.2)
ALBUMIN/GLOB SERPL: 1.7 G/DL
ALP SERPL-CCNC: 63 U/L (ref 39–117)
ALT SERPL W P-5'-P-CCNC: 23 U/L (ref 1–33)
ANION GAP SERPL CALCULATED.3IONS-SCNC: 12.5 MMOL/L
AST SERPL-CCNC: 28 U/L (ref 1–32)
BILIRUB SERPL-MCNC: 0.2 MG/DL (ref 0.1–1.2)
BUN BLD-MCNC: 13 MG/DL (ref 6–20)
BUN/CREAT SERPL: 24.5 (ref 7–25)
CALCIUM SPEC-SCNC: 9.9 MG/DL (ref 8.6–10.5)
CHLORIDE SERPL-SCNC: 104 MMOL/L (ref 98–107)
CO2 SERPL-SCNC: 25.5 MMOL/L (ref 22–29)
CREAT BLD-MCNC: 0.53 MG/DL (ref 0.57–1)
DEPRECATED RDW RBC AUTO: 55.8 FL (ref 37–54)
EOSINOPHIL # BLD MANUAL: 0.17 10*3/MM3 (ref 0–0.7)
EOSINOPHIL NFR BLD MANUAL: 2 % (ref 0–7)
ERYTHROCYTE [DISTWIDTH] IN BLOOD BY AUTOMATED COUNT: 16.3 % (ref 11.7–13)
GFR SERPL CREATININE-BSD FRML MDRD: 143 ML/MIN/1.73
GFR SERPL CREATININE-BSD FRML MDRD: >150 ML/MIN/1.73
GLOBULIN UR ELPH-MCNC: 2.6 GM/DL
GLUCOSE BLD-MCNC: 92 MG/DL (ref 65–99)
HCT VFR BLD AUTO: 28.4 % (ref 35.6–45.5)
HGB BLD-MCNC: 9.6 G/DL (ref 11.9–15.5)
LYMPHOCYTES # BLD MANUAL: 1.21 10*3/MM3 (ref 0.8–7)
LYMPHOCYTES NFR BLD MANUAL: 14 % (ref 24–44)
LYMPHOCYTES NFR BLD MANUAL: 15 % (ref 0–12)
MCH RBC QN AUTO: 32.5 PG (ref 26.9–32)
MCHC RBC AUTO-ENTMCNC: 33.8 G/DL (ref 32.4–36.3)
MCV RBC AUTO: 96.3 FL (ref 80.5–98.2)
METAMYELOCYTES NFR BLD MANUAL: 16 % (ref 0–0)
MONOCYTES # BLD AUTO: 1.29 10*3/MM3 (ref 0–1)
NEUTROPHILS # BLD AUTO: 4.57 10*3/MM3 (ref 1.9–8.1)
NEUTROPHILS NFR BLD MANUAL: 50 % (ref 42.7–76)
NEUTS BAND NFR BLD MANUAL: 3 % (ref 0–5)
NRBC SPEC MANUAL: 4 /100 WBC (ref 0–0)
PLAT MORPH BLD: NORMAL
PLATELET # BLD AUTO: 85 10*3/MM3 (ref 140–500)
PMV BLD AUTO: 12.5 FL (ref 6–12)
POTASSIUM BLD-SCNC: 4 MMOL/L (ref 3.5–5.2)
PROT SERPL-MCNC: 7.1 G/DL (ref 6–8.5)
RBC # BLD AUTO: 2.95 10*6/MM3 (ref 3.9–5.2)
RBC MORPH BLD: NORMAL
SCAN SLIDE: NORMAL
SODIUM BLD-SCNC: 142 MMOL/L (ref 136–145)
WBC MORPH BLD: NORMAL
WBC NRBC COR # BLD: 8.62 10*3/MM3 (ref 4.5–10.7)

## 2018-09-10 PROCEDURE — 25010000002 HEPARIN FLUSH (PORCINE) 100 UNIT/ML SOLUTION: Performed by: INTERNAL MEDICINE

## 2018-09-10 PROCEDURE — 80053 COMPREHEN METABOLIC PANEL: CPT | Performed by: INTERNAL MEDICINE

## 2018-09-10 PROCEDURE — 85025 COMPLETE CBC W/AUTO DIFF WBC: CPT | Performed by: INTERNAL MEDICINE

## 2018-09-10 PROCEDURE — 25010000002 GOSERELIN PER 3.6 MG: Performed by: NURSE PRACTITIONER

## 2018-09-10 PROCEDURE — 85007 BL SMEAR W/DIFF WBC COUNT: CPT | Performed by: INTERNAL MEDICINE

## 2018-09-10 PROCEDURE — 96372 THER/PROPH/DIAG INJ SC/IM: CPT | Performed by: INTERNAL MEDICINE

## 2018-09-10 RX ORDER — SODIUM CHLORIDE 0.9 % (FLUSH) 0.9 %
10 SYRINGE (ML) INJECTION AS NEEDED
Status: DISCONTINUED | OUTPATIENT
Start: 2018-09-10 | End: 2018-09-10 | Stop reason: HOSPADM

## 2018-09-10 RX ORDER — SODIUM CHLORIDE 0.9 % (FLUSH) 0.9 %
10 SYRINGE (ML) INJECTION AS NEEDED
Status: CANCELLED | OUTPATIENT
Start: 2018-09-10

## 2018-09-10 RX ADMIN — SODIUM CHLORIDE, PRESERVATIVE FREE 500 UNITS: 5 INJECTION INTRAVENOUS at 10:00

## 2018-09-10 RX ADMIN — GOSERELIN ACETATE 3.6 MG: 3.6 IMPLANT SUBCUTANEOUS at 10:17

## 2018-09-10 RX ADMIN — Medication 10 ML: at 10:00

## 2018-09-13 ENCOUNTER — OFFICE VISIT (OUTPATIENT)
Dept: ONCOLOGY | Facility: CLINIC | Age: 23
End: 2018-09-13

## 2018-09-13 ENCOUNTER — APPOINTMENT (OUTPATIENT)
Dept: OTHER | Facility: HOSPITAL | Age: 23
End: 2018-09-13

## 2018-09-13 ENCOUNTER — DOCUMENTATION (OUTPATIENT)
Dept: ONCOLOGY | Facility: CLINIC | Age: 23
End: 2018-09-13

## 2018-09-13 ENCOUNTER — LAB (OUTPATIENT)
Dept: LAB | Facility: HOSPITAL | Age: 23
End: 2018-09-13

## 2018-09-13 VITALS
OXYGEN SATURATION: 99 % | BODY MASS INDEX: 25.65 KG/M2 | WEIGHT: 139.4 LBS | TEMPERATURE: 98.6 F | RESPIRATION RATE: 16 BRPM | HEART RATE: 77 BPM | SYSTOLIC BLOOD PRESSURE: 88 MMHG | HEIGHT: 62 IN | DIASTOLIC BLOOD PRESSURE: 56 MMHG

## 2018-09-13 DIAGNOSIS — C85.22 MEDIASTINAL (THYMIC) LARGE B-CELL LYMPHOMA OF INTRATHORACIC LYMPH NODES (HCC): ICD-10-CM

## 2018-09-13 DIAGNOSIS — C85.28 MEDIASTINAL LARGE B-CELL LYMPHOMA OF LYMPH NODES OF MULTIPLE REGIONS (HCC): Primary | ICD-10-CM

## 2018-09-13 LAB
ALBUMIN SERPL-MCNC: 4.7 G/DL (ref 3.5–5.2)
ALBUMIN/GLOB SERPL: 1.9 G/DL (ref 1.1–2.4)
ALP SERPL-CCNC: 71 U/L (ref 38–116)
ALT SERPL W P-5'-P-CCNC: 19 U/L (ref 0–33)
ANION GAP SERPL CALCULATED.3IONS-SCNC: 13.9 MMOL/L
AST SERPL-CCNC: 23 U/L (ref 0–32)
BASOPHILS # BLD AUTO: 0.07 10*3/MM3 (ref 0–0.1)
BASOPHILS NFR BLD AUTO: 0.8 % (ref 0–1.1)
BILIRUB SERPL-MCNC: 0.2 MG/DL (ref 0.1–1.2)
BUN BLD-MCNC: 14 MG/DL (ref 6–20)
BUN/CREAT SERPL: 18.4 (ref 7.3–30)
CALCIUM SPEC-SCNC: 10.2 MG/DL (ref 8.5–10.2)
CHLORIDE SERPL-SCNC: 100 MMOL/L (ref 98–107)
CO2 SERPL-SCNC: 27.1 MMOL/L (ref 22–29)
CREAT BLD-MCNC: 0.76 MG/DL (ref 0.6–1.1)
DEPRECATED RDW RBC AUTO: 60 FL (ref 37–49)
EOSINOPHIL # BLD AUTO: 0.01 10*3/MM3 (ref 0–0.36)
EOSINOPHIL NFR BLD AUTO: 0.1 % (ref 1–5)
ERYTHROCYTE [DISTWIDTH] IN BLOOD BY AUTOMATED COUNT: 17.2 % (ref 11.7–14.5)
GFR SERPL CREATININE-BSD FRML MDRD: 114 ML/MIN/1.73
GFR SERPL CREATININE-BSD FRML MDRD: 94 ML/MIN/1.73
GLOBULIN UR ELPH-MCNC: 2.5 GM/DL (ref 1.8–3.5)
GLUCOSE BLD-MCNC: 94 MG/DL (ref 74–124)
HCT VFR BLD AUTO: 32.9 % (ref 34–45)
HGB BLD-MCNC: 10.7 G/DL (ref 11.5–14.9)
IMM GRANULOCYTES # BLD: 2.23 10*3/MM3 (ref 0–0.03)
IMM GRANULOCYTES NFR BLD: 27.1 % (ref 0–0.5)
LYMPHOCYTES # BLD AUTO: 0.37 10*3/MM3 (ref 1–3.5)
LYMPHOCYTES NFR BLD AUTO: 4.5 % (ref 20–49)
MCH RBC QN AUTO: 32.1 PG (ref 27–33)
MCHC RBC AUTO-ENTMCNC: 32.5 G/DL (ref 32–35)
MCV RBC AUTO: 98.8 FL (ref 83–97)
MONOCYTES # BLD AUTO: 1.41 10*3/MM3 (ref 0.25–0.8)
MONOCYTES NFR BLD AUTO: 17.1 % (ref 4–12)
NEUTROPHILS # BLD AUTO: 4.15 10*3/MM3 (ref 1.5–7)
NEUTROPHILS NFR BLD AUTO: 50.4 % (ref 39–75)
NRBC BLD MANUAL-RTO: 1.1 /100 WBC (ref 0–0)
PLATELET # BLD AUTO: 143 10*3/MM3 (ref 150–375)
PMV BLD AUTO: 11.8 FL (ref 8.9–12.1)
POTASSIUM BLD-SCNC: 3.9 MMOL/L (ref 3.5–4.7)
PROT SERPL-MCNC: 7.2 G/DL (ref 6.3–8)
RBC # BLD AUTO: 3.33 10*6/MM3 (ref 3.9–5)
SODIUM BLD-SCNC: 141 MMOL/L (ref 134–145)
WBC NRBC COR # BLD: 8.24 10*3/MM3 (ref 4–10)

## 2018-09-13 PROCEDURE — 85025 COMPLETE CBC W/AUTO DIFF WBC: CPT | Performed by: INTERNAL MEDICINE

## 2018-09-13 PROCEDURE — 36415 COLL VENOUS BLD VENIPUNCTURE: CPT | Performed by: INTERNAL MEDICINE

## 2018-09-13 PROCEDURE — 80053 COMPREHEN METABOLIC PANEL: CPT | Performed by: INTERNAL MEDICINE

## 2018-09-13 PROCEDURE — 99214 OFFICE O/P EST MOD 30 MIN: CPT | Performed by: INTERNAL MEDICINE

## 2018-09-13 NOTE — PROGRESS NOTES
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 She did initiate EPOCH-R  in the hospital on 06/16/2018.    History of Present Illness      The patient is a 23 y.o. female with the above-mentioned history, with history of mediastinal large B-cell lymphoma status post 4 cycles of chemotherapy with dose adusted EPOCHR, patient has completed 4 cycles of chemotherapy.  And had a CT scan of the chest abdomen pelvis which shows continued response.  The plan is to do a total of 6 cycles of chemotherapy.  On the CT scan radiology read as the spleen to be slightly decreased from before.  However I do not think the spleen was involved in the first place as PET scan had not picked up any activity on the spleen prior to starting treatment and again after 2 cycles.  So far she has had an excellent response to treatment.  She is on dose adjusted EPOCH R.     She received Zoladex monthly and the next injection is due on October 8, 2018.      Past Medical History, Past Surgical History, Social History, Family History have been reviewed and are without significant changes except as mentioned.    Oncologic history:.   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  She saw Dr. Arina Cohen on last Friday.  The reason the patient was referred to Dr. Cohen was because they thought she had cardiomegaly on chest x-ray.  However a chest x-ray was ordered which showedThe chest x-ray showed extensive abnormal increased density throughout the anterior mediastinum extending into the left hilar region and left perihilar region and is most likely due to confluent lymphadenopathy.     CT angiogram of the chest did not show pulmonary embolism but showed     there is a large conglomerate  mass encases multiple vascular structures of the mediastinum and left  hilar region that is most likely extensive confluent lymphadenopathy.  The primary  differential diagnostic considerations would be lymphoma.  The tumor posteriorly displaces the pulmonary arteries and the left and  moderately narrows the main pulmonary artery. The tumor also posteriorly  displaces the trachea and markedly narrows the left mainstem bronchus.  The pulmonary veins in the left are also encased and narrowed. The mass  encases and markedly narrows the SVC. The large edy mass measures  approximately 19.8 x 13.7 x 14.0 cm in diameter. Enlarged lymph nodes  are also present in the left supraclavicular region where they appear to  produce occlusion of the left internal jugular vein. There is no  axillary lymphadenopathy. There are no filling defects within the  pulmonary arteries. There is no CT evidence of pulmonary embolism. There  is a small left pleural effusion. A small pericardial effusion measuring  8 mm in maximum thickness is also present. There is compressive  atelectasis of the left lung. Patchy airspace opacities are also present  in the superior aspect of the left upper lobe. These are most likely due  to direct tumor infiltration of the lung parenchyma, but could also  represent postobstructive pneumonia. The right lung is well expanded and  clear. Images through the upper abdomen partially show what could be a  edy mass in the retroperitoneum posterior and inferior to the  pancreas. Further evaluation with a CT scan of the abdomen and pelvis is  recommended. Bone window images demonstrate patchy sclerosis in the C7  and T1 and T2 and T3 and T4 vertebral body suspicious for bone  involvement with lymphoma.     IMPRESSION:  Very large tumor mass in the mediastinum encasing multiple  vascular structures and also moderately narrowing the left mainstem  bronchus as described in more detail above. Direct tumor infiltration of  the left upper lobe is also suspected. Small left pleural effusion and a  small pericardial effusion. There may also be partially  visualized  lymphadenopathy in the upper abdomen. Patchy areas of sclerosis are also  noted in the C7 and T1 and T2 and T3 and T4 vertebral bodies suspicious  for osseous metastatic disease. The findings are consistent with  Lymphoma.     Dr. Cohen felt that she had a small pericardial effusion.  Patient has been symptomatic with cough which is worsening which started back in February 2018 and worsened over the last 4 months.  Patient also has some shortness of breath with walking up the steps.  She has not lost weight.  She say she is reasonable appetite.  She does have fever kind of low-grade fever and night sweats.  She is more fatigued compared to before.  She was referred to us because of concern that this could be lymphoma.  She does not have any tissue diagnosis yet.  Patient does complain of back pain.    Echo done on admission June 12, 2018 by Dr. Fitzgerald showed that the patient's posterior and appears large primary leukemia the left ventricle and apex.  There is no tamponade.  The pericardium appears thickened pointing to involvement of the pericardium into the mediastinal process.  Dr. Fitzgerald felt that it would be difficult to do pericardial synthesis as the mediastinal mass covers the anterior aspect of the heart.  Ejection fraction is 58%  CT-guided needle biopsy June 12, 2018 was negative  LDH is elevated.  Uric acid is high.  MRI thoracic spine, negative  CT abdomen pelvis negative  Scan July 13, 2018 shows large infiltrative edy mass in the anterior mediastinum and left hilar region that nearly completely fills the left hemithorax and shows intense hypermetabolism with an SUV of 19.8.  No foci of pathologic hypermetabolism are identified elsewhere in the chest, neck abdomen or pelvis.    Pathology was consistent with primary mediastinal large B-cell lymphoma.    Patient was seen by Dr. Melgar.  He discussed harvesting eggs versus Zoladex plus oral contraceptives versus Zoladex alone for fertility  preservation.  Due to large size of the tumor and rapid initiation of treatment needed the patient was not able to have aches harvested done.  Because she is at increased risk of thrombosis with the use of oral contraceptives secondary to malignancy he recommended Zoladex alone.  Patient received first dose of Zoladex 3.6 mg subcutaneous on Belia 15, 2018.    Patient started on EPOCH/R on June 16, 2018    Patient had a reaction to Rituxan which improved with steroids, Benadryl and Pepcid.  She had to receive it slow.  She started having itching over her EARS , sore throat.  She was given IV steroids Benadryl and Pepcid and following that she was started at a slower rate and she completed it.    Right upper extremity Doppler ultrasound showed new superficial vein thrombosis around the PICC line with no extension into the deep system.  Repeat Doppler was ordered.    A Doppler ultrasound initially showed superficial thrombophlebitis.  She was on prophylactic Arixtra.  A Doppler was repeated 2 days later on 6/20/18  which showed extension of thrombus into the axillary and brachial veins.  She was therefore started on Xarelto 15 mg one every 12 hours and the PICC line was removed as soon as chemotherapy completed on 6/20/18.  She will take 15 mg of Xarelto every 12 hours for 21 days and then transition to 20 mg once a day.  The patient will have CBC performed twice a week.  If the platelet count drops below 50,000, anticoagulation will need to be temporarily held  Patient was started on acyclovir/fluconazole/Bactrim  Bone marrow done June 14, 2018, morphology was negative for lymphoma    Fertility preservation, Zoladex is next due July 12, 2018.  CT scan and PET scan showing significant response after cycle 2 of chemotherapy  Next dose of Lupron August 13, 2018    Review of Systems   Constitutional: Positive for fatigue. Negative for activity change, appetite change, chills and fever.   HENT: Negative for mouth sores.   "  Eyes: Negative for visual disturbance.   Respiratory: Negative for cough (improved) and shortness of breath (improved).    Cardiovascular: Negative.    Gastrointestinal: Negative.  Negative for abdominal pain, diarrhea, nausea and vomiting.   Endocrine: Negative.    Genitourinary: Negative for dysuria, frequency and menstrual problem.   Musculoskeletal: Negative for arthralgias, back pain, joint swelling and myalgias.   Skin: Negative.    Allergic/Immunologic: Negative.    Neurological: Negative.  Negative for dizziness and weakness.   Hematological: Negative.  Does not bruise/bleed easily.   Psychiatric/Behavioral: The patient is not nervous/anxious.    All other systems reviewed and are negative.  Review of systems unchanged except as updated.    Medications:  The current medication list was reviewed in the EMR    ALLERGIES:  No Known Allergies    Objective      Vitals:    09/13/18 0857   BP: (!) 88/56   Pulse: 77   Resp: 16   Temp: 98.6 °F (37 °C)   TempSrc: Oral   SpO2: 99%   Weight: 63.2 kg (139 lb 6.4 oz)   Height: 157.5 cm (62.01\")   PainSc:   1   PainLoc: Back     Current Status 9/13/2018   ECOG score 0       Physical Exam   Constitutional: She is oriented to person, place, and time. She appears well-developed and well-nourished.   She is accompanied by her mother today.   HENT:   Head: Normocephalic.   Eyes: Pupils are equal, round, and reactive to light.   Neck: Normal range of motion.   Cardiovascular: Normal rate, regular rhythm and normal heart sounds.    Pulmonary/Chest: Effort normal and breath sounds normal. No respiratory distress. She has no wheezes. She has no rales.   Abdominal: Soft.   Lymphadenopathy:     She has no cervical adenopathy.   Neurological: She is alert and oriented to person, place, and time.   Skin: Skin is warm and dry.   Psychiatric: She has a normal mood and affect.   physical exam unchanged from previous except as updated.    RECENT LABS:    Results from last 7 days  Lab " Units 09/13/18  0847 09/10/18  0944 09/07/18  0817   WBC 10*3/mm3 8.24 8.62 0.72*   NEUTROS ABS 10*3/mm3 4.15 4.57 0.36*   LYMPHS ABS 10*3/mm3  --  1.21  --    HEMOGLOBIN g/dL 10.7* 9.6* 9.2*   HEMATOCRIT % 32.9* 28.4* 27.7*   PLATELETS 10*3/mm3 143* 85* 57*   CT SCAN:  CHEST: There has been interval decrease in the large confluent anterior  mediastinal mass which measures approximately 11 x 8 cm, previously 13.5  x 9.5 cm when measured along the same planes. There has been a decrease  in the mass effect and narrowing of the left upper lobe bronchus and  there has been improved aeration at the left upper lobe. The patchy left  upper lobe airspace opacities have decreased in size and density. There  are no new pulmonary opacities and there are no pleural or pericardial  effusions.     ABDOMEN/PELVIS: Borderline splenic size is stable, measuring  approximately 11 cm. The liver, gallbladder, pancreas, adrenals, and  kidneys appear unremarkable. There is formed stool throughout the colon.  No bowel thickening is seen. The appendix appears within normal limits.  Uterus and adnexa appear unremarkable. There is no free fluid or  lymphadenopathy.     PET   Chest: Pulmonary opacification within the left upper lobe demonstrating  intense FDG uptake has decreased in extent since 7/9/2019. The  background ground glass opacification and interlobular septal thickening  has also improved. The anterior mediastinal soft tissue mass has  decreased in size, measuring 8.5 x 10.7 cm (previously 13 x 16.7 cm) and  FDG uptake with a maximum SUV of 5 (previously 19.8). There is no  pleural effusion or pneumothorax.      Abdomen and pelvis:     The liver, spleen, pancreas, kidneys and adrenal glands have a normal  non contrast CT appearance and demonstrate physiologic FDG uptake.     The gallbladder is unremarkable.     The bowel demonstrates normal physiologic FDG uptake.     There are no FDG avid or enlarged abdominopelvic lymph nodes.      The bladder is unremarkable for its degree of distension.     There is diffusely increased FDG uptake within the marrow.  No  suspicious focal lytic or blastic bony lesions.        Results from last 7 days  Lab Units 09/13/18  0847 09/10/18  0944 09/07/18  0817   SODIUM mmol/L 141 142 139   POTASSIUM mmol/L 3.9 4.0 4.0   CHLORIDE mmol/L 100 104 102   CO2 mmol/L 27.1 25.5 25.9   BUN mg/dL 14 13 13   CREATININE mg/dL 0.76 0.53* 0.51*   CALCIUM mg/dL 10.2 9.9 9.4   ALBUMIN g/dL 4.70 4.50 4.20   BILIRUBIN mg/dL 0.2 0.2 0.3   ALK PHOS U/L 71 63 51   ALT (SGPT) U/L 19 23 31   AST (SGOT) U/L 23 28 19   GLUCOSE mg/dL 94 92 93           Assessment/Plan   1.  Primary mediastinal large B-cell lymphoma: The patient presented with dyspnea, cough, and chest pain.  A CT angiogram of the chest 6/8/18 showed a very large tumor mass in the mediastinum encasing multiple vascular structures and narrowing the left mainstem bronchus.  There was direct tumor infiltration in the left upper lobe.  There was a small left pleural effusion.  She underwent a CT-guided biopsy of the mediastinal mass on 6/12/18 and pathology reviewed at [facility] oncology showed diffuse large B-cell lymphoma consistent with a primary diffuse large B-cell lymphoma.  A bone marrow exam was performed on 6/14/18 which was negative for lymphoma.  She underwent a PET scan 6/13/18 which showed a large hypermetabolic mass in the chest involving the anterior mediastinum, left hilum, nearly completely filling the left hemothorax with SUV 19.8.  There was physiologic distribution elsewhere.     The patient was started on IV fluid hydration and allopurinol for prevention of hyperuricemia.  She initiated systemic chemotherapy with DA-EPOCH-R on 6/16/18 and completed the evening of 6/21/18.  She had a infusion reaction to rituximab but was able to complete with additional medications and otherwise tolerated chemotherapy well.  She will require additional premedications with  additional rituximab.  Plan is to monitor her blood counts twice per week outpatient and proceed with cycle 2 of chemotherapy day 21.     The patient had significant improvement in shortness of breath, cough, and chest pain by the time of discharge.    · Patient received Neulasta support  · Her white count dropped down to as low as 0.8 low-grade temperature and patient was placed on Levaquin ×5 days starting June 27, 2018, neutropenia AT  11 days posttreatment.  Platelets dropped to 82.  · Her next ZOLADEX injection is due July 12.  · She is due to start chemotherapy on July 9.  · Discussed with Dr. Fox at the [facility] and his suggestion was to do the CT scan and PET scan after 2 cycles and discuss the results with him.  If she has an excellent response early on she would not require any further treatments after completion of 6 cycles of EPOCH/R  · Patient being admitted for cycle 3 of chemotherapy on July 30, 2018.  · She's status post cycle 4 of chemotherapy and CT scan has been reviewed following 4 cycles with continued response.  · She is due for ZOLADEX  injection on October 8, 2018.   · Patient is due to go for cycle 5 of chemotherapy on Monday, September 17, 2018  · Asked echocardiogram done August 22, 2018 shows ejection fraction of 59% to 60% with the eldest strain of 19.2%     2.  Pericardial effusion:   a 2-D echocardiogram 6/8/18 showed a left ventricular systolic function 58%.  There was a 2 cm pericardial effusion with no tamponade.  The pericardium appeared thickened.  She had no evidence of volume overload or Nod throughout the hospital stay     3.   FEN: She was monitored very carefully for any evidence of tumor lysis.  She was maintained on IV fluids and allopurinol throughout hospitalization.    her uric acid at discharge was 1.2 but I recommended that she stay on allopurinol twice daily until her next cycle of chemotherapy to prevent hyperuricemia.  She has mild hypokalemia and will  be discharged on potassium 20 mEq once per day.  Have ordered an outpatient BMP weekly for monitoring of her renal function and electrolytes     4.  Fertility preservation:  Patient received Zoladex injection  for fertility preservation; this should be continued once monthly during her chemotherapy.  Next dose due 7/12/18     5.  Right upper extremity swelling:  The patient had a PIC line placed in the right upper extremity for administration of chemotherapy.  Her arm was noted to be swollen.  A Doppler ultrasound initially showed superficial thrombophlebitis.  She was on prophylactic Arixtra.  A Doppler was repeated 2 days later on 6/20/18  which showed extension of thrombus into the axillary and brachial veins.  She was therefore started on Xarelto 15 mg one every 12 hours and the PICC line was removed as soon as chemotherapy completed on 6/20/18.  She will take 15 mg of Xarelto every 12 hours for 21 days and then transition to 20 mg once a day.  The patient will have CBC performed twice a week.  If the platelet count drops below 50,000, anticoagulation will need to be temporarily held     6.  ID: The patient will receive Neulasta 24 hours after completion of chemotherapy for reduction of neutropenic infection.  She will be discharged on prophylactic antibiotics including acyclovir, Diflucan, and Bactrim.  She may require bacterial prophylaxis if the ANC drops below 1000.    Plan 1.   admit for cycle 5 of chemotherapy with EPOCH/R on September 17, 2018. we will give Neulasta support patient has had excellent response after cycle 4.  Plan will be to complete total of 6 cycles of chemotherapy.      2.  ZOLADEX  injection is due 3.6 mg subcutaneous on October 8, 2018    3 Continue Pradaxa    4.  Patient will see nurse practitioner on September 17, 2018 with labs and be admitted to receive cycle 5 of chemotherapy.    5.  We will discuss with radiology Dr. Campbell  about the spleen.  I believe it may be just a  variation in the size of the spleen since it was read by 2 different radiologists.  However since agents mother is very concerned I will discuss with radiology.       Millie Padilla MD   9/13/2018      CC: Dr. Arina Munoz

## 2018-09-13 NOTE — PROGRESS NOTES
Per Dr Padilla-Pt is still having problems with her Pradaxa copay. I contacted [facility] [phone] and spoke to the Pharmacist. She looked into this and states that pt had a different insurance. She states that last month they did not need to use the card as her insurance paid all of the cost. She has loaded the Pradaxa copay card with the new insurance and it has come back as $0 copay. She will get the Pradaxa ready for pt and contact pt.

## 2018-09-15 ENCOUNTER — READMISSION MANAGEMENT (OUTPATIENT)
Dept: CALL CENTER | Facility: HOSPITAL | Age: 23
End: 2018-09-15

## 2018-09-15 NOTE — OUTREACH NOTE
Medical Week 2 Survey      Responses   Facility patient discharged from?  [facility]   Does the patient have one of the following disease processes/diagnoses(primary or secondary)?  Other   Week 2 attempt successful?  Yes   Call start time  [id number]   Revoke  Decline to participate   Call end time  [phone]          Camelia Goel RN

## 2018-09-17 ENCOUNTER — INFUSION (OUTPATIENT)
Dept: ONCOLOGY | Facility: HOSPITAL | Age: 23
End: 2018-09-17

## 2018-09-17 ENCOUNTER — HOSPITAL ENCOUNTER (INPATIENT)
Facility: HOSPITAL | Age: 23
LOS: 4 days | Discharge: HOME | End: 2018-09-21
Attending: INTERNAL MEDICINE | Admitting: INTERNAL MEDICINE

## 2018-09-17 ENCOUNTER — OFFICE VISIT (OUTPATIENT)
Dept: ONCOLOGY | Facility: CLINIC | Age: 23
End: 2018-09-17

## 2018-09-17 VITALS
WEIGHT: 140.8 LBS | HEART RATE: 75 BPM | OXYGEN SATURATION: 97 % | DIASTOLIC BLOOD PRESSURE: 68 MMHG | BODY MASS INDEX: 25.91 KG/M2 | HEIGHT: 62 IN | SYSTOLIC BLOOD PRESSURE: 100 MMHG | RESPIRATION RATE: 16 BRPM | TEMPERATURE: 98.4 F

## 2018-09-17 DIAGNOSIS — Z45.2 FITTING AND ADJUSTMENT OF VASCULAR CATHETER: ICD-10-CM

## 2018-09-17 DIAGNOSIS — C85.22 MEDIASTINAL (THYMIC) LARGE B-CELL LYMPHOMA OF INTRATHORACIC LYMPH NODES (HCC): ICD-10-CM

## 2018-09-17 DIAGNOSIS — T45.1X5A ANEMIA ASSOCIATED WITH CHEMOTHERAPY: ICD-10-CM

## 2018-09-17 DIAGNOSIS — D64.81 ANEMIA ASSOCIATED WITH CHEMOTHERAPY: ICD-10-CM

## 2018-09-17 DIAGNOSIS — C85.28 MEDIASTINAL LARGE B-CELL LYMPHOMA OF LYMPH NODES OF MULTIPLE REGIONS (HCC): ICD-10-CM

## 2018-09-17 DIAGNOSIS — C83.30 DIFFUSE LARGE B-CELL LYMPHOMA, UNSPECIFIED BODY REGION (HCC): Primary | ICD-10-CM

## 2018-09-17 DIAGNOSIS — G43.809 OTHER MIGRAINE WITHOUT STATUS MIGRAINOSUS, NOT INTRACTABLE: ICD-10-CM

## 2018-09-17 LAB
ALBUMIN SERPL-MCNC: 4.1 G/DL (ref 3.5–5.2)
ALBUMIN/GLOB SERPL: 1.6 G/DL (ref 1.1–2.4)
ALP SERPL-CCNC: 53 U/L (ref 38–116)
ALT SERPL W P-5'-P-CCNC: 15 U/L (ref 0–33)
ANION GAP SERPL CALCULATED.3IONS-SCNC: 11.3 MMOL/L
AST SERPL-CCNC: 19 U/L (ref 0–32)
BASOPHILS # BLD AUTO: 0.02 10*3/MM3 (ref 0–0.1)
BASOPHILS NFR BLD AUTO: 0.5 % (ref 0–1.1)
BILIRUB SERPL-MCNC: 0.2 MG/DL (ref 0.1–1.2)
BUN BLD-MCNC: 11 MG/DL (ref 6–20)
BUN/CREAT SERPL: 20 (ref 7.3–30)
CALCIUM SPEC-SCNC: 9.4 MG/DL (ref 8.5–10.2)
CHLORIDE SERPL-SCNC: 103 MMOL/L (ref 98–107)
CO2 SERPL-SCNC: 25.7 MMOL/L (ref 22–29)
CREAT BLD-MCNC: 0.55 MG/DL (ref 0.6–1.1)
DEPRECATED RDW RBC AUTO: 61.6 FL (ref 37–49)
EOSINOPHIL # BLD AUTO: 0.04 10*3/MM3 (ref 0–0.36)
EOSINOPHIL NFR BLD AUTO: 1 % (ref 1–5)
ERYTHROCYTE [DISTWIDTH] IN BLOOD BY AUTOMATED COUNT: 17.1 % (ref 11.7–14.5)
GFR SERPL CREATININE-BSD FRML MDRD: 137 ML/MIN/1.73
GFR SERPL CREATININE-BSD FRML MDRD: >150 ML/MIN/1.73
GLOBULIN UR ELPH-MCNC: 2.5 GM/DL (ref 1.8–3.5)
GLUCOSE BLD-MCNC: 125 MG/DL (ref 74–124)
HCT VFR BLD AUTO: 30.9 % (ref 34–45)
HGB BLD-MCNC: 10.1 G/DL (ref 11.5–14.9)
IMM GRANULOCYTES # BLD: 0.37 10*3/MM3 (ref 0–0.03)
IMM GRANULOCYTES NFR BLD: 9 % (ref 0–0.5)
LDH SERPL-CCNC: 254 U/L (ref 99–259)
LYMPHOCYTES # BLD AUTO: 0.33 10*3/MM3 (ref 1–3.5)
LYMPHOCYTES NFR BLD AUTO: 8 % (ref 20–49)
MCH RBC QN AUTO: 32.7 PG (ref 27–33)
MCHC RBC AUTO-ENTMCNC: 32.7 G/DL (ref 32–35)
MCV RBC AUTO: 100 FL (ref 83–97)
MONOCYTES # BLD AUTO: 0.6 10*3/MM3 (ref 0.25–0.8)
MONOCYTES NFR BLD AUTO: 14.6 % (ref 4–12)
NEUTROPHILS # BLD AUTO: 2.74 10*3/MM3 (ref 1.5–7)
NEUTROPHILS NFR BLD AUTO: 66.9 % (ref 39–75)
NRBC BLD MANUAL-RTO: 0.5 /100 WBC (ref 0–0)
PLATELET # BLD AUTO: 196 10*3/MM3 (ref 150–375)
PMV BLD AUTO: 10.7 FL (ref 8.9–12.1)
POTASSIUM BLD-SCNC: 3.8 MMOL/L (ref 3.5–4.7)
PROT SERPL-MCNC: 6.6 G/DL (ref 6.3–8)
RBC # BLD AUTO: 3.09 10*6/MM3 (ref 3.9–5)
SODIUM BLD-SCNC: 140 MMOL/L (ref 134–145)
URATE SERPL-MCNC: 2.8 MG/DL (ref 2.8–7.4)
WBC NRBC COR # BLD: 4.1 10*3/MM3 (ref 4–10)

## 2018-09-17 PROCEDURE — 99254 IP/OBS CNSLTJ NEW/EST MOD 60: CPT | Performed by: NURSE PRACTITIONER

## 2018-09-17 PROCEDURE — 83615 LACTATE (LD) (LDH) ENZYME: CPT

## 2018-09-17 PROCEDURE — 25010000002 ONDANSETRON PER 1 MG: Performed by: NURSE PRACTITIONER

## 2018-09-17 PROCEDURE — 84550 ASSAY OF BLOOD/URIC ACID: CPT

## 2018-09-17 PROCEDURE — 25010000002 DOXORUBICIN PER 10 MG: Performed by: NURSE PRACTITIONER

## 2018-09-17 PROCEDURE — 63710000001 PREDNISONE PER 5 MG: Performed by: NURSE PRACTITIONER

## 2018-09-17 PROCEDURE — 99223 1ST HOSP IP/OBS HIGH 75: CPT | Performed by: NURSE PRACTITIONER

## 2018-09-17 PROCEDURE — 85025 COMPLETE CBC W/AUTO DIFF WBC: CPT

## 2018-09-17 PROCEDURE — 25010000002 RITUXIMAB 10 MG/ML SOLUTION 10 ML VIAL: Performed by: NURSE PRACTITIONER

## 2018-09-17 PROCEDURE — 3E04305 INTRODUCTION OF OTHER ANTINEOPLASTIC INTO CENTRAL VEIN, PERCUTANEOUS APPROACH: ICD-10-PCS | Performed by: INTERNAL MEDICINE

## 2018-09-17 PROCEDURE — 25010000002 RITUXIMAB 10 MG/ML SOLUTION 50 ML VIAL: Performed by: NURSE PRACTITIONER

## 2018-09-17 PROCEDURE — 25010000002 DIPHENHYDRAMINE PER 50 MG: Performed by: NURSE PRACTITIONER

## 2018-09-17 PROCEDURE — 25010000002 VINCRISTINE PER 1 MG: Performed by: NURSE PRACTITIONER

## 2018-09-17 PROCEDURE — 80053 COMPREHEN METABOLIC PANEL: CPT

## 2018-09-17 PROCEDURE — 25010000002 ETOPOSIDE 100 MG/5ML SOLUTION 25 ML VIAL: Performed by: NURSE PRACTITIONER

## 2018-09-17 RX ORDER — PANTOPRAZOLE SODIUM 40 MG/1
40 TABLET, DELAYED RELEASE ORAL
Status: CANCELLED | OUTPATIENT
Start: 2018-09-17

## 2018-09-17 RX ORDER — SODIUM CHLORIDE 0.9 % (FLUSH) 0.9 %
10 SYRINGE (ML) INJECTION AS NEEDED
Status: DISCONTINUED | OUTPATIENT
Start: 2018-09-17 | End: 2018-09-21 | Stop reason: HOSPADM

## 2018-09-17 RX ORDER — SODIUM CHLORIDE 9 MG/ML
75 INJECTION, SOLUTION INTRAVENOUS CONTINUOUS
Status: CANCELLED
Start: 2018-09-17

## 2018-09-17 RX ORDER — ACETAMINOPHEN 325 MG/1
650 TABLET ORAL ONCE
Status: COMPLETED | OUTPATIENT
Start: 2018-09-17 | End: 2018-09-17

## 2018-09-17 RX ORDER — PREDNISONE 50 MG/1
100 TABLET ORAL 2 TIMES DAILY WITH MEALS
Status: DISCONTINUED | OUTPATIENT
Start: 2018-09-17 | End: 2018-09-21 | Stop reason: HOSPADM

## 2018-09-17 RX ORDER — PANTOPRAZOLE SODIUM 40 MG/1
40 TABLET, DELAYED RELEASE ORAL
Status: COMPLETED | OUTPATIENT
Start: 2018-09-17 | End: 2018-09-21

## 2018-09-17 RX ORDER — DIPHENHYDRAMINE HYDROCHLORIDE 50 MG/ML
50 INJECTION INTRAMUSCULAR; INTRAVENOUS AS NEEDED
Status: DISCONTINUED | OUTPATIENT
Start: 2018-09-17 | End: 2018-09-21 | Stop reason: HOSPADM

## 2018-09-17 RX ORDER — SODIUM CHLORIDE 0.9 % (FLUSH) 0.9 %
10 SYRINGE (ML) INJECTION AS NEEDED
Status: CANCELLED | OUTPATIENT
Start: 2018-09-17

## 2018-09-17 RX ORDER — SODIUM CHLORIDE 9 MG/ML
75 INJECTION, SOLUTION INTRAVENOUS CONTINUOUS
Status: DISCONTINUED | OUTPATIENT
Start: 2018-09-17 | End: 2018-09-21 | Stop reason: HOSPADM

## 2018-09-17 RX ORDER — SODIUM CHLORIDE 0.9 % (FLUSH) 0.9 %
10 SYRINGE (ML) INJECTION EVERY 12 HOURS SCHEDULED
Status: CANCELLED | OUTPATIENT
Start: 2018-09-17

## 2018-09-17 RX ORDER — PREDNISONE 20 MG/1
100 TABLET ORAL 2 TIMES DAILY WITH MEALS
Status: CANCELLED | OUTPATIENT
Start: 2018-09-17

## 2018-09-17 RX ORDER — FAMOTIDINE 10 MG/ML
20 INJECTION, SOLUTION INTRAVENOUS AS NEEDED
Status: DISCONTINUED | OUTPATIENT
Start: 2018-09-17 | End: 2018-09-21 | Stop reason: HOSPADM

## 2018-09-17 RX ORDER — SODIUM CHLORIDE 0.9 % (FLUSH) 0.9 %
20 SYRINGE (ML) INJECTION AS NEEDED
Status: CANCELLED | OUTPATIENT
Start: 2018-09-17

## 2018-09-17 RX ORDER — MAGNESIUM OXIDE 400 MG/1
400 TABLET ORAL DAILY
Status: DISCONTINUED | OUTPATIENT
Start: 2018-09-17 | End: 2018-09-21 | Stop reason: HOSPADM

## 2018-09-17 RX ORDER — FAMOTIDINE 10 MG/ML
20 INJECTION, SOLUTION INTRAVENOUS AS NEEDED
Status: CANCELLED | OUTPATIENT
Start: 2018-09-17

## 2018-09-17 RX ORDER — DIPHENHYDRAMINE HYDROCHLORIDE 50 MG/ML
50 INJECTION INTRAMUSCULAR; INTRAVENOUS AS NEEDED
Status: CANCELLED | OUTPATIENT
Start: 2018-09-17

## 2018-09-17 RX ORDER — ACETAMINOPHEN 325 MG/1
650 TABLET ORAL ONCE
Status: CANCELLED | OUTPATIENT
Start: 2018-09-17

## 2018-09-17 RX ORDER — SODIUM CHLORIDE 0.9 % (FLUSH) 0.9 %
10 SYRINGE (ML) INJECTION EVERY 12 HOURS SCHEDULED
Status: DISCONTINUED | OUTPATIENT
Start: 2018-09-17 | End: 2018-09-21 | Stop reason: HOSPADM

## 2018-09-17 RX ORDER — SODIUM CHLORIDE 0.9 % (FLUSH) 0.9 %
20 SYRINGE (ML) INJECTION AS NEEDED
Status: DISCONTINUED | OUTPATIENT
Start: 2018-09-17 | End: 2018-09-21 | Stop reason: HOSPADM

## 2018-09-17 RX ORDER — MEPERIDINE HYDROCHLORIDE 50 MG/ML
25 INJECTION INTRAMUSCULAR; INTRAVENOUS; SUBCUTANEOUS
Status: ACTIVE | OUTPATIENT
Start: 2018-09-17 | End: 2018-09-18

## 2018-09-17 RX ADMIN — DIPHENHYDRAMINE HYDROCHLORIDE 25 MG: 50 INJECTION, SOLUTION INTRAMUSCULAR; INTRAVENOUS at 14:42

## 2018-09-17 RX ADMIN — Medication 400 MG: at 17:36

## 2018-09-17 RX ADMIN — SODIUM CHLORIDE 75 ML/HR: 9 INJECTION, SOLUTION INTRAVENOUS at 14:45

## 2018-09-17 RX ADMIN — ONDANSETRON 16 MG: 2 INJECTION INTRAMUSCULAR; INTRAVENOUS at 18:21

## 2018-09-17 RX ADMIN — RITUXIMAB 600 MG: 10 INJECTION, SOLUTION INTRAVENOUS at 15:52

## 2018-09-17 RX ADMIN — ACETAMINOPHEN 650 MG: 325 TABLET ORAL at 14:41

## 2018-09-17 RX ADMIN — PREDNISONE 100 MG: 50 TABLET ORAL at 17:36

## 2018-09-17 RX ADMIN — PANTOPRAZOLE SODIUM 40 MG: 40 TABLET, DELAYED RELEASE ORAL at 14:41

## 2018-09-17 RX ADMIN — VINCRISTINE SULFATE: 1 INJECTION, SOLUTION INTRAVENOUS at 18:51

## 2018-09-17 RX ADMIN — Medication 10 ML: at 11:33

## 2018-09-17 NOTE — H&P
Subjective .     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 Admission for Cycle #5 of chemotherapy today.    HISTORY OF PRESENT ILLNESS:  The patient is a 23 y.o. year old female who is here for follow-up with the above-mentioned history.    History of Present Illness       The patient is a 23 y.o. female with the above-mentioned history, here today for follow-up prior to admission for cycle #5 of therapy.     She notes the last few weeks she has had more migraines.  She has a history of migraines though typically just getting one a year or so.  She discussed this with Dr. Paidlla previously, specifically seen in neurologist during 1 of her hospital admissions.  We will plan to consult neurology during this upcoming admission today.     She still has some occasional discomfort in her right chest at the site of her Mediport.  She also has some stiffness in her neck dating back to when she was diagnosed with a blood clot in her right arm related to the PICC line.  Overall this is improved though.  She continues on Pradaxa 150 mg twice a day.     The patient has continued to respond well to therapy, with CT scan after 4 cycles showing continued response.     Her next Zoladex injection is due 10/8/18.     The patient and her mom denies other concerns at this time.        Past Medical History:   Diagnosis Date   • Diffuse large B cell lymphoma (CMS/HCC) 06/2018   • Fracture of lower leg 2000    L   • H/O Lung mass 06/2018   • H/O Pericardial effusion        ONCOLOGIC HISTORY:  (History from previous dates can be found in the separate document.)    Current Facility-Administered Medications on File Prior to Visit   Medication Dose Route Frequency Provider Last Rate Last Dose   • heparin flush (porcine) 100 UNIT/ML injection 500 Units  5 mL Intravenous PRN Lynda Pritchard APRN       • sodium chloride 0.9 % flush 10 mL  10 mL Intravenous Q12H Lynda Pritchard  BRANNON LEIGH       • sodium chloride 0.9 % flush 10 mL  10 mL Intravenous PRN Lynda Pritchard APRN       • sodium chloride 0.9 % flush 20 mL  20 mL Intravenous PRN Lynda Pritchard APRN         Current Outpatient Prescriptions on File Prior to Visit   Medication Sig Dispense Refill   • acyclovir (ZOVIRAX) 400 MG tablet Take 1 tablet by mouth 2 (Two) Times a Day for 191 doses. Take no more than 5 doses a day. 60 tablet 3   • allopurinol (ZYLOPRIM) 300 MG tablet TAKE ONE TABLET BY MOUTH TWICE A DAY 60 tablet 3   • dabigatran etexilate (PRADAXA) 150 MG capsu Take 1 capsule by mouth Every 12 (Twelve) Hours. 60 capsule 6   • fluconazole (DIFLUCAN) 200 MG tablet Take 2 tablets by mouth Daily for 96 doses. 60 tablet 3   • ondansetron (ZOFRAN) 4 MG tablet Take 1 tablet by mouth Every 6 (Six) Hours As Needed for Nausea or Vomiting. 30 tablet 2   • potassium chloride (MICRO-K) 10 MEQ CR capsule Take 2 capsules by mouth 2 (Two) Times a Day With Meals. 60 capsule 5   • pyridoxine (VITAMIN B-6) 50 MG tablet tablet Take 1 tablet by mouth Daily. 30 tablet 6   • sennosides-docusate sodium (SENOKOT-S) 8.6-50 MG tablet Take 2 tablets by mouth 2 (Two) Times a Day As Needed for Constipation. 60 tablet 1   • sulfamethoxazole-trimethoprim (BACTRIM DS,SEPTRA DS) 800-160 MG per tablet Take 1 tablet by mouth 3 (Three) Times a Week. 12 tablet 3       ALLERGIES:   No Known Allergies    Social History     Social History   • Marital status: Single     Spouse name: N/A   • Number of children: N/A   • Years of education: N/A     Occupational History   • Dental student      [employer]     Social History Main Topics   • Smoking status: Never Smoker   • Smokeless tobacco: Never Used   • Alcohol use No   • Drug use: No   • Sexual activity: No     Other Topics Concern   • Not on file     Social History Narrative   • No narrative on file         Cancer-related family history includes Lung cancer in her maternal grandmother.      Review of Systems   Constitutional: Positive for fatigue. Negative for activity change, appetite change, chills and fever.   HENT: Negative for mouth sores.    Eyes: Negative for visual disturbance.   Respiratory: Negative for cough and shortness of breath.    Cardiovascular: Negative.    Gastrointestinal: Negative.  Negative for abdominal pain, diarrhea, nausea and vomiting.   Endocrine: Negative.    Genitourinary: Negative for dysuria, frequency and menstrual problem.   Musculoskeletal: Negative for arthralgias, back pain, joint swelling and myalgias.   Skin: Negative.    Allergic/Immunologic: Negative.    Neurological: Positive for headaches (migraines more frequent). Negative for dizziness and weakness.   Hematological: Negative.  Does not bruise/bleed easily.   Psychiatric/Behavioral: The patient is not nervous/anxious.    All other systems reviewed and are negative.  Review of systems unchanged except as updated.    Objective      Vitals:    09/17/18 1004   BP: 100/68   Pulse: 75   Resp: 16   Temp: 98.4 °F (36.9 °C)   SpO2: 97%      Current Status 9/17/2018   ECOG score 0       Physical Exam    Constitutional: She is oriented to person, place, and time. She appears well-developed and well-nourished.   She is accompanied by her mother today.   HENT:   Head: Normocephalic.   Mouth/Throat: No oropharyngeal exudate.   Eyes: Pupils are equal, round, and reactive to light. Conjunctivae and EOM are normal.   Neck: Normal range of motion.   Cardiovascular: Normal rate, regular rhythm and normal heart sounds.    Pulmonary/Chest: Effort normal and breath sounds normal. No respiratory distress. She has no wheezes. She has no rales.   Mediport accessed, otherwise benign.   Abdominal: Soft. She exhibits no distension. There is no tenderness.   Musculoskeletal: Normal range of motion. She exhibits no edema.   Lymphadenopathy:     She has no cervical adenopathy.   Neurological: She is alert and oriented to person, place,  and time.   Skin: Skin is warm and dry.   alopecia   Psychiatric: She has a normal mood and affect.   physical exam unchanged from previous except as updated.        RECENT LABS:    Results from last 7 days  Lab Units 09/17/18  0940 09/13/18  0847   WBC 10*3/mm3 4.10 8.24   NEUTROS ABS 10*3/mm3 2.74 4.15   HEMOGLOBIN g/dL 10.1* 10.7*   HEMATOCRIT % 30.9* 32.9*   PLATELETS 10*3/mm3 196 143*       Results from last 7 days  Lab Units 09/17/18  0939 09/13/18  0847   SODIUM mmol/L 140 141   POTASSIUM mmol/L 3.8 3.9   CHLORIDE mmol/L 103 100   CO2 mmol/L 25.7 27.1   BUN mg/dL 11 14   CREATININE mg/dL 0.55* 0.76   CALCIUM mg/dL 9.4 10.2   ALBUMIN g/dL 4.10 4.70   BILIRUBIN mg/dL 0.2 0.2   ALK PHOS U/L 53 71   ALT (SGPT) U/L 15 19   AST (SGOT) U/L 19 23   GLUCOSE mg/dL 125* 94         Assessment/Plan   1.  Primary mediastinal large B-cell lymphoma: The patient presented with dyspnea, cough, and chest pain.  A CT angiogram of the chest 6/8/18 showed a very large tumor mass in the mediastinum encasing multiple vascular structures and narrowing the left mainstem bronchus.  There was direct tumor infiltration in the left upper lobe.  There was a small left pleural effusion.  She underwent a CT-guided biopsy of the mediastinal mass on 6/12/18 and pathology reviewed at [facility] oncology showed diffuse large B-cell lymphoma consistent with a primary diffuse large B-cell lymphoma.  A bone marrow exam was performed on 6/14/18 which was negative for lymphoma.  She underwent a PET scan 6/13/18 which showed a large hypermetabolic mass in the chest involving the anterior mediastinum, left hilum, nearly completely filling the left hemothorax with SUV 19.8.  There was physiologic distribution elsewhere.     · The patient was started on IV fluid hydration and allopurinol for prevention of hyperuricemia.   · Initiated systemic chemotherapy with DA-EPOCH-R on 6/16/18.  She had a infusion reaction to rituximab but was able to complete with  additional medications and otherwise tolerated chemotherapy well.  She will require additional premedications with additional rituximab.    · The patient had significant improvement in shortness of breath, cough, and chest pain by the time of discharge.  · Patient receives Neulasta support  · Her white count dropped down to as low as 0.8 low-grade temperature and patient was placed on Levaquin ×5 days starting June 27, 2018, neutropenia at 11 days posttreatment.  Platelets dropped to 82.  · Admitted for cycle 2 on 7/9/18.  · Discussed with Dr. Fox at the [facility] and his suggestion was to do the CT scan and PET scan after 2 cycles and discuss the results with him.  If she has an excellent response early on she would not require any further treatments after completion of 6 cycles of EPOCH/R  · Admitted for cycle 3 on 7/30/18.  · CT scan after 4 cycles with continued response.  · Admission today for cycle 5 of chemotherapy     2.  Pericardial effusion:    · a 2-D echocardiogram 6/8/18 showed a left ventricular systolic function 58%.  There was a 2 cm pericardial effusion with no tamponade.  The pericardium appeared thickened.  She had no evidence of volume overload or tamponade throughout the hospital stay.  · Repeat echocardiogram done 8/22/18 shows ejection fraction of 59% to 60% with the LV strain of 19.2%.     3.  Fertility preservation:  Patient received Zoladex injection  for fertility preservation; this should be continued once monthly during her chemotherapy.  Next dose due 10/8/18.     4.  Right upper extremity swelling:  The patient had a PICC line placed in the right upper extremity for administration of chemotherapy initially.  Her arm was noted to be swollen. Doppler initially showed superficial thrombophlebitis.  She was on prophylactic Arixtra.  Doppler was repeated 2 days later on 6/20/18  which showed extension of thrombus into the axillary and brachial veins.  She was therefore started  on Xarelto 15 mg every 12 hours and PICC line was removed as soon as chemotherapy completed on 6/20/18.  She is now actually taking Pradaxa 150 mg every 12 hours.     If the platelet count drops below 50,000, anticoagulation will need to be temporarily held     5.  ID: The patient receives Neulasta 24 hours after completion of chemotherapy for reduction of neutropenic infection.  She is discharged on prophylactic antibiotics including acyclovir, Diflucan, and Bactrim.  She may require bacterial prophylaxis if the ANC drops below 1000.     6.  History of migraines, worsening in recent weeks.  As discussed with the patient previously by Dr. Padilla, we will consult neurology while the patient is hospitalized.     PLAN:  1.   Admit for cycle 5 of chemotherapy with EPOCH/R on September 17, 2018.   2.  Plan Neulasta support per protocol.  3.  Consult neurology for migraines.  4.  Continue Pradaxa.  5.  Please note: next Zoladex injection due 10/8/18.        BRANNON Maxwell   9/17/2018                      Cc:  Angela Cox, BRANNON

## 2018-09-17 NOTE — PROGRESS NOTES
I have seen the patient.  I have reviewed the nurse practitioner's note and agree with her assessment and plan.  Patient is admitted for cycle 5 of chemotherapy with [facility] Rituxan.  This is for her diffuse large B-cell lymphoma.  Given that she was significantly neutropenic after cycle 4 the doses of her EPOCH R will be the same.  She has had a 25% dose decrease in her vincristine given her neuropathy and we will keep at the same.  Her neuropathy improved after decreasing the dose.  Today she is afebrile and her vital signs are stable.    We will review all her labs and start her on chemotherapy today.

## 2018-09-17 NOTE — NURSING NOTE
Nursing Chemotherapy Verification    Chemotherapy Regimen:   Treatment Plans     Name Type Hold Status Plan dates Plan Provider       Active    OP Goserelin 3.6 mg Q28D ONCOLOGY SUPPORTIVE CARE 1 On Automatic Hold  6/20/2018 - Present Ramón Camp MD    IP LYMPHOMA R-EPOCH (Dose Adjusted) RiTUXimab / Etoposide / DOXOrubicin / VinCRIStine / Cyclophosphamide / PredniSONE ONCOLOGY TREATMENT Not on Hold  6/16/2018 - Present Kimberley Melgar MD                    Current height and weight:      Calculated BSA from current height and weight (Zahira): 1.6    Relevant Labs    Results from last 7 days  Lab Units 09/17/18  0940 09/13/18  0847   WBC 10*3/mm3 4.10 8.24   HEMOGLOBIN g/dL 10.1* 10.7*   HEMATOCRIT % 30.9* 32.9*   PLATELETS 10*3/mm3 196 143*     Lab Results   Component Value Date    NEUTROABS 2.74 09/17/2018         Results from last 7 days  Lab Units 09/17/18  0939 09/13/18  0847   CREATININE mg/dL 0.55* 0.76       Serum creatinine: 0.55 mg/dL (L) 09/17/18 0939  Estimated creatinine clearance: 139.6 mL/min (A)    Lab Results   Component Value Date    HEPAIGM Negative 07/13/2018    HEPAIGM Non-Reactive 07/13/2018    HEPBCAB Negative 06/14/2018       Verification attestation:  I have personally reviewed the planned regimen and its administration and dosing. I understand the potential side effects.The patient has been instructed on the regimen, potential side effects, and self care measures; the consent form has been completed. I have confirmed that the appropriate premedication, prehydration, post medication and/or emergency medications are ordered in addition to the chemotherapy.    I have independently verified that the height and weight is current and calculated the BSA. I have verified the doses with the planned regimen and have clarified any deviations with the physician Dr. Padilla. I have confirmed the route of administration and patient IV access.    Nurse: Divine Woods RN  2nd verification Nurse:  Ana Cazares RN

## 2018-09-17 NOTE — PROGRESS NOTES
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 She did initiate EPOCH-R  in the hospital on 06/16/2018.    History of Present Illness      The patient is a 23 y.o. female with the above-mentioned history, here today for follow-up prior to admission for cycle #5 of therapy.    She notes the last few weeks she has had more migraines.  She has a history of migraines though typically just getting one a year or so.  She discussed this with Dr. Padilla previously, specifically seen in neurologist during 1 of her hospital admissions.  We will plan to consult neurology during this upcoming admission today.    She still has some occasional discomfort in her right chest at the site of her Mediport.  She also has some stiffness in her neck dating back to when she was diagnosed with a blood clot in her right arm related to the PICC line.  Overall this is improved though.  She continues on Pradaxa 150 mg twice a day.    The patient has continued to respond well to therapy, with CT scan after 4 cycles showing continued response.    Her next Zoladex injection is due 10/8/18.    The patient and her mom denies other concerns at this time.    Past Medical History, Past Surgical History, Social History, Family History have been reviewed and are without significant changes except as mentioned.    Oncologic history:.   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  She saw Dr. Arina Cohen on last Friday.  The reason the patient was referred to Dr. Cohen was because they thought she had cardiomegaly on chest x-ray.  However a chest x-ray was ordered which showedThe chest x-ray showed extensive abnormal increased density throughout the anterior mediastinum extending into the left hilar region and left perihilar region and is most likely due to confluent lymphadenopathy.     CT angiogram of the chest did not show pulmonary embolism but  showed     there is a large conglomerate  mass encases multiple vascular structures of the mediastinum and left  hilar region that is most likely extensive confluent lymphadenopathy.  The primary differential diagnostic considerations would be lymphoma.  The tumor posteriorly displaces the pulmonary arteries and the left and  moderately narrows the main pulmonary artery. The tumor also posteriorly  displaces the trachea and markedly narrows the left mainstem bronchus.  The pulmonary veins in the left are also encased and narrowed. The mass  encases and markedly narrows the SVC. The large edy mass measures  approximately 19.8 x 13.7 x 14.0 cm in diameter. Enlarged lymph nodes  are also present in the left supraclavicular region where they appear to  produce occlusion of the left internal jugular vein. There is no  axillary lymphadenopathy. There are no filling defects within the  pulmonary arteries. There is no CT evidence of pulmonary embolism. There  is a small left pleural effusion. A small pericardial effusion measuring  8 mm in maximum thickness is also present. There is compressive  atelectasis of the left lung. Patchy airspace opacities are also present  in the superior aspect of the left upper lobe. These are most likely due  to direct tumor infiltration of the lung parenchyma, but could also  represent postobstructive pneumonia. The right lung is well expanded and  clear. Images through the upper abdomen partially show what could be a  edy mass in the retroperitoneum posterior and inferior to the  pancreas. Further evaluation with a CT scan of the abdomen and pelvis is  recommended. Bone window images demonstrate patchy sclerosis in the C7  and T1 and T2 and T3 and T4 vertebral body suspicious for bone  involvement with lymphoma.     IMPRESSION:  Very large tumor mass in the mediastinum encasing multiple  vascular structures and also moderately narrowing the left mainstem  bronchus as described in more  detail above. Direct tumor infiltration of  the left upper lobe is also suspected. Small left pleural effusion and a  small pericardial effusion. There may also be partially visualized  lymphadenopathy in the upper abdomen. Patchy areas of sclerosis are also  noted in the C7 and T1 and T2 and T3 and T4 vertebral bodies suspicious  for osseous metastatic disease. The findings are consistent with  Lymphoma.     Dr. Cohen felt that she had a small pericardial effusion.  Patient has been symptomatic with cough which is worsening which started back in February 2018 and worsened over the last 4 months.  Patient also has some shortness of breath with walking up the steps.  She has not lost weight.  She say she is reasonable appetite.  She does have fever kind of low-grade fever and night sweats.  She is more fatigued compared to before.  She was referred to us because of concern that this could be lymphoma.  She does not have any tissue diagnosis yet.  Patient does complain of back pain.    Echo done on admission June 12, 2018 by Dr. Fitzgerald showed that the patient's posterior and appears large primary leukemia the left ventricle and apex.  There is no tamponade.  The pericardium appears thickened pointing to involvement of the pericardium into the mediastinal process.  Dr. Fitzgerald felt that it would be difficult to do pericardial synthesis as the mediastinal mass covers the anterior aspect of the heart.  Ejection fraction is 58%  CT-guided needle biopsy June 12, 2018 was negative  LDH is elevated.  Uric acid is high.  MRI thoracic spine, negative  CT abdomen pelvis negative  Scan July 13, 2018 shows large infiltrative edy mass in the anterior mediastinum and left hilar region that nearly completely fills the left hemithorax and shows intense hypermetabolism with an SUV of 19.8.  No foci of pathologic hypermetabolism are identified elsewhere in the chest, neck abdomen or pelvis.    Pathology was consistent with primary  mediastinal large B-cell lymphoma.    Patient was seen by Dr. Melgar.  He discussed harvesting eggs versus Zoladex plus oral contraceptives versus Zoladex alone for fertility preservation.  Due to large size of the tumor and rapid initiation of treatment needed the patient was not able to have aches harvested done.  Because she is at increased risk of thrombosis with the use of oral contraceptives secondary to malignancy he recommended Zoladex alone.  Patient received first dose of Zoladex 3.6 mg subcutaneous on Belia 15, 2018.    Patient started on EPOCH/R on June 16, 2018    Patient had a reaction to Rituxan which improved with steroids, Benadryl and Pepcid.  She had to receive it slow.  She started having itching over her EARS , sore throat.  She was given IV steroids Benadryl and Pepcid and following that she was started at a slower rate and she completed it.    Right upper extremity Doppler ultrasound showed new superficial vein thrombosis around the PICC line with no extension into the deep system.  Repeat Doppler was ordered.    A Doppler ultrasound initially showed superficial thrombophlebitis.  She was on prophylactic Arixtra.  A Doppler was repeated 2 days later on 6/20/18  which showed extension of thrombus into the axillary and brachial veins.  She was therefore started on Xarelto 15 mg one every 12 hours and the PICC line was removed as soon as chemotherapy completed on 6/20/18.  She will take 15 mg of Xarelto every 12 hours for 21 days and then transition to 20 mg once a day.  The patient will have CBC performed twice a week.  If the platelet count drops below 50,000, anticoagulation will need to be temporarily held  Patient was started on acyclovir/fluconazole/Bactrim  Bone marrow done June 14, 2018, morphology was negative for lymphoma    Fertility preservation, Zoladex is next due July 12, 2018.  CT scan and PET scan showing significant response after cycle 2 of chemotherapy  Next dose of Lupron  "August 13, 2018    Review of Systems   Constitutional: Positive for fatigue. Negative for activity change, appetite change, chills and fever.   HENT: Negative for mouth sores.    Eyes: Negative for visual disturbance.   Respiratory: Negative for cough and shortness of breath.    Cardiovascular: Negative.    Gastrointestinal: Negative.  Negative for abdominal pain, diarrhea, nausea and vomiting.   Endocrine: Negative.    Genitourinary: Negative for dysuria, frequency and menstrual problem.   Musculoskeletal: Negative for arthralgias, back pain, joint swelling and myalgias.   Skin: Negative.    Allergic/Immunologic: Negative.    Neurological: Positive for headaches (migraines more frequent). Negative for dizziness and weakness.   Hematological: Negative.  Does not bruise/bleed easily.   Psychiatric/Behavioral: The patient is not nervous/anxious.    All other systems reviewed and are negative.  Review of systems unchanged except as updated.    Medications:  The current medication list was reviewed in the EMR    ALLERGIES:  No Known Allergies    Objective      Vitals:    09/17/18 1004   BP: 100/68   Pulse: 75   Resp: 16   Temp: 98.4 °F (36.9 °C)   TempSrc: Oral   SpO2: 97%   Weight: 63.9 kg (140 lb 12.8 oz)   Height: 157.5 cm (62.01\")   PainSc: 0-No pain     Current Status 9/17/2018   ECOG score 0       Physical Exam   Constitutional: She is oriented to person, place, and time. She appears well-developed and well-nourished.   She is accompanied by her mother today.   HENT:   Head: Normocephalic.   Mouth/Throat: No oropharyngeal exudate.   Eyes: Pupils are equal, round, and reactive to light. Conjunctivae and EOM are normal.   Neck: Normal range of motion.   Cardiovascular: Normal rate, regular rhythm and normal heart sounds.    Pulmonary/Chest: Effort normal and breath sounds normal. No respiratory distress. She has no wheezes. She has no rales.   Mediport accessed, otherwise benign.   Abdominal: Soft. She exhibits no " distension. There is no tenderness.   Musculoskeletal: Normal range of motion. She exhibits no edema.   Lymphadenopathy:     She has no cervical adenopathy.   Neurological: She is alert and oriented to person, place, and time.   Skin: Skin is warm and dry.   alopecia   Psychiatric: She has a normal mood and affect.   physical exam unchanged from previous except as updated.    RECENT LABS:      Results from last 7 days  Lab Units 09/17/18  0940 09/13/18  0847   WBC 10*3/mm3 4.10 8.24   NEUTROS ABS 10*3/mm3 2.74 4.15   HEMOGLOBIN g/dL 10.1* 10.7*   HEMATOCRIT % 30.9* 32.9*   PLATELETS 10*3/mm3 196 143*       Results from last 7 days  Lab Units 09/17/18  0939 09/13/18  0847   SODIUM mmol/L 140 141   POTASSIUM mmol/L 3.8 3.9   CHLORIDE mmol/L 103 100   CO2 mmol/L 25.7 27.1   BUN mg/dL 11 14   CREATININE mg/dL 0.55* 0.76   CALCIUM mg/dL 9.4 10.2   ALBUMIN g/dL 4.10 4.70   BILIRUBIN mg/dL 0.2 0.2   ALK PHOS U/L 53 71   ALT (SGPT) U/L 15 19   AST (SGOT) U/L 19 23   GLUCOSE mg/dL 125* 94             CT SCAN:  CHEST: There has been interval decrease in the large confluent anterior  mediastinal mass which measures approximately 11 x 8 cm, previously 13.5  x 9.5 cm when measured along the same planes. There has been a decrease  in the mass effect and narrowing of the left upper lobe bronchus and  there has been improved aeration at the left upper lobe. The patchy left  upper lobe airspace opacities have decreased in size and density. There  are no new pulmonary opacities and there are no pleural or pericardial  effusions.     ABDOMEN/PELVIS: Borderline splenic size is stable, measuring  approximately 11 cm. The liver, gallbladder, pancreas, adrenals, and  kidneys appear unremarkable. There is formed stool throughout the colon.  No bowel thickening is seen. The appendix appears within normal limits.  Uterus and adnexa appear unremarkable. There is no free fluid or  lymphadenopathy.     PET   Chest: Pulmonary opacification  within the left upper lobe demonstrating  intense FDG uptake has decreased in extent since 7/9/2019. The  background ground glass opacification and interlobular septal thickening  has also improved. The anterior mediastinal soft tissue mass has  decreased in size, measuring 8.5 x 10.7 cm (previously 13 x 16.7 cm) and  FDG uptake with a maximum SUV of 5 (previously 19.8). There is no  pleural effusion or pneumothorax.      Abdomen and pelvis:     The liver, spleen, pancreas, kidneys and adrenal glands have a normal  non contrast CT appearance and demonstrate physiologic FDG uptake.     The gallbladder is unremarkable.     The bowel demonstrates normal physiologic FDG uptake.     There are no FDG avid or enlarged abdominopelvic lymph nodes.     The bladder is unremarkable for its degree of distension.     There is diffusely increased FDG uptake within the marrow.  No  suspicious focal lytic or blastic bony lesions.          Assessment/Plan   1.  Primary mediastinal large B-cell lymphoma: The patient presented with dyspnea, cough, and chest pain.  A CT angiogram of the chest 6/8/18 showed a very large tumor mass in the mediastinum encasing multiple vascular structures and narrowing the left mainstem bronchus.  There was direct tumor infiltration in the left upper lobe.  There was a small left pleural effusion.  She underwent a CT-guided biopsy of the mediastinal mass on 6/12/18 and pathology reviewed at [facility] oncology showed diffuse large B-cell lymphoma consistent with a primary diffuse large B-cell lymphoma.  A bone marrow exam was performed on 6/14/18 which was negative for lymphoma.  She underwent a PET scan 6/13/18 which showed a large hypermetabolic mass in the chest involving the anterior mediastinum, left hilum, nearly completely filling the left hemothorax with SUV 19.8.  There was physiologic distribution elsewhere.     · The patient was started on IV fluid hydration and allopurinol for prevention of  hyperuricemia.   · Initiated systemic chemotherapy with DA-EPOCH-R on 6/16/18.  She had a infusion reaction to rituximab but was able to complete with additional medications and otherwise tolerated chemotherapy well.  She will require additional premedications with additional rituximab.    · The patient had significant improvement in shortness of breath, cough, and chest pain by the time of discharge.  · Patient receives Neulasta support  · Her white count dropped down to as low as 0.8 low-grade temperature and patient was placed on Levaquin ×5 days starting June 27, 2018, neutropenia at 11 days posttreatment.  Platelets dropped to 82.  · Admitted for cycle 2 on 7/9/18.  · Discussed with Dr. Fox at the [facility] and his suggestion was to do the CT scan and PET scan after 2 cycles and discuss the results with him.  If she has an excellent response early on she would not require any further treatments after completion of 6 cycles of EPOCH/R  · Admitted for cycle 3 on 7/30/18.  · CT scan after 4 cycles with continued response.  · Admission today for cycle 5 of chemotherapy     2.  Pericardial effusion:    · a 2-D echocardiogram 6/8/18 showed a left ventricular systolic function 58%.  There was a 2 cm pericardial effusion with no tamponade.  The pericardium appeared thickened.  She had no evidence of volume overload or tamponade throughout the hospital stay.  · Repeat echocardiogram done 8/22/18 shows ejection fraction of 59% to 60% with the LV strain of 19.2%.     3.  Fertility preservation:  Patient received Zoladex injection  for fertility preservation; this should be continued once monthly during her chemotherapy.  Next dose due 10/8/18.     4.  Right upper extremity swelling:  The patient had a PICC line placed in the right upper extremity for administration of chemotherapy initially.  Her arm was noted to be swollen. Doppler initially showed superficial thrombophlebitis.  She was on prophylactic  Arixtra.  Doppler was repeated 2 days later on 6/20/18  which showed extension of thrombus into the axillary and brachial veins.  She was therefore started on Xarelto 15 mg every 12 hours and PICC line was removed as soon as chemotherapy completed on 6/20/18.  She is now actually taking Pradaxa 150 mg every 12 hours.    If the platelet count drops below 50,000, anticoagulation will need to be temporarily held     5.  ID: The patient receives Neulasta 24 hours after completion of chemotherapy for reduction of neutropenic infection.  She is discharged on prophylactic antibiotics including acyclovir, Diflucan, and Bactrim.  She may require bacterial prophylaxis if the ANC drops below 1000.    6.  History of migraines, worsening in recent weeks.  As discussed with the patient previously by Dr. Padilla, we will consult neurology while the patient is hospitalized.    PLAN:  1.   Admit for cycle 5 of chemotherapy with EPOCH/R on September 17, 2018.   2.  Plan Neulasta support per protocol.  3.  Consult neurology for migraines.  4.  Continue Pradaxa.  5.  Please note: next Zoladex injection due 10/8/18.       Lynda Pritchard, APRN   9/17/2018      CC: Dr. Arina Munoz

## 2018-09-17 NOTE — CONSULTS
Neurology Consult Note    Consult Date: 9/17/2018    Referring MD: Millie Padilla MD    Reason for Consult I have been asked to see the patient in neurological consultation to render advice and opinion regarding migraine.    Vikki Durham is a 23 y.o. 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 Neurology was consulted for evaluation of migraines. Pt has h/o migraines and describes 3 distinct previous episodes. In the fourth grade she had visual impairment with a while light and h/a. In middle school she had a h/a associated with whole body paralysis, vertigo, and nausea. In highschool she had an episode of h/a and spots in her vision. These h/as are typically severe, with phono/phpotophpobia and resolved with lying down in a dark room. She has never taken medication, sought medical care, or had any head imaging for these.     Since she started chemo she began having migraine and visual aura (seein spots) for the first few days after completing chemo. She was admitted today to start her fifth round of chemo. She started having h/a's over the past few days. The h/a lasts a few hours, is a 6 out of 10, and is associated with seeing green or purple spots. She currently does not have a h/a or visual symptoms.     Her mother has a h/o migraine with visual aura. The pt had a fall after a chair fell on her at age [age] but she did not lose consciousness or have any obvious concussive symptoms. No personal or family h/o seizure.      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    Past Medical/Surgical Hx:  Past Medical History:   Diagnosis Date   • Diffuse large B cell lymphoma (CMS/HCC) 06/2018   • Fracture of lower leg 2000    L   • H/O Lung mass 06/2018   • H/O Pericardial effusion      Past Surgical  History:   Procedure Laterality Date   • OTHER SURGICAL HISTORY  06/12/2018    CT-GUIDED BIOPSY OF MEDIASTINAL MASS   • VENOUS ACCESS DEVICE (PORT) INSERTION Right 7/31/2018    Procedure: RIGHT MEDIPORT PLACEMENT;  Surgeon: Farhan Hurt MD;  Location: [facility] MAIN OR;  Service: Vascular       Medications On Admission  Prescriptions Prior to Admission   Medication Sig Dispense Refill Last Dose   • acyclovir (ZOVIRAX) 400 MG tablet Take 1 tablet by mouth 2 (Two) Times a Day for 191 doses. Take no more than 5 doses a day. 60 tablet 3 9/16/2018 at Unknown time   • allopurinol (ZYLOPRIM) 300 MG tablet TAKE ONE TABLET BY MOUTH TWICE A DAY 60 tablet 3 9/16/2018 at Unknown time   • dabigatran etexilate (PRADAXA) 150 MG capsu Take 1 capsule by mouth Every 12 (Twelve) Hours. 60 capsule 6 9/16/2018 at Unknown time   • fluconazole (DIFLUCAN) 200 MG tablet Take 2 tablets by mouth Daily for 96 doses. 60 tablet 3 9/16/2018 at Unknown time   • ondansetron (ZOFRAN) 4 MG tablet Take 1 tablet by mouth Every 6 (Six) Hours As Needed for Nausea or Vomiting. 30 tablet 2 9/16/2018 at Unknown time   • potassium chloride (MICRO-K) 10 MEQ CR capsule Take 2 capsules by mouth 2 (Two) Times a Day With Meals. 60 capsule 5 9/16/2018 at Unknown time   • pyridoxine (VITAMIN B-6) 50 MG tablet tablet Take 1 tablet by mouth Daily. 30 tablet 6 9/16/2018 at Unknown time   • sennosides-docusate sodium (SENOKOT-S) 8.6-50 MG tablet Take 2 tablets by mouth 2 (Two) Times a Day As Needed for Constipation. 60 tablet 1 Past Month at Unknown time   • sulfamethoxazole-trimethoprim (BACTRIM DS,SEPTRA DS) 800-160 MG per tablet Take 1 tablet by mouth 3 (Three) Times a Week. 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  Eye: Visual atenderness, dizziness, or tinnitus. Normal smell and taste. Normal speech and swallowing] + h/a  Respiratory: [No coughing, wheezing] Improving SOA  Cardiovascular: [No Chest pain, palpitations, syncope, LEWIS]   Gastrointestinal: [Normal bowel function. No nausea, vomiting, diarrhea]   Genitourinary: [Normal bladder function]   Musculoskeletal: [No trauma, joint or neck pain, myalgias, cramping or weakness]   Skin: [No itching, burning, pain, rashes, or birthmarks]   Endocrinology: [No heat or cold intolerance]   Psychiatric: [No sleep disturbance. No anxiety or depression]   Neurologic: [See HPI, above]  Numbness in fingertips has improved with dose adjustment of vincristine          Exam    BP 95/59 (BP Location: Left arm, Patient Position: Sitting)   Pulse 69   Temp 98 °F (36.7 °C) (Oral)   Resp 18   SpO2 99%   General appearance: Well developed, well nourished, well groomed, alert and cooperative.   HEENT: Normocephalic.   Neck and spine: Normal range of motion. Normal alignment. No mass or tenderness.    Cardiac: Regular rate and rhythm. No murmurs.   Peripheral Vasculature: Peripheral pulses are equal and symmetric.  Chest Exam: Clear to auscultation bilaterally, no wheezes, no rhonchi.  Extremities: Normal, no edema.   Skin: No rashes or birthmarks.     Higher integrative function: Oriented to time, place, person, intact recent and remote  memory, attention span, concentration and language. Spontaneous speech, fund of vocabulary are normal.   CN II: Normal visual fields.   CN III IV VI: Extraocular movements are full without nystagmus. Pupils are equal, round, and reactive to light. No relative afferent pupillary defect. No internuclear ophthalmoplegia.   CN V: Normal facial sensation and strength of muscles of mastication.   CN VII: Facial movements are symmetric, no weakness.   CN VIII: Auditory acuity is normal.   CN IX & X: Symmetric palatal movement.   CN XI: Sternocleidomastoid and trapezius are normal. No weakness.   CN XII: The tongue is midline. No atrophy or fasciculations.   Motor: Normal muscle strength, bulk, and tone in upper and lower extremities. No fasciculations, rigidity, spasticity or abnormal movements.   Sensation: Normal light touch except for numbness in fingertips.   Station and gait: Not assessed.  Muscle stretch reflexes: Reflexes are normal and symmetric in the upper and lower extremities.   Plantar reflexes are flexor bilaterally.   Coordination: Finger to nose test showed no dysmetria. Rapid alternating movements were normal. Heel to shin normal.     DATA:    Lab Results   Component Value Date    GLUCOSE 125 (H) 09/17/2018    CALCIUM 9.4 09/17/2018     09/17/2018    K 3.8 09/17/2018    CO2 25.7 09/17/2018     09/17/2018    BUN 11 09/17/2018    CREATININE 0.55 (L) 09/17/2018    EGFRIFAFRI >150 09/17/2018    EGFRIFNONA 137 09/17/2018    BCR 20.0 09/17/2018    ANIONGAP 11.3 09/17/2018     Lab Results   Component Value Date    WBC 4.10 09/17/2018    HGB 10.1 (L) 09/17/2018    HCT 30.9 (L) 09/17/2018    .0 (H) 09/17/2018     09/17/2018     Lab Results   Component Value Date    CHOL 114 06/08/2018    CHOL 118 06/13/2017     Lab Results   Component Value Date    HDL 19 (L) 06/08/2018    HDL 36 (L) 06/13/2017     Lab Results   Component Value Date    LDL 77 06/08/2018    LDL 69 06/13/2017     Lab Results    Component Value Date    TRIG 90 06/08/2018    TRIG 65 06/13/2017     No results found for: HGBA1C  Lab Results   Component Value Date    INR 0.96 07/30/2018    INR 1.05 06/15/2018    INR 1.06 06/11/2018    PROTIME 12.6 07/30/2018    PROTIME 13.5 06/15/2018    PROTIME 13.6 06/11/2018       Imaging review:   No radiology results for the last 7 days    Impression:  1) Migraine with aura, likely exacerbated by chemo  2) Mediastinal large B-cell lymphoma currently undergoing chemo  3) Chemo induced neuropathy      PLAN: Pt d/w Dr Morris. Will check MRI brain w/wo give no previous brain imaging and on immunosuppression. Will start magnesium oxide daily and at d/c pt can try OTC Migrelief which is carried in our outpatient pharmacy.

## 2018-09-18 ENCOUNTER — APPOINTMENT (OUTPATIENT)
Dept: MRI IMAGING | Facility: HOSPITAL | Age: 23
End: 2018-09-18

## 2018-09-18 LAB
ALBUMIN SERPL-MCNC: 4 G/DL (ref 3.5–5.2)
ALBUMIN/GLOB SERPL: 2.1 G/DL
ALP SERPL-CCNC: 49 U/L (ref 39–117)
ALT SERPL W P-5'-P-CCNC: 16 U/L (ref 1–33)
ANION GAP SERPL CALCULATED.3IONS-SCNC: 9.4 MMOL/L
ANISOCYTOSIS BLD QL: ABNORMAL
AST SERPL-CCNC: 16 U/L (ref 1–32)
BILIRUB SERPL-MCNC: <0.2 MG/DL (ref 0.1–1.2)
BUN BLD-MCNC: 12 MG/DL (ref 6–20)
BUN/CREAT SERPL: 26.7 (ref 7–25)
CALCIUM SPEC-SCNC: 8.9 MG/DL (ref 8.6–10.5)
CHLORIDE SERPL-SCNC: 107 MMOL/L (ref 98–107)
CO2 SERPL-SCNC: 24.6 MMOL/L (ref 22–29)
CREAT BLD-MCNC: 0.45 MG/DL (ref 0.57–1)
DACRYOCYTES BLD QL SMEAR: ABNORMAL
DEPRECATED RDW RBC AUTO: 60 FL (ref 37–54)
ERYTHROCYTE [DISTWIDTH] IN BLOOD BY AUTOMATED COUNT: 17 % (ref 11.7–13)
GFR SERPL CREATININE-BSD FRML MDRD: >150 ML/MIN/1.73
GFR SERPL CREATININE-BSD FRML MDRD: >150 ML/MIN/1.73
GLOBULIN UR ELPH-MCNC: 1.9 GM/DL
GLUCOSE BLD-MCNC: 125 MG/DL (ref 65–99)
HCT VFR BLD AUTO: 30.6 % (ref 35.6–45.5)
HGB BLD-MCNC: 9.8 G/DL (ref 11.9–15.5)
HYPOCHROMIA BLD QL: ABNORMAL
LDH SERPL-CCNC: 223 U/L (ref 135–214)
LYMPHOCYTES # BLD MANUAL: 0.31 10*3/MM3 (ref 0.9–4.8)
LYMPHOCYTES NFR BLD MANUAL: 3 % (ref 5–12)
LYMPHOCYTES NFR BLD MANUAL: 4 % (ref 19.6–45.3)
MCH RBC QN AUTO: 31.4 PG (ref 26.9–32)
MCHC RBC AUTO-ENTMCNC: 32 G/DL (ref 32.4–36.3)
MCV RBC AUTO: 98.1 FL (ref 80.5–98.2)
MONOCYTES # BLD AUTO: 0.23 10*3/MM3 (ref 0.2–1.2)
NEUTROPHILS # BLD AUTO: 7.28 10*3/MM3 (ref 1.9–8.1)
NEUTROPHILS NFR BLD MANUAL: 93 % (ref 42.7–76)
OVALOCYTES BLD QL SMEAR: ABNORMAL
PLAT MORPH BLD: NORMAL
PLATELET # BLD AUTO: 227 10*3/MM3 (ref 140–500)
PMV BLD AUTO: 11.6 FL (ref 6–12)
POTASSIUM BLD-SCNC: 3.6 MMOL/L (ref 3.5–5.2)
PROT SERPL-MCNC: 5.9 G/DL (ref 6–8.5)
RBC # BLD AUTO: 3.12 10*6/MM3 (ref 3.9–5.2)
SODIUM BLD-SCNC: 141 MMOL/L (ref 136–145)
URATE SERPL-MCNC: 2.6 MG/DL (ref 2.4–5.7)
WBC MORPH BLD: NORMAL
WBC NRBC COR # BLD: 7.83 10*3/MM3 (ref 4.5–10.7)

## 2018-09-18 PROCEDURE — 25010000002 VINCRISTINE PER 1 MG: Performed by: NURSE PRACTITIONER

## 2018-09-18 PROCEDURE — 85027 COMPLETE CBC AUTOMATED: CPT | Performed by: INTERNAL MEDICINE

## 2018-09-18 PROCEDURE — 99232 SBSQ HOSP IP/OBS MODERATE 35: CPT | Performed by: NURSE PRACTITIONER

## 2018-09-18 PROCEDURE — 25010000002 ETOPOSIDE 100 MG/5ML SOLUTION 25 ML VIAL: Performed by: NURSE PRACTITIONER

## 2018-09-18 PROCEDURE — A9577 INJ MULTIHANCE: HCPCS | Performed by: INTERNAL MEDICINE

## 2018-09-18 PROCEDURE — 0 GADOBENATE DIMEGLUMINE 529 MG/ML SOLUTION: Performed by: INTERNAL MEDICINE

## 2018-09-18 PROCEDURE — 85007 BL SMEAR W/DIFF WBC COUNT: CPT | Performed by: INTERNAL MEDICINE

## 2018-09-18 PROCEDURE — 99232 SBSQ HOSP IP/OBS MODERATE 35: CPT | Performed by: INTERNAL MEDICINE

## 2018-09-18 PROCEDURE — 84550 ASSAY OF BLOOD/URIC ACID: CPT | Performed by: INTERNAL MEDICINE

## 2018-09-18 PROCEDURE — 80053 COMPREHEN METABOLIC PANEL: CPT | Performed by: NURSE PRACTITIONER

## 2018-09-18 PROCEDURE — 25010000002 ONDANSETRON PER 1 MG: Performed by: NURSE PRACTITIONER

## 2018-09-18 PROCEDURE — 63710000001 PREDNISONE PER 5 MG: Performed by: NURSE PRACTITIONER

## 2018-09-18 PROCEDURE — 83615 LACTATE (LD) (LDH) ENZYME: CPT | Performed by: INTERNAL MEDICINE

## 2018-09-18 PROCEDURE — 70553 MRI BRAIN STEM W/O & W/DYE: CPT

## 2018-09-18 PROCEDURE — 25010000002 DOXORUBICIN PER 10 MG: Performed by: NURSE PRACTITIONER

## 2018-09-18 RX ORDER — LANOLIN ALCOHOL/MO/W.PET/CERES
50 CREAM (GRAM) TOPICAL DAILY
Status: DISCONTINUED | OUTPATIENT
Start: 2018-09-18 | End: 2018-09-21 | Stop reason: HOSPADM

## 2018-09-18 RX ORDER — POTASSIUM CHLORIDE 750 MG/1
20 CAPSULE, EXTENDED RELEASE ORAL 2 TIMES DAILY WITH MEALS
Status: DISCONTINUED | OUTPATIENT
Start: 2018-09-18 | End: 2018-09-21 | Stop reason: HOSPADM

## 2018-09-18 RX ORDER — CHOLECALCIFEROL (VITAMIN D3) 125 MCG
1000 CAPSULE ORAL DAILY
Status: DISCONTINUED | OUTPATIENT
Start: 2018-09-18 | End: 2018-09-21 | Stop reason: HOSPADM

## 2018-09-18 RX ORDER — FLUCONAZOLE 200 MG/1
400 TABLET ORAL EVERY 24 HOURS
Status: DISCONTINUED | OUTPATIENT
Start: 2018-09-18 | End: 2018-09-21 | Stop reason: HOSPADM

## 2018-09-18 RX ORDER — SENNA AND DOCUSATE SODIUM 50; 8.6 MG/1; MG/1
2 TABLET, FILM COATED ORAL 2 TIMES DAILY PRN
Status: DISCONTINUED | OUTPATIENT
Start: 2018-09-18 | End: 2018-09-21 | Stop reason: HOSPADM

## 2018-09-18 RX ORDER — SULFAMETHOXAZOLE AND TRIMETHOPRIM 800; 160 MG/1; MG/1
1 TABLET ORAL 3 TIMES WEEKLY
Status: DISCONTINUED | OUTPATIENT
Start: 2018-09-19 | End: 2018-09-21 | Stop reason: HOSPADM

## 2018-09-18 RX ORDER — ACYCLOVIR 400 MG/1
400 TABLET ORAL 2 TIMES DAILY
Status: DISCONTINUED | OUTPATIENT
Start: 2018-09-18 | End: 2018-09-21 | Stop reason: HOSPADM

## 2018-09-18 RX ORDER — DABIGATRAN ETEXILATE 150 MG/1
150 CAPSULE ORAL EVERY 12 HOURS SCHEDULED
Status: DISCONTINUED | OUTPATIENT
Start: 2018-09-18 | End: 2018-09-21 | Stop reason: HOSPADM

## 2018-09-18 RX ORDER — LANOLIN ALCOHOL/MO/W.PET/CERES
1000 CREAM (GRAM) TOPICAL DAILY
COMMUNITY
End: 2020-12-21

## 2018-09-18 RX ORDER — ONDANSETRON 4 MG/1
4 TABLET, FILM COATED ORAL EVERY 6 HOURS PRN
Status: DISCONTINUED | OUTPATIENT
Start: 2018-09-18 | End: 2018-09-21 | Stop reason: HOSPADM

## 2018-09-18 RX ADMIN — POTASSIUM CHLORIDE 20 MEQ: 750 CAPSULE, EXTENDED RELEASE ORAL at 14:18

## 2018-09-18 RX ADMIN — PREDNISONE 100 MG: 50 TABLET ORAL at 17:42

## 2018-09-18 RX ADMIN — POTASSIUM CHLORIDE 20 MEQ: 750 CAPSULE, EXTENDED RELEASE ORAL at 18:04

## 2018-09-18 RX ADMIN — DABIGATRAN ETEXILATE MESYLATE 150 MG: 150 CAPSULE ORAL at 21:57

## 2018-09-18 RX ADMIN — SODIUM CHLORIDE 75 ML/HR: 9 INJECTION, SOLUTION INTRAVENOUS at 04:38

## 2018-09-18 RX ADMIN — SODIUM CHLORIDE 75 ML/HR: 9 INJECTION, SOLUTION INTRAVENOUS at 19:10

## 2018-09-18 RX ADMIN — Medication 10 ML: at 09:35

## 2018-09-18 RX ADMIN — Medication 10 ML: at 21:59

## 2018-09-18 RX ADMIN — Medication 1000 MCG: at 14:18

## 2018-09-18 RX ADMIN — PREDNISONE 100 MG: 50 TABLET ORAL at 09:22

## 2018-09-18 RX ADMIN — FLUCONAZOLE 400 MG: 200 TABLET ORAL at 14:18

## 2018-09-18 RX ADMIN — ONDANSETRON 16 MG: 2 INJECTION INTRAMUSCULAR; INTRAVENOUS at 19:12

## 2018-09-18 RX ADMIN — ACYCLOVIR 400 MG: 400 TABLET ORAL at 14:18

## 2018-09-18 RX ADMIN — Medication 400 MG: at 09:23

## 2018-09-18 RX ADMIN — PANTOPRAZOLE SODIUM 40 MG: 40 TABLET, DELAYED RELEASE ORAL at 05:22

## 2018-09-18 RX ADMIN — GADOBENATE DIMEGLUMINE 13 ML: 529 INJECTION, SOLUTION INTRAVENOUS at 11:58

## 2018-09-18 RX ADMIN — ETOPOSIDE: 20 INJECTION, SOLUTION, CONCENTRATE INTRAVENOUS at 19:44

## 2018-09-18 RX ADMIN — Medication 50 MG: at 14:18

## 2018-09-18 RX ADMIN — ACYCLOVIR 400 MG: 400 TABLET ORAL at 21:57

## 2018-09-18 RX ADMIN — DABIGATRAN ETEXILATE MESYLATE 150 MG: 150 CAPSULE ORAL at 14:18

## 2018-09-18 NOTE — PROGRESS NOTES
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 She did initiate EPOCH-R  in the hospital on 06/16/2018.    History of Present Illness      The patient is a 23 y.o. female with the above-mentioned history, with history of mediastinal large B-cell lymphoma status post 4 cycles of chemotherapy with dose adusted EPOCHR, patient has completed 4 cycles of chemotherapy.  And had a CT scan of the chest abdomen pelvis which shows continued response.  The plan is to do a total of 6 cycles of chemotherapy.  On the CT scan radiology read as the spleen to be slightly decreased from before.  However I do not think the spleen was involved in the first place as PET scan had not picked up any activity on the spleen prior to starting treatment and again after 2 cycles.  So far she has had an excellent response to treatment.  She is on dose adjusted EPOCH R.     She received Zoladex monthly and the next injection is due on October 8, 2018.    Interval history:  September 18, 2018: Patient had complained of headaches concerning for migraine headaches and neurology was consulted.  MRI of brain has been done.  Results reviewed.  Patient is tolerating chemotherapy very well.      Past Medical History, Past Surgical History, Social History, Family History have been reviewed and are without significant changes except as mentioned.    Oncologic history:.   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  She saw Dr. Arina Cohen on last Friday.  The reason the patient was referred to Dr. Cohen was because they thought she had cardiomegaly on chest x-ray.  However a chest x-ray was ordered which showedThe chest x-ray showed extensive abnormal increased density throughout the anterior mediastinum extending into the left hilar region and left perihilar region and is most likely due to confluent  lymphadenopathy.     CT angiogram of the chest did not show pulmonary embolism but showed     there is a large conglomerate  mass encases multiple vascular structures of the mediastinum and left  hilar region that is most likely extensive confluent lymphadenopathy.  The primary differential diagnostic considerations would be lymphoma.  The tumor posteriorly displaces the pulmonary arteries and the left and  moderately narrows the main pulmonary artery. The tumor also posteriorly  displaces the trachea and markedly narrows the left mainstem bronchus.  The pulmonary veins in the left are also encased and narrowed. The mass  encases and markedly narrows the SVC. The large edy mass measures  approximately 19.8 x 13.7 x 14.0 cm in diameter. Enlarged lymph nodes  are also present in the left supraclavicular region where they appear to  produce occlusion of the left internal jugular vein. There is no  axillary lymphadenopathy. There are no filling defects within the  pulmonary arteries. There is no CT evidence of pulmonary embolism. There  is a small left pleural effusion. A small pericardial effusion measuring  8 mm in maximum thickness is also present. There is compressive  atelectasis of the left lung. Patchy airspace opacities are also present  in the superior aspect of the left upper lobe. These are most likely due  to direct tumor infiltration of the lung parenchyma, but could also  represent postobstructive pneumonia. The right lung is well expanded and  clear. Images through the upper abdomen partially show what could be a  edy mass in the retroperitoneum posterior and inferior to the  pancreas. Further evaluation with a CT scan of the abdomen and pelvis is  recommended. Bone window images demonstrate patchy sclerosis in the C7  and T1 and T2 and T3 and T4 vertebral body suspicious for bone  involvement with lymphoma.     IMPRESSION:  Very large tumor mass in the mediastinum encasing multiple  vascular  structures and also moderately narrowing the left mainstem  bronchus as described in more detail above. Direct tumor infiltration of  the left upper lobe is also suspected. Small left pleural effusion and a  small pericardial effusion. There may also be partially visualized  lymphadenopathy in the upper abdomen. Patchy areas of sclerosis are also  noted in the C7 and T1 and T2 and T3 and T4 vertebral bodies suspicious  for osseous metastatic disease. The findings are consistent with  Lymphoma.     Dr. Cohen felt that she had a small pericardial effusion.  Patient has been symptomatic with cough which is worsening which started back in February 2018 and worsened over the last 4 months.  Patient also has some shortness of breath with walking up the steps.  She has not lost weight.  She say she is reasonable appetite.  She does have fever kind of low-grade fever and night sweats.  She is more fatigued compared to before.  She was referred to us because of concern that this could be lymphoma.  She does not have any tissue diagnosis yet.  Patient does complain of back pain.    Echo done on admission June 12, 2018 by Dr. Fitzgerald showed that the patient's posterior and appears large primary leukemia the left ventricle and apex.  There is no tamponade.  The pericardium appears thickened pointing to involvement of the pericardium into the mediastinal process.  Dr. Fitzgerald felt that it would be difficult to do pericardial synthesis as the mediastinal mass covers the anterior aspect of the heart.  Ejection fraction is 58%  CT-guided needle biopsy June 12, 2018 was negative  LDH is elevated.  Uric acid is high.  MRI thoracic spine, negative  CT abdomen pelvis negative  Scan July 13, 2018 shows large infiltrative edy mass in the anterior mediastinum and left hilar region that nearly completely fills the left hemithorax and shows intense hypermetabolism with an SUV of 19.8.  No foci of pathologic hypermetabolism are identified  elsewhere in the chest, neck abdomen or pelvis.    Pathology was consistent with primary mediastinal large B-cell lymphoma.    Patient was seen by Dr. Melgar.  He discussed harvesting eggs versus Zoladex plus oral contraceptives versus Zoladex alone for fertility preservation.  Due to large size of the tumor and rapid initiation of treatment needed the patient was not able to have aches harvested done.  Because she is at increased risk of thrombosis with the use of oral contraceptives secondary to malignancy he recommended Zoladex alone.  Patient received first dose of Zoladex 3.6 mg subcutaneous on Belia 15, 2018.    Patient started on EPOCH/R on June 16, 2018    Patient had a reaction to Rituxan which improved with steroids, Benadryl and Pepcid.  She had to receive it slow.  She started having itching over her EARS , sore throat.  She was given IV steroids Benadryl and Pepcid and following that she was started at a slower rate and she completed it.    Right upper extremity Doppler ultrasound showed new superficial vein thrombosis around the PICC line with no extension into the deep system.  Repeat Doppler was ordered.    A Doppler ultrasound initially showed superficial thrombophlebitis.  She was on prophylactic Arixtra.  A Doppler was repeated 2 days later on 6/20/18  which showed extension of thrombus into the axillary and brachial veins.  She was therefore started on Xarelto 15 mg one every 12 hours and the PICC line was removed as soon as chemotherapy completed on 6/20/18.  She will take 15 mg of Xarelto every 12 hours for 21 days and then transition to 20 mg once a day.  The patient will have CBC performed twice a week.  If the platelet count drops below 50,000, anticoagulation will need to be temporarily held  Patient was started on acyclovir/fluconazole/Bactrim  Bone marrow done June 14, 2018, morphology was negative for lymphoma    Fertility preservation, Zoladex is next due July 12, 2018.  CT scan and PET  scan showing significant response after cycle 2 of chemotherapy  Next dose of Lupron August 13, 2018    Review of Systems   Constitutional: Positive for fatigue. Negative for activity change, appetite change, chills and fever.   HENT: Negative for mouth sores.    Eyes: Negative for visual disturbance.   Respiratory: Negative for cough (improved) and shortness of breath (improved).    Cardiovascular: Negative.    Gastrointestinal: Negative.  Negative for abdominal pain, diarrhea, nausea and vomiting.   Endocrine: Negative.    Genitourinary: Negative for dysuria, frequency and menstrual problem.   Musculoskeletal: Negative for arthralgias, back pain, joint swelling and myalgias.   Skin: Negative.    Allergic/Immunologic: Negative.    Neurological: Negative.  Negative for dizziness and weakness.   Hematological: Negative.  Does not bruise/bleed easily.   Psychiatric/Behavioral: The patient is not nervous/anxious.    All other systems reviewed and are negative.  Review of systems unchanged except as updated.    Medications:  The current medication list was reviewed in the EMR    ALLERGIES:  No Known Allergies    Objective      Vitals:    09/17/18 2223 09/18/18 0149 09/18/18 0514 09/18/18 0517   BP: 91/58 (!) 82/46 (!) 82/52    BP Location: Right arm Right arm Left arm    Patient Position: Sitting Lying Lying    Pulse: 64 56 53    Resp: 16 16 18    Temp: 97.7 °F (36.5 °C) 97.3 °F (36.3 °C) 96.7 °F (35.9 °C)    TempSrc: Oral Oral Oral    SpO2: 98% 96% 98%    Weight:    64.9 kg (143 lb 1 oz)     Current Status 9/17/2018   ECOG score 0       Physical Exam   Constitutional: She is oriented to person, place, and time. She appears well-developed and well-nourished.   She is accompanied by her mother today.   HENT:   Head: Normocephalic.   Eyes: Pupils are equal, round, and reactive to light.   Neck: Normal range of motion.   Cardiovascular: Normal rate, regular rhythm and normal heart sounds.    Pulmonary/Chest: Effort normal  and breath sounds normal. No respiratory distress. She has no wheezes. She has no rales.   Abdominal: Soft.   Lymphadenopathy:     She has no cervical adenopathy.   Neurological: She is alert and oriented to person, place, and time.   Skin: Skin is warm and dry.   Psychiatric: She has a normal mood and affect.   physical exam unchanged from previous except as updated.    RECENT LABS:    Results from last 7 days  Lab Units 09/18/18  0520 09/17/18  0940 09/13/18  0847   WBC 10*3/mm3 7.83 4.10 8.24   NEUTROS ABS 10*3/mm3 7.28 2.74 4.15   LYMPHS ABS 10*3/mm3 0.31*  --   --    HEMOGLOBIN g/dL 9.8* 10.1* 10.7*   HEMATOCRIT % 30.6* 30.9* 32.9*   PLATELETS 10*3/mm3 227 196 143*   CT SCAN:  CHEST: There has been interval decrease in the large confluent anterior  mediastinal mass which measures approximately 11 x 8 cm, previously 13.5  x 9.5 cm when measured along the same planes. There has been a decrease  in the mass effect and narrowing of the left upper lobe bronchus and  there has been improved aeration at the left upper lobe. The patchy left  upper lobe airspace opacities have decreased in size and density. There  are no new pulmonary opacities and there are no pleural or pericardial  effusions.     ABDOMEN/PELVIS: Borderline splenic size is stable, measuring  approximately 11 cm. The liver, gallbladder, pancreas, adrenals, and  kidneys appear unremarkable. There is formed stool throughout the colon.  No bowel thickening is seen. The appendix appears within normal limits.  Uterus and adnexa appear unremarkable. There is no free fluid or  lymphadenopathy.     PET   Chest: Pulmonary opacification within the left upper lobe demonstrating  intense FDG uptake has decreased in extent since 7/9/2019. The  background ground glass opacification and interlobular septal thickening  has also improved. The anterior mediastinal soft tissue mass has  decreased in size, measuring 8.5 x 10.7 cm (previously 13 x 16.7 cm) and  FDG uptake  with a maximum SUV of 5 (previously 19.8). There is no  pleural effusion or pneumothorax.      Abdomen and pelvis:     The liver, spleen, pancreas, kidneys and adrenal glands have a normal  non contrast CT appearance and demonstrate physiologic FDG uptake.     The gallbladder is unremarkable.     The bowel demonstrates normal physiologic FDG uptake.     There are no FDG avid or enlarged abdominopelvic lymph nodes.     The bladder is unremarkable for its degree of distension.     There is diffusely increased FDG uptake within the marrow.  No  suspicious focal lytic or blastic bony lesions.    MRI brain:  There is no evidence to suggest acute intracranial pathology. However,  note is made of a hypoenhancing left inferior turbinate. The precise  etiology and significance of this finding is uncertain although this  finding can be seen in the setting of early acute invasive fungal  rhinosinusitis and correlation is suggested.        Results from last 7 days  Lab Units 09/18/18  0520 09/17/18  0939 09/13/18  0847   SODIUM mmol/L 141 140 141   POTASSIUM mmol/L 3.6 3.8 3.9   CHLORIDE mmol/L 107 103 100   CO2 mmol/L 24.6 25.7 27.1   BUN mg/dL 12 11 14   CREATININE mg/dL 0.45* 0.55* 0.76   CALCIUM mg/dL 8.9 9.4 10.2   ALBUMIN g/dL 4.00 4.10 4.70   BILIRUBIN mg/dL <0.2 0.2 0.2   ALK PHOS U/L 49 53 71   ALT (SGPT) U/L 16 15 19   AST (SGOT) U/L 16 19 23   GLUCOSE mg/dL 125* 125* 94           Assessment/Plan   1.  Primary mediastinal large B-cell lymphoma: The patient presented with dyspnea, cough, and chest pain.  A CT angiogram of the chest 6/8/18 showed a very large tumor mass in the mediastinum encasing multiple vascular structures and narrowing the left mainstem bronchus.  There was direct tumor infiltration in the left upper lobe.  There was a small left pleural effusion.  She underwent a CT-guided biopsy of the mediastinal mass on 6/12/18 and [facility] showed diffuse large B-cell lymphoma  consistent with a primary diffuse large B-cell lymphoma.  A bone marrow exam was performed on 6/14/18 which was negative for lymphoma.  She underwent a PET scan 6/13/18 which showed a large hypermetabolic mass in the chest involving the anterior mediastinum, left hilum, nearly completely filling the left hemothorax with SUV 19.8.  There was physiologic distribution elsewhere.     The patient was started on IV fluid hydration and allopurinol for prevention of hyperuricemia.  She initiated systemic chemotherapy with DA-EPOCH-R on 6/16/18 and completed the evening of 6/21/18.  She had a infusion reaction to rituximab but was able to complete with additional medications and otherwise tolerated chemotherapy well.  She will require additional premedications with additional rituximab.  Plan is to monitor her blood counts twice per week outpatient and proceed with cycle 2 of chemotherapy day 21.     The patient had significant improvement in shortness of breath, cough, and chest pain by the time of discharge.    · Patient received Neulasta support  · Her white count dropped down to as low as 0.8 low-grade temperature and patient was placed on Levaquin ×5 days starting June 27, 2018, neutropenia AT  11 days posttreatment.  Platelets dropped to 82.  · Her next ZOLADEX injection is due July 12.  · She is due to start chemotherapy on July 9.  · Discussed with Dr. Fox at the [facility] and his suggestion was to do the CT scan and PET scan after 2 cycles and discuss the results with him.  If she has an excellent response early on she would not require any further treatments after completion of 6 cycles of EPOCH/R  · Patient being admitted for cycle 3 of chemotherapy on July 30, 2018.  · She's status post cycle 4 of chemotherapy and CT scan has been reviewed following 4 cycles with continued response.  · She is due for ZOLADEX  injection on October 8, 2018.   · Patient is due to go for cycle 5 of chemotherapy on  Monday, September 17, 2018  · Asked echocardiogram done August 22, 2018 shows ejection fraction of 59% to 60% with the eldest strain of 19.2%     2.  Pericardial effusion:   a 2-D echocardiogram 6/8/18 showed a left ventricular systolic function 58%.  There was a 2 cm pericardial effusion with no tamponade.  The pericardium appeared thickened.  She had no evidence of volume overload or Nod throughout the hospital stay     3.   FEN: She was monitored very carefully for any evidence of tumor lysis.  She was maintained on IV fluids and allopurinol throughout hospitalization.    her uric acid at discharge was 1.2 but I recommended that she stay on allopurinol twice daily until her next cycle of chemotherapy to prevent hyperuricemia.  She has mild hypokalemia and will be discharged on potassium 20 mEq once per day.  Have ordered an outpatient BMP weekly for monitoring of her renal function and electrolytes     4.  Fertility preservation:  Patient received Zoladex injection  for fertility preservation; this should be continued once monthly during her chemotherapy.  Next dose due October 8, 2018   5.  Right upper extremity swelling:  The patient had a PIC line placed in the right upper extremity for administration of chemotherapy.  Her arm was noted to be swollen.  A Doppler ultrasound initially showed superficial thrombophlebitis.  She was on prophylactic Arixtra.  A Doppler was repeated 2 days later on 6/20/18  which showed extension of thrombus into the axillary and brachial veins.  She was therefore started on Xarelto 15 mg one every 12 hours and the PICC line was removed as soon as chemotherapy completed on 6/20/18.  She will take 15 mg of Xarelto every 12 hours for 21 days and then transition to 20 mg once a day.  The patient will have CBC performed twice a week.  If the platelet count drops below 50,000, anticoagulation will need to be temporarily held     6.  ID: The patient will receive Neulasta 24 hours after  completion of chemotherapy for reduction of neutropenic infection.  She will be discharged on prophylactic antibiotics including acyclovir, Diflucan, and Bactrim.  She may require bacterial prophylaxis if the ANC drops below 1000.    7.  Headache: Neurology has seen patient Patient is having mild headaches and MRI done shows questionable rhinosinusitis of the left inferior turbinate.  We will consult ENT.  Patient does not have any fevers and I doubt this is of any concern but we'll check with ENT.     Plan 1.   admit for cycle 5 of chemotherapy with EPOCH/R on September 17, 2018. we will give Neulasta support patient has had excellent response after cycle 4.  Plan will be to complete total of 6 cycles of chemotherapy.      2.  ZOLADEX  injection is due 3.6 mg subcutaneous on October 8, 2018    3 Continue Pradaxa    4.  Consult ENT for questionable hypoenhancement of the left turbinate, questionable rhinosinusitis.         Millie Padilla MD   9/18/2018      CC: Dr. Arina Munoz

## 2018-09-18 NOTE — PLAN OF CARE
Problem: Patient Care Overview  Goal: Plan of Care Review  Outcome: Ongoing (interventions implemented as appropriate)   09/18/18 7170   Coping/Psychosocial   Plan of Care Reviewed With patient;mother   Plan of Care Review   Progress no change   OTHER   Outcome Summary Patient is tolerating chemotherapy well. Will start day 2 tonight. MRI of brain today because of headaches. Showed some changes to the left ear. Consulted ENT physician but has not seen the patient. Restarted home meds.      Goal: Individualization and Mutuality  Outcome: Ongoing (interventions implemented as appropriate)    Goal: Discharge Needs Assessment  Outcome: Ongoing (interventions implemented as appropriate)    Goal: Interprofessional Rounds/Family Conf  Outcome: Ongoing (interventions implemented as appropriate)      Problem: Oncology Care (Adult)  Goal: Signs and Symptoms of Listed Potential Problems Will be Absent, Minimized or Managed (Oncology Care)  Outcome: Ongoing (interventions implemented as appropriate)

## 2018-09-18 NOTE — CONSULTS
"Oncology Social Work    Referral received from Lien Tate, RN -  to assist with financial assistance for medical bills.    Chart reviewed.  Patient admitted for cycle 5 of chemo - CHOP Rituxan for her large B-Cell Lymphoma.   Patient to have MRI today due to migraines and visual aura ( seeing spots).      OSW met with patient at bedside today.  She was alert and oriented x 3 and engaged in conversation.  Patient states that some of the hardest parts of her last 4 months of undergoing chemo is not having the energy to do things she once found so easy to do.  Patient does endorse strong support from her family and friends and Muslim group.   Patient states that her medical bills are starting to cause distress and was curious if there was any programs locally or nationally that could assist.  Patient was given the [facility] Financial assistance application packet.  OSW spoke to patient about local [facility] Way that we can apply to for assistance along with a new program through [facility], [facility] Urgent Patient Need Fund.  Explored The [facility] as an option, but enrollment period has .    Discussed \" Make A wish\" like program for young Adults named, \"3 little birds for life\".  Patient interested in this program.  OSW called  and discovered that it applies for patient's 25 yrs of age up to 55 years.  OSW will inform patient that at this moment , since she is 23 yrs old, that she is ineligible.     OSW will follow up with patient to complete paperwork for the applications she can apply for.  Thank you,  Faina Santiago, EZEKIELW, CSW, OSW-C  "

## 2018-09-18 NOTE — PROGRESS NOTES
DOS: 2018  NAME: Vikki Durham   : 1995  PCP: Carla Zhao MD  No chief complaint on file.  Chief complaint/reason for consult: h/a    Neurology    Chief complaint: headache  Subjective: Pt denies h/a or visual aura. No new complaints. MRI findings discussed with pt and her mother and pt has had runny nose for past 1-2 weeks but no sinus pain/pressure or congestion.     Objective:  Vital signs: BP 92/56 (BP Location: Left arm, Patient Position: Sitting)   Pulse 70   Temp 97.5 °F (36.4 °C) (Oral)   Resp 18   Wt 64.9 kg (143 lb 1 oz)   SpO2 99%   BMI 26.16 kg/m²       General appearance: Well developed, well nourished, well groomed, alert and cooperative.   HEENT: Normocephalic.   Neck and spine: Normal range of motion. Normal alignment. No mass or tenderness.    Cardiac: Regular rate and rhythm. No murmurs.   Peripheral Vasculature: Extremities warm and dry.  Chest Exam: Clear to auscultation bilaterally, no wheezes, no rhonchi.  Extremities: Normal, no edema.   Skin: No rashes or birthmarks.     Higher integrative function: Oriented to time, place, person, intact recent and remote memory, attention span, concentration and language. Spontaneous speech, fund of vocabulary are normal.   CN II: Normal visual acuity and visual fields.   CN III IV VI: Extraocular movements are full without nystagmus. Pupils are equal, round, and reactive to light.  CN V: Normal facial sensation.  CN VII: Facial movements are symmetric, no weakness.   CN VIII: Auditory acuity is normal.   CN IX & X: Symmetric palatal movement.   CN XI: Sternocleidomastoid and trapezius are normal. No weakness.   CN XII: The tongue is midline. No atrophy or fasciculations.   Motor: Normal muscle strength, bulk, and tone in upper and lower extremities. No abnormal movements.   Sensation: Normal light touch.  Station and gait: Not assessed.  Coordination: Finger to nose test showed no dysmetria. Heel to shin normal.       Laboratory  results:  Lab Results   Component Value Date    GLUCOSE 125 (H) 09/18/2018    CALCIUM 8.9 09/18/2018     09/18/2018    K 3.6 09/18/2018    CO2 24.6 09/18/2018     09/18/2018    BUN 12 09/18/2018    CREATININE 0.45 (L) 09/18/2018    EGFRIFAFRI >150 09/18/2018    EGFRIFNONA >150 09/18/2018    BCR 26.7 (H) 09/18/2018    ANIONGAP 9.4 09/18/2018     Lab Results   Component Value Date    WBC 7.83 09/18/2018    HGB 9.8 (L) 09/18/2018    HCT 30.6 (L) 09/18/2018    MCV 98.1 09/18/2018     09/18/2018     Lab Results   Component Value Date    CHOL 114 06/08/2018    CHOL 118 06/13/2017     Lab Results   Component Value Date    HDL 19 (L) 06/08/2018    HDL 36 (L) 06/13/2017     Lab Results   Component Value Date    LDL 77 06/08/2018    LDL 69 06/13/2017     Lab Results   Component Value Date    TRIG 90 06/08/2018    TRIG 65 06/13/2017     Imaging:  Mri Brain With & Without Contrast    Result Date: 9/18/2018   There is no evidence to suggest acute intracranial pathology. However, note is made of a hypoenhancing left inferior turbinate. The precise etiology and significance of this finding is uncertain although this finding can be seen in the setting of early acute invasive fungal rhinosinusitis and correlation is suggested.  These findings and recommendations were discussed with Linda Will on 09/18/2018 at approximately 12:40 PM.        Impression/Plan  Vikki Durham is a 23 y.o. 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     Neurology was consulted for evaluation of migraines. Diagnosis is migraine. She was started on mag ox while inpatient and recommend to change to Migrelief OTC as outpatient.     MRI brain w/wo show no acute findings of the brain however per Dr Briggs, there was decreased enhancement of the left  inferior turbinate which can be seen in early acute fungal rhinosinusitis. MRI findings discussed with Dr Morris and Dr Padilla. Will consult ENT, Dr Geroge.    No further neurology w/u at this time. Will sign off but are available if needed. Pt can follow up as outpatient if needed.

## 2018-09-19 LAB
ALBUMIN SERPL-MCNC: 4.1 G/DL (ref 3.5–5.2)
ALBUMIN/GLOB SERPL: 2 G/DL
ALP SERPL-CCNC: 46 U/L (ref 39–117)
ALT SERPL W P-5'-P-CCNC: 14 U/L (ref 1–33)
ANION GAP SERPL CALCULATED.3IONS-SCNC: 9.9 MMOL/L
ANISOCYTOSIS BLD QL: ABNORMAL
AST SERPL-CCNC: 15 U/L (ref 1–32)
BILIRUB SERPL-MCNC: 0.2 MG/DL (ref 0.1–1.2)
BUN BLD-MCNC: 7 MG/DL (ref 6–20)
BUN/CREAT SERPL: 16.7 (ref 7–25)
CALCIUM SPEC-SCNC: 9.1 MG/DL (ref 8.6–10.5)
CHLORIDE SERPL-SCNC: 108 MMOL/L (ref 98–107)
CO2 SERPL-SCNC: 26.1 MMOL/L (ref 22–29)
CREAT BLD-MCNC: 0.42 MG/DL (ref 0.57–1)
DACRYOCYTES BLD QL SMEAR: ABNORMAL
DEPRECATED RDW RBC AUTO: 61.5 FL (ref 37–54)
ERYTHROCYTE [DISTWIDTH] IN BLOOD BY AUTOMATED COUNT: 17.2 % (ref 11.7–13)
GFR SERPL CREATININE-BSD FRML MDRD: >150 ML/MIN/1.73
GFR SERPL CREATININE-BSD FRML MDRD: >150 ML/MIN/1.73
GLOBULIN UR ELPH-MCNC: 2.1 GM/DL
GLUCOSE BLD-MCNC: 130 MG/DL (ref 65–99)
HCT VFR BLD AUTO: 30.7 % (ref 35.6–45.5)
HGB BLD-MCNC: 9.8 G/DL (ref 11.9–15.5)
HYPOCHROMIA BLD QL: ABNORMAL
LYMPHOCYTES # BLD MANUAL: 0.44 10*3/MM3 (ref 0.9–4.8)
LYMPHOCYTES NFR BLD MANUAL: 1 % (ref 5–12)
LYMPHOCYTES NFR BLD MANUAL: 5 % (ref 19.6–45.3)
MCH RBC QN AUTO: 31.6 PG (ref 26.9–32)
MCHC RBC AUTO-ENTMCNC: 31.9 G/DL (ref 32.4–36.3)
MCV RBC AUTO: 99 FL (ref 80.5–98.2)
MONOCYTES # BLD AUTO: 0.09 10*3/MM3 (ref 0.2–1.2)
NEUTROPHILS # BLD AUTO: 8.32 10*3/MM3 (ref 1.9–8.1)
NEUTROPHILS NFR BLD MANUAL: 94 % (ref 42.7–76)
OVALOCYTES BLD QL SMEAR: ABNORMAL
PLAT MORPH BLD: NORMAL
PLATELET # BLD AUTO: 232 10*3/MM3 (ref 140–500)
PMV BLD AUTO: 11.6 FL (ref 6–12)
POTASSIUM BLD-SCNC: 3.3 MMOL/L (ref 3.5–5.2)
PROT SERPL-MCNC: 6.2 G/DL (ref 6–8.5)
RBC # BLD AUTO: 3.1 10*6/MM3 (ref 3.9–5.2)
SODIUM BLD-SCNC: 144 MMOL/L (ref 136–145)
WBC MORPH BLD: NORMAL
WBC NRBC COR # BLD: 8.85 10*3/MM3 (ref 4.5–10.7)

## 2018-09-19 PROCEDURE — 85007 BL SMEAR W/DIFF WBC COUNT: CPT | Performed by: INTERNAL MEDICINE

## 2018-09-19 PROCEDURE — 85027 COMPLETE CBC AUTOMATED: CPT | Performed by: INTERNAL MEDICINE

## 2018-09-19 PROCEDURE — 99232 SBSQ HOSP IP/OBS MODERATE 35: CPT | Performed by: INTERNAL MEDICINE

## 2018-09-19 PROCEDURE — 25010000002 ETOPOSIDE 100 MG/5ML SOLUTION 25 ML VIAL: Performed by: NURSE PRACTITIONER

## 2018-09-19 PROCEDURE — 80053 COMPREHEN METABOLIC PANEL: CPT | Performed by: NURSE PRACTITIONER

## 2018-09-19 PROCEDURE — 25010000002 VINCRISTINE PER 1 MG: Performed by: NURSE PRACTITIONER

## 2018-09-19 PROCEDURE — 63710000001 PREDNISONE PER 5 MG: Performed by: NURSE PRACTITIONER

## 2018-09-19 PROCEDURE — 25010000002 DOXORUBICIN PER 10 MG: Performed by: NURSE PRACTITIONER

## 2018-09-19 PROCEDURE — 25010000002 ONDANSETRON PER 1 MG: Performed by: NURSE PRACTITIONER

## 2018-09-19 RX ADMIN — DABIGATRAN ETEXILATE MESYLATE 150 MG: 150 CAPSULE ORAL at 08:44

## 2018-09-19 RX ADMIN — DABIGATRAN ETEXILATE MESYLATE 150 MG: 150 CAPSULE ORAL at 22:16

## 2018-09-19 RX ADMIN — SODIUM CHLORIDE 75 ML/HR: 9 INJECTION, SOLUTION INTRAVENOUS at 08:44

## 2018-09-19 RX ADMIN — PREDNISONE 100 MG: 50 TABLET ORAL at 08:44

## 2018-09-19 RX ADMIN — Medication 50 MG: at 08:44

## 2018-09-19 RX ADMIN — Medication 1000 MCG: at 08:44

## 2018-09-19 RX ADMIN — Medication 400 MG: at 08:44

## 2018-09-19 RX ADMIN — POTASSIUM CHLORIDE 20 MEQ: 750 CAPSULE, EXTENDED RELEASE ORAL at 17:34

## 2018-09-19 RX ADMIN — SODIUM CHLORIDE 75 ML/HR: 9 INJECTION, SOLUTION INTRAVENOUS at 22:22

## 2018-09-19 RX ADMIN — FLUCONAZOLE 400 MG: 200 TABLET ORAL at 11:46

## 2018-09-19 RX ADMIN — PANTOPRAZOLE SODIUM 40 MG: 40 TABLET, DELAYED RELEASE ORAL at 06:02

## 2018-09-19 RX ADMIN — ACYCLOVIR 400 MG: 400 TABLET ORAL at 22:16

## 2018-09-19 RX ADMIN — POTASSIUM CHLORIDE 20 MEQ: 750 CAPSULE, EXTENDED RELEASE ORAL at 08:44

## 2018-09-19 RX ADMIN — PREDNISONE 100 MG: 50 TABLET ORAL at 17:34

## 2018-09-19 RX ADMIN — ONDANSETRON 16 MG: 2 INJECTION INTRAMUSCULAR; INTRAVENOUS at 18:32

## 2018-09-19 RX ADMIN — ETOPOSIDE: 20 INJECTION, SOLUTION, CONCENTRATE INTRAVENOUS at 19:18

## 2018-09-19 RX ADMIN — Medication 10 ML: at 22:16

## 2018-09-19 RX ADMIN — SULFAMETHOXAZOLE AND TRIMETHOPRIM 160 MG: 800; 160 TABLET ORAL at 08:44

## 2018-09-19 RX ADMIN — ACYCLOVIR 400 MG: 400 TABLET ORAL at 08:44

## 2018-09-19 RX ADMIN — Medication 10 ML: at 08:48

## 2018-09-19 NOTE — NURSING NOTE
Spoke with Faina at Dr. George's office, let them know that we do not have the nursing staff to transport pt office for consult today. Faina spoke with MD stated that he could be here tomorrow for the consult

## 2018-09-19 NOTE — CONSULTS
Asked to see patient regarding abnormal MRI finding.    HPI: 23-year-old female with large B-cell lymphoma undergoing chemotherapy.  She has resultant immunosuppression.  An MRI was completed showing some changes in her inferior turbinates particularly on her left side.  She has not had any major sinonasal symptomatology recently or any recent nosebleeds.  She did have a mild runny nose about a week ago. A full review of the electronic medical record was performed which was inclusive of past medical history, past surgical history, social history, family history and review of systems.    PE: Well-nourished well-developed female in no apparent cardiopulmonary distress.  Her cranial nerves are grossly intact.  Her auricles are normal.  Her nasal passages did not show any abnormal appearance to the inferior turbinates anteriorly.  There is some mild edema as would be suspected from allergies.  No discharge or drainage is noted.  The oral cavity, oropharyngeal and neck do not show any obvious abnormalities either.  Her voice is normal.    A/P: I do not see any significant abnormality intranasally on anterior rhinoscopy.  If any further nasal symptomatology develops or if we need to get a better look, she will have to come to the office for an endoscopic examination after decongestion.    EMR Dragon/Transcription disclaimer:   Much of this encounter note is an electronic transcription/translation of spoken language to printed text. The electronic translation of spoken language may permit erroneous, or at times, nonsensical words or phrases to be inadvertently transcribed; Although I have reviewed the note for such errors, some may still exist.

## 2018-09-19 NOTE — PROGRESS NOTES
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 She did initiate EPOCH-R  in the hospital on 06/16/2018.    History of Present Illness      The patient is a 23 y.o. female with the above-mentioned history, with history of mediastinal large B-cell lymphoma status post 4 cycles of chemotherapy with dose adusted EPOCHR, patient has completed 4 cycles of chemotherapy.  And had a CT scan of the chest abdomen pelvis which shows continued response.  The plan is to do a total of 6 cycles of chemotherapy.  On the CT scan radiology read as the spleen to be slightly decreased from before.  However I do not think the spleen was involved in the first place as PET scan had not picked up any activity on the spleen prior to starting treatment and again after 2 cycles.  So far she has had an excellent response to treatment.  She is on dose adjusted EPOCH R.     She received Zoladex monthly and the next injection is due on October 8, 2018.    Interval history:  September 18, 2018: Patient had complained of headaches concerning for migraine headaches and neurology was consulted.  MRI of brain has been done.  Results reviewed.  Patient is tolerating chemotherapy very well.      Past Medical History, Past Surgical History, Social History, Family History have been reviewed and are without significant changes except as mentioned.    Oncologic history:.   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  She saw Dr. Arina Cohen on last Friday.  The reason the patient was referred to Dr. Cohen was because they thought she had cardiomegaly on chest x-ray.  However a chest x-ray was ordered which showedThe chest x-ray showed extensive abnormal increased density throughout the anterior mediastinum extending into the left hilar region and left perihilar region and is most likely due to confluent  lymphadenopathy.     CT angiogram of the chest did not show pulmonary embolism but showed     there is a large conglomerate  mass encases multiple vascular structures of the mediastinum and left  hilar region that is most likely extensive confluent lymphadenopathy.  The primary differential diagnostic considerations would be lymphoma.  The tumor posteriorly displaces the pulmonary arteries and the left and  moderately narrows the main pulmonary artery. The tumor also posteriorly  displaces the trachea and markedly narrows the left mainstem bronchus.  The pulmonary veins in the left are also encased and narrowed. The mass  encases and markedly narrows the SVC. The large edy mass measures  approximately 19.8 x 13.7 x 14.0 cm in diameter. Enlarged lymph nodes  are also present in the left supraclavicular region where they appear to  produce occlusion of the left internal jugular vein. There is no  axillary lymphadenopathy. There are no filling defects within the  pulmonary arteries. There is no CT evidence of pulmonary embolism. There  is a small left pleural effusion. A small pericardial effusion measuring  8 mm in maximum thickness is also present. There is compressive  atelectasis of the left lung. Patchy airspace opacities are also present  in the superior aspect of the left upper lobe. These are most likely due  to direct tumor infiltration of the lung parenchyma, but could also  represent postobstructive pneumonia. The right lung is well expanded and  clear. Images through the upper abdomen partially show what could be a  edy mass in the retroperitoneum posterior and inferior to the  pancreas. Further evaluation with a CT scan of the abdomen and pelvis is  recommended. Bone window images demonstrate patchy sclerosis in the C7  and T1 and T2 and T3 and T4 vertebral body suspicious for bone  involvement with lymphoma.     IMPRESSION:  Very large tumor mass in the mediastinum encasing multiple  vascular  structures and also moderately narrowing the left mainstem  bronchus as described in more detail above. Direct tumor infiltration of  the left upper lobe is also suspected. Small left pleural effusion and a  small pericardial effusion. There may also be partially visualized  lymphadenopathy in the upper abdomen. Patchy areas of sclerosis are also  noted in the C7 and T1 and T2 and T3 and T4 vertebral bodies suspicious  for osseous metastatic disease. The findings are consistent with  Lymphoma.     Dr. Cohen felt that she had a small pericardial effusion.  Patient has been symptomatic with cough which is worsening which started back in February 2018 and worsened over the last 4 months.  Patient also has some shortness of breath with walking up the steps.  She has not lost weight.  She say she is reasonable appetite.  She does have fever kind of low-grade fever and night sweats.  She is more fatigued compared to before.  She was referred to us because of concern that this could be lymphoma.  She does not have any tissue diagnosis yet.  Patient does complain of back pain.    Echo done on admission June 12, 2018 by Dr. Fitzgerald showed that the patient's posterior and appears large primary leukemia the left ventricle and apex.  There is no tamponade.  The pericardium appears thickened pointing to involvement of the pericardium into the mediastinal process.  Dr. Fitzgerald felt that it would be difficult to do pericardial synthesis as the mediastinal mass covers the anterior aspect of the heart.  Ejection fraction is 58%  CT-guided needle biopsy June 12, 2018 was negative  LDH is elevated.  Uric acid is high.  MRI thoracic spine, negative  CT abdomen pelvis negative  Scan July 13, 2018 shows large infiltrative edy mass in the anterior mediastinum and left hilar region that nearly completely fills the left hemithorax and shows intense hypermetabolism with an SUV of 19.8.  No foci of pathologic hypermetabolism are identified  elsewhere in the chest, neck abdomen or pelvis.    Pathology was consistent with primary mediastinal large B-cell lymphoma.    Patient was seen by Dr. Melgar.  He discussed harvesting eggs versus Zoladex plus oral contraceptives versus Zoladex alone for fertility preservation.  Due to large size of the tumor and rapid initiation of treatment needed the patient was not able to have aches harvested done.  Because she is at increased risk of thrombosis with the use of oral contraceptives secondary to malignancy he recommended Zoladex alone.  Patient received first dose of Zoladex 3.6 mg subcutaneous on Belia 15, 2018.    Patient started on EPOCH/R on June 16, 2018    Patient had a reaction to Rituxan which improved with steroids, Benadryl and Pepcid.  She had to receive it slow.  She started having itching over her EARS , sore throat.  She was given IV steroids Benadryl and Pepcid and following that she was started at a slower rate and she completed it.    Right upper extremity Doppler ultrasound showed new superficial vein thrombosis around the PICC line with no extension into the deep system.  Repeat Doppler was ordered.    A Doppler ultrasound initially showed superficial thrombophlebitis.  She was on prophylactic Arixtra.  A Doppler was repeated 2 days later on 6/20/18  which showed extension of thrombus into the axillary and brachial veins.  She was therefore started on Xarelto 15 mg one every 12 hours and the PICC line was removed as soon as chemotherapy completed on 6/20/18.  She will take 15 mg of Xarelto every 12 hours for 21 days and then transition to 20 mg once a day.  The patient will have CBC performed twice a week.  If the platelet count drops below 50,000, anticoagulation will need to be temporarily held  Patient was started on acyclovir/fluconazole/Bactrim  Bone marrow done June 14, 2018, morphology was negative for lymphoma    Fertility preservation, Zoladex is next due July 12, 2018.  CT scan and PET  scan showing significant response after cycle 2 of chemotherapy  Next dose of Lupron August 13, 2018    Review of Systems   Constitutional: Positive for fatigue. Negative for activity change, appetite change, chills and fever.   HENT: Negative for mouth sores.    Eyes: Negative for visual disturbance.   Respiratory: Negative for cough (improved) and shortness of breath (improved).    Cardiovascular: Negative.    Gastrointestinal: Negative.  Negative for abdominal pain, diarrhea, nausea and vomiting.   Endocrine: Negative.    Genitourinary: Negative for dysuria, frequency and menstrual problem.   Musculoskeletal: Negative for arthralgias, back pain, joint swelling and myalgias.   Skin: Negative.    Allergic/Immunologic: Negative.    Neurological: Negative.  Negative for dizziness and weakness.   Hematological: Negative.  Does not bruise/bleed easily.   Psychiatric/Behavioral: The patient is not nervous/anxious.    All other systems reviewed and are negative.    Patient has mild headache yesterday it is improved today.  Review of systems unchanged except as updated.    Medications:  The current medication list was reviewed in the EMR    ALLERGIES:  No Known Allergies    Objective      Vitals:    09/18/18 2132 09/19/18 0512 09/19/18 0551 09/19/18 0807   BP: 90/59 106/68  97/67   BP Location: Left arm Left arm  Left arm   Patient Position: Sitting Lying  Sitting   Pulse: 59 58  61   Resp: 18 16  16   Temp: 98.5 °F (36.9 °C) 98 °F (36.7 °C)  98.1 °F (36.7 °C)   TempSrc: Oral Oral  Oral   SpO2: 100% 98%  99%   Weight:   68.4 kg (150 lb 11.2 oz)    Height:         Current Status 9/17/2018   ECOG score 0       Physical Exam     GENERAL:  Well-developed, well-nourished in no acute distress.   SKIN:  Warm, dry without rashes, purpura or petechiae.  EYES:  Pupils equal, round and reactive to light.  EOMs intact.  Conjunctivae normal.  EARS:  Hearing intact.  NOSE:  Septum midline.  No excoriations or nasal discharge.  MOUTH:   Tongue is well-papillated; no stomatitis or ulcers.  Lips normal.  THROAT:  Oropharynx without lesions or exudates.  NECK:  Supple with good range of motion; no thyromegaly or masses, no JVD.  LYMPHATICS:  No cervical, supraclavicular, axillary or inguinal adenopathy.  CHEST:  Lungs clear to auscultation. Good airflow.  CARDIAC:  Regular rate and rhythm without murmurs, rubs or gallops. Normal S1,S2.  ABDOMEN:  Soft, nontender with no hepatosplenomegaly or masses.  EXTREMITIES:  No clubbing, cyanosis or edema.  NEUROLOGICAL:  Cranial Nerves II-XII grossly intact.  No focal neurological deficits.  PSYCHIATRIC:  Normal affect and mood.        RECENT LABS:    Results from last 7 days  Lab Units 09/19/18  0501 09/18/18  0520 09/17/18  0940   WBC 10*3/mm3 8.85 7.83 4.10   NEUTROS ABS 10*3/mm3 8.32* 7.28 2.74   LYMPHS ABS 10*3/mm3 0.44* 0.31*  --    HEMOGLOBIN g/dL 9.8* 9.8* 10.1*   HEMATOCRIT % 30.7* 30.6* 30.9*   PLATELETS 10*3/mm3 232 227 196   CT SCAN:  CHEST: There has been interval decrease in the large confluent anterior  mediastinal mass which measures approximately 11 x 8 cm, previously 13.5  x 9.5 cm when measured along the same planes. There has been a decrease  in the mass effect and narrowing of the left upper lobe bronchus and  there has been improved aeration at the left upper lobe. The patchy left  upper lobe airspace opacities have decreased in size and density. There  are no new pulmonary opacities and there are no pleural or pericardial  effusions.     ABDOMEN/PELVIS: Borderline splenic size is stable, measuring  approximately 11 cm. The liver, gallbladder, pancreas, adrenals, and  kidneys appear unremarkable. There is formed stool throughout the colon.  No bowel thickening is seen. The appendix appears within normal limits.  Uterus and adnexa appear unremarkable. There is no free fluid or  lymphadenopathy.       MRI brain:  There is no evidence to suggest acute intracranial pathology. However,  note is  made of a hypoenhancing left inferior turbinate. The precise  etiology and significance of this finding is uncertain although this  finding can be seen in the setting of early acute invasive fungal  rhinosinusitis and correlation is suggested.        Results from last 7 days  Lab Units 09/19/18  0501 09/18/18  0520 09/17/18  0939   SODIUM mmol/L 144 141 140   POTASSIUM mmol/L 3.3* 3.6 3.8   CHLORIDE mmol/L 108* 107 103   CO2 mmol/L 26.1 24.6 25.7   BUN mg/dL 7 12 11   CREATININE mg/dL 0.42* 0.45* 0.55*   CALCIUM mg/dL 9.1 8.9 9.4   ALBUMIN g/dL 4.10 4.00 4.10   BILIRUBIN mg/dL 0.2 <0.2 0.2   ALK PHOS U/L 46 49 53   ALT (SGPT) U/L 14 16 15   AST (SGOT) U/L 15 16 19   GLUCOSE mg/dL 130* 125* 125*           Assessment/Plan   1.  Primary mediastinal large B-cell lymphoma: The patient presented with dyspnea, cough, and chest pain.  A CT angiogram of the chest 6/8/18 showed a very large tumor mass in the mediastinum encasing multiple vascular structures and narrowing the left mainstem bronchus.  There was direct tumor infiltration in the left upper lobe.  There was a small left pleural effusion.  She underwent a CT-guided biopsy of the mediastinal mass on 6/12/18 and pathology reviewed at [facility] oncology showed diffuse large B-cell lymphoma consistent with a primary diffuse large B-cell lymphoma.  A bone marrow exam was performed on 6/14/18 which was negative for lymphoma.  She underwent a PET scan 6/13/18 which showed a large hypermetabolic mass in the chest involving the anterior mediastinum, left hilum, nearly completely filling the left hemothorax with SUV 19.8.  There was physiologic distribution elsewhere.     The patient was started on IV fluid hydration and allopurinol for prevention of hyperuricemia.  She initiated systemic chemotherapy with DA-EPOCH-R on 6/16/18 and completed the evening of 6/21/18.  She had a infusion reaction to rituximab but was able to complete with additional medications and otherwise  tolerated chemotherapy well.  She will require additional premedications with additional rituximab.  Plan is to monitor her blood counts twice per week outpatient and proceed with cycle 2 of chemotherapy day 21.     The patient had significant improvement in shortness of breath, cough, and chest pain by the time of discharge.    · Patient received Neulasta support  · Her white count dropped down to as low as 0.8 low-grade temperature and patient was placed on Levaquin ×5 days starting June 27, 2018, neutropenia AT  11 days posttreatment.  Platelets dropped to 82.  · Her next ZOLADEX injection is due July 12.  · She is due to start chemotherapy on July 9.  · Discussed with Dr. Fox at the [facility] and his suggestion was to do the CT scan and PET scan after 2 cycles and discuss the results with him.  If she has an excellent response early on she would not require any further treatments after completion of 6 cycles of EPOCH/R  · Patient being admitted for cycle 3 of chemotherapy on July 30, 2018.  · She's status post cycle 4 of chemotherapy and CT scan has been reviewed following 4 cycles with continued response.  · She is due for ZOLADEX  injection on October 8, 2018.   · Patient is due to go for cycle 5 of chemotherapy on Monday, September 17, 2018  · Last  echocardiogram done August 22, 2018 shows ejection fraction of 59% to 60% with the eldest strain of 19.2%  · Cycle 5, day 3 chemotherapy as of September 19, 2018 and tolerating well     2.  Pericardial effusion:   a 2-D echocardiogram 6/8/18 showed a left ventricular systolic function 58%.  There was a 2 cm pericardial effusion with no tamponade.  The pericardium appeared thickened.  She had no evidence of volume overload or Nod throughout the hospital stay  · Repeat echo August 22 shows good ejection fraction of 59%.     3.   FEN: She was monitored very carefully for any evidence of tumor lysis.  She was maintained on IV fluids and allopurinol  throughout hospitalization.    her uric acid at discharge was 1.2 but I recommended that she stay on allopurinol twice daily until her next cycle of chemotherapy to prevent hyperuricemia.  She has mild hypokalemia and will be discharged on potassium 20 mEq once per day.  Have ordered an outpatient BMP weekly for monitoring of her renal function and electrolytes     4.  Fertility preservation:  Patient received Zoladex injection  for fertility preservation; this should be continued once monthly during her chemotherapy.  Next dose due October 8, 2018     5.  Right upper extremity swelling:  The patient had a PIC line placed in the right upper extremity for administration of chemotherapy.  Her arm was noted to be swollen.  A Doppler ultrasound initially showed superficial thrombophlebitis.  She was on prophylactic Arixtra.  A Doppler was repeated 2 days later on 6/20/18  which showed extension of thrombus into the axillary and brachial veins.  She was therefore started on Xarelto 15 mg one every 12 hours and the PICC line was removed as soon as chemotherapy completed on 6/20/18.  She will take 15 mg of Xarelto every 12 hours for 21 days and then transition to 20 mg once a day.  The patient will have CBC performed twice a week.  If the platelet count drops below 50,000, anticoagulation will need to be temporarily held     6.  ID: The patient will receive Neulasta 24 hours after completion of chemotherapy for reduction of neutropenic infection.  She will be discharged on prophylactic antibiotics including acyclovir, Diflucan, and Bactrim.  She may require bacterial prophylaxis if the ANC drops below 1000.    7.  Headache: Neurology has seen patient Patient is having mild headaches and MRI done shows questionable rhinosinusitis of the left inferior turbinate.  We will consult ENT.  Patient does not have any fevers and I doubt this is of any concern but we'll check with ENT.     Plan 1.   Continue cycle 5 , day 3 of  chemotherapy with EPOCH/R on September 19, 2018. we will give Neulasta support patient has had excellent response after cycle 4.  Plan will be to complete total of 6 cycles of chemotherapy.      2.  ZOLADEX  injection is due 3.6 mg subcutaneous on October 8, 2018    3 Continue Pradaxa    4.  Consult ENT for questionable hypoenhancement of the left turbinate, questionable rhinosinusitis.  ENT consult pending         Millie Padilla MD   9/19/2018      CC: Dr. Arina Munoz

## 2018-09-19 NOTE — PROGRESS NOTES
Oncology Social Work    Followed up with patient today.  Went over financial assistance applications with patient -  [employer], [employer] and [employer].  All three given to patient and explained what all needed to be filled out and where.  Explained that all require documents to complete application process.  Explained that OSW is available to assist with faxing final forms to [employer] once completed.      OSW called Casi Dasilva in Financial Services to inquire if patient must include her parents financial status on her application since she is living with them during treatment.  Awaiting return call.    Will cont to follow and assist  Faina Santiago, EZEKIELW,CSW, OSW-C

## 2018-09-19 NOTE — PROGRESS NOTES
Student therapist visited pt on 9/19/18 for an art therapy session. Pt had a visitor at the time, so was not able to participate in art therapy at this time. Student therapist checked in with the pt on how she has been doing and how her week has been. Pt had positive affect and expressed that her week had been well. Pt and student therapist made plan for a future session when she returns for her next treatment.

## 2018-09-19 NOTE — PLAN OF CARE
Problem: Patient Care Overview  Goal: Plan of Care Review  Outcome: Ongoing (interventions implemented as appropriate)   09/19/18 1933   Coping/Psychosocial   Plan of Care Reviewed With patient   Plan of Care Review   Progress no change   OTHER   Outcome Summary Pt continue with day 3 of chemo, tolerating well. admits to neuropathy getting a bit better. no complaints of headaches today. ENT came by and did nasal assessment for possible fungal infection, no new orders. VSS, will continue to monitor.      Goal: Individualization and Mutuality  Outcome: Ongoing (interventions implemented as appropriate)   09/19/18 1933   Individualization   Patient Specific Preferences goes by liam   Patient Specific Goals (Include Timeframe) get chemo on time   Patient Specific Interventions chemo day 3 given as scheduled          Advancement Flap (Single) Text: The defect edges were debeveled with a #15 scalpel blade.  Given the location of the defect and the proximity to free margins a single advancement flap was deemed most appropriate.  Using a sterile surgical marker, an appropriate advancement flap was drawn incorporating the defect and placing the expected incisions within the relaxed skin tension lines where possible.    The area thus outlined was incised deep to adipose tissue with a #15 scalpel blade.  The skin margins were undermined to an appropriate distance in all directions utilizing iris scissors.

## 2018-09-20 LAB
ALBUMIN SERPL-MCNC: 4.1 G/DL (ref 3.5–5.2)
ALBUMIN/GLOB SERPL: 2.1 G/DL
ALP SERPL-CCNC: 43 U/L (ref 39–117)
ALT SERPL W P-5'-P-CCNC: 13 U/L (ref 1–33)
ANION GAP SERPL CALCULATED.3IONS-SCNC: 10.4 MMOL/L
ANISOCYTOSIS BLD QL: ABNORMAL
AST SERPL-CCNC: 15 U/L (ref 1–32)
BILIRUB SERPL-MCNC: 0.2 MG/DL (ref 0.1–1.2)
BUN BLD-MCNC: 9 MG/DL (ref 6–20)
BUN/CREAT SERPL: 18.4 (ref 7–25)
CALCIUM SPEC-SCNC: 9.2 MG/DL (ref 8.6–10.5)
CHLORIDE SERPL-SCNC: 103 MMOL/L (ref 98–107)
CO2 SERPL-SCNC: 28.6 MMOL/L (ref 22–29)
CREAT BLD-MCNC: 0.49 MG/DL (ref 0.57–1)
DACRYOCYTES BLD QL SMEAR: ABNORMAL
DEPRECATED RDW RBC AUTO: 60.2 FL (ref 37–54)
ERYTHROCYTE [DISTWIDTH] IN BLOOD BY AUTOMATED COUNT: 16.9 % (ref 11.7–13)
GFR SERPL CREATININE-BSD FRML MDRD: >150 ML/MIN/1.73
GFR SERPL CREATININE-BSD FRML MDRD: >150 ML/MIN/1.73
GLOBULIN UR ELPH-MCNC: 2 GM/DL
GLUCOSE BLD-MCNC: 116 MG/DL (ref 65–99)
HCT VFR BLD AUTO: 29.9 % (ref 35.6–45.5)
HGB BLD-MCNC: 9.7 G/DL (ref 11.9–15.5)
HYPOCHROMIA BLD QL: ABNORMAL
LDH SERPL-CCNC: 226 U/L (ref 135–214)
LYMPHOCYTES # BLD MANUAL: 0.32 10*3/MM3 (ref 0.9–4.8)
LYMPHOCYTES NFR BLD MANUAL: 3 % (ref 5–12)
LYMPHOCYTES NFR BLD MANUAL: 5 % (ref 19.6–45.3)
MCH RBC QN AUTO: 31.8 PG (ref 26.9–32)
MCHC RBC AUTO-ENTMCNC: 32.4 G/DL (ref 32.4–36.3)
MCV RBC AUTO: 98 FL (ref 80.5–98.2)
MONOCYTES # BLD AUTO: 0.19 10*3/MM3 (ref 0.2–1.2)
NEUTROPHILS # BLD AUTO: 5.92 10*3/MM3 (ref 1.9–8.1)
NEUTROPHILS NFR BLD MANUAL: 92 % (ref 42.7–76)
OVALOCYTES BLD QL SMEAR: ABNORMAL
PLAT MORPH BLD: NORMAL
PLATELET # BLD AUTO: 198 10*3/MM3 (ref 140–500)
PMV BLD AUTO: 11.2 FL (ref 6–12)
POTASSIUM BLD-SCNC: 3.3 MMOL/L (ref 3.5–5.2)
PROT SERPL-MCNC: 6.1 G/DL (ref 6–8.5)
RBC # BLD AUTO: 3.05 10*6/MM3 (ref 3.9–5.2)
SODIUM BLD-SCNC: 142 MMOL/L (ref 136–145)
URATE SERPL-MCNC: 3.3 MG/DL (ref 2.4–5.7)
WBC MORPH BLD: NORMAL
WBC NRBC COR # BLD: 6.44 10*3/MM3 (ref 4.5–10.7)

## 2018-09-20 PROCEDURE — 85027 COMPLETE CBC AUTOMATED: CPT | Performed by: INTERNAL MEDICINE

## 2018-09-20 PROCEDURE — 80053 COMPREHEN METABOLIC PANEL: CPT | Performed by: NURSE PRACTITIONER

## 2018-09-20 PROCEDURE — 85007 BL SMEAR W/DIFF WBC COUNT: CPT | Performed by: INTERNAL MEDICINE

## 2018-09-20 PROCEDURE — 63710000001 PREDNISONE PER 5 MG: Performed by: NURSE PRACTITIONER

## 2018-09-20 PROCEDURE — 83615 LACTATE (LD) (LDH) ENZYME: CPT | Performed by: INTERNAL MEDICINE

## 2018-09-20 PROCEDURE — 25010000002 ONDANSETRON PER 1 MG: Performed by: NURSE PRACTITIONER

## 2018-09-20 PROCEDURE — 84550 ASSAY OF BLOOD/URIC ACID: CPT | Performed by: INTERNAL MEDICINE

## 2018-09-20 PROCEDURE — 25010000002 DOXORUBICIN PER 10 MG: Performed by: NURSE PRACTITIONER

## 2018-09-20 PROCEDURE — 25010000002 ETOPOSIDE 100 MG/5ML SOLUTION 25 ML VIAL: Performed by: NURSE PRACTITIONER

## 2018-09-20 PROCEDURE — 25010000002 VINCRISTINE PER 1 MG: Performed by: NURSE PRACTITIONER

## 2018-09-20 PROCEDURE — 99232 SBSQ HOSP IP/OBS MODERATE 35: CPT | Performed by: INTERNAL MEDICINE

## 2018-09-20 RX ADMIN — Medication 10 ML: at 22:32

## 2018-09-20 RX ADMIN — Medication 400 MG: at 09:11

## 2018-09-20 RX ADMIN — POTASSIUM CHLORIDE 20 MEQ: 750 CAPSULE, EXTENDED RELEASE ORAL at 17:22

## 2018-09-20 RX ADMIN — VINCRISTINE SULFATE: 1 INJECTION, SOLUTION INTRAVENOUS at 17:49

## 2018-09-20 RX ADMIN — Medication 10 ML: at 09:10

## 2018-09-20 RX ADMIN — Medication 50 MG: at 09:11

## 2018-09-20 RX ADMIN — POTASSIUM CHLORIDE 20 MEQ: 750 CAPSULE, EXTENDED RELEASE ORAL at 09:11

## 2018-09-20 RX ADMIN — ACYCLOVIR 400 MG: 400 TABLET ORAL at 09:11

## 2018-09-20 RX ADMIN — PANTOPRAZOLE SODIUM 40 MG: 40 TABLET, DELAYED RELEASE ORAL at 06:11

## 2018-09-20 RX ADMIN — FLUCONAZOLE 400 MG: 200 TABLET ORAL at 13:20

## 2018-09-20 RX ADMIN — DABIGATRAN ETEXILATE MESYLATE 150 MG: 150 CAPSULE ORAL at 19:50

## 2018-09-20 RX ADMIN — PREDNISONE 100 MG: 50 TABLET ORAL at 09:11

## 2018-09-20 RX ADMIN — Medication 1000 MCG: at 09:11

## 2018-09-20 RX ADMIN — ACYCLOVIR 400 MG: 400 TABLET ORAL at 19:50

## 2018-09-20 RX ADMIN — PREDNISONE 100 MG: 50 TABLET ORAL at 17:22

## 2018-09-20 RX ADMIN — DABIGATRAN ETEXILATE MESYLATE 150 MG: 150 CAPSULE ORAL at 09:10

## 2018-09-20 RX ADMIN — ONDANSETRON 16 MG: 2 INJECTION INTRAMUSCULAR; INTRAVENOUS at 17:22

## 2018-09-20 RX ADMIN — SODIUM CHLORIDE 75 ML/HR: 9 INJECTION, SOLUTION INTRAVENOUS at 17:22

## 2018-09-20 NOTE — PROGRESS NOTES
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 She did initiate EPOCH-R  in the hospital on 06/16/2018.    History of Present Illness      The patient is a 23 y.o. female with the above-mentioned history, with history of mediastinal large B-cell lymphoma status post 4 cycles of chemotherapy with dose adusted EPOCHR, patient has completed 4 cycles of chemotherapy.  And had a CT scan of the chest abdomen pelvis which shows continued response.  The plan is to do a total of 6 cycles of chemotherapy.  On the CT scan radiology read as the spleen to be slightly decreased from before.  However I do not think the spleen was involved in the first place as PET scan had not picked up any activity on the spleen prior to starting treatment and again after 2 cycles.  So far she has had an excellent response to treatment.  She is on dose adjusted EPOCH R.     She received Zoladex monthly and the next injection is due on October 8, 2018.    Interval history:  September 18, 2018: Patient had complained of headaches concerning for migraine headaches and neurology was consulted.  MRI of brain has been done.  Results reviewed.  Patient is tolerating chemotherapy very well.    September 20, 2018: Patient is tolerating chemotherapy very well.  No further headaches.  Dr. George saw patient and was not concerned about the MRI showing mild enhancement in the turbinate.  Discussion done with Dr. Fox at the [facility] [place].  Patient has had very good response to treatment so far and the plan would be to do a PET scan 6 weeks after the sixth cycle of treatment.  Hopefully we can avoid radiation to the chest wall mass.  Dr. Fox would like to see her following completion of all chemotherapy and following completion of PET scan.      Past Medical History, Past Surgical History, Social History, Family History have been reviewed and are  without significant changes except as mentioned.    Oncologic history:.   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  She saw Dr. Arina Cohen on last Friday.  The reason the patient was referred to Dr. Cohen was because they thought she had cardiomegaly on chest x-ray.  However a chest x-ray was ordered which showedThe chest x-ray showed extensive abnormal increased density throughout the anterior mediastinum extending into the left hilar region and left perihilar region and is most likely due to confluent lymphadenopathy.     CT angiogram of the chest did not show pulmonary embolism but showed     there is a large conglomerate  mass encases multiple vascular structures of the mediastinum and left  hilar region that is most likely extensive confluent lymphadenopathy.  The primary differential diagnostic considerations would be lymphoma.  The tumor posteriorly displaces the pulmonary arteries and the left and  moderately narrows the main pulmonary artery. The tumor also posteriorly  displaces the trachea and markedly narrows the left mainstem bronchus.  The pulmonary veins in the left are also encased and narrowed. The mass  encases and markedly narrows the SVC. The large edy mass measures  approximately 19.8 x 13.7 x 14.0 cm in diameter. Enlarged lymph nodes  are also present in the left supraclavicular region where they appear to  produce occlusion of the left internal jugular vein. There is no  axillary lymphadenopathy. There are no filling defects within the  pulmonary arteries. There is no CT evidence of pulmonary embolism. There  is a small left pleural effusion. A small pericardial effusion measuring  8 mm in maximum thickness is also present. There is compressive  atelectasis of the left lung. Patchy airspace opacities are also present  in the superior aspect of the left upper lobe. These are most likely due  to direct tumor infiltration of the lung parenchyma, but  could also  represent postobstructive pneumonia. The right lung is well expanded and  clear. Images through the upper abdomen partially show what could be a  edy mass in the retroperitoneum posterior and inferior to the  pancreas. Further evaluation with a CT scan of the abdomen and pelvis is  recommended. Bone window images demonstrate patchy sclerosis in the C7  and T1 and T2 and T3 and T4 vertebral body suspicious for bone  involvement with lymphoma.     IMPRESSION:  Very large tumor mass in the mediastinum encasing multiple  vascular structures and also moderately narrowing the left mainstem  bronchus as described in more detail above. Direct tumor infiltration of  the left upper lobe is also suspected. Small left pleural effusion and a  small pericardial effusion. There may also be partially visualized  lymphadenopathy in the upper abdomen. Patchy areas of sclerosis are also  noted in the C7 and T1 and T2 and T3 and T4 vertebral bodies suspicious  for osseous metastatic disease. The findings are consistent with  Lymphoma.     Dr. Cohen felt that she had a small pericardial effusion.  Patient has been symptomatic with cough which is worsening which started back in February 2018 and worsened over the last 4 months.  Patient also has some shortness of breath with walking up the steps.  She has not lost weight.  She say she is reasonable appetite.  She does have fever kind of low-grade fever and night sweats.  She is more fatigued compared to before.  She was referred to us because of concern that this could be lymphoma.  She does not have any tissue diagnosis yet.  Patient does complain of back pain.    Echo done on admission June 12, 2018 by Dr. Fitzgerald showed that the patient's posterior and appears large primary leukemia the left ventricle and apex.  There is no tamponade.  The pericardium appears thickened pointing to involvement of the pericardium into the mediastinal process.  Dr. Fitzgerald felt that it would be  difficult to do pericardial synthesis as the mediastinal mass covers the anterior aspect of the heart.  Ejection fraction is 58%  CT-guided needle biopsy June 12, 2018 was negative  LDH is elevated.  Uric acid is high.  MRI thoracic spine, negative  CT abdomen pelvis negative  Scan July 13, 2018 shows large infiltrative edy mass in the anterior mediastinum and left hilar region that nearly completely fills the left hemithorax and shows intense hypermetabolism with an SUV of 19.8.  No foci of pathologic hypermetabolism are identified elsewhere in the chest, neck abdomen or pelvis.    Pathology was consistent with primary mediastinal large B-cell lymphoma.    Patient was seen by Dr. Melgar.  He discussed harvesting eggs versus Zoladex plus oral contraceptives versus Zoladex alone for fertility preservation.  Due to large size of the tumor and rapid initiation of treatment needed the patient was not able to have aches harvested done.  Because she is at increased risk of thrombosis with the use of oral contraceptives secondary to malignancy he recommended Zoladex alone.  Patient received first dose of Zoladex 3.6 mg subcutaneous on Belia 15, 2018.    Patient started on EPOCH/R on June 16, 2018    Patient had a reaction to Rituxan which improved with steroids, Benadryl and Pepcid.  She had to receive it slow.  She started having itching over her EARS , sore throat.  She was given IV steroids Benadryl and Pepcid and following that she was started at a slower rate and she completed it.    Right upper extremity Doppler ultrasound showed new superficial vein thrombosis around the PICC line with no extension into the deep system.  Repeat Doppler was ordered.    A Doppler ultrasound initially showed superficial thrombophlebitis.  She was on prophylactic Arixtra.  A Doppler was repeated 2 days later on 6/20/18  which showed extension of thrombus into the axillary and brachial veins.  She was therefore started on Xarelto 15 mg  one every 12 hours and the PICC line was removed as soon as chemotherapy completed on 6/20/18.  She will take 15 mg of Xarelto every 12 hours for 21 days and then transition to 20 mg once a day.  The patient will have CBC performed twice a week.  If the platelet count drops below 50,000, anticoagulation will need to be temporarily held  Patient was started on acyclovir/fluconazole/Bactrim  Bone marrow done June 14, 2018, morphology was negative for lymphoma    Fertility preservation, Zoladex is next due July 12, 2018.  CT scan and PET scan showing significant response after cycle 2 of chemotherapy  Next dose of Lupron August 13, 2018    Review of Systems   Constitutional: Positive for fatigue. Negative for activity change, appetite change, chills and fever.   HENT: Negative for mouth sores.    Eyes: Negative for visual disturbance.   Respiratory: Negative for cough (improved) and shortness of breath (improved).    Cardiovascular: Negative.    Gastrointestinal: Negative.  Negative for abdominal pain, diarrhea, nausea and vomiting.   Endocrine: Negative.    Genitourinary: Negative for dysuria, frequency and menstrual problem.   Musculoskeletal: Negative for arthralgias, back pain, joint swelling and myalgias.   Skin: Negative.    Allergic/Immunologic: Negative.    Neurological: Negative.  Negative for dizziness and weakness.   Hematological: Negative.  Does not bruise/bleed easily.   Psychiatric/Behavioral: The patient is not nervous/anxious.    All other systems reviewed and are negative.    Patient has mild headache yesterday it is improved today.  Review of systems unchanged except as updated.    Medications:  The current medication list was reviewed in the EMR    ALLERGIES:  No Known Allergies    Objective      Vitals:    09/19/18 1900 09/20/18 0343 09/20/18 0600 09/20/18 0805   BP: 106/60 110/67  111/66   BP Location: Left arm Left arm  Left arm   Patient Position: Sitting Lying  Lying   Pulse: 62 52  53   Resp:  16 16  16   Temp: 98 °F (36.7 °C) 97.4 °F (36.3 °C)  98.1 °F (36.7 °C)   TempSrc: Oral Oral  Oral   SpO2: 99% 97%  99%   Weight:   63.7 kg (140 lb 6.4 oz)    Height:         Current Status 9/17/2018   ECOG score 0       Physical Exam     GENERAL:  Well-developed, well-nourished in no acute distress. .  MOUTH:  Tongue is well-papillated; no stomatitis or ulcers.  Lips normal.  THROAT:  Oropharynx without lesions or exudates.  NECK:  Supple with good range of motion; no thyromegaly or masses, no JVD.  LYMPHATICS:  No cervical, supraclavicular, axillary or inguinal adenopathy.  CHEST:  Lungs clear to auscultation. Good airflow.  CARDIAC:  Regular rate and rhythm without murmurs, rubs or gallops. Normal S1,S2.  ABDOMEN:  Soft, nontender with no hepatosplenomegaly or masses.  EXTREMITIES:  No clubbing, cyanosis or edema.  NEUROLOGICAL:  Cranial Nerves II-XII grossly intact.  No focal neurological deficits.  PSYCHIATRIC:  Normal affect and mood.        RECENT LABS:    Results from last 7 days  Lab Units 09/20/18  0515 09/19/18  0501 09/18/18  0520   WBC 10*3/mm3 6.44 8.85 7.83   NEUTROS ABS 10*3/mm3 5.92 8.32* 7.28   LYMPHS ABS 10*3/mm3 0.32* 0.44* 0.31*   HEMOGLOBIN g/dL 9.7* 9.8* 9.8*   HEMATOCRIT % 29.9* 30.7* 30.6*   PLATELETS 10*3/mm3 198 232 227   CT SCAN:  CHEST: There has been interval decrease in the large confluent anterior  mediastinal mass which measures approximately 11 x 8 cm, previously 13.5  x 9.5 cm when measured along the same planes. There has been a decrease  in the mass effect and narrowing of the left upper lobe bronchus and  there has been improved aeration at the left upper lobe. The patchy left  upper lobe airspace opacities have decreased in size and density. There  are no new pulmonary opacities and there are no pleural or pericardial  effusions.     ABDOMEN/PELVIS: Borderline splenic size is stable, measuring  approximately 11 cm. The liver, gallbladder, pancreas, adrenals, and  kidneys appear  unremarkable. There is formed stool throughout the colon.  No bowel thickening is seen. The appendix appears within normal limits.  Uterus and adnexa appear unremarkable. There is no free fluid or  lymphadenopathy.       MRI brain:  There is no evidence to suggest acute intracranial pathology. However,  note is made of a hypoenhancing left inferior turbinate. The precise  etiology and significance of this finding is uncertain although this  finding can be seen in the setting of early acute invasive fungal  rhinosinusitis and correlation is suggested.        Results from last 7 days  Lab Units 09/20/18  0515 09/19/18  0501 09/18/18  0520   SODIUM mmol/L 142 144 141   POTASSIUM mmol/L 3.3* 3.3* 3.6   CHLORIDE mmol/L 103 108* 107   CO2 mmol/L 28.6 26.1 24.6   BUN mg/dL 9 7 12   CREATININE mg/dL 0.49* 0.42* 0.45*   CALCIUM mg/dL 9.2 9.1 8.9   ALBUMIN g/dL 4.10 4.10 4.00   BILIRUBIN mg/dL 0.2 0.2 <0.2   ALK PHOS U/L 43 46 49   ALT (SGPT) U/L 13 14 16   AST (SGOT) U/L 15 15 16   GLUCOSE mg/dL 116* 130* 125*           Assessment/Plan   1.  Primary mediastinal large B-cell lymphoma: The patient presented with dyspnea, cough, and chest pain.  A CT angiogram of the chest 6/8/18 showed a very large tumor mass in the mediastinum encasing multiple vascular structures and narrowing the left mainstem bronchus.  There was direct tumor infiltration in the left upper lobe.  There was a small left pleural effusion.  She underwent a CT-guided biopsy of the mediastinal mass on 6/12/18 and [facility] showed diffuse large B-cell lymphoma consistent with a primary diffuse large B-cell lymphoma.  A bone marrow exam was performed on 6/14/18 which was negative for lymphoma.  She underwent a PET scan 6/13/18 which showed a large hypermetabolic mass in the chest involving the anterior mediastinum, left hilum, nearly completely filling the left hemothorax with SUV 19.8.  There was physiologic distribution  elsewhere.     The patient was started on IV fluid hydration and allopurinol for prevention of hyperuricemia.  She initiated systemic chemotherapy with DA-EPOCH-R on 6/16/18 and completed the evening of 6/21/18.  She had a infusion reaction to rituximab but was able to complete with additional medications and otherwise tolerated chemotherapy well.  She will require additional premedications with additional rituximab.  Plan is to monitor her blood counts twice per week outpatient and proceed with cycle 2 of chemotherapy day 21.     The patient had significant improvement in shortness of breath, cough, and chest pain by the time of discharge.    · Patient received Neulasta support  · Her white count dropped down to as low as 0.8 low-grade temperature and patient was placed on Levaquin ×5 days starting June 27, 2018, neutropenia AT  11 days posttreatment.  Platelets dropped to 82.  · Her next ZOLADEX injection is due July 12.  · She is due to start chemotherapy on July 9.  · Discussed with Dr. Fox at the [facility] and his suggestion was to do the CT scan and PET scan after 2 cycles and discuss the results with him.  If she has an excellent response early on she would not require any further treatments after completion of 6 cycles of EPOCH/R  · Patient being admitted for cycle 3 of chemotherapy on July 30, 2018.  · She's status post cycle 4 of chemotherapy and CT scan has been reviewed following 4 cycles with continued response.  · She is due for ZOLADEX  injection on October 8, 2018.   · Patient is due to go for cycle 5 of chemotherapy on Monday, September 17, 2018  · Last  echocardiogram done August 22, 2018 shows ejection fraction of 59% to 60% with the eldest strain of 19.2%  · Cycle 5, day 4 chemotherapy as of September 20 , 2018 and tolerating well     2.  Pericardial effusion:   a 2-D echocardiogram 6/8/18 showed a left ventricular systolic function 58%.  There was a 2 cm pericardial effusion with no  tamponade.  The pericardium appeared thickened.  She had no evidence of volume overload or Nod throughout the hospital stay  · Repeat echo August 22 shows good ejection fraction of 59%.  No pericardial effusion seen     3.   FEN: She was monitored very carefully for any evidence of tumor lysis.  She was maintained on IV fluids and allopurinol throughout hospitalization.    her uric acid at discharge was 1.2 but I recommended that she stay on allopurinol twice daily until her next cycle of chemotherapy to prevent hyperuricemia.  She has mild hypokalemia and will be discharged on potassium 20 mEq once per day.  Have ordered an outpatient BMP weekly for monitoring of her renal function and electrolytes  · Mild hypokalemia will supplement     4.  Fertility preservation:  Patient received Zoladex injection  for fertility preservation; this should be continued once monthly during her chemotherapy.  Next dose due October 8, 2018     5.  Right upper extremity swelling:  The patient had a PIC line placed in the right upper extremity for administration of chemotherapy.  Her arm was noted to be swollen.  A Doppler ultrasound initially showed superficial thrombophlebitis.  She was on prophylactic Arixtra.  A Doppler was repeated 2 days later on 6/20/18  which showed extension of thrombus into the axillary and brachial veins.  She was therefore started on Xarelto 15 mg one every 12 hours and the PICC line was removed as soon as chemotherapy completed on 6/20/18.  She will take 15 mg of Xarelto every 12 hours for 21 days and then transition to 20 mg once a day.  The patient will have CBC performed twice a week.  If the platelet count drops below 50,000, anticoagulation will need to be temporarily held  · Patient is back on Pradaxa     6.  ID: The patient will receive Neulasta 24 hours after completion of chemotherapy for reduction of neutropenic infection.  She will be discharged on prophylactic antibiotics including acyclovir,  Diflucan, and Bactrim.  She may require bacterial prophylaxis if the ANC drops below 1000.    7.  Headache: Neurology has seen patient Patient is having mild headaches and MRI done shows questionable rhinosinusitis of the left inferior turbinate.  We will consult ENT.  Patient does not have any fevers and I doubt this is of any concern but we'll check with ENT  · Improved.  ENT seen..     Plan 1.   Continue cycle 5 , day 4 of chemotherapy with EPOCH/R on September 19, 2018. we will give Neulasta support patient has had excellent response after cycle 4.  Plan will be to complete total of 6 cycles of chemotherapy.      2.  ZOLADEX  injection is due 3.6 mg subcutaneous on October 8, 2018    3 Continue Pradaxa    4.  Patient completes chemotherapy on Friday evening and will receive Neulasta on Sunday morning.       Millie Padilla MD   9/20/2018      CC: Dr. Arina Munoz

## 2018-09-20 NOTE — NURSING NOTE
"Nursing Chemotherapy Verification    Chemotherapy Regimen:   Treatment Plans     Name Type Hold Status Plan dates Plan Provider       Active    OP Goserelin 3.6 mg Q28D ONCOLOGY SUPPORTIVE CARE 1 On Automatic Hold  6/20/2018 - Present Ramón Camp MD    IP LYMPHOMA R-EPOCH (Dose Adjusted) RiTUXimab / Etoposide / DOXOrubicin / VinCRIStine / Cyclophosphamide / PredniSONE ONCOLOGY TREATMENT Not on Hold  6/16/2018 - Present Kimberley Melgar MD                    Current height and weight: 157.5 cm (62.01\") 63.7 kg (140 lb 6.4 oz)  Calculated BSA from current height and weight (Zahira): 1.6    Relevant Labs    Results from last 7 days  Lab Units 09/20/18  0515 09/19/18  0501 09/18/18  0520   WBC 10*3/mm3 6.44 8.85 7.83   HEMOGLOBIN g/dL 9.7* 9.8* 9.8*   HEMATOCRIT % 29.9* 30.7* 30.6*   PLATELETS 10*3/mm3 198 232 227     Lab Results   Component Value Date    NEUTROABS 5.92 09/20/2018         Results from last 7 days  Lab Units 09/20/18  0515 09/19/18  0501 09/18/18  0520   CREATININE mg/dL 0.49* 0.42* 0.45*       Serum creatinine: 0.49 mg/dL (L) 09/20/18 0515  Estimated creatinine clearance: 156.4 mL/min (A)    Lab Results   Component Value Date    HEPAIGM Negative 07/13/2018    HEPAIGM Non-Reactive 07/13/2018    HEPBCAB Negative 06/14/2018       Verification attestation:  I have personally reviewed the planned regimen and its administration and dosing. I understand the potential side effects.The patient has been instructed on the regimen, potential side effects, and self care measures; the consent form has been completed. I have confirmed that the appropriate premedication, prehydration, post medication and/or emergency medications are ordered in addition to the chemotherapy.    I have independently verified that the height and weight is current and calculated the BSA. I have verified the doses with the planned regimen and have clarified any deviations with the physician Dr. Padilla . I have confirmed the route of " administration and patient IV access.    Nurse: Divine Woods, RN  2nd verification Nurse: Yelena Rubio RN

## 2018-09-21 VITALS
HEART RATE: 62 BPM | BODY MASS INDEX: 26.13 KG/M2 | SYSTOLIC BLOOD PRESSURE: 112 MMHG | OXYGEN SATURATION: 97 % | WEIGHT: 142 LBS | HEIGHT: 62 IN | TEMPERATURE: 98.6 F | DIASTOLIC BLOOD PRESSURE: 73 MMHG | RESPIRATION RATE: 16 BRPM

## 2018-09-21 LAB
ALBUMIN SERPL-MCNC: 4.3 G/DL (ref 3.5–5.2)
ALBUMIN/GLOB SERPL: 2.2 G/DL
ALP SERPL-CCNC: 43 U/L (ref 39–117)
ALT SERPL W P-5'-P-CCNC: 17 U/L (ref 1–33)
ANION GAP SERPL CALCULATED.3IONS-SCNC: 12.4 MMOL/L
ANISOCYTOSIS BLD QL: ABNORMAL
AST SERPL-CCNC: 19 U/L (ref 1–32)
BILIRUB SERPL-MCNC: 0.3 MG/DL (ref 0.1–1.2)
BUN BLD-MCNC: 10 MG/DL (ref 6–20)
BUN/CREAT SERPL: 19.2 (ref 7–25)
CALCIUM SPEC-SCNC: 9.4 MG/DL (ref 8.6–10.5)
CHLORIDE SERPL-SCNC: 102 MMOL/L (ref 98–107)
CO2 SERPL-SCNC: 28.6 MMOL/L (ref 22–29)
CREAT BLD-MCNC: 0.52 MG/DL (ref 0.57–1)
DACRYOCYTES BLD QL SMEAR: ABNORMAL
DEPRECATED RDW RBC AUTO: 57.4 FL (ref 37–54)
ERYTHROCYTE [DISTWIDTH] IN BLOOD BY AUTOMATED COUNT: 16.5 % (ref 11.7–13)
GFR SERPL CREATININE-BSD FRML MDRD: 146 ML/MIN/1.73
GFR SERPL CREATININE-BSD FRML MDRD: >150 ML/MIN/1.73
GLOBULIN UR ELPH-MCNC: 2 GM/DL
GLUCOSE BLD-MCNC: 114 MG/DL (ref 65–99)
HCT VFR BLD AUTO: 29.9 % (ref 35.6–45.5)
HGB BLD-MCNC: 9.9 G/DL (ref 11.9–15.5)
LYMPHOCYTES # BLD MANUAL: 0.17 10*3/MM3 (ref 0.9–4.8)
LYMPHOCYTES NFR BLD MANUAL: 3 % (ref 5–12)
LYMPHOCYTES NFR BLD MANUAL: 4 % (ref 19.6–45.3)
MCH RBC QN AUTO: 31.7 PG (ref 26.9–32)
MCHC RBC AUTO-ENTMCNC: 33.1 G/DL (ref 32.4–36.3)
MCV RBC AUTO: 95.8 FL (ref 80.5–98.2)
MONOCYTES # BLD AUTO: 0.13 10*3/MM3 (ref 0.2–1.2)
NEUTROPHILS # BLD AUTO: 4 10*3/MM3 (ref 1.9–8.1)
NEUTROPHILS NFR BLD MANUAL: 93 % (ref 42.7–76)
NRBC SPEC MANUAL: 2 /100 WBC (ref 0–0)
PLAT MORPH BLD: NORMAL
PLATELET # BLD AUTO: 204 10*3/MM3 (ref 140–500)
PMV BLD AUTO: 10.7 FL (ref 6–12)
POTASSIUM BLD-SCNC: 3.4 MMOL/L (ref 3.5–5.2)
PROT SERPL-MCNC: 6.3 G/DL (ref 6–8.5)
RBC # BLD AUTO: 3.12 10*6/MM3 (ref 3.9–5.2)
SODIUM BLD-SCNC: 143 MMOL/L (ref 136–145)
WBC MORPH BLD: NORMAL
WBC NRBC COR # BLD: 4.3 10*3/MM3 (ref 4.5–10.7)

## 2018-09-21 PROCEDURE — 85007 BL SMEAR W/DIFF WBC COUNT: CPT | Performed by: INTERNAL MEDICINE

## 2018-09-21 PROCEDURE — 63710000001 PREDNISONE PER 5 MG: Performed by: NURSE PRACTITIONER

## 2018-09-21 PROCEDURE — 85027 COMPLETE CBC AUTOMATED: CPT | Performed by: INTERNAL MEDICINE

## 2018-09-21 PROCEDURE — 80053 COMPREHEN METABOLIC PANEL: CPT | Performed by: NURSE PRACTITIONER

## 2018-09-21 PROCEDURE — 25010000002 ONDANSETRON PER 1 MG: Performed by: NURSE PRACTITIONER

## 2018-09-21 PROCEDURE — 25010000002 HEPARIN FLUSH (PORCINE) 100 UNIT/ML SOLUTION: Performed by: NURSE PRACTITIONER

## 2018-09-21 PROCEDURE — 25010000002 CYCLOPHOSPHAMIDE PER 100 MG: Performed by: NURSE PRACTITIONER

## 2018-09-21 PROCEDURE — 99239 HOSP IP/OBS DSCHRG MGMT >30: CPT | Performed by: INTERNAL MEDICINE

## 2018-09-21 RX ORDER — SODIUM CHLORIDE 0.9 % (FLUSH) 0.9 %
10 SYRINGE (ML) INJECTION AS NEEDED
Status: DISCONTINUED | OUTPATIENT
Start: 2018-09-21 | End: 2018-09-21 | Stop reason: HOSPADM

## 2018-09-21 RX ORDER — MAGNESIUM OXIDE 400 MG/1
400 TABLET ORAL DAILY
Qty: 30 TABLET | Refills: 1 | Status: SHIPPED | OUTPATIENT
Start: 2018-09-22 | End: 2018-11-13 | Stop reason: SDUPTHER

## 2018-09-21 RX ORDER — SODIUM CHLORIDE 0.9 % (FLUSH) 0.9 %
10 SYRINGE (ML) INJECTION AS NEEDED
Status: CANCELLED | OUTPATIENT
Start: 2018-09-21

## 2018-09-21 RX ADMIN — ACYCLOVIR 400 MG: 400 TABLET ORAL at 08:43

## 2018-09-21 RX ADMIN — PREDNISONE 100 MG: 50 TABLET ORAL at 08:43

## 2018-09-21 RX ADMIN — Medication 10 ML: at 08:42

## 2018-09-21 RX ADMIN — POTASSIUM CHLORIDE 20 MEQ: 750 CAPSULE, EXTENDED RELEASE ORAL at 08:44

## 2018-09-21 RX ADMIN — SODIUM CHLORIDE 75 ML/HR: 9 INJECTION, SOLUTION INTRAVENOUS at 07:49

## 2018-09-21 RX ADMIN — Medication 1000 MCG: at 08:43

## 2018-09-21 RX ADMIN — DABIGATRAN ETEXILATE MESYLATE 150 MG: 150 CAPSULE ORAL at 08:43

## 2018-09-21 RX ADMIN — Medication 500 UNITS: at 19:51

## 2018-09-21 RX ADMIN — SULFAMETHOXAZOLE AND TRIMETHOPRIM 160 MG: 800; 160 TABLET ORAL at 08:43

## 2018-09-21 RX ADMIN — PANTOPRAZOLE SODIUM 40 MG: 40 TABLET, DELAYED RELEASE ORAL at 07:52

## 2018-09-21 RX ADMIN — FLUCONAZOLE 400 MG: 200 TABLET ORAL at 11:05

## 2018-09-21 RX ADMIN — CYCLOPHOSPHAMIDE 1800 MG: 2 INJECTION, POWDER, FOR SOLUTION INTRAVENOUS; ORAL at 18:51

## 2018-09-21 RX ADMIN — POTASSIUM CHLORIDE 20 MEQ: 750 CAPSULE, EXTENDED RELEASE ORAL at 17:06

## 2018-09-21 RX ADMIN — ONDANSETRON 16 MG: 2 INJECTION INTRAMUSCULAR; INTRAVENOUS at 18:23

## 2018-09-21 RX ADMIN — Medication 50 MG: at 08:43

## 2018-09-21 RX ADMIN — Medication 400 MG: at 08:43

## 2018-09-21 RX ADMIN — PREDNISONE 100 MG: 50 TABLET ORAL at 17:07

## 2018-09-21 NOTE — PROGRESS NOTES
Case Management Discharge Note    Final Note: Home with parents. No dc needs identified. Family to transport per private auto.    Destination     No service has been selected for the patient.      Durable Medical Equipment     No service has been selected for the patient.      Dialysis/Infusion     No service has been selected for the patient.      Home Medical Care     No service has been selected for the patient.      Social Care     No service has been selected for the patient.             Final Discharge Disposition Code: 01 - home or self-care

## 2018-09-21 NOTE — NURSING NOTE
"Nursing Chemotherapy Verification    Chemotherapy Regimen:   Treatment Plans     Name Type Plan dates Plan Provider         Active    OP Goserelin 3.6 mg Q28D ONCOLOGY SUPPORTIVE CARE 1  6/20/2018 - Present Ramón Camp MD     IP LYMPHOMA R-EPOCH (Dose Adjusted) RiTUXimab / Etoposide / DOXOrubicin / VinCRIStine / Cyclophosphamide / PredniSONE ONCOLOGY TREATMENT  6/16/2018 - Present Kimberley Melgar MD                     Current height and weight: 157.5 cm (62.01\") 64.4 kg (142 lb)  Calculated BSA from current height and weight (Zahira):1.6    Relevant Labs    Results from last 7 days  Lab Units 09/21/18  0441 09/20/18  0515 09/19/18  0501   WBC 10*3/mm3 4.30* 6.44 8.85   HEMOGLOBIN g/dL 9.9* 9.7* 9.8*   HEMATOCRIT % 29.9* 29.9* 30.7*   PLATELETS 10*3/mm3 204 198 232     Lab Results   Component Value Date    NEUTROABS 4.00 09/21/2018         Results from last 7 days  Lab Units 09/21/18  0441 09/20/18  0515 09/19/18  0501   CREATININE mg/dL 0.52* 0.49* 0.42*       Serum creatinine: 0.52 mg/dL (L) 09/21/18 0441  Estimated creatinine clearance: 148.2 mL/min (A)    Lab Results   Component Value Date    HEPAIGM Negative 07/13/2018    HEPAIGM Non-Reactive 07/13/2018    HEPBCAB Negative 06/14/2018       Verification attestation:  I have personally reviewed the planned regimen and its administration and dosing. I understand the potential side effects.The patient has been instructed on the regimen, potential side effects, and self care measures; the consent form has been completed. I have confirmed that the appropriate premedication, prehydration, post medication and/or emergency medications are ordered in addition to the chemotherapy.    I have independently verified that the height and weight is current and calculated the BSA. I have verified the doses with the planned regimen and have clarified any deviations with the physician Dr. Padilla . I have confirmed the route of administration and patient IV " access.    Nurse: Divine Woods, RN  2nd verification Nurse: Michaelle Christian

## 2018-09-21 NOTE — DISCHARGE SUMMARY
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 female presented with mediastinal large B-cell lymphoma.  She was admitted for dose adjusted epoch/r. .  She completed all treatment from 9/17/2018 the 9/21 2018.  She had mild nausea on day 4.  Her last echocardiogram was August 22, 2018 and it was normal.  She has had good response to chemotherapy.  The plan is to complete total of 6 cycles.  In the hospital she had a little low magnesium and we are sending her home on 400 mg by mouth magnesium oxide daily.  Her potassium is also low as a result she is on KCl 20 mg by mouth twice a day.  Patient's diagnosis where the same as cycle 4 chemotherapy.  She is due to receive Zoladex on October 8, 2018 for fertility preservation.  Patient had migraine headaches and an MRI was obtained.  There was low level enhancement of the turbinate bone and neurology was not convinced there was any issue.  ENT consult was obtained and they were not impressed by this.  Patient's symptoms resolved and she didn't have any further headache.  She will be discharged today on September 21, 2018.  She will come to oncology [facility] on Sunday to get Neulasta injection      Procedures Performed         Consults:   Consults     Date and Time Order Name Status Description    9/18/2018 8934 Inpatient ENT Consult Completed     9/17/2018 1056 Inpatient Neurology Consult Completed           Pertinent Test Results: MRI brain  FINDINGS:     The ventricles, sulci, and cisterns are age appropriate. The midline  intracranial anatomy is unremarkable There are no abnormal areas of  restricted diffusion. The major intracranial flow-related signal voids  are within normal. No abnormal areas of  contrast enhancement are noted  within the brain parenchyma.     Note is made of an enhancing left inferior turbinate. The findings are  of uncertain significance. However, this type of finding can be seen as  a very early sign of acute invasive fungal rhinosinusitis and  correlation with clinical data is suggested.     IMPRESSION:     There is no evidence to suggest acute intracranial pathology. However,  note is made of a hypoenhancing left inferior turbinate. The precise  etiology and significance of this finding is uncertain although this  finding can be seen in the setting of early acute invasive fungal  rhinosinusitis and correlation is suggested.     These findings and recommendations were discussed with Linda Will  on 09/18/2018 at approximately 12:40 PM.     This report was finalized on 9/18/2018 4:39 PM by Dr. Anthony Briggs M.D.       Condition on Discharge:  Fair    Vital Signs  Temp:  [97 °F (36.1 °C)-97.8 °F (36.6 °C)] 97 °F (36.1 °C)  Heart Rate:  [59-84] 59  Resp:  [16] 16  BP: (101-114)/(60-75) 101/65    Physical Exam:     GENERAL:  Well-developed, well-nourished in no acute distress.   SKIN:  Warm, dry without rashes, purpura or petechiae.  EYES:  Pupils equal, round and reactive to light.  EOMs intact.  Conjunctivae normal.  EARS:  Hearing intact.  NOSE:  Septum midline.  No excoriations or nasal discharge.  MOUTH:  Tongue is well-papillated; no stomatitis or ulcers.  Lips normal.  THROAT:  Oropharynx without lesions or exudates.  NECK:  Supple with good range of motion; no thyromegaly or masses, no JVD.  LYMPHATICS:  No cervical, supraclavicular, axillary or inguinal adenopathy.  CHEST:  Lungs clear to auscultation. Good airflow.  CARDIAC:  Regular rate and rhythm without murmurs, rubs or gallops. Normal S1,S2.  ABDOMEN:  Soft, nontender with no hepatosplenomegaly or masses.  EXTREMITIES:  No clubbing, cyanosis or edema.  NEUROLOGICAL:  Cranial Nerves II-XII grossly intact.  No focal  neurological deficits.  PSYCHIATRIC:  Normal affect and mood.        Discharge Disposition  Home or Self Care     Discharge follow-up: Patient will come for Neulasta on September 23, 2018 at [address] [place] oncology floor.    Patient will receive CBC checks twice weekly at the office with nurse review.    Follow-up in one week with nurse practitioner and in 2 weeks with me.      Discharge Medications     Discharge Medications      New Medications      Instructions Start Date   magnesium oxide 400 MG tablet  Commonly known as:  MAG-OX   400 mg, Oral, Daily         Continue These Medications      Instructions Start Date   acyclovir 400 MG tablet  Commonly known as:  ZOVIRAX   400 mg, Oral, 2 Times Daily, Take no more than 5 doses a day.      dabigatran etexilate 150 MG capsu  Commonly known as:  PRADAXA   150 mg, Oral, Every 12 Hours Scheduled      fluconazole 200 MG tablet  Commonly known as:  DIFLUCAN   400 mg, Oral, Every 24 Hours      ondansetron 4 MG tablet  Commonly known as:  ZOFRAN   4 mg, Oral, Every 6 Hours PRN      potassium chloride 10 MEQ CR capsule  Commonly known as:  MICRO-K   20 mEq, Oral, 2 Times Daily With Meals      pyridoxine 50 MG tablet tablet  Commonly known as:  VITAMIN B-6   50 mg, Oral, Daily      sennosides-docusate sodium 8.6-50 MG tablet  Commonly known as:  SENOKOT-S   2 tablets, Oral, 2 Times Daily PRN      sulfamethoxazole-trimethoprim 800-160 MG per tablet  Commonly known as:  BACTRIM DS,SEPTRA DS   1 tablet, Oral, 3 Times Weekly      vitamin B-12 1000 MCG tablet  Commonly known as:  CYANOCOBALAMIN   1,000 mcg, Oral, Daily             Discharge Diet:  regular    Activity at Discharge:  up and about    Follow-up Appointments  Future Appointments  Date Time Provider Department Center   9/23/2018 1:00 PM INJECTION ROOM  [facility]         Test Results Pending at Discharge   hemoglobin was 9.9, white count 4.3, platelet count of 204, absolute neutrophil count of 4.0.  Potassium is 3.4 and we  will continue by mouth percussion.  BUN 10 and creatinine of 0.5 to.  Liver function tests are normal.   Uric acid 3.3, .    Millie Padilla MD  09/21/18  1:23 PM    Time:

## 2018-09-22 ENCOUNTER — READMISSION MANAGEMENT (OUTPATIENT)
Dept: CALL CENTER | Facility: HOSPITAL | Age: 23
End: 2018-09-22

## 2018-09-22 NOTE — OUTREACH NOTE
Prep Survey      Responses   Facility patient discharged from?  [facility]   Is patient eligible?  Yes   Discharge diagnosis   Mediastinal large B-cell lymphoma,  chemo   Does the patient have one of the following disease processes/diagnoses(primary or secondary)?  Other   Does the patient have Home health ordered?  No   Is there a DME ordered?  No   Prep survey completed?  Yes          Sulma Alex RN

## 2018-09-23 ENCOUNTER — INFUSION (OUTPATIENT)
Dept: ONCOLOGY | Facility: HOSPITAL | Age: 23
End: 2018-09-23

## 2018-09-23 VITALS
DIASTOLIC BLOOD PRESSURE: 57 MMHG | SYSTOLIC BLOOD PRESSURE: 88 MMHG | HEART RATE: 92 BPM | OXYGEN SATURATION: 100 % | TEMPERATURE: 97.4 F | RESPIRATION RATE: 18 BRPM

## 2018-09-23 DIAGNOSIS — C85.28 MEDIASTINAL LARGE B-CELL LYMPHOMA OF LYMPH NODES OF MULTIPLE REGIONS (HCC): Primary | ICD-10-CM

## 2018-09-23 PROCEDURE — 25010000002 PEGFILGRASTIM 6 MG/0.6ML SOLUTION PREFILLED SYRINGE: Performed by: NURSE PRACTITIONER

## 2018-09-23 PROCEDURE — 96372 THER/PROPH/DIAG INJ SC/IM: CPT

## 2018-09-23 RX ADMIN — PEGFILGRASTIM 6 MG: 6 INJECTION SUBCUTANEOUS at 15:38

## 2018-09-25 ENCOUNTER — LAB (OUTPATIENT)
Dept: LAB | Facility: HOSPITAL | Age: 23
End: 2018-09-25

## 2018-09-25 ENCOUNTER — OFFICE VISIT (OUTPATIENT)
Dept: ONCOLOGY | Facility: CLINIC | Age: 23
End: 2018-09-25

## 2018-09-25 ENCOUNTER — READMISSION MANAGEMENT (OUTPATIENT)
Dept: CALL CENTER | Facility: HOSPITAL | Age: 23
End: 2018-09-25

## 2018-09-25 VITALS
BODY MASS INDEX: 25.95 KG/M2 | HEIGHT: 62 IN | HEART RATE: 86 BPM | DIASTOLIC BLOOD PRESSURE: 62 MMHG | SYSTOLIC BLOOD PRESSURE: 90 MMHG | WEIGHT: 141 LBS | OXYGEN SATURATION: 99 % | TEMPERATURE: 99.1 F | RESPIRATION RATE: 14 BRPM

## 2018-09-25 DIAGNOSIS — D64.81 ANEMIA ASSOCIATED WITH CHEMOTHERAPY: Primary | ICD-10-CM

## 2018-09-25 DIAGNOSIS — T45.1X5A ANEMIA ASSOCIATED WITH CHEMOTHERAPY: Primary | ICD-10-CM

## 2018-09-25 DIAGNOSIS — C85.28 MEDIASTINAL LARGE B-CELL LYMPHOMA OF LYMPH NODES OF MULTIPLE REGIONS (HCC): Primary | ICD-10-CM

## 2018-09-25 LAB
BASOPHILS # BLD AUTO: 0.05 10*3/MM3 (ref 0–0.1)
BASOPHILS NFR BLD AUTO: 0.4 % (ref 0–1.1)
DEPRECATED RDW RBC AUTO: 56 FL (ref 37–49)
EOSINOPHIL # BLD AUTO: 0.02 10*3/MM3 (ref 0–0.36)
EOSINOPHIL NFR BLD AUTO: 0.2 % (ref 1–5)
ERYTHROCYTE [DISTWIDTH] IN BLOOD BY AUTOMATED COUNT: 15.7 % (ref 11.7–14.5)
HCT VFR BLD AUTO: 29.8 % (ref 34–45)
HGB BLD-MCNC: 10.2 G/DL (ref 11.5–14.9)
IMM GRANULOCYTES # BLD: 1.41 10*3/MM3 (ref 0–0.03)
IMM GRANULOCYTES NFR BLD: 11.8 % (ref 0–0.5)
LYMPHOCYTES # BLD AUTO: 0.28 10*3/MM3 (ref 1–3.5)
LYMPHOCYTES NFR BLD AUTO: 2.3 % (ref 20–49)
MCH RBC QN AUTO: 33 PG (ref 27–33)
MCHC RBC AUTO-ENTMCNC: 34.2 G/DL (ref 32–35)
MCV RBC AUTO: 96.4 FL (ref 83–97)
MONOCYTES # BLD AUTO: 0.06 10*3/MM3 (ref 0.25–0.8)
MONOCYTES NFR BLD AUTO: 0.5 % (ref 4–12)
NEUTROPHILS # BLD AUTO: 10.15 10*3/MM3 (ref 1.5–7)
NEUTROPHILS NFR BLD AUTO: 84.8 % (ref 39–75)
NRBC BLD MANUAL-RTO: 0 /100 WBC (ref 0–0)
PLATELET # BLD AUTO: 132 10*3/MM3 (ref 150–375)
PMV BLD AUTO: 11.8 FL (ref 8.9–12.1)
RBC # BLD AUTO: 3.09 10*6/MM3 (ref 3.9–5)
WBC NRBC COR # BLD: 11.97 10*3/MM3 (ref 4–10)

## 2018-09-25 PROCEDURE — 85025 COMPLETE CBC W/AUTO DIFF WBC: CPT | Performed by: NURSE PRACTITIONER

## 2018-09-25 PROCEDURE — 36415 COLL VENOUS BLD VENIPUNCTURE: CPT | Performed by: NURSE PRACTITIONER

## 2018-09-25 PROCEDURE — 99212-NC PR NO CHARGE CBC OFFICE OUTPATIENT VISIT 10 MINUTES: Performed by: NURSE PRACTITIONER

## 2018-09-25 NOTE — PROGRESS NOTES
Mrs. Durham is here today for scheduled lab and RN  review after receiving impatient  EPOCH. She was discharged on 09/21/2018.  She reports feeling well with no questions or concerns today.  Her CBC is stable.    She will return as already scheduled on 09/28/2018 for CBC with RN review, then to see Dr. Padilla as already scheduled on 10/2/2018.     Lab Results   Component Value Date    WBC 11.97 (H) 09/25/2018    HGB 10.2 (L) 09/25/2018    HCT 29.8 (L) 09/25/2018    MCV 96.4 09/25/2018     (L) 09/25/2018

## 2018-09-25 NOTE — OUTREACH NOTE
Medical Week 1 Survey      Responses   Facility patient discharged from?  [facility]   Does the patient have one of the following disease processes/diagnoses(primary or secondary)?  Other   Is there a successful TCM telephone encounter documented?  No   Week 1 attempt successful?  Yes   Call start time  1233   Call end time  1238   General alerts for this patient  lymphoma on chemo--Admitted for chemo mgt   Discharge diagnosis   Mediastinal large B-cell lymphoma,  chemo   Meds reviewed with patient/caregiver?  Yes   Is the patient having any side effects they believe may be caused by any medication additions or changes?  No   Does the patient have all medications ordered at discharge?  Yes   Is the patient taking all medications as directed (includes completed medication regime)?  Yes   Medication comments  Pt having chemo--this is her fifth round so she knows what to expect.   Does the patient have a primary care provider?   Yes   Does the patient have an appointment with their PCP within 7 days of discharge?  Yes   Has the patient kept scheduled appointments due by today?  Yes   Comments  has numerous chemo and f/u appts noted in EPIC   Has home health visited the patient within 72 hours of discharge?  N/A   Psychosocial issues?  No   Did the patient receive a copy of their discharge instructions?  Yes   Nursing interventions  Reviewed instructions with patient   What is the patient's perception of their health status since discharge?  Improving   Is the patient/caregiver able to teach back signs and symptoms related to disease process for when to call PCP?  Yes   Is the patient/caregiver able to teach back signs and symptoms related to disease process for when to call 911?  Yes   Is the patient/caregiver able to teach back the hierarchy of who to call/visit for symptoms/problems? PCP, Specialist, Home health nurse, Urgent Care, ED, 911  Yes   Additional teach back comments  Pt satets she is doing well and is not  having significant SE's from chemo at this time.   Week 1 call completed?  Yes          Izzy Francois RN

## 2018-09-28 ENCOUNTER — CLINICAL SUPPORT (OUTPATIENT)
Dept: ONCOLOGY | Facility: HOSPITAL | Age: 23
End: 2018-09-28

## 2018-09-28 ENCOUNTER — APPOINTMENT (OUTPATIENT)
Dept: LAB | Facility: HOSPITAL | Age: 23
End: 2018-09-28

## 2018-09-28 DIAGNOSIS — R59.1 LYMPHADENOPATHY: Primary | ICD-10-CM

## 2018-09-28 LAB
BASOPHILS # BLD AUTO: 0.02 10*3/MM3 (ref 0–0.1)
BASOPHILS NFR BLD AUTO: 7.1 % (ref 0–1.1)
DEPRECATED RDW RBC AUTO: 51.2 FL (ref 37–49)
EOSINOPHIL # BLD AUTO: 0 10*3/MM3 (ref 0–0.36)
EOSINOPHIL NFR BLD AUTO: 0 % (ref 1–5)
ERYTHROCYTE [DISTWIDTH] IN BLOOD BY AUTOMATED COUNT: 14.6 % (ref 11.7–14.5)
HCT VFR BLD AUTO: 27.1 % (ref 34–45)
HGB BLD-MCNC: 9.3 G/DL (ref 11.5–14.9)
IMM GRANULOCYTES # BLD: 0.03 10*3/MM3 (ref 0–0.03)
IMM GRANULOCYTES NFR BLD: 10.7 % (ref 0–0.5)
LYMPHOCYTES # BLD AUTO: 0.14 10*3/MM3 (ref 1–3.5)
LYMPHOCYTES NFR BLD AUTO: 50 % (ref 20–49)
MCH RBC QN AUTO: 32.6 PG (ref 27–33)
MCHC RBC AUTO-ENTMCNC: 34.3 G/DL (ref 32–35)
MCV RBC AUTO: 95.1 FL (ref 83–97)
MONOCYTES # BLD AUTO: 0.07 10*3/MM3 (ref 0.25–0.8)
MONOCYTES NFR BLD AUTO: 25 % (ref 4–12)
NEUTROPHILS # BLD AUTO: 0.02 10*3/MM3 (ref 1.5–7)
NEUTROPHILS NFR BLD AUTO: 7.2 % (ref 39–75)
NRBC BLD MANUAL-RTO: 0 /100 WBC (ref 0–0)
PLATELET # BLD AUTO: 56 10*3/MM3 (ref 150–375)
PMV BLD AUTO: 11.1 FL (ref 8.9–12.1)
RBC # BLD AUTO: 2.85 10*6/MM3 (ref 3.9–5)
WBC NRBC COR # BLD: 0.28 10*3/MM3 (ref 4–10)

## 2018-09-28 PROCEDURE — 36415 COLL VENOUS BLD VENIPUNCTURE: CPT | Performed by: INTERNAL MEDICINE

## 2018-09-28 PROCEDURE — 85025 COMPLETE CBC W/AUTO DIFF WBC: CPT | Performed by: INTERNAL MEDICINE

## 2018-09-28 RX ORDER — LEVOFLOXACIN 500 MG/1
500 TABLET, FILM COATED ORAL DAILY
Qty: 7 TABLET | Refills: 0 | Status: SHIPPED | OUTPATIENT
Start: 2018-09-28 | End: 2018-10-05

## 2018-09-28 NOTE — PROGRESS NOTES
Pt present for RN review. Pt states she has been tired. She is has burning when she defecates and there is some bright red blood each time. WBC 0.28, ANC 0.02, platelets 56K Temp 98.9. Reviewed symptoms and labs with Dr. Padilla. Per Dr. Padilla, pt is to hold Pradaxa for platelets less than 60.  She will take 500 mg of Levaquin for 7 days. Should the blood when deficates get worse, the pt will  Report to the ER.  She will monitor and call our office should she have a temp of 100.4 or greater. Reviewed S/S of bleeding and infection. Pt knows when to seek medical attention. Levaquin e-scribed to pt's pharmacy. Copy of labs given and Pt V/U.   Lab Results   Component Value Date    WBC 0.28 (C) 09/28/2018    HGB 9.3 (L) 09/28/2018    HCT 27.1 (L) 09/28/2018    MCV 95.1 09/28/2018    PLT 56 (L) 09/28/2018

## 2018-10-01 NOTE — PROGRESS NOTES
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 She did initiate EPOCH-R  in the hospital on 06/16/2018.    History of Present Illness      The patient is a 23 y.o. female with the above-mentioned history, with history of mediastinal large B-cell lymphoma status post 4 cycles of chemotherapy with dose adusted EPOCHR, patient has completed 5 cycles of chemotherapy.  And had a CT scan of the chest abdomen pelvis which shows continued response.  The plan is to do a total of 6 cycles of chemotherapy.  Following 6 cycles patient will receive a CT scan and PET scan 6 weeks after completion.  She is being admitted October 8, 2018.  She will see Dr Paul prior to admission.    She received Zoladex monthly and the next injection is due on October 8, 2018.    Patient states that she had a migraine headache severe with some nausea and palpitations last week but did not go to the emergency room.  She also had some blood in the stools.  She had held her Pradaxa  At that time.  Today her headache is significantly improved to a level II on a 1-10 scale and also during her last admission neurology had seen her for migraine headaches and an MRI had shown very minimal enhancement of the turbinate which is thought to be nonspecific.  Patient states that her shortness of breath had significantly improved after fourth cycle but subsequently had shortness of breath which is mild.  She did have a CT scan of the chest abdomen pelvis after cycle 4 which had shown improvement in her mediastinal mass.      Past Medical History, Past Surgical History, Social History, Family History have been reviewed and are without significant changes except as mentioned.    Oncologic history:.   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  She saw Dr. Arina Cohen on last Friday.  The reason the patient was referred to Dr. Cohen was because they thought she had cardiomegaly  on chest x-ray.  However a chest x-ray was ordered which showedThe chest x-ray showed extensive abnormal increased density throughout the anterior mediastinum extending into the left hilar region and left perihilar region and is most likely due to confluent lymphadenopathy.     CT angiogram of the chest did not show pulmonary embolism but showed     there is a large conglomerate  mass encases multiple vascular structures of the mediastinum and left  hilar region that is most likely extensive confluent lymphadenopathy.  The primary differential diagnostic considerations would be lymphoma.  The tumor posteriorly displaces the pulmonary arteries and the left and  moderately narrows the main pulmonary artery. The tumor also posteriorly  displaces the trachea and markedly narrows the left mainstem bronchus.  The pulmonary veins in the left are also encased and narrowed. The mass  encases and markedly narrows the SVC. The large edy mass measures  approximately 19.8 x 13.7 x 14.0 cm in diameter. Enlarged lymph nodes  are also present in the left supraclavicular region where they appear to  produce occlusion of the left internal jugular vein. There is no  axillary lymphadenopathy. There are no filling defects within the  pulmonary arteries. There is no CT evidence of pulmonary embolism. There  is a small left pleural effusion. A small pericardial effusion measuring  8 mm in maximum thickness is also present. There is compressive  atelectasis of the left lung. Patchy airspace opacities are also present  in the superior aspect of the left upper lobe. These are most likely due  to direct tumor infiltration of the lung parenchyma, but could also  represent postobstructive pneumonia. The right lung is well expanded and  clear. Images through the upper abdomen partially show what could be a  edy mass in the retroperitoneum posterior and inferior to the  pancreas. Further evaluation with a CT scan of the abdomen and pelvis  is  recommended. Bone window images demonstrate patchy sclerosis in the C7  and T1 and T2 and T3 and T4 vertebral body suspicious for bone  involvement with lymphoma.     IMPRESSION:  Very large tumor mass in the mediastinum encasing multiple  vascular structures and also moderately narrowing the left mainstem  bronchus as described in more detail above. Direct tumor infiltration of  the left upper lobe is also suspected. Small left pleural effusion and a  small pericardial effusion. There may also be partially visualized  lymphadenopathy in the upper abdomen. Patchy areas of sclerosis are also  noted in the C7 and T1 and T2 and T3 and T4 vertebral bodies suspicious  for osseous metastatic disease. The findings are consistent with  Lymphoma.     Dr. Cohen felt that she had a small pericardial effusion.  Patient has been symptomatic with cough which is worsening which started back in February 2018 and worsened over the last 4 months.  Patient also has some shortness of breath with walking up the steps.  She has not lost weight.  She say she is reasonable appetite.  She does have fever kind of low-grade fever and night sweats.  She is more fatigued compared to before.  She was referred to us because of concern that this could be lymphoma.  She does not have any tissue diagnosis yet.  Patient does complain of back pain.    Echo done on admission June 12, 2018 by Dr. Fitzgerald showed that the patient's posterior and appears large primary leukemia the left ventricle and apex.  There is no tamponade.  The pericardium appears thickened pointing to involvement of the pericardium into the mediastinal process.  Dr. Fitzgerald felt that it would be difficult to do pericardial synthesis as the mediastinal mass covers the anterior aspect of the heart.  Ejection fraction is 58%  CT-guided needle biopsy June 12, 2018 was negative  LDH is elevated.  Uric acid is high.  MRI thoracic spine, negative  CT abdomen pelvis negative  Scan July 13,  2018 shows large infiltrative edy mass in the anterior mediastinum and left hilar region that nearly completely fills the left hemithorax and shows intense hypermetabolism with an SUV of 19.8.  No foci of pathologic hypermetabolism are identified elsewhere in the chest, neck abdomen or pelvis.    Pathology was consistent with primary mediastinal large B-cell lymphoma.    Patient was seen by Dr. Melgar.  He discussed harvesting eggs versus Zoladex plus oral contraceptives versus Zoladex alone for fertility preservation.  Due to large size of the tumor and rapid initiation of treatment needed the patient was not able to have aches harvested done.  Because she is at increased risk of thrombosis with the use of oral contraceptives secondary to malignancy he recommended Zoladex alone.  Patient received first dose of Zoladex 3.6 mg subcutaneous on Belia 15, 2018.    Patient started on EPOCH/R on June 16, 2018    Patient had a reaction to Rituxan which improved with steroids, Benadryl and Pepcid.  She had to receive it slow.  She started having itching over her EARS , sore throat.  She was given IV steroids Benadryl and Pepcid and following that she was started at a slower rate and she completed it.    Right upper extremity Doppler ultrasound showed new superficial vein thrombosis around the PICC line with no extension into the deep system.  Repeat Doppler was ordered.    A Doppler ultrasound initially showed superficial thrombophlebitis.  She was on prophylactic Arixtra.  A Doppler was repeated 2 days later on 6/20/18  which showed extension of thrombus into the axillary and brachial veins.  She was therefore started on Xarelto 15 mg one every 12 hours and the PICC line was removed as soon as chemotherapy completed on 6/20/18.  She will take 15 mg of Xarelto every 12 hours for 21 days and then transition to 20 mg once a day.  The patient will have CBC performed twice a week.  If the platelet count drops below 50,000,  "anticoagulation will need to be temporarily held  Patient was started on acyclovir/fluconazole/Bactrim  Bone marrow done June 14, 2018, morphology was negative for lymphoma    Fertility preservation, Zoladex is next due July 12, 2018.  CT scan and PET scan showing significant response after cycle 2 of chemotherapy  Next dose of Lupron August 13, 2018  Status post 5 cycles  OF epoch/r and is due cycle 6  On oct 8th.    Review of Systems   Constitutional: Positive for fatigue. Negative for activity change, appetite change, chills and fever.   HENT: Negative for mouth sores.    Eyes: Negative for visual disturbance.   Respiratory: Negative for cough (improved) and shortness of breath (improved).    Cardiovascular: Negative.    Gastrointestinal: Negative.  Negative for abdominal pain, diarrhea, nausea and vomiting.   Endocrine: Negative.    Genitourinary: Negative for dysuria, frequency and menstrual problem.   Musculoskeletal: Negative for arthralgias, back pain, joint swelling and myalgias.   Skin: Negative.    Allergic/Immunologic: Negative.    Neurological: Negative.  Negative for dizziness and weakness.   Hematological: Negative.  Does not bruise/bleed easily.   Psychiatric/Behavioral: The patient is not nervous/anxious.    All other systems reviewed and are negative.  Patient complains of migraine headaches.    Medications:  The current medication list was reviewed in the EMR    ALLERGIES:  No Known Allergies    Objective      Vitals:    10/02/18 1552   BP: 92/60   Pulse: 71   Resp: 16   Temp: 98.8 °F (37.1 °C)   TempSrc: Oral   SpO2: 98%   Weight: 64.1 kg (141 lb 6.4 oz)   Height: 157.5 cm (62.01\")   PainSc: 2  Comment: Neck and mid back pain.Nausea with a migraine.   PainLoc: Neck     Current Status 10/2/2018   ECOG score 0       Physical Exam       GENERAL:  Well-developed, well-nourished in no acute distress.   SKIN:  Warm, dry without rashes, purpura or petechiae.  EYES:  Pupils equal, round and reactive to " light.  EOMs intact.  Conjunctivae normal.  EARS:  Hearing intact.  NOSE:  Septum midline.  No excoriations or nasal discharge.  MOUTH:  Tongue is well-papillated; no stomatitis or ulcers.  Lips normal.  THROAT:  Oropharynx without lesions or exudates.  NECK:  Supple with good range of motion; no thyromegaly or masses, no JVD.  LYMPHATICS:  No cervical, supraclavicular, axillary or inguinal adenopathy.  CHEST:  Lungs clear to auscultation. Good airflow.  CARDIAC:  Regular rate and rhythm without murmurs, rubs or gallops. Normal S1,S2.  ABDOMEN:  Soft, nontender with no hepatosplenomegaly or masses.  EXTREMITIES:  No clubbing, cyanosis or edema.  NEUROLOGICAL:  Cranial Nerves II-XII grossly intact.  No focal neurological deficits.  PSYCHIATRIC:  Normal affect and mood.        RECENT LABS:    Results from last 7 days  Lab Units 10/02/18  1550 09/28/18  1458   WBC 10*3/mm3 10.34* 0.28*   NEUTROS ABS 10*3/mm3 4.43 0.02*   HEMOGLOBIN g/dL 8.8* 9.3*   HEMATOCRIT % 26.0* 27.1*   PLATELETS 10*3/mm3 99* 56*         Assessment/Plan   1.  Primary mediastinal large B-cell lymphoma: The patient presented with dyspnea, cough, and chest pain.  A CT angiogram of the chest 6/8/18 showed a very large tumor mass in the mediastinum encasing multiple vascular structures and narrowing the left mainstem bronchus.  There was direct tumor infiltration in the left upper lobe.  There was a small left pleural effusion.  She underwent a CT-guided biopsy of the mediastinal mass on 6/12/18 and pathology reviewed at [facility] oncology showed diffuse large B-cell lymphoma consistent with a primary diffuse large B-cell lymphoma.  A bone marrow exam was performed on 6/14/18 which was negative for lymphoma.  She underwent a PET scan 6/13/18 which showed a large hypermetabolic mass in the chest involving the anterior mediastinum, left hilum, nearly completely filling the left hemothorax with SUV 19.8.  There was physiologic distribution  elsewhere.     The patient was started on IV fluid hydration and allopurinol for prevention of hyperuricemia.  She initiated systemic chemotherapy with DA-EPOCH-R on 6/16/18 and completed the evening of 6/21/18.  She had a infusion reaction to rituximab but was able to complete with additional medications and otherwise tolerated chemotherapy well.  She will require additional premedications with additional rituximab.  Plan is to monitor her blood counts twice per week outpatient and proceed with cycle 2 of chemotherapy day 21.     The patient had significant improvement in shortness of breath, cough, and chest pain by the time of discharge.    · Patient received Neulasta support  · Her white count dropped down to as low as 0.8 low-grade temperature and patient was placed on Levaquin ×5 days starting June 27, 2018, neutropenia AT  11 days posttreatment.  Platelets dropped to 82.  · Her next ZOLADEX injection is due July 12.  · She is due to start chemotherapy on July 9.  · Discussed with Dr. Fox at the [facility] and his suggestion was to do the CT scan and PET scan after 2 cycles and discuss the results with him.  If she has an excellent response early on she would not require any further treatments after completion of 6 cycles of EPOCH/R  · Patient being admitted for cycle 3 of chemotherapy on July 30, 2018.  · She's status post cycle 4 of chemotherapy and CT scan has been reviewed following 4 cycles with continued response.  · She is due for ZOLADEX  injection on October 8, 2018.   · Patient is due to go for cycle 5 of chemotherapy on Monday, September 17, 2018  · Asked echocardiogram done August 22, 2018 shows ejection fraction of 59% to 60% with the eldest strain of 19.2%  · Cycle 6 chemotherapy with dose adjusted EPOCH/R on October 8, 2018.     2.  Pericardial effusion:   a 2-D echocardiogram 6/8/18 showed a left ventricular systolic function 58%.  There was a 2 cm pericardial effusion with no  tamponade.  The pericardium appeared thickened.  She had no evidence of volume overload.     3.   FEN: She was monitored very carefully for any evidence of tumor lysis.  She was maintained on IV fluids and allopurinol throughout hospitalization.    her uric acid at discharge was 1.2 but I recommended that she stay on allopurinol twice daily until her next cycle of chemotherapy to prevent hyperuricemia.  She has mild hypokalemia and will be discharged on potassium 20 mEq once per day.  Have ordered an outpatient BMP weekly for monitoring of her renal function and electrolytes  · Potassium is chronically slightly low.  Requires 20 mg by mouth daily     4.  Fertility preservation:  Patient received Zoladex injection  for fertility preservation; this should be continued once monthly during her chemotherapy.    · Next dose of Zoladex due October 8, 2018, needs to receive when admitted admitted     5.  Right upper extremity DVT on Pradaxa.     6.  ID: The patient will receive Neulasta 24 hours after completion of chemotherapy for reduction of neutropenic infection.  She will be discharged on prophylactic antibiotics including acyclovir, Diflucan, and Bactrim.  She may require bacterial prophylaxis if the ANC drops below 1000.    7.  Migraine headaches, patient had it as a child but recently after cycle 4 patient developed migraine headaches and was seen by neurology when admitted with cycle 5.  MRI showed mild enhancement of the turbinate but was nonspecific.    · Given migraine headache has gotten worse, could reconsult neurology next week when admitted    8.  Shortness of breath with palpitations, could obtain repeat echocardiogram and if persistent palpitations , consult cardiology.    Plan 1.   admit for cycle 6 of chemotherapy with EPOCH/R on October 8, 2018.       2.  ZOLADEX  injection is due 3.6 mg subcutaneous on October 8, 2018    3 Continue Pradaxa    4.  Will schedule echocardiogram prior to her next  appointment    5.  Patient will require twice weekly CBC check when she is discharged    6.  Obtain CT scans of the neck chest abdomen pelvis at 3 weeks following chemotherapy and PET scan at 6 weeks    7.  Follow-up October 22 with me at 1 PM, 2 unit appointment    8.  Dr. Fox at [facility] to see her after PET scan obtained    9.  Consult cardiology for intermittent palpitations    10.  Obtain neurology consult  for migraine headaches.     Millie Padilla MD   10/2/2018      CC: Dr. Arina Munoz

## 2018-10-02 ENCOUNTER — APPOINTMENT (OUTPATIENT)
Dept: OTHER | Facility: HOSPITAL | Age: 23
End: 2018-10-02

## 2018-10-02 ENCOUNTER — LAB (OUTPATIENT)
Dept: LAB | Facility: HOSPITAL | Age: 23
End: 2018-10-02

## 2018-10-02 ENCOUNTER — OFFICE VISIT (OUTPATIENT)
Dept: ONCOLOGY | Facility: CLINIC | Age: 23
End: 2018-10-02

## 2018-10-02 VITALS
TEMPERATURE: 98.8 F | RESPIRATION RATE: 16 BRPM | BODY MASS INDEX: 26.02 KG/M2 | WEIGHT: 141.4 LBS | SYSTOLIC BLOOD PRESSURE: 92 MMHG | DIASTOLIC BLOOD PRESSURE: 60 MMHG | HEIGHT: 62 IN | HEART RATE: 71 BPM | OXYGEN SATURATION: 98 %

## 2018-10-02 DIAGNOSIS — G43.819 OTHER MIGRAINE WITHOUT STATUS MIGRAINOSUS, INTRACTABLE: Primary | ICD-10-CM

## 2018-10-02 DIAGNOSIS — R59.1 LYMPHADENOPATHY: Primary | ICD-10-CM

## 2018-10-02 DIAGNOSIS — C85.28 MEDIASTINAL LARGE B-CELL LYMPHOMA OF LYMPH NODES OF MULTIPLE REGIONS (HCC): ICD-10-CM

## 2018-10-02 PROBLEM — C83.30 DIFFUSE LARGE B-CELL LYMPHOMA (HCC): Status: RESOLVED | Noted: 2018-07-09 | Resolved: 2018-10-02

## 2018-10-02 PROBLEM — C85.90 LYMPHOMA: Status: RESOLVED | Noted: 2018-07-30 | Resolved: 2018-10-02

## 2018-10-02 LAB
BASOPHILS # BLD AUTO: 0.12 10*3/MM3 (ref 0–0.1)
BASOPHILS NFR BLD AUTO: 1.2 % (ref 0–1.1)
DEPRECATED RDW RBC AUTO: 53.9 FL (ref 37–49)
EOSINOPHIL # BLD AUTO: 0.04 10*3/MM3 (ref 0–0.36)
EOSINOPHIL NFR BLD AUTO: 0.4 % (ref 1–5)
ERYTHROCYTE [DISTWIDTH] IN BLOOD BY AUTOMATED COUNT: 15.4 % (ref 11.7–14.5)
HCT VFR BLD AUTO: 26 % (ref 34–45)
HGB BLD-MCNC: 8.8 G/DL (ref 11.5–14.9)
IMM GRANULOCYTES # BLD: 3.07 10*3/MM3 (ref 0–0.03)
IMM GRANULOCYTES NFR BLD: 29.7 % (ref 0–0.5)
LYMPHOCYTES # BLD AUTO: 0.68 10*3/MM3 (ref 1–3.5)
LYMPHOCYTES NFR BLD AUTO: 6.6 % (ref 20–49)
MCH RBC QN AUTO: 33.2 PG (ref 27–33)
MCHC RBC AUTO-ENTMCNC: 33.8 G/DL (ref 32–35)
MCV RBC AUTO: 98.1 FL (ref 83–97)
MONOCYTES # BLD AUTO: 2 10*3/MM3 (ref 0.25–0.8)
MONOCYTES NFR BLD AUTO: 19.3 % (ref 4–12)
NEUTROPHILS # BLD AUTO: 4.43 10*3/MM3 (ref 1.5–7)
NEUTROPHILS NFR BLD AUTO: 42.8 % (ref 39–75)
NRBC BLD MANUAL-RTO: 1.2 /100 WBC (ref 0–0)
PLATELET # BLD AUTO: 99 10*3/MM3 (ref 150–375)
PMV BLD AUTO: 13.3 FL (ref 8.9–12.1)
RBC # BLD AUTO: 2.65 10*6/MM3 (ref 3.9–5)
WBC NRBC COR # BLD: 10.34 10*3/MM3 (ref 4–10)

## 2018-10-02 PROCEDURE — 85025 COMPLETE CBC W/AUTO DIFF WBC: CPT | Performed by: INTERNAL MEDICINE

## 2018-10-02 PROCEDURE — 36415 COLL VENOUS BLD VENIPUNCTURE: CPT | Performed by: INTERNAL MEDICINE

## 2018-10-02 PROCEDURE — 99215 OFFICE O/P EST HI 40 MIN: CPT | Performed by: INTERNAL MEDICINE

## 2018-10-02 RX ORDER — PYRIDOXINE HCL (VITAMIN B6) 50 MG
50 TABLET ORAL DAILY
Qty: 30 TABLET | Refills: 6 | Status: SHIPPED | OUTPATIENT
Start: 2018-10-02 | End: 2019-05-07

## 2018-10-03 ENCOUNTER — DOCUMENTATION (OUTPATIENT)
Dept: OTHER | Facility: HOSPITAL | Age: 23
End: 2018-10-03

## 2018-10-03 NOTE — PROGRESS NOTES
Oncology Social Work    OSW met with patient and her mother in the cancer resource center.  Went over financial assistance forms -  [employer], [facility], [employer] [facility] and [employer] [facility] Financial Aid Packet.    OSW faxed application and bill to [employer] -  Awaiting decision on how much they can assist with patient's medical bills.    OSW created a patient portal on [facility] website and applied for practical assistance through their new [facility] Patient Need Fund ( asked for assistance with care insurance, transportation, food etc ).    Explained to patient and her mother that they would need to include their household income on the [employer] application if they claim their daughter on their taxes.  Would also need W2 forms.  Explained that getting this application in now sooner rather than later would be good considering their medical bills have been sent to collections.    This financial burden has caused significant anxiety and stress.    OSW will cont to follow and assist.  Thank you,  Faina Santiago, MSSW, CSW, OSW-C

## 2018-10-05 ENCOUNTER — READMISSION MANAGEMENT (OUTPATIENT)
Dept: CALL CENTER | Facility: HOSPITAL | Age: 23
End: 2018-10-05

## 2018-10-05 NOTE — OUTREACH NOTE
Medical Week 2 Survey      Responses   Facility patient discharged from?  [place]   Does the patient have one of the following disease processes/diagnoses(primary or secondary)?  Other   Week 2 attempt successful?  Yes   Call start time  0800   Revoke  Decline to participate [She has everything she needs. She is very knowledgeable ]   Call end time  0802          Zenaida Pendleton RN

## 2018-10-08 ENCOUNTER — HOSPITAL ENCOUNTER (INPATIENT)
Facility: HOSPITAL | Age: 23
LOS: 4 days | Discharge: HOME | End: 2018-10-12
Attending: INTERNAL MEDICINE | Admitting: INTERNAL MEDICINE

## 2018-10-08 ENCOUNTER — OFFICE VISIT (OUTPATIENT)
Dept: ONCOLOGY | Facility: CLINIC | Age: 23
End: 2018-10-08

## 2018-10-08 ENCOUNTER — PREP FOR SURGERY (OUTPATIENT)
Dept: OTHER | Facility: HOSPITAL | Age: 23
End: 2018-10-08

## 2018-10-08 ENCOUNTER — LAB (OUTPATIENT)
Dept: LAB | Facility: HOSPITAL | Age: 23
End: 2018-10-08

## 2018-10-08 VITALS
HEIGHT: 62 IN | HEART RATE: 78 BPM | OXYGEN SATURATION: 98 % | RESPIRATION RATE: 16 BRPM | WEIGHT: 142.4 LBS | DIASTOLIC BLOOD PRESSURE: 58 MMHG | BODY MASS INDEX: 26.2 KG/M2 | SYSTOLIC BLOOD PRESSURE: 96 MMHG | TEMPERATURE: 98.5 F

## 2018-10-08 DIAGNOSIS — C85.28 MEDIASTINAL LARGE B-CELL LYMPHOMA OF LYMPH NODES OF MULTIPLE REGIONS (HCC): Primary | ICD-10-CM

## 2018-10-08 DIAGNOSIS — C85.28 MEDIASTINAL LARGE B-CELL LYMPHOMA OF LYMPH NODES OF MULTIPLE REGIONS (HCC): ICD-10-CM

## 2018-10-08 PROBLEM — C85.29: Status: ACTIVE | Noted: 2018-10-08

## 2018-10-08 LAB
ALBUMIN SERPL-MCNC: 4.2 G/DL (ref 3.5–5.2)
ALBUMIN/GLOB SERPL: 1.8 G/DL
ALP SERPL-CCNC: 51 U/L (ref 39–117)
ALT SERPL W P-5'-P-CCNC: 15 U/L (ref 1–33)
ANION GAP SERPL CALCULATED.3IONS-SCNC: 12.9 MMOL/L
AST SERPL-CCNC: 18 U/L (ref 1–32)
BASOPHILS # BLD AUTO: 0.03 10*3/MM3 (ref 0–0.1)
BASOPHILS NFR BLD AUTO: 0.5 % (ref 0–1.1)
BILIRUB SERPL-MCNC: <0.2 MG/DL (ref 0.1–1.2)
BUN BLD-MCNC: 10 MG/DL (ref 6–20)
BUN/CREAT SERPL: 19.2 (ref 7–25)
CALCIUM SPEC-SCNC: 9.4 MG/DL (ref 8.6–10.5)
CHLORIDE SERPL-SCNC: 103 MMOL/L (ref 98–107)
CO2 SERPL-SCNC: 25.1 MMOL/L (ref 22–29)
CREAT BLD-MCNC: 0.52 MG/DL (ref 0.57–1)
DEPRECATED RDW RBC AUTO: 55.4 FL (ref 37–49)
EOSINOPHIL # BLD AUTO: 0.07 10*3/MM3 (ref 0–0.36)
EOSINOPHIL NFR BLD AUTO: 1.2 % (ref 1–5)
ERYTHROCYTE [DISTWIDTH] IN BLOOD BY AUTOMATED COUNT: 15.8 % (ref 11.7–14.5)
GFR SERPL CREATININE-BSD FRML MDRD: 146 ML/MIN/1.73
GFR SERPL CREATININE-BSD FRML MDRD: >150 ML/MIN/1.73
GLOBULIN UR ELPH-MCNC: 2.3 GM/DL
GLUCOSE BLD-MCNC: 98 MG/DL (ref 65–99)
HCT VFR BLD AUTO: 28.5 % (ref 34–45)
HGB BLD-MCNC: 9.7 G/DL (ref 11.5–14.9)
IMM GRANULOCYTES # BLD: 0.63 10*3/MM3 (ref 0–0.03)
IMM GRANULOCYTES NFR BLD: 10.6 % (ref 0–0.5)
LYMPHOCYTES # BLD AUTO: 0.47 10*3/MM3 (ref 1–3.5)
LYMPHOCYTES NFR BLD AUTO: 7.9 % (ref 20–49)
MCH RBC QN AUTO: 33.6 PG (ref 27–33)
MCHC RBC AUTO-ENTMCNC: 34 G/DL (ref 32–35)
MCV RBC AUTO: 98.6 FL (ref 83–97)
MONOCYTES # BLD AUTO: 1.13 10*3/MM3 (ref 0.25–0.8)
MONOCYTES NFR BLD AUTO: 19.1 % (ref 4–12)
NEUTROPHILS # BLD AUTO: 3.6 10*3/MM3 (ref 1.5–7)
NEUTROPHILS NFR BLD AUTO: 60.7 % (ref 39–75)
NRBC BLD MANUAL-RTO: 0.3 /100 WBC (ref 0–0)
PLATELET # BLD AUTO: 201 10*3/MM3 (ref 150–375)
PMV BLD AUTO: 11.8 FL (ref 8.9–12.1)
POTASSIUM BLD-SCNC: 3.8 MMOL/L (ref 3.5–5.2)
PROT SERPL-MCNC: 6.5 G/DL (ref 6–8.5)
RBC # BLD AUTO: 2.89 10*6/MM3 (ref 3.9–5)
SODIUM BLD-SCNC: 141 MMOL/L (ref 136–145)
WBC NRBC COR # BLD: 5.93 10*3/MM3 (ref 4–10)

## 2018-10-08 PROCEDURE — 25010000002 ETOPOSIDE 100 MG/5ML SOLUTION 25 ML VIAL: Performed by: INTERNAL MEDICINE

## 2018-10-08 PROCEDURE — 25010000002 RITUXIMAB 10 MG/ML SOLUTION 50 ML VIAL: Performed by: INTERNAL MEDICINE

## 2018-10-08 PROCEDURE — 25010000002 ONDANSETRON PER 1 MG: Performed by: INTERNAL MEDICINE

## 2018-10-08 PROCEDURE — 25010000002 DOXORUBICIN PER 10 MG: Performed by: INTERNAL MEDICINE

## 2018-10-08 PROCEDURE — 25010000002 RITUXIMAB 10 MG/ML SOLUTION 10 ML VIAL: Performed by: INTERNAL MEDICINE

## 2018-10-08 PROCEDURE — 25010000002 VINCRISTINE PER 1 MG: Performed by: INTERNAL MEDICINE

## 2018-10-08 PROCEDURE — 80053 COMPREHEN METABOLIC PANEL: CPT | Performed by: INTERNAL MEDICINE

## 2018-10-08 PROCEDURE — 99223 1ST HOSP IP/OBS HIGH 75: CPT | Performed by: INTERNAL MEDICINE

## 2018-10-08 PROCEDURE — 85025 COMPLETE CBC W/AUTO DIFF WBC: CPT | Performed by: INTERNAL MEDICINE

## 2018-10-08 PROCEDURE — 99253 IP/OBS CNSLTJ NEW/EST LOW 45: CPT | Performed by: PSYCHIATRY & NEUROLOGY

## 2018-10-08 PROCEDURE — 63710000001 PREDNISONE PER 5 MG: Performed by: INTERNAL MEDICINE

## 2018-10-08 PROCEDURE — 36415 COLL VENOUS BLD VENIPUNCTURE: CPT | Performed by: INTERNAL MEDICINE

## 2018-10-08 PROCEDURE — 25010000002 DIPHENHYDRAMINE PER 50 MG: Performed by: INTERNAL MEDICINE

## 2018-10-08 RX ORDER — FAMOTIDINE 10 MG/ML
20 INJECTION, SOLUTION INTRAVENOUS AS NEEDED
Status: CANCELLED | OUTPATIENT
Start: 2018-10-08

## 2018-10-08 RX ORDER — ACETAMINOPHEN 325 MG/1
650 TABLET ORAL EVERY 4 HOURS PRN
Status: CANCELLED | OUTPATIENT
Start: 2018-10-08

## 2018-10-08 RX ORDER — AMITRIPTYLINE HYDROCHLORIDE 10 MG/1
10 TABLET, FILM COATED ORAL NIGHTLY
Status: DISCONTINUED | OUTPATIENT
Start: 2018-10-08 | End: 2018-10-12 | Stop reason: HOSPADM

## 2018-10-08 RX ORDER — ACYCLOVIR 400 MG/1
400 TABLET ORAL 2 TIMES DAILY
Status: DISCONTINUED | OUTPATIENT
Start: 2018-10-08 | End: 2018-10-12 | Stop reason: HOSPADM

## 2018-10-08 RX ORDER — PREDNISONE 20 MG/1
100 TABLET ORAL 2 TIMES DAILY WITH MEALS
Status: CANCELLED | OUTPATIENT
Start: 2018-10-08

## 2018-10-08 RX ORDER — ACETAMINOPHEN 325 MG/1
650 TABLET ORAL EVERY 4 HOURS PRN
Status: DISCONTINUED | OUTPATIENT
Start: 2018-10-08 | End: 2018-10-12 | Stop reason: HOSPADM

## 2018-10-08 RX ORDER — SODIUM CHLORIDE 9 MG/ML
75 INJECTION, SOLUTION INTRAVENOUS CONTINUOUS
Status: DISCONTINUED | OUTPATIENT
Start: 2018-10-08 | End: 2018-10-12 | Stop reason: HOSPADM

## 2018-10-08 RX ORDER — SODIUM CHLORIDE 9 MG/ML
75 INJECTION, SOLUTION INTRAVENOUS CONTINUOUS
Status: CANCELLED
Start: 2018-10-08

## 2018-10-08 RX ORDER — MEPERIDINE HYDROCHLORIDE 50 MG/ML
25 INJECTION INTRAMUSCULAR; INTRAVENOUS; SUBCUTANEOUS
Status: ACTIVE | OUTPATIENT
Start: 2018-10-08 | End: 2018-10-09

## 2018-10-08 RX ORDER — FLUCONAZOLE 200 MG/1
400 TABLET ORAL 2 TIMES DAILY
COMMUNITY
Start: 2018-10-02 | End: 2018-12-20

## 2018-10-08 RX ORDER — DIPHENHYDRAMINE HYDROCHLORIDE 50 MG/ML
50 INJECTION INTRAMUSCULAR; INTRAVENOUS AS NEEDED
Status: DISCONTINUED | OUTPATIENT
Start: 2018-10-08 | End: 2018-10-12 | Stop reason: HOSPADM

## 2018-10-08 RX ORDER — ONDANSETRON 4 MG/1
4 TABLET, FILM COATED ORAL EVERY 6 HOURS PRN
Status: DISCONTINUED | OUTPATIENT
Start: 2018-10-08 | End: 2018-10-12 | Stop reason: HOSPADM

## 2018-10-08 RX ORDER — CHOLECALCIFEROL (VITAMIN D3) 125 MCG
1000 CAPSULE ORAL DAILY
Status: DISCONTINUED | OUTPATIENT
Start: 2018-10-08 | End: 2018-10-12 | Stop reason: HOSPADM

## 2018-10-08 RX ORDER — SENNA AND DOCUSATE SODIUM 50; 8.6 MG/1; MG/1
2 TABLET, FILM COATED ORAL 2 TIMES DAILY PRN
Status: DISCONTINUED | OUTPATIENT
Start: 2018-10-08 | End: 2018-10-12 | Stop reason: HOSPADM

## 2018-10-08 RX ORDER — DIPHENHYDRAMINE HYDROCHLORIDE 50 MG/ML
50 INJECTION INTRAMUSCULAR; INTRAVENOUS AS NEEDED
Status: CANCELLED | OUTPATIENT
Start: 2018-10-08

## 2018-10-08 RX ORDER — LANOLIN ALCOHOL/MO/W.PET/CERES
50 CREAM (GRAM) TOPICAL DAILY
Status: DISCONTINUED | OUTPATIENT
Start: 2018-10-08 | End: 2018-10-12 | Stop reason: HOSPADM

## 2018-10-08 RX ORDER — MAGNESIUM OXIDE 400 MG/1
400 TABLET ORAL DAILY
Status: DISCONTINUED | OUTPATIENT
Start: 2018-10-08 | End: 2018-10-12 | Stop reason: HOSPADM

## 2018-10-08 RX ORDER — ALLOPURINOL 300 MG/1
300 TABLET ORAL 2 TIMES DAILY
Status: ON HOLD | COMMUNITY
End: 2018-10-08

## 2018-10-08 RX ORDER — POTASSIUM CHLORIDE 750 MG/1
20 CAPSULE, EXTENDED RELEASE ORAL DAILY
Status: DISCONTINUED | OUTPATIENT
Start: 2018-10-08 | End: 2018-10-12 | Stop reason: HOSPADM

## 2018-10-08 RX ORDER — SULFAMETHOXAZOLE AND TRIMETHOPRIM 800; 160 MG/1; MG/1
1 TABLET ORAL 3 TIMES WEEKLY
Status: DISCONTINUED | OUTPATIENT
Start: 2018-10-08 | End: 2018-10-12 | Stop reason: HOSPADM

## 2018-10-08 RX ORDER — FAMOTIDINE 10 MG/ML
20 INJECTION, SOLUTION INTRAVENOUS AS NEEDED
Status: DISCONTINUED | OUTPATIENT
Start: 2018-10-08 | End: 2018-10-12 | Stop reason: HOSPADM

## 2018-10-08 RX ORDER — PANTOPRAZOLE SODIUM 40 MG/1
40 TABLET, DELAYED RELEASE ORAL
Status: COMPLETED | OUTPATIENT
Start: 2018-10-08 | End: 2018-10-12

## 2018-10-08 RX ORDER — MEPERIDINE HYDROCHLORIDE 50 MG/ML
25 INJECTION INTRAMUSCULAR; INTRAVENOUS; SUBCUTANEOUS
Status: CANCELLED | OUTPATIENT
Start: 2018-10-08 | End: 2018-10-09

## 2018-10-08 RX ORDER — PREDNISONE 50 MG/1
100 TABLET ORAL 2 TIMES DAILY WITH MEALS
Status: DISCONTINUED | OUTPATIENT
Start: 2018-10-08 | End: 2018-10-12 | Stop reason: HOSPADM

## 2018-10-08 RX ORDER — ACYCLOVIR 400 MG/1
400 TABLET ORAL 2 TIMES DAILY
COMMUNITY
End: 2019-05-07

## 2018-10-08 RX ORDER — DABIGATRAN ETEXILATE 150 MG/1
150 CAPSULE ORAL EVERY 12 HOURS SCHEDULED
Status: DISCONTINUED | OUTPATIENT
Start: 2018-10-08 | End: 2018-10-12 | Stop reason: HOSPADM

## 2018-10-08 RX ORDER — PANTOPRAZOLE SODIUM 40 MG/1
40 TABLET, DELAYED RELEASE ORAL
Status: CANCELLED | OUTPATIENT
Start: 2018-10-08

## 2018-10-08 RX ORDER — FLUCONAZOLE 200 MG/1
400 TABLET ORAL DAILY
Status: DISCONTINUED | OUTPATIENT
Start: 2018-10-08 | End: 2018-10-12 | Stop reason: HOSPADM

## 2018-10-08 RX ORDER — ACETAMINOPHEN 325 MG/1
650 TABLET ORAL ONCE
Status: CANCELLED | OUTPATIENT
Start: 2018-10-08

## 2018-10-08 RX ORDER — ACETAMINOPHEN 325 MG/1
650 TABLET ORAL ONCE
Status: COMPLETED | OUTPATIENT
Start: 2018-10-08 | End: 2018-10-08

## 2018-10-08 RX ADMIN — ACYCLOVIR 400 MG: 400 TABLET ORAL at 20:22

## 2018-10-08 RX ADMIN — DABIGATRAN ETEXILATE MESYLATE 150 MG: 150 CAPSULE ORAL at 20:22

## 2018-10-08 RX ADMIN — PANTOPRAZOLE SODIUM 40 MG: 40 TABLET, DELAYED RELEASE ORAL at 14:48

## 2018-10-08 RX ADMIN — RITUXIMAB 600 MG: 10 INJECTION, SOLUTION INTRAVENOUS at 15:25

## 2018-10-08 RX ADMIN — VINCRISTINE SULFATE: 1 INJECTION, SOLUTION INTRAVENOUS at 18:39

## 2018-10-08 RX ADMIN — DIPHENHYDRAMINE HYDROCHLORIDE 25 MG: 50 INJECTION, SOLUTION INTRAMUSCULAR; INTRAVENOUS at 14:49

## 2018-10-08 RX ADMIN — ONDANSETRON 16 MG: 2 INJECTION INTRAMUSCULAR; INTRAVENOUS at 18:00

## 2018-10-08 RX ADMIN — ACETAMINOPHEN 650 MG: 325 TABLET, FILM COATED ORAL at 14:48

## 2018-10-08 RX ADMIN — SODIUM CHLORIDE 75 ML/HR: 9 INJECTION, SOLUTION INTRAVENOUS at 14:08

## 2018-10-08 RX ADMIN — AMITRIPTYLINE HYDROCHLORIDE 10 MG: 10 TABLET, FILM COATED ORAL at 20:22

## 2018-10-08 RX ADMIN — PREDNISONE 100 MG: 50 TABLET ORAL at 14:49

## 2018-10-08 NOTE — PROGRESS NOTES
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 Admission for cycle 6.     History of Present Illness      The patient is a 23 y.o. female with the above-mentioned history who typically follows with Dr Siddiqi. This is my first time meeting patient.    She has mediastinal large B-cell lymphoma and has completed 5 cycles of chemotherapy with dose adusted EPOCHR. Her CT scans done on September 7, 2018 showed continued decrease in size of the large anterior mediastinal mass and mild splenomegaly with improvement from prior exam. The plan is to do a total of 6 cycles of chemotherapy.  Following 6 cycles patient will receive a PET/CT scan 6 weeks after completion.  She received Zoladex monthly and the next injection is due today, October 8, 2018.    She saw Dr. GIRARD last week and noted improvement iwth shortness of breath but worsening migraine headaches. They discussed neurology referral while inpatient. Patient has occasional light headedness but does not have a migraine today.     Past Medical History, Past Surgical History, Social History, Family History have been reviewed and are without significant changes.    Oncologic history: Reviewed and detailed in Dr Siddiqi's prior notes.    ROS I reviewed, confirmed and updated ROS as necessary.   Review of Systems   Constitutional: Positive for fatigue. Negative for activity change, appetite change, chills and fever.   HENT: Negative for mouth sores.    Eyes: Negative for visual disturbance.   Respiratory: Negative for cough (improved) and shortness of breath (improved).    Cardiovascular: Negative.    Gastrointestinal: Negative.  Negative for abdominal pain, diarrhea, nausea and vomiting.   Endocrine: Negative.    Genitourinary: Negative for dysuria, frequency and menstrual problem.   Musculoskeletal: Negative for arthralgias, back pain, joint swelling and myalgias.   Skin: Negative.   "  Allergic/Immunologic: Negative.    Neurological: Negative.  Negative for dizziness and weakness.   Hematological: Negative.  Does not bruise/bleed easily.   Psychiatric/Behavioral: The patient is not nervous/anxious.    All other systems reviewed and are negative.      Medications:  The current medication list was reviewed in the EMR    ALLERGIES:  No Known Allergies    Objective      Vitals:    10/08/18 1135   BP: 96/58   Pulse: 78   Resp: 16   Temp: 98.5 °F (36.9 °C)   TempSrc: Oral   SpO2: 98%   Weight: 64.6 kg (142 lb 6.4 oz)   Height: 157.5 cm (62.01\")   PainSc: 0-No pain     Current Status 10/8/2018   ECOG score 0       Physical Exam     GENERAL:  Well-developed, well-nourished female in no acute distress. Vital signs reviewed.   SKIN:  Warm, dry without rashes, purpura or petechiae.  EYES:  Pupils equal, round and reactive to light.  EOMs intact.  Conjunctivae normal.  HEAD:  Normocephalic.  EARS/NOSE/MOUTH/THROAT:  Hearing intact. Septum midline.  No excoriations or nasal discharge. No stomatitis or ulcers.  Lips normal. Oropharynx without lesions or exudates.  NECK:  Supple with good range of motion; no thyromegaly or masses  LYMPHATICS:  No cervical, supraclavicular, axillary adenopathy.  RESP:  Lungs clear to percussion and auscultation. Good airflow. Normal respiratory effort.   CHEST: Mediport - Yes ; Breast exam- No  CARDIAC:  Regular rate and rhythm without murmurs, rubs or gallops. Normal S1,S2. No edema  GI:  Soft, nontender, no hepatosplenomegaly, normal bowel sounds  MSK:  No clubbing or cyanosis, No joint swelling noted in hands  NEUROLOGICAL:  Cranial Nerves II-XII grossly intact.  No focal neurological deficits.  PSYCHIATRIC:  Normal affect and mood.  Alert and Oriented x 3.       RECENT LABS:    Results from last 7 days  Lab Units 10/08/18  1131 10/02/18  1550   WBC 10*3/mm3 5.93 10.34*   NEUTROS ABS 10*3/mm3 3.60 4.43   HEMOGLOBIN g/dL 9.7* 8.8*   HEMATOCRIT % 28.5* 26.0*   PLATELETS " 10*3/mm3 201 99*         Assessment/Plan   1.  Primary mediastinal large B-cell lymphoma: The patient presented with dyspnea, cough, and chest pain.  A CT angiogram of the chest 6/8/18 showed a very large tumor mass in the mediastinum encasing multiple vascular structures and narrowing the left mainstem bronchus.  There was direct tumor infiltration in the left upper lobe.  There was a small left pleural effusion.  She underwent a CT-guided biopsy of the mediastinal mass on 6/12/18 and pathology reviewed at [facility] oncology showed diffuse large B-cell lymphoma consistent with a primary diffuse large B-cell lymphoma.  A bone marrow exam was performed on 6/14/18 which was negative for lymphoma.  She underwent a PET scan 6/13/18 which showed a large hypermetabolic mass in the chest involving the anterior mediastinum, left hilum, nearly completely filling the left hemothorax with SUV 19.8.  There was physiologic distribution elsewhere.     The patient was started on IV fluid hydration and allopurinol for prevention of hyperuricemia.  She initiated systemic chemotherapy with DA-EPOCH-R on 6/16/18 with Neulasta support. She had a infusion reaction to rituximab but was able to complete with additional medications and otherwise tolerated chemotherapy well. She is also on Zoladex for ovarian protection. Case with previously discussed with Dr. Fox at the [facility] [place]    · Admit for cycle 6 (last cycle) chemotherapy with dose adjusted EPOCH/R on October 8, 2018. Her doses will stay the same as they were for cycle 5.   · Outpatient Neulasta. She will be discharged on prophylactic antibiotics including acyclovir, Diflucan, and Bactrim.  She may require bacterial prophylaxis if the ANC drops below 1000.  · Will need PET/CT scan after 6th cycle and Dr GIRADR's note mentions that she wants PET/CT 5-6 weeks after last cycle of chemo.      2.  Pericardial effusion: Not mentioned on repeat echo 9/2018     3.   Hypokalemia.  Potassium is chronically slightly low.  Requires 20 mg by mouth daily     4.  Fertility preservation:  Patient received Zoladex injection  for fertility preservation; this should be continued once monthly during her chemotherapy.    · Next dose of Zoladex due October 8, 2018, needs to receive when admitted     5.  Right upper extremity DVT on Pradaxa.     7.  Migraine headaches, patient had it as a child but recently after cycle 4 patient developed migraine headaches and was seen by neurology when admitted with cycle 5.  MRI showed mild enhancement of the turbinate but was nonspecific.    · Given migraine headache has gotten worse, Reconsult neurology    8.  Shortness of breath with palpitations, could obtain repeat echocardiogram and if persistent palpitations with cardiology consult if needed while inpatient.     Plan   1.  Admit for cycle 6 of chemotherapy with EPOCH/R on October 8, 2018.   2.  ZOLADEX  injection is due 3.6 mg subcutaneous on October 8, 2018  3   Continue Pradaxa  4.  Will schedule echocardiogram prior to her next appointment  5.  Patient will require twice weekly CBC check when she is discharged  6.  Obtain CT scans of the neck chest abdomen pelvis at 3 weeks following chemotherapy and PET scan at 6 weeks  7.  Follow-up October 22 with Dr GIRARD per her last note at 1 PM, 2 unit appointment  8.  Dr. Fox at [facility] to see her after PET scan obtained  9.  Consult cardiology for intermittent palpitations  10.  Obtain neurology consult  for migraine headaches.    This is my first time meeting patient thus all problems are new to me. She will follow up with Dr GIRARD as outpatient. Case discussed with oncology pharmacist, Dr Nevaeh Tillman.      Claudia Paul MD   10/8/2018

## 2018-10-08 NOTE — H&P
Please see office H and P done 10/8. It is obviously reviewed and without change since it was written today.

## 2018-10-08 NOTE — SIGNIFICANT NOTE
This patient was admitted from the office today to receive her last cycle of chemotherapy with EPOCH/Rituxan in the background of a large mediastinal non-Hodgkin's lymphoma. The patient is having minimal symptomatology at this time with the exception of migraines. Her appetite is good. She has not had any further respiratory issues. I have personally reviewed her PET scan before and after and it is a very dramatic response. The patient's laboratory parameters are appropriate and she will proceed with her chemotherapy as originally planned. I discussed the case with Dr. Claudia Paul. I discussed the case with the nurse taking care of the patient and with the patient per se and the patient's mother.

## 2018-10-08 NOTE — PLAN OF CARE
Problem: Patient Care Overview  Goal: Plan of Care Review  Outcome: Ongoing (interventions implemented as appropriate)   10/08/18 1430   Coping/Psychosocial   Plan of Care Reviewed With patient;mother   Plan of Care Review   Progress no change   OTHER   Outcome Summary Denies complaints. Port accessed with good blood return. Labs wnl. This is her last treatment. Mother at bedside.     Goal: Individualization and Mutuality  Outcome: Ongoing (interventions implemented as appropriate)

## 2018-10-08 NOTE — CONSULTS
Patient Identification:  NAME:  Vikki Durham  Age:  23 y.o.   Sex:  female   :  1995   MRN:  [id number]       Chief complaint: Migraine, with aura, reason for consult headaches    History of present illness: This patient is a pleasant 23-year-old left-handed Amharic female who has diffuse large B-cell lymphoma there is a history of lung mass history of pericardial effusion as well in the past, now presents for follow-up chemotherapy she is complained of intermittent headaches whole head in location dull in quality associated symptoms a visual aura-like white spots flashing off to the left modifying factor sleep she has had migraines before she thinks triggered by the chemotherapy she did get an MRI that he had about a month ago that showed no brain abnormality.  He denies any focal paresthesias paralysis she states she doesn't have any headache today but has had some of the visual aura      Past medical history:  Past Medical History:   Diagnosis Date   • Diffuse large B cell lymphoma (CMS/HCC) 2018   • Fracture of lower leg 2000    L   • H/O Lung mass 2018   • H/O Pericardial effusion        Past surgical history:  Past Surgical History:   Procedure Laterality Date   • OTHER SURGICAL HISTORY  2018    CT-GUIDED BIOPSY OF MEDIASTINAL MASS   • VENOUS ACCESS DEVICE (PORT) INSERTION Right 2018    Procedure: RIGHT MEDIPORT PLACEMENT;  Surgeon: Farhan Hurt MD;  Location: [facility];  Service: Vascular       Allergies:  Patient has no known allergies.    Home medications:  Prescriptions Prior to Admission   Medication Sig Dispense Refill Last Dose   • acyclovir (ZOVIRAX) 400 MG tablet Take 400 mg by mouth 2 (Two) Times a Day. Take no more than 5 doses a day.   10/8/2018 at Unknown time   • dabigatran etexilate (PRADAXA) 150 MG capsu Take 1 capsule by mouth Every 12 (Twelve) Hours. 60 capsule 6 10/8/2018 at Unknown time   • fluconazole (DIFLUCAN) 200 MG tablet Take 400 mg by mouth  2 (Two) Times a Day. 2 tablets PO twice daily   10/8/2018 at Unknown time   • magnesium oxide (MAG-OX) 400 MG tablet Take 1 tablet by mouth Daily. 30 tablet 1 10/8/2018 at Unknown time   • ondansetron (ZOFRAN) 4 MG tablet Take 1 tablet by mouth Every 6 (Six) Hours As Needed for Nausea or Vomiting. 30 tablet 2 Past Month at Unknown time   • potassium chloride (MICRO-K) 10 MEQ CR capsule Take 2 capsules by mouth 2 (Two) Times a Day With Meals. (Patient taking differently: Take 20 mEq by mouth Daily.) 60 capsule 5 10/8/2018 at Unknown time   • pyridoxine (VITAMIN B-6) 50 MG tablet tablet Take 1 tablet by mouth Daily. 30 tablet 6 10/8/2018 at Unknown time   • sennosides-docusate sodium (SENOKOT-S) 8.6-50 MG tablet Take 2 tablets by mouth 2 (Two) Times a Day As Needed for Constipation. 60 tablet 1 Taking   • sulfamethoxazole-trimethoprim (BACTRIM DS,SEPTRA DS) 800-160 MG per tablet Take 1 tablet by mouth 3 (Three) Times a Week. 12 tablet 3 10/8/2018 at Unknown time   • vitamin B-12 (CYANOCOBALAMIN) 1000 MCG tablet Take 1,000 mcg by mouth Daily.   10/8/2018 at Unknown time        Hospital medications:    acyclovir 400 mg Oral BID   dabigatran etexilate 150 mg Oral Q12H   DOXOrubicin (ADRIAMYCIN), etoposide, and vincristine chemo IVPB  Intravenous Once   fluconazole 400 mg Oral Daily   magnesium oxide 400 mg Oral Daily   ondansetron (ZOFRAN) IVPB 50 mL 16 mg Intravenous Once   pantoprazole 40 mg Oral Q AM   potassium chloride 20 mEq Oral Daily   predniSONE 100 mg Oral BID With Meals   riTUXimab (RITUXAN) IVPB 600 mg Intravenous Once   sulfamethoxazole-trimethoprim 1 tablet Oral Once per day on Mon Wed Fri   vitamin B-12 1,000 mcg Oral Daily   pyridoxine 50 mg Oral Daily       sodium chloride 75 mL/hr Last Rate: 75 mL/hr (10/08/18 1408)     •  acetaminophen  •  diphenhydrAMINE  •  famotidine  •  hydrocortisone sodium succinate  •  meperidine  •  ondansetron  •  sennosides-docusate sodium    Family history:  Family History    Problem Relation Age of Onset   • Thyroid disease Mother    • Glaucoma Mother    • Lung cancer Maternal Grandmother    • Hypertension Paternal Grandmother    • Diabetes Paternal Grandmother        Social history:  Social History   Substance Use Topics   • Smoking status: Never Smoker   • Smokeless tobacco: Never Used   • Alcohol use No       Review of systems:    Now she has had some visual auras no eyes ears nose throat skin bone joint  lymphatic hematologic complaints no neck pain chest pain abdominal pain bowel bladder incontinence fever chills or rash she does have non-Hodgkin's lymphoma and is undergoing chemotherapy treatments    Objective:  Vitals Ranges:   Temp:  [97.9 °F (36.6 °C)-98.5 °F (36.9 °C)] 98.3 °F (36.8 °C)  Heart Rate:  [73-79] 79  Resp:  [14-16] 14  BP: (92-99)/(58-70) 92/61      Physical Exam:  Awake alert well oriented fund of knowledge good attention span and concentration good recent and remote memory good language function normal well-developed well-nourished in no distress missing all of her hair pupils to constricting to 1 normal disc retinas visual fields extraocular movements full without nystagmus nasolabial folds palate tongue symmetrical normal hearing facial sensation head turning shoulder shrug motor 5 out of 5 times for extremities no atrophy fasciculations rigidity bradykinesia resting tremor reflexes absent throughout toes downgoing bilaterally sensation normal light touch face both arms both legs coordination normal in the upper externally station and gait was not tested heart regular without murmur neck supple without bruits extremities no clubbing cyanosis edema visual acuity normal at 3 feet  Results review:   I reviewed the patient's new clinical results.    Data review:  Lab Results (last 24 hours)     Procedure Component Value Units Date/Time    Comprehensive Metabolic Panel [[id number]]  (Abnormal) Collected:  10/08/18 1357    Specimen:  Blood Updated:  10/08/18 4232      Glucose 98 mg/dL      BUN 10 mg/dL      Creatinine 0.52 (L) mg/dL      Sodium 141 mmol/L      Potassium 3.8 mmol/L      Chloride 103 mmol/L      CO2 25.1 mmol/L      Calcium 9.4 mg/dL      Total Protein 6.5 g/dL      Albumin 4.20 g/dL      ALT (SGPT) 15 U/L      AST (SGOT) 18 U/L      Alkaline Phosphatase 51 U/L      Total Bilirubin <0.2 mg/dL      eGFR Non African Amer 146 mL/min/1.73      eGFR  African Amer >150 mL/min/1.73      Globulin 2.3 gm/dL      A/G Ratio 1.8 g/dL      BUN/Creatinine Ratio 19.2     Anion Gap 12.9 mmol/L            Imaging:  Imaging Results (last 24 hours)     ** No results found for the last 24 hours. **             Assessment and Plan:     Active Problems:    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)    Patient's headache pattern as well as her current visual auras without headaches are all consistent with classic migraine I will initiate Elavil 10 mg by mouth daily at bedtime and reassess tomorrow based upon the September normal MRI brain, as well as her current normal neurologic exam and classic migraine-type symptomatology, I do not believe we are dealing with metastatic brain disease or carcinomatous meningitis.      Rodger Rice MD  10/08/18  3:27 PM

## 2018-10-09 LAB
ALBUMIN SERPL-MCNC: 3.9 G/DL (ref 3.5–5.2)
ALBUMIN/GLOB SERPL: 1.8 G/DL
ALP SERPL-CCNC: 46 U/L (ref 39–117)
ALT SERPL W P-5'-P-CCNC: 14 U/L (ref 1–33)
ANION GAP SERPL CALCULATED.3IONS-SCNC: 8.6 MMOL/L
AST SERPL-CCNC: 15 U/L (ref 1–32)
BASOPHILS # BLD AUTO: 0.01 10*3/MM3 (ref 0–0.2)
BASOPHILS NFR BLD AUTO: 0.1 % (ref 0–1.5)
BILIRUB SERPL-MCNC: <0.2 MG/DL (ref 0.1–1.2)
BUN BLD-MCNC: 7 MG/DL (ref 6–20)
BUN/CREAT SERPL: 14 (ref 7–25)
CALCIUM SPEC-SCNC: 8.8 MG/DL (ref 8.6–10.5)
CHLORIDE SERPL-SCNC: 108 MMOL/L (ref 98–107)
CO2 SERPL-SCNC: 25.4 MMOL/L (ref 22–29)
CREAT BLD-MCNC: 0.5 MG/DL (ref 0.57–1)
DEPRECATED RDW RBC AUTO: 58.1 FL (ref 37–54)
EOSINOPHIL # BLD AUTO: 0 10*3/MM3 (ref 0–0.7)
EOSINOPHIL NFR BLD AUTO: 0 % (ref 0.3–6.2)
ERYTHROCYTE [DISTWIDTH] IN BLOOD BY AUTOMATED COUNT: 16 % (ref 11.7–13)
GFR SERPL CREATININE-BSD FRML MDRD: >150 ML/MIN/1.73
GFR SERPL CREATININE-BSD FRML MDRD: >150 ML/MIN/1.73
GLOBULIN UR ELPH-MCNC: 2.2 GM/DL
GLUCOSE BLD-MCNC: 136 MG/DL (ref 65–99)
HCT VFR BLD AUTO: 28 % (ref 35.6–45.5)
HGB BLD-MCNC: 8.7 G/DL (ref 11.9–15.5)
IMM GRANULOCYTES # BLD: 0.35 10*3/MM3 (ref 0–0.03)
IMM GRANULOCYTES NFR BLD: 3.6 % (ref 0–0.5)
LYMPHOCYTES # BLD AUTO: 0.35 10*3/MM3 (ref 0.9–4.8)
LYMPHOCYTES NFR BLD AUTO: 3.6 % (ref 19.6–45.3)
MAGNESIUM SERPL-MCNC: 1.9 MG/DL (ref 1.6–2.6)
MCH RBC QN AUTO: 31.4 PG (ref 26.9–32)
MCHC RBC AUTO-ENTMCNC: 31.1 G/DL (ref 32.4–36.3)
MCV RBC AUTO: 101.1 FL (ref 80.5–98.2)
MONOCYTES # BLD AUTO: 0.59 10*3/MM3 (ref 0.2–1.2)
MONOCYTES NFR BLD AUTO: 6.1 % (ref 5–12)
NEUTROPHILS # BLD AUTO: 8.34 10*3/MM3 (ref 1.9–8.1)
NEUTROPHILS NFR BLD AUTO: 86.6 % (ref 42.7–76)
PLATELET # BLD AUTO: 210 10*3/MM3 (ref 140–500)
PMV BLD AUTO: 11.6 FL (ref 6–12)
POTASSIUM BLD-SCNC: 3.8 MMOL/L (ref 3.5–5.2)
PROT SERPL-MCNC: 6.1 G/DL (ref 6–8.5)
RBC # BLD AUTO: 2.77 10*6/MM3 (ref 3.9–5.2)
SODIUM BLD-SCNC: 142 MMOL/L (ref 136–145)
WBC NRBC COR # BLD: 9.64 10*3/MM3 (ref 4.5–10.7)

## 2018-10-09 PROCEDURE — 25010000002 ONDANSETRON PER 1 MG: Performed by: INTERNAL MEDICINE

## 2018-10-09 PROCEDURE — 25010000002 VINCRISTINE PER 1 MG: Performed by: INTERNAL MEDICINE

## 2018-10-09 PROCEDURE — 63710000001 PREDNISONE PER 5 MG: Performed by: INTERNAL MEDICINE

## 2018-10-09 PROCEDURE — 85025 COMPLETE CBC W/AUTO DIFF WBC: CPT | Performed by: INTERNAL MEDICINE

## 2018-10-09 PROCEDURE — 99233 SBSQ HOSP IP/OBS HIGH 50: CPT | Performed by: INTERNAL MEDICINE

## 2018-10-09 PROCEDURE — 83735 ASSAY OF MAGNESIUM: CPT | Performed by: INTERNAL MEDICINE

## 2018-10-09 PROCEDURE — 80053 COMPREHEN METABOLIC PANEL: CPT | Performed by: INTERNAL MEDICINE

## 2018-10-09 PROCEDURE — 99231 SBSQ HOSP IP/OBS SF/LOW 25: CPT | Performed by: PSYCHIATRY & NEUROLOGY

## 2018-10-09 PROCEDURE — 25010000002 ETOPOSIDE 100 MG/5ML SOLUTION 25 ML VIAL: Performed by: INTERNAL MEDICINE

## 2018-10-09 PROCEDURE — 25010000002 DOXORUBICIN PER 10 MG: Performed by: INTERNAL MEDICINE

## 2018-10-09 RX ADMIN — PREDNISONE 100 MG: 50 TABLET ORAL at 08:10

## 2018-10-09 RX ADMIN — Medication 400 MG: at 08:10

## 2018-10-09 RX ADMIN — Medication 50 MG: at 08:11

## 2018-10-09 RX ADMIN — POTASSIUM CHLORIDE 20 MEQ: 750 CAPSULE, EXTENDED RELEASE ORAL at 08:10

## 2018-10-09 RX ADMIN — ACYCLOVIR 400 MG: 400 TABLET ORAL at 08:10

## 2018-10-09 RX ADMIN — DABIGATRAN ETEXILATE MESYLATE 150 MG: 150 CAPSULE ORAL at 22:14

## 2018-10-09 RX ADMIN — VINCRISTINE SULFATE: 1 INJECTION, SOLUTION INTRAVENOUS at 18:55

## 2018-10-09 RX ADMIN — ACYCLOVIR 400 MG: 400 TABLET ORAL at 22:14

## 2018-10-09 RX ADMIN — PREDNISONE 100 MG: 50 TABLET ORAL at 18:49

## 2018-10-09 RX ADMIN — FLUCONAZOLE 400 MG: 200 TABLET ORAL at 08:10

## 2018-10-09 RX ADMIN — ONDANSETRON HYDROCHLORIDE 16 MG: 2 SOLUTION INTRAMUSCULAR; INTRAVENOUS at 18:35

## 2018-10-09 RX ADMIN — Medication 1000 MCG: at 08:11

## 2018-10-09 RX ADMIN — PANTOPRAZOLE SODIUM 40 MG: 40 TABLET, DELAYED RELEASE ORAL at 05:29

## 2018-10-09 RX ADMIN — DABIGATRAN ETEXILATE MESYLATE 150 MG: 150 CAPSULE ORAL at 08:10

## 2018-10-09 RX ADMIN — SODIUM CHLORIDE 75 ML/HR: 9 INJECTION, SOLUTION INTRAVENOUS at 18:17

## 2018-10-09 RX ADMIN — SODIUM CHLORIDE 75 ML/HR: 9 INJECTION, SOLUTION INTRAVENOUS at 05:20

## 2018-10-09 RX ADMIN — AMITRIPTYLINE HYDROCHLORIDE 10 MG: 10 TABLET, FILM COATED ORAL at 22:14

## 2018-10-09 NOTE — NURSING NOTE
"Nursing Chemotherapy Verification    Chemotherapy Regimen:   Treatment Plans     Name Type Hold Status Plan dates Plan Provider       Active    OP Goserelin 3.6 mg Q28D ONCOLOGY SUPPORTIVE CARE 1 On Automatic Hold  6/20/2018 - Present Ramón Camp MD    IP LYMPHOMA R-EPOCH (Dose Adjusted) RiTUXimab / Etoposide / DOXOrubicin / VinCRIStine / Cyclophosphamide / PredniSONE ONCOLOGY TREATMENT Not on Hold  6/16/2018 - Present Kimberley Melgar MD                    Current height and weight: 157.5 cm (62\") 63.9 kg (140 lb 14 oz)  Calculated BSA from current height and weight ([place]): 1.65    Relevant Labs    Results from last 7 days  Lab Units 10/09/18  0529 10/08/18  1131   WBC 10*3/mm3 9.64 5.93   HEMOGLOBIN g/dL 8.7* 9.7*   HEMATOCRIT % 28.0* 28.5*   PLATELETS 10*3/mm3 210 201     Lab Results   Component Value Date    NEUTROABS 8.34 (H) 10/09/2018         Results from last 7 days  Lab Units 10/09/18  0529 10/08/18  1357   CREATININE mg/dL 0.50* 0.52*       Serum creatinine: 0.5 mg/dL (L) 10/09/18 0529  Estimated creatinine clearance: 153.6 mL/min (A)    Lab Results   Component Value Date    HEPAIGM Negative 07/13/2018    HEPAIGM Non-Reactive 07/13/2018    HEPBCAB Negative 06/14/2018       Verification attestation:  I have personally reviewed the planned regimen and its administration and dosing. I understand the potential side effects.The patient has been instructed on the regimen, potential side effects, and self care measures; the consent form has been completed. I have confirmed that the appropriate premedication, prehydration, post medication and/or emergency medications are ordered in addition to the chemotherapy.    I have independently verified that the height and weight is current and calculated the BSA. I have verified the doses with the planned regimen and have clarified any deviations with the physician (Dr. Luevano). I have confirmed the route of administration and patient IV access.    Nurse: Darby " PAUL Rojas  2nd verification Nurse: Divine Woods

## 2018-10-09 NOTE — PROGRESS NOTES
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 Admission for cycle 6.     History of Present Illness      The patient is a 23 y.o. female with the above-mentioned history who typically follows with Dr Siddiqi. This is my first time meeting patient.    She has mediastinal large B-cell lymphoma and has completed 5 cycles of chemotherapy with dose adusted EPOCHR. Her CT scans done on September 7, 2018 showed continued decrease in size of the large anterior mediastinal mass and mild splenomegaly with improvement from prior exam. The plan is to do a total of 6 cycles of chemotherapy.  Following 6 cycles patient will receive a PET/CT scan 6 weeks after completion.  She received Zoladex monthly and the next injection is due today, October 8, 2018.    She saw Dr. GIRARD last week and noted improvement iwth shortness of breath but worsening migraine headaches. They discussed neurology referral while inpatient. Patient has occasional light headedness but does not have a migraine today.     Past Medical History, Past Surgical History, Social History, Family History have been reviewed and are without significant changes.    Oncologic history: Reviewed and detailed in Dr Siddiqi's prior notes.    ROS I reviewed, confirmed and updated ROS as necessary.   Review of Systems   Constitutional: Negative.    HENT: Negative.    Eyes: Negative.    Respiratory: Negative.    Cardiovascular: Negative.    Gastrointestinal: Negative.    Endocrine: Negative.    Genitourinary: Negative.    Musculoskeletal: Negative.    Skin: Negative.    Neurological: Negative.    Hematological: Negative.    Psychiatric/Behavioral: Negative.        Medications:  The current medication list was reviewed in the EMR    ALLERGIES:  No Known Allergies    Objective      Vitals:    10/08/18 2030 10/09/18 0032 10/09/18 0448 10/09/18 0813   BP: 101/62 (!) 82/49 (!) 88/53 97/63   BP Location: Left arm  Left arm Left arm Left arm   Patient Position: Sitting Lying Lying Sitting   Pulse: 90 59 53 66   Resp: 18 18 18 18   Temp: 98.7 °F (37.1 °C) 97.4 °F (36.3 °C) 97 °F (36.1 °C) 97 °F (36.1 °C)   TempSrc: Oral Oral Oral Oral   SpO2: 97% 97% 98% 100%   Weight:       Height:         Current Status 10/8/2018   ECOG score 0       Physical Exam     GENERAL:  Well-developed, well-nourished female in no acute distress. Vital signs reviewed.   SKIN:  Warm, dry without rashes, purpura or petechiae.  EYES:  Pupils equal, round and reactive to light.  EOMs intact.  Conjunctivae normal.  HEAD:  Normocephalic.  EARS/NOSE/MOUTH/THROAT:  Hearing intact. Septum midline.  No excoriations or nasal discharge. No stomatitis or ulcers.  Lips normal. Oropharynx without lesions or exudates.  NECK:  Supple with good range of motion; no thyromegaly or masses  LYMPHATICS:  No cervical, supraclavicular, axillary adenopathy.  RESP:  Lungs clear to percussion and auscultation. Good airflow. Normal respiratory effort.   CHEST: Mediport - Yes ; Breast exam- No  CARDIAC:  Regular rate and rhythm without murmurs, rubs or gallops. Normal S1,S2. No edema  GI:  Soft, nontender, no hepatosplenomegaly, normal bowel sounds  MSK:  No clubbing or cyanosis, No joint swelling noted in hands  NEUROLOGICAL:  Cranial Nerves II-XII grossly intact.  No focal neurological deficits.  PSYCHIATRIC:  Normal affect and mood.  Alert and Oriented x 3.       RECENT LABS:    Results from last 7 days  Lab Units 10/09/18  0529 10/08/18  1131 10/02/18  1550   WBC 10*3/mm3 9.64 5.93 10.34*   NEUTROS ABS 10*3/mm3 8.34* 3.60 4.43   HEMOGLOBIN g/dL 8.7* 9.7* 8.8*   HEMATOCRIT % 28.0* 28.5* 26.0*   PLATELETS 10*3/mm3 210 201 99*         Assessment/Plan   1.  Primary mediastinal large B-cell lymphoma: The patient presented with dyspnea, cough, and chest pain.  A CT angiogram of the chest 6/8/18 showed a very large tumor mass in the mediastinum encasing multiple vascular structures  and narrowing the left mainstem bronchus.  There was direct tumor infiltration in the left upper lobe.  There was a small left pleural effusion.  She underwent a CT-guided biopsy of the mediastinal mass on 6/12/18 and pathology reviewed at [facility] oncology showed diffuse large B-cell lymphoma consistent with a primary diffuse large B-cell lymphoma.  A bone marrow exam was performed on 6/14/18 which was negative for lymphoma.  She underwent a PET scan 6/13/18 which showed a large hypermetabolic mass in the chest involving the anterior mediastinum, left hilum, nearly completely filling the left hemothorax with SUV 19.8.  There was physiologic distribution elsewhere.     The patient was started on IV fluid hydration and allopurinol for prevention of hyperuricemia.  She initiated systemic chemotherapy with DA-EPOCH-R on 6/16/18 with Neulasta support. She had a infusion reaction to rituximab but was able to complete with additional medications and otherwise tolerated chemotherapy well. She is also on Zoladex for ovarian protection. Case with previously discussed with Dr. Fox at the [facility]    · Admit for cycle 6 (last cycle) chemotherapy with dose adjusted EPOCH/R on October 8, 2018. Her doses will stay the same as they were for cycle 5.   · Outpatient Neulasta. She will be discharged on prophylactic antibiotics including acyclovir, Diflucan, and Bactrim.  She may require bacterial prophylaxis if the ANC drops below 1000.  · Will need PET/CT scan after 6th cycle and Dr GIRARD's note mentions that she wants PET/CT 5-6 weeks after last cycle of chemo.      2.  Pericardial effusion: Not mentioned on repeat echo 9/2018     3.   Hypokalemia. Potassium is chronically slightly low.  Requires 20 mg by mouth daily     4.  Fertility preservation:  Patient received Zoladex injection  for fertility preservation; this should be continued once monthly during her chemotherapy.    · Next dose of Zoladex due October 8, 2018,  needs to receive when admitted     5.  Right upper extremity DVT on Pradaxa.     7.  Migraine headaches, patient had it as a child but recently after cycle 4 patient developed migraine headaches and was seen by neurology when admitted with cycle 5.  MRI showed mild enhancement of the turbinate but was nonspecific.    · Given migraine headache has gotten worse, Reconsult neurology    8.  Shortness of breath with palpitations, could obtain repeat echocardiogram and if persistent palpitations with cardiology consult if needed while inpatient.     Plan 1.  From the point of view of his mediastinal non-Hodgkin's lymphoma the patient initiated yesterday a plan of chemotherapy with a regimen of EPOCH/Rituxan with no difficulties whatsoever.  Today she has not had any side effects from the treatment and neither last night, her port is working perfectly fine, she has no cancer-related pain and no symptoms whatsoever related to her lymphoma that was dramatically symptomatic at the time of her diagnosis given the bulk of her disease.   2.  From the point of view of migraines she has not had any further issues and we will just monitor this on clinical grounds.   3.  From the point of view of a low potassium this is being replaced.  Laboratory parameters remain stable.   4.  From the point of view of discharge, likely Friday evening or Saturday morning, depending on the timing of completion of chemotherapy.  She will require as well Neulasta injection and eventually she will require another PET scan as an outpatient. She will require blood counts in the office twice a week and she will require nursing practitioner assessment in between until she is seen by Dr. Padilla again for reassessment for response.     I discussed all these facts with the patient and the pharmacist taking care of the patient as well as the nurse taking care of the patient. I discussed all these facts in regard to therapy with the patient’s mother as  siva.      Diaz Luevano MD   10/9/2018

## 2018-10-09 NOTE — PROGRESS NOTES
Patient Identification:  NAME:  Vikki Durham  Age:  23 y.o.   Sex:  female   :  1995   MRN:  [id number]       Chief complaint: Classic migraine  History of present illness:  Headache and no visual aura she is doing well      Past medical history:  Past Medical History:   Diagnosis Date   • Diffuse large B cell lymphoma (CMS/HCC) 2018   • Fracture of lower leg 2000    L   • H/O Lung mass 2018   • H/O Pericardial effusion        Allergies:  Patient has no known allergies.    Home medications:  Prescriptions Prior to Admission   Medication Sig Dispense Refill Last Dose   • acyclovir (ZOVIRAX) 400 MG tablet Take 400 mg by mouth 2 (Two) Times a Day. Take no more than 5 doses a day.   10/8/2018 at Unknown time   • dabigatran etexilate (PRADAXA) 150 MG capsu Take 1 capsule by mouth Every 12 (Twelve) Hours. 60 capsule 6 10/8/2018 at Unknown time   • fluconazole (DIFLUCAN) 200 MG tablet Take 400 mg by mouth 2 (Two) Times a Day. 2 tablets PO twice daily   10/8/2018 at Unknown time   • magnesium oxide (MAG-OX) 400 MG tablet Take 1 tablet by mouth Daily. 30 tablet 1 10/8/2018 at Unknown time   • ondansetron (ZOFRAN) 4 MG tablet Take 1 tablet by mouth Every 6 (Six) Hours As Needed for Nausea or Vomiting. 30 tablet 2 Past Month at Unknown time   • potassium chloride (MICRO-K) 10 MEQ CR capsule Take 2 capsules by mouth 2 (Two) Times a Day With Meals. (Patient taking differently: Take 20 mEq by mouth Daily.) 60 capsule 5 10/8/2018 at Unknown time   • pyridoxine (VITAMIN B-6) 50 MG tablet tablet Take 1 tablet by mouth Daily. 30 tablet 6 10/8/2018 at Unknown time   • sennosides-docusate sodium (SENOKOT-S) 8.6-50 MG tablet Take 2 tablets by mouth 2 (Two) Times a Day As Needed for Constipation. 60 tablet 1 Taking   • sulfamethoxazole-trimethoprim (BACTRIM DS,SEPTRA DS) 800-160 MG per tablet Take 1 tablet by mouth 3 (Three) Times a Week. 12 tablet 3 10/8/2018 at Unknown time   • vitamin B-12 (CYANOCOBALAMIN) 1000 MCG  tablet Take 1,000 mcg by mouth Daily.   10/8/2018 at Unknown time        Hospital medications:    acyclovir 400 mg Oral BID   amitriptyline 10 mg Oral Nightly   dabigatran etexilate 150 mg Oral Q12H   DOXOrubicin (ADRIAMYCIN), etoposide, and vincristine chemo IVPB  Intravenous Once   DOXOrubicin (ADRIAMYCIN), etoposide and vinCRIStine chemo IVPB (pediatric)  Intravenous Once   fluconazole 400 mg Oral Daily   magnesium oxide 400 mg Oral Daily   ondansetron (ZOFRAN) IVPB 50 mL 16 mg Intravenous Once   pantoprazole 40 mg Oral Q AM   potassium chloride 20 mEq Oral Daily   predniSONE 100 mg Oral BID With Meals   sulfamethoxazole-trimethoprim 1 tablet Oral Once per day on Mon Wed Fri   vitamin B-12 1,000 mcg Oral Daily   pyridoxine 50 mg Oral Daily       sodium chloride 75 mL/hr Last Rate: 75 mL/hr (10/09/18 0520)     •  acetaminophen  •  diphenhydrAMINE  •  famotidine  •  hydrocortisone sodium succinate  •  meperidine  •  ondansetron  •  sennosides-docusate sodium      Objective:  Vitals Ranges:   Temp:  [97 °F (36.1 °C)-98.7 °F (37.1 °C)] 97 °F (36.1 °C)  Heart Rate:  [53-90] 66  Resp:  [14-18] 18  BP: ()/(49-63) 97/63      Physical Exam:  Awake alert ambulating well normal cranial nerves II through VII tongue is midline reflexes trace throughout symmetrical toes downgoing bilaterally    Results review:   I reviewed the patient's new clinical results.    Data review:  Lab Results (last 24 hours)     Procedure Component Value Units Date/Time    Comprehensive Metabolic Panel [[id number]]  (Abnormal) Collected:  10/09/18 0529    Specimen:  Blood Updated:  10/09/18 0629     Glucose 136 (H) mg/dL      BUN 7 mg/dL      Creatinine 0.50 (L) mg/dL      Sodium 142 mmol/L      Potassium 3.8 mmol/L      Chloride 108 (H) mmol/L      CO2 25.4 mmol/L      Calcium 8.8 mg/dL      Total Protein 6.1 g/dL      Albumin 3.90 g/dL      ALT (SGPT) 14 U/L      AST (SGOT) 15 U/L      Alkaline Phosphatase 46 U/L      Total Bilirubin <0.2  mg/dL      eGFR Non African Amer >150 mL/min/1.73      eGFR  African Amer >150 mL/min/1.73      Globulin 2.2 gm/dL      A/G Ratio 1.8 g/dL      BUN/Creatinine Ratio 14.0     Anion Gap 8.6 mmol/L     CBC & Differential [[id number]] Collected:  10/09/18 0529    Specimen:  Blood Updated:  10/09/18 0626    Narrative:       The following orders were created for panel order CBC & Differential.  Procedure                               Abnormality         Status                     ---------                               -----------         ------                     CBC Auto Differential[[id number]]        Abnormal            Final result                 Please view results for these tests on the individual orders.    CBC Auto Differential [[id number]]  (Abnormal) Collected:  10/09/18 0529    Specimen:  Blood Updated:  10/09/18 0626     WBC 9.64 10*3/mm3      RBC 2.77 (L) 10*6/mm3      Hemoglobin 8.7 (L) g/dL      Hematocrit 28.0 (L) %      .1 (H) fL      MCH 31.4 pg      MCHC 31.1 (L) g/dL      RDW 16.0 (H) %      RDW-SD 58.1 (H) fl      MPV 11.6 fL      Platelets 210 10*3/mm3      Neutrophil % 86.6 (H) %      Lymphocyte % 3.6 (L) %      Monocyte % 6.1 %      Eosinophil % 0.0 (L) %      Basophil % 0.1 %      Immature Grans % 3.6 (H) %      Neutrophils, Absolute 8.34 (H) 10*3/mm3      Lymphocytes, Absolute 0.35 (L) 10*3/mm3      Monocytes, Absolute 0.59 10*3/mm3      Eosinophils, Absolute 0.00 10*3/mm3      Basophils, Absolute 0.01 10*3/mm3      Immature Grans, Absolute 0.35 (H) 10*3/mm3     Magnesium [[id number]]  (Normal) Collected:  10/09/18 0529    Specimen:  Blood Updated:  10/09/18 0623     Magnesium 1.9 mg/dL     Comprehensive Metabolic Panel [[id number]]  (Abnormal) Collected:  10/08/18 1357    Specimen:  Blood Updated:  10/08/18 1442     Glucose 98 mg/dL      BUN 10 mg/dL      Creatinine 0.52 (L) mg/dL      Sodium 141 mmol/L      Potassium 3.8 mmol/L      Chloride 103 mmol/L      CO2 25.1 mmol/L      Calcium 9.4  mg/dL      Total Protein 6.5 g/dL      Albumin 4.20 g/dL      ALT (SGPT) 15 U/L      AST (SGOT) 18 U/L      Alkaline Phosphatase 51 U/L      Total Bilirubin <0.2 mg/dL      eGFR Non African Amer 146 mL/min/1.73      eGFR  African Amer >150 mL/min/1.73      Globulin 2.3 gm/dL      A/G Ratio 1.8 g/dL      BUN/Creatinine Ratio 19.2     Anion Gap 12.9 mmol/L            Imaging:  Imaging Results (last 24 hours)     ** No results found for the last 24 hours. **             Assessment and Plan:       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)    Does not have a headache and is not having any visual aura symptoms.  We will continue the low-dose Elavil for the time being for a diagnosis of classic migraine      Rodger Rice MD  10/09/18  2:07 PM

## 2018-10-09 NOTE — PLAN OF CARE
Problem: Patient Care Overview  Goal: Plan of Care Review  Outcome: Ongoing (interventions implemented as appropriate)   10/09/18 3462   Coping/Psychosocial   Plan of Care Reviewed With patient;family   Plan of Care Review   Progress no change   OTHER   Outcome Summary No c/o pain. R port, dressing intact. Flushes with blood return. Last cycle of EPOCH. Family at bedside. Ambulates in halls. Will continue to monitor.      Goal: Individualization and Mutuality  Outcome: Ongoing (interventions implemented as appropriate)    Goal: Discharge Needs Assessment  Outcome: Ongoing (interventions implemented as appropriate)    Goal: Interprofessional Rounds/Family Conf  Outcome: Ongoing (interventions implemented as appropriate)      Problem: Oncology Care (Adult)  Goal: Signs and Symptoms of Listed Potential Problems Will be Absent, Minimized or Managed (Oncology Care)  Outcome: Ongoing (interventions implemented as appropriate)

## 2018-10-10 ENCOUNTER — TELEPHONE (OUTPATIENT)
Dept: GENERAL RADIOLOGY | Facility: HOSPITAL | Age: 23
End: 2018-10-10

## 2018-10-10 DIAGNOSIS — Z79.899 ENCOUNTER FOR LONG-TERM (CURRENT) USE OF HIGH-RISK MEDICATION: ICD-10-CM

## 2018-10-10 DIAGNOSIS — Z51.11 ENCOUNTER FOR ANTINEOPLASTIC CHEMOTHERAPY: Primary | ICD-10-CM

## 2018-10-10 LAB
ALBUMIN SERPL-MCNC: 4.4 G/DL (ref 3.5–5.2)
ALBUMIN/GLOB SERPL: 1.8 G/DL
ALP SERPL-CCNC: 50 U/L (ref 39–117)
ALT SERPL W P-5'-P-CCNC: 14 U/L (ref 1–33)
ANION GAP SERPL CALCULATED.3IONS-SCNC: 14.4 MMOL/L
AST SERPL-CCNC: 18 U/L (ref 1–32)
BASOPHILS # BLD AUTO: 0.01 10*3/MM3 (ref 0–0.2)
BASOPHILS NFR BLD AUTO: 0.1 % (ref 0–1.5)
BILIRUB SERPL-MCNC: <0.2 MG/DL (ref 0.1–1.2)
BUN BLD-MCNC: 8 MG/DL (ref 6–20)
BUN/CREAT SERPL: 13.1 (ref 7–25)
CALCIUM SPEC-SCNC: 9.5 MG/DL (ref 8.6–10.5)
CHLORIDE SERPL-SCNC: 103 MMOL/L (ref 98–107)
CO2 SERPL-SCNC: 25.6 MMOL/L (ref 22–29)
CREAT BLD-MCNC: 0.61 MG/DL (ref 0.57–1)
DEPRECATED RDW RBC AUTO: 59.9 FL (ref 37–54)
EOSINOPHIL # BLD AUTO: 0 10*3/MM3 (ref 0–0.7)
EOSINOPHIL NFR BLD AUTO: 0 % (ref 0.3–6.2)
ERYTHROCYTE [DISTWIDTH] IN BLOOD BY AUTOMATED COUNT: 16.3 % (ref 11.7–13)
GFR SERPL CREATININE-BSD FRML MDRD: 122 ML/MIN/1.73
GFR SERPL CREATININE-BSD FRML MDRD: 147 ML/MIN/1.73
GLOBULIN UR ELPH-MCNC: 2.4 GM/DL
GLUCOSE BLD-MCNC: 151 MG/DL (ref 65–99)
HCT VFR BLD AUTO: 30.2 % (ref 35.6–45.5)
HGB BLD-MCNC: 9.3 G/DL (ref 11.9–15.5)
IMM GRANULOCYTES # BLD: 0.21 10*3/MM3 (ref 0–0.03)
IMM GRANULOCYTES NFR BLD: 2.1 % (ref 0–0.5)
LYMPHOCYTES # BLD AUTO: 0.4 10*3/MM3 (ref 0.9–4.8)
LYMPHOCYTES NFR BLD AUTO: 4 % (ref 19.6–45.3)
MCH RBC QN AUTO: 31.4 PG (ref 26.9–32)
MCHC RBC AUTO-ENTMCNC: 30.8 G/DL (ref 32.4–36.3)
MCV RBC AUTO: 102 FL (ref 80.5–98.2)
MONOCYTES # BLD AUTO: 0.19 10*3/MM3 (ref 0.2–1.2)
MONOCYTES NFR BLD AUTO: 1.9 % (ref 5–12)
NEUTROPHILS # BLD AUTO: 9.11 10*3/MM3 (ref 1.9–8.1)
NEUTROPHILS NFR BLD AUTO: 91.9 % (ref 42.7–76)
PLATELET # BLD AUTO: 219 10*3/MM3 (ref 140–500)
PMV BLD AUTO: 11.6 FL (ref 6–12)
POTASSIUM BLD-SCNC: 3.6 MMOL/L (ref 3.5–5.2)
PROT SERPL-MCNC: 6.8 G/DL (ref 6–8.5)
RBC # BLD AUTO: 2.96 10*6/MM3 (ref 3.9–5.2)
SODIUM BLD-SCNC: 143 MMOL/L (ref 136–145)
WBC NRBC COR # BLD: 9.92 10*3/MM3 (ref 4.5–10.7)

## 2018-10-10 PROCEDURE — 25010000002 VINCRISTINE PER 1 MG: Performed by: INTERNAL MEDICINE

## 2018-10-10 PROCEDURE — 25010000002 DOXORUBICIN PER 10 MG: Performed by: INTERNAL MEDICINE

## 2018-10-10 PROCEDURE — 25010000002 ONDANSETRON PER 1 MG: Performed by: INTERNAL MEDICINE

## 2018-10-10 PROCEDURE — 99232 SBSQ HOSP IP/OBS MODERATE 35: CPT | Performed by: INTERNAL MEDICINE

## 2018-10-10 PROCEDURE — 63710000001 PREDNISONE PER 5 MG: Performed by: INTERNAL MEDICINE

## 2018-10-10 PROCEDURE — 80053 COMPREHEN METABOLIC PANEL: CPT | Performed by: INTERNAL MEDICINE

## 2018-10-10 PROCEDURE — 36591 DRAW BLOOD OFF VENOUS DEVICE: CPT

## 2018-10-10 PROCEDURE — 25010000002 ETOPOSIDE 100 MG/5ML SOLUTION 25 ML VIAL: Performed by: INTERNAL MEDICINE

## 2018-10-10 PROCEDURE — 85025 COMPLETE CBC W/AUTO DIFF WBC: CPT | Performed by: INTERNAL MEDICINE

## 2018-10-10 RX ADMIN — SULFAMETHOXAZOLE AND TRIMETHOPRIM 160 MG: 800; 160 TABLET ORAL at 08:16

## 2018-10-10 RX ADMIN — ONDANSETRON 16 MG: 2 INJECTION INTRAMUSCULAR; INTRAVENOUS at 18:02

## 2018-10-10 RX ADMIN — PREDNISONE 100 MG: 50 TABLET ORAL at 18:10

## 2018-10-10 RX ADMIN — POTASSIUM CHLORIDE 20 MEQ: 750 CAPSULE, EXTENDED RELEASE ORAL at 08:16

## 2018-10-10 RX ADMIN — Medication 1000 MCG: at 08:16

## 2018-10-10 RX ADMIN — DABIGATRAN ETEXILATE MESYLATE 150 MG: 150 CAPSULE ORAL at 08:16

## 2018-10-10 RX ADMIN — DABIGATRAN ETEXILATE MESYLATE 150 MG: 150 CAPSULE ORAL at 21:55

## 2018-10-10 RX ADMIN — VINCRISTINE SULFATE: 1 INJECTION, SOLUTION INTRAVENOUS at 18:37

## 2018-10-10 RX ADMIN — PANTOPRAZOLE SODIUM 40 MG: 40 TABLET, DELAYED RELEASE ORAL at 06:12

## 2018-10-10 RX ADMIN — ACYCLOVIR 400 MG: 400 TABLET ORAL at 08:15

## 2018-10-10 RX ADMIN — ACYCLOVIR 400 MG: 400 TABLET ORAL at 21:55

## 2018-10-10 RX ADMIN — PREDNISONE 100 MG: 50 TABLET ORAL at 08:15

## 2018-10-10 RX ADMIN — SODIUM CHLORIDE 75 ML/HR: 9 INJECTION, SOLUTION INTRAVENOUS at 06:12

## 2018-10-10 RX ADMIN — Medication 50 MG: at 08:16

## 2018-10-10 RX ADMIN — Medication 400 MG: at 08:16

## 2018-10-10 RX ADMIN — AMITRIPTYLINE HYDROCHLORIDE 10 MG: 10 TABLET, FILM COATED ORAL at 21:55

## 2018-10-10 RX ADMIN — SODIUM CHLORIDE 75 ML/HR: 9 INJECTION, SOLUTION INTRAVENOUS at 18:35

## 2018-10-10 RX ADMIN — FLUCONAZOLE 400 MG: 200 TABLET ORAL at 08:16

## 2018-10-10 NOTE — PLAN OF CARE
Problem: Patient Care Overview  Goal: Plan of Care Review  Outcome: Ongoing (interventions implemented as appropriate)   10/10/18 0450   Coping/Psychosocial   Plan of Care Reviewed With patient   Plan of Care Review   Progress no change   OTHER   Outcome Summary VSS. Ad rikki. Ambulating marrero. No c/o pain or nausea. R MP patent with +blood return. Tolerating day 2 cycle 6 of EPOCH. Cont to monitor VS & labs. No orders for labs this AM. Added CBC & CMP once order d/t chemo. Will address with dayshift to address with CBC MD.

## 2018-10-10 NOTE — PROGRESS NOTES
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 Admission for cycle 6.     History of Present Illness      The patient is a 23 y.o. female with the above-mentioned history who typically follows with Dr Siddiqi. This is my first time meeting patient.    She has mediastinal large B-cell lymphoma and has completed 5 cycles of chemotherapy with dose adusted EPOCHR. Her CT scans done on September 7, 2018 showed continued decrease in size of the large anterior mediastinal mass and mild splenomegaly with improvement from prior exam. The plan is to do a total of 6 cycles of chemotherapy.  Following 6 cycles patient will receive a PET/CT scan 6 weeks after completion.  She received Zoladex monthly and the next injection is due today, October 8, 2018.    She saw Dr. GIRARD last week and noted improvement iwth shortness of breath but worsening migraine headaches. They discussed neurology referral while inpatient. Patient has occasional light headedness but does not have a migraine today.     Past Medical History, Past Surgical History, Social History, Family History have been reviewed and are without significant changes.    Oncologic history: Reviewed and detailed in Dr Siddiqi's prior notes.      Review of Systems   Constitutional: Negative.    HENT: Negative.    Eyes: Negative.    Respiratory: Negative.    Cardiovascular: Negative.    Gastrointestinal: Negative.    Endocrine: Negative.    Genitourinary: Negative.    Musculoskeletal: Negative.    Skin: Negative.    Neurological: Negative.    Hematological: Negative.    Psychiatric/Behavioral: Negative.        Medications:  The current medication list was reviewed in the EMR    ALLERGIES:  No Known Allergies    Objective      Vitals:    10/09/18 1600 10/09/18 1955 10/10/18 0353 10/10/18 0852   BP: 91/53 94/59 105/66 102/67   BP Location: Left arm Left arm Left arm Left arm   Patient Position: Sitting Sitting  Lying Lying   Pulse: 66 71 56 54   Resp: 18 16 16 16   Temp: 97.1 °F (36.2 °C) 98.6 °F (37 °C) 97 °F (36.1 °C) 98.5 °F (36.9 °C)   TempSrc: Oral Oral Oral Oral   SpO2: 100% 98% 98% 98%   Weight:       Height:         Current Status 10/8/2018   ECOG score 0       Physical Exam     GENERAL:  Well-developed, well-nourished female in no acute distress. Vital signs reviewed.   SKIN:  Warm, dry without rashes, purpura or petechiae.  EYES:  Pupils equal, round and reactive to light.  EOMs intact.  Conjunctivae normal.  HEAD:  Normocephalic.  EARS/NOSE/MOUTH/THROAT:  Hearing intact. Septum midline.  No excoriations or nasal discharge. No stomatitis or ulcers.  Lips normal. Oropharynx without lesions or exudates.  NECK:  Supple with good range of motion; no thyromegaly or masses  LYMPHATICS:  No cervical, supraclavicular, axillary adenopathy.  RESP:  Lungs clear to percussion and auscultation. Good airflow. Normal respiratory effort.   CHEST: Mediport - Yes ; Breast exam- No  CARDIAC:  Regular rate and rhythm without murmurs, rubs or gallops. Normal S1,S2. No edema  GI:  Soft, nontender, no hepatosplenomegaly, normal bowel sounds  MSK:  No clubbing or cyanosis, No joint swelling noted in hands  NEUROLOGICAL:  Cranial Nerves II-XII grossly intact.  No focal neurological deficits.  PSYCHIATRIC:  Normal affect and mood.  Alert and Oriented x 3.       RECENT LABS:    Results from last 7 days  Lab Units 10/10/18  0407 10/09/18  0529 10/08/18  1131   WBC 10*3/mm3 9.92 9.64 5.93   NEUTROS ABS 10*3/mm3 9.11* 8.34* 3.60   HEMOGLOBIN g/dL 9.3* 8.7* 9.7*   HEMATOCRIT % 30.2* 28.0* 28.5*   PLATELETS 10*3/mm3 219 210 201         Assessment/Plan   1.  Primary mediastinal large B-cell lymphoma: The patient presented with dyspnea, cough, and chest pain.  A CT angiogram of the chest 6/8/18 showed a very large tumor mass in the mediastinum encasing multiple vascular structures and narrowing the left mainstem bronchus.  There was direct tumor  infiltration in the left upper lobe.  There was a small left pleural effusion.  She underwent a CT-guided biopsy of the mediastinal mass on 6/12/18 and pathology reviewed at [facility] oncology showed diffuse large B-cell lymphoma consistent with a primary diffuse large B-cell lymphoma.  A bone marrow exam was performed on 6/14/18 which was negative for lymphoma.  She underwent a PET scan 6/13/18 which showed a large hypermetabolic mass in the chest involving the anterior mediastinum, left hilum, nearly completely filling the left hemothorax with SUV 19.8.  There was physiologic distribution elsewhere.     The patient was started on IV fluid hydration and allopurinol for prevention of hyperuricemia.  She initiated systemic chemotherapy with DA-EPOCH-R on 6/16/18 with Neulasta support. She had a infusion reaction to rituximab but was able to complete with additional medications and otherwise tolerated chemotherapy well. She is also on Zoladex for ovarian protection. Case with previously discussed with Dr. Fox at the [facility]    · Admit for cycle 6 (last cycle) chemotherapy with dose adjusted EPOCH/R on October 8, 2018. Her doses will stay the same as they were for cycle 5.   · Outpatient Neulasta. She will be discharged on prophylactic antibiotics including acyclovir, Diflucan, and Bactrim.  She may require bacterial prophylaxis if the ANC drops below 1000.  · Will need PET/CT scan after 6th cycle and Dr GIRARD's note mentions that she wants PET/CT 5-6 weeks after last cycle of chemo.      2.  Pericardial effusion: Not mentioned on repeat echo 9/2018     3.   Hypokalemia. Potassium is chronically slightly low.  Requires 20 mg by mouth daily     4.  Fertility preservation:  Patient received Zoladex injection  for fertility preservation; this should be continued once monthly during her chemotherapy.    · Next dose of Zoladex due October 8, 2018, needs to receive when admitted     5.  Right upper extremity DVT  on Pradaxa.     7.  Migraine headaches, patient had it as a child but recently after cycle 4 patient developed migraine headaches and was seen by neurology when admitted with cycle 5.  MRI showed mild enhancement of the turbinate but was nonspecific.    · Given migraine headache has gotten worse, Reconsult neurology    8.  Shortness of breath with palpitations, could obtain repeat echocardiogram and if persistent palpitations with cardiology consult if needed while inpatient.     Plan 1.  From the point of view of his mediastinal non-Hodgkin's lymphoma the patient initiated yesterday a plan of chemotherapy with a regimen of EPOCH/Rituxan with no difficulties whatsoever.  Today she has not had any side effects from the treatment and neither last night, her port is working perfectly fine, she has no cancer-related pain and no symptoms whatsoever related to her lymphoma that was dramatically symptomatic at the time of her diagnosis given the bulk of her disease.   2.  From the point of view of migraines she has not had any further issues and we will just monitor this on clinical grounds.   3.  From the point of view of a low potassium this is being replaced.  Laboratory parameters remain stable.   4.  From the point of view of discharge, likely Friday evening or Saturday morning, depending on the timing of completion of chemotherapy.  She will require as well Neulasta injection and eventually she will require another PET scan as an outpatient. She will require blood counts in the office twice a week and she will require nursing practitioner assessment in between until she is seen by Dr. Padilla again for reassessment for response.     I discussed all these facts with the patient and the pharmacist taking care of the patient as well as the nurse taking care of the patient. I discussed all these facts in regard to therapy with the patient’s mother as well.    On 10/10/2018 the patient will continue her chemotherapy as  originally planned and we are planning to release her home very likely Saturday morning. She will come to [facility] or the oncology office for her Zoladex injection and for her Neulasta injection.     I discussed all of these facts with the patient and the pharmacist. She had no other issues today and she looks terrific.      Diaz Luevano MD   10/10/2018

## 2018-10-10 NOTE — PLAN OF CARE
Problem: Patient Care Overview  Goal: Plan of Care Review  Outcome: Ongoing (interventions implemented as appropriate)   10/10/18 5270   Coping/Psychosocial   Plan of Care Reviewed With patient   Plan of Care Review   Progress improving   OTHER   Outcome Summary Pt tolerating chemo well. Plan to hang new bag at 1830. Blood return present in port, flushes well. Will continue to monitor.      Goal: Individualization and Mutuality  Outcome: Ongoing (interventions implemented as appropriate)    Goal: Discharge Needs Assessment  Outcome: Ongoing (interventions implemented as appropriate)    Goal: Interprofessional Rounds/Family Conf  Outcome: Ongoing (interventions implemented as appropriate)      Problem: Oncology Care (Adult)  Goal: Signs and Symptoms of Listed Potential Problems Will be Absent, Minimized or Managed (Oncology Care)  Outcome: Ongoing (interventions implemented as appropriate)

## 2018-10-10 NOTE — PROGRESS NOTES
Patient Identification:  NAME:  Vikki Durham  Age:  23 y.o.   Sex:  female   :  1995   MRN:  [id number]       Chief complaint: Migraine    History of present illness:  No headache doing well      Past medical history:  Past Medical History:   Diagnosis Date   • Diffuse large B cell lymphoma (CMS/HCC) 2018   • Fracture of lower leg 2000    L   • H/O Lung mass 2018   • H/O Pericardial effusion        Allergies:  Patient has no known allergies.    Home medications:  Prescriptions Prior to Admission   Medication Sig Dispense Refill Last Dose   • acyclovir (ZOVIRAX) 400 MG tablet Take 400 mg by mouth 2 (Two) Times a Day. Take no more than 5 doses a day.   10/8/2018 at Unknown time   • dabigatran etexilate (PRADAXA) 150 MG capsu Take 1 capsule by mouth Every 12 (Twelve) Hours. 60 capsule 6 10/8/2018 at Unknown time   • fluconazole (DIFLUCAN) 200 MG tablet Take 400 mg by mouth 2 (Two) Times a Day. 2 tablets PO twice daily   10/8/2018 at Unknown time   • magnesium oxide (MAG-OX) 400 MG tablet Take 1 tablet by mouth Daily. 30 tablet 1 10/8/2018 at Unknown time   • ondansetron (ZOFRAN) 4 MG tablet Take 1 tablet by mouth Every 6 (Six) Hours As Needed for Nausea or Vomiting. 30 tablet 2 Past Month at Unknown time   • potassium chloride (MICRO-K) 10 MEQ CR capsule Take 2 capsules by mouth 2 (Two) Times a Day With Meals. (Patient taking differently: Take 20 mEq by mouth Daily.) 60 capsule 5 10/8/2018 at Unknown time   • pyridoxine (VITAMIN B-6) 50 MG tablet tablet Take 1 tablet by mouth Daily. 30 tablet 6 10/8/2018 at Unknown time   • sennosides-docusate sodium (SENOKOT-S) 8.6-50 MG tablet Take 2 tablets by mouth 2 (Two) Times a Day As Needed for Constipation. 60 tablet 1 Taking   • sulfamethoxazole-trimethoprim (BACTRIM DS,SEPTRA DS) 800-160 MG per tablet Take 1 tablet by mouth 3 (Three) Times a Week. 12 tablet 3 10/8/2018 at Unknown time   • vitamin B-12 (CYANOCOBALAMIN) 1000 MCG tablet Take 1,000 mcg by mouth  Daily.   10/8/2018 at Unknown time        Hospital medications:    acyclovir 400 mg Oral BID   amitriptyline 10 mg Oral Nightly   dabigatran etexilate 150 mg Oral Q12H   DOXOrubicin (ADRIAMYCIN), etoposide and vinCRIStine chemo IVPB (pediatric)  Intravenous Once   DOXOrubicin (ADRIAMYCIN), etoposide and vinCRIStine chemo IVPB (pediatric)  Intravenous Once   fluconazole 400 mg Oral Daily   magnesium oxide 400 mg Oral Daily   ondansetron (ZOFRAN) IVPB 50 mL 16 mg Intravenous Once   pantoprazole 40 mg Oral Q AM   potassium chloride 20 mEq Oral Daily   predniSONE 100 mg Oral BID With Meals   sulfamethoxazole-trimethoprim 1 tablet Oral Once per day on Mon Wed Fri   vitamin B-12 1,000 mcg Oral Daily   pyridoxine 50 mg Oral Daily       sodium chloride 75 mL/hr Last Rate: 75 mL/hr (10/10/18 0612)     •  acetaminophen  •  diphenhydrAMINE  •  famotidine  •  hydrocortisone sodium succinate  •  ondansetron  •  sennosides-docusate sodium      Objective:  Vitals Ranges:   Temp:  [97 °F (36.1 °C)-98.6 °F (37 °C)] 98.5 °F (36.9 °C)  Heart Rate:  [54-71] 54  Resp:  [16-18] 16  BP: ()/(53-67) 102/67      Physical Exam: Awake alert oriented ×3 normal cranial nerves II through VII good motor times for extremities is downgoing bilaterally    Results review:   I reviewed the patient's new clinical results.    Data review:  Lab Results (last 24 hours)     Procedure Component Value Units Date/Time    Comprehensive Metabolic Panel [[id number]]  (Abnormal) Collected:  10/10/18 0407    Specimen:  Blood Updated:  10/10/18 0638     Glucose 151 (H) mg/dL      BUN 8 mg/dL      Creatinine 0.61 mg/dL      Sodium 143 mmol/L      Potassium 3.6 mmol/L      Chloride 103 mmol/L      CO2 25.6 mmol/L      Calcium 9.5 mg/dL      Total Protein 6.8 g/dL      Albumin 4.40 g/dL      ALT (SGPT) 14 U/L      AST (SGOT) 18 U/L      Alkaline Phosphatase 50 U/L      Total Bilirubin <0.2 mg/dL      eGFR Non African Amer 122 mL/min/1.73      eGFR  African Amer  147 mL/min/1.73      Globulin 2.4 gm/dL      A/G Ratio 1.8 g/dL      BUN/Creatinine Ratio 13.1     Anion Gap 14.4 mmol/L     CBC & Differential [[id number]] Collected:  10/10/18 0407    Specimen:  Blood Updated:  10/10/18 0549    Narrative:       The following orders were created for panel order CBC & Differential.  Procedure                               Abnormality         Status                     ---------                               -----------         ------                     CBC Auto Differential[[id number]]        Abnormal            Final result                 Please view results for these tests on the individual orders.    CBC Auto Differential [[id number]]  (Abnormal) Collected:  10/10/18 0407    Specimen:  Blood Updated:  10/10/18 0549     WBC 9.92 10*3/mm3      RBC 2.96 (L) 10*6/mm3      Hemoglobin 9.3 (L) g/dL      Hematocrit 30.2 (L) %      .0 (H) fL      MCH 31.4 pg      MCHC 30.8 (L) g/dL      RDW 16.3 (H) %      RDW-SD 59.9 (H) fl      MPV 11.6 fL      Platelets 219 10*3/mm3      Neutrophil % 91.9 (H) %      Lymphocyte % 4.0 (L) %      Monocyte % 1.9 (L) %      Eosinophil % 0.0 (L) %      Basophil % 0.1 %      Immature Grans % 2.1 (H) %      Neutrophils, Absolute 9.11 (H) 10*3/mm3      Lymphocytes, Absolute 0.40 (L) 10*3/mm3      Monocytes, Absolute 0.19 (L) 10*3/mm3      Eosinophils, Absolute 0.00 10*3/mm3      Basophils, Absolute 0.01 10*3/mm3      Immature Grans, Absolute 0.21 (H) 10*3/mm3            Imaging:  Imaging Results (last 24 hours)     ** No results found for the last 24 hours. **             Assessment and Plan:       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)    She is tolerating Elavil 10 mg by mouth daily at bedtime she will continue this while she is getting the chemotherapy when she is discharged she may take this on a when necessary basis at night.      Rodger Rice MD  10/10/18  1:12 PM

## 2018-10-10 NOTE — TELEPHONE ENCOUNTER
----- Message from Halina Morris RN sent at 10/10/2018  9:14 AM EDT -----  Per Dr Padilla patient needs to be scheduled for ECHO before her next follow up. Order has been placed. Thanks

## 2018-10-10 NOTE — PROGRESS NOTES
"Student therapist met with pt for an art therapy session on 10/10/18 from [id number]. Pt was willing to participate. Student therapist directed pt to create a self symbol using play-tonia. Student therapist asked pt to describe her self symbol. Pt described the symbol to be a purple cortes. Once pt completed self symbol, student therapist directed pt to complete \"I\" statements about the symbol. The statements completed by the pt were; \"I am my grandmother's granddaughter\", \"I need to grow more like Edmund\", \"I want to honor God (thoughts, words, actions) and share his love\", \"I feel beloved by God\", and \"I am here on earth with a greater purpose\". Pt expressed that purple has symbolic meaning to her in regards to it being her grandmother's favorite color, as well as purple having a \"royalty\" sense. Pt also related the symbol to her amanda. The student therapist directed the pt to close her eyes and hold the symbol in her hands. Pt was asked to complete \"I\" statements again, keeping her eyes closed. Pt responded with the following statements; \"I feel symmetry\", \"I am content\", \"I want to know more\", and \"I need love\". Pt described this process as challenging because it \"really makes you think\". 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 Pt reported that she has great connection with her amanda and acknowledges that she wants to share that with others, as well as connect with others. Student therapist and pt continued to talk about her journey with school, what inspired her to pursue a career in dentistry, as well as her journey through treatment. Student therapist guided the conversation in regards to the \"I\" statements made about the artwork created by the pt. Overall, pt showed positive affect and excitement that this is her " last round of treatment and that she is ready to see what is next for her.

## 2018-10-11 LAB
ALBUMIN SERPL-MCNC: 4.1 G/DL (ref 3.5–5.2)
ALBUMIN/GLOB SERPL: 2.2 G/DL
ALP SERPL-CCNC: 40 U/L (ref 39–117)
ALT SERPL W P-5'-P-CCNC: 15 U/L (ref 1–33)
ANION GAP SERPL CALCULATED.3IONS-SCNC: 12 MMOL/L
AST SERPL-CCNC: 17 U/L (ref 1–32)
BASOPHILS # BLD AUTO: 0.01 10*3/MM3 (ref 0–0.2)
BASOPHILS NFR BLD AUTO: 0.2 % (ref 0–1.5)
BILIRUB SERPL-MCNC: <0.2 MG/DL (ref 0.1–1.2)
BUN BLD-MCNC: 9 MG/DL (ref 6–20)
BUN/CREAT SERPL: 20.9 (ref 7–25)
CALCIUM SPEC-SCNC: 8.8 MG/DL (ref 8.6–10.5)
CHLORIDE SERPL-SCNC: 106 MMOL/L (ref 98–107)
CO2 SERPL-SCNC: 25 MMOL/L (ref 22–29)
CREAT BLD-MCNC: 0.43 MG/DL (ref 0.57–1)
DEPRECATED RDW RBC AUTO: 60.4 FL (ref 37–54)
EOSINOPHIL # BLD AUTO: 0 10*3/MM3 (ref 0–0.7)
EOSINOPHIL NFR BLD AUTO: 0 % (ref 0.3–6.2)
ERYTHROCYTE [DISTWIDTH] IN BLOOD BY AUTOMATED COUNT: 16.3 % (ref 11.7–13)
GFR SERPL CREATININE-BSD FRML MDRD: >150 ML/MIN/1.73
GFR SERPL CREATININE-BSD FRML MDRD: >150 ML/MIN/1.73
GLOBULIN UR ELPH-MCNC: 1.9 GM/DL
GLUCOSE BLD-MCNC: 129 MG/DL (ref 65–99)
HCT VFR BLD AUTO: 26.7 % (ref 35.6–45.5)
HGB BLD-MCNC: 8.3 G/DL (ref 11.9–15.5)
IMM GRANULOCYTES # BLD: 0.07 10*3/MM3 (ref 0–0.03)
IMM GRANULOCYTES NFR BLD: 1.2 % (ref 0–0.5)
LYMPHOCYTES # BLD AUTO: 0.2 10*3/MM3 (ref 0.9–4.8)
LYMPHOCYTES NFR BLD AUTO: 3.4 % (ref 19.6–45.3)
MCH RBC QN AUTO: 31.6 PG (ref 26.9–32)
MCHC RBC AUTO-ENTMCNC: 31.1 G/DL (ref 32.4–36.3)
MCV RBC AUTO: 101.5 FL (ref 80.5–98.2)
MONOCYTES # BLD AUTO: 0.26 10*3/MM3 (ref 0.2–1.2)
MONOCYTES NFR BLD AUTO: 4.4 % (ref 5–12)
NEUTROPHILS # BLD AUTO: 5.38 10*3/MM3 (ref 1.9–8.1)
NEUTROPHILS NFR BLD AUTO: 90.8 % (ref 42.7–76)
PLATELET # BLD AUTO: 173 10*3/MM3 (ref 140–500)
PMV BLD AUTO: 11.3 FL (ref 6–12)
POTASSIUM BLD-SCNC: 3.2 MMOL/L (ref 3.5–5.2)
PROT SERPL-MCNC: 6 G/DL (ref 6–8.5)
RBC # BLD AUTO: 2.63 10*6/MM3 (ref 3.9–5.2)
SODIUM BLD-SCNC: 143 MMOL/L (ref 136–145)
WBC NRBC COR # BLD: 5.92 10*3/MM3 (ref 4.5–10.7)

## 2018-10-11 PROCEDURE — 80053 COMPREHEN METABOLIC PANEL: CPT | Performed by: INTERNAL MEDICINE

## 2018-10-11 PROCEDURE — 99233 SBSQ HOSP IP/OBS HIGH 50: CPT | Performed by: INTERNAL MEDICINE

## 2018-10-11 PROCEDURE — 25010000002 ETOPOSIDE 100 MG/5ML SOLUTION 25 ML VIAL: Performed by: INTERNAL MEDICINE

## 2018-10-11 PROCEDURE — 85025 COMPLETE CBC W/AUTO DIFF WBC: CPT | Performed by: INTERNAL MEDICINE

## 2018-10-11 PROCEDURE — 25010000002 DOXORUBICIN PER 10 MG: Performed by: INTERNAL MEDICINE

## 2018-10-11 PROCEDURE — 25010000002 VINCRISTINE PER 1 MG: Performed by: INTERNAL MEDICINE

## 2018-10-11 PROCEDURE — 63710000001 PREDNISONE PER 5 MG: Performed by: INTERNAL MEDICINE

## 2018-10-11 PROCEDURE — 25010000002 ONDANSETRON PER 1 MG: Performed by: INTERNAL MEDICINE

## 2018-10-11 RX ADMIN — PANTOPRAZOLE SODIUM 40 MG: 40 TABLET, DELAYED RELEASE ORAL at 04:58

## 2018-10-11 RX ADMIN — VINCRISTINE SULFATE: 1 INJECTION, SOLUTION INTRAVENOUS at 18:38

## 2018-10-11 RX ADMIN — SODIUM CHLORIDE 75 ML/HR: 9 INJECTION, SOLUTION INTRAVENOUS at 22:53

## 2018-10-11 RX ADMIN — DABIGATRAN ETEXILATE MESYLATE 150 MG: 150 CAPSULE ORAL at 08:06

## 2018-10-11 RX ADMIN — DABIGATRAN ETEXILATE MESYLATE 150 MG: 150 CAPSULE ORAL at 22:54

## 2018-10-11 RX ADMIN — PREDNISONE 100 MG: 50 TABLET ORAL at 18:50

## 2018-10-11 RX ADMIN — FLUCONAZOLE 400 MG: 200 TABLET ORAL at 08:06

## 2018-10-11 RX ADMIN — Medication 50 MG: at 08:06

## 2018-10-11 RX ADMIN — ACYCLOVIR 400 MG: 400 TABLET ORAL at 22:54

## 2018-10-11 RX ADMIN — Medication 400 MG: at 08:06

## 2018-10-11 RX ADMIN — Medication 1000 MCG: at 08:06

## 2018-10-11 RX ADMIN — ACYCLOVIR 400 MG: 400 TABLET ORAL at 08:06

## 2018-10-11 RX ADMIN — ONDANSETRON 16 MG: 2 INJECTION INTRAMUSCULAR; INTRAVENOUS at 18:13

## 2018-10-11 RX ADMIN — AMITRIPTYLINE HYDROCHLORIDE 10 MG: 10 TABLET, FILM COATED ORAL at 22:54

## 2018-10-11 RX ADMIN — POTASSIUM CHLORIDE 20 MEQ: 750 CAPSULE, EXTENDED RELEASE ORAL at 08:06

## 2018-10-11 RX ADMIN — PREDNISONE 100 MG: 50 TABLET ORAL at 08:06

## 2018-10-11 NOTE — NURSING NOTE
"Nursing Chemotherapy Verification    Chemotherapy Regimen:   Treatment Plans     Name Type Hold Status Plan dates Plan Provider       Active    OP Goserelin 3.6 mg Q28D ONCOLOGY SUPPORTIVE CARE 1 On Automatic Hold  6/20/2018 - Present Ramón Camp MD    IP LYMPHOMA R-EPOCH (Dose Adjusted) RiTUXimab / Etoposide / DOXOrubicin / VinCRIStine / Cyclophosphamide / PredniSONE ONCOLOGY TREATMENT Not on Hold  6/16/2018 - Present Kimberley Melgar MD                    Current height and weight: 157.5 cm (62\") 63.9 kg (140 lb 14 oz)  Calculated BSA from current height and weight (Zahira): 1.65    Relevant Labs    Results from last 7 days  Lab Units 10/11/18  0448 10/10/18  0407 10/09/18  0529   WBC 10*3/mm3 5.92 9.92 9.64   HEMOGLOBIN g/dL 8.3* 9.3* 8.7*   HEMATOCRIT % 26.7* 30.2* 28.0*   PLATELETS 10*3/mm3 173 219 210     Lab Results   Component Value Date    NEUTROABS 5.38 10/11/2018         Results from last 7 days  Lab Units 10/11/18  0448 10/10/18  0407 10/09/18  0529   CREATININE mg/dL 0.43* 0.61 0.50*       Serum creatinine: 0.43 mg/dL (L) 10/11/18 0448  Estimated creatinine clearance: 178.6 mL/min (A)    Lab Results   Component Value Date    HEPAIGM Negative 07/13/2018    HEPAIGM Non-Reactive 07/13/2018    HEPBCAB Negative 06/14/2018       Verification attestation:  I have personally reviewed the planned regimen and its administration and dosing. I understand the potential side effects.The patient has been instructed on the regimen, potential side effects, and self care measures; the consent form has been completed. I have confirmed that the appropriate premedication, prehydration, post medication and/or emergency medications are ordered in addition to the chemotherapy.    I have independently verified that the height and weight is current and calculated the BSA. I have verified the doses with the planned regimen and have clarified any deviations with the physician Dr. Luevano. I have confirmed the route of " administration and patient IV access.    Nurse: Divine Woods, RN  2nd verification Nurse: Darby Rojas RN

## 2018-10-11 NOTE — PROGRESS NOTES
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 Admission for cycle 6.     History of Present Illness      The patient is a 23 y.o. female with the above-mentioned history who typically follows with Dr Siddiqi. This is my first time meeting patient.    She has mediastinal large B-cell lymphoma and has completed 5 cycles of chemotherapy with dose adusted EPOCHR. Her CT scans done on September 7, 2018 showed continued decrease in size of the large anterior mediastinal mass and mild splenomegaly with improvement from prior exam. The plan is to do a total of 6 cycles of chemotherapy.  Following 6 cycles patient will receive a PET/CT scan 6 weeks after completion.  She received Zoladex monthly and the next injection is due today, October 8, 2018.    She saw Dr. GIRARD last week and noted improvement iwth shortness of breath but worsening migraine headaches. They discussed neurology referral while inpatient. Patient has occasional light headedness but does not have a migraine today.     Past Medical History, Past Surgical History, Social History, Family History have been reviewed and are without significant changes.    Oncologic history: Reviewed and detailed in Dr Siddiqi's prior notes.      Review of Systems   Constitutional: Negative.    HENT: Negative.    Eyes: Negative.    Respiratory: Negative.    Cardiovascular: Negative.    Gastrointestinal: Negative.    Endocrine: Negative.    Genitourinary: Negative.    Musculoskeletal: Negative.    Skin: Negative.    Neurological: Negative.    Hematological: Negative.    Psychiatric/Behavioral: Negative.        Medications:  The current medication list was reviewed in the EMR    ALLERGIES:  No Known Allergies    Objective      Vitals:    10/10/18 1957 10/11/18 0437 10/11/18 0700 10/11/18 1152   BP: 95/57 111/74 109/69 99/64   BP Location: Left arm Left arm Left arm Left arm   Patient Position: Sitting Lying  Lying Sitting   Pulse: 70 52 54 70   Resp: 18 16 16 16   Temp: 97.6 °F (36.4 °C) 97 °F (36.1 °C) 96.9 °F (36.1 °C) 97.6 °F (36.4 °C)   TempSrc: Oral Oral Oral Oral   SpO2: 98% 99% 100% 99%   Weight:       Height:         Current Status 10/8/2018   ECOG score 0       Physical Exam     GENERAL:  Well-developed, well-nourished female in no acute distress. Vital signs reviewed.   SKIN:  Warm, dry without rashes, purpura or petechiae.  EYES:  Pupils equal, round and reactive to light.  EOMs intact.  Conjunctivae normal.  HEAD:  Normocephalic.  EARS/NOSE/MOUTH/THROAT:  Hearing intact. Septum midline.  No excoriations or nasal discharge. No stomatitis or ulcers.  Lips normal. Oropharynx without lesions or exudates.  NECK:  Supple with good range of motion; no thyromegaly or masses  LYMPHATICS:  No cervical, supraclavicular, axillary adenopathy.  RESP:  Lungs clear to percussion and auscultation. Good airflow. Normal respiratory effort.   CHEST: Mediport - Yes ; Breast exam- No  CARDIAC:  Regular rate and rhythm without murmurs, rubs or gallops. Normal S1,S2. No edema  GI:  Soft, nontender, no hepatosplenomegaly, normal bowel sounds  MSK:  No clubbing or cyanosis, No joint swelling noted in hands  NEUROLOGICAL:  Cranial Nerves II-XII grossly intact.  No focal neurological deficits.  PSYCHIATRIC:  Normal affect and mood.  Alert and Oriented x 3.       RECENT LABS:    Results from last 7 days  Lab Units 10/11/18  0448 10/10/18  0407 10/09/18  0529   WBC 10*3/mm3 5.92 9.92 9.64   NEUTROS ABS 10*3/mm3 5.38 9.11* 8.34*   HEMOGLOBIN g/dL 8.3* 9.3* 8.7*   HEMATOCRIT % 26.7* 30.2* 28.0*   PLATELETS 10*3/mm3 173 219 210         Assessment/Plan   1.  Primary mediastinal large B-cell lymphoma: The patient presented with dyspnea, cough, and chest pain.  A CT angiogram of the chest 6/8/18 showed a very large tumor mass in the mediastinum encasing multiple vascular structures and narrowing the left mainstem bronchus.  There was direct  tumor infiltration in the left upper lobe.  There was a small left pleural effusion.  She underwent a CT-guided biopsy of the mediastinal mass on 6/12/18 and pathology reviewed at [facility] oncology showed diffuse large B-cell lymphoma consistent with a primary diffuse large B-cell lymphoma.  A bone marrow exam was performed on 6/14/18 which was negative for lymphoma.  She underwent a PET scan 6/13/18 which showed a large hypermetabolic mass in the chest involving the anterior mediastinum, left hilum, nearly completely filling the left hemothorax with SUV 19.8.  There was physiologic distribution elsewhere.     The patient was started on IV fluid hydration and allopurinol for prevention of hyperuricemia.  She initiated systemic chemotherapy with DA-EPOCH-R on 6/16/18 with Neulasta support. She had a infusion reaction to rituximab but was able to complete with additional medications and otherwise tolerated chemotherapy well. She is also on Zoladex for ovarian protection. Case with previously discussed with Dr. Fox at the [facility]    · Admit for cycle 6 (last cycle) chemotherapy with dose adjusted EPOCH/R on October 8, 2018. Her doses will stay the same as they were for cycle 5.   · Outpatient Neulasta. She will be discharged on prophylactic antibiotics including acyclovir, Diflucan, and Bactrim.  She may require bacterial prophylaxis if the ANC drops below 1000.  · Will need PET/CT scan after 6th cycle and Dr GIRARD's note mentions that she wants PET/CT 5-6 weeks after last cycle of chemo.      2.  Pericardial effusion: Not mentioned on repeat echo 9/2018     3.   Hypokalemia. Potassium is chronically slightly low.  Requires 20 mg by mouth daily     4.  Fertility preservation:  Patient received Zoladex injection  for fertility preservation; this should be continued once monthly during her chemotherapy.    · Next dose of Zoladex due October 8, 2018, needs to receive when admitted     5.  Right upper  extremity DVT on Pradaxa.     7.  Migraine headaches, patient had it as a child but recently after cycle 4 patient developed migraine headaches and was seen by neurology when admitted with cycle 5.  MRI showed mild enhancement of the turbinate but was nonspecific.    · Given migraine headache has gotten worse, Reconsult neurology    8.  Shortness of breath with palpitations, could obtain repeat echocardiogram and if persistent palpitations with cardiology consult if needed while inpatient.     Plan 1.  From the point of view of his mediastinal non-Hodgkin's lymphoma the patient initiated yesterday a plan of chemotherapy with a regimen of EPOCH/Rituxan with no difficulties whatsoever.  Today she has not had any side effects from the treatment and neither last night, her port is working perfectly fine, she has no cancer-related pain and no symptoms whatsoever related to her lymphoma that was dramatically symptomatic at the time of her diagnosis given the bulk of her disease.   2.  From the point of view of migraines she has not had any further issues and we will just monitor this on clinical grounds.   3.  From the point of view of a low potassium this is being replaced.  Laboratory parameters remain stable.   4.  From the point of view of discharge, likely Friday evening or Saturday morning, depending on the timing of completion of chemotherapy.  She will require as well Neulasta injection and eventually she will require another PET scan as an outpatient. She will require blood counts in the office twice a week and she will require nursing practitioner assessment in between until she is seen by Dr. Padilla again for reassessment for response.     I discussed all these facts with the patient and the pharmacist taking care of the patient as well as the nurse taking care of the patient. I discussed all these facts in regard to therapy with the patient’s mother as well.    On 10/10/2018 the patient will continue her  chemotherapy as originally planned and we are planning to release her home very likely Saturday morning. She will come to [facility] or the oncology office for her Zoladex injection and for her Neulasta injection.     I discussed all of these facts with the patient and the pharmacist. She had no other issues today and she looks terrific.  On 10/11/2018, the patient continues doing fantastic from the point of view of the administration of her chemotherapy treatment with no nausea, vomiting, diarrhea, constipation or pain.  Her port was completely functional.  Her laboratory parameters documented a minor drop in the hemoglobin and I would not be surprised if she needs to be transfused tomorrow before discharge before completion of her plan of chemotherapy.  The patient already has an appointment for followup in the office next week.  She also has an appointment to proceed with Zoladex next week as well as her Neulasta injection.     Other than that, nothing new in regard to her laboratory parameters.  She discussed issues with the [place] and feels very much at peace in regard to how well she is doing and how much she has achieved in regard to controlling her disease process.  I discussed all the facts with the patient and pharmacist.        Diaz Luevano MD   10/11/2018

## 2018-10-11 NOTE — PROGRESS NOTES
Discharge Planning Assessment  [facility]     Patient Name: Vikki Durham  MRN: [id number]  Today's Date: 10/11/2018    Admit Date: 10/8/2018          Discharge Needs Assessment     Row Name 10/11/18 1840       Living Environment    Quality of Family Relationships helpful;supportive    Able to Return to Prior Arrangements yes    Living Arrangement Comments Lives with parents in a multi-level house.       Resource/Environmental Concerns    Resource/Environmental Concerns none       Transition Planning    Patient/Family Anticipates Transition to home with family    Patient/Family Anticipated Services at Transition none    Transportation Anticipated family or friend will provide       Discharge Needs Assessment    Readmission Within the Last 30 Days planned readmission    Concerns to be Addressed no discharge needs identified;denies needs/concerns at this time    Equipment Currently Used at Home none    Anticipated Changes Related to Illness inability to return to school    Equipment Needed After Discharge none    Offered/Gave Vendor List no    Patient's Choice of Community Agency(s) No HH or SNF needed.    Discharge Coordination/Progress Per patient---no changes noted.    Row Name 10/11/18 5895       Living Environment    Lives With parent(s)    Name(s) of Who Lives With Patient Emma Durham/parents      Current Living Arrangements home/apartment/condo    Primary Care Provided by self;parent(s)    Provides Primary Care For no one    Family Caregiver if Needed parent(s)    Family Caregiver Names Emma Durham/parents  [phone]    Quality of Family Relationships involved            Discharge Plan     Row Name 10/11/18 0960       Plan    Plan Plans dc home with parents. No needs noted. Continue to follow.        Destination     No service coordination in this encounter.      Durable Medical Equipment     No service coordination in this encounter.      Dialysis/Infusion     No service coordination in  this encounter.      Home Medical Care     No service coordination in this encounter.      Social Care     No service coordination in this encounter.                Demographic Summary     Row Name 10/11/18 1332       General Information    Admission Type inpatient    Referral Source admission list    Reason for Consult discharge planning            Functional Status     Row Name 10/11/18 1338       Functional Status    Usual Activity Tolerance good    Current Activity Tolerance good       Functional Status, IADL    Medications independent    Meal Preparation independent    Housekeeping independent    Laundry independent    Shopping independent            Psychosocial    No documentation.           Abuse/Neglect    No documentation.           Legal    No documentation.           Substance Abuse    No documentation.           Patient Forms    No documentation.         Lien Tate RN

## 2018-10-11 NOTE — PLAN OF CARE
Problem: Patient Care Overview  Goal: Plan of Care Review  Outcome: Ongoing (interventions implemented as appropriate)   10/11/18 1750   Coping/Psychosocial   Plan of Care Reviewed With patient   Plan of Care Review   Progress improving   OTHER   Outcome Summary Tolerating chemp well. Last bag to be hung at 1830. No c/o pain. Will continue to monitor.      Goal: Individualization and Mutuality  Outcome: Ongoing (interventions implemented as appropriate)    Goal: Discharge Needs Assessment  Outcome: Ongoing (interventions implemented as appropriate)    Goal: Interprofessional Rounds/Family Conf  Outcome: Ongoing (interventions implemented as appropriate)      Problem: Oncology Care (Adult)  Goal: Signs and Symptoms of Listed Potential Problems Will be Absent, Minimized or Managed (Oncology Care)  Outcome: Ongoing (interventions implemented as appropriate)

## 2018-10-11 NOTE — PLAN OF CARE
Problem: Patient Care Overview  Goal: Plan of Care Review  Outcome: Ongoing (interventions implemented as appropriate)   10/11/18 0602   Coping/Psychosocial   Plan of Care Reviewed With patient   Plan of Care Review   Progress improving   OTHER   Outcome Summary Pt appeared to have a restful night. Friend at bedside until about 2200. Continue to tolerate chemo.        Problem: Oncology Care (Adult)  Goal: Signs and Symptoms of Listed Potential Problems Will be Absent, Minimized or Managed (Oncology Care)  Outcome: Ongoing (interventions implemented as appropriate)

## 2018-10-12 VITALS
TEMPERATURE: 98.3 F | RESPIRATION RATE: 16 BRPM | SYSTOLIC BLOOD PRESSURE: 103 MMHG | WEIGHT: 140.87 LBS | BODY MASS INDEX: 25.92 KG/M2 | DIASTOLIC BLOOD PRESSURE: 61 MMHG | OXYGEN SATURATION: 98 % | HEIGHT: 62 IN | HEART RATE: 61 BPM

## 2018-10-12 LAB
ALBUMIN SERPL-MCNC: 4.2 G/DL (ref 3.5–5.2)
ALBUMIN/GLOB SERPL: 2.1 G/DL
ALP SERPL-CCNC: 41 U/L (ref 39–117)
ALT SERPL W P-5'-P-CCNC: 22 U/L (ref 1–33)
ANION GAP SERPL CALCULATED.3IONS-SCNC: 12.1 MMOL/L
AST SERPL-CCNC: 17 U/L (ref 1–32)
BASOPHILS # BLD AUTO: 0 10*3/MM3 (ref 0–0.2)
BASOPHILS NFR BLD AUTO: 0 % (ref 0–1.5)
BILIRUB SERPL-MCNC: 0.2 MG/DL (ref 0.1–1.2)
BUN BLD-MCNC: 10 MG/DL (ref 6–20)
BUN/CREAT SERPL: 19.6 (ref 7–25)
CALCIUM SPEC-SCNC: 9.3 MG/DL (ref 8.6–10.5)
CHLORIDE SERPL-SCNC: 103 MMOL/L (ref 98–107)
CO2 SERPL-SCNC: 26.9 MMOL/L (ref 22–29)
CREAT BLD-MCNC: 0.51 MG/DL (ref 0.57–1)
DEPRECATED RDW RBC AUTO: 57.7 FL (ref 37–54)
EOSINOPHIL # BLD AUTO: 0 10*3/MM3 (ref 0–0.7)
EOSINOPHIL NFR BLD AUTO: 0 % (ref 0.3–6.2)
ERYTHROCYTE [DISTWIDTH] IN BLOOD BY AUTOMATED COUNT: 15.8 % (ref 11.7–13)
GFR SERPL CREATININE-BSD FRML MDRD: 149 ML/MIN/1.73
GFR SERPL CREATININE-BSD FRML MDRD: >150 ML/MIN/1.73
GLOBULIN UR ELPH-MCNC: 2 GM/DL
GLUCOSE BLD-MCNC: 124 MG/DL (ref 65–99)
HCT VFR BLD AUTO: 28.8 % (ref 35.6–45.5)
HGB BLD-MCNC: 9.1 G/DL (ref 11.9–15.5)
IMM GRANULOCYTES # BLD: 0.05 10*3/MM3 (ref 0–0.03)
IMM GRANULOCYTES NFR BLD: 1.4 % (ref 0–0.5)
LYMPHOCYTES # BLD AUTO: 0.16 10*3/MM3 (ref 0.9–4.8)
LYMPHOCYTES NFR BLD AUTO: 4.3 % (ref 19.6–45.3)
MCH RBC QN AUTO: 31.6 PG (ref 26.9–32)
MCHC RBC AUTO-ENTMCNC: 31.6 G/DL (ref 32.4–36.3)
MCV RBC AUTO: 100 FL (ref 80.5–98.2)
MONOCYTES # BLD AUTO: 0.09 10*3/MM3 (ref 0.2–1.2)
MONOCYTES NFR BLD AUTO: 2.4 % (ref 5–12)
NEUTROPHILS # BLD AUTO: 3.38 10*3/MM3 (ref 1.9–8.1)
NEUTROPHILS NFR BLD AUTO: 91.9 % (ref 42.7–76)
PLATELET # BLD AUTO: 185 10*3/MM3 (ref 140–500)
PMV BLD AUTO: 11.2 FL (ref 6–12)
POTASSIUM BLD-SCNC: 3.3 MMOL/L (ref 3.5–5.2)
PROT SERPL-MCNC: 6.2 G/DL (ref 6–8.5)
RBC # BLD AUTO: 2.88 10*6/MM3 (ref 3.9–5.2)
SODIUM BLD-SCNC: 142 MMOL/L (ref 136–145)
WBC NRBC COR # BLD: 3.68 10*3/MM3 (ref 4.5–10.7)

## 2018-10-12 PROCEDURE — 85025 COMPLETE CBC W/AUTO DIFF WBC: CPT | Performed by: INTERNAL MEDICINE

## 2018-10-12 PROCEDURE — 80053 COMPREHEN METABOLIC PANEL: CPT | Performed by: INTERNAL MEDICINE

## 2018-10-12 PROCEDURE — 99239 HOSP IP/OBS DSCHRG MGMT >30: CPT | Performed by: INTERNAL MEDICINE

## 2018-10-12 PROCEDURE — 25010000002 CYCLOPHOSPHAMIDE PER 100 MG: Performed by: INTERNAL MEDICINE

## 2018-10-12 PROCEDURE — 25010000002 ONDANSETRON PER 1 MG: Performed by: INTERNAL MEDICINE

## 2018-10-12 PROCEDURE — 63710000001 PREDNISONE PER 5 MG: Performed by: INTERNAL MEDICINE

## 2018-10-12 RX ORDER — AMITRIPTYLINE HYDROCHLORIDE 10 MG/1
10 TABLET, FILM COATED ORAL NIGHTLY
Qty: 30 TABLET | Refills: 3 | Status: SHIPPED | OUTPATIENT
Start: 2018-10-12 | End: 2019-05-07

## 2018-10-12 RX ORDER — PREDNISONE 50 MG/1
100 TABLET ORAL 2 TIMES DAILY WITH MEALS
Qty: 2 TABLET | Refills: 0 | Status: SHIPPED | OUTPATIENT
Start: 2018-10-12 | End: 2018-10-13

## 2018-10-12 RX ADMIN — CYCLOPHOSPHAMIDE 1800 MG: 2 INJECTION, POWDER, FOR SOLUTION INTRAVENOUS; ORAL at 18:37

## 2018-10-12 RX ADMIN — DABIGATRAN ETEXILATE MESYLATE 150 MG: 150 CAPSULE ORAL at 08:30

## 2018-10-12 RX ADMIN — SULFAMETHOXAZOLE AND TRIMETHOPRIM 160 MG: 800; 160 TABLET ORAL at 08:29

## 2018-10-12 RX ADMIN — POTASSIUM CHLORIDE 20 MEQ: 750 CAPSULE, EXTENDED RELEASE ORAL at 08:30

## 2018-10-12 RX ADMIN — ONDANSETRON HYDROCHLORIDE 16 MG: 2 SOLUTION INTRAMUSCULAR; INTRAVENOUS at 18:07

## 2018-10-12 RX ADMIN — SODIUM CHLORIDE 75 ML/HR: 9 INJECTION, SOLUTION INTRAVENOUS at 12:01

## 2018-10-12 RX ADMIN — Medication 50 MG: at 08:29

## 2018-10-12 RX ADMIN — PANTOPRAZOLE SODIUM 40 MG: 40 TABLET, DELAYED RELEASE ORAL at 04:36

## 2018-10-12 RX ADMIN — Medication 400 MG: at 08:30

## 2018-10-12 RX ADMIN — PREDNISONE 100 MG: 50 TABLET ORAL at 18:07

## 2018-10-12 RX ADMIN — Medication 1000 MCG: at 08:30

## 2018-10-12 RX ADMIN — PREDNISONE 100 MG: 50 TABLET ORAL at 08:30

## 2018-10-12 RX ADMIN — FLUCONAZOLE 400 MG: 200 TABLET ORAL at 08:30

## 2018-10-12 RX ADMIN — ACYCLOVIR 400 MG: 400 TABLET ORAL at 08:30

## 2018-10-12 NOTE — PROGRESS NOTES
Case Management Discharge Note    Final Note: Orders received to dc home. No needs noted. Family to transport per private auto.    Destination     No service has been selected for the patient.      Durable Medical Equipment     No service has been selected for the patient.      Dialysis/Infusion     No service has been selected for the patient.      Home Medical Care     No service has been selected for the patient.      Social Care     No service has been selected for the patient.             Final Discharge Disposition Code: 01 - home or self-care

## 2018-10-12 NOTE — DISCHARGE SUMMARY
NAME: Vikki Durham ADMIT: 10/8/2018   : 1995  PCP: Carla Zhao MD    MRN: [id number] LOS: 4 days   AGE/SEX: 23 y.o. female  ROOM:      Date of Admission:  10/8/2018  Date of Discharge:  10/12/2018    PCP: Carla Zhao MD    CHIEF COMPLAINTThis patient was admitted to the hospital to continue and complete her chemotherapy in the background of mediastinal B-cell non-Hodgkin lymphoma. This will be her last course of chemotherapy. Overall the patient is doing dramatically better. She had no cancer-related pain or shortness of breath. No chest fullness or discomfort of any other nature. The patient proceeded with her treatment without any conveniences like described below. The patient had no other issues at the time of the admission besides minimal exacerbation of her migraine. She was seen by neurologist without any further issues or problems.      No chief complaint on file.      DISCHARGE DIAGNOSIS  Active Hospital Problems    Diagnosis Date Noted   • 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) [C85.29] 10/08/2018      Resolved Hospital Problems    Diagnosis Date Noted Date Resolved   No resolved problems to display.       SECONDARY DIAGNOSES  Past Medical History:   Diagnosis Date   • Diffuse large B cell lymphoma (CMS/HCC) 2018   • Fracture of lower leg 2000    L   • H/O Lung mass 2018   • H/O Pericardial effusion        CONSULTS   Consults     Date and Time Order Name Status Description    10/8/2018 1324 Inpatient Neurology Consult Completed     2018 1335 Inpatient ENT Consult Completed     2018 1056 Inpatient Neurology Consult Completed           PROCEDURES PERFORMED  Imaging Results (last 72 hours)     ** No results found for the last 72 hours. **            HOSPITAL COURSEThe patient underwent normal assessment in regard to completion of chemotherapy for her mediastinal B-cell non-Hodgkin lymphoma. Her port was accessed.  Chemotherapy orders were written. Pharmacy mixed the medications and she initiated her treatment. She tolerated all the days of treatment without any difficulties. Specifically no nausea, no vomiting, no constipation. Normal bowel activity, normal urination. No fevers, no chills, no sweats. No respiratory or cardiovascular issues. She had a minor degree of migraine that went away without any further issues or problems. She was seen by Neurology.     Other than that, her laboratory parameters at discharge were stable. The patient received all her chemotherapy as originally planned. She already has appointments in the office to continue with her medications like she was supposed to be and continue her blood work and MD appointments as well.     Other than that, no other issues at this time. All these issues were discussed with the patient and her mother. Also this was discussed with pharmacist and discussed with nurse taking care of the patient.            PHYSICAL EXAM   GENERAL:  Well-developed, well-nourished  Patient  in no acute distress.   SKIN:  Warm, dry ,NO rashes,NO purpura ,NO petechiae.  HEENT:  Pupils were equal and reactive to light and accomodation, conjunctivas non injected, no pterigion, normal extraocular movements, normal visual acuity.   Mouth mucosa was moist, no exudates in oropharynx, normal gum line, normal roof of the mouth and pillars, normal papillations of the tongue.  NECK:  Supple with good range of motion; no thyromegaly or masses, no JVD or bruits, no cervical adenopathies.No carotid arteries pain, no carotid abnormal pulsation , NO arterial dance.  LYMPHATICS:  No cervical, NO supraclavicular, NO axillary,NO epitrochlear , NO inguinal adenopathy.  CHEST:  Normal excursion of both sarah beth thoraces, normal voice fremitus, no subcutaneous emphysema, normal axillas, no rashes or acanthosis nigricans. Lungs clear to percussion and auscultation, normal breath sounds bilaterally, no wheezing,NO  crackles NO ronchi, NO stridor, NO rubs.  CARDIAC AND VASCULAR:  normal rate and regular rhythm, without murmurs,NO rubs NO S3 NO S4 right or left . Normal femoral, popliteal, pedis, brachial and carotid pulses.  ABDOMEN:  Soft, nontender with no organomegaly or masses, no ascites, no collateral circulation,no distention,no Carl sign, no abdominal pain, no inguinal hernias,no umbilical hernia, no abdominal bruits. Normal bowel sounds.  GENITAL: Not  Performed.  EXTREMITIES  AND SPINE:  No clubbing, cyanosis or edema, no deformities or pain .No kyphosis, scoliosis, deformities or pain in spine, ribs or pelvic bone.  NEUROLOGICAL:  Patient was awake, alert, oriented to time, person and place.      Objective  CONDITION ON DISCHARGE stable normal  Stable.      DISCHARGE DISPOSITION home , mother will take her.        DISCHARGE MEDICATIONS     Discharge Medications      ASK your doctor about these medications      Instructions Start Date   acyclovir 400 MG tablet  Commonly known as:  ZOVIRAX   400 mg, Oral, 2 Times Daily, Take no more than 5 doses a day.       dabigatran etexilate 150 MG capsu  Commonly known as:  PRADAXA   150 mg, Oral, Every 12 Hours Scheduled      fluconazole 200 MG tablet  Commonly known as:  DIFLUCAN   400 mg, Oral, 2 Times Daily, 2 tablets PO twice daily      magnesium oxide 400 MG tablet  Commonly known as:  MAG-OX   400 mg, Oral, Daily      ondansetron 4 MG tablet  Commonly known as:  ZOFRAN   4 mg, Oral, Every 6 Hours PRN      potassium chloride 10 MEQ CR capsule  Commonly known as:  MICRO-K   20 mEq, Oral, 2 Times Daily With Meals      pyridoxine 50 MG tablet tablet  Commonly known as:  VITAMIN B-6   50 mg, Oral, Daily      sennosides-docusate sodium 8.6-50 MG tablet  Commonly known as:  SENOKOT-S   2 tablets, Oral, 2 Times Daily PRN      sulfamethoxazole-trimethoprim 800-160 MG per tablet  Commonly known as:  BACTRIM DS,SEPTRA DS   1 tablet, Oral, 3 Times Weekly      vitamin B-12 1000 MCG  tablet  Commonly known as:  CYANOCOBALAMIN   1,000 mcg, Oral, Daily            Future Appointments  Date Time Provider Department Center   10/16/2018 7:45 AM [facility] ECHO/[facility] ECHO [facility]   10/22/2018 12:30 PM LAB CHAIR 3 [facility]  LAB [facility]   10/22/2018 1:00 PM Millie Padilla MD [facility]  [facility]       TEST  RESULTS PENDING AT DISCHARGE none         Diaz Luevano MD    10/12/18  3:26 PM

## 2018-10-12 NOTE — NURSING NOTE
"Nursing Chemotherapy Verification    Chemotherapy Regimen:   Treatment Plans     Name Type Hold Status Plan dates Plan Provider       Active    OP Goserelin 3.6 mg Q28D ONCOLOGY SUPPORTIVE CARE 1 On Automatic Hold  6/20/2018 - Present Ramón Camp MD    IP LYMPHOMA R-EPOCH (Dose Adjusted) RiTUXimab / Etoposide / DOXOrubicin / VinCRIStine / Cyclophosphamide / PredniSONE ONCOLOGY TREATMENT Not on Hold  6/16/2018 - Present Kimberley Melgar MD                    Current height and weight: 157.5 cm (62\") 63.9 kg (140 lb 14 oz)  Calculated BSA from current height and weight ([place]): 1.64  Relevant Labs    Results from last 7 days  Lab Units 10/12/18  0427 10/11/18  0448 10/10/18  0407   WBC 10*3/mm3 3.68* 5.92 9.92   HEMOGLOBIN g/dL 9.1* 8.3* 9.3*   HEMATOCRIT % 28.8* 26.7* 30.2*   PLATELETS 10*3/mm3 185 173 219     Lab Results   Component Value Date    NEUTROABS 3.38 10/12/2018         Results from last 7 days  Lab Units 10/12/18  0427 10/11/18  0448 10/10/18  0407   CREATININE mg/dL 0.51* 0.43* 0.61       Serum creatinine: 0.51 mg/dL (L) 10/12/18 0427  Estimated creatinine clearance: 150.6 mL/min (A)    Lab Results   Component Value Date    HEPAIGM Negative 07/13/2018    HEPAIGM Non-Reactive 07/13/2018    HEPBCAB Negative 06/14/2018       Verification attestation:  I have personally reviewed the planned regimen and its administration and dosing. I understand the potential side effects.The patient has been instructed on the regimen, potential side effects, and self care measures; the consent form has been completed. I have confirmed that the appropriate premedication, prehydration, post medication and/or emergency medications are ordered in addition to the chemotherapy.    I have independently verified that the height and weight is current and calculated the BSA. I have verified the doses with the planned regimen and have clarified any deviations with the physician (Diaz Luevano MD). I have confirmed the route " of administration and patient IV access.    Nurse: Belle Rubio, RN  2nd verification Nurse: Ewelina Berry RN

## 2018-10-12 NOTE — PROGRESS NOTES
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 Admission for cycle 6.     History of Present Illness      The patient is a 23 y.o. female with the above-mentioned history who typically follows with Dr Siddiqi. This is my first time meeting patient.    She has mediastinal large B-cell lymphoma and has completed 5 cycles of chemotherapy with dose adusted EPOCHR. Her CT scans done on September 7, 2018 showed continued decrease in size of the large anterior mediastinal mass and mild splenomegaly with improvement from prior exam. The plan is to do a total of 6 cycles of chemotherapy.  Following 6 cycles patient will receive a PET/CT scan 6 weeks after completion.  She received Zoladex monthly and the next injection is due today, October 8, 2018.    She saw Dr. GIRARD last week and noted improvement iwth shortness of breath but worsening migraine headaches. They discussed neurology referral while inpatient. Patient has occasional light headedness but does not have a migraine today.     Past Medical History, Past Surgical History, Social History, Family History have been reviewed and are without significant changes.    Oncologic history: Reviewed and detailed in Dr Siddiqi's prior notes.      Review of Systems   Constitutional: Negative.    HENT: Negative.    Eyes: Negative.    Respiratory: Negative.    Cardiovascular: Negative.    Gastrointestinal: Negative.    Endocrine: Negative.    Genitourinary: Negative.    Musculoskeletal: Negative.    Skin: Negative.    Neurological: Negative.    Hematological: Negative.    Psychiatric/Behavioral: Negative.        Medications:  The current medication list was reviewed in the EMR    ALLERGIES:  No Known Allergies    Objective      Vitals:    10/11/18 1152 10/11/18 1536 10/11/18 2010 10/12/18 0428   BP: 99/64 103/58 104/67 119/73   BP Location: Left arm Right arm Left arm Left arm   Patient Position: Sitting Sitting  Sitting Lying   Pulse: 70 84 55 52   Resp: 16 16 16 16   Temp: 97.6 °F (36.4 °C) 97.8 °F (36.6 °C) 98.7 °F (37.1 °C) 97.7 °F (36.5 °C)   TempSrc: Oral Oral Oral Oral   SpO2: 99% 98% 100% 98%   Weight:       Height:         Current Status 10/8/2018   ECOG score 0       Physical Exam     GENERAL:  Well-developed, well-nourished female in no acute distress. Vital signs reviewed.   SKIN:  Warm, dry without rashes, purpura or petechiae.  EYES:  Pupils equal, round and reactive to light.  EOMs intact.  Conjunctivae normal.  HEAD:  Normocephalic.  EARS/NOSE/MOUTH/THROAT:  Hearing intact. Septum midline.  No excoriations or nasal discharge. No stomatitis or ulcers.  Lips normal. Oropharynx without lesions or exudates.  NECK:  Supple with good range of motion; no thyromegaly or masses  LYMPHATICS:  No cervical, supraclavicular, axillary adenopathy.  RESP:  Lungs clear to percussion and auscultation. Good airflow. Normal respiratory effort.   CHEST: Mediport - Yes ; Breast exam- No  CARDIAC:  Regular rate and rhythm without murmurs, rubs or gallops. Normal S1,S2. No edema  GI:  Soft, nontender, no hepatosplenomegaly, normal bowel sounds  MSK:  No clubbing or cyanosis, No joint swelling noted in hands  NEUROLOGICAL:  Cranial Nerves II-XII grossly intact.  No focal neurological deficits.  PSYCHIATRIC:  Normal affect and mood.  Alert and Oriented x 3.       RECENT LABS:    Results from last 7 days  Lab Units 10/12/18  0427 10/11/18  0448 10/10/18  0407   WBC 10*3/mm3 3.68* 5.92 9.92   NEUTROS ABS 10*3/mm3 3.38 5.38 9.11*   HEMOGLOBIN g/dL 9.1* 8.3* 9.3*   HEMATOCRIT % 28.8* 26.7* 30.2*   PLATELETS 10*3/mm3 185 173 219         Assessment/Plan   1.  Primary mediastinal large B-cell lymphoma: The patient presented with dyspnea, cough, and chest pain.  A CT angiogram of the chest 6/8/18 showed a very large tumor mass in the mediastinum encasing multiple vascular structures and narrowing the left mainstem bronchus.  There was direct  tumor infiltration in the left upper lobe.  There was a small left pleural effusion.  She underwent a CT-guided biopsy of the mediastinal mass on 6/12/18 and pathology reviewed at [facility] oncology showed diffuse large B-cell lymphoma consistent with a primary diffuse large B-cell lymphoma.  A bone marrow exam was performed on 6/14/18 which was negative for lymphoma.  She underwent a PET scan 6/13/18 which showed a large hypermetabolic mass in the chest involving the anterior mediastinum, left hilum, nearly completely filling the left hemothorax with SUV 19.8.  There was physiologic distribution elsewhere.     The patient was started on IV fluid hydration and allopurinol for prevention of hyperuricemia.  She initiated systemic chemotherapy with DA-EPOCH-R on 6/16/18 with Neulasta support. She had a infusion reaction to rituximab but was able to complete with additional medications and otherwise tolerated chemotherapy well. She is also on Zoladex for ovarian protection. Case with previously discussed with Dr. Fox at the [facility]    · Admit for cycle 6 (last cycle) chemotherapy with dose adjusted EPOCH/R on October 8, 2018. Her doses will stay the same as they were for cycle 5.   · Outpatient Neulasta. She will be discharged on prophylactic antibiotics including acyclovir, Diflucan, and Bactrim.  She may require bacterial prophylaxis if the ANC drops below 1000.  · Will need PET/CT scan after 6th cycle and Dr GIRARD's note mentions that she wants PET/CT 5-6 weeks after last cycle of chemo.      2.  Pericardial effusion: Not mentioned on repeat echo 9/2018     3.   Hypokalemia. Potassium is chronically slightly low.  Requires 20 mg by mouth daily     4.  Fertility preservation:  Patient received Zoladex injection  for fertility preservation; this should be continued once monthly during her chemotherapy.    · Next dose of Zoladex due October 8, 2018, needs to receive when admitted     5.  Right upper  extremity DVT on Pradaxa.     7.  Migraine headaches, patient had it as a child but recently after cycle 4 patient developed migraine headaches and was seen by neurology when admitted with cycle 5.  MRI showed mild enhancement of the turbinate but was nonspecific.    · Given migraine headache has gotten worse, Reconsult neurology    8.  Shortness of breath with palpitations, could obtain repeat echocardiogram and if persistent palpitations with cardiology consult if needed while inpatient.     Plan 1.  From the point of view of his mediastinal non-Hodgkin's lymphoma the patient initiated yesterday a plan of chemotherapy with a regimen of EPOCH/Rituxan with no difficulties whatsoever.  Today she has not had any side effects from the treatment and neither last night, her port is working perfectly fine, she has no cancer-related pain and no symptoms whatsoever related to her lymphoma that was dramatically symptomatic at the time of her diagnosis given the bulk of her disease.   2.  From the point of view of migraines she has not had any further issues and we will just monitor this on clinical grounds.   3.  From the point of view of a low potassium this is being replaced.  Laboratory parameters remain stable.   4.  From the point of view of discharge, likely Friday evening or Saturday morning, depending on the timing of completion of chemotherapy.  She will require as well Neulasta injection and eventually she will require another PET scan as an outpatient. She will require blood counts in the office twice a week and she will require nursing practitioner assessment in between until she is seen by Dr. Padilla again for reassessment for response.     I discussed all these facts with the patient and the pharmacist taking care of the patient as well as the nurse taking care of the patient. I discussed all these facts in regard to therapy with the patient’s mother as well.    On 10/10/2018 the patient will continue her  chemotherapy as originally planned and we are planning to release her home very likely Saturday morning. She will come to [facility] or the oncology office for her Zoladex injection and for her Neulasta injection.     I discussed all of these facts with the patient and the pharmacist. She had no other issues today and she looks terrific.  On 10/11/2018, the patient continues doing fantastic from the point of view of the administration of her chemotherapy treatment with no nausea, vomiting, diarrhea, constipation or pain.  Her port was completely functional.  Her laboratory parameters documented a minor drop in the hemoglobin and I would not be surprised if she needs to be transfused tomorrow before discharge before completion of her plan of chemotherapy.  The patient already has an appointment for followup in the office next week.  She also has an appointment to proceed with Zoladex next week as well as her Neulasta injection.     Other than that, nothing new in regard to her laboratory parameters.  She discussed issues with the [place] and feels very much at peace in regard to how well she is doing and how much she has achieved in regard to controlling her disease process.  I discussed all the facts with the patient and pharmacist.    On 10/12/2018, the patient will be completing her plan of chemotherapy at this time and she will be ready for discharge tonight. She already has arrangement for followups in the office, blood work, MD appointment and so forth under the present circumstances. The other issue that was pending yesterday was the definition about transfusion or not. The patient’s hemoglobin today is actually 9.2. My assumption is that maybe there was a component of hemodilution yesterday. The white count and platelet count remain stable. The chemistry profile remains stable. She was ready to be discharged. Other than that, no other recommendations at this time.        Diaz Luevano MD   10/12/2018

## 2018-10-13 ENCOUNTER — READMISSION MANAGEMENT (OUTPATIENT)
Dept: CALL CENTER | Facility: HOSPITAL | Age: 23
End: 2018-10-13

## 2018-10-13 NOTE — OUTREACH NOTE
Prep Survey      Responses   Facility patient discharged from?  [facility]   Is patient eligible?  Yes   Discharge diagnosis   continue and complete her chemotherapy in the background of mediastinal B-cell non-Hodgkin lymphoma   Does the patient have one of the following disease processes/diagnoses(primary or secondary)?  Other   Does the patient have Home health ordered?  No   Is there a DME ordered?  No   Prep survey completed?  Yes          Tahmina Linares RN

## 2018-10-15 ENCOUNTER — TELEPHONE (OUTPATIENT)
Dept: ONCOLOGY | Facility: CLINIC | Age: 23
End: 2018-10-15

## 2018-10-15 ENCOUNTER — LAB (OUTPATIENT)
Dept: LAB | Facility: HOSPITAL | Age: 23
End: 2018-10-15

## 2018-10-15 ENCOUNTER — INFUSION (OUTPATIENT)
Dept: ONCOLOGY | Facility: HOSPITAL | Age: 23
End: 2018-10-15

## 2018-10-15 ENCOUNTER — TELEPHONE (OUTPATIENT)
Dept: ONCOLOGY | Facility: HOSPITAL | Age: 23
End: 2018-10-15

## 2018-10-15 VITALS — TEMPERATURE: 98.6 F

## 2018-10-15 DIAGNOSIS — C85.28 MEDIASTINAL LARGE B-CELL LYMPHOMA OF LYMPH NODES OF MULTIPLE REGIONS (HCC): ICD-10-CM

## 2018-10-15 DIAGNOSIS — C85.28 MEDIASTINAL LARGE B-CELL LYMPHOMA OF LYMPH NODES OF MULTIPLE REGIONS (HCC): Primary | ICD-10-CM

## 2018-10-15 LAB
ALBUMIN SERPL-MCNC: 4.2 G/DL (ref 3.5–5.2)
ALBUMIN/GLOB SERPL: 2.2 G/DL (ref 1.1–2.4)
ALP SERPL-CCNC: 38 U/L (ref 38–116)
ALT SERPL W P-5'-P-CCNC: 20 U/L (ref 0–33)
ANION GAP SERPL CALCULATED.3IONS-SCNC: 9.1 MMOL/L
AST SERPL-CCNC: 15 U/L (ref 0–32)
BASOPHILS # BLD AUTO: 0.01 10*3/MM3 (ref 0–0.1)
BASOPHILS NFR BLD AUTO: 0.3 % (ref 0–1.1)
BILIRUB SERPL-MCNC: <0.2 MG/DL (ref 0.1–1.2)
BUN BLD-MCNC: 19 MG/DL (ref 6–20)
BUN/CREAT SERPL: 37.3 (ref 7.3–30)
CALCIUM SPEC-SCNC: 8.9 MG/DL (ref 8.5–10.2)
CHLORIDE SERPL-SCNC: 100 MMOL/L (ref 98–107)
CO2 SERPL-SCNC: 29.9 MMOL/L (ref 22–29)
CREAT BLD-MCNC: 0.51 MG/DL (ref 0.6–1.1)
DEPRECATED RDW RBC AUTO: 55.8 FL (ref 37–49)
EOSINOPHIL # BLD AUTO: 0.03 10*3/MM3 (ref 0–0.36)
EOSINOPHIL NFR BLD AUTO: 1 % (ref 1–5)
ERYTHROCYTE [DISTWIDTH] IN BLOOD BY AUTOMATED COUNT: 15 % (ref 11.7–14.5)
GFR SERPL CREATININE-BSD FRML MDRD: 149 ML/MIN/1.73
GFR SERPL CREATININE-BSD FRML MDRD: >150 ML/MIN/1.73
GLOBULIN UR ELPH-MCNC: 1.9 GM/DL (ref 1.8–3.5)
GLUCOSE BLD-MCNC: 96 MG/DL (ref 74–124)
HCT VFR BLD AUTO: 28.8 % (ref 34–45)
HGB BLD-MCNC: 9.7 G/DL (ref 11.5–14.9)
IMM GRANULOCYTES # BLD: 0.2 10*3/MM3 (ref 0–0.03)
IMM GRANULOCYTES NFR BLD: 6.7 % (ref 0–0.5)
LYMPHOCYTES # BLD AUTO: 0.2 10*3/MM3 (ref 1–3.5)
LYMPHOCYTES NFR BLD AUTO: 6.7 % (ref 20–49)
MCH RBC QN AUTO: 33.9 PG (ref 27–33)
MCHC RBC AUTO-ENTMCNC: 33.7 G/DL (ref 32–35)
MCV RBC AUTO: 100.7 FL (ref 83–97)
MONOCYTES # BLD AUTO: 0.02 10*3/MM3 (ref 0.25–0.8)
MONOCYTES NFR BLD AUTO: 0.7 % (ref 4–12)
NEUTROPHILS # BLD AUTO: 2.51 10*3/MM3 (ref 1.5–7)
NEUTROPHILS NFR BLD AUTO: 84.6 % (ref 39–75)
NRBC BLD MANUAL-RTO: 0 /100 WBC (ref 0–0)
PLATELET # BLD AUTO: 199 10*3/MM3 (ref 150–375)
PMV BLD AUTO: 10.2 FL (ref 8.9–12.1)
POTASSIUM BLD-SCNC: 4 MMOL/L (ref 3.5–4.7)
PROT SERPL-MCNC: 6.1 G/DL (ref 6.3–8)
RBC # BLD AUTO: 2.86 10*6/MM3 (ref 3.9–5)
SODIUM BLD-SCNC: 139 MMOL/L (ref 134–145)
WBC NRBC COR # BLD: 2.97 10*3/MM3 (ref 4–10)

## 2018-10-15 PROCEDURE — 96372 THER/PROPH/DIAG INJ SC/IM: CPT | Performed by: INTERNAL MEDICINE

## 2018-10-15 PROCEDURE — 25010000002 PEGFILGRASTIM 6 MG/0.6ML SOLUTION PREFILLED SYRINGE: Performed by: INTERNAL MEDICINE

## 2018-10-15 PROCEDURE — 36415 COLL VENOUS BLD VENIPUNCTURE: CPT | Performed by: INTERNAL MEDICINE

## 2018-10-15 PROCEDURE — 25010000002 GOSERELIN PER 3.6 MG: Performed by: INTERNAL MEDICINE

## 2018-10-15 PROCEDURE — 80053 COMPREHEN METABOLIC PANEL: CPT | Performed by: INTERNAL MEDICINE

## 2018-10-15 PROCEDURE — 85025 COMPLETE CBC W/AUTO DIFF WBC: CPT | Performed by: INTERNAL MEDICINE

## 2018-10-15 RX ADMIN — PEGFILGRASTIM 6 MG: 6 INJECTION SUBCUTANEOUS at 14:27

## 2018-10-15 RX ADMIN — GOSERELIN ACETATE 3.6 MG: 3.6 IMPLANT SUBCUTANEOUS at 14:28

## 2018-10-15 NOTE — TELEPHONE ENCOUNTER
----- Message from Helen Alvarenga sent at 10/15/2018  9:10 AM EDT -----  Contact: [phone]  Pt says she was suppose to get two injections over the weekend and it was not scheduled for her and needs to get them today    Called and spoke with pt. She states she was discharged late on Friday and the nurses told her to call today to make an appt for both her neulasta injection and her Zoladex injection today. Message sent to scheduling.

## 2018-10-15 NOTE — TELEPHONE ENCOUNTER
----- Message from Sarai Allison RN sent at 10/15/2018  9:20 AM EDT -----  Please schedule pt for Neulasta and Zoladex today. She was discharged from the hospital and it was not scheduled.

## 2018-10-16 ENCOUNTER — HOSPITAL ENCOUNTER (OUTPATIENT)
Dept: CARDIOLOGY | Facility: HOSPITAL | Age: 23
Discharge: HOME | End: 2018-10-16
Attending: INTERNAL MEDICINE | Admitting: INTERNAL MEDICINE

## 2018-10-16 VITALS
HEART RATE: 82 BPM | WEIGHT: 140.87 LBS | BODY MASS INDEX: 25.92 KG/M2 | DIASTOLIC BLOOD PRESSURE: 62 MMHG | HEIGHT: 62 IN | SYSTOLIC BLOOD PRESSURE: 90 MMHG

## 2018-10-16 DIAGNOSIS — Z51.11 ENCOUNTER FOR ANTINEOPLASTIC CHEMOTHERAPY: ICD-10-CM

## 2018-10-16 DIAGNOSIS — Z79.899 ENCOUNTER FOR LONG-TERM (CURRENT) USE OF HIGH-RISK MEDICATION: ICD-10-CM

## 2018-10-16 PROCEDURE — 93306 TTE W/DOPPLER COMPLETE: CPT | Performed by: INTERNAL MEDICINE

## 2018-10-16 PROCEDURE — 0399T HC MYOCARDL STRAIN IMAG QUAN ASSMT PER SESS: CPT

## 2018-10-16 PROCEDURE — 0399T ADULT TRANSTHORACIC ECHO COMPLETE W/ CONT IF NECESSARY PER PROTOCOL: CPT | Performed by: INTERNAL MEDICINE

## 2018-10-16 PROCEDURE — 25010000002 PERFLUTREN (DEFINITY) 8.476 MG IN SODIUM CHLORIDE 0.9 % 10 ML INJECTION: Performed by: INTERNAL MEDICINE

## 2018-10-16 PROCEDURE — 93306 TTE W/DOPPLER COMPLETE: CPT

## 2018-10-16 RX ADMIN — PERFLUTREN 1.5 ML: 6.52 INJECTION, SUSPENSION INTRAVENOUS at 08:46

## 2018-10-17 ENCOUNTER — READMISSION MANAGEMENT (OUTPATIENT)
Dept: CALL CENTER | Facility: HOSPITAL | Age: 23
End: 2018-10-17

## 2018-10-17 LAB
ASCENDING AORTA: 2.6 CM
BH CV ECHO MEAS - ACS: 1.7 CM
BH CV ECHO MEAS - AO MAX PG (FULL): 6 MMHG
BH CV ECHO MEAS - AO MAX PG: 8.3 MMHG
BH CV ECHO MEAS - AO MEAN PG (FULL): 3.2 MMHG
BH CV ECHO MEAS - AO MEAN PG: 4.6 MMHG
BH CV ECHO MEAS - AO ROOT AREA (BSA CORRECTED): 1.8
BH CV ECHO MEAS - AO ROOT AREA: 6.6 CM^2
BH CV ECHO MEAS - AO ROOT DIAM: 2.9 CM
BH CV ECHO MEAS - AO V2 MAX: 143.9 CM/SEC
BH CV ECHO MEAS - AO V2 MEAN: 101.7 CM/SEC
BH CV ECHO MEAS - AO V2 VTI: 29 CM
BH CV ECHO MEAS - AVA(I,A): 1.6 CM^2
BH CV ECHO MEAS - AVA(I,D): 1.6 CM^2
BH CV ECHO MEAS - AVA(V,A): 1.5 CM^2
BH CV ECHO MEAS - AVA(V,D): 1.5 CM^2
BH CV ECHO MEAS - BSA(HAYCOCK): 1.7 M^2
BH CV ECHO MEAS - BSA: 1.6 M^2
BH CV ECHO MEAS - BZI_BMI: 25.6 KILOGRAMS/M^2
BH CV ECHO MEAS - BZI_METRIC_HEIGHT: 157.5 CM
BH CV ECHO MEAS - BZI_METRIC_WEIGHT: 63.5 KG
BH CV ECHO MEAS - EDV(MOD-SP2): 91 ML
BH CV ECHO MEAS - EDV(MOD-SP4): 108 ML
BH CV ECHO MEAS - EDV(TEICH): 96.4 ML
BH CV ECHO MEAS - EF(CUBED): 69.7 %
BH CV ECHO MEAS - EF(MOD-BP): 60 %
BH CV ECHO MEAS - EF(MOD-SP2): 59.3 %
BH CV ECHO MEAS - EF(MOD-SP4): 61.1 %
BH CV ECHO MEAS - EF(TEICH): 61.3 %
BH CV ECHO MEAS - ESV(MOD-SP2): 37 ML
BH CV ECHO MEAS - ESV(MOD-SP4): 42 ML
BH CV ECHO MEAS - ESV(TEICH): 37.3 ML
BH CV ECHO MEAS - IVS/LVPW: 0.96
BH CV ECHO MEAS - IVSD: 0.93 CM
BH CV ECHO MEAS - LAT PEAK E' VEL: 12 CM/SEC
BH CV ECHO MEAS - LV DIASTOLIC VOL/BSA (35-75): 65.7 ML/M^2
BH CV ECHO MEAS - LV MASS(C)D: 146.8 GRAMS
BH CV ECHO MEAS - LV MASS(C)DI: 89.4 GRAMS/M^2
BH CV ECHO MEAS - LV MAX PG: 2.3 MMHG
BH CV ECHO MEAS - LV MEAN PG: 1.4 MMHG
BH CV ECHO MEAS - LV SYSTOLIC VOL/BSA (12-30): 25.6 ML/M^2
BH CV ECHO MEAS - LV V1 MAX: 75.2 CM/SEC
BH CV ECHO MEAS - LV V1 MEAN: 55.5 CM/SEC
BH CV ECHO MEAS - LV V1 VTI: 15.8 CM
BH CV ECHO MEAS - LVIDD: 4.6 CM
BH CV ECHO MEAS - LVIDS: 3.1 CM
BH CV ECHO MEAS - LVLD AP2: 7.2 CM
BH CV ECHO MEAS - LVLD AP4: 7.4 CM
BH CV ECHO MEAS - LVLS AP2: 6.5 CM
BH CV ECHO MEAS - LVLS AP4: 5.8 CM
BH CV ECHO MEAS - LVOT AREA (M): 2.8 CM^2
BH CV ECHO MEAS - LVOT AREA: 2.9 CM^2
BH CV ECHO MEAS - LVOT DIAM: 1.9 CM
BH CV ECHO MEAS - LVPWD: 0.97 CM
BH CV ECHO MEAS - MED PEAK E' VEL: 9 CM/SEC
BH CV ECHO MEAS - MV A DUR: 0.09 SEC
BH CV ECHO MEAS - MV A MAX VEL: 53.8 CM/SEC
BH CV ECHO MEAS - MV DEC SLOPE: 356.1 CM/SEC^2
BH CV ECHO MEAS - MV DEC TIME: 0.18 SEC
BH CV ECHO MEAS - MV E MAX VEL: 66.5 CM/SEC
BH CV ECHO MEAS - MV E/A: 1.2
BH CV ECHO MEAS - MV MAX PG: 2.4 MMHG
BH CV ECHO MEAS - MV MEAN PG: 1.4 MMHG
BH CV ECHO MEAS - MV P1/2T MAX VEL: 66.9 CM/SEC
BH CV ECHO MEAS - MV P1/2T: 55.1 MSEC
BH CV ECHO MEAS - MV V2 MAX: 78.2 CM/SEC
BH CV ECHO MEAS - MV V2 MEAN: 56.6 CM/SEC
BH CV ECHO MEAS - MV V2 VTI: 21.9 CM
BH CV ECHO MEAS - MVA P1/2T LCG: 3.3 CM^2
BH CV ECHO MEAS - MVA(P1/2T): 4 CM^2
BH CV ECHO MEAS - MVA(VTI): 2.1 CM^2
BH CV ECHO MEAS - PA MAX PG (FULL): 3.4 MMHG
BH CV ECHO MEAS - PA MAX PG: 7.1 MMHG
BH CV ECHO MEAS - PA V2 MAX: 133 CM/SEC
BH CV ECHO MEAS - PULM A REVS DUR: 0.11 SEC
BH CV ECHO MEAS - PULM A REVS VEL: 52 CM/SEC
BH CV ECHO MEAS - PULM DIAS VEL: 66.7 CM/SEC
BH CV ECHO MEAS - PULM S/D: 1.1
BH CV ECHO MEAS - PULM SYS VEL: 72.2 CM/SEC
BH CV ECHO MEAS - PVA(V,A): 2.2 CM^2
BH CV ECHO MEAS - PVA(V,D): 2.2 CM^2
BH CV ECHO MEAS - QP/QS: 1.3
BH CV ECHO MEAS - RV MAX PG: 3.7 MMHG
BH CV ECHO MEAS - RV MEAN PG: 2 MMHG
BH CV ECHO MEAS - RV V1 MAX: 96.5 CM/SEC
BH CV ECHO MEAS - RV V1 MEAN: 66.4 CM/SEC
BH CV ECHO MEAS - RV V1 VTI: 19.4 CM
BH CV ECHO MEAS - RVOT AREA: 3.1 CM^2
BH CV ECHO MEAS - RVOT DIAM: 2 CM
BH CV ECHO MEAS - SI(AO): 116.4 ML/M^2
BH CV ECHO MEAS - SI(CUBED): 40.8 ML/M^2
BH CV ECHO MEAS - SI(LVOT): 27.6 ML/M^2
BH CV ECHO MEAS - SI(MOD-SP2): 32.9 ML/M^2
BH CV ECHO MEAS - SI(MOD-SP4): 40.2 ML/M^2
BH CV ECHO MEAS - SI(TEICH): 36 ML/M^2
BH CV ECHO MEAS - SUP REN AO DIAM: 1.6 CM
BH CV ECHO MEAS - SV(AO): 191.1 ML
BH CV ECHO MEAS - SV(CUBED): 67 ML
BH CV ECHO MEAS - SV(LVOT): 45.4 ML
BH CV ECHO MEAS - SV(MOD-SP2): 54 ML
BH CV ECHO MEAS - SV(MOD-SP4): 66 ML
BH CV ECHO MEAS - SV(RVOT): 59.5 ML
BH CV ECHO MEAS - SV(TEICH): 59.2 ML
BH CV ECHO MEAS - TAPSE (>1.6): 2 CM2
BH CV ECHO MEASUREMENTS AVERAGE E/E' RATIO: 6.33
BH CV XLRA - RV BASE: 2.2 CM
BH CV XLRA - TDI S': 12 CM/SEC
LEFT ATRIUM VOLUME INDEX: 17 ML/M2
LV EF 2D ECHO EST: 60 %
MAXIMAL PREDICTED HEART RATE: 197 BPM
SINUS: 2.5 CM
STJ: 2.5 CM
STRESS TARGET HR: 167 BPM

## 2018-10-17 NOTE — OUTREACH NOTE
Medical Week 1 Survey      Responses   Facility patient discharged from?  [facility]   Does the patient have one of the following disease processes/diagnoses(primary or secondary)?  Other   Is there a successful TCM telephone encounter documented?  No   Week 1 attempt successful?  Yes   Call start time  1604   Call end time  1608   General alerts for this patient  lymphoma on chemo--Admitted for chemo mgt   Discharge diagnosis   continue and complete her chemotherapy in the background of mediastinal B-cell non-Hodgkin lymphoma   Meds reviewed with patient/caregiver?  Yes   Is the patient having any side effects they believe may be caused by any medication additions or changes?  No   Does the patient have all medications ordered at discharge?  Yes   Is the patient taking all medications as directed (includes completed medication regime)?  Yes   Does the patient have a primary care provider?   Yes   Does the patient have an appointment with their PCP within 7 days of discharge?  Greater than 7 days   Has the patient kept scheduled appointments due by today?  N/A   Has home health visited the patient within 72 hours of discharge?  N/A   Psychosocial issues?  No   Did the patient receive a copy of their discharge instructions?  Yes   Nursing interventions  Reviewed instructions with patient   What is the patient's perception of their health status since discharge?  Improving   Is the patient/caregiver able to teach back signs and symptoms related to disease process for when to call PCP?  Yes   Is the patient/caregiver able to teach back signs and symptoms related to disease process for when to call 911?  Yes   Additional teach back comments  Pt was in hospital for her last course of chemo. She is doing well. Has no questions. WIll be seeing Dr next wk. Feels good   Week 1 call completed?  Yes          Marilee Leon RN

## 2018-10-20 ENCOUNTER — APPOINTMENT (OUTPATIENT)
Dept: GENERAL RADIOLOGY | Facility: HOSPITAL | Age: 23
End: 2018-10-20

## 2018-10-20 ENCOUNTER — HOSPITAL ENCOUNTER (INPATIENT)
Facility: HOSPITAL | Age: 23
LOS: 1 days | Discharge: HOME | End: 2018-10-22
Attending: EMERGENCY MEDICINE | Admitting: INTERNAL MEDICINE

## 2018-10-20 DIAGNOSIS — D70.9 NEUTROPENIC FEVER (HCC): ICD-10-CM

## 2018-10-20 DIAGNOSIS — D61.810 ANTINEOPLASTIC CHEMOTHERAPY INDUCED PANCYTOPENIA (HCC): ICD-10-CM

## 2018-10-20 DIAGNOSIS — T45.1X5A ANTINEOPLASTIC CHEMOTHERAPY INDUCED PANCYTOPENIA (HCC): ICD-10-CM

## 2018-10-20 DIAGNOSIS — R50.9 FEVER AND CHILLS: Primary | ICD-10-CM

## 2018-10-20 DIAGNOSIS — R50.81 NEUTROPENIC FEVER (HCC): ICD-10-CM

## 2018-10-20 LAB — D-LACTATE SERPL-SCNC: 0.8 MMOL/L (ref 0.5–2)

## 2018-10-20 PROCEDURE — 82746 ASSAY OF FOLIC ACID SERUM: CPT | Performed by: INTERNAL MEDICINE

## 2018-10-20 PROCEDURE — 80053 COMPREHEN METABOLIC PANEL: CPT | Performed by: EMERGENCY MEDICINE

## 2018-10-20 PROCEDURE — 85025 COMPLETE CBC W/AUTO DIFF WBC: CPT | Performed by: EMERGENCY MEDICINE

## 2018-10-20 PROCEDURE — 84703 CHORIONIC GONADOTROPIN ASSAY: CPT | Performed by: EMERGENCY MEDICINE

## 2018-10-20 PROCEDURE — 83605 ASSAY OF LACTIC ACID: CPT | Performed by: EMERGENCY MEDICINE

## 2018-10-20 PROCEDURE — 71046 X-RAY EXAM CHEST 2 VIEWS: CPT

## 2018-10-20 PROCEDURE — 84145 PROCALCITONIN (PCT): CPT | Performed by: EMERGENCY MEDICINE

## 2018-10-20 PROCEDURE — 82607 VITAMIN B-12: CPT | Performed by: INTERNAL MEDICINE

## 2018-10-20 PROCEDURE — 81003 URINALYSIS AUTO W/O SCOPE: CPT | Performed by: EMERGENCY MEDICINE

## 2018-10-20 PROCEDURE — 85007 BL SMEAR W/DIFF WBC COUNT: CPT | Performed by: EMERGENCY MEDICINE

## 2018-10-20 PROCEDURE — 99285 EMERGENCY DEPT VISIT HI MDM: CPT

## 2018-10-20 RX ORDER — ACETAMINOPHEN 500 MG
1000 TABLET ORAL ONCE
Status: COMPLETED | OUTPATIENT
Start: 2018-10-20 | End: 2018-10-20

## 2018-10-20 RX ORDER — SODIUM CHLORIDE 0.9 % (FLUSH) 0.9 %
10 SYRINGE (ML) INJECTION AS NEEDED
Status: DISCONTINUED | OUTPATIENT
Start: 2018-10-20 | End: 2018-10-22 | Stop reason: HOSPADM

## 2018-10-20 RX ADMIN — SODIUM CHLORIDE 1000 ML: 9 INJECTION, SOLUTION INTRAVENOUS at 23:53

## 2018-10-20 RX ADMIN — ACETAMINOPHEN 1000 MG: 500 TABLET, FILM COATED ORAL at 23:32

## 2018-10-21 ENCOUNTER — READMISSION MANAGEMENT (OUTPATIENT)
Dept: CALL CENTER | Facility: HOSPITAL | Age: 23
End: 2018-10-21

## 2018-10-21 PROBLEM — D61.818 PANCYTOPENIA (HCC): Status: ACTIVE | Noted: 2018-10-21

## 2018-10-21 PROBLEM — R50.9 FEVER AND CHILLS: Status: ACTIVE | Noted: 2018-10-21

## 2018-10-21 LAB
ABO GROUP BLD: NORMAL
ALBUMIN SERPL-MCNC: 3.5 G/DL (ref 3.5–5.2)
ALBUMIN SERPL-MCNC: 4.1 G/DL (ref 3.5–5.2)
ALBUMIN/GLOB SERPL: 1.7 G/DL
ALBUMIN/GLOB SERPL: 2 G/DL
ALP SERPL-CCNC: 29 U/L (ref 39–117)
ALP SERPL-CCNC: 34 U/L (ref 39–117)
ALT SERPL W P-5'-P-CCNC: 20 U/L (ref 1–33)
ALT SERPL W P-5'-P-CCNC: 23 U/L (ref 1–33)
ANION GAP SERPL CALCULATED.3IONS-SCNC: 10.2 MMOL/L
ANION GAP SERPL CALCULATED.3IONS-SCNC: 9.5 MMOL/L
AST SERPL-CCNC: 14 U/L (ref 1–32)
AST SERPL-CCNC: 19 U/L (ref 1–32)
B PERT DNA SPEC QL NAA+PROBE: NOT DETECTED
BASOPHILS # BLD AUTO: 0.01 10*3/MM3 (ref 0–0.2)
BASOPHILS NFR BLD AUTO: 3.6 % (ref 0–1.5)
BILIRUB SERPL-MCNC: <0.2 MG/DL (ref 0.1–1.2)
BILIRUB SERPL-MCNC: <0.2 MG/DL (ref 0.1–1.2)
BILIRUB UR QL STRIP: NEGATIVE
BLD GP AB SCN SERPL QL: NEGATIVE
BUN BLD-MCNC: 10 MG/DL (ref 6–20)
BUN BLD-MCNC: 8 MG/DL (ref 6–20)
BUN/CREAT SERPL: 14.8 (ref 7–25)
BUN/CREAT SERPL: 15.9 (ref 7–25)
C PNEUM DNA NPH QL NAA+NON-PROBE: NOT DETECTED
CALCIUM SPEC-SCNC: 8.3 MG/DL (ref 8.6–10.5)
CALCIUM SPEC-SCNC: 8.8 MG/DL (ref 8.6–10.5)
CHLORIDE SERPL-SCNC: 108 MMOL/L (ref 98–107)
CHLORIDE SERPL-SCNC: 99 MMOL/L (ref 98–107)
CLARITY UR: CLEAR
CO2 SERPL-SCNC: 22.5 MMOL/L (ref 22–29)
CO2 SERPL-SCNC: 24.8 MMOL/L (ref 22–29)
COLOR UR: YELLOW
CREAT BLD-MCNC: 0.54 MG/DL (ref 0.57–1)
CREAT BLD-MCNC: 0.63 MG/DL (ref 0.57–1)
DACRYOCYTES BLD QL SMEAR: NORMAL
DEPRECATED RDW RBC AUTO: 56.7 FL (ref 37–54)
DEPRECATED RDW RBC AUTO: 57.3 FL (ref 37–54)
EOSINOPHIL # BLD AUTO: 0 10*3/MM3 (ref 0–0.7)
EOSINOPHIL NFR BLD AUTO: 0 % (ref 0.3–6.2)
ERYTHROCYTE [DISTWIDTH] IN BLOOD BY AUTOMATED COUNT: 15.9 % (ref 11.7–13)
ERYTHROCYTE [DISTWIDTH] IN BLOOD BY AUTOMATED COUNT: 16 % (ref 11.7–13)
FERRITIN SERPL-MCNC: 555.8 NG/ML (ref 13–150)
FLUAV H1 2009 PAND RNA NPH QL NAA+PROBE: NOT DETECTED
FLUAV H1 HA GENE NPH QL NAA+PROBE: NOT DETECTED
FLUAV H3 RNA NPH QL NAA+PROBE: NOT DETECTED
FLUAV SUBTYP SPEC NAA+PROBE: NOT DETECTED
FLUBV RNA ISLT QL NAA+PROBE: NOT DETECTED
FOLATE SERPL-MCNC: 14.19 NG/ML (ref 4.78–24.2)
GFR SERPL CREATININE-BSD FRML MDRD: 117 ML/MIN/1.73
GFR SERPL CREATININE-BSD FRML MDRD: 140 ML/MIN/1.73
GFR SERPL CREATININE-BSD FRML MDRD: 142 ML/MIN/1.73
GFR SERPL CREATININE-BSD FRML MDRD: >150 ML/MIN/1.73
GLOBULIN UR ELPH-MCNC: 2.1 GM/DL
GLOBULIN UR ELPH-MCNC: 2.1 GM/DL
GLUCOSE BLD-MCNC: 102 MG/DL (ref 65–99)
GLUCOSE BLD-MCNC: 109 MG/DL (ref 65–99)
GLUCOSE UR STRIP-MCNC: NEGATIVE MG/DL
HADV DNA SPEC NAA+PROBE: NOT DETECTED
HCG SERPL QL: NEGATIVE
HCOV 229E RNA SPEC QL NAA+PROBE: NOT DETECTED
HCOV HKU1 RNA SPEC QL NAA+PROBE: NOT DETECTED
HCOV NL63 RNA SPEC QL NAA+PROBE: NOT DETECTED
HCOV OC43 RNA SPEC QL NAA+PROBE: NOT DETECTED
HCT VFR BLD AUTO: 21.1 % (ref 35.6–45.5)
HCT VFR BLD AUTO: 22.7 % (ref 35.6–45.5)
HGB BLD-MCNC: 6.9 G/DL (ref 11.9–15.5)
HGB BLD-MCNC: 7.5 G/DL (ref 11.9–15.5)
HGB UR QL STRIP.AUTO: NEGATIVE
HMPV RNA NPH QL NAA+NON-PROBE: NOT DETECTED
HPIV1 RNA SPEC QL NAA+PROBE: NOT DETECTED
HPIV2 RNA SPEC QL NAA+PROBE: DETECTED
HPIV3 RNA NPH QL NAA+PROBE: NOT DETECTED
HPIV4 P GENE NPH QL NAA+PROBE: NOT DETECTED
IMM GRANULOCYTES # BLD: 0 10*3/MM3 (ref 0–0.03)
IMM GRANULOCYTES NFR BLD: 0 % (ref 0–0.5)
INR PPP: 1.1 (ref 0.9–1.1)
IRON 24H UR-MRATE: 27 MCG/DL (ref 37–145)
IRON SATN MFR SERPL: 11 % (ref 20–50)
KETONES UR QL STRIP: NEGATIVE
LEUKOCYTE ESTERASE UR QL STRIP.AUTO: NEGATIVE
LYMPHOCYTES # BLD AUTO: 0.15 10*3/MM3 (ref 0.9–4.8)
LYMPHOCYTES NFR BLD AUTO: 53.5 % (ref 19.6–45.3)
M PNEUMO IGG SER IA-ACNC: NOT DETECTED
MAGNESIUM SERPL-MCNC: 1.7 MG/DL (ref 1.6–2.6)
MCH RBC QN AUTO: 31.8 PG (ref 26.9–32)
MCH RBC QN AUTO: 32.1 PG (ref 26.9–32)
MCHC RBC AUTO-ENTMCNC: 32.7 G/DL (ref 32.4–36.3)
MCHC RBC AUTO-ENTMCNC: 33 G/DL (ref 32.4–36.3)
MCV RBC AUTO: 97 FL (ref 80.5–98.2)
MCV RBC AUTO: 97.2 FL (ref 80.5–98.2)
MONOCYTES # BLD AUTO: 0.12 10*3/MM3 (ref 0.2–1.2)
MONOCYTES NFR BLD AUTO: 42.9 % (ref 5–12)
NEUTROPHILS # BLD AUTO: 0 10*3/MM3 (ref 1.9–8.1)
NEUTROPHILS NFR BLD AUTO: 0 % (ref 42.7–76)
NITRITE UR QL STRIP: NEGATIVE
NRBC BLD MANUAL-RTO: 0 /100 WBC (ref 0–0)
PH UR STRIP.AUTO: 6.5 [PH] (ref 5–8)
PLAT MORPH BLD: NORMAL
PLATELET # BLD AUTO: 24 10*3/MM3 (ref 140–500)
PLATELET # BLD AUTO: 26 10*3/MM3 (ref 140–500)
POTASSIUM BLD-SCNC: 3.5 MMOL/L (ref 3.5–5.2)
POTASSIUM BLD-SCNC: 3.7 MMOL/L (ref 3.5–5.2)
PROCALCITONIN SERPL-MCNC: 0.1 NG/ML (ref 0.1–0.25)
PROT SERPL-MCNC: 5.6 G/DL (ref 6–8.5)
PROT SERPL-MCNC: 6.2 G/DL (ref 6–8.5)
PROT UR QL STRIP: NEGATIVE
PROTHROMBIN TIME: 14 SECONDS (ref 11.7–14.2)
RBC # BLD AUTO: 2.17 10*6/MM3 (ref 3.9–5.2)
RBC # BLD AUTO: 2.34 10*6/MM3 (ref 3.9–5.2)
RH BLD: POSITIVE
RHINOVIRUS RNA SPEC NAA+PROBE: NOT DETECTED
RSV RNA NPH QL NAA+NON-PROBE: NOT DETECTED
SODIUM BLD-SCNC: 134 MMOL/L (ref 136–145)
SODIUM BLD-SCNC: 140 MMOL/L (ref 136–145)
SP GR UR STRIP: 1.01 (ref 1–1.03)
T&S EXPIRATION DATE: NORMAL
TIBC SERPL-MCNC: 244 MCG/DL (ref 298–536)
TRANSFERRIN SERPL-MCNC: 164 MG/DL (ref 200–360)
UROBILINOGEN UR QL STRIP: NORMAL
VIT B12 BLD-MCNC: >2000 PG/ML (ref 211–946)
WBC MORPH BLD: NORMAL
WBC NRBC COR # BLD: 0.28 10*3/MM3 (ref 4.5–10.7)
WBC NRBC COR # BLD: 0.29 10*3/MM3 (ref 4.5–10.7)

## 2018-10-21 PROCEDURE — 83540 ASSAY OF IRON: CPT | Performed by: INTERNAL MEDICINE

## 2018-10-21 PROCEDURE — 87798 DETECT AGENT NOS DNA AMP: CPT | Performed by: INTERNAL MEDICINE

## 2018-10-21 PROCEDURE — 87633 RESP VIRUS 12-25 TARGETS: CPT | Performed by: INTERNAL MEDICINE

## 2018-10-21 PROCEDURE — 86900 BLOOD TYPING SEROLOGIC ABO: CPT | Performed by: INTERNAL MEDICINE

## 2018-10-21 PROCEDURE — 25010000002 VANCOMYCIN 750 MG RECONSTITUTED SOLUTION: Performed by: INTERNAL MEDICINE

## 2018-10-21 PROCEDURE — 87581 M.PNEUMON DNA AMP PROBE: CPT | Performed by: INTERNAL MEDICINE

## 2018-10-21 PROCEDURE — 84466 ASSAY OF TRANSFERRIN: CPT | Performed by: INTERNAL MEDICINE

## 2018-10-21 PROCEDURE — 86850 RBC ANTIBODY SCREEN: CPT | Performed by: INTERNAL MEDICINE

## 2018-10-21 PROCEDURE — 86900 BLOOD TYPING SEROLOGIC ABO: CPT

## 2018-10-21 PROCEDURE — 25010000002 VANCOMYCIN 10 G RECONSTITUTED SOLUTION: Performed by: EMERGENCY MEDICINE

## 2018-10-21 PROCEDURE — 86901 BLOOD TYPING SEROLOGIC RH(D): CPT | Performed by: INTERNAL MEDICINE

## 2018-10-21 PROCEDURE — 36430 TRANSFUSION BLD/BLD COMPNT: CPT

## 2018-10-21 PROCEDURE — P9016 RBC LEUKOCYTES REDUCED: HCPCS

## 2018-10-21 PROCEDURE — 99255 IP/OBS CONSLTJ NEW/EST HI 80: CPT | Performed by: INTERNAL MEDICINE

## 2018-10-21 PROCEDURE — 85610 PROTHROMBIN TIME: CPT | Performed by: INTERNAL MEDICINE

## 2018-10-21 PROCEDURE — 86923 COMPATIBILITY TEST ELECTRIC: CPT

## 2018-10-21 PROCEDURE — 25010000002 CEFEPIME PER 500 MG: Performed by: INTERNAL MEDICINE

## 2018-10-21 PROCEDURE — 87486 CHLMYD PNEUM DNA AMP PROBE: CPT | Performed by: INTERNAL MEDICINE

## 2018-10-21 PROCEDURE — 63710000001 DIPHENHYDRAMINE PER 50 MG: Performed by: INTERNAL MEDICINE

## 2018-10-21 PROCEDURE — 82728 ASSAY OF FERRITIN: CPT | Performed by: INTERNAL MEDICINE

## 2018-10-21 PROCEDURE — 80053 COMPREHEN METABOLIC PANEL: CPT | Performed by: INTERNAL MEDICINE

## 2018-10-21 PROCEDURE — 83735 ASSAY OF MAGNESIUM: CPT | Performed by: INTERNAL MEDICINE

## 2018-10-21 PROCEDURE — 85025 COMPLETE CBC W/AUTO DIFF WBC: CPT | Performed by: INTERNAL MEDICINE

## 2018-10-21 PROCEDURE — 25010000002 CEFEPIME PER 500 MG: Performed by: EMERGENCY MEDICINE

## 2018-10-21 PROCEDURE — 87040 BLOOD CULTURE FOR BACTERIA: CPT | Performed by: EMERGENCY MEDICINE

## 2018-10-21 RX ORDER — ONDANSETRON 4 MG/1
4 TABLET, ORALLY DISINTEGRATING ORAL EVERY 6 HOURS PRN
Status: DISCONTINUED | OUTPATIENT
Start: 2018-10-21 | End: 2018-10-22 | Stop reason: HOSPADM

## 2018-10-21 RX ORDER — ACETAMINOPHEN 325 MG/1
650 TABLET ORAL EVERY 4 HOURS PRN
Status: DISCONTINUED | OUTPATIENT
Start: 2018-10-21 | End: 2018-10-22 | Stop reason: HOSPADM

## 2018-10-21 RX ORDER — LANOLIN ALCOHOL/MO/W.PET/CERES
50 CREAM (GRAM) TOPICAL DAILY
Status: DISCONTINUED | OUTPATIENT
Start: 2018-10-21 | End: 2018-10-22 | Stop reason: HOSPADM

## 2018-10-21 RX ORDER — ACETAMINOPHEN 325 MG/1
650 TABLET ORAL ONCE
Status: DISCONTINUED | OUTPATIENT
Start: 2018-10-21 | End: 2018-10-22 | Stop reason: HOSPADM

## 2018-10-21 RX ORDER — CHOLECALCIFEROL (VITAMIN D3) 125 MCG
1000 CAPSULE ORAL DAILY
Status: DISCONTINUED | OUTPATIENT
Start: 2018-10-21 | End: 2018-10-22 | Stop reason: HOSPADM

## 2018-10-21 RX ORDER — ONDANSETRON 2 MG/ML
4 INJECTION INTRAMUSCULAR; INTRAVENOUS EVERY 6 HOURS PRN
Status: DISCONTINUED | OUTPATIENT
Start: 2018-10-21 | End: 2018-10-22 | Stop reason: HOSPADM

## 2018-10-21 RX ORDER — SENNA AND DOCUSATE SODIUM 50; 8.6 MG/1; MG/1
2 TABLET, FILM COATED ORAL 2 TIMES DAILY PRN
Status: DISCONTINUED | OUTPATIENT
Start: 2018-10-21 | End: 2018-10-22 | Stop reason: HOSPADM

## 2018-10-21 RX ORDER — FLUCONAZOLE 200 MG/1
400 TABLET ORAL 2 TIMES DAILY
Status: DISCONTINUED | OUTPATIENT
Start: 2018-10-21 | End: 2018-10-21

## 2018-10-21 RX ORDER — SODIUM CHLORIDE 9 MG/ML
100 INJECTION, SOLUTION INTRAVENOUS CONTINUOUS
Status: DISCONTINUED | OUTPATIENT
Start: 2018-10-21 | End: 2018-10-22 | Stop reason: HOSPADM

## 2018-10-21 RX ORDER — MAGNESIUM OXIDE 400 MG/1
400 TABLET ORAL DAILY
Status: DISCONTINUED | OUTPATIENT
Start: 2018-10-21 | End: 2018-10-22 | Stop reason: HOSPADM

## 2018-10-21 RX ORDER — DIPHENHYDRAMINE HCL 25 MG
25 CAPSULE ORAL ONCE
Status: COMPLETED | OUTPATIENT
Start: 2018-10-21 | End: 2018-10-21

## 2018-10-21 RX ORDER — FLUCONAZOLE 200 MG/1
200 TABLET ORAL DAILY
Status: DISCONTINUED | OUTPATIENT
Start: 2018-10-22 | End: 2018-10-22 | Stop reason: HOSPADM

## 2018-10-21 RX ORDER — ACYCLOVIR 400 MG/1
400 TABLET ORAL 2 TIMES DAILY
Status: DISCONTINUED | OUTPATIENT
Start: 2018-10-21 | End: 2018-10-22 | Stop reason: HOSPADM

## 2018-10-21 RX ORDER — ECHINACEA PURPUREA EXTRACT 125 MG
1 TABLET ORAL AS NEEDED
Status: DISCONTINUED | OUTPATIENT
Start: 2018-10-21 | End: 2018-10-22 | Stop reason: HOSPADM

## 2018-10-21 RX ORDER — ONDANSETRON 4 MG/1
4 TABLET, FILM COATED ORAL EVERY 6 HOURS PRN
Status: DISCONTINUED | OUTPATIENT
Start: 2018-10-21 | End: 2018-10-22 | Stop reason: HOSPADM

## 2018-10-21 RX ORDER — HYDROCODONE BITARTRATE AND ACETAMINOPHEN 5; 325 MG/1; MG/1
1 TABLET ORAL EVERY 4 HOURS PRN
Status: DISCONTINUED | OUTPATIENT
Start: 2018-10-21 | End: 2018-10-22 | Stop reason: HOSPADM

## 2018-10-21 RX ORDER — SODIUM CHLORIDE 0.9 % (FLUSH) 0.9 %
3-10 SYRINGE (ML) INJECTION AS NEEDED
Status: DISCONTINUED | OUTPATIENT
Start: 2018-10-21 | End: 2018-10-22 | Stop reason: HOSPADM

## 2018-10-21 RX ORDER — AMITRIPTYLINE HYDROCHLORIDE 10 MG/1
10 TABLET, FILM COATED ORAL NIGHTLY
Status: DISCONTINUED | OUTPATIENT
Start: 2018-10-21 | End: 2018-10-22 | Stop reason: HOSPADM

## 2018-10-21 RX ORDER — SODIUM CHLORIDE 0.9 % (FLUSH) 0.9 %
3 SYRINGE (ML) INJECTION EVERY 12 HOURS SCHEDULED
Status: DISCONTINUED | OUTPATIENT
Start: 2018-10-21 | End: 2018-10-22 | Stop reason: HOSPADM

## 2018-10-21 RX ADMIN — ACETAMINOPHEN 650 MG: 325 TABLET, FILM COATED ORAL at 13:04

## 2018-10-21 RX ADMIN — Medication 50 MG: at 09:19

## 2018-10-21 RX ADMIN — SODIUM CHLORIDE 100 ML/HR: 9 INJECTION, SOLUTION INTRAVENOUS at 23:02

## 2018-10-21 RX ADMIN — CEFEPIME HYDROCHLORIDE 2 G: 2 INJECTION, POWDER, FOR SOLUTION INTRAVENOUS at 01:02

## 2018-10-21 RX ADMIN — SALINE NASAL SPRAY 1 SPRAY: 1.5 SOLUTION NASAL at 10:59

## 2018-10-21 RX ADMIN — SODIUM CHLORIDE 750 MG: 900 INJECTION, SOLUTION INTRAVENOUS at 18:22

## 2018-10-21 RX ADMIN — Medication 1000 MCG: at 09:19

## 2018-10-21 RX ADMIN — Medication 3 ML: at 09:21

## 2018-10-21 RX ADMIN — ACETAMINOPHEN 650 MG: 325 TABLET, FILM COATED ORAL at 21:53

## 2018-10-21 RX ADMIN — ACETAMINOPHEN 650 MG: 325 TABLET, FILM COATED ORAL at 04:51

## 2018-10-21 RX ADMIN — SODIUM CHLORIDE 100 ML/HR: 9 INJECTION, SOLUTION INTRAVENOUS at 14:07

## 2018-10-21 RX ADMIN — ACYCLOVIR 400 MG: 400 TABLET ORAL at 21:53

## 2018-10-21 RX ADMIN — FLUCONAZOLE 400 MG: 200 TABLET ORAL at 09:19

## 2018-10-21 RX ADMIN — ACYCLOVIR 400 MG: 400 TABLET ORAL at 09:19

## 2018-10-21 RX ADMIN — CEFEPIME HYDROCHLORIDE 2 G: 2 INJECTION, POWDER, FOR SOLUTION INTRAVENOUS at 09:20

## 2018-10-21 RX ADMIN — SODIUM CHLORIDE 923 ML: 9 INJECTION, SOLUTION INTRAVENOUS at 00:47

## 2018-10-21 RX ADMIN — Medication 3 ML: at 21:53

## 2018-10-21 RX ADMIN — VANCOMYCIN HYDROCHLORIDE 1250 MG: 10 INJECTION, POWDER, LYOPHILIZED, FOR SOLUTION INTRAVENOUS at 01:38

## 2018-10-21 RX ADMIN — Medication 400 MG: at 09:19

## 2018-10-21 RX ADMIN — SALINE NASAL SPRAY 1 SPRAY: 1.5 SOLUTION NASAL at 16:57

## 2018-10-21 RX ADMIN — SODIUM CHLORIDE 100 ML/HR: 9 INJECTION, SOLUTION INTRAVENOUS at 04:52

## 2018-10-21 RX ADMIN — DIPHENHYDRAMINE HYDROCHLORIDE 25 MG: 25 CAPSULE ORAL at 10:59

## 2018-10-21 RX ADMIN — SODIUM CHLORIDE 750 MG: 900 INJECTION, SOLUTION INTRAVENOUS at 09:20

## 2018-10-21 NOTE — H&P
Name: Vikki Durham ADMIT: 10/20/2018   : 1995  PCP: Carla Zhao MD    MRN: [id number] LOS: 0 days   AGE/SEX: 23 y.o. female  ROOM: [facility]     Chief Complaint   Patient presents with   • Fever       Subjective   Patient is a 23 y.o. female who presents to [facility] with the above chief complaint.  Her first fever was about 48 hours ago.  She woke up with a mild temperature elevation and some chills but her symptoms went away and she was feeling fine after couple hours.  She had no further symptoms and went throughout the day.  However yesterday morning she woke up with a temperature up to 104 her fever persisted throughout the day.  She was experiencing some upper respiratory symptoms and some chills.  She's had a runny nose and watery eyes sore throat and a cough that's been nonproductive.  She denies any chest pain or shortness of breath.  She denies any sick contacts.  She just finished chemotherapy cycle last week for her B-cell lymphoma.  She's followed in the outpatient setting by her oncologist.  The entire course relatively uncomplicated and she only had some migraines but towards the end of the cycle she did experience pancytopenia.  She's had some nausea but denies any vomiting no abdominal pain urinary symptoms or diarrhea noted.        History of Present Illness    Past Medical History:   Diagnosis Date   • Diffuse large B cell lymphoma (CMS/HCC) 2018   • Fracture of lower leg 2000    L   • H/O Lung mass 2018   • H/O Pericardial effusion      Past Surgical History:   Procedure Laterality Date   • OTHER SURGICAL HISTORY  2018    CT-GUIDED BIOPSY OF MEDIASTINAL MASS   • VENOUS ACCESS DEVICE (PORT) INSERTION Right 2018    Procedure: RIGHT MEDIPORT PLACEMENT;  Surgeon: Farhan Hurt MD;  Location: [facility] OR;  Service: Vascular     Family History   Problem Relation Age of Onset   • Thyroid disease Mother    • Glaucoma Mother    • Lung cancer  Maternal Grandmother    • Hypertension Paternal Grandmother    • Diabetes Paternal Grandmother      Social History   Substance Use Topics   • Smoking status: Never Smoker   • Smokeless tobacco: Never Used   • Alcohol use No     Prescriptions Prior to Admission   Medication Sig Dispense Refill Last Dose   • acyclovir (ZOVIRAX) 400 MG tablet Take 400 mg by mouth 2 (Two) Times a Day. Take no more than 5 doses a day.   Past Week at Unknown time   • amitriptyline (ELAVIL) 10 MG tablet Take 1 tablet by mouth Every Night. 30 tablet 3 Past Week at Unknown time   • dabigatran etexilate (PRADAXA) 150 MG capsu Take 1 capsule by mouth Every 12 (Twelve) Hours. 60 capsule 6 Past Week at Unknown time   • fluconazole (DIFLUCAN) 200 MG tablet Take 400 mg by mouth 2 (Two) Times a Day. 2 tablets PO twice daily   Past Week at Unknown time   • magnesium oxide (MAG-OX) 400 MG tablet Take 1 tablet by mouth Daily. 30 tablet 1 Past Week at Unknown time   • ondansetron (ZOFRAN) 4 MG tablet Take 1 tablet by mouth Every 6 (Six) Hours As Needed for Nausea or Vomiting. 30 tablet 2 Past Week at Unknown time   • potassium chloride (MICRO-K) 10 MEQ CR capsule Take 2 capsules by mouth 2 (Two) Times a Day With Meals. (Patient taking differently: Take 20 mEq by mouth Daily.) 60 capsule 5 Past Week at Unknown time   • pyridoxine (VITAMIN B-6) 50 MG tablet tablet Take 1 tablet by mouth Daily. 30 tablet 6 Past Week at Unknown time   • sennosides-docusate sodium (SENOKOT-S) 8.6-50 MG tablet Take 2 tablets by mouth 2 (Two) Times a Day As Needed for Constipation. 60 tablet 1 Past Week at Unknown time   • sulfamethoxazole-trimethoprim (BACTRIM DS,SEPTRA DS) 800-160 MG per tablet Take 1 tablet by mouth 3 (Three) Times a Week. 12 tablet 3 Past Week at Unknown time   • vitamin B-12 (CYANOCOBALAMIN) 1000 MCG tablet Take 1,000 mcg by mouth Daily.   Past Week at Unknown time     Allergies:  Patient has no known allergies.    Review of Systems   Constitutional:  Positive for chills, fatigue and fever.   HENT: Positive for congestion, postnasal drip, rhinorrhea, sinus pain and sore throat. Negative for trouble swallowing.    Eyes: Negative for photophobia, redness and visual disturbance.   Respiratory: Positive for cough. Negative for chest tightness, shortness of breath and wheezing.    Cardiovascular: Negative for chest pain and palpitations.   Gastrointestinal: Negative for abdominal distention, abdominal pain, constipation and diarrhea.   Endocrine: Negative for polydipsia, polyphagia and polyuria.   Genitourinary: Negative for difficulty urinating, dysuria and frequency.   Musculoskeletal: Negative for back pain, myalgias and neck stiffness.   Skin: Negative for pallor and rash.   Allergic/Immunologic: Negative for environmental allergies, food allergies and immunocompromised state.   Neurological: Negative for dizziness, numbness and headaches.   Hematological: Negative for adenopathy. Does not bruise/bleed easily.   Psychiatric/Behavioral: Negative for agitation, behavioral problems and confusion.        Objective    Vital Signs  Temp:  [97.5 °F (36.4 °C)-103.1 °F (39.5 °C)] 100.2 °F (37.9 °C)  Heart Rate:  [] 79  Resp:  [16-20] 16  BP: ()/(48-65) 97/52  SpO2:  [95 %-99 %] 97 %  on   ;   Device (Oxygen Therapy): room air  Body mass index is 26.61 kg/m².    Physical Exam   Constitutional: She is oriented to person, place, and time. She appears well-developed and well-nourished.   HENT:   Head: Normocephalic and atraumatic.   Nose: Nose normal.   Eyes: Conjunctivae and EOM are normal.   Neck: Neck supple.   Cardiovascular: Normal rate, regular rhythm and normal heart sounds.    Pulmonary/Chest: Effort normal and breath sounds normal.   Abdominal: Soft. Bowel sounds are normal.   Musculoskeletal: Normal range of motion. She exhibits no edema.   Neurological: She is alert and oriented to person, place, and time. No cranial nerve deficit.   Skin: Skin is warm  and dry.   Psychiatric: She has a normal mood and affect. Her behavior is normal.   Nursing note and vitals reviewed.      Results Review:   I reviewed the patient's new clinical results.    Results from last 7 days  Lab Units 10/21/18  0458 10/20/18  2331 10/15/18  1402   WBC 10*3/mm3 0.29* 0.28* 2.97*   HEMOGLOBIN g/dL 6.9* 7.5* 9.7*   PLATELETS 10*3/mm3 24* 26* 199     Results from last 7 days  Lab Units 10/21/18  0458 10/20/18  2331 10/15/18  1402   SODIUM mmol/L 140 134* 139   POTASSIUM mmol/L 3.7 3.5 4.0   CHLORIDE mmol/L 108* 99 100   CO2 mmol/L 22.5 24.8 29.9*   BUN mg/dL 8 10 19   CREATININE mg/dL 0.54* 0.63 0.51*   GLUCOSE mg/dL 102* 109* 96   ALBUMIN g/dL 3.50 4.10 4.20   BILIRUBIN mg/dL <0.2 <0.2 <0.2   ALK PHOS U/L 29* 34* 38   AST (SGOT) U/L 14 19 15   ALT (SGPT) U/L 20 23 20   Estimated Creatinine Clearance: 144.5 mL/min (A) (by C-G formula based on SCr of 0.54 mg/dL (L)).  Results from last 7 days  Lab Units 10/21/18  0458   INR  1.10         Invalid input(s): LDLCALC  Results from last 7 days  Lab Units 10/20/18  2352   NITRITE UA  Negative     XR Chest 2 View   Final Result   Patchy infiltrate again noted within the left upper lobe. This was also   seen on prior CT. No new infiltrates are identified.       This report was finalized on 10/20/2018 11:54 PM by Dr. Silvia Ordonez M.D.            Assessment/Plan       Anemia associated with chemotherapy    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)    Fever and chills    Pancytopenia (CMS/HCC)      Assessment & Plan  She is being admitted to the hospital with neutropenic fever.  I think the likely sources and upper respiratory infection whether be bacterial or viral.  Infectious disease is already evaluated the patient and ordered cultures and further workup.  In the meantime will continue on antibiotic therapy and follow up the culture results.  She is quite anemic this morning and she's already been ordered 2  units of packed red blood cells.  We'll transfuse those and follow her hemoglobin closely.  Again infectious disease and hematology oncology will be consulted for assistance.      I discussed the patients findings and my recommendations with patient, family and nursing staff.          Arie Trejo MD  [facility]  10/21/18  8:15 AM

## 2018-10-21 NOTE — CONSULTS
Subjective     REASON FOR CONSULTATION:  Provide an opinion on any further workup or treatment on:    Fever                       REQUESTING PHYSICIAN: Miguel Cerrato MD      HISTORY OF PRESENT ILLNESS:      Vikki Durham is a 23 y.o. patient who was admitted on 10/20/2018 with fever.  The patient received her last cycle of chemotherapy (Cycle #6) between 10/8/18 and 10/12/18.  She was discharged home and received her Neulasta injection on 10/15/18.  On 10/19/18, she started having nasal congestion and runny nose. She also started having cough without sputum production. No shortness of breath.  On 10/20/18, she started having fever. She took Tylenol but the fever recurred and was up to 104 at home.  Her mother called the answering service last night and I asked her to take her to the ER. Temp in ER was 103.1 and HR was 121.       Past Medical History:   Diagnosis Date   • Diffuse large B cell lymphoma (CMS/HCC) 06/2018   • Fracture of lower leg 2000    L   • H/O Lung mass 06/2018   • H/O Pericardial effusion        Past Surgical History:   Procedure Laterality Date   • OTHER SURGICAL HISTORY  06/12/2018    CT-GUIDED BIOPSY OF MEDIASTINAL MASS   • VENOUS ACCESS DEVICE (PORT) INSERTION Right 7/31/2018    Procedure: RIGHT MEDIPORT PLACEMENT;  Surgeon: Farhan Hurt MD;  Location: [facility];  Service: Vascular         SCHEDULED MEDS:    acyclovir 400 mg Oral BID   amitriptyline 10 mg Oral Nightly   cefepime 2 g Intravenous Q8H   fluconazole 400 mg Oral BID   magnesium oxide 400 mg Oral Daily   sodium chloride 3 mL Intravenous Q12H   vancomycin 750 mg Intravenous Q8H   vitamin B-12 1,000 mcg Oral Daily   pyridoxine 50 mg Oral Daily     INFUSIONS:    Pharmacy to dose vancomycin     sodium chloride 100 mL/hr Last Rate: 100 mL/hr (10/21/18 3937)     PRN MEDS:  •  acetaminophen  •  HYDROcodone-acetaminophen  •  ondansetron **OR** ondansetron ODT **OR** ondansetron  •  Pharmacy to dose vancomycin  •   prochlorperazine  •  sennosides-docusate sodium  •  sodium chloride  •  Insert peripheral IV **AND** sodium chloride  •  sodium chloride    ALLERGIES:  No Known Allergies     Social History     Social History   • Marital status: Single     Spouse name: N/A   • Number of children: N/A   • Years of education: N/A     Occupational History   • Dental student      [employer]     Social History Main Topics   • Smoking status: Never Smoker   • Smokeless tobacco: Never Used   • Alcohol use No   • Drug use: No   • Sexual activity: No     Other Topics Concern   • Not on file     Social History Narrative   • No narrative on file       Cancer-related family history includes Lung cancer in her maternal grandmother.    REVIEW OF SYSTEMS:   GENERAL:  Fever and chills. fatigue.   SKIN: Negative for skin rash or lesion.  HEME/LYMPH: Negative for easy bruisability.  EYES:  Negative for vision changes.  ENT: nasal congestion and runny nose.  RESPIRATORY:  Cough. No sputum. No SOB.   CVS:  Negative for chest pain, lower extremity swelling.   GI: Nausea. Constipation. Occasional blood on the toilet paper but no blood in the stool.  Negative for abdominal pain, vomiting, Diarrhea or melena.   :  Negative for hematuria, dysuria or increased frequency.   MUSCULOSKELETAL:  Negative for back pain, joint pain or stiffness.  NEUROLOGICAL: history of migraine headaches. No dizziness or numbness.  PSYCHIATRIC:  Negative for anxiety or depression.     Objective   VITAL SIGNS:  Vitals:    10/21/18 0242 10/21/18 0451 10/21/18 0555 10/21/18 0741   BP:    97/52   BP Location:    Left arm   Patient Position:    Lying   Pulse:  90  79   Resp:    16   Temp:  (!) 101.6 °F (38.7 °C) 97.5 °F (36.4 °C) 100.2 °F (37.9 °C)   TempSrc:  Oral Oral Oral   SpO2:  97%  97%   Weight: 66 kg (145 lb 8 oz)      Height:           Wt Readings from Last 3 Encounters:   10/21/18 66 kg (145 lb 8 oz)   10/16/18 63.9 kg (140 lb 14 oz)   10/08/18 63.9 kg (140  lb 14 oz)       PHYSICAL EXAMINATION:   GENERAL:  The patient appears weak.  SKIN: Warm and dry. No skin rashes, ecchymosis or petechiae.  HEAD:  Normocephalic. Alopecia due to chemotherapy.  EYES:  No Jaundice. No Pallor. Pupils equal. EOMI.  MOUTH: No Ulcers. No Thrush. No Exudates.  NECK:  Supple with Good ROM. No Thyromegaly. No Masses.  LYMPHATICS:  No cervical or supraclavicular lymphadenopathy.  CHEST: Normal respiratory effort. Lungs clear to auscultation.  Port in the right upper chest appears benign.  CARDIAC:  Normal S1 & S2. No murmurs. No edema.  ABDOMEN:  Soft. No tenderness. No Hepatomegaly. No Splenomegaly. No masses.  EXTREMITIES:  No clubbing. No joint swelling in the hands. No Calf tenderness.  NEUROLOGICAL:  No Focal neurological deficits.          RESULT REVIEW:     Results from last 7 days  Lab Units 10/21/18  0458 10/20/18  2331 10/15/18  1402   WBC 10*3/mm3 0.29* 0.28* 2.97*   NEUTROS ABS 10*3/mm3  --  0.00* 2.51   HEMOGLOBIN g/dL 6.9* 7.5* 9.7*   HEMATOCRIT % 21.1* 22.7* 28.8*   PLATELETS 10*3/mm3 24* 26* 199       Results from last 7 days  Lab Units 10/21/18  0458 10/20/18  2331 10/15/18  1402   SODIUM mmol/L 140 134* 139   POTASSIUM mmol/L 3.7 3.5 4.0   CHLORIDE mmol/L 108* 99 100   CO2 mmol/L 22.5 24.8 29.9*   BUN mg/dL 8 10 19   CREATININE mg/dL 0.54* 0.63 0.51*   CALCIUM mg/dL 8.3* 8.8 8.9   ALBUMIN g/dL 3.50 4.10 4.20   BILIRUBIN mg/dL <0.2 <0.2 <0.2   ALK PHOS U/L 29* 34* 38   ALT (SGPT) U/L 20 23 20   AST (SGOT) U/L 14 19 15   MAGNESIUM mg/dL 1.7  --   --        Results from last 7 days  Lab Units 10/21/18  0458   INR  1.10     PA AND LATERAL CHEST RADIOGRAPH 10/20/18:     HISTORY: Fever     COMPARISON: September 7, 2018     TECHNIQUE: 2 views     FINDINGS:  Cardiomediastinal silhouette appears unchanged when compared to prior  exam from September 7, 2018. Patient has a known anterior mediastinal  mass. Right subclavian Mediport extends to the superior vena cava. No  pneumothorax or  pleural effusion is seen. There is some patchy  infiltrate seen within the left upper lobe. This was actually also  present on prior CT. No pneumothorax or pleural effusion is seen.     IMPRESSION:  Patchy infiltrate again noted within the left upper lobe. This was also  seen on prior CT. No new infiltrates are identified.     This report was finalized on 10/20/2018 11:54 PM by Dr. Silvia Ordonez M.D.         Assessment/Plan   1.  Primary mediastinal large B-cell lymphoma. She received chemotherapy with DA-EPOCH-R. She was started on 6/16/18 and received her 6th and last cycle of chemotherapy between 10/8/18 and 10/12/18. She responded well to the treatment.     2.  Neutropenic fever. The patient developed severe neutropenia despite receiving Neulasta. This is likely due to the cumulative effect of the chemotherapy on her bone marrow. The source appears to be from upper respiratory site. The CXR reported infiltrate in the left upper lobe that was present on her previous CT scan ? Significance.     3.  Severe anemia. This is also attributed to the suppressive effect of the chemotherapy on her bone marrow.     4.  Thrombocytopenia.  She is not having problem with bleeding.    PLAN:    1.  Patient is on IV fluids and ABx per the primary service.     2.  I will transfuse with 2 units of PRBC given the degree of anemia and the significant bone marrow suppression.     3.  I will obtain anemia workup to evaluate for other possible contributing factors.         Kimberley Melgar MD  10/21/18

## 2018-10-21 NOTE — ED NOTES
Spoke with 6S RN, they are speaking with  as they do not think pt is appropriate for floor.      Zulma Raymond, RN  10/21/18 0147

## 2018-10-21 NOTE — CONSULTS
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 History is obtained from the patient and review of the old medical records which I summarize/synthesize as follows: She was discharged on 10/13/18 after being admitted for her final round (6th cycle) of chemotherapy (DA-EPOCH-R). She was discharged on prophylactic Bactrim, fluconazole, and acyclovir which she has been taking. She has received Neulasta as an outpatient but presented on 10/20/18 with fever to 103 F and neutropenia. She had associated rhinorrhea, sinus congestion, and headache. There were no alleviating factors. She was started on vancomycin and cefepime. She says she is feeling better. No sick contacts at home. No recent travel. No issues w/ port site.    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  No tobacco    Family History:  No 1st degree relatives w/ neutropenic fever    Allergies:  No Antibiotic Allergies    Medications:    Current Facility-Administered Medications:   •  acetaminophen (TYLENOL) tablet 650 mg, 650 mg, Oral, Q4H PRN, Miguel Cerrato MD, 650 mg at 10/21/18 0451  •  acetaminophen (TYLENOL) tablet 650 mg, 650 mg, Oral, Once, Kimberley Melgar MD  •  acyclovir (ZOVIRAX)  tablet 400 mg, 400 mg, Oral, BID, Miguel Cerrato MD, 400 mg at 10/21/18 0919  •  amitriptyline (ELAVIL) tablet 10 mg, 10 mg, Oral, Nightly, Miguel Cerrato MD  •  cefepime (MAXIPIME) 2 g/100 mL 0.9% NS (mbp), 2 g, Intravenous, Q8H, Miguel Cerrato MD, 2 g at 10/21/18 0920  •  diphenhydrAMINE (BENADRYL) capsule 25 mg, 25 mg, Oral, Once, Kimberley Melgar MD  •  fluconazole (DIFLUCAN) tablet 400 mg, 400 mg, Oral, BID, Miguel Cerrato MD, 400 mg at 10/21/18 0919  •  HYDROcodone-acetaminophen (NORCO) 5-325 MG per tablet 1 tablet, 1 tablet, Oral, Q4H PRN, Miguel Cerrato MD  •  magnesium oxide (MAG-OX) tablet 400 mg, 400 mg, Oral, Daily, Miguel Cerrato MD, 400 mg at 10/21/18 0919  •  ondansetron (ZOFRAN) tablet 4 mg, 4 mg, Oral, Q6H PRN **OR** ondansetron ODT (ZOFRAN-ODT) disintegrating tablet 4 mg, 4 mg, Oral, Q6H PRN **OR** ondansetron (ZOFRAN) injection 4 mg, 4 mg, Intravenous, Q6H PRN, Miguel Cerrato MD  •  Pharmacy to dose vancomycin, , Does not apply, Continuous PRN, Miguel Cerrato MD  •  prochlorperazine (COMPAZINE) injection 5 mg, 5 mg, Intravenous, Q6H PRN, Miguel Cerrato MD  •  sennosides-docusate sodium (SENOKOT-S) 8.6-50 MG tablet 2 tablet, 2 tablet, Oral, BID PRN, Miguel Cerrato MD  •  sodium chloride (OCEAN) nasal spray 1 spray, 1 spray, Each Nare, PRN, Miguel Cerrato MD  •  Insert peripheral IV, , , Once **AND** sodium chloride 0.9 % flush 10 mL, 10 mL, Intravenous, PRN, Luis Navarrete MD  •  sodium chloride 0.9 % flush 3 mL, 3 mL, Intravenous, Q12H, Miguel Cerrato MD, 3 mL at 10/21/18 0921  •  sodium chloride 0.9 % flush 3-10 mL, 3-10 mL, Intravenous, PRN, Miguel Cerrato MD  •  sodium chloride 0.9 % infusion, 100 mL/hr, Intravenous, Continuous, Miguel Cerrato MD, Last Rate: 100 mL/hr at 10/21/18 0452, 100 mL/hr at 10/21/18 0452  •  vancomycin 750 mg/250 mL 0.9% NS add-vantage, 750 mg, Intravenous, Q8H, Miguel Cerrato MD, 750 mg  at 10/21/18 0920  •  vitamin B-12 (CYANOCOBALAMIN) tablet 1,000 mcg, 1,000 mcg, Oral, Daily, Miguel Cerrato MD, 1,000 mcg at 10/21/18 0919  •  vitamin B-6 (PYRIDOXINE) tablet 50 mg, 50 mg, Oral, Daily, Miguel Cerrato MD, 50 mg at 10/21/18 0919    Review of Systems  All systems were reviewed and are negative unless otherwise stated above in the HPI    Objective   Vital Signs   Temp:  [97.5 °F (36.4 °C)-103.1 °F (39.5 °C)] 99.9 °F (37.7 °C)  Heart Rate:  [] 79  Resp:  [16-20] 16  BP: ()/(48-65) 97/52    Physical Exam:   General: awake, alert, NAD   Head: alopecia due to chemo  Eyes: PERRL, EOMI, no scleral icterus, no conjunctival pallor, no conjunctival hemorrhages.   ENT: MMM, OP clear, no thrush. Good dentition.   Neck: Supple, no visible thyromegaly  Cardiovascular: NR, RR, no murmurs, rubs, or gallops; no LE edema  Respiratory: Lungs are clear to auscultation bilaterally, no rales or wheezing; normal work of breathing on ambient air  GI: Abdomen is soft, non-tender, non-distended, normal bowel sounds in all four quadrants; no hepatosplenomegaly, no masses palpated  : no Paez catheter present  Musculoskeletal: no joint abnormalities, normal musculature  Skin: No rashes, lesions, or embolic phenomenon  Neurological: Alert and oriented x 3, cranial nerves 2-12 grossly intact, motor strength 5/5 in all four extremities  Psychiatric: Normal mood and affect   Lymph: no pre-auricular, post-auricular, submandibular, cervical, supraclavicular  LAD  Vasc: no cyanosis; Port w/o erythema    Labs:     Lab Results   Component Value Date    WBC 0.29 (C) 10/21/2018    HGB 6.9 (C) 10/21/2018    HCT 21.1 (L) 10/21/2018    MCV 97.2 10/21/2018    PLT 24 (C) 10/21/2018       Lab Results   Component Value Date    GLUCOSE 102 (H) 10/21/2018    BUN 8 10/21/2018    CREATININE 0.54 (L) 10/21/2018    EGFRIFNONA 140 10/21/2018    EGFRIFAFRI >150 10/21/2018    BCR 14.8 10/21/2018    CO2 22.5 10/21/2018    CALCIUM 8.3  (L) 10/21/2018    ALBUMIN 3.50 10/21/2018    AST 14 10/21/2018    ALT 20 10/21/2018     UA negative  Procal 0.1    Microbiology:  10/21 BCx: NGTD    Radiology (personally reviewed images and report):  CXR with patchy infiltrate in JEROD per radilogist; I do not see any significant findings    Assessment/Plan   1. Neutropenic fever  2. Diffuse large B cell lymphoma on DA-EPOCH-R s/p 6 cycles  3. Anemia and thrombocytopenia    Agree with empiric vancomycin with goal trough 15-20 and cefepime 2 g IV q8h. Check respiratory viral panel. F/U blood cultures. Repeat procal tomorrow. If no clear answers and fever persists, will obtain CT CAP.    Thank you for this consult. ID will follow.

## 2018-10-21 NOTE — ED PROVIDER NOTES
EMERGENCY DEPARTMENT ENCOUNTER    CHIEF COMPLAINT  Chief Complaint: Fever  History given by: patient   History limited by: n/a  Room Number: 21/21  PMD: Carla Zhao MD Dr Bhupalam, CBC    HPI:  Pt is a 23 y.o. female who presents complaining of a fever that began yesterday morning at 03:00. Temp on arrival to the ED is 103.1. Pt also complains of rhinorrhea, headache, and mild cough. She denies chest pain, abd pain, or any other sx. Pt has a hx of lymphoma, and last chemotherapy treatment was 1 week ago.     Duration:  1 day  Onset: gradual  Timing: constant   Location: generalized   Radiation: none  Quality: fever   Intensity/Severity: moderate  Progression: unchanged   Associated Symptoms:  rhinorrhea, headache, and mild cough  Aggravating Factors: none  Alleviating Factors: none    PAST MEDICAL HISTORY  Active Ambulatory Problems     Diagnosis Date Noted   • Health care maintenance 06/13/2017   • Bunion of great toe of left foot 06/13/2017   • Low HDL (under 40) 06/13/2017   • Persistent cough for 3 weeks or longer 03/20/2018   • Allergic sinusitis 03/20/2018   • Mediastinal mass 06/11/2018   • Thoracic back pain 06/11/2018   • Dyspnea on exertion 06/11/2018   • Palpitation 06/11/2018   • 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   • Hypokalemia 07/14/2018   • Arm DVT (deep venous thromboembolism), acute, right (CMS/HCC) 07/14/2018   • Elevated liver enzymes 07/14/2018   • Leukocytopenia 07/15/2018   • Anemia associated with chemotherapy 07/15/2018   • Fitting and adjustment of vascular catheter 08/10/2018   • 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) 10/08/2018     Resolved Ambulatory Problems     Diagnosis Date Noted   • Lymphadenopathy 06/11/2018   • Diffuse large B-cell lymphoma (CMS/HCC) 07/09/2018   • Lymphoma (CMS/HCC) 07/30/2018     Past Medical History:   Diagnosis Date   • Diffuse large B cell lymphoma (CMS/HCC)  06/2018   • Fracture of lower leg 2000   • H/O Lung mass 06/2018   • H/O Pericardial effusion        PAST SURGICAL HISTORY  Past Surgical History:   Procedure Laterality Date   • OTHER SURGICAL HISTORY  06/12/2018    CT-GUIDED BIOPSY OF MEDIASTINAL MASS   • VENOUS ACCESS DEVICE (PORT) INSERTION Right 7/31/2018    Procedure: RIGHT MEDIPORT PLACEMENT;  Surgeon: Farhan Hurt MD;  Location: [facility] OR;  Service: Vascular       FAMILY HISTORY  Family History   Problem Relation Age of Onset   • Thyroid disease Mother    • Glaucoma Mother    • Lung cancer Maternal Grandmother    • Hypertension Paternal Grandmother    • Diabetes Paternal Grandmother        SOCIAL HISTORY  Social History     Social History   • Marital status: Single     Spouse name: N/A   • Number of children: N/A   • Years of education: N/A     Occupational History   • Dental student      [employer]     Social History Main Topics   • Smoking status: Never Smoker   • Smokeless tobacco: Never Used   • Alcohol use No   • Drug use: No   • Sexual activity: No     Other Topics Concern   • Not on file     Social History Narrative   • No narrative on file       ALLERGIES  Patient has no known allergies.    REVIEW OF SYSTEMS  Review of Systems   Constitutional: Positive for fever.   HENT: Positive for rhinorrhea. Negative for sore throat.    Eyes: Negative.    Respiratory: Positive for cough. Negative for shortness of breath.    Cardiovascular: Negative for chest pain.   Gastrointestinal: Negative for abdominal pain, diarrhea and vomiting.   Genitourinary: Negative for dysuria.   Musculoskeletal: Negative for neck pain.   Skin: Negative for rash.   Allergic/Immunologic: Negative.    Neurological: Positive for headaches. Negative for weakness and numbness.   Hematological: Negative.    Psychiatric/Behavioral: Negative.    All other systems reviewed and are negative.      PHYSICAL EXAM  ED Triage Vitals [10/20/18 2254]   Temp Heart Rate  Resp BP SpO2   (!) 103.1 °F (39.5 °C) (!) 121 18 -- 99 %      Temp src Heart Rate Source Patient Position BP Location FiO2 (%)   Oral Monitor -- -- --       Physical Exam   Constitutional: She is oriented to person, place, and time. No distress.   HENT:   Head: Normocephalic and atraumatic.   Eyes: Pupils are equal, round, and reactive to light. EOM are normal.   Neck: Normal range of motion. Neck supple.   Cardiovascular: Regular rhythm and normal heart sounds.  Tachycardia present.    Pulmonary/Chest: Effort normal and breath sounds normal. No respiratory distress.   Port site is clean, dry, and without erythema.    Abdominal: Soft. There is no tenderness. There is no rebound and no guarding.   Musculoskeletal: Normal range of motion. She exhibits no edema.   Neurological: She is alert and oriented to person, place, and time. She has normal sensation and normal strength.   Skin: Skin is warm and dry. No rash noted.   Psychiatric: Mood and affect normal.   Nursing note and vitals reviewed.      LAB RESULTS  Lab Results (last 24 hours)     Procedure Component Value Units Date/Time    CBC & Differential [[id number]] Collected:  10/20/18 2331    Specimen:  Blood Updated:  10/21/18 0000    Narrative:       The following orders were created for panel order CBC & Differential.  Procedure                               Abnormality         Status                     ---------                               -----------         ------                     Scan Slide[[id number]]                                       Final result               CBC Auto Differential[[id number]]        Abnormal            Final result                 Please view results for these tests on the individual orders.    Comprehensive Metabolic Panel [[id number]]  (Abnormal) Collected:  10/20/18 2331    Specimen:  Blood Updated:  10/21/18 0011     Glucose 109 (H) mg/dL      BUN 10 mg/dL      Creatinine 0.63 mg/dL      Sodium 134 (L) mmol/L      Potassium 3.5  "mmol/L      Chloride 99 mmol/L      CO2 24.8 mmol/L      Calcium 8.8 mg/dL      Total Protein 6.2 g/dL      Albumin 4.10 g/dL      ALT (SGPT) 23 U/L      AST (SGOT) 19 U/L      Alkaline Phosphatase 34 (L) U/L      Total Bilirubin <0.2 mg/dL      eGFR Non African Amer 117 mL/min/1.73      eGFR  African Amer 142 mL/min/1.73      Globulin 2.1 gm/dL      A/G Ratio 2.0 g/dL      BUN/Creatinine Ratio 15.9     Anion Gap 10.2 mmol/L     hCG, Serum, Qualitative [[id number]]  (Normal) Collected:  10/20/18 2331    Specimen:  Blood Updated:  10/21/18 0008     HCG Qualitative Negative    Lactic Acid, Plasma [[id number]]  (Normal) Collected:  10/20/18 2331    Specimen:  Blood Updated:  10/20/18 2354     Lactate 0.8 mmol/L     Procalcitonin [[id number]]  (Normal) Collected:  10/20/18 2331    Specimen:  Blood Updated:  10/21/18 0013     Procalcitonin 0.10 ng/mL     Narrative:       As a Marker for Sepsis (Non-Neonates):   1. <0.5 ng/mL represents a low risk of severe sepsis and/or septic shock.  1. >2 ng/mL represents a high risk of severe sepsis and/or septic shock.    As a Marker for Lower Respiratory Tract Infections that require antibiotic therapy:  PCT on Admission     Antibiotic Therapy             6-12 Hrs later  > 0.5                Strongly Recommended            >0.25 - <0.5         Recommended  0.1 - 0.25           Discouraged                   Remeasure/reassess PCT  <0.1                 Strongly Discouraged          Remeasure/reassess PCT      As 28 day mortality risk marker: \"Change in Procalcitonin Result\" (> 80 % or <=80 %) if Day 0 (or Day 1) and Day 4 values are available. Refer to http://www.[employer]-pct-calculator.com/   Change in PCT <=80 %   A decrease of PCT levels below or equal to 80 % defines a positive change in PCT test result representing a higher risk for 28-day all-cause mortality of patients diagnosed with severe sepsis or septic shock.  Change in PCT > 80 %   A decrease of PCT levels of more than 80 % " defines a negative change in PCT result representing a lower risk for 28-day all-cause mortality of patients diagnosed with severe sepsis or septic shock.                CBC Auto Differential [[id number]]  (Abnormal) Collected:  10/20/18 2331    Specimen:  Blood Updated:  10/21/18 0000     WBC 0.28 (C) 10*3/mm3      RBC 2.34 (L) 10*6/mm3      Hemoglobin 7.5 (L) g/dL      Hematocrit 22.7 (L) %      MCV 97.0 fL      MCH 32.1 (H) pg      MCHC 33.0 g/dL      RDW 15.9 (H) %      RDW-SD 56.7 (H) fl      Platelets 26 (C) 10*3/mm3      Neutrophil % 0.0 (L) %      Lymphocyte % 53.5 (H) %      Monocyte % 42.9 (H) %      Eosinophil % 0.0 (L) %      Basophil % 3.6 (H) %      Immature Grans % 0.0 %      Neutrophils, Absolute 0.00 (C) 10*3/mm3      Lymphocytes, Absolute 0.15 (L) 10*3/mm3      Monocytes, Absolute 0.12 (L) 10*3/mm3      Eosinophils, Absolute 0.00 10*3/mm3      Basophils, Absolute 0.01 10*3/mm3      Immature Grans, Absolute 0.00 10*3/mm3      nRBC 0.0 /100 WBC     Scan Slide [[id number]] Collected:  10/20/18 2331    Specimen:  Blood Updated:  10/21/18 0000     Dacrocytes Slight/1+     WBC Morphology Normal     Platelet Morphology Normal    Urinalysis With Microscopic If Indicated (No Culture) - Urine, Clean Catch [[id number]]  (Normal) Collected:  10/20/18 2352    Specimen:  Urine from Urine, Clean Catch Updated:  10/21/18 0008     Color, UA Yellow     Appearance, UA Clear     pH, UA 6.5     Specific Gravity, UA 1.011     Glucose, UA Negative     Ketones, UA Negative     Bilirubin, UA Negative     Blood, UA Negative     Protein, UA Negative     Leuk Esterase, UA Negative     Nitrite, UA Negative     Urobilinogen, UA 0.2 E.U./dL    Narrative:       Urine microscopic not indicated.    Blood Culture - Blood, [[id number]] Collected:  10/21/18 0001    Specimen:  Blood from Arm, Left Updated:  10/21/18 0014    Blood Culture - Blood, [[id number]] Collected:  10/21/18 0048    Specimen:  Blood from Arm, Left Updated:  10/21/18  0051          I ordered the above labs and reviewed the results    RADIOLOGY  XR Chest 2 View   Final Result   Patchy infiltrate again noted within the left upper lobe. This was also   seen on prior CT. No new infiltrates are identified.       This report was finalized on 10/20/2018 11:54 PM by Dr. Silvia Ordonez M.D.               I ordered the above noted radiological studies. Interpreted by radiologist. Reviewed by me in PACS.       PROCEDURES  Critical Care  Performed by: RADHA EDGAR  Authorized by: RADHA EDGAR     Critical care provider statement:     Critical care time (minutes):  30    Critical care time was exclusive of:  Separately billable procedures and treating other patients and teaching time    Critical care was necessary to treat or prevent imminent or life-threatening deterioration of the following conditions: neutropenic fever.    Critical care was time spent personally by me on the following activities:  Development of treatment plan with patient or surrogate, discussions with consultants, evaluation of patient's response to treatment, examination of patient, obtaining history from patient or surrogate, ordering and performing treatments and interventions, ordering and review of laboratory studies, ordering and review of radiographic studies, re-evaluation of patient's condition and review of old charts            PROGRESS AND CONSULTS       22:58  UA, blood cultures, UA, lactic, pro-calcitonin, CMP, CBC, and hcg ordered.     23;08  BP- 118/65 HR- (!) 121 Temp- (!) 103.1 °F (39.5 °C) (Oral) O2 sat- 99%  Informed pt of the plan for labs and CXR. Will treat fever. Pt understands and agrees with the plan, all questions answered.    23;16  IVF ordered for hydration. Tylenol ordered to treat fever. CXR ordered.     00:07  30 mL/kg fluid bolus ordered per sepsis protocol.     00:43  BP- 110/63 HR- 100 Temp- (!) 102.5 °F (39.2 °C) (Oral) O2 sat- 97%  Rechecked the patient who is in  NAD and is resting comfortably. Informed pt and family that the labs show a WBC of 0.28. Informed her of the plan for broad spectrum abx and admission. Pt understands and agrees with the plan, all questions answered.    00;49  Vancomycin and Cefepime ordered for abx coverage. Call placed to [facility] for admission.    01:20  Discussed pt's case with Dr Cerrato ([facility]), who agrees to admit the pt.     MEDICAL DECISION MAKING  Results were reviewed/discussed with the patient and they were also made aware of online access. Pt also made aware that some labs, such as cultures, will not be resulted during ER visit and follow up with PMD is necessary.     MDM  Number of Diagnoses or Management Options  Antineoplastic chemotherapy induced pancytopenia (CMS/HCC):   Fever and chills:   Neutropenic fever (CMS/HCC):      Amount and/or Complexity of Data Reviewed  Clinical lab tests: ordered and reviewed (WBC=0.28, platelets=26, Absolute neutrophils=0.00)  Tests in the radiology section of CPT®: ordered and reviewed (CXR shows patchy infiltrate in the JEROD)  Decide to obtain previous medical records or to obtain history from someone other than the patient: yes  Review and summarize past medical records: yes (Admitted on 10/8/18 s/t mediastenial large B cell lymphoma. )  Discuss the patient with other providers: yes (Dr Cerrato ([facility]))    Critical Care  Total time providing critical care: 30-74 minutes    Patient Progress  Patient progress: stable         DIAGNOSIS  Final diagnoses:   Fever and chills   Antineoplastic chemotherapy induced pancytopenia (CMS/HCC)   Neutropenic fever (CMS/HCC)       DISPOSITION  ADMISSION    Discussed treatment plan and reason for admission with pt/family and admitting physician.  Pt/family voiced understanding of the plan for admission for further testing/treatment as needed.     Latest Documented Vital Signs:  As of 1:40 AM  BP- 109/54 HR- 85 Temp- 99.5 °F (37.5 °C) (Oral) O2 sat- 99%    --  Documentation  assistance provided by martha Kraft for Dr Navarrete.  Information recorded by the scribe was done at my direction and has been verified and validated by me.       Massiel Kraft  10/21/18 0127       Lius Navarrete MD  10/21/18 0140

## 2018-10-21 NOTE — PROGRESS NOTES
"Pharmacokinetic Consult - Vancomycin Dosing (Initial Note)    Vikki Kim has been consulted for pharmacy to dose vancomycin for sepsis. Pt presents to ED complaining of  fever that began yesterday morning at 03:00. Temp on arrival to the ED is 103.1. Pt also complains of rhinorrhea, headache, and mild cough. Pt has a hx of lymphoma, and last chemotherapy treatment was 1 week ago.     Pharmacy dosing vancomycin per Dr. Cerrato's request.     Goal trough: 15-20 mg/L   Other antimicrobials: cefepime    Relevant clinical data and objective history reviewed:  23 y.o. female 157.5 cm (62\") 64.1 kg (141 lb 6.4 oz)    Past Medical History:   Diagnosis Date   • Diffuse large B cell lymphoma (CMS/HCC) 06/2018   • Fracture of lower leg 2000    L   • H/O Lung mass 06/2018   • H/O Pericardial effusion      Creatinine   Date Value Ref Range Status   10/20/2018 0.63 0.57 - 1.00 mg/dL Final   10/15/2018 0.51 (L) 0.60 - 1.10 mg/dL Final   10/12/2018 0.51 (L) 0.57 - 1.00 mg/dL Final     BUN   Date Value Ref Range Status   10/20/2018 10 6 - 20 mg/dL Final     Estimated Creatinine Clearance: 122.1 mL/min (by C-G formula based on SCr of 0.63 mg/dL).    Lab Results   Component Value Date    WBC 0.28 (C) 10/20/2018     Temp Readings from Last 3 Encounters:   10/21/18 99.7 °F (37.6 °C) (Oral)   10/15/18 98.6 °F (37 °C)   10/12/18 98.3 °F (36.8 °C) (Oral)      Baseline culture/source/susceptibility:   10/21 Blood culture x2 - In process    Vancomycin Dosing Hx:  10/21 0138 Vancomycin 1250mg IV x1 given in ED    Assessment/Plan  1. Will start vancomycin 750mg (~11.7mg/kg) IV q8h based on renal function and patient age.   2. Will monitor serum creatinine every 24 hours for the first 3 days then at least every 48 hours per dosing recommendations.   3. Vancomycin trough level to be drawn 10/22 @0900, 30 min prior to 5th dose.   4. Pharmacy will continue to follow daily while on vancomycin and adjust as needed.     Thanks,  Uziel Love, " PharmD

## 2018-10-21 NOTE — PLAN OF CARE
Problem: Patient Care Overview  Goal: Plan of Care Review  Outcome: Ongoing (interventions implemented as appropriate)   10/21/18 0644   Coping/Psychosocial   Plan of Care Reviewed With patient   Plan of Care Review   Progress no change   OTHER   Outcome Summary Pt admitted with elevated temp of 103. No nausea or vomiting. NPO, ice chip/sips. IVF @ 100/hr. Stand-by assist. Hem/Onc consult and ID consult.

## 2018-10-21 NOTE — ED TRIAGE NOTES
Pt reports fever at home tonight, tmax 104.0.      Pt is not actively receiving chemotherapy but has hx of lymphoma.

## 2018-10-22 ENCOUNTER — APPOINTMENT (OUTPATIENT)
Dept: ONCOLOGY | Facility: CLINIC | Age: 23
End: 2018-10-22

## 2018-10-22 ENCOUNTER — APPOINTMENT (OUTPATIENT)
Dept: LAB | Facility: HOSPITAL | Age: 23
End: 2018-10-22

## 2018-10-22 VITALS
BODY MASS INDEX: 26.78 KG/M2 | HEIGHT: 62 IN | HEART RATE: 71 BPM | RESPIRATION RATE: 16 BRPM | TEMPERATURE: 99.4 F | WEIGHT: 145.5 LBS | OXYGEN SATURATION: 96 % | SYSTOLIC BLOOD PRESSURE: 108 MMHG | DIASTOLIC BLOOD PRESSURE: 65 MMHG

## 2018-10-22 PROBLEM — B34.8 PARAINFLUENZA VIRUS INFECTION: Status: ACTIVE | Noted: 2018-10-22

## 2018-10-22 LAB
ABO + RH BLD: NORMAL
ABO + RH BLD: NORMAL
ALBUMIN SERPL-MCNC: 3.5 G/DL (ref 3.5–5.2)
ANION GAP SERPL CALCULATED.3IONS-SCNC: 10.1 MMOL/L
ANISOCYTOSIS BLD QL: ABNORMAL
BH BB BLOOD EXPIRATION DATE: NORMAL
BH BB BLOOD EXPIRATION DATE: NORMAL
BH BB BLOOD TYPE BARCODE: 6200
BH BB BLOOD TYPE BARCODE: 6200
BH BB DISPENSE STATUS: NORMAL
BH BB DISPENSE STATUS: NORMAL
BH BB PRODUCT CODE: NORMAL
BH BB PRODUCT CODE: NORMAL
BH BB UNIT NUMBER: NORMAL
BH BB UNIT NUMBER: NORMAL
BUN BLD-MCNC: 4 MG/DL (ref 6–20)
BUN/CREAT SERPL: 8 (ref 7–25)
CALCIUM SPEC-SCNC: 8.4 MG/DL (ref 8.6–10.5)
CHLORIDE SERPL-SCNC: 108 MMOL/L (ref 98–107)
CO2 SERPL-SCNC: 23.9 MMOL/L (ref 22–29)
CREAT BLD-MCNC: 0.5 MG/DL (ref 0.57–1)
DEPRECATED RDW RBC AUTO: 57 FL (ref 37–54)
EOSINOPHIL # BLD MANUAL: 0.04 10*3/MM3 (ref 0–0.7)
EOSINOPHIL NFR BLD MANUAL: 3 % (ref 0.3–6.2)
ERYTHROCYTE [DISTWIDTH] IN BLOOD BY AUTOMATED COUNT: 16.4 % (ref 11.7–13)
GFR SERPL CREATININE-BSD FRML MDRD: >150 ML/MIN/1.73
GFR SERPL CREATININE-BSD FRML MDRD: >150 ML/MIN/1.73
GLUCOSE BLD-MCNC: 92 MG/DL (ref 65–99)
HCT VFR BLD AUTO: 28.9 % (ref 35.6–45.5)
HGB BLD-MCNC: 9.4 G/DL (ref 11.9–15.5)
LYMPHOCYTES # BLD MANUAL: 0.23 10*3/MM3 (ref 0.9–4.8)
LYMPHOCYTES NFR BLD MANUAL: 18 % (ref 19.6–45.3)
LYMPHOCYTES NFR BLD MANUAL: 35 % (ref 5–12)
MCH RBC QN AUTO: 31.4 PG (ref 26.9–32)
MCHC RBC AUTO-ENTMCNC: 32.5 G/DL (ref 32.4–36.3)
MCV RBC AUTO: 96.7 FL (ref 80.5–98.2)
MONOCYTES # BLD AUTO: 0.44 10*3/MM3 (ref 0.2–1.2)
NEUTROPHILS # BLD AUTO: 0.55 10*3/MM3 (ref 1.9–8.1)
NEUTROPHILS NFR BLD MANUAL: 44 % (ref 42.7–76)
NRBC SPEC MANUAL: 1 /100 WBC (ref 0–0)
PHOSPHATE SERPL-MCNC: 3.4 MG/DL (ref 2.5–4.5)
PLAT MORPH BLD: NORMAL
PLATELET # BLD AUTO: 28 10*3/MM3 (ref 140–500)
POTASSIUM BLD-SCNC: 3.6 MMOL/L (ref 3.5–5.2)
RBC # BLD AUTO: 2.99 10*6/MM3 (ref 3.9–5.2)
SCAN SLIDE: NORMAL
SODIUM BLD-SCNC: 142 MMOL/L (ref 136–145)
UNIT  ABO: NORMAL
UNIT  ABO: NORMAL
UNIT  RH: NORMAL
UNIT  RH: NORMAL
VANCOMYCIN TROUGH SERPL-MCNC: 11 MCG/ML (ref 5–20)
WBC MORPH BLD: NORMAL
WBC NRBC COR # BLD: 1.26 10*3/MM3 (ref 4.5–10.7)

## 2018-10-22 PROCEDURE — 99232 SBSQ HOSP IP/OBS MODERATE 35: CPT | Performed by: INTERNAL MEDICINE

## 2018-10-22 PROCEDURE — 80069 RENAL FUNCTION PANEL: CPT | Performed by: HOSPITALIST

## 2018-10-22 PROCEDURE — 85025 COMPLETE CBC W/AUTO DIFF WBC: CPT | Performed by: INTERNAL MEDICINE

## 2018-10-22 PROCEDURE — 80202 ASSAY OF VANCOMYCIN: CPT | Performed by: INTERNAL MEDICINE

## 2018-10-22 PROCEDURE — 85007 BL SMEAR W/DIFF WBC COUNT: CPT | Performed by: INTERNAL MEDICINE

## 2018-10-22 PROCEDURE — 25010000002 VANCOMYCIN 750 MG RECONSTITUTED SOLUTION: Performed by: INTERNAL MEDICINE

## 2018-10-22 RX ORDER — SULFAMETHOXAZOLE AND TRIMETHOPRIM 800; 160 MG/1; MG/1
1 TABLET ORAL 3 TIMES WEEKLY
Status: DISCONTINUED | OUTPATIENT
Start: 2018-10-22 | End: 2018-10-22 | Stop reason: HOSPADM

## 2018-10-22 RX ADMIN — Medication 400 MG: at 10:31

## 2018-10-22 RX ADMIN — FLUCONAZOLE 200 MG: 200 TABLET ORAL at 10:31

## 2018-10-22 RX ADMIN — SODIUM CHLORIDE 750 MG: 900 INJECTION, SOLUTION INTRAVENOUS at 01:40

## 2018-10-22 RX ADMIN — SULFAMETHOXAZOLE AND TRIMETHOPRIM 160 MG: 800; 160 TABLET ORAL at 10:31

## 2018-10-22 RX ADMIN — Medication 50 MG: at 10:31

## 2018-10-22 RX ADMIN — Medication 1000 MCG: at 10:32

## 2018-10-22 RX ADMIN — Medication 300 UNITS: at 12:09

## 2018-10-22 RX ADMIN — Medication 3 ML: at 10:33

## 2018-10-22 RX ADMIN — ACYCLOVIR 400 MG: 400 TABLET ORAL at 10:32

## 2018-10-22 NOTE — PROGRESS NOTES
Case Management Discharge Note    Final Note: DC home via private auto. Belle Morris RN    Destination     No service has been selected for the patient.      Durable Medical Equipment     No service has been selected for the patient.      Dialysis/Infusion     No service has been selected for the patient.      Home Medical Care     No service has been selected for the patient.      Social Care     No service has been selected for the patient.        Other: Other (private auto)    Final Discharge Disposition Code: 01 - home or self-care

## 2018-10-22 NOTE — OUTREACH NOTE
Medical Week 2 Survey      Responses   Facility patient discharged from?  [facility]   Does the patient have one of the following disease processes/diagnoses(primary or secondary)?  Other   Week 2 attempt successful?  No   Revoke  Readmitted          Oralia Lacey RN

## 2018-10-22 NOTE — PLAN OF CARE
Problem: Patient Care Overview  Goal: Plan of Care Review  Outcome: Ongoing (interventions implemented as appropriate)   10/22/18 0348   Coping/Psychosocial   Plan of Care Reviewed With patient   Plan of Care Review   Progress no change   OTHER   Outcome Summary Pt had 2 units PRBC, fever 100.9 once, Tylenol given. IVF and IV abx running. Will continue to monitor     Goal: Individualization and Mutuality  Outcome: Ongoing (interventions implemented as appropriate)    Goal: Discharge Needs Assessment  Outcome: Ongoing (interventions implemented as appropriate)    Goal: Interprofessional Rounds/Family Conf  Outcome: Ongoing (interventions implemented as appropriate)      Problem: Infection, Risk/Actual (Adult)  Goal: Infection Prevention/Resolution  Outcome: Ongoing (interventions implemented as appropriate)      Problem: Activity Intolerance (Adult)  Goal: Identify Related Risk Factors and Signs and Symptoms  Outcome: Ongoing (interventions implemented as appropriate)    Goal: Activity Tolerance  Outcome: Ongoing (interventions implemented as appropriate)    Goal: Effective Energy Conservation Techniques  Outcome: Ongoing (interventions implemented as appropriate)

## 2018-10-22 NOTE — DISCHARGE SUMMARY
Date of Admission: 10/20/2018  Date of Discharge:  10/22/2018    PCP: No primary care provider on file.      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)    Fever and chills    Pancytopenia (CMS/HCC)    Parainfluenza virus infection      SECONDARY DIAGNOSES  Past Medical History:   Diagnosis Date   • Diffuse large B cell lymphoma (CMS/HCC) 06/2018   • Fracture of lower leg 2000    L   • H/O Lung mass 06/2018   • H/O Pericardial effusion        CONSULTS   Consults     Date and Time Order Name Status Description    10/21/2018 0223 Hematology & Oncology Inpatient Consult Completed     10/21/2018 0223 Inpatient Infectious Diseases Consult Completed     10/21/2018 0048 [facility] (on-call MD unless specified) Completed     10/8/2018 1324 Inpatient Neurology Consult Completed     9/18/2018 1335 Inpatient ENT Consult Completed     9/17/2018 1056 Inpatient Neurology Consult Completed           PROCEDURES PERFORMED      HOSPITAL COURSE  Patient is a 23 y.o. female presented to [facility] complaining of fevers chills and upper respiratory symptoms.  Please see the admitting history and physical for further details.  She was admitted to the hospital with neutropenic fever.  Given her underlying history of B-cell lymphoma and recent completion of chemotherapy she was suffering significant pancytopenia.  She was started on broad-spectrum antibiotics and cultures were collected.  Based on her predominant upper respiratory symptoms and was felt she likely had a viral upper respiratory infection.  Respiratory viral panel was positive for parainfluenza virus.  Infectious disease and oncology both evaluated the patient with a course for hospitalization.  Everyone was in agreement with discontinuing antibiotics and letting the patient go home.  She's been fever free for about 18 hours and is feeling much better.  At this point  "she stable for discharge home.  She has follow-up arranged with the oncologist office within the week.      CONDITION ON DISCHARGE  Stable.      VITAL SIGNS  /65 (BP Location: Left arm, Patient Position: Lying)   Pulse 71   Temp 99.4 °F (37.4 °C) (Oral)   Resp 16   Ht 157.5 cm (62\")   Wt 66 kg (145 lb 8 oz)   SpO2 96%   BMI 26.61 kg/m²   Objective:  General Appearance:  Comfortable.    Vital signs: (most recent): Blood pressure 108/65, pulse 71, temperature 99.4 °F (37.4 °C), temperature source Oral, resp. rate 16, height 157.5 cm (62\"), weight 66 kg (145 lb 8 oz), SpO2 96 %.  Vital signs are normal.  No fever.    HEENT: Normal HEENT exam.    Lungs:  Normal effort.  Breath sounds clear to auscultation.    Heart: Normal rate.  S1 normal and S2 normal.    Abdomen: Abdomen is soft.  Bowel sounds are normal.   There is no abdominal tenderness.     Extremities: Normal range of motion.    Neurological: Patient is alert and oriented to person, place and time.                DISCHARGE DISPOSITION   Home or Self Care      DISCHARGE MEDICATIONS     Discharge Medications      Changes to Medications      Instructions Start Date   potassium chloride 10 MEQ CR capsule  Commonly known as:  MICRO-K  What changed:  when to take this   20 mEq, Oral, 2 Times Daily With Meals         Continue These Medications      Instructions Start Date   acyclovir 400 MG tablet  Commonly known as:  ZOVIRAX   400 mg, Oral, 2 Times Daily, Take no more than 5 doses a day.       amitriptyline 10 MG tablet  Commonly known as:  ELAVIL   10 mg, Oral, Nightly      fluconazole 200 MG tablet  Commonly known as:  DIFLUCAN   400 mg, Oral, 2 Times Daily, 2 tablets PO twice daily      magnesium oxide 400 MG tablet  Commonly known as:  MAG-OX   400 mg, Oral, Daily      ondansetron 4 MG tablet  Commonly known as:  ZOFRAN   4 mg, Oral, Every 6 Hours PRN      pyridoxine 50 MG tablet tablet  Commonly known as:  VITAMIN B-6   50 mg, Oral, Daily    "   sennosides-docusate sodium 8.6-50 MG tablet  Commonly known as:  SENOKOT-S   2 tablets, Oral, 2 Times Daily PRN      sulfamethoxazole-trimethoprim 800-160 MG per tablet  Commonly known as:  BACTRIM DS,SEPTRA DS   1 tablet, Oral, 3 Times Weekly      vitamin B-12 1000 MCG tablet  Commonly known as:  CYANOCOBALAMIN   1,000 mcg, Oral, Daily         Stop These Medications    dabigatran etexilate 150 MG capsu  Commonly known as:  PRADAXA          Diet Instructions     Diet: Regular; Thin       Discharge Diet:  Regular    Fluid Consistency:  Thin       Activity Instructions     Activity as Tolerated         Future Appointments  Date Time Provider Department Center   10/25/2018 3:30 PM LAB CHAIR 4 [facility]   10/25/2018 4:00 PM Linda Varela APRN [facility]   10/29/2018 7:50 AM LAB CHAIR 6 [facility]   10/29/2018 8:20 AM Millie Padilla MD [facility]   Additional Instructions for the Follow-ups that You Need to Schedule     Discharge Follow-up with PCP    As directed      Currently Documented PCP:  No primary care provider on file.  PCP Phone Number:  None    Follow Up Details:  4-6 weeks         Discharge Follow-up with Specialty: oncology; 1 Week    As directed      Specialty:  oncology    Follow Up:  1 Week               TEST  RESULTS PENDING AT DISCHARGE   Order Current Status    Blood Culture - Blood, Preliminary result    Blood Culture - Blood, Preliminary result             Arie Trejo MD  [facility]  10/22/18  10:25 AM      Time: greater than 30 minutes.

## 2018-10-22 NOTE — PROGRESS NOTES
CHIEF COMPLAINT/REASONS FOR FOLLOW-UP:  1.  Primary mediastinal diffuse large B-cell lymphoma treated with 6 cycles of EPOCH-R completed 10/12/18  2.  Neutropenic fever (positive parainfluenza virus)  3.  Pancytopenia secondary to chemotherapy    HISTORY OF PRESENT ILLNESS:     The patient feels okay this morning.  Her fever curve is down.  She has a mild nonproductive cough unassociated with shortness of breath.    Past Medical History, Past Surgical History, Social History, Family History have been reviewed and are without significant changes except as mentioned.    Review of Systems   Constitutional: Positive for activity change and fever. Negative for unexpected weight change.   Respiratory: Positive for cough. Negative for shortness of breath.    Cardiovascular: Negative.    Gastrointestinal: Negative.    Musculoskeletal: Negative.    Neurological: Negative.    Hematological: Negative.    Psychiatric/Behavioral: Negative.           Medications:  The current medication list was reviewed in the EMR    ALLERGIES:  No Known Allergies    Objective      Vitals:    10/22/18 0705   BP: 108/65   Pulse: 71   Resp: 16   Temp: 99.4 °F (37.4 °C)   SpO2: 96%          Physical Exam    CON: pleasant well-developed young woman  HEENT: no icterus, no thrush, moist membranes  NECK: no jvd  LYMPH: no cervical or supraclavicular lad  CV: RRR, S1S2, no murmur  RESP: cta bilat, no wheezing, no rales  GI: soft, non-tender, no splenomegaly, +bs  MUSC: no edema, normal gait  NEURO: alert and oriented x3, normal strength  PSYCH: normal mood  SKIN: alopecia    RECENT LABS:  Hematology   Results from last 7 days  Lab Units 10/22/18  0611 10/21/18  0458 10/20/18  2331   WBC 10*3/mm3 1.26* 0.29* 0.28*   HEMOGLOBIN g/dL 9.4* 6.9* 7.5*   HEMATOCRIT % 28.9* 21.1* 22.7*   PLATELETS 10*3/mm3 28* 24* 26*     2  Lab Results   Component Value Date    GLUCOSE 92 10/22/2018    BUN 4 (L) 10/22/2018    CREATININE 0.50 (L) 10/22/2018    EGFRIFNONA >150  10/22/2018    EGFRIFAFRI >150 10/22/2018    BCR 8.0 10/22/2018    CO2 23.9 10/22/2018    CALCIUM 8.4 (L) 10/22/2018    ALBUMIN 3.50 10/22/2018    AST 14 10/21/2018    ALT 20 10/21/2018       Lab Results   Component Value Date    IRON 27 (L) 10/21/2018    TIBC 244 10/21/2018    FERRITIN 555.80 (H) 10/21/2018       Lab Results   Component Value Date    YNDQTCLG22 >2,000 (H) 10/20/2018       Lab Results   Component Value Date    FOLATE 14.19 10/20/2018          Assessment/Plan     1.  Primary mediastinal large B-cell lymphoma. She received chemotherapy with DA-EPOCH-R. She was started on 6/16/18 and received her 6th and last cycle of chemotherapy between 10/8/18 and 10/12/18. She responded well to the treatment.   The patient will need a follow-up PET scan which will be arranged at her office follow-up     2.  Neutropenic fever. The patient developed severe neutropenia despite receiving Neulasta.  The white blood cell count is beginning to improve today with an ANC 0.55.  Evaluation has shown the patient to be positive for parainfluenza virus.  Blood cultures show no growth and pro-calcitonin on admission 0.1.     3.  Severe anemia. This is also attributed to the suppressive effect of the chemotherapy on her bone marrow.  The patient received 2 units of packed red blood cells on 10/21/18 with hemoglobin improved today 9.4.  B12 and folic acid levels are normal.  Iron studies look consistent with chronic disease/inflammation.     4.  Thrombocytopenia.  She is not having problem with bleeding.  Platelet count stable today 28,000.  She has history of DVT on Pradaxa outpatient, but I explained to the patient anticoagulation needs to be held until the platelet count recovers greater than 50,000.     PLAN:     If the patient is discharged, I will arrange a CBC to be performed in the office Thursday of this week with a nurse review.  Anticoagulation needs to be held until the platelets are greater than 50,000.  I will  rearrange follow-up with Dr. Padilla as she was scheduled to be seen today.                10/22/2018      CC:

## 2018-10-22 NOTE — PROGRESS NOTES
Discharge Planning Assessment  [facility]     Patient Name: Vikki Durham  MRN: [id number]  Today's Date: 10/22/2018    Admit Date: 10/20/2018          Discharge Needs Assessment     Row Name 10/22/18 1000       Living Environment    Lives With parent(s)    Current Living Arrangements home/apartment/condo    Primary Care Provided by self;parent(s)    Provides Primary Care For no one    Family Caregiver if Needed parent(s)    Quality of Family Relationships helpful;involved;supportive    Able to Return to Prior Arrangements yes       Resource/Environmental Concerns    Resource/Environmental Concerns none    Transportation Concerns car, none       Transition Planning    Patient/Family Anticipates Transition to home with family    Patient/Family Anticipated Services at Transition none    Transportation Anticipated family or friend will provide       Discharge Needs Assessment    Concerns to be Addressed discharge planning    Equipment Currently Used at Home none    Anticipated Changes Related to Illness none    Equipment Needed After Discharge none    Offered/Gave Vendor List yes    Discharge Coordination/Progress Home with parents, CCP to follow for potential iv abx needs. -ANDREA Costa            Discharge Plan     Row Name 10/22/18 1002       Plan    Plan Home with parents, CCP to follow for potential iv abx needs. -ANDREA Costa    Patient/Family in Agreement with Plan yes    Plan Comments CCP met with patient at bedside, explained role of CCP, verified face sheet, and discussed d/c planning needs. Left HH/SNF list. Patient lives at home with her mother Lizzeth Durham [phone] and father Joby Durham, home has 4 steps to enter. No handrails or grab bars in the home. Patient is not current with any PCP but is actively looking for a new MD. Denied assistance from CCP to establish new PCP. Patient has no history of HH or SNF services. Denies issues with medication costs or transportation. Relies on  her parents for transportation as she currently does not drive. No use of DME at home. Patient's plan upon discharge is to return home with parents.  Patient's mother will provide transportation upon d/c. CCP to follow for any d/c planning needs that may arise. -ANDREA Costa        Destination     No service coordination in this encounter.      Durable Medical Equipment     No service coordination in this encounter.      Dialysis/Infusion     No service coordination in this encounter.      Home Medical Care     No service coordination in this encounter.      Social Care     No service coordination in this encounter.                Demographic Summary     Row Name 10/22/18 0959       General Information    Admission Type inpatient    Arrived From home    Referral Source physician;nursing    Reason for Consult discharge planning    Preferred Language English     Used During This Interaction no            Functional Status     Row Name 10/22/18 1000       Functional Status    Usual Activity Tolerance good    Current Activity Tolerance good       Functional Status, IADL    Medications independent    Meal Preparation independent    Housekeeping independent    Laundry independent    Shopping independent       Mental Status    General Appearance WDL WDL       Mental Status Summary    Recent Changes in Mental Status/Cognitive Functioning no changes       Employment/    Employment Status student            Psychosocial    No documentation.           Abuse/Neglect    No documentation.           Legal    No documentation.           Substance Abuse    No documentation.           Patient Forms    No documentation.         ANDREA Costa

## 2018-10-22 NOTE — PROGRESS NOTES
LOS: 1 day     Chief Complaint:  Follow-up paraflu 2    Interval History:  Fever curve improving. Nasal congestion about the same. RVP positive for parainfluenza. BCx remain negative    ROS: no n/v/d    Vital Signs  Temp:  [97.3 °F (36.3 °C)-102.3 °F (39.1 °C)] 99.4 °F (37.4 °C)  Heart Rate:  [71-95] 71  Resp:  [16] 16  BP: ()/(50-74) 108/65    Physical Exam:  General: awake, alert, NAD   Head: alopecia due to chemo  Eyes: no scleral icterus  ENT: MMM, OP clear, no thrush. Good dentition.   Neck: Supple  Cardiovascular: NR, no LE edema  Respiratory: normal work of breathing on ambient air  GI: Abdomen is non-distended  : no Paez catheter present  Musculoskeletal: no joint abnormalities, normal musculature  Skin: No rashes, lesions, or embolic phenomenon  Neurological: Alert and oriented x 3,  motor strength 5/5 in all four extremities  Psychiatric: Normal mood and affect     Meds:    Current Facility-Administered Medications:   •  acetaminophen (TYLENOL) tablet 650 mg, 650 mg, Oral, Q4H PRN, Miguel Cerrato MD, 650 mg at 10/21/18 2153  •  acetaminophen (TYLENOL) tablet 650 mg, 650 mg, Oral, Once, Kimberley Melgar MD, Stopped at 10/21/18 1059  •  acyclovir (ZOVIRAX) tablet 400 mg, 400 mg, Oral, BID, Miguel Cerrato MD, 400 mg at 10/21/18 2153  •  amitriptyline (ELAVIL) tablet 10 mg, 10 mg, Oral, Nightly, Miguel Cerrato MD  •  fluconazole (DIFLUCAN) tablet 200 mg, 200 mg, Oral, Daily, Mata Mike MD  •  HYDROcodone-acetaminophen (NORCO) 5-325 MG per tablet 1 tablet, 1 tablet, Oral, Q4H PRN, Miguel Cerrato MD  •  magnesium oxide (MAG-OX) tablet 400 mg, 400 mg, Oral, Daily, Miguel Cerrato MD, 400 mg at 10/21/18 0919  •  ondansetron (ZOFRAN) tablet 4 mg, 4 mg, Oral, Q6H PRN **OR** ondansetron ODT (ZOFRAN-ODT) disintegrating tablet 4 mg, 4 mg, Oral, Q6H PRN **OR** ondansetron (ZOFRAN) injection 4 mg, 4 mg, Intravenous, Q6H PRN, Miguel Cerrato MD  •  prochlorperazine  (COMPAZINE) injection 5 mg, 5 mg, Intravenous, Q6H PRN, Miguel Cerrato MD  •  sennosides-docusate sodium (SENOKOT-S) 8.6-50 MG tablet 2 tablet, 2 tablet, Oral, BID PRN, Miguel Cerrato MD  •  sodium chloride (OCEAN) nasal spray 1 spray, 1 spray, Each Nare, PRN, Miguel Cerrato MD, 1 spray at 10/21/18 1657  •  Insert peripheral IV, , , Once **AND** sodium chloride 0.9 % flush 10 mL, 10 mL, Intravenous, PRN, Luis Navarrete MD  •  sodium chloride 0.9 % flush 3 mL, 3 mL, Intravenous, Q12H, Miguel Cerrato MD, 3 mL at 10/21/18 2153  •  sodium chloride 0.9 % flush 3-10 mL, 3-10 mL, Intravenous, PRN, Miguel Cerrato MD  •  sodium chloride 0.9 % infusion, 100 mL/hr, Intravenous, Continuous, Miguel Cerrato MD, Last Rate: 100 mL/hr at 10/21/18 2302, 100 mL/hr at 10/21/18 2302  •  sulfamethoxazole-trimethoprim (BACTRIM DS,SEPTRA DS) 800-160 MG per tablet 160 mg, 1 tablet, Oral, Once per day on Mon Wed Fri, Mata Mike MD  •  vitamin B-12 (CYANOCOBALAMIN) tablet 1,000 mcg, 1,000 mcg, Oral, Daily, Miguel Cerrato MD, 1,000 mcg at 10/21/18 0919  •  vitamin B-6 (PYRIDOXINE) tablet 50 mg, 50 mg, Oral, Daily, Miguel Cerrato MD, 50 mg at 10/21/18 0919    LABS:  CBC, BMP, RVP, micro reviewed today  Lab Results   Component Value Date    WBC 1.26 (C) 10/22/2018    HGB 9.4 (L) 10/22/2018    HCT 28.9 (L) 10/22/2018    MCV 96.7 10/22/2018    PLT 28 (C) 10/22/2018     Lab Results   Component Value Date    GLUCOSE 92 10/22/2018    BUN 4 (L) 10/22/2018    CREATININE 0.50 (L) 10/22/2018    EGFRIFNONA >150 10/22/2018    EGFRIFAFRI >150 10/22/2018    BCR 8.0 10/22/2018    CO2 23.9 10/22/2018    CALCIUM 8.4 (L) 10/22/2018    ALBUMIN 3.50 10/22/2018    AST 14 10/21/2018    ALT 20 10/21/2018    CRP 1.23 (H) 06/12/2018     Microbiology:  10/21 BCx: NGTD   10/21 RVP: parainfluenza 2    Radiology (personally reviewed):   No new imaging    Assessment/Plan   1. Neutropenic fever secondary to  parainfluenza 2  2. Diffuse large B cell lymphoma on DA-EPOCH-R s/p 6 cycles (in remission)  3. Anemia and thrombocytopenia     BCx negative. DC vancomycin today. Cefepime DC'ed yesterday. Continue supportive care for parainfluenza 2. I suspect her symptoms will improve as her counts recover. She is now back on prophylactic Bactrim, fluconazole, and acyclovir.    Thank you for allowing me to be involved in the care of this patient. Infectious diseases will sign off at this time but please call me at [phone] if any further questions.    I have discussed this case with Dr Trejo

## 2018-10-23 ENCOUNTER — READMISSION MANAGEMENT (OUTPATIENT)
Dept: CALL CENTER | Facility: HOSPITAL | Age: 23
End: 2018-10-23

## 2018-10-23 NOTE — OUTREACH NOTE
Prep Survey      Responses   Facility patient discharged from?  [place]   Is patient eligible?  Yes   Discharge diagnosis  Mediastinal large B-cell lymphoma of solid organ excluding spleen,    Pancytopenia,    Parainfluenza virus infection   Does the patient have one of the following disease processes/diagnoses(primary or secondary)?  Other   Does the patient have Home health ordered?  No   Is there a DME ordered?  No   Prep survey completed?  Yes          Oralia Lacey RN

## 2018-10-25 ENCOUNTER — LAB (OUTPATIENT)
Dept: LAB | Facility: HOSPITAL | Age: 23
End: 2018-10-25

## 2018-10-25 ENCOUNTER — READMISSION MANAGEMENT (OUTPATIENT)
Dept: CALL CENTER | Facility: HOSPITAL | Age: 23
End: 2018-10-25

## 2018-10-25 ENCOUNTER — OFFICE VISIT (OUTPATIENT)
Dept: ONCOLOGY | Facility: CLINIC | Age: 23
End: 2018-10-25

## 2018-10-25 VITALS
DIASTOLIC BLOOD PRESSURE: 70 MMHG | HEIGHT: 62 IN | RESPIRATION RATE: 14 BRPM | HEART RATE: 71 BPM | SYSTOLIC BLOOD PRESSURE: 110 MMHG | BODY MASS INDEX: 25.21 KG/M2 | OXYGEN SATURATION: 99 % | WEIGHT: 137 LBS | TEMPERATURE: 98.5 F

## 2018-10-25 DIAGNOSIS — C85.28 MEDIASTINAL LARGE B-CELL LYMPHOMA OF LYMPH NODES OF MULTIPLE REGIONS (HCC): Primary | ICD-10-CM

## 2018-10-25 DIAGNOSIS — R50.81 NEUTROPENIC FEVER (HCC): ICD-10-CM

## 2018-10-25 DIAGNOSIS — D70.9 NEUTROPENIC FEVER (HCC): ICD-10-CM

## 2018-10-25 DIAGNOSIS — D69.6 THROMBOCYTOPENIA (HCC): ICD-10-CM

## 2018-10-25 LAB
BASOPHILS # BLD AUTO: 0.16 10*3/MM3 (ref 0–0.1)
BASOPHILS NFR BLD AUTO: 0.9 % (ref 0–1.1)
DEPRECATED RDW RBC AUTO: 55.2 FL (ref 37–49)
EOSINOPHIL # BLD AUTO: 0.02 10*3/MM3 (ref 0–0.36)
EOSINOPHIL NFR BLD AUTO: 0.1 % (ref 1–5)
ERYTHROCYTE [DISTWIDTH] IN BLOOD BY AUTOMATED COUNT: 15.9 % (ref 11.7–14.5)
HCT VFR BLD AUTO: 35.9 % (ref 34–45)
HGB BLD-MCNC: 11.8 G/DL (ref 11.5–14.9)
IMM GRANULOCYTES # BLD: 5.82 10*3/MM3 (ref 0–0.03)
IMM GRANULOCYTES NFR BLD: 33.4 % (ref 0–0.5)
LYMPHOCYTES # BLD AUTO: 0.65 10*3/MM3 (ref 1–3.5)
LYMPHOCYTES NFR BLD AUTO: 3.7 % (ref 20–49)
MCH RBC QN AUTO: 31.6 PG (ref 27–33)
MCHC RBC AUTO-ENTMCNC: 32.9 G/DL (ref 32–35)
MCV RBC AUTO: 96 FL (ref 83–97)
MONOCYTES # BLD AUTO: 2.76 10*3/MM3 (ref 0.25–0.8)
MONOCYTES NFR BLD AUTO: 15.9 % (ref 4–12)
NEUTROPHILS # BLD AUTO: 7.99 10*3/MM3 (ref 1.5–7)
NEUTROPHILS NFR BLD AUTO: 46 % (ref 39–75)
NRBC BLD MANUAL-RTO: 0.4 /100 WBC (ref 0–0)
PLATELET # BLD AUTO: 106 10*3/MM3 (ref 150–375)
PMV BLD AUTO: 12.8 FL (ref 8.9–12.1)
RBC # BLD AUTO: 3.74 10*6/MM3 (ref 3.9–5)
WBC NRBC COR # BLD: 17.4 10*3/MM3 (ref 4–10)

## 2018-10-25 PROCEDURE — 99213 OFFICE O/P EST LOW 20 MIN: CPT | Performed by: NURSE PRACTITIONER

## 2018-10-25 PROCEDURE — 36415 COLL VENOUS BLD VENIPUNCTURE: CPT | Performed by: INTERNAL MEDICINE

## 2018-10-25 PROCEDURE — 85025 COMPLETE CBC W/AUTO DIFF WBC: CPT | Performed by: INTERNAL MEDICINE

## 2018-10-25 NOTE — OUTREACH NOTE
Medical Week 1 Survey      Responses   Facility patient discharged from?  [place]   Does the patient have one of the following disease processes/diagnoses(primary or secondary)?  Other   Is there a successful TCM telephone encounter documented?  No   Week 1 attempt successful?  Yes   Call start time  1240   Call end time  1244   General alerts for this patient  Finished chemo   Discharge diagnosis  Mediastinal large B-cell lymphoma of solid organ excluding spleen,    Pancytopenia,    Parainfluenza virus infection   Meds reviewed with patient/caregiver?  Yes   Is the patient having any side effects they believe may be caused by any medication additions or changes?  No   Does the patient have all medications ordered at discharge?  N/A   Is the patient taking all medications as directed (includes completed medication regime)?  Yes   Does the patient have a primary care provider?   Yes   Does the patient have an appointment with their PCP within 7 days of discharge?  Yes   Has the patient kept scheduled appointments due by today?  N/A   Comments  F/U with [facility] group today as this is her primary care group for her lymphoma   Has home health visited the patient within 72 hours of discharge?  N/A   Psychosocial issues?  No   Did the patient receive a copy of their discharge instructions?  Yes   Nursing interventions  Reviewed instructions with patient   What is the patient's perception of their health status since discharge?  Improving   Is the patient/caregiver able to teach back signs and symptoms related to disease process for when to call PCP?  Yes   Is the patient/caregiver able to teach back signs and symptoms related to disease process for when to call 911?  Yes   Is the patient/caregiver able to teach back the hierarchy of who to call/visit for symptoms/problems? PCP, Specialist, Home health nurse, Urgent Care, ED, 911  Yes   Additional teach back comments  Pt states she is doing better, feeling better and will  keep her appts today.   Week 1 call completed?  Yes          Izzy Francois RN

## 2018-10-25 NOTE — PROGRESS NOTES
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 Hospital return.     History of Present Illness      The patient is a 23 y.o. female with the above-mentioned history, who returns today for hospital follow-up and lab review.  She was recently admitted 10/20/2018 through 10/22/2018 for neutropenic fever.  She was found to be positive for parainfluenza virus.  Ultimately, antibiotics were discontinued given the viral nature of her illness.  She was discharged with instructions to follow-up today for lab.  The patient reports she is slowly recovering from a viral illness.  She continues to have cough and clear nasal drainage.  She denies further fevers.  She denies shortness of breath or chest pain.  She has been holding her Pradaxa due to thrombocytopenia.   She does report developing swelling of her hands during recent blood transfusion.  The symptoms have fully recovered.    Past Medical History, Past Surgical History, Social History, Family History have been reviewed and are without significant changes.    Oncologic history: Reviewed and detailed in Dr Siddiqi's prior notes.    I have reviewed the patient's medical history in detail and updated the computerized patient record.    Review of Systems   Constitutional: Positive for fatigue. Negative for activity change, appetite change, chills and fever.   HENT: Positive for rhinorrhea. Negative for mouth sores.    Eyes: Negative for visual disturbance.   Respiratory: Positive for cough (improved). Negative for shortness of breath.    Cardiovascular: Negative.    Gastrointestinal: Negative.  Negative for abdominal pain, diarrhea, nausea and vomiting.   Endocrine: Negative.    Genitourinary: Negative for dysuria, frequency and menstrual problem.   Musculoskeletal: Negative for arthralgias, back pain, joint swelling and myalgias.   Skin: Negative.    Allergic/Immunologic: Negative.    Neurological:  "Negative.  Negative for dizziness and weakness.   Hematological: Negative.  Does not bruise/bleed easily.   Psychiatric/Behavioral: The patient is not nervous/anxious.    All other systems reviewed and are negative.  review of systems unchanged from previous except as updated.    Medications:  The current medication list was reviewed in the EMR    ALLERGIES:  No Known Allergies    Objective      Vitals:    10/25/18 1555   BP: 110/70   Pulse: 71   Resp: 14   Temp: 98.5 °F (36.9 °C)   SpO2: 99%  Comment: at rest   Weight: 62.1 kg (137 lb)   Height: 157.5 cm (62.01\")   PainSc: 2  Comment: rt. side of neck     Current Status 10/25/2018   ECOG score 0       Physical Exam     GENERAL:  Well-developed, well-nourished female in no acute distress. Vital signs reviewed.   SKIN:  Warm, dry without rashes, purpura or petechiae.  EYES:  Pupils equal, round and reactive to light.  EOMs intact.  Conjunctivae normal.  HEAD:  Normocephalic.  EARS/NOSE/MOUTH/THROAT:  Hearing intact. Septum midline.  No excoriations or nasal discharge. No stomatitis or ulcers.  Lips normal. Oropharynx without lesions or exudates.  RESP:  Lungs clear to auscultation. Good airflow. Normal respiratory effort.   CARDIAC:  Regular rate and rhythm without murmurs, rubs or gallops. Normal S1,S2. No edema  MSK:  No clubbing or cyanosis, No joint swelling noted in hands  NEUROLOGICAL:  Cranial Nerves II-XII grossly intact.  No focal neurological deficits.  PSYCHIATRIC:  Normal affect and mood.  Alert and Oriented x 3.   Physical exam unchanged from previous except as updated.    RECENT LABS:    Results from last 7 days  Lab Units 10/25/18  1545 10/22/18  0611 10/21/18  0458 10/20/18  2331   WBC 10*3/mm3 17.40* 1.26* 0.29* 0.28*   NEUTROS ABS 10*3/mm3 7.99* 0.55*  --  0.00*   LYMPHS ABS 10*3/mm3  --  0.23*  --   --    HEMOGLOBIN g/dL 11.8 9.4* 6.9* 7.5*   HEMATOCRIT % 35.9 28.9* 21.1* 22.7*   PLATELETS 10*3/mm3 106* 28* 24* 26*       Results from last 7 " days  Lab Units 10/21/18  0458   INR  1.10       Assessment/Plan   1.  Primary mediastinal large B-cell lymphoma. She received chemotherapy with DA-EPOCH-R. She was started on 6/16/18 and received her 6th and last cycle of chemotherapy between 10/8/18 and 10/12/18. She responded well to the treatment.   The patient will need a follow-up PET scan which will be arranged at her office follow-up with Dr. Padilla 10/29/2018.     2.  Neutropenic fever. The patient developed severe neutropenia despite receiving Neulasta.  Hospitalization 10/20/2018 through 10/22/2018.  The patient's counts have recovered today, with leukocytosis secondary to Neulasta.     3.  Severe anemia. This is also attributed to the suppressive effect of the chemotherapy on her bone marrow.  The patient received 2 units of packed red blood cells on 10/21/18. he will lobe and is further improved to 11.8 today.  Patient does report mild reaction to most recent blood transfusion with swelling and itching of her hands.     4.  Thrombocytopenia.  Platelet count is recovering.  The patient has been holding Pradaxa, though is able to resume Pradaxa given that her platelets are greater than 50,000.  She denies signs or symptoms of bleeding.    Plan   1.  Resume Pradaxa  2. Follow up with Dr Padilla 10/29/2018.  3. PET scan will be ordered at MD follow up  4. [facility] her after PET scan obtained    Linda Varela, BRANNON   10/25/2018

## 2018-10-26 LAB
BACTERIA SPEC AEROBE CULT: NORMAL
BACTERIA SPEC AEROBE CULT: NORMAL

## 2018-10-28 NOTE — PROGRESS NOTES
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 She did initiate EPOCH-R  in the hospital on 06/16/2018.    History of Present Illness      The patient is a 23 y.o. female with the above-mentioned history, with history of mediastinal large B-cell lymphoma status post 4 cycles of chemotherapy with dose adusted EPOCHR, patient has completed 5 cycles of chemotherapy.  And had a CT scan of the chest abdomen pelvis which shows continued response.  The plan is to do a total of 6 cycles of chemotherapy.  Following 6 cycles patient will receive a CT scan and PET scan 6 weeks after completion.  She is being admitted October 8, 2018.  She will see Dr Paul prior to admission.    She received Zoladex monthly and the next injection is due on October 8, 2018.    Patient states that she had a migraine headache severe with some nausea and palpitations last week but did not go to the emergency room.  She also had some blood in the stools.  She had held her Pradaxa  At that time.  Today her headache is significantly improved to a level II on a 1-10 scale and also during her last admission neurology had seen her for migraine headaches and an MRI had shown very minimal enhancement of the turbinate which is thought to be nonspecific.  Patient states that her shortness of breath had significantly improved after fourth cycle but subsequently had shortness of breath which is mild.  She did have a CT scan of the chest abdomen pelvis after cycle 4 which had shown improvement in her mediastinal mass.      Past Medical History, Past Surgical History, Social History, Family History have been reviewed and are without significant changes except as mentioned.    Oncologic history:.   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  She saw Dr. Arina Cohen on last Friday.  The reason the patient was referred to Dr. Cohen was because they thought she had cardiomegaly  on chest x-ray.  However a chest x-ray was ordered which showedThe chest x-ray showed extensive abnormal increased density throughout the anterior mediastinum extending into the left hilar region and left perihilar region and is most likely due to confluent lymphadenopathy.     CT angiogram of the chest did not show pulmonary embolism but showed     there is a large conglomerate  mass encases multiple vascular structures of the mediastinum and left  hilar region that is most likely extensive confluent lymphadenopathy.  The primary differential diagnostic considerations would be lymphoma.  The tumor posteriorly displaces the pulmonary arteries and the left and  moderately narrows the main pulmonary artery. The tumor also posteriorly  displaces the trachea and markedly narrows the left mainstem bronchus.  The pulmonary veins in the left are also encased and narrowed. The mass  encases and markedly narrows the SVC. The large edy mass measures  approximately 19.8 x 13.7 x 14.0 cm in diameter. Enlarged lymph nodes  are also present in the left supraclavicular region where they appear to  produce occlusion of the left internal jugular vein. There is no  axillary lymphadenopathy. There are no filling defects within the  pulmonary arteries. There is no CT evidence of pulmonary embolism. There  is a small left pleural effusion. A small pericardial effusion measuring  8 mm in maximum thickness is also present. There is compressive  atelectasis of the left lung. Patchy airspace opacities are also present  in the superior aspect of the left upper lobe. These are most likely due  to direct tumor infiltration of the lung parenchyma, but could also  represent postobstructive pneumonia. The right lung is well expanded and  clear. Images through the upper abdomen partially show what could be a  edy mass in the retroperitoneum posterior and inferior to the  pancreas. Further evaluation with a CT scan of the abdomen and pelvis  is  recommended. Bone window images demonstrate patchy sclerosis in the C7  and T1 and T2 and T3 and T4 vertebral body suspicious for bone  involvement with lymphoma.     IMPRESSION:  Very large tumor mass in the mediastinum encasing multiple  vascular structures and also moderately narrowing the left mainstem  bronchus as described in more detail above. Direct tumor infiltration of  the left upper lobe is also suspected. Small left pleural effusion and a  small pericardial effusion. There may also be partially visualized  lymphadenopathy in the upper abdomen. Patchy areas of sclerosis are also  noted in the C7 and T1 and T2 and T3 and T4 vertebral bodies suspicious  for osseous metastatic disease. The findings are consistent with  Lymphoma.     Dr. Cohen felt that she had a small pericardial effusion.  Patient has been symptomatic with cough which is worsening which started back in February 2018 and worsened over the last 4 months.  Patient also has some shortness of breath with walking up the steps.  She has not lost weight.  She say she is reasonable appetite.  She does have fever kind of low-grade fever and night sweats.  She is more fatigued compared to before.  She was referred to us because of concern that this could be lymphoma.  She does not have any tissue diagnosis yet.  Patient does complain of back pain.    Echo done on admission June 12, 2018 by Dr. Fitzgerald showed that the patient's posterior and appears large primary leukemia the left ventricle and apex.  There is no tamponade.  The pericardium appears thickened pointing to involvement of the pericardium into the mediastinal process.  Dr. Fitzgerald felt that it would be difficult to do pericardial synthesis as the mediastinal mass covers the anterior aspect of the heart.  Ejection fraction is 58%  CT-guided needle biopsy June 12, 2018 was negative  LDH is elevated.  Uric acid is high.  MRI thoracic spine, negative  CT abdomen pelvis negative  Scan July 13,  2018 shows large infiltrative edy mass in the anterior mediastinum and left hilar region that nearly completely fills the left hemithorax and shows intense hypermetabolism with an SUV of 19.8.  No foci of pathologic hypermetabolism are identified elsewhere in the chest, neck abdomen or pelvis.    Pathology was consistent with primary mediastinal large B-cell lymphoma.    Patient was seen by Dr. Melgar.  He discussed harvesting eggs versus Zoladex plus oral contraceptives versus Zoladex alone for fertility preservation.  Due to large size of the tumor and rapid initiation of treatment needed the patient was not able to have aches harvested done.  Because she is at increased risk of thrombosis with the use of oral contraceptives secondary to malignancy he recommended Zoladex alone.  Patient received first dose of Zoladex 3.6 mg subcutaneous on Belia 15, 2018.    Patient started on EPOCH/R on June 16, 2018    Patient had a reaction to Rituxan which improved with steroids, Benadryl and Pepcid.  She had to receive it slow.  She started having itching over her EARS , sore throat.  She was given IV steroids Benadryl and Pepcid and following that she was started at a slower rate and she completed it.    Right upper extremity Doppler ultrasound showed new superficial vein thrombosis around the PICC line with no extension into the deep system.  Repeat Doppler was ordered.    A Doppler ultrasound initially showed superficial thrombophlebitis.  She was on prophylactic Arixtra.  A Doppler was repeated 2 days later on 6/20/18  which showed extension of thrombus into the axillary and brachial veins.  She was therefore started on Xarelto 15 mg one every 12 hours and the PICC line was removed as soon as chemotherapy completed on 6/20/18.  She will take 15 mg of Xarelto every 12 hours for 21 days and then transition to 20 mg once a day.  The patient will have CBC performed twice a week.  If the platelet count drops below 50,000,  anticoagulation will need to be temporarily held  Patient was started on acyclovir/fluconazole/Bactrim  Bone marrow done June 14, 2018, morphology was negative for lymphoma    Fertility preservation, Zoladex is next due July 12, 2018.  CT scan and PET scan showing significant response after cycle 2 of chemotherapy  Next dose of Lupron August 13, 2018  Status post 5 cycles  OF epoch/r and is due cycle 6  On oct 8th.    Patient admitted October 20, 2018 and discharged October 22, 2018 when she was admitted with neutropenic fever.  She was treated with broad-spectrum antibiotics.  Her respiratory viral panel was positive for parainfluenza virus.    Review of Systems   Constitutional: Positive for fatigue. Negative for activity change, appetite change, chills and fever.   HENT: Negative for mouth sores.    Eyes: Negative for visual disturbance.   Respiratory: Negative for cough (improved) and shortness of breath (improved).    Cardiovascular: Negative.    Gastrointestinal: Negative.  Negative for abdominal pain, diarrhea, nausea and vomiting.   Endocrine: Negative.    Genitourinary: Negative for dysuria, frequency and menstrual problem.   Musculoskeletal: Negative for arthralgias, back pain, joint swelling and myalgias.   Skin: Negative.    Allergic/Immunologic: Negative.    Neurological: Negative.  Negative for dizziness and weakness.   Hematological: Negative.  Does not bruise/bleed easily.   Psychiatric/Behavioral: The patient is not nervous/anxious.    All other systems reviewed and are negative.  Patient complains of migraine headaches.    Medications:  The current medication list was reviewed in the EMR    ALLERGIES:  No Known Allergies    Objective      There were no vitals filed for this visit.  Current Status 10/25/2018   ECOG score 0       Physical Exam       GENERAL:  Well-developed, well-nourished in no acute distress.   SKIN:  Warm, dry without rashes, purpura or petechiae.  EYES:  Pupils equal, round and  reactive to light.  EOMs intact.  Conjunctivae normal.  EARS:  Hearing intact.  NOSE:  Septum midline.  No excoriations or nasal discharge.  MOUTH:  Tongue is well-papillated; no stomatitis or ulcers.  Lips normal.  THROAT:  Oropharynx without lesions or exudates.  NECK:  Supple with good range of motion; no thyromegaly or masses, no JVD.  LYMPHATICS:  No cervical, supraclavicular, axillary or inguinal adenopathy.  CHEST:  Lungs clear to auscultation. Good airflow.  CARDIAC:  Regular rate and rhythm without murmurs, rubs or gallops. Normal S1,S2.  ABDOMEN:  Soft, nontender with no hepatosplenomegaly or masses.  EXTREMITIES:  No clubbing, cyanosis or edema.  NEUROLOGICAL:  Cranial Nerves II-XII grossly intact.  No focal neurological deficits.  PSYCHIATRIC:  Normal affect and mood.        RECENT LABS:    Results from last 7 days  Lab Units 10/25/18  1545 10/22/18  0611   WBC 10*3/mm3 17.40* 1.26*   NEUTROS ABS 10*3/mm3 7.99* 0.55*   LYMPHS ABS 10*3/mm3  --  0.23*   HEMOGLOBIN g/dL 11.8 9.4*   HEMATOCRIT % 35.9 28.9*   PLATELETS 10*3/mm3 106* 28*         Assessment/Plan   1.  Primary mediastinal large B-cell lymphoma: The patient presented with dyspnea, cough, and chest pain.  A CT angiogram of the chest 6/8/18 showed a very large tumor mass in the mediastinum encasing multiple vascular structures and narrowing the left mainstem bronchus.  There was direct tumor infiltration in the left upper lobe.  There was a small left pleural effusion.  She underwent a CT-guided biopsy of the mediastinal mass on 6/12/18 and pathology reviewed at [facility] oncology showed diffuse large B-cell lymphoma consistent with a primary diffuse large B-cell lymphoma.  A bone marrow exam was performed on 6/14/18 which was negative for lymphoma.  She underwent a PET scan 6/13/18 which showed a large hypermetabolic mass in the chest involving the anterior mediastinum, left hilum, nearly completely filling the left hemothorax with SUV 19.8.   There was physiologic distribution elsewhere.     The patient was started on IV fluid hydration and allopurinol for prevention of hyperuricemia.  She initiated systemic chemotherapy with DA-EPOCH-R on 6/16/18 and completed the evening of 6/21/18.  She had a infusion reaction to rituximab but was able to complete with additional medications and otherwise tolerated chemotherapy well.  She will require additional premedications with additional rituximab.  Plan is to monitor her blood counts twice per week outpatient and proceed with cycle 2 of chemotherapy day 21.     The patient had significant improvement in shortness of breath, cough, and chest pain by the time of discharge.    · Patient received Neulasta support  · Her white count dropped down to as low as 0.8 low-grade temperature and patient was placed on Levaquin ×5 days starting June 27, 2018, neutropenia AT  11 days posttreatment.  Platelets dropped to 82.  · Her next ZOLADEX injection is due July 12.  · She is due to start chemotherapy on July 9.  · Discussed with Dr. Fox at the [facility] and his suggestion was to do the CT scan and PET scan after 2 cycles and discuss the results with him.  If she has an excellent response early on she would not require any further treatments after completion of 6 cycles of EPOCH/R  · Patient being admitted for cycle 3 of chemotherapy on July 30, 2018.  · She's status post cycle 4 of chemotherapy and CT scan has been reviewed following 4 cycles with continued response.  · She is due for ZOLADEX  injection on October 8, 2018.   · Patient is due to go for cycle 5 of chemotherapy on Monday, September 17, 2018  · Asked echocardiogram done August 22, 2018 shows ejection fraction of 59% to 60% with the eldest strain of 19.2%  · Cycle 6 chemotherapy with dose adjusted EPOCH/R on October 8, 2018.  · We will plan to obtain a CT scan and PET scan following which we will follow the patient.  · .  There is still significant  activity in the mediastinum, questionable radiation     2.  Pericardial effusion:   a 2-D echocardiogram 6/8/18 showed a left ventricular systolic function 58%.  There was a 2 cm pericardial effusion with no tamponade.  The pericardium appeared thickened.  She had no evidence of volume overload.  It is resolved     3.   FEN: She was monitored very carefully for any evidence of tumor lysis.  She was maintained on IV fluids and allopurinol throughout hospitalization.    her uric acid at discharge was 1.2 but I recommended that she stay on allopurinol twice daily until her next cycle of chemotherapy to prevent hyperuricemia.  She has mild hypokalemia and will be discharged on potassium 20 mEq once per day.  Have ordered an outpatient BMP weekly for monitoring of her renal function and electrolytes  · Potassium is chronically slightly low.  Requires 20 mg by mouth daily     4.  Fertility preservation:  Patient received Zoladex injection  for fertility preservation; this should be continued once monthly during her chemotherapy.    · Next dose of Zoladex due October 8, 2018, needs to receive when admitted admitted  · Her next dose of Zoladex is on November 5, 2018     5.  Right upper extremity DVT on Pradaxa.     6.  ID: The patient will receive Neulasta 24 hours after completion of chemotherapy for reduction of neutropenic infection.  She will be discharged on prophylactic antibiotics including acyclovir, Diflucan, and Bactrim.  She may require bacterial prophylaxis if the ANC drops below 1000.    7.  Migraine headaches, patient had it as a child but recently after cycle 4 patient developed migraine headaches and was seen by neurology when admitted with cycle 5.  MRI showed mild enhancement of the turbinate but was nonspecific.    · Given migraine headache has gotten worse, could reconsult neurology next week when admitted    8.  Shortness of breath with palpitations, could obtain repeat echocardiogram and if persistent  palpitations , consult cardiology.    Plan 1.  Reviewed echocardiogram from October 16 and discussed with patient.  Good ejection fraction.    2.  Continue Pradaxa    3.  Obtain CT scan of the neck chest abdomen pelvis as well as PET scan in 2 weeks    4.  Patient to see me in 3 weeks to discuss the results    5.  Patient to receive Zoladex November 5, 2018          Millie Padilla MD   10/28/2018      CC: Dr. Arina Munoz

## 2018-10-29 ENCOUNTER — OFFICE VISIT (OUTPATIENT)
Dept: ONCOLOGY | Facility: CLINIC | Age: 23
End: 2018-10-29

## 2018-10-29 ENCOUNTER — LAB (OUTPATIENT)
Dept: LAB | Facility: HOSPITAL | Age: 23
End: 2018-10-29

## 2018-10-29 VITALS
TEMPERATURE: 98.7 F | DIASTOLIC BLOOD PRESSURE: 80 MMHG | BODY MASS INDEX: 26.13 KG/M2 | HEIGHT: 62 IN | RESPIRATION RATE: 16 BRPM | OXYGEN SATURATION: 99 % | HEART RATE: 65 BPM | SYSTOLIC BLOOD PRESSURE: 102 MMHG | WEIGHT: 142 LBS

## 2018-10-29 DIAGNOSIS — C85.29 MEDIASTINAL LARGE B-CELL LYMPHOMA OF SOLID ORGAN EXCLUDING SPLEEN (HCC): ICD-10-CM

## 2018-10-29 DIAGNOSIS — C85.28 MEDIASTINAL LARGE B-CELL LYMPHOMA OF LYMPH NODES OF MULTIPLE REGIONS (HCC): Primary | ICD-10-CM

## 2018-10-29 LAB
BASOPHILS # BLD AUTO: 0.05 10*3/MM3 (ref 0–0.1)
BASOPHILS NFR BLD AUTO: 0.6 % (ref 0–1.1)
DEPRECATED RDW RBC AUTO: 53.4 FL (ref 37–49)
EOSINOPHIL # BLD AUTO: 0.03 10*3/MM3 (ref 0–0.36)
EOSINOPHIL NFR BLD AUTO: 0.4 % (ref 1–5)
ERYTHROCYTE [DISTWIDTH] IN BLOOD BY AUTOMATED COUNT: 15.1 % (ref 11.7–14.5)
HCT VFR BLD AUTO: 35.3 % (ref 34–45)
HGB BLD-MCNC: 11.9 G/DL (ref 11.5–14.9)
IMM GRANULOCYTES # BLD: 0.81 10*3/MM3 (ref 0–0.03)
IMM GRANULOCYTES NFR BLD: 10 % (ref 0–0.5)
LYMPHOCYTES # BLD AUTO: 0.71 10*3/MM3 (ref 1–3.5)
LYMPHOCYTES NFR BLD AUTO: 8.8 % (ref 20–49)
MCH RBC QN AUTO: 32.5 PG (ref 27–33)
MCHC RBC AUTO-ENTMCNC: 33.7 G/DL (ref 32–35)
MCV RBC AUTO: 96.4 FL (ref 83–97)
MONOCYTES # BLD AUTO: 1.34 10*3/MM3 (ref 0.25–0.8)
MONOCYTES NFR BLD AUTO: 16.6 % (ref 4–12)
NEUTROPHILS # BLD AUTO: 5.12 10*3/MM3 (ref 1.5–7)
NEUTROPHILS NFR BLD AUTO: 63.6 % (ref 39–75)
NRBC BLD MANUAL-RTO: 0.2 /100 WBC (ref 0–0)
PLATELET # BLD AUTO: 202 10*3/MM3 (ref 150–375)
PMV BLD AUTO: 11.4 FL (ref 8.9–12.1)
RBC # BLD AUTO: 3.66 10*6/MM3 (ref 3.9–5)
WBC NRBC COR # BLD: 8.06 10*3/MM3 (ref 4–10)

## 2018-10-29 PROCEDURE — 99215 OFFICE O/P EST HI 40 MIN: CPT | Performed by: INTERNAL MEDICINE

## 2018-10-29 PROCEDURE — 36415 COLL VENOUS BLD VENIPUNCTURE: CPT | Performed by: INTERNAL MEDICINE

## 2018-10-29 PROCEDURE — 85025 COMPLETE CBC W/AUTO DIFF WBC: CPT | Performed by: INTERNAL MEDICINE

## 2018-11-01 ENCOUNTER — READMISSION MANAGEMENT (OUTPATIENT)
Dept: CALL CENTER | Facility: HOSPITAL | Age: 23
End: 2018-11-01

## 2018-11-01 NOTE — OUTREACH NOTE
Medical Week 2 Survey      Responses   Facility patient discharged from?  [facility]   Does the patient have one of the following disease processes/diagnoses(primary or secondary)?  Other   Week 2 attempt successful?  Yes   Call start time  1110   General alerts for this patient  Finished chemo   Discharge diagnosis  Mediastinal large B-cell lymphoma of solid organ excluding spleen,    Pancytopenia,    Parainfluenza virus infection   Call end time  1114   Meds reviewed with patient/caregiver?  Yes   Is the patient having any side effects they believe may be caused by any medication additions or changes?  No   Does the patient have all medications ordered at discharge?  N/A   Is the patient taking all medications as directed (includes completed medication regime)?  Yes   Medication comments  Pt having chemo--this is her fifth round so she knows what to expect.   Comments regarding appointments  Infusion appt on Nov 6, and CT scheduled on Nov 9   Does the patient have a primary care provider?   Yes   Does the patient have an appointment with their PCP within 7 days of discharge?  Yes   Has home health visited the patient within 72 hours of discharge?  N/A   Psychosocial issues?  No   Did the patient receive a copy of their discharge instructions?  Yes   Nursing interventions  Reviewed instructions with patient   What is the patient's perception of their health status since discharge?  Improving   Is the patient/caregiver able to teach back signs and symptoms related to disease process for when to call PCP?  Yes   Is the patient/caregiver able to teach back signs and symptoms related to disease process for when to call 911?  Yes   Is the patient/caregiver able to teach back the hierarchy of who to call/visit for symptoms/problems? PCP, Specialist, Home health nurse, Urgent Care, ED, 911  Yes   Week 2 Call Completed?  Yes          Castillo Salgado RN

## 2018-11-05 ENCOUNTER — APPOINTMENT (OUTPATIENT)
Dept: ONCOLOGY | Facility: HOSPITAL | Age: 23
End: 2018-11-05

## 2018-11-06 ENCOUNTER — INFUSION (OUTPATIENT)
Dept: ONCOLOGY | Facility: HOSPITAL | Age: 23
End: 2018-11-06

## 2018-11-06 DIAGNOSIS — C85.28 MEDIASTINAL LARGE B-CELL LYMPHOMA OF LYMPH NODES OF MULTIPLE REGIONS (HCC): Primary | ICD-10-CM

## 2018-11-06 DIAGNOSIS — Z45.2 FITTING AND ADJUSTMENT OF VASCULAR CATHETER: ICD-10-CM

## 2018-11-06 LAB
ALBUMIN SERPL-MCNC: 4.6 G/DL (ref 3.5–5.2)
ALBUMIN/GLOB SERPL: 2.1 G/DL (ref 1.1–2.4)
ALP SERPL-CCNC: 48 U/L (ref 38–116)
ALT SERPL W P-5'-P-CCNC: 19 U/L (ref 0–33)
ANION GAP SERPL CALCULATED.3IONS-SCNC: 12.1 MMOL/L
AST SERPL-CCNC: 18 U/L (ref 0–32)
BASOPHILS # BLD AUTO: 0.02 10*3/MM3 (ref 0–0.1)
BASOPHILS NFR BLD AUTO: 0.7 % (ref 0–1.1)
BILIRUB SERPL-MCNC: 0.2 MG/DL (ref 0.1–1.2)
BUN BLD-MCNC: 14 MG/DL (ref 6–20)
BUN/CREAT SERPL: 24.1 (ref 7.3–30)
CALCIUM SPEC-SCNC: 9.4 MG/DL (ref 8.5–10.2)
CHLORIDE SERPL-SCNC: 104 MMOL/L (ref 98–107)
CO2 SERPL-SCNC: 25.9 MMOL/L (ref 22–29)
CREAT BLD-MCNC: 0.58 MG/DL (ref 0.6–1.1)
DEPRECATED RDW RBC AUTO: 51.8 FL (ref 37–49)
EOSINOPHIL # BLD AUTO: 0.02 10*3/MM3 (ref 0–0.36)
EOSINOPHIL NFR BLD AUTO: 0.7 % (ref 1–5)
ERYTHROCYTE [DISTWIDTH] IN BLOOD BY AUTOMATED COUNT: 14.9 % (ref 11.7–14.5)
GFR SERPL CREATININE-BSD FRML MDRD: 129 ML/MIN/1.73
GFR SERPL CREATININE-BSD FRML MDRD: >150 ML/MIN/1.73
GLOBULIN UR ELPH-MCNC: 2.2 GM/DL (ref 1.8–3.5)
GLUCOSE BLD-MCNC: 100 MG/DL (ref 74–124)
HCT VFR BLD AUTO: 35.2 % (ref 34–45)
HGB BLD-MCNC: 11.9 G/DL (ref 11.5–14.9)
IMM GRANULOCYTES # BLD: 0 10*3/MM3 (ref 0–0.03)
IMM GRANULOCYTES NFR BLD: 0 % (ref 0–0.5)
LYMPHOCYTES # BLD AUTO: 0.34 10*3/MM3 (ref 1–3.5)
LYMPHOCYTES NFR BLD AUTO: 12.1 % (ref 20–49)
MCH RBC QN AUTO: 32.6 PG (ref 27–33)
MCHC RBC AUTO-ENTMCNC: 33.8 G/DL (ref 32–35)
MCV RBC AUTO: 96.4 FL (ref 83–97)
MONOCYTES # BLD AUTO: 0.68 10*3/MM3 (ref 0.25–0.8)
MONOCYTES NFR BLD AUTO: 24.3 % (ref 4–12)
NEUTROPHILS # BLD AUTO: 1.74 10*3/MM3 (ref 1.5–7)
NEUTROPHILS NFR BLD AUTO: 62.2 % (ref 39–75)
NRBC BLD MANUAL-RTO: 0 /100 WBC (ref 0–0)
PLATELET # BLD AUTO: 198 10*3/MM3 (ref 150–375)
PMV BLD AUTO: 10.9 FL (ref 8.9–12.1)
POTASSIUM BLD-SCNC: 4.4 MMOL/L (ref 3.5–4.7)
PROT SERPL-MCNC: 6.8 G/DL (ref 6.3–8)
RBC # BLD AUTO: 3.65 10*6/MM3 (ref 3.9–5)
SODIUM BLD-SCNC: 142 MMOL/L (ref 134–145)
WBC NRBC COR # BLD: 2.8 10*3/MM3 (ref 4–10)

## 2018-11-06 PROCEDURE — 80053 COMPREHEN METABOLIC PANEL: CPT

## 2018-11-06 PROCEDURE — 85025 COMPLETE CBC W/AUTO DIFF WBC: CPT

## 2018-11-06 PROCEDURE — 96523 IRRIG DRUG DELIVERY DEVICE: CPT | Performed by: INTERNAL MEDICINE

## 2018-11-06 RX ORDER — SODIUM CHLORIDE 0.9 % (FLUSH) 0.9 %
10 SYRINGE (ML) INJECTION AS NEEDED
Status: DISCONTINUED | OUTPATIENT
Start: 2018-11-06 | End: 2018-11-06 | Stop reason: HOSPADM

## 2018-11-06 RX ORDER — SODIUM CHLORIDE 0.9 % (FLUSH) 0.9 %
10 SYRINGE (ML) INJECTION AS NEEDED
Status: CANCELLED | OUTPATIENT
Start: 2018-11-06

## 2018-11-06 RX ADMIN — Medication 10 ML: at 12:59

## 2018-11-06 RX ADMIN — SODIUM CHLORIDE, PRESERVATIVE FREE 500 UNITS: 5 INJECTION INTRAVENOUS at 12:59

## 2018-11-06 NOTE — PROGRESS NOTES
Too early for patient to receive zoladex today. Last dose was 10/15/2018.  Reviewed CBC and her counts are stable, though her WBC is a little low.  Message sent to scheduling desk for return appt next week for zoladex and repeat CBC.

## 2018-11-09 ENCOUNTER — INFUSION (OUTPATIENT)
Dept: ONCOLOGY | Facility: HOSPITAL | Age: 23
End: 2018-11-09

## 2018-11-09 ENCOUNTER — HOSPITAL ENCOUNTER (OUTPATIENT)
Dept: PET IMAGING | Facility: HOSPITAL | Age: 23
Discharge: HOME | End: 2018-11-09
Attending: INTERNAL MEDICINE | Admitting: INTERNAL MEDICINE

## 2018-11-09 DIAGNOSIS — C85.28 MEDIASTINAL LARGE B-CELL LYMPHOMA OF LYMPH NODES OF MULTIPLE REGIONS (HCC): ICD-10-CM

## 2018-11-09 DIAGNOSIS — Z45.2 FITTING AND ADJUSTMENT OF VASCULAR CATHETER: ICD-10-CM

## 2018-11-09 DIAGNOSIS — C85.29 MEDIASTINAL LARGE B-CELL LYMPHOMA OF SOLID ORGAN EXCLUDING SPLEEN (HCC): ICD-10-CM

## 2018-11-09 LAB — CREAT BLDA-MCNC: 0.7 MG/DL (ref 0.6–1.3)

## 2018-11-09 PROCEDURE — 82565 ASSAY OF CREATININE: CPT

## 2018-11-09 PROCEDURE — 0 DIATRIZOATE MEGLUMINE & SODIUM PER 1 ML: Performed by: INTERNAL MEDICINE

## 2018-11-09 PROCEDURE — 25010000002 IOPAMIDOL 61 % SOLUTION: Performed by: INTERNAL MEDICINE

## 2018-11-09 PROCEDURE — 71260 CT THORAX DX C+: CPT

## 2018-11-09 PROCEDURE — 70491 CT SOFT TISSUE NECK W/DYE: CPT

## 2018-11-09 PROCEDURE — 74177 CT ABD & PELVIS W/CONTRAST: CPT

## 2018-11-09 PROCEDURE — 96523 IRRIG DRUG DELIVERY DEVICE: CPT | Performed by: NURSE PRACTITIONER

## 2018-11-09 RX ORDER — LIDOCAINE AND PRILOCAINE 25; 25 MG/G; MG/G
CREAM TOPICAL
Qty: 5 G | Refills: 1 | Status: SHIPPED | OUTPATIENT
Start: 2018-11-09 | End: 2019-05-07

## 2018-11-09 RX ADMIN — DIATRIZOATE MEGLUMINE AND DIATRIZOATE SODIUM 30 ML: 660; 100 LIQUID ORAL; RECTAL at 14:10

## 2018-11-09 RX ADMIN — IOPAMIDOL 85 ML: 612 INJECTION, SOLUTION INTRAVENOUS at 14:57

## 2018-11-13 ENCOUNTER — INFUSION (OUTPATIENT)
Dept: ONCOLOGY | Facility: HOSPITAL | Age: 23
End: 2018-11-13

## 2018-11-13 DIAGNOSIS — C85.28 MEDIASTINAL LARGE B-CELL LYMPHOMA OF LYMPH NODES OF MULTIPLE REGIONS (HCC): ICD-10-CM

## 2018-11-13 DIAGNOSIS — C85.28 MEDIASTINAL LARGE B-CELL LYMPHOMA OF LYMPH NODES OF MULTIPLE REGIONS (HCC): Primary | ICD-10-CM

## 2018-11-13 DIAGNOSIS — Z45.2 FITTING AND ADJUSTMENT OF VASCULAR CATHETER: ICD-10-CM

## 2018-11-13 LAB
ALBUMIN SERPL-MCNC: 4.7 G/DL (ref 3.5–5.2)
ALBUMIN/GLOB SERPL: 2 G/DL (ref 1.1–2.4)
ALP SERPL-CCNC: 61 U/L (ref 38–116)
ALT SERPL W P-5'-P-CCNC: 22 U/L (ref 0–33)
ANION GAP SERPL CALCULATED.3IONS-SCNC: 11.9 MMOL/L
AST SERPL-CCNC: 24 U/L (ref 0–32)
BASOPHILS # BLD AUTO: 0.03 10*3/MM3 (ref 0–0.1)
BASOPHILS NFR BLD AUTO: 0.9 % (ref 0–1.1)
BILIRUB SERPL-MCNC: 0.2 MG/DL (ref 0.1–1.2)
BUN BLD-MCNC: 15 MG/DL (ref 6–20)
BUN/CREAT SERPL: 25.9 (ref 7.3–30)
CALCIUM SPEC-SCNC: 9.8 MG/DL (ref 8.5–10.2)
CHLORIDE SERPL-SCNC: 101 MMOL/L (ref 98–107)
CO2 SERPL-SCNC: 27.1 MMOL/L (ref 22–29)
CREAT BLD-MCNC: 0.58 MG/DL (ref 0.6–1.1)
DEPRECATED RDW RBC AUTO: 54 FL (ref 37–49)
EOSINOPHIL # BLD AUTO: 0.11 10*3/MM3 (ref 0–0.36)
EOSINOPHIL NFR BLD AUTO: 3.1 % (ref 1–5)
ERYTHROCYTE [DISTWIDTH] IN BLOOD BY AUTOMATED COUNT: 15.1 % (ref 11.7–14.5)
GFR SERPL CREATININE-BSD FRML MDRD: 129 ML/MIN/1.73
GFR SERPL CREATININE-BSD FRML MDRD: >150 ML/MIN/1.73
GLOBULIN UR ELPH-MCNC: 2.4 GM/DL (ref 1.8–3.5)
GLUCOSE BLD-MCNC: 114 MG/DL (ref 74–124)
HCT VFR BLD AUTO: 37.4 % (ref 34–45)
HGB BLD-MCNC: 12.6 G/DL (ref 11.5–14.9)
IMM GRANULOCYTES # BLD: 0.01 10*3/MM3 (ref 0–0.03)
IMM GRANULOCYTES NFR BLD: 0.3 % (ref 0–0.5)
LYMPHOCYTES # BLD AUTO: 0.4 10*3/MM3 (ref 1–3.5)
LYMPHOCYTES NFR BLD AUTO: 11.4 % (ref 20–49)
MCH RBC QN AUTO: 32.7 PG (ref 27–33)
MCHC RBC AUTO-ENTMCNC: 33.7 G/DL (ref 32–35)
MCV RBC AUTO: 97.1 FL (ref 83–97)
MONOCYTES # BLD AUTO: 0.63 10*3/MM3 (ref 0.25–0.8)
MONOCYTES NFR BLD AUTO: 17.9 % (ref 4–12)
NEUTROPHILS # BLD AUTO: 2.34 10*3/MM3 (ref 1.5–7)
NEUTROPHILS NFR BLD AUTO: 66.4 % (ref 39–75)
NRBC BLD MANUAL-RTO: 0 /100 WBC (ref 0–0)
PLATELET # BLD AUTO: 149 10*3/MM3 (ref 150–375)
PMV BLD AUTO: 10.3 FL (ref 8.9–12.1)
POTASSIUM BLD-SCNC: 4.4 MMOL/L (ref 3.5–4.7)
PROT SERPL-MCNC: 7.1 G/DL (ref 6.3–8)
RBC # BLD AUTO: 3.85 10*6/MM3 (ref 3.9–5)
SODIUM BLD-SCNC: 140 MMOL/L (ref 134–145)
WBC NRBC COR # BLD: 3.52 10*3/MM3 (ref 4–10)

## 2018-11-13 PROCEDURE — 96372 THER/PROPH/DIAG INJ SC/IM: CPT | Performed by: NURSE PRACTITIONER

## 2018-11-13 PROCEDURE — 25010000002 GOSERELIN PER 3.6 MG: Performed by: NURSE PRACTITIONER

## 2018-11-13 PROCEDURE — 80053 COMPREHEN METABOLIC PANEL: CPT

## 2018-11-13 PROCEDURE — 85025 COMPLETE CBC W/AUTO DIFF WBC: CPT

## 2018-11-13 RX ORDER — SODIUM CHLORIDE 0.9 % (FLUSH) 0.9 %
10 SYRINGE (ML) INJECTION AS NEEDED
Status: CANCELLED | OUTPATIENT
Start: 2018-11-13

## 2018-11-13 RX ORDER — SODIUM CHLORIDE 0.9 % (FLUSH) 0.9 %
10 SYRINGE (ML) INJECTION AS NEEDED
Status: DISCONTINUED | OUTPATIENT
Start: 2018-11-13 | End: 2018-11-13 | Stop reason: HOSPADM

## 2018-11-13 RX ORDER — MAGNESIUM OXIDE 400 MG/1
400 TABLET ORAL DAILY
Qty: 30 TABLET | Refills: 2 | Status: SHIPPED | OUTPATIENT
Start: 2018-11-13 | End: 2019-05-07

## 2018-11-13 RX ADMIN — Medication 10 ML: at 14:17

## 2018-11-13 RX ADMIN — SODIUM CHLORIDE, PRESERVATIVE FREE 500 UNITS: 5 INJECTION INTRAVENOUS at 14:17

## 2018-11-13 RX ADMIN — GOSERELIN ACETATE 3.6 MG: 3.6 IMPLANT SUBCUTANEOUS at 14:34

## 2018-11-13 NOTE — PROGRESS NOTES
Additional day added to Cycle 5 of Xoladex plan as pt was too early last week to receive injection, however the orders had been released.

## 2018-11-14 ENCOUNTER — INFUSION (OUTPATIENT)
Dept: ONCOLOGY | Facility: HOSPITAL | Age: 23
End: 2018-11-14

## 2018-11-14 ENCOUNTER — HOSPITAL ENCOUNTER (OUTPATIENT)
Dept: PET IMAGING | Facility: HOSPITAL | Age: 23
Discharge: HOME | End: 2018-11-14
Attending: INTERNAL MEDICINE | Admitting: INTERNAL MEDICINE

## 2018-11-14 ENCOUNTER — HOSPITAL ENCOUNTER (OUTPATIENT)
Dept: PET IMAGING | Facility: HOSPITAL | Age: 23
Discharge: HOME | End: 2018-11-14
Attending: INTERNAL MEDICINE

## 2018-11-14 DIAGNOSIS — C85.29 MEDIASTINAL LARGE B-CELL LYMPHOMA OF SOLID ORGAN EXCLUDING SPLEEN (HCC): ICD-10-CM

## 2018-11-14 DIAGNOSIS — C85.28 MEDIASTINAL LARGE B-CELL LYMPHOMA OF LYMPH NODES OF MULTIPLE REGIONS (HCC): ICD-10-CM

## 2018-11-14 LAB — GLUCOSE BLDC GLUCOMTR-MCNC: 103 MG/DL (ref 70–130)

## 2018-11-14 PROCEDURE — 0 FLUDEOXYGLUCOSE F18 SOLUTION: Performed by: INTERNAL MEDICINE

## 2018-11-14 PROCEDURE — A9552 F18 FDG: HCPCS | Performed by: INTERNAL MEDICINE

## 2018-11-14 PROCEDURE — 82962 GLUCOSE BLOOD TEST: CPT

## 2018-11-14 PROCEDURE — 78815 PET IMAGE W/CT SKULL-THIGH: CPT

## 2018-11-14 RX ADMIN — FLUDEOXYGLUCOSE F18 1 DOSE: 300 INJECTION INTRAVENOUS at 08:06

## 2018-11-15 ENCOUNTER — HOSPITAL ENCOUNTER (OUTPATIENT)
Dept: PET IMAGING | Facility: HOSPITAL | Age: 23
End: 2018-11-15
Attending: INTERNAL MEDICINE

## 2018-11-20 ENCOUNTER — OFFICE VISIT (OUTPATIENT)
Dept: ONCOLOGY | Facility: CLINIC | Age: 23
End: 2018-11-20

## 2018-11-20 ENCOUNTER — INFUSION (OUTPATIENT)
Dept: LAB | Facility: HOSPITAL | Age: 23
End: 2018-11-20

## 2018-11-20 VITALS
OXYGEN SATURATION: 96 % | BODY MASS INDEX: 27.1 KG/M2 | WEIGHT: 147.3 LBS | TEMPERATURE: 98.6 F | RESPIRATION RATE: 18 BRPM | HEART RATE: 71 BPM | SYSTOLIC BLOOD PRESSURE: 100 MMHG | HEIGHT: 62 IN | DIASTOLIC BLOOD PRESSURE: 65 MMHG

## 2018-11-20 DIAGNOSIS — Z00.00 HEALTH CARE MAINTENANCE: ICD-10-CM

## 2018-11-20 DIAGNOSIS — C85.28 MEDIASTINAL LARGE B-CELL LYMPHOMA OF LYMPH NODES OF MULTIPLE REGIONS (HCC): ICD-10-CM

## 2018-11-20 DIAGNOSIS — C83.30 DIFFUSE LARGE B-CELL LYMPHOMA, UNSPECIFIED BODY REGION (HCC): Primary | ICD-10-CM

## 2018-11-20 LAB
BASOPHILS # BLD AUTO: 0.02 10*3/MM3 (ref 0–0.1)
BASOPHILS NFR BLD AUTO: 0.9 % (ref 0–1.1)
DEPRECATED RDW RBC AUTO: 50.5 FL (ref 37–49)
EOSINOPHIL # BLD AUTO: 0.41 10*3/MM3 (ref 0–0.36)
EOSINOPHIL NFR BLD AUTO: 18.6 % (ref 1–5)
ERYTHROCYTE [DISTWIDTH] IN BLOOD BY AUTOMATED COUNT: 14.3 % (ref 11.7–14.5)
HCT VFR BLD AUTO: 35.2 % (ref 34–45)
HGB BLD-MCNC: 12 G/DL (ref 11.5–14.9)
IMM GRANULOCYTES # BLD: 0.01 10*3/MM3 (ref 0–0.03)
IMM GRANULOCYTES NFR BLD: 0.5 % (ref 0–0.5)
LYMPHOCYTES # BLD AUTO: 0.51 10*3/MM3 (ref 1–3.5)
LYMPHOCYTES NFR BLD AUTO: 23.1 % (ref 20–49)
MCH RBC QN AUTO: 32.7 PG (ref 27–33)
MCHC RBC AUTO-ENTMCNC: 34.1 G/DL (ref 32–35)
MCV RBC AUTO: 95.9 FL (ref 83–97)
MONOCYTES # BLD AUTO: 0.24 10*3/MM3 (ref 0.25–0.8)
MONOCYTES NFR BLD AUTO: 10.9 % (ref 4–12)
NEUTROPHILS # BLD AUTO: 1.02 10*3/MM3 (ref 1.5–7)
NEUTROPHILS NFR BLD AUTO: 46 % (ref 39–75)
NRBC BLD MANUAL-RTO: 0 /100 WBC (ref 0–0)
PLATELET # BLD AUTO: 174 10*3/MM3 (ref 150–375)
PMV BLD AUTO: 10.8 FL (ref 8.9–12.1)
RBC # BLD AUTO: 3.67 10*6/MM3 (ref 3.9–5)
WBC NRBC COR # BLD: 2.21 10*3/MM3 (ref 4–10)

## 2018-11-20 PROCEDURE — 85025 COMPLETE CBC W/AUTO DIFF WBC: CPT

## 2018-11-20 PROCEDURE — 99214 OFFICE O/P EST MOD 30 MIN: CPT | Performed by: INTERNAL MEDICINE

## 2018-11-20 NOTE — PROGRESS NOTES
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 She did initiate EPOCH-R  in the hospital on 06/16/2018.    History of Present Illness      The patient is a 23 y.o. female with the above-mentioned history, with history of mediastinal large B-cell lymphoma status post 4 cycles of chemotherapy with dose adusted EPOCHR, patient has completed 5 cycles of chemotherapy.  And had a CT scan of the chest abdomen pelvis which shows continued response.  The plan is to do a total of 6 cycles of chemotherapy.  Following 6 cycles patient will receive a CT scan and PET scan 6 weeks after completion.  She is being admitted October 8, 2018.  She will see Dr Paul prior to admission.    She received Zoladex monthly and the next injection is due on October 8, 2018.    Patient states that she had a migraine headache severe with some nausea and palpitations last week but did not go to the emergency room.  She also had some blood in the stools.  She had held her Pradaxa  At that time.  Today her headache is significantly improved to a level II on a 1-10 scale and also during her last admission neurology had seen her for migraine headaches and an MRI had shown very minimal enhancement of the turbinate which is thought to be nonspecific.  Patient states that her shortness of breath had significantly improved after fourth cycle but subsequently had shortness of breath which is mild.  She did have a CT scan of the chest abdomen pelvis after cycle 4 which had shown improvement in her mediastinal mass.      Interval history: 11/20/2000 1718 patient completed all 6 cycles of chemotherapy.  CT scan and PET scan reviewed in length.  She has further decrease in size by CT scan and the PET scan shows activity level of 2.4 which is the blood for level.  Patient does not have any symptoms.  Given there is still some activity and a large tumor I will reorder a PET scan at 4 weeks from now.  I will also refer  her to Dr. Fox for second opinion to see if biopsy indicated in this situation of the mediastinal node.      Past Medical History, Past Surgical History, Social History, Family History have been reviewed and are without significant changes except as mentioned.    Oncologic history:.   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  She saw Dr. Arina Cohen on last Friday.  The reason the patient was referred to Dr. Cohen was because they thought she had cardiomegaly on chest x-ray.  However a chest x-ray was ordered which showedThe chest x-ray showed extensive abnormal increased density throughout the anterior mediastinum extending into the left hilar region and left perihilar region and is most likely due to confluent lymphadenopathy.     CT angiogram of the chest did not show pulmonary embolism but showed     there is a large conglomerate  mass encases multiple vascular structures of the mediastinum and left  hilar region that is most likely extensive confluent lymphadenopathy.  The primary differential diagnostic considerations would be lymphoma.  The tumor posteriorly displaces the pulmonary arteries and the left and  moderately narrows the main pulmonary artery. The tumor also posteriorly  displaces the trachea and markedly narrows the left mainstem bronchus.  The pulmonary veins in the left are also encased and narrowed. The mass  encases and markedly narrows the SVC. The large edy mass measures  approximately 19.8 x 13.7 x 14.0 cm in diameter. Enlarged lymph nodes  are also present in the left supraclavicular region where they appear to  produce occlusion of the left internal jugular vein. There is no  axillary lymphadenopathy. There are no filling defects within the  pulmonary arteries. There is no CT evidence of pulmonary embolism. There  is a small left pleural effusion. A small pericardial effusion measuring  8 mm in maximum thickness is also present. There is  compressive  atelectasis of the left lung. Patchy airspace opacities are also present  in the superior aspect of the left upper lobe. These are most likely due  to direct tumor infiltration of the lung parenchyma, but could also  represent postobstructive pneumonia. The right lung is well expanded and  clear. Images through the upper abdomen partially show what could be a  edy mass in the retroperitoneum posterior and inferior to the  pancreas. Further evaluation with a CT scan of the abdomen and pelvis is  recommended. Bone window images demonstrate patchy sclerosis in the C7  and T1 and T2 and T3 and T4 vertebral body suspicious for bone  involvement with lymphoma.     IMPRESSION:  Very large tumor mass in the mediastinum encasing multiple  vascular structures and also moderately narrowing the left mainstem  bronchus as described in more detail above. Direct tumor infiltration of  the left upper lobe is also suspected. Small left pleural effusion and a  small pericardial effusion. There may also be partially visualized  lymphadenopathy in the upper abdomen. Patchy areas of sclerosis are also  noted in the C7 and T1 and T2 and T3 and T4 vertebral bodies suspicious  for osseous metastatic disease. The findings are consistent with  Lymphoma.     Dr. Cohen felt that she had a small pericardial effusion.  Patient has been symptomatic with cough which is worsening which started back in February 2018 and worsened over the last 4 months.  Patient also has some shortness of breath with walking up the steps.  She has not lost weight.  She say she is reasonable appetite.  She does have fever kind of low-grade fever and night sweats.  She is more fatigued compared to before.  She was referred to us because of concern that this could be lymphoma.  She does not have any tissue diagnosis yet.  Patient does complain of back pain.    Echo done on admission June 12, 2018 by Dr. Fitzgerald showed that the patient's posterior and appears  large primary leukemia the left ventricle and apex.  There is no tamponade.  The pericardium appears thickened pointing to involvement of the pericardium into the mediastinal process.  Dr. Fitzgerald felt that it would be difficult to do pericardial synthesis as the mediastinal mass covers the anterior aspect of the heart.  Ejection fraction is 58%  CT-guided needle biopsy June 12, 2018 was negative  LDH is elevated.  Uric acid is high.  MRI thoracic spine, negative  CT abdomen pelvis negative  Scan July 13, 2018 shows large infiltrative edy mass in the anterior mediastinum and left hilar region that nearly completely fills the left hemithorax and shows intense hypermetabolism with an SUV of 19.8.  No foci of pathologic hypermetabolism are identified elsewhere in the chest, neck abdomen or pelvis.    Pathology was consistent with primary mediastinal large B-cell lymphoma.    Patient was seen by Dr. Melgar.  He discussed harvesting eggs versus Zoladex plus oral contraceptives versus Zoladex alone for fertility preservation.  Due to large size of the tumor and rapid initiation of treatment needed the patient was not able to have aches harvested done.  Because she is at increased risk of thrombosis with the use of oral contraceptives secondary to malignancy he recommended Zoladex alone.  Patient received first dose of Zoladex 3.6 mg subcutaneous on Belia 15, 2018.    Patient started on EPOCH/R on June 16, 2018    Patient had a reaction to Rituxan which improved with steroids, Benadryl and Pepcid.  She had to receive it slow.  She started having itching over her EARS , sore throat.  She was given IV steroids Benadryl and Pepcid and following that she was started at a slower rate and she completed it.    Right upper extremity Doppler ultrasound showed new superficial vein thrombosis around the PICC line with no extension into the deep system.  Repeat Doppler was ordered.    A Doppler ultrasound initially showed superficial  thrombophlebitis.  She was on prophylactic Arixtra.  A Doppler was repeated 2 days later on 6/20/18  which showed extension of thrombus into the axillary and brachial veins.  She was therefore started on Xarelto 15 mg one every 12 hours and the PICC line was removed as soon as chemotherapy completed on 6/20/18.  She will take 15 mg of Xarelto every 12 hours for 21 days and then transition to 20 mg once a day.  The patient will have CBC performed twice a week.  If the platelet count drops below 50,000, anticoagulation will need to be temporarily held  Patient was started on acyclovir/fluconazole/Bactrim  Bone marrow done June 14, 2018, morphology was negative for lymphoma    Fertility preservation, Zoladex is next due July 12, 2018.  CT scan and PET scan showing significant response after cycle 2 of chemotherapy  Next dose of Lupron August 13, 2018  Status post 5 cycles  OF epoch/r and is due cycle 6  On oct 8th.    Patient admitted October 20, 2018 and discharged October 22, 2018 when she was admitted with neutropenic fever.  She was treated with broad-spectrum antibiotics.  Her respiratory viral panel was positive for parainfluenza virus.    Review of Systems   Constitutional: Positive for fatigue. Negative for activity change, appetite change, chills and fever.   HENT: Negative for mouth sores.    Eyes: Negative for visual disturbance.   Respiratory: Negative for cough (improved) and shortness of breath (improved).    Cardiovascular: Negative.    Gastrointestinal: Negative.  Negative for abdominal pain, diarrhea, nausea and vomiting.   Endocrine: Negative.    Genitourinary: Negative for dysuria, frequency and menstrual problem.   Musculoskeletal: Negative for arthralgias, back pain, joint swelling and myalgias.   Skin: Negative.    Allergic/Immunologic: Negative.    Neurological: Negative.  Negative for dizziness and weakness.   Hematological: Negative.  Does not bruise/bleed easily.   Psychiatric/Behavioral: The  "patient is not nervous/anxious.    All other systems reviewed and are negative.  Patient complains of migraine headaches.    Medications:  The current medication list was reviewed in the EMR    ALLERGIES:  No Known Allergies    Objective      Vitals:    11/20/18 1050   BP: 100/65   Pulse: 71   Resp: 18   Temp: 98.6 °F (37 °C)   TempSrc: Oral   SpO2: 96%   Weight: 66.8 kg (147 lb 4.8 oz)   Height: 157.5 cm (62.01\")   PainSc: 0-No pain     Current Status 11/20/2018   ECOG score 0       Physical Exam         GENERAL:  Well-developed, well-nourished in no acute distress.   SKIN:  Warm, dry without rashes, purpura or petechiae.  EYES:  Pupils equal, round and reactive to light.  EOMs intact.  Conjunctivae normal.  EARS:  Hearing intact.  NOSE:  Septum midline.  No excoriations or nasal discharge.  MOUTH:  Tongue is well-papillated; no stomatitis or ulcers.  Lips normal.  THROAT:  Oropharynx without lesions or exudates.  NECK:  Supple with good range of motion; no thyromegaly or masses, no JVD.  LYMPHATICS:  No cervical, supraclavicular, axillary or inguinal adenopathy.  CHEST:  Lungs clear to auscultation. Good airflow.  CARDIAC:  Regular rate and rhythm without murmurs, rubs or gallops. Normal S1,S2.  ABDOMEN:  Soft, nontender with no hepatosplenomegaly or masses.  EXTREMITIES:  No clubbing, cyanosis or edema.  NEUROLOGICAL:  Cranial Nerves II-XII grossly intact.  No focal neurological deficits.  PSYCHIATRIC:  Normal affect and mood.        RECENT LABS:  Results from last 7 days   Lab Units  11/20/18   1015   WBC 10*3/mm3  2.21*   NEUTROS ABS 10*3/mm3  1.02*   HEMOGLOBIN g/dL  12.0   HEMATOCRIT %  35.2   PLATELETS 10*3/mm3  174       CT NECK, CHEST, ABDOMEN, AND PELVIS WITH IV CONTRAST  IMPRESSION:  1. Further interval decrease in the size of the mediastinal mass. There  is no lymphadenopathy within the neck, abdomen, or pelvis. Further  interval decrease in splenic size.  2. The 8 mm nodular density at the left upper " lobe may represent  scarring related to radiation pneumonitis. Likely benign, but should be  reevaluated on the subsequent chest CT.  3. There is significant mucosal thickening with some frothy secretions  within the left maxillary sinus. Acute sinusitis cannot be excluded.  Please correlate clinically.    PET FROM SKULL BASE TO MID THIGH   IMPRESSION:  1.  Anterior mediastinal mass which has slowly decreased in size over  multiple prior exams and is decreased in FDG avidity since prior PET on  07/26/2018 with current FDG uptake similar to that of blood pool.  2.  Irregular interlobular septal thickening and nodular pulmonary  opacification within the left apex which no longer demonstrates FDG  uptake above that of surrounding lung.   3.  No findings of FDG avid disease in the neck, abdomen or pelvis    Assessment/Plan   1.  Primary mediastinal large B-cell lymphoma: The patient presented with dyspnea, cough, and chest pain.  A CT angiogram of the chest 6/8/18 showed a very large tumor mass in the mediastinum encasing multiple vascular structures and narrowing the left mainstem bronchus.  There was direct tumor infiltration in the left upper lobe.  There was a small left pleural effusion.  She underwent a CT-guided biopsy of the mediastinal mass on 6/12/18 and pathology reviewed at [facility] oncology showed diffuse large B-cell lymphoma consistent with a primary diffuse large B-cell lymphoma.  A bone marrow exam was performed on 6/14/18 which was negative for lymphoma.  She underwent a PET scan 6/13/18 which showed a large hypermetabolic mass in the chest involving the anterior mediastinum, left hilum, nearly completely filling the left hemothorax with SUV 19.8.  There was physiologic distribution elsewhere.     The patient was started on IV fluid hydration and allopurinol for prevention of hyperuricemia.  She initiated systemic chemotherapy with DA-EPOCH-R on 6/16/18 and completed the evening of 6/21/18.  She had  a infusion reaction to rituximab but was able to complete with additional medications and otherwise tolerated chemotherapy well.  She will require additional premedications with additional rituximab.  Plan is to monitor her blood counts twice per week outpatient and proceed with cycle 2 of chemotherapy day 21.     The patient had significant improvement in shortness of breath, cough, and chest pain by the time of discharge.    · Patient received Neulasta support  · Her white count dropped down to as low as 0.8 low-grade temperature and patient was placed on Levaquin ×5 days starting June 27, 2018, neutropenia AT  11 days posttreatment.  Platelets dropped to 82.  · Her next ZOLADEX injection is due July 12.  · She is due to start chemotherapy on July 9.  · Discussed with Dr. Fox at the [facility] and his suggestion was to do the CT scan and PET scan after 2 cycles and discuss the results with him.  If she has an excellent response early on she would not require any further treatments after completion of 6 cycles of EPOCH/R  · Patient being admitted for cycle 3 of chemotherapy on July 30, 2018.  · She's status post cycle 4 of chemotherapy and CT scan has been reviewed following 4 cycles with continued response.  · She is due for ZOLADEX  injection on October 8, 2018.   · Patient is due to go for cycle 5 of chemotherapy on Monday, September 17, 2018  · Asked echocardiogram done August 22, 2018 shows ejection fraction of 59% to 60% with the eldest strain of 19.2%  · Cycle 6 chemotherapy with dose adjusted EPOCH/R on October 8, 2018.  · Reviewed CT scan and PET scan and the images with the patient and family.  Significant response with decrease in the mediastinal mass to 7.8 cm and activity level is 2.4 a week on PET scan.  · Will consider repeating PET scan in 4 weeks to make show this no further activity    2.  Pericardial effusion:   a 2-D echocardiogram 6/8/18 showed a left ventricular systolic function  58%.  There was a 2 cm pericardial effusion with no tamponade.  The pericardium appeared thickened.  She had no evidence of volume overload.  It is resolved     3.   FEN: She was monitored very carefully for any evidence of tumor lysis.  She was maintained on IV fluids and allopurinol throughout hospitalization.    her uric acid at discharge was 1.2 but I recommended that she stay on allopurinol twice daily until her next cycle of chemotherapy to prevent hyperuricemia.  She has mild hypokalemia and will be discharged on potassium 20 mEq once per day.  Have ordered an outpatient BMP weekly for monitoring of her renal function and electrolytes  · Potassium is chronically slightly low.  Requires 20 mg by mouth daily     4.  Fertility preservation:  Patient received Zoladex injection  for fertility preservation; this should be continued once monthly during her chemotherapy.    · Next dose of Zoladex due October 8, 2018, needs to receive when admitted admitted  Her next dose of Zoladex is on December 1 week.     5.  Right upper extremity DVT on Pradaxa.     6.  Migraine headaches, patient had it as a child but recently after cycle 4 patient developed migraine headaches and was seen by neurology when admitted with cycle 5.  MRI showed mild enhancement of the turbinate but was nonspecific.    · Given migraine headache has gotten worse, could reconsult neurology next week when admitted    Plan 1.   Obtained repeat PET scan in 4 weeks in order to see there is resolution of all the SUV    2.  Continue Pradaxa    3.  Will schedule follow-up with Dr. Fox at [facility] in order to determine if a biopsy needed on the mediastinal lymph node as it is significantly large even though she has had a significant response.      5.  Patient to receive Zoladex December 11, 2018.    6.  Follow-up with Dr. Fox in 3 weeks and see me in 4 weeks with a PET scan prior to that        Millie Padilla MD   11/20/2018      CC:   Arina Munoz

## 2018-12-11 ENCOUNTER — APPOINTMENT (OUTPATIENT)
Dept: ONCOLOGY | Facility: HOSPITAL | Age: 23
End: 2018-12-11

## 2018-12-12 ENCOUNTER — INFUSION (OUTPATIENT)
Dept: ONCOLOGY | Facility: HOSPITAL | Age: 23
End: 2018-12-12

## 2018-12-12 DIAGNOSIS — Z45.2 FITTING AND ADJUSTMENT OF VASCULAR CATHETER: Primary | ICD-10-CM

## 2018-12-12 DIAGNOSIS — C85.28 MEDIASTINAL LARGE B-CELL LYMPHOMA OF LYMPH NODES OF MULTIPLE REGIONS (HCC): ICD-10-CM

## 2018-12-12 DIAGNOSIS — C85.28 MEDIASTINAL LARGE B-CELL LYMPHOMA OF LYMPH NODES OF MULTIPLE REGIONS (HCC): Primary | ICD-10-CM

## 2018-12-12 LAB
ALBUMIN SERPL-MCNC: 4.9 G/DL (ref 3.5–5.2)
ALBUMIN/GLOB SERPL: 2.1 G/DL (ref 1.1–2.4)
ALP SERPL-CCNC: 72 U/L (ref 38–116)
ALT SERPL W P-5'-P-CCNC: 15 U/L (ref 0–33)
ANION GAP SERPL CALCULATED.3IONS-SCNC: 13.1 MMOL/L
AST SERPL-CCNC: 23 U/L (ref 0–32)
BASOPHILS # BLD AUTO: 0.02 10*3/MM3 (ref 0–0.1)
BASOPHILS NFR BLD AUTO: 0.9 % (ref 0–1.1)
BILIRUB SERPL-MCNC: 0.4 MG/DL (ref 0.1–1.2)
BUN BLD-MCNC: 14 MG/DL (ref 6–20)
BUN/CREAT SERPL: 24.6 (ref 7.3–30)
CALCIUM SPEC-SCNC: 10 MG/DL (ref 8.5–10.2)
CHLORIDE SERPL-SCNC: 100 MMOL/L (ref 98–107)
CO2 SERPL-SCNC: 26.9 MMOL/L (ref 22–29)
CREAT BLD-MCNC: 0.57 MG/DL (ref 0.6–1.1)
DEPRECATED RDW RBC AUTO: 42 FL (ref 37–49)
EOSINOPHIL # BLD AUTO: 0.09 10*3/MM3 (ref 0–0.36)
EOSINOPHIL NFR BLD AUTO: 4.2 % (ref 1–5)
ERYTHROCYTE [DISTWIDTH] IN BLOOD BY AUTOMATED COUNT: 11.9 % (ref 11.7–14.5)
GFR SERPL CREATININE-BSD FRML MDRD: 131 ML/MIN/1.73
GFR SERPL CREATININE-BSD FRML MDRD: >150 ML/MIN/1.73
GLOBULIN UR ELPH-MCNC: 2.3 GM/DL (ref 1.8–3.5)
GLUCOSE BLD-MCNC: 106 MG/DL (ref 74–124)
HCT VFR BLD AUTO: 38.7 % (ref 34–45)
HGB BLD-MCNC: 13.2 G/DL (ref 11.5–14.9)
IMM GRANULOCYTES # BLD: 0.01 10*3/MM3 (ref 0–0.03)
IMM GRANULOCYTES NFR BLD: 0.5 % (ref 0–0.5)
LYMPHOCYTES # BLD AUTO: 0.57 10*3/MM3 (ref 1–3.5)
LYMPHOCYTES NFR BLD AUTO: 26.5 % (ref 20–49)
MCH RBC QN AUTO: 32.3 PG (ref 27–33)
MCHC RBC AUTO-ENTMCNC: 34.1 G/DL (ref 32–35)
MCV RBC AUTO: 94.6 FL (ref 83–97)
MONOCYTES # BLD AUTO: 0.34 10*3/MM3 (ref 0.25–0.8)
MONOCYTES NFR BLD AUTO: 15.8 % (ref 4–12)
NEUTROPHILS # BLD AUTO: 1.12 10*3/MM3 (ref 1.5–7)
NEUTROPHILS NFR BLD AUTO: 52.1 % (ref 39–75)
NRBC BLD MANUAL-RTO: 0 /100 WBC (ref 0–0)
PLATELET # BLD AUTO: 171 10*3/MM3 (ref 150–375)
PMV BLD AUTO: 9.9 FL (ref 8.9–12.1)
POTASSIUM BLD-SCNC: 4.1 MMOL/L (ref 3.5–4.7)
PROT SERPL-MCNC: 7.2 G/DL (ref 6.3–8)
RBC # BLD AUTO: 4.09 10*6/MM3 (ref 3.9–5)
SODIUM BLD-SCNC: 140 MMOL/L (ref 134–145)
WBC NRBC COR # BLD: 2.15 10*3/MM3 (ref 4–10)

## 2018-12-12 PROCEDURE — 85025 COMPLETE CBC W/AUTO DIFF WBC: CPT

## 2018-12-12 PROCEDURE — 80053 COMPREHEN METABOLIC PANEL: CPT

## 2018-12-12 PROCEDURE — 96523 IRRIG DRUG DELIVERY DEVICE: CPT | Performed by: INTERNAL MEDICINE

## 2018-12-12 PROCEDURE — 25010000002 GOSERELIN PER 3.6 MG: Performed by: NURSE PRACTITIONER

## 2018-12-12 PROCEDURE — 96372 THER/PROPH/DIAG INJ SC/IM: CPT | Performed by: NURSE PRACTITIONER

## 2018-12-12 RX ORDER — SODIUM CHLORIDE 0.9 % (FLUSH) 0.9 %
10 SYRINGE (ML) INJECTION AS NEEDED
Status: CANCELLED | OUTPATIENT
Start: 2018-12-12

## 2018-12-12 RX ORDER — SODIUM CHLORIDE 0.9 % (FLUSH) 0.9 %
10 SYRINGE (ML) INJECTION AS NEEDED
Status: DISCONTINUED | OUTPATIENT
Start: 2018-12-12 | End: 2018-12-12 | Stop reason: HOSPADM

## 2018-12-12 RX ADMIN — SODIUM CHLORIDE, PRESERVATIVE FREE 500 UNITS: 5 INJECTION INTRAVENOUS at 13:31

## 2018-12-12 RX ADMIN — SODIUM CHLORIDE, PRESERVATIVE FREE 10 ML: 5 INJECTION INTRAVENOUS at 13:31

## 2018-12-12 RX ADMIN — Medication 3.6 MG: at 13:39

## 2018-12-14 ENCOUNTER — HOSPITAL ENCOUNTER (OUTPATIENT)
Dept: PET IMAGING | Facility: HOSPITAL | Age: 23
Discharge: HOME | End: 2018-12-14
Attending: INTERNAL MEDICINE | Admitting: INTERNAL MEDICINE

## 2018-12-14 ENCOUNTER — HOSPITAL ENCOUNTER (OUTPATIENT)
Dept: PET IMAGING | Facility: HOSPITAL | Age: 23
Discharge: HOME | End: 2018-12-14
Attending: INTERNAL MEDICINE

## 2018-12-14 ENCOUNTER — INFUSION (OUTPATIENT)
Dept: ONCOLOGY | Facility: HOSPITAL | Age: 23
End: 2018-12-14

## 2018-12-14 DIAGNOSIS — C83.30 DIFFUSE LARGE B-CELL LYMPHOMA, UNSPECIFIED BODY REGION (HCC): ICD-10-CM

## 2018-12-14 LAB — GLUCOSE BLDC GLUCOMTR-MCNC: 105 MG/DL (ref 70–130)

## 2018-12-14 PROCEDURE — 0 FLUDEOXYGLUCOSE F18 SOLUTION: Performed by: INTERNAL MEDICINE

## 2018-12-14 PROCEDURE — 78815 PET IMAGE W/CT SKULL-THIGH: CPT

## 2018-12-14 PROCEDURE — A9552 F18 FDG: HCPCS | Performed by: INTERNAL MEDICINE

## 2018-12-14 PROCEDURE — 82962 GLUCOSE BLOOD TEST: CPT

## 2018-12-14 RX ADMIN — FLUDEOXYGLUCOSE F18 1 DOSE: 300 INJECTION INTRAVENOUS at 08:25

## 2018-12-19 ENCOUNTER — OFFICE VISIT (OUTPATIENT)
Dept: ONCOLOGY | Facility: CLINIC | Age: 23
End: 2018-12-19

## 2018-12-19 ENCOUNTER — LAB (OUTPATIENT)
Dept: OTHER | Facility: HOSPITAL | Age: 23
End: 2018-12-19

## 2018-12-19 VITALS
HEIGHT: 62 IN | HEART RATE: 77 BPM | TEMPERATURE: 98.6 F | RESPIRATION RATE: 16 BRPM | BODY MASS INDEX: 26.59 KG/M2 | DIASTOLIC BLOOD PRESSURE: 70 MMHG | OXYGEN SATURATION: 98 % | SYSTOLIC BLOOD PRESSURE: 101 MMHG | WEIGHT: 144.5 LBS

## 2018-12-19 DIAGNOSIS — C85.28 MEDIASTINAL LARGE B-CELL LYMPHOMA OF LYMPH NODES OF MULTIPLE REGIONS (HCC): ICD-10-CM

## 2018-12-19 DIAGNOSIS — R59.1 LYMPHADENOPATHY: Primary | ICD-10-CM

## 2018-12-19 DIAGNOSIS — C85.29 MEDIASTINAL LARGE B-CELL LYMPHOMA OF SOLID ORGAN EXCLUDING SPLEEN (HCC): Primary | ICD-10-CM

## 2018-12-19 LAB
BASOPHILS # BLD AUTO: 0.02 10*3/MM3 (ref 0–0.2)
BASOPHILS NFR BLD AUTO: 0.7 % (ref 0–1.5)
DEPRECATED RDW RBC AUTO: 39.5 FL (ref 37–54)
EOSINOPHIL # BLD AUTO: 0.06 10*3/MM3 (ref 0–0.7)
EOSINOPHIL NFR BLD AUTO: 2.2 % (ref 0.3–6.2)
ERYTHROCYTE [DISTWIDTH] IN BLOOD BY AUTOMATED COUNT: 11.6 % (ref 11.7–13)
HCT VFR BLD AUTO: 39.5 % (ref 35.6–45.5)
HGB BLD-MCNC: 14.1 G/DL (ref 11.9–15.5)
IMM GRANULOCYTES # BLD: 0.05 10*3/MM3 (ref 0–0.03)
IMM GRANULOCYTES NFR BLD: 1.8 % (ref 0–0.5)
LYMPHOCYTES # BLD AUTO: 0.53 10*3/MM3 (ref 0.9–4.8)
LYMPHOCYTES NFR BLD AUTO: 19.1 % (ref 19.6–45.3)
MCH RBC QN AUTO: 32.7 PG (ref 26.9–32)
MCHC RBC AUTO-ENTMCNC: 35.7 G/DL (ref 32.4–36.3)
MCV RBC AUTO: 91.6 FL (ref 80.5–98.2)
MONOCYTES # BLD AUTO: 0.3 10*3/MM3 (ref 0.2–1.2)
MONOCYTES NFR BLD AUTO: 10.8 % (ref 5–12)
NEUTROPHILS # BLD AUTO: 1.82 10*3/MM3 (ref 1.9–8.1)
NEUTROPHILS NFR BLD AUTO: 65.4 % (ref 42.7–76)
NRBC BLD MANUAL-RTO: 0 /100 WBC (ref 0–0)
PLATELET # BLD AUTO: 181 10*3/MM3 (ref 140–500)
PMV BLD AUTO: 10.5 FL (ref 6–12)
RBC # BLD AUTO: 4.31 10*6/MM3 (ref 3.9–5.2)
WBC NRBC COR # BLD: 2.78 10*3/MM3 (ref 4.5–10.7)

## 2018-12-19 PROCEDURE — 36415 COLL VENOUS BLD VENIPUNCTURE: CPT

## 2018-12-19 PROCEDURE — 85025 COMPLETE CBC W/AUTO DIFF WBC: CPT | Performed by: INTERNAL MEDICINE

## 2018-12-19 PROCEDURE — 99214 OFFICE O/P EST MOD 30 MIN: CPT | Performed by: INTERNAL MEDICINE

## 2018-12-19 NOTE — PROGRESS NOTES
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 She did initiate EPOCH-R  in the hospital on 06/16/2018.    History of Present Illness      The patient is a 23 y.o. female with the above-mentioned history, with history of mediastinal large B-cell lymphoma status post 4 cycles of chemotherapy with dose adusted EPOCHR, patient has completed 5 cycles of chemotherapy.  And had a CT scan of the chest abdomen pelvis which shows continued response.  The plan is to do a total of 6 cycles of chemotherapy.  Following 6 cycles patient will receive a CT scan and PET scan 6 weeks after completion.  She is being admitted October 8, 2018.  She will see Dr Paul prior to admission.    She received Zoladex monthly and the next injection is due on October 8, 2018.    Patient states that she had a migraine headache severe with some nausea and palpitations last week but did not go to the emergency room.  She also had some blood in the stools.  She had held her Pradaxa  At that time.  Today her headache is significantly improved to a level II on a 1-10 scale and also during her last admission neurology had seen her for migraine headaches and an MRI had shown very minimal enhancement of the turbinate which is thought to be nonspecific.  Patient states that her shortness of breath had significantly improved after fourth cycle but subsequently had shortness of breath which is mild.  She did have a CT scan of the chest abdomen pelvis after cycle 4 which had shown improvement in her mediastinal mass.      Interval history: 11/20/2000 1718 patient completed all 6 cycles of chemotherapy.  CT scan and PET scan reviewed in length.  She has further decrease in size by CT scan and the PET scan shows activity level of 2.4 which is the blood for level.  Patient does not have any symptoms.  Given there is still some activity and a large tumor I will reorder a PET scan at 4 weeks from now.  I will also refer  her to Dr. Fox for second opinion to see if biopsy indicated in this situation of the mediastinal node.   · Patient had repeat PET scan which shows a 7.8 cm mediastinal mass with an SUV of 2.4.  This likely is scar tissue and my concern is with 7.8 tumor, should she receive radiation which I would like to avoid if possible in a young patient.  · Was sent patient for second opinion to Dr. Fox as she did not keep her appointment with them before     Past Medical History, Past Surgical History, Social History, Family History have been reviewed and are without significant changes except as mentioned.    Oncologic history:.   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  She saw Dr. Arina Cohen on last Friday.  The reason the patient was referred to Dr. Cohen was because they thought she had cardiomegaly on chest x-ray.  However a chest x-ray was ordered which showedThe chest x-ray showed extensive abnormal increased density throughout the anterior mediastinum extending into the left hilar region and left perihilar region and is most likely due to confluent lymphadenopathy.     CT angiogram of the chest did not show pulmonary embolism but showed     there is a large conglomerate  mass encases multiple vascular structures of the mediastinum and left  hilar region that is most likely extensive confluent lymphadenopathy.  The primary differential diagnostic considerations would be lymphoma.  The tumor posteriorly displaces the pulmonary arteries and the left and  moderately narrows the main pulmonary artery. The tumor also posteriorly  displaces the trachea and markedly narrows the left mainstem bronchus.  The pulmonary veins in the left are also encased and narrowed. The mass  encases and markedly narrows the SVC. The large edy mass measures  approximately 19.8 x 13.7 x 14.0 cm in diameter. Enlarged lymph nodes  are also present in the left supraclavicular region where they  appear to  produce occlusion of the left internal jugular vein. There is no  axillary lymphadenopathy. There are no filling defects within the  pulmonary arteries. There is no CT evidence of pulmonary embolism. There  is a small left pleural effusion. A small pericardial effusion measuring  8 mm in maximum thickness is also present. There is compressive  atelectasis of the left lung. Patchy airspace opacities are also present  in the superior aspect of the left upper lobe. These are most likely due  to direct tumor infiltration of the lung parenchyma, but could also  represent postobstructive pneumonia. The right lung is well expanded and  clear. Images through the upper abdomen partially show what could be a  edy mass in the retroperitoneum posterior and inferior to the  pancreas. Further evaluation with a CT scan of the abdomen and pelvis is  recommended. Bone window images demonstrate patchy sclerosis in the C7  and T1 and T2 and T3 and T4 vertebral body suspicious for bone  involvement with lymphoma.     IMPRESSION:  Very large tumor mass in the mediastinum encasing multiple  vascular structures and also moderately narrowing the left mainstem  bronchus as described in more detail above. Direct tumor infiltration of  the left upper lobe is also suspected. Small left pleural effusion and a  small pericardial effusion. There may also be partially visualized  lymphadenopathy in the upper abdomen. Patchy areas of sclerosis are also  noted in the C7 and T1 and T2 and T3 and T4 vertebral bodies suspicious  for osseous metastatic disease. The findings are consistent with  Lymphoma.     Dr. Cohen felt that she had a small pericardial effusion.  Patient has been symptomatic with cough which is worsening which started back in February 2018 and worsened over the last 4 months.  Patient also has some shortness of breath with walking up the steps.  She has not lost weight.  She say she is reasonable appetite.  She does have  fever kind of low-grade fever and night sweats.  She is more fatigued compared to before.  She was referred to us because of concern that this could be lymphoma.  She does not have any tissue diagnosis yet.  Patient does complain of back pain.    Echo done on admission June 12, 2018 by Dr. Fitzgerald showed that the patient's posterior and appears large primary leukemia the left ventricle and apex.  There is no tamponade.  The pericardium appears thickened pointing to involvement of the pericardium into the mediastinal process.  Dr. Fitzgerald felt that it would be difficult to do pericardial synthesis as the mediastinal mass covers the anterior aspect of the heart.  Ejection fraction is 58%  CT-guided needle biopsy June 12, 2018 was negative  LDH is elevated.  Uric acid is high.  MRI thoracic spine, negative  CT abdomen pelvis negative  Scan July 13, 2018 shows large infiltrative edy mass in the anterior mediastinum and left hilar region that nearly completely fills the left hemithorax and shows intense hypermetabolism with an SUV of 19.8.  No foci of pathologic hypermetabolism are identified elsewhere in the chest, neck abdomen or pelvis.    Pathology was consistent with primary mediastinal large B-cell lymphoma.    Patient was seen by Dr. Melgar.  He discussed harvesting eggs versus Zoladex plus oral contraceptives versus Zoladex alone for fertility preservation.  Due to large size of the tumor and rapid initiation of treatment needed the patient was not able to have aches harvested done.  Because she is at increased risk of thrombosis with the use of oral contraceptives secondary to malignancy he recommended Zoladex alone.  Patient received first dose of Zoladex 3.6 mg subcutaneous on Belia 15, 2018.    Patient started on EPOCH/R on June 16, 2018    Patient had a reaction to Rituxan which improved with steroids, Benadryl and Pepcid.  She had to receive it slow.  She started having itching over her EARS , sore throat.   She was given IV steroids Benadryl and Pepcid and following that she was started at a slower rate and she completed it.    Right upper extremity Doppler ultrasound showed new superficial vein thrombosis around the PICC line with no extension into the deep system.  Repeat Doppler was ordered.    A Doppler ultrasound initially showed superficial thrombophlebitis.  She was on prophylactic Arixtra.  A Doppler was repeated 2 days later on 6/20/18  which showed extension of thrombus into the axillary and brachial veins.  She was therefore started on Xarelto 15 mg one every 12 hours and the PICC line was removed as soon as chemotherapy completed on 6/20/18.  She will take 15 mg of Xarelto every 12 hours for 21 days and then transition to 20 mg once a day.  The patient will have CBC performed twice a week.  If the platelet count drops below 50,000, anticoagulation will need to be temporarily held  Patient was started on acyclovir/fluconazole/Bactrim  Bone marrow done June 14, 2018, morphology was negative for lymphoma    Fertility preservation, Zoladex is next due July 12, 2018.  CT scan and PET scan showing significant response after cycle 2 of chemotherapy  Next dose of Lupron August 13, 2018  Status post 5 cycles  OF epoch/r and is due cycle 6  On oct 8th.    Patient admitted October 20, 2018 and discharged October 22, 2018 when she was admitted with neutropenic fever.  She was treated with broad-spectrum antibiotics.  Her respiratory viral panel was positive for parainfluenza virus.    Review of Systems   Constitutional: Positive for fatigue. Negative for activity change, appetite change, chills and fever.   HENT: Negative for mouth sores.    Eyes: Negative for visual disturbance.   Respiratory: Negative for cough (improved) and shortness of breath (improved).    Cardiovascular: Negative.    Gastrointestinal: Negative.  Negative for abdominal pain, diarrhea, nausea and vomiting.   Endocrine: Negative.    Genitourinary:  "Negative for dysuria, frequency and menstrual problem.   Musculoskeletal: Negative for arthralgias, back pain, joint swelling and myalgias.   Skin: Negative.    Allergic/Immunologic: Negative.    Neurological: Negative.  Negative for dizziness and weakness.   Hematological: Negative.  Does not bruise/bleed easily.   Psychiatric/Behavioral: The patient is not nervous/anxious.    All other systems reviewed and are negative.  Patient complains of migraine headaches.    Medications:  The current medication list was reviewed in the EMR    ALLERGIES:  No Known Allergies    Objective      Vitals:    12/19/18 1447   BP: 101/70   Pulse: 77   Resp: 16   Temp: 98.6 °F (37 °C)   SpO2: 98%   Weight: 65.5 kg (144 lb 8 oz)   Height: 157.5 cm (62.01\")   PainSc: 0-No pain     Current Status 12/19/2018   ECOG score 0       Physical Exam     GENERAL:  Well-developed, well-nourished in no acute distress.   NECK:  Supple with good range of motion; no thyromegaly or masses, no JVD.  LYMPHATICS:  No cervical, supraclavicular, axillary or inguinal adenopathy.  CHEST:  Lungs clear to auscultation. Good airflow.  CARDIAC:  Regular rate and rhythm without murmurs, rubs or gallops. Normal S1,S2.  ABDOMEN:  Soft, nontender with no hepatosplenomegaly or masses.  EXTREMITIES:  No clubbing, cyanosis or edema.  NEUROLOGICAL:  Cranial Nerves II-XII grossly intact.  No focal neurological deficits.  PSYCHIATRIC:  Normal affect and mood.        RECENT LABS:  Results from last 7 days   Lab Units  12/19/18   1435   WBC 10*3/mm3  2.78*   NEUTROS ABS 10*3/mm3  1.82*   HEMOGLOBIN g/dL  14.1   HEMATOCRIT %  39.5   PLATELETS 10*3/mm3  181       CT NECK, CHEST, ABDOMEN, AND PELVIS WITH IV CONTRAST  IMPRESSION:  1. Further interval decrease in the size of the mediastinal mass. There  is no lymphadenopathy within the neck, abdomen, or pelvis. Further  interval decrease in splenic size.  2. The 8 mm nodular density at the left upper lobe may represent  scarring " related to radiation pneumonitis. Likely benign, but should be  reevaluated on the subsequent chest CT.  3. There is significant mucosal thickening with some frothy secretions  within the left maxillary sinus. Acute sinusitis cannot be excluded.  Please correlate clinically.    PET FROM SKULL BASE TO MID THIGH   IMPRESSION:  1.  Anterior mediastinal mass which has slowly decreased in size over  multiple prior exams and is decreased in FDG avidity since prior PET on  07/26/2018 with current FDG uptake similar to that of blood pool.  2.  Irregular interlobular septal thickening and nodular pulmonary  opacification within the left apex which no longer demonstrates FDG  uptake above that of surrounding lung.   3.  No findings of FDG avid disease in the neck, abdomen or pelvis    Assessment/Plan   1.  Primary mediastinal large B-cell lymphoma: The patient presented with dyspnea, cough, and chest pain.  A CT angiogram of the chest 6/8/18 showed a very large tumor mass in the mediastinum encasing multiple vascular structures and narrowing the left mainstem bronchus.  There was direct tumor infiltration in the left upper lobe.  There was a small left pleural effusion.  She underwent a CT-guided biopsy of the mediastinal mass on 6/12/18 and pathology reviewed at [facility] [place] oncology showed diffuse large B-cell lymphoma consistent with a primary diffuse large B-cell lymphoma.  A bone marrow exam was performed on 6/14/18 which was negative for lymphoma.  She underwent a PET scan 6/13/18 which showed a large hypermetabolic mass in the chest involving the anterior mediastinum, left hilum, nearly completely filling the left hemothorax with SUV 19.8.  There was physiologic distribution elsewhere.     The patient was started on IV fluid hydration and allopurinol for prevention of hyperuricemia.  She initiated systemic chemotherapy with DA-EPOCH-R on 6/16/18 and completed the evening of 6/21/18.  She had a infusion reaction to  rituximab but was able to complete with additional medications and otherwise tolerated chemotherapy well.  She will require additional premedications with additional rituximab.  Plan is to monitor her blood counts twice per week outpatient and proceed with cycle 2 of chemotherapy day 21.     The patient had significant improvement in shortness of breath, cough, and chest pain by the time of discharge.    · Patient received Neulasta support  · Her white count dropped down to as low as 0.8 low-grade temperature and patient was placed on Levaquin ×5 days starting June 27, 2018, neutropenia AT  11 days posttreatment.  Platelets dropped to 82.  · Her next ZOLADEX injection is due July 12.  · She is due to start chemotherapy on July 9.  · Discussed with Dr. Fox at [facility] and his suggestion was to do the CT scan and PET scan after 2 cycles and discuss the results with him.  If she has an excellent response early on she would not require any further treatments after completion of 6 cycles of EPOCH/R  · Patient being admitted for cycle 3 of chemotherapy on July 30, 2018.  · She's status post cycle 4 of chemotherapy and CT scan has been reviewed following 4 cycles with continued response.  · She is due for ZOLADEX  injection on October 8, 2018.   · Patient is due to go for cycle 5 of chemotherapy on Monday, September 17, 2018  · Asked echocardiogram done August 22, 2018 shows ejection fraction of 59% to 60% with the eldest strain of 19.2%  · Cycle 6 chemotherapy with dose adjusted EPOCH/R on October 8, 2018.  · Reviewed CT scan and PET scan and the images with the patient and family.  Significant response with decrease in the mediastinal mass to 7.8 cm and activity level is 2.4 a week on PET scan.  · Reviewed repeat PET scan still with 7.8 cm tumor in the mediastinum.  The SUV is 2.4 and this could be scar tissue.    2.  Pericardial effusion:   a 2-D echocardiogram 6/8/18 showed a left ventricular systolic  function 58%.  There was a 2 cm pericardial effusion with no tamponade.  The pericardium appeared thickened.  She had no evidence of volume overload.  It is resolved     3.   FEN: She was monitored very carefully for any evidence of tumor lysis.  She was maintained on IV fluids and allopurinol throughout hospitalization.    her uric acid at discharge was 1.2 but I recommended that she stay on allopurinol twice daily until her next cycle of chemotherapy to prevent hyperuricemia.  She has mild hypokalemia and will be discharged on potassium 20 mEq once per day.  Have ordered an outpatient BMP weekly for monitoring of her renal function and electrolytes  · Potassium is chronically slightly low.  Requires 20 mg by mouth daily     4.  Fertility preservation:  Patient received Zoladex injection  for fertility preservation; this should be continued once monthly during her chemotherapy.    · Next dose of Zoladex due October 8, 2018, needs to receive when admitted admitted  Her next dose of Zoladex is on January 1 week.  We will continue Zoladex for now for the next 3 months     5.  Right upper extremity DVT on Pradaxa.     6.  Migraine headaches, patient had it as a child but recently after cycle 4 patient developed migraine headaches and was seen by neurology when admitted with cycle 5.  MRI showed mild enhancement of the turbinate but was nonspecific.        Plan 1.   Follow-up with Dr. Fox at the [facility] for second opinion as patient has a 7.8 cm residual mediastinal mass after chemotherapy, but SUV of 2.4.  Likely scar tissue and no need for biopsy.  However we will get opinion from Dr. Fox given still continue to large size of the tumor.    2.  Continue Pradaxa    3.   Patient to receive Zoladex December January 3    4.  Randy in 6 weeks with labs        Millie Padilla MD   12/19/2018      CC: Dr. Arina Munoz

## 2018-12-20 ENCOUNTER — TELEPHONE (OUTPATIENT)
Dept: ONCOLOGY | Facility: HOSPITAL | Age: 23
End: 2018-12-20

## 2018-12-20 NOTE — TELEPHONE ENCOUNTER
Message sent to oncology nurses and scheduling department to have pt set up for monthly Zoladex injections, with 1st dose the 1st week of January.  Scheduling to call pt with apts.

## 2018-12-20 NOTE — TELEPHONE ENCOUNTER
----- Message from Millie Padilla MD sent at 12/20/2018 12:11 AM EST -----  Patient needs to continue Zoladex every month, please add more orders of Zoladex monthly and make sure she has an appointment first week of January for Zoladex injection  Millie Padilla MD

## 2019-01-07 DIAGNOSIS — C85.28 MEDIASTINAL LARGE B-CELL LYMPHOMA OF LYMPH NODES OF MULTIPLE REGIONS (HCC): ICD-10-CM

## 2019-01-09 ENCOUNTER — APPOINTMENT (OUTPATIENT)
Dept: OTHER | Facility: HOSPITAL | Age: 24
End: 2019-01-09

## 2019-01-09 ENCOUNTER — INFUSION (OUTPATIENT)
Dept: ONCOLOGY | Facility: HOSPITAL | Age: 24
End: 2019-01-09

## 2019-01-09 VITALS
TEMPERATURE: 98 F | BODY MASS INDEX: 26.33 KG/M2 | SYSTOLIC BLOOD PRESSURE: 95 MMHG | WEIGHT: 144 LBS | HEART RATE: 58 BPM | OXYGEN SATURATION: 99 % | DIASTOLIC BLOOD PRESSURE: 46 MMHG

## 2019-01-09 DIAGNOSIS — Z45.2 FITTING AND ADJUSTMENT OF VASCULAR CATHETER: Primary | ICD-10-CM

## 2019-01-09 DIAGNOSIS — C85.29 MEDIASTINAL LARGE B-CELL LYMPHOMA OF SOLID ORGAN EXCLUDING SPLEEN (HCC): ICD-10-CM

## 2019-01-09 DIAGNOSIS — C85.28 MEDIASTINAL LARGE B-CELL LYMPHOMA OF LYMPH NODES OF MULTIPLE REGIONS (HCC): ICD-10-CM

## 2019-01-09 LAB
ALBUMIN SERPL-MCNC: 4.6 G/DL (ref 3.5–5.2)
ALBUMIN/GLOB SERPL: 2.1 G/DL
ALP SERPL-CCNC: 72 U/L (ref 39–117)
ALT SERPL W P-5'-P-CCNC: 18 U/L (ref 1–33)
ANION GAP SERPL CALCULATED.3IONS-SCNC: 12.3 MMOL/L
AST SERPL-CCNC: 20 U/L (ref 1–32)
BASOPHILS # BLD AUTO: 0.01 10*3/MM3 (ref 0–0.2)
BASOPHILS NFR BLD AUTO: 0.6 % (ref 0–1.5)
BILIRUB SERPL-MCNC: 0.2 MG/DL (ref 0.1–1.2)
BUN BLD-MCNC: 11 MG/DL (ref 6–20)
BUN/CREAT SERPL: 18.6 (ref 7–25)
CALCIUM SPEC-SCNC: 9.7 MG/DL (ref 8.6–10.5)
CHLORIDE SERPL-SCNC: 102 MMOL/L (ref 98–107)
CO2 SERPL-SCNC: 27.7 MMOL/L (ref 22–29)
CREAT BLD-MCNC: 0.59 MG/DL (ref 0.57–1)
DEPRECATED RDW RBC AUTO: 36.5 FL (ref 37–54)
EOSINOPHIL # BLD AUTO: 0.06 10*3/MM3 (ref 0–0.7)
EOSINOPHIL NFR BLD AUTO: 3.7 % (ref 0.3–6.2)
ERYTHROCYTE [DISTWIDTH] IN BLOOD BY AUTOMATED COUNT: 10.7 % (ref 11.7–13)
GFR SERPL CREATININE-BSD FRML MDRD: 126 ML/MIN/1.73
GFR SERPL CREATININE-BSD FRML MDRD: >150 ML/MIN/1.73
GLOBULIN UR ELPH-MCNC: 2.2 GM/DL
GLUCOSE BLD-MCNC: 95 MG/DL (ref 65–99)
HCT VFR BLD AUTO: 39.1 % (ref 35.6–45.5)
HGB BLD-MCNC: 13.4 G/DL (ref 11.9–15.5)
IMM GRANULOCYTES # BLD AUTO: 0 10*3/MM3 (ref 0–0.03)
IMM GRANULOCYTES NFR BLD AUTO: 0 % (ref 0–0.5)
LYMPHOCYTES # BLD AUTO: 0.68 10*3/MM3 (ref 0.9–4.8)
LYMPHOCYTES NFR BLD AUTO: 41.7 % (ref 19.6–45.3)
MCH RBC QN AUTO: 31.5 PG (ref 26.9–32)
MCHC RBC AUTO-ENTMCNC: 34.3 G/DL (ref 32.4–36.3)
MCV RBC AUTO: 91.8 FL (ref 80.5–98.2)
MONOCYTES # BLD AUTO: 0.31 10*3/MM3 (ref 0.2–1.2)
MONOCYTES NFR BLD AUTO: 19 % (ref 5–12)
NEUTROPHILS # BLD AUTO: 0.57 10*3/MM3 (ref 1.9–8.1)
NEUTROPHILS NFR BLD AUTO: 35 % (ref 42.7–76)
NRBC BLD AUTO-RTO: 0 /100 WBC (ref 0–0)
PLATELET # BLD AUTO: 162 10*3/MM3 (ref 140–500)
PMV BLD AUTO: 10.3 FL (ref 6–12)
POTASSIUM BLD-SCNC: 4 MMOL/L (ref 3.5–5.2)
PROT SERPL-MCNC: 6.8 G/DL (ref 6–8.5)
RBC # BLD AUTO: 4.26 10*6/MM3 (ref 3.9–5.2)
SODIUM BLD-SCNC: 142 MMOL/L (ref 136–145)
WBC NRBC COR # BLD: 1.63 10*3/MM3 (ref 4.5–10.7)

## 2019-01-09 PROCEDURE — 36415 COLL VENOUS BLD VENIPUNCTURE: CPT | Performed by: INTERNAL MEDICINE

## 2019-01-09 PROCEDURE — 85025 COMPLETE CBC W/AUTO DIFF WBC: CPT | Performed by: INTERNAL MEDICINE

## 2019-01-09 PROCEDURE — 25010000002 GOSERELIN PER 3.6 MG: Performed by: NURSE PRACTITIONER

## 2019-01-09 PROCEDURE — 96372 THER/PROPH/DIAG INJ SC/IM: CPT | Performed by: NURSE PRACTITIONER

## 2019-01-09 PROCEDURE — 80053 COMPREHEN METABOLIC PANEL: CPT | Performed by: INTERNAL MEDICINE

## 2019-01-09 RX ORDER — SODIUM CHLORIDE 0.9 % (FLUSH) 0.9 %
10 SYRINGE (ML) INJECTION AS NEEDED
Status: CANCELLED | OUTPATIENT
Start: 2019-01-09

## 2019-01-09 RX ORDER — SODIUM CHLORIDE 0.9 % (FLUSH) 0.9 %
10 SYRINGE (ML) INJECTION AS NEEDED
Status: DISCONTINUED | OUTPATIENT
Start: 2019-01-09 | End: 2019-01-09 | Stop reason: HOSPADM

## 2019-01-09 RX ADMIN — SODIUM CHLORIDE, PRESERVATIVE FREE 500 UNITS: 5 INJECTION INTRAVENOUS at 09:31

## 2019-01-09 RX ADMIN — GOSERELIN ACETATE 3.6 MG: 3.6 IMPLANT SUBCUTANEOUS at 10:46

## 2019-01-09 RX ADMIN — Medication 10 ML: at 09:30

## 2019-01-09 NOTE — PROGRESS NOTES
WBC   Date Value Ref Range Status   01/09/2019 1.63 (C) 4.50 - 10.70 10*3/mm3 Final     RBC   Date Value Ref Range Status   01/09/2019 4.26 3.90 - 5.20 10*6/mm3 Final     Hemoglobin   Date Value Ref Range Status   01/09/2019 13.4 11.9 - 15.5 g/dL Final     Hematocrit   Date Value Ref Range Status   01/09/2019 39.1 35.6 - 45.5 % Final     MCV   Date Value Ref Range Status   01/09/2019 91.8 80.5 - 98.2 fL Final     MCH   Date Value Ref Range Status   01/09/2019 31.5 26.9 - 32.0 pg Final     MCHC   Date Value Ref Range Status   01/09/2019 34.3 32.4 - 36.3 g/dL Final     RDW   Date Value Ref Range Status   01/09/2019 10.7 (L) 11.7 - 13.0 % Final     RDW-SD   Date Value Ref Range Status   01/09/2019 36.5 (L) 37.0 - 54.0 fl Final     MPV   Date Value Ref Range Status   01/09/2019 10.3 6.0 - 12.0 fL Final     Platelets   Date Value Ref Range Status   01/09/2019 162 140 - 500 10*3/mm3 Final     Neutrophil %   Date Value Ref Range Status   01/09/2019 35.0 (L) 42.7 - 76.0 % Final     Lymphocyte %   Date Value Ref Range Status   01/09/2019 41.7 19.6 - 45.3 % Final     Monocyte %   Date Value Ref Range Status   01/09/2019 19.0 (H) 5.0 - 12.0 % Final     Eosinophil %   Date Value Ref Range Status   01/09/2019 3.7 0.3 - 6.2 % Final     Basophil %   Date Value Ref Range Status   01/09/2019 0.6 0.0 - 1.5 % Final     Immature Grans %   Date Value Ref Range Status   01/09/2019 0.0 0.0 - 0.5 % Final     Neutrophils, Absolute   Date Value Ref Range Status   01/09/2019 0.57 (C) 1.90 - 8.10 10*3/mm3 Final     Lymphocytes, Absolute   Date Value Ref Range Status   01/09/2019 0.68 (L) 0.90 - 4.80 10*3/mm3 Final     Monocytes, Absolute   Date Value Ref Range Status   01/09/2019 0.31 0.20 - 1.20 10*3/mm3 Final     Eosinophils, Absolute   Date Value Ref Range Status   01/09/2019 0.06 0.00 - 0.70 10*3/mm3 Final     Basophils, Absolute   Date Value Ref Range Status   01/09/2019 0.01 0.00 - 0.20 10*3/mm3 Final     Immature Grans, Absolute   Date  Value Ref Range Status   01/09/2019 0.00 0.00 - 0.03 10*3/mm3 Final     nRBC   Date Value Ref Range Status   01/09/2019 0.0 0.0 - 0.0 /100 WBC Final   Pt presents with neutropenia based on lab results.  Reviewed with Patience Haskins with v/o to proceed with Zoladex.  See NP note for additional info.

## 2019-01-14 LAB — REF LAB TEST METHOD: NORMAL

## 2019-01-15 ENCOUNTER — HOSPITAL ENCOUNTER (OUTPATIENT)
Dept: CT IMAGING | Facility: HOSPITAL | Age: 24
Discharge: HOME | End: 2019-01-15
Admitting: NURSE PRACTITIONER

## 2019-01-15 ENCOUNTER — OFFICE VISIT (OUTPATIENT)
Dept: ONCOLOGY | Facility: CLINIC | Age: 24
End: 2019-01-15

## 2019-01-15 ENCOUNTER — INFUSION (OUTPATIENT)
Dept: ONCOLOGY | Facility: HOSPITAL | Age: 24
End: 2019-01-15

## 2019-01-15 DIAGNOSIS — C85.29 MEDIASTINAL LARGE B-CELL LYMPHOMA OF SOLID ORGAN EXCLUDING SPLEEN (HCC): Primary | ICD-10-CM

## 2019-01-15 DIAGNOSIS — C85.29 MEDIASTINAL LARGE B-CELL LYMPHOMA OF SOLID ORGAN EXCLUDING SPLEEN (HCC): ICD-10-CM

## 2019-01-15 DIAGNOSIS — Z45.2 FITTING AND ADJUSTMENT OF VASCULAR CATHETER: ICD-10-CM

## 2019-01-15 DIAGNOSIS — C85.28 MEDIASTINAL LARGE B-CELL LYMPHOMA OF LYMPH NODES OF MULTIPLE REGIONS (HCC): Primary | ICD-10-CM

## 2019-01-15 LAB
ALBUMIN SERPL-MCNC: 4.7 G/DL (ref 3.5–5.2)
ALBUMIN/GLOB SERPL: 2.1 G/DL
ALP SERPL-CCNC: 73 U/L (ref 39–117)
ALT SERPL W P-5'-P-CCNC: 16 U/L (ref 1–33)
ANION GAP SERPL CALCULATED.3IONS-SCNC: 11.8 MMOL/L
AST SERPL-CCNC: 19 U/L (ref 1–32)
BASOPHILS # BLD AUTO: 0.02 10*3/MM3 (ref 0–0.2)
BASOPHILS NFR BLD AUTO: 0.8 % (ref 0–1.5)
BILIRUB SERPL-MCNC: 0.2 MG/DL (ref 0.1–1.2)
BUN BLD-MCNC: 8 MG/DL (ref 6–20)
BUN/CREAT SERPL: 13.3 (ref 7–25)
CALCIUM SPEC-SCNC: 9.9 MG/DL (ref 8.6–10.5)
CHLORIDE SERPL-SCNC: 105 MMOL/L (ref 98–107)
CO2 SERPL-SCNC: 26.2 MMOL/L (ref 22–29)
CREAT BLD-MCNC: 0.6 MG/DL (ref 0.57–1)
DEPRECATED RDW RBC AUTO: 36.4 FL (ref 37–54)
EOSINOPHIL # BLD AUTO: 0.08 10*3/MM3 (ref 0–0.7)
EOSINOPHIL NFR BLD AUTO: 3.4 % (ref 0.3–6.2)
ERYTHROCYTE [DISTWIDTH] IN BLOOD BY AUTOMATED COUNT: 10.7 % (ref 11.7–13)
GFR SERPL CREATININE-BSD FRML MDRD: 124 ML/MIN/1.73
GFR SERPL CREATININE-BSD FRML MDRD: 150 ML/MIN/1.73
GLOBULIN UR ELPH-MCNC: 2.2 GM/DL
GLUCOSE BLD-MCNC: 95 MG/DL (ref 65–99)
HCT VFR BLD AUTO: 40.7 % (ref 35.6–45.5)
HGB BLD-MCNC: 14 G/DL (ref 11.9–15.5)
IMM GRANULOCYTES # BLD AUTO: 0.01 10*3/MM3 (ref 0–0.03)
IMM GRANULOCYTES NFR BLD AUTO: 0.4 % (ref 0–0.5)
LYMPHOCYTES # BLD AUTO: 0.63 10*3/MM3 (ref 0.9–4.8)
LYMPHOCYTES NFR BLD AUTO: 26.7 % (ref 19.6–45.3)
MCH RBC QN AUTO: 31.9 PG (ref 26.9–32)
MCHC RBC AUTO-ENTMCNC: 34.4 G/DL (ref 32.4–36.3)
MCV RBC AUTO: 92.7 FL (ref 80.5–98.2)
MONOCYTES # BLD AUTO: 0.34 10*3/MM3 (ref 0.2–1.2)
MONOCYTES NFR BLD AUTO: 14.4 % (ref 5–12)
NEUTROPHILS # BLD AUTO: 1.28 10*3/MM3 (ref 1.9–8.1)
NEUTROPHILS NFR BLD AUTO: 54.3 % (ref 42.7–76)
NRBC BLD AUTO-RTO: 0 /100 WBC (ref 0–0)
PLATELET # BLD AUTO: 167 10*3/MM3 (ref 140–500)
PMV BLD AUTO: 10.4 FL (ref 6–12)
POTASSIUM BLD-SCNC: 4.1 MMOL/L (ref 3.5–5.2)
PROT SERPL-MCNC: 6.9 G/DL (ref 6–8.5)
RBC # BLD AUTO: 4.39 10*6/MM3 (ref 3.9–5.2)
SODIUM BLD-SCNC: 143 MMOL/L (ref 136–145)
WBC NRBC COR # BLD: 2.36 10*3/MM3 (ref 4.5–10.7)

## 2019-01-15 PROCEDURE — 99212-NC PR NO CHARGE CBC OFFICE OUTPATIENT VISIT 10 MINUTES: Performed by: NURSE PRACTITIONER

## 2019-01-15 PROCEDURE — 74177 CT ABD & PELVIS W/CONTRAST: CPT

## 2019-01-15 PROCEDURE — 82565 ASSAY OF CREATININE: CPT

## 2019-01-15 PROCEDURE — 71260 CT THORAX DX C+: CPT

## 2019-01-15 PROCEDURE — 36592 COLLECT BLOOD FROM PICC: CPT

## 2019-01-15 PROCEDURE — 25010000002 IOPAMIDOL 61 % SOLUTION: Performed by: NURSE PRACTITIONER

## 2019-01-15 PROCEDURE — 80053 COMPREHEN METABOLIC PANEL: CPT | Performed by: INTERNAL MEDICINE

## 2019-01-15 PROCEDURE — 36415 COLL VENOUS BLD VENIPUNCTURE: CPT | Performed by: INTERNAL MEDICINE

## 2019-01-15 PROCEDURE — 70491 CT SOFT TISSUE NECK W/DYE: CPT

## 2019-01-15 PROCEDURE — 0 DIATRIZOATE MEGLUMINE & SODIUM PER 1 ML: Performed by: NURSE PRACTITIONER

## 2019-01-15 PROCEDURE — 85025 COMPLETE CBC W/AUTO DIFF WBC: CPT | Performed by: INTERNAL MEDICINE

## 2019-01-15 RX ORDER — SODIUM CHLORIDE 0.9 % (FLUSH) 0.9 %
10 SYRINGE (ML) INJECTION AS NEEDED
Status: CANCELLED | OUTPATIENT
Start: 2019-01-15

## 2019-01-15 RX ORDER — SODIUM CHLORIDE 0.9 % (FLUSH) 0.9 %
10 SYRINGE (ML) INJECTION AS NEEDED
Status: DISCONTINUED | OUTPATIENT
Start: 2019-01-15 | End: 2019-01-15 | Stop reason: HOSPADM

## 2019-01-15 RX ADMIN — IOPAMIDOL 90 ML: 612 INJECTION, SOLUTION INTRAVENOUS at 11:51

## 2019-01-15 RX ADMIN — Medication 10 ML: at 09:30

## 2019-01-15 RX ADMIN — DIATRIZOATE MEGLUMINE AND DIATRIZOATE SODIUM 30 ML: 660; 100 LIQUID ORAL; RECTAL at 10:40

## 2019-01-15 NOTE — PROGRESS NOTES
I reviewed labs with the patient and her mother today. Her CBC has fortunately improved with a total WBC of 2.36 and ANC of 1.28. She reports feeling well with no new questions or concerns.  She is scheduled for a CT Scan today and will return as already scheduled next wee to see Dr Padilla to review the results.     Lab Results   Component Value Date    WBC 2.36 (L) 01/15/2019    HGB 14.0 01/15/2019    HCT 40.7 01/15/2019    MCV 92.7 01/15/2019     01/15/2019

## 2019-01-16 LAB — CREAT BLDA-MCNC: 0.5 MG/DL (ref 0.6–1.3)

## 2019-01-22 DIAGNOSIS — C85.28 MEDIASTINAL LARGE B-CELL LYMPHOMA OF LYMPH NODES OF MULTIPLE REGIONS (HCC): Primary | ICD-10-CM

## 2019-01-23 ENCOUNTER — INFUSION (OUTPATIENT)
Dept: ONCOLOGY | Facility: HOSPITAL | Age: 24
End: 2019-01-23

## 2019-01-23 ENCOUNTER — OFFICE VISIT (OUTPATIENT)
Dept: ONCOLOGY | Facility: CLINIC | Age: 24
End: 2019-01-23

## 2019-01-23 VITALS
DIASTOLIC BLOOD PRESSURE: 65 MMHG | SYSTOLIC BLOOD PRESSURE: 101 MMHG | OXYGEN SATURATION: 97 % | RESPIRATION RATE: 18 BRPM | HEART RATE: 58 BPM | BODY MASS INDEX: 26.31 KG/M2 | TEMPERATURE: 98.4 F | WEIGHT: 143 LBS | HEIGHT: 62 IN

## 2019-01-23 DIAGNOSIS — Z45.2 FITTING AND ADJUSTMENT OF VASCULAR CATHETER: ICD-10-CM

## 2019-01-23 DIAGNOSIS — C85.28 MEDIASTINAL LARGE B-CELL LYMPHOMA OF LYMPH NODES OF MULTIPLE REGIONS (HCC): Primary | ICD-10-CM

## 2019-01-23 DIAGNOSIS — C85.92 NON-HODGKIN LYMPHOMA OF INTRATHORACIC LYMPH NODES, UNSPECIFIED NON-HODGKIN LYMPHOMA TYPE (HCC): Primary | ICD-10-CM

## 2019-01-23 DIAGNOSIS — C85.28 MEDIASTINAL LARGE B-CELL LYMPHOMA OF LYMPH NODES OF MULTIPLE REGIONS (HCC): ICD-10-CM

## 2019-01-23 LAB
ALBUMIN SERPL-MCNC: 4.7 G/DL (ref 3.5–5.2)
ALBUMIN/GLOB SERPL: 2 G/DL (ref 1.1–2.4)
ALP SERPL-CCNC: 76 U/L (ref 38–116)
ALT SERPL W P-5'-P-CCNC: 16 U/L (ref 0–33)
ANION GAP SERPL CALCULATED.3IONS-SCNC: 11 MMOL/L
AST SERPL-CCNC: 21 U/L (ref 0–32)
BASOPHILS # BLD AUTO: 0.01 10*3/MM3 (ref 0–0.1)
BASOPHILS NFR BLD AUTO: 0.4 % (ref 0–1.1)
BILIRUB SERPL-MCNC: 0.2 MG/DL (ref 0.1–1.2)
BUN BLD-MCNC: 11 MG/DL (ref 6–20)
BUN/CREAT SERPL: 19 (ref 7.3–30)
CALCIUM SPEC-SCNC: 9.8 MG/DL (ref 8.5–10.2)
CHLORIDE SERPL-SCNC: 103 MMOL/L (ref 98–107)
CO2 SERPL-SCNC: 28 MMOL/L (ref 22–29)
CREAT BLD-MCNC: 0.58 MG/DL (ref 0.6–1.1)
DEPRECATED RDW RBC AUTO: 35.8 FL (ref 37–49)
EOSINOPHIL # BLD AUTO: 0.05 10*3/MM3 (ref 0–0.36)
EOSINOPHIL NFR BLD AUTO: 2 % (ref 1–5)
ERYTHROCYTE [DISTWIDTH] IN BLOOD BY AUTOMATED COUNT: 10.5 % (ref 11.7–14.5)
GFR SERPL CREATININE-BSD FRML MDRD: 129 ML/MIN/1.73
GFR SERPL CREATININE-BSD FRML MDRD: >150 ML/MIN/1.73
GLOBULIN UR ELPH-MCNC: 2.3 GM/DL (ref 1.8–3.5)
GLUCOSE BLD-MCNC: 106 MG/DL (ref 74–124)
HCT VFR BLD AUTO: 40.5 % (ref 34–45)
HGB BLD-MCNC: 13.8 G/DL (ref 11.5–14.9)
IMM GRANULOCYTES # BLD AUTO: 0.01 10*3/MM3 (ref 0–0.03)
IMM GRANULOCYTES NFR BLD AUTO: 0.4 % (ref 0–0.5)
LYMPHOCYTES # BLD AUTO: 0.62 10*3/MM3 (ref 1–3.5)
LYMPHOCYTES NFR BLD AUTO: 24.3 % (ref 20–49)
MCH RBC QN AUTO: 31.3 PG (ref 27–33)
MCHC RBC AUTO-ENTMCNC: 34.1 G/DL (ref 32–35)
MCV RBC AUTO: 91.8 FL (ref 83–97)
MONOCYTES # BLD AUTO: 0.24 10*3/MM3 (ref 0.25–0.8)
MONOCYTES NFR BLD AUTO: 9.4 % (ref 4–12)
NEUTROPHILS # BLD AUTO: 1.62 10*3/MM3 (ref 1.5–7)
NEUTROPHILS NFR BLD AUTO: 63.5 % (ref 39–75)
NRBC BLD AUTO-RTO: 0 /100 WBC (ref 0–0)
PLATELET # BLD AUTO: 166 10*3/MM3 (ref 150–375)
PMV BLD AUTO: 9.7 FL (ref 8.9–12.1)
POTASSIUM BLD-SCNC: 4 MMOL/L (ref 3.5–4.7)
PROT SERPL-MCNC: 7 G/DL (ref 6.3–8)
RBC # BLD AUTO: 4.41 10*6/MM3 (ref 3.9–5)
SODIUM BLD-SCNC: 142 MMOL/L (ref 134–145)
WBC NRBC COR # BLD: 2.55 10*3/MM3 (ref 4–10)

## 2019-01-23 PROCEDURE — 96523 IRRIG DRUG DELIVERY DEVICE: CPT | Performed by: INTERNAL MEDICINE

## 2019-01-23 PROCEDURE — 99215 OFFICE O/P EST HI 40 MIN: CPT | Performed by: INTERNAL MEDICINE

## 2019-01-23 PROCEDURE — 80053 COMPREHEN METABOLIC PANEL: CPT

## 2019-01-23 PROCEDURE — 85025 COMPLETE CBC W/AUTO DIFF WBC: CPT

## 2019-01-23 RX ORDER — SODIUM CHLORIDE 0.9 % (FLUSH) 0.9 %
10 SYRINGE (ML) INJECTION AS NEEDED
Status: DISCONTINUED | OUTPATIENT
Start: 2019-01-23 | End: 2019-01-23 | Stop reason: HOSPADM

## 2019-01-23 RX ORDER — SODIUM CHLORIDE 0.9 % (FLUSH) 0.9 %
10 SYRINGE (ML) INJECTION AS NEEDED
Status: CANCELLED | OUTPATIENT
Start: 2019-01-23

## 2019-01-23 RX ORDER — ATENOLOL 100 MG/1
TABLET ORAL EVERY 12 HOURS
COMMUNITY
Start: 2018-12-29 | End: 2019-02-28 | Stop reason: SDUPTHER

## 2019-01-23 RX ADMIN — SODIUM CHLORIDE, PRESERVATIVE FREE 10 ML: 5 INJECTION INTRAVENOUS at 12:17

## 2019-01-23 RX ADMIN — Medication 500 UNITS: at 12:17

## 2019-01-23 NOTE — PROGRESS NOTES
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 She did initiate EPOCH-R  in the hospital on 06/16/2018.    History of Present Illness     The patient is a 23 y.o. female with the above-mentioned history, with history of mediastinal large B-cell lymphoma status post completion of  6  cycles of chemotherapy with dose adusted EPOCH-R in October 2018.  CT imaging following therapy did demonstrate a response.  A repeat PET scan revealed a residual mediastinal tumor measuring 7.8 cm.  Radiation therapy was a consideration, however given her young age, Dr. Padilla has decided to defer this for now.      She continues on monthly Zoladex for fertility preservation .    Agent had repeat CT scan on January 15, 2019 and I have reviewed the results in detail with the patient and her mother.  There is further decrease in the mediastinal lymph node.  Patient has seen Dr. Fox at the [facility] and given that the PET scan showed that the mediastinal lymph node was photopenic, there is no plans on radiation treatment.    I also had her BE  evaluated by Dr. Fox at the [facility] and he has agreed as well not to consider any radiation given the PET scan showed that it was photopenic.    Now that all chemotherapy is complete, we will stop the Zoladex as it is causing her significant symptoms of hot flashes.  She will require port flush is.  We can keep the port for 3 more months and subsequently discontinue at which time we can stop the Pradaxa.        Past Medical History, Past Surgical History, Social History, Family History have been reviewed and are without significant changes except as mentioned.    Oncologic history:.   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  She saw Dr. Arina Cohen on last Friday.  The reason the patient was referred to Dr. Cohen was because they thought she had cardiomegaly on chest x-ray.   However a chest x-ray was ordered which showedThe chest x-ray showed extensive abnormal increased density throughout the anterior mediastinum extending into the left hilar region and left perihilar region and is most likely due to confluent lymphadenopathy.     CT angiogram of the chest did not show pulmonary embolism but showed     there is a large conglomerate  mass encases multiple vascular structures of the mediastinum and left  hilar region that is most likely extensive confluent lymphadenopathy.  The primary differential diagnostic considerations would be lymphoma.  The tumor posteriorly displaces the pulmonary arteries and the left and  moderately narrows the main pulmonary artery. The tumor also posteriorly  displaces the trachea and markedly narrows the left mainstem bronchus.  The pulmonary veins in the left are also encased and narrowed. The mass  encases and markedly narrows the SVC. The large edy mass measures  approximately 19.8 x 13.7 x 14.0 cm in diameter. Enlarged lymph nodes  are also present in the left supraclavicular region where they appear to  produce occlusion of the left internal jugular vein. There is no  axillary lymphadenopathy. There are no filling defects within the  pulmonary arteries. There is no CT evidence of pulmonary embolism. There  is a small left pleural effusion. A small pericardial effusion measuring  8 mm in maximum thickness is also present. There is compressive  atelectasis of the left lung. Patchy airspace opacities are also present  in the superior aspect of the left upper lobe. These are most likely due  to direct tumor infiltration of the lung parenchyma, but could also  represent postobstructive pneumonia. The right lung is well expanded and  clear. Images through the upper abdomen partially show what could be a  edy mass in the retroperitoneum posterior and inferior to the  pancreas. Further evaluation with a CT scan of the abdomen and pelvis is  recommended.  Bone window images demonstrate patchy sclerosis in the C7  and T1 and T2 and T3 and T4 vertebral body suspicious for bone  involvement with lymphoma.     IMPRESSION:  Very large tumor mass in the mediastinum encasing multiple  vascular structures and also moderately narrowing the left mainstem  bronchus as described in more detail above. Direct tumor infiltration of  the left upper lobe is also suspected. Small left pleural effusion and a  small pericardial effusion. There may also be partially visualized  lymphadenopathy in the upper abdomen. Patchy areas of sclerosis are also  noted in the C7 and T1 and T2 and T3 and T4 vertebral bodies suspicious  for osseous metastatic disease. The findings are consistent with  Lymphoma.     Dr. Cohen felt that she had a small pericardial effusion.  Patient has been symptomatic with cough which is worsening which started back in February 2018 and worsened over the last 4 months.  Patient also has some shortness of breath with walking up the steps.  She has not lost weight.  She say she is reasonable appetite.  She does have fever kind of low-grade fever and night sweats.  She is more fatigued compared to before.  She was referred to us because of concern that this could be lymphoma.  She does not have any tissue diagnosis yet.  Patient does complain of back pain.    Echo done on admission June 12, 2018 by Dr. Fitzgerald showed that the patient's posterior and appears large primary leukemia the left ventricle and apex.  There is no tamponade.  The pericardium appears thickened pointing to involvement of the pericardium into the mediastinal process.  Dr. Fitzgerald felt that it would be difficult to do pericardial synthesis as the mediastinal mass covers the anterior aspect of the heart.  Ejection fraction is 58%  CT-guided needle biopsy June 12, 2018 was negative  LDH is elevated.  Uric acid is high.  MRI thoracic spine, negative  CT abdomen pelvis negative  Scan July 13, 2018 shows large  infiltrative edy mass in the anterior mediastinum and left hilar region that nearly completely fills the left hemithorax and shows intense hypermetabolism with an SUV of 19.8.  No foci of pathologic hypermetabolism are identified elsewhere in the chest, neck abdomen or pelvis.    Pathology was consistent with primary mediastinal large B-cell lymphoma.    Patient was seen by Dr. Melgar.  He discussed harvesting eggs versus Zoladex plus oral contraceptives versus Zoladex alone for fertility preservation.  Due to large size of the tumor and rapid initiation of treatment needed the patient was not able to have aches harvested done.  Because she is at increased risk of thrombosis with the use of oral contraceptives secondary to malignancy he recommended Zoladex alone.  Patient received first dose of Zoladex 3.6 mg subcutaneous on Belia 15, 2018.    Patient started on EPOCH/R on June 16, 2018    Patient had a reaction to Rituxan which improved with steroids, Benadryl and Pepcid.  She had to receive it slow.  She started having itching over her EARS , sore throat.  She was given IV steroids Benadryl and Pepcid and following that she was started at a slower rate and she completed it.    Right upper extremity Doppler ultrasound showed new superficial vein thrombosis around the PICC line with no extension into the deep system.  Repeat Doppler was ordered.    A Doppler ultrasound initially showed superficial thrombophlebitis.  She was on prophylactic Arixtra.  A Doppler was repeated 2 days later on 6/20/18  which showed extension of thrombus into the axillary and brachial veins.  She was therefore started on Xarelto 15 mg one every 12 hours and the PICC line was removed as soon as chemotherapy completed on 6/20/18.  She will take 15 mg of Xarelto every 12 hours for 21 days and then transition to 20 mg once a day.  The patient will have CBC performed twice a week.  If the platelet count drops below 50,000, anticoagulation  "will need to be temporarily held  Patient was started on acyclovir/fluconazole/Bactrim  Bone marrow done June 14, 2018, morphology was negative for lymphoma    Fertility preservation, Zoladex is next due July 12, 2018.  CT scan and PET scan showing significant response after cycle 2 of chemotherapy  Next dose of Lupron August 13, 2018  Status post 5 cycles  OF epoch/r and is due cycle 6  On oct 8th.    Patient admitted October 20, 2018 and discharged October 22, 2018 when she was admitted with neutropenic fever.  She was treated with broad-spectrum antibiotics.  Her respiratory viral panel was positive for parainfluenza virus.    Review of Systems   Constitutional: Negative for activity change, appetite change and fever.        See history of present illness   HENT: Negative for mouth sores.    Eyes: Negative for visual disturbance.   Respiratory: Negative for cough and shortness of breath.    Cardiovascular: Negative.    Gastrointestinal: Negative.  Negative for abdominal pain, diarrhea, nausea and vomiting.   Endocrine: Negative.    Genitourinary: Negative for dysuria, frequency and menstrual problem.   Musculoskeletal: Negative for arthralgias, back pain, joint swelling and myalgias.   Skin: Positive for rash.        See history of present illness   Allergic/Immunologic: Negative.    Neurological: Negative.  Negative for dizziness and weakness.   Hematological: Negative.  Does not bruise/bleed easily.   Psychiatric/Behavioral: The patient is not nervous/anxious.    All other systems reviewed and are negative.  Patient complains of migraine headaches.    Medications:  The current medication list was reviewed in the EMR    ALLERGIES:  No Known Allergies    Objective      Vitals:    01/23/19 1220   BP: 101/65   Pulse: 58   Resp: 18   Temp: 98.4 °F (36.9 °C)   TempSrc: Oral   SpO2: 97%   Weight: 64.9 kg (143 lb)   Height: 157.5 cm (62.01\")   PainSc: 0-No pain     Current Status 1/23/2019   ECOG score 0       Physical " Exam     GENERAL:  Well-developed, well-nourished in no acute distress.   NECK:  Supple with good range of motion; no thyromegaly or masses, no JVD.  LYMPHATICS:  No cervical, supraclavicular, axillary or inguinal adenopathy.  SKIN: Macular, slightly reddened rash, no pruritis or warmth to touch currently, confined to chest region   CHEST:  Lungs clear to auscultation. Good airflow.  CARDIAC:  Regular rate and rhythm without murmurs, rubs or gallops. Normal S1,S2.  ABDOMEN:  Soft, nontender with no hepatosplenomegaly or masses.  EXTREMITIES:  No clubbing, cyanosis or edema.  NEUROLOGICAL:  Cranial Nerves II-XII grossly intact.  No focal neurological deficits.  PSYCHIATRIC:  Normal affect and mood.        RECENT LABS:  Results from last 7 days   Lab Units 01/23/19  1208   WBC 10*3/mm3 2.55*   NEUTROS ABS 10*3/mm3 1.62   HEMOGLOBIN g/dL 13.8   HEMATOCRIT % 40.5   PLATELETS 10*3/mm3 166        CT NECK, CHEST, ABDOMEN, AND PELVIS WITH IV CONTRAST.  IMPRESSION:  Slight interval decrease in the mediastinal  lymphadenopathy. The examination is otherwise stable and there is no new  Abnormality.    F-18 FDG PET FROM SKULL BASE TO MID THIGH WITH PET/CT FUSION  IMPRESSION:  Stable examination. The remaining anterior mediastinal  lymphadenopathy is either photopenic or has blood pool level activity.  There is no new hypermetabolic abnormality.      CT NECK, CHEST, ABDOMEN, AND PELVIS WITH IV CONTRAST  IMPRESSION:  1. Further interval decrease in the size of the mediastinal mass. There  is no lymphadenopathy within the neck, abdomen, or pelvis. Further  interval decrease in splenic size.  2. The 8 mm nodular density at the left upper lobe may represent  scarring related to radiation pneumonitis. Likely benign, but should be  reevaluated on the subsequent chest CT.  3. There is significant mucosal thickening with some frothy secretions  within the left maxillary sinus. Acute sinusitis cannot be excluded.  Please correlate  clinically.    PET FROM SKULL BASE TO MID THIGH   IMPRESSION:  1.  Anterior mediastinal mass which has slowly decreased in size over  multiple prior exams and is decreased in FDG avidity since prior PET on  07/26/2018 with current FDG uptake similar to that of blood pool.  2.  Irregular interlobular septal thickening and nodular pulmonary  opacification within the left apex which no longer demonstrates FDG  uptake above that of surrounding lung.   3.  No findings of FDG avid disease in the neck, abdomen or pelvis    Assessment/Plan   1.  Primary mediastinal large B-cell lymphoma: The patient presented with dyspnea, cough, and chest pain.  A CT angiogram of the chest 6/8/18 showed a very large tumor mass in the mediastinum encasing multiple vascular structures and narrowing the left mainstem bronchus.  There was direct tumor infiltration in the left upper lobe.  There was a small left pleural effusion.  She underwent a CT-guided biopsy of the mediastinal mass on 6/12/18 and pathology reviewed at [facility] showed diffuse large B-cell lymphoma consistent with a primary diffuse large B-cell lymphoma.  A bone marrow exam was performed on 6/14/18 which was negative for lymphoma.  She underwent a PET scan 6/13/18 which showed a large hypermetabolic mass in the chest involving the anterior mediastinum, left hilum, nearly completely filling the left hemothorax with SUV 19.8.  There was physiologic distribution elsewhere.     The patient was started on IV fluid hydration and allopurinol for prevention of hyperuricemia.  She initiated systemic chemotherapy with DA-EPOCH-R on 6/16/18 and completed the evening of 6/21/18.  She had a infusion reaction to rituximab but was able to complete with additional medications and otherwise tolerated chemotherapy well.  She will require additional premedications with additional rituximab.  Plan is to monitor her blood counts twice per week outpatient and proceed with cycle 2 of  chemotherapy day 21.     The patient had significant improvement in shortness of breath, cough, and chest pain by the time of discharge.    · Patient received Neulasta support  · Her white count dropped down to as low as 0.8 low-grade temperature and patient was placed on Levaquin ×5 days starting June 27, 2018, neutropenia AT  11 days posttreatment.  Platelets dropped to 82.  · Her next ZOLADEX injection is due July 12.  · She is due to start chemotherapy on July 9.  · Discussed with Dr. Fox at the [facility] and his suggestion was to do the CT scan and PET scan after 2 cycles and discuss the results with him.  If she has an excellent response early on she would not require any further treatments after completion of 6 cycles of EPOCH/R  · Patient being admitted for cycle 3 of chemotherapy on July 30, 2018.  · She's status post cycle 4 of chemotherapy and CT scan has been reviewed following 4 cycles with continued response.  · She is due for ZOLADEX  injection on October 8, 2018.   · Patient is due to go for cycle 5 of chemotherapy on Monday, September 17, 2018  ·  echocardiogram done August 22, 2018 shows ejection fraction of 59% to 60% with the eldest strain of 19.2%  · Cycle 6 chemotherapy with dose adjusted EPOCH/R on October 8, 2018.  · Reviewed CT scan and PET scan and the images with the patient and family.  Significant response with decrease in the mediastinal mass to 7.8 cm and activity level is 2.4 a week on PET scan.  · Reviewed repeat PET scan still with 7.8 cm tumor in the mediastinum.  The SUV is 2.4 and this could be scar tissue.  · The patient is due for her next dose of Zoladex today. As noted above, her ANC has been steadily declining over the last several months in the absence of infectious symptoms. This will be further detailed below. She will proceed with Zoladex, as scheduled.   · Discontinue Zoladex  · Reviewed CT as of January 15, 2019 with further improvement  · No plans on  radiation to the mediastinal lymph node given the PET scan showed that it is photopenic    2.  Pericardial effusion:   a 2-D echocardiogram 6/8/18 showed a left ventricular systolic function 58%.  There was a 2 cm pericardial effusion with no tamponade.  The pericardium appeared thickened.  She had no evidence of volume overload.  It is resolved     3.   FEN: She was monitored very carefully for any evidence of tumor lysis.  She was maintained on IV fluids and allopurinol throughout hospitalization.    her uric acid at discharge was 1.2 but I recommended that she stay on allopurinol twice daily until her next cycle of chemotherapy to prevent hyperuricemia.  She has mild hypokalemia and will be discharged on potassium 20 mEq once per day.  Have ordered an outpatient BMP weekly for monitoring of her renal function and electrolytes  · Potassium is chronically slightly low.  Requires 20 mg by mouth daily. Potassium today is 4.0.      4.  Fertility preservation:  Patient received Zoladex injection  for fertility preservation; this should be continued once monthly during her chemotherapy.    · She is due for her next dose today and will continue this for 2 additional doses.      5.  Right upper extremity DVT on Pradaxa. She continues on this with no issues.      6.  Migraine headaches, patient had it as a child but recently after cycle 4 patient developed migraine headaches and was seen by neurology when admitted with cycle 5.  MRI showed mild enhancement of the turbinate but was nonspecific.  This was no an issue today     7. Neutropenia: As noted above, patient's ANC today is 0.57.  Flow cytometry is negative.  Repeat CT shows improvement in the mediastinal mass.  No splenomegaly.  Her absolute neutrophil count has improved to 1.6      Plan   1. discontinue Zoladex    2.  Continue Pradaxa    3.  Obtain records from Dr. Fox's office    4.  Port flush in 6 weeks and follow-up with me in 6 weeks.  After 3 months we'll  get the port removed following which Pradaxa can be discontinued    5.  No plans on radiation to the mediastinal mass given that PET scan shows that it is photopenic      Millie Padilla MD   1/23/2019      CC: Dr. Arina Fox, at [facility]

## 2019-02-07 ENCOUNTER — DOCUMENTATION (OUTPATIENT)
Dept: ONCOLOGY | Facility: CLINIC | Age: 24
End: 2019-02-07

## 2019-02-07 NOTE — PROGRESS NOTES
ANNALEE had Dr. Padilla sign the [facility] Program form, giving clearance for the patient to participate in the survivorship program.     ANNALEE called the patient and told her that the form was complete and [facility].

## 2019-02-27 LAB
CYTO UR: NORMAL
DX PRELIMINARY: NORMAL
LAB AP CASE REPORT: NORMAL
LAB AP CLINICAL INFORMATION: NORMAL
Lab: NORMAL
PATH REPORT.ADDENDUM SPEC: NORMAL
PATH REPORT.FINAL DX SPEC: NORMAL
PATH REPORT.GROSS SPEC: NORMAL

## 2019-02-28 ENCOUNTER — OFFICE VISIT (OUTPATIENT)
Dept: ONCOLOGY | Facility: CLINIC | Age: 24
End: 2019-02-28

## 2019-02-28 ENCOUNTER — INFUSION (OUTPATIENT)
Dept: ONCOLOGY | Facility: HOSPITAL | Age: 24
End: 2019-02-28

## 2019-02-28 VITALS
BODY MASS INDEX: 27.18 KG/M2 | RESPIRATION RATE: 18 BRPM | TEMPERATURE: 98.5 F | HEIGHT: 62 IN | OXYGEN SATURATION: 96 % | HEART RATE: 60 BPM | WEIGHT: 147.7 LBS | SYSTOLIC BLOOD PRESSURE: 112 MMHG | DIASTOLIC BLOOD PRESSURE: 71 MMHG

## 2019-02-28 DIAGNOSIS — R59.1 LYMPHADENOPATHY: Primary | ICD-10-CM

## 2019-02-28 DIAGNOSIS — C85.29 MEDIASTINAL LARGE B-CELL LYMPHOMA OF SOLID ORGAN EXCLUDING SPLEEN (HCC): Primary | ICD-10-CM

## 2019-02-28 DIAGNOSIS — Z45.2 FITTING AND ADJUSTMENT OF VASCULAR CATHETER: ICD-10-CM

## 2019-02-28 DIAGNOSIS — C85.28 MEDIASTINAL LARGE B-CELL LYMPHOMA OF LYMPH NODES OF MULTIPLE REGIONS (HCC): Primary | ICD-10-CM

## 2019-02-28 DIAGNOSIS — C85.28 MEDIASTINAL LARGE B-CELL LYMPHOMA OF LYMPH NODES OF MULTIPLE REGIONS (HCC): ICD-10-CM

## 2019-02-28 LAB
ALBUMIN SERPL-MCNC: 4.5 G/DL (ref 3.5–5.2)
ALBUMIN/GLOB SERPL: 1.9 G/DL (ref 1.1–2.4)
ALP SERPL-CCNC: 76 U/L (ref 38–116)
ALT SERPL W P-5'-P-CCNC: 17 U/L (ref 0–33)
ANION GAP SERPL CALCULATED.3IONS-SCNC: 10.8 MMOL/L
AST SERPL-CCNC: 22 U/L (ref 0–32)
BASOPHILS # BLD AUTO: 0.02 10*3/MM3 (ref 0–0.2)
BASOPHILS NFR BLD AUTO: 0.6 % (ref 0–1.5)
BILIRUB SERPL-MCNC: 0.2 MG/DL (ref 0.1–1.2)
BUN BLD-MCNC: 10 MG/DL (ref 6–20)
BUN/CREAT SERPL: 15.6 (ref 7.3–30)
CALCIUM SPEC-SCNC: 9.3 MG/DL (ref 8.5–10.2)
CHLORIDE SERPL-SCNC: 102 MMOL/L (ref 98–107)
CO2 SERPL-SCNC: 27.2 MMOL/L (ref 22–29)
CREAT BLD-MCNC: 0.64 MG/DL (ref 0.6–1.1)
DEPRECATED RDW RBC AUTO: 37.8 FL (ref 37–54)
EOSINOPHIL # BLD AUTO: 0.04 10*3/MM3 (ref 0–0.4)
EOSINOPHIL NFR BLD AUTO: 1.1 % (ref 0.3–6.2)
ERYTHROCYTE [DISTWIDTH] IN BLOOD BY AUTOMATED COUNT: 11.1 % (ref 12.3–15.4)
GFR SERPL CREATININE-BSD FRML MDRD: 114 ML/MIN/1.73
GFR SERPL CREATININE-BSD FRML MDRD: 138 ML/MIN/1.73
GLOBULIN UR ELPH-MCNC: 2.4 GM/DL (ref 1.8–3.5)
GLUCOSE BLD-MCNC: 100 MG/DL (ref 74–124)
HCT VFR BLD AUTO: 40.2 % (ref 34–46.6)
HGB BLD-MCNC: 13.8 G/DL (ref 12–15.9)
IMM GRANULOCYTES # BLD AUTO: 0 10*3/MM3 (ref 0–0.05)
IMM GRANULOCYTES NFR BLD AUTO: 0 % (ref 0–0.5)
LYMPHOCYTES # BLD AUTO: 0.77 10*3/MM3 (ref 0.7–3.1)
LYMPHOCYTES NFR BLD AUTO: 21.9 % (ref 19.6–45.3)
MCH RBC QN AUTO: 31.4 PG (ref 26.6–33)
MCHC RBC AUTO-ENTMCNC: 34.3 G/DL (ref 31.5–35.7)
MCV RBC AUTO: 91.6 FL (ref 79–97)
MONOCYTES # BLD AUTO: 0.39 10*3/MM3 (ref 0.1–0.9)
MONOCYTES NFR BLD AUTO: 11.1 % (ref 5–12)
NEUTROPHILS # BLD AUTO: 2.3 10*3/MM3 (ref 1.4–7)
NEUTROPHILS NFR BLD AUTO: 65.3 % (ref 42.7–76)
NRBC BLD AUTO-RTO: 0 /100 WBC (ref 0–0)
PLATELET # BLD AUTO: 165 10*3/MM3 (ref 140–450)
PMV BLD AUTO: 9.8 FL (ref 6–12)
POTASSIUM BLD-SCNC: 4 MMOL/L (ref 3.5–4.7)
PROT SERPL-MCNC: 6.9 G/DL (ref 6.3–8)
RBC # BLD AUTO: 4.39 10*6/MM3 (ref 3.77–5.28)
SODIUM BLD-SCNC: 140 MMOL/L (ref 134–145)
WBC NRBC COR # BLD: 3.52 10*3/MM3 (ref 3.4–10.8)

## 2019-02-28 PROCEDURE — 85025 COMPLETE CBC W/AUTO DIFF WBC: CPT

## 2019-02-28 PROCEDURE — 96523 IRRIG DRUG DELIVERY DEVICE: CPT | Performed by: INTERNAL MEDICINE

## 2019-02-28 PROCEDURE — 99214 OFFICE O/P EST MOD 30 MIN: CPT | Performed by: INTERNAL MEDICINE

## 2019-02-28 PROCEDURE — 80053 COMPREHEN METABOLIC PANEL: CPT

## 2019-02-28 RX ORDER — SODIUM CHLORIDE 0.9 % (FLUSH) 0.9 %
10 SYRINGE (ML) INJECTION AS NEEDED
OUTPATIENT
Start: 2019-02-28

## 2019-02-28 RX ORDER — HEPARIN SODIUM (PORCINE) LOCK FLUSH IV SOLN 100 UNIT/ML 100 UNIT/ML
500 SOLUTION INTRAVENOUS AS NEEDED
OUTPATIENT
Start: 2019-02-28

## 2019-02-28 RX ORDER — SODIUM CHLORIDE 0.9 % (FLUSH) 0.9 %
10 SYRINGE (ML) INJECTION AS NEEDED
Status: DISCONTINUED | OUTPATIENT
Start: 2019-02-28 | End: 2019-02-28 | Stop reason: HOSPADM

## 2019-02-28 RX ORDER — ATENOLOL 100 MG/1
150 TABLET ORAL 2 TIMES DAILY
Qty: 60 CAPSULE | Refills: 3 | Status: SHIPPED | OUTPATIENT
Start: 2019-02-28 | End: 2019-12-17

## 2019-02-28 RX ADMIN — Medication 500 UNITS: at 15:55

## 2019-02-28 RX ADMIN — SODIUM CHLORIDE, PRESERVATIVE FREE 10 ML: 5 INJECTION INTRAVENOUS at 15:55

## 2019-02-28 NOTE — PROGRESS NOTES
Orders placed for CT neck chest abd/pelvis with contrast and PET scan to be done in 5 weeks per Dr Padilla  Message to appt desk to arrange

## 2019-02-28 NOTE — PROGRESS NOTES
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 She did initiate EPOCH-R  in the hospital on 06/16/2018.    History of Present Illness     The patient is a 24 y.o. female with the above-mentioned history, with history of mediastinal large B-cell lymphoma status post completion of  6  cycles of chemotherapy with dose adusted EPOCH-R in October 2018.  CT imaging following therapy did demonstrate a response.  A repeat PET scan revealed a residual mediastinal tumor measuring 7.8 cm.  Radiation therapy was a consideration, however given her young age, Dr. Padilla has decided to defer this for now.      She continues on monthly Zoladex for fertility preservation .    Agent had repeat CT scan on January 15, 2019 and I have reviewed the results in detail with the patient and her mother.  There is further decrease in the mediastinal lymph node.  Patient has seen Dr. Fox at the [facility] and given that the PET scan showed that the mediastinal lymph node was photopenic, there is no plans on radiation treatment.    I also had her BE  evaluated by Dr. Fox at the [facility] and he has agreed as well not to consider any radiation given the PET scan showed that it was photopenic.    Now that all chemotherapy is complete, we will stop the Zoladex as it is causing her significant symptoms of hot flashes.  She will require port flush is.  We can keep the port for 3 more months and subsequently discontinue at which time we can stop the Pradaxa.    Interval history: Patient is doing well.  She has gained weight.  She has got a good appetite.  She does not have any chest pain or shortness of breath.  She does not have any fever or night sweats.        Past Medical History, Past Surgical History, Social History, Family History have been reviewed and are without significant changes except as mentioned.    Oncologic history:.   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  She saw Dr. Arina Cohen on last Friday.  The reason the patient was referred to Dr. Cohen was because they thought she had cardiomegaly on chest x-ray.  However a chest x-ray was ordered which showedThe chest x-ray showed extensive abnormal increased density throughout the anterior mediastinum extending into the left hilar region and left perihilar region and is most likely due to confluent lymphadenopathy.     CT angiogram of the chest did not show pulmonary embolism but showed     there is a large conglomerate  mass encases multiple vascular structures of the mediastinum and left  hilar region that is most likely extensive confluent lymphadenopathy.  The primary differential diagnostic considerations would be lymphoma.  The tumor posteriorly displaces the pulmonary arteries and the left and  moderately narrows the main pulmonary artery. The tumor also posteriorly  displaces the trachea and markedly narrows the left mainstem bronchus.  The pulmonary veins in the left are also encased and narrowed. The mass  encases and markedly narrows the SVC. The large edy mass measures  approximately 19.8 x 13.7 x 14.0 cm in diameter. Enlarged lymph nodes  are also present in the left supraclavicular region where they appear to  produce occlusion of the left internal jugular vein. There is no  axillary lymphadenopathy. There are no filling defects within the  pulmonary arteries. There is no CT evidence of pulmonary embolism. There  is a small left pleural effusion. A small pericardial effusion measuring  8 mm in maximum thickness is also present. There is compressive  atelectasis of the left lung. Patchy airspace opacities are also present  in the superior aspect of the left upper lobe. These are most likely due  to direct tumor infiltration of the lung parenchyma, but could also  represent postobstructive pneumonia. The right lung is well expanded and  clear. Images through the  upper abdomen partially show what could be a  edy mass in the retroperitoneum posterior and inferior to the  pancreas. Further evaluation with a CT scan of the abdomen and pelvis is  recommended. Bone window images demonstrate patchy sclerosis in the C7  and T1 and T2 and T3 and T4 vertebral body suspicious for bone  involvement with lymphoma.     IMPRESSION:  Very large tumor mass in the mediastinum encasing multiple  vascular structures and also moderately narrowing the left mainstem  bronchus as described in more detail above. Direct tumor infiltration of  the left upper lobe is also suspected. Small left pleural effusion and a  small pericardial effusion. There may also be partially visualized  lymphadenopathy in the upper abdomen. Patchy areas of sclerosis are also  noted in the C7 and T1 and T2 and T3 and T4 vertebral bodies suspicious  for osseous metastatic disease. The findings are consistent with  Lymphoma.     Dr. Cohen felt that she had a small pericardial effusion.  Patient has been symptomatic with cough which is worsening which started back in February 2018 and worsened over the last 4 months.  Patient also has some shortness of breath with walking up the steps.  She has not lost weight.  She say she is reasonable appetite.  She does have fever kind of low-grade fever and night sweats.  She is more fatigued compared to before.  She was referred to us because of concern that this could be lymphoma.  She does not have any tissue diagnosis yet.  Patient does complain of back pain.    Echo done on admission June 12, 2018 by Dr. Fitzgerald showed that the patient's posterior and appears large primary leukemia the left ventricle and apex.  There is no tamponade.  The pericardium appears thickened pointing to involvement of the pericardium into the mediastinal process.  Dr. Fitzgerald felt that it would be difficult to do pericardial synthesis as the mediastinal mass covers the anterior aspect of the heart.  Ejection  fraction is 58%  CT-guided needle biopsy June 12, 2018 was negative  LDH is elevated.  Uric acid is high.  MRI thoracic spine, negative  CT abdomen pelvis negative  Scan July 13, 2018 shows large infiltrative edy mass in the anterior mediastinum and left hilar region that nearly completely fills the left hemithorax and shows intense hypermetabolism with an SUV of 19.8.  No foci of pathologic hypermetabolism are identified elsewhere in the chest, neck abdomen or pelvis.    Pathology was consistent with primary mediastinal large B-cell lymphoma.    Patient was seen by Dr. Melgar.  He discussed harvesting eggs versus Zoladex plus oral contraceptives versus Zoladex alone for fertility preservation.  Due to large size of the tumor and rapid initiation of treatment needed the patient was not able to have aches harvested done.  Because she is at increased risk of thrombosis with the use of oral contraceptives secondary to malignancy he recommended Zoladex alone.  Patient received first dose of Zoladex 3.6 mg subcutaneous on Belia 15, 2018.    Patient started on EPOCH/R on June 16, 2018    Patient had a reaction to Rituxan which improved with steroids, Benadryl and Pepcid.  She had to receive it slow.  She started having itching over her EARS , sore throat.  She was given IV steroids Benadryl and Pepcid and following that she was started at a slower rate and she completed it.    Right upper extremity Doppler ultrasound showed new superficial vein thrombosis around the PICC line with no extension into the deep system.  Repeat Doppler was ordered.    A Doppler ultrasound initially showed superficial thrombophlebitis.  She was on prophylactic Arixtra.  A Doppler was repeated 2 days later on 6/20/18  which showed extension of thrombus into the axillary and brachial veins.  She was therefore started on Xarelto 15 mg one every 12 hours and the PICC line was removed as soon as chemotherapy completed on 6/20/18.  She will take 15  mg of Xarelto every 12 hours for 21 days and then transition to 20 mg once a day.  The patient will have CBC performed twice a week.  If the platelet count drops below 50,000, anticoagulation will need to be temporarily held  Patient was started on acyclovir/fluconazole/Bactrim  Bone marrow done June 14, 2018, morphology was negative for lymphoma    Fertility preservation, Zoladex is next due July 12, 2018.  CT scan and PET scan showing significant response after cycle 2 of chemotherapy  Next dose of Lupron August 13, 2018  Status post 5 cycles  OF epoch/r and is due cycle 6  On oct 8th.    Patient admitted October 20, 2018 and discharged October 22, 2018 when she was admitted with neutropenic fever.  She was treated with broad-spectrum antibiotics.  Her respiratory viral panel was positive for parainfluenza virus.    PET scan with significant improvement but residual 7.8 cm lesion without any activity.  Reviewed CT scan from January 15, 2019    Patient seen by Dr. Fox at the [facility], agreed with observation at present no plans on radiation.    Review of Systems   Constitutional: Negative for appetite change, chills, diaphoresis, fatigue, fever and unexpected weight change.   HENT: Negative for hearing loss, sore throat and trouble swallowing.    Respiratory: Negative for cough, chest tightness, shortness of breath and wheezing.    Cardiovascular: Negative for chest pain, palpitations and leg swelling.   Gastrointestinal: Negative for abdominal distention, abdominal pain, constipation, diarrhea, nausea and vomiting.   Genitourinary: Negative for dysuria, frequency, hematuria and urgency.   Musculoskeletal: Negative for joint swelling.        No muscle weakness.   Skin: Negative for rash and wound.   Neurological: Negative for seizures, syncope, speech difficulty, weakness, numbness and headaches.   Hematological: Negative for adenopathy. Does not bruise/bleed easily.   Psychiatric/Behavioral: Negative  "for behavioral problems, confusion and suicidal ideas.   Patient complains of migraine headaches.    Medications:  The current medication list was reviewed in the EMR    ALLERGIES:  No Known Allergies    Objective      Vitals:    02/28/19 1606   BP: 112/71   Pulse: 60   Resp: 18   Temp: 98.5 °F (36.9 °C)   TempSrc: Oral   SpO2: 96%   Weight: 67 kg (147 lb 11.2 oz)   Height: 157.5 cm (62.01\")   PainSc: 0-No pain     Current Status 2/28/2019   ECOG score 0       Physical Exam       GENERAL:  Well-developed, well-nourished in no acute distress.   SKIN:  Warm, dry without rashes, purpura or petechiae.  EYES:  Pupils equal, round and reactive to light.  EOMs intact.  Conjunctivae normal.  EARS:  Hearing intact.  NOSE:  Septum midline.  No excoriations or nasal discharge.  MOUTH:  Tongue is well-papillated; no stomatitis or ulcers.  Lips normal.  THROAT:  Oropharynx without lesions or exudates.  NECK:  Supple with good range of motion; no thyromegaly or masses, no JVD.  LYMPHATICS:  No cervical, supraclavicular, axillary or inguinal adenopathy.  CHEST:  Lungs clear to auscultation. Good airflow.  CARDIAC:  Regular rate and rhythm without murmurs, rubs or gallops. Normal S1,S2.  ABDOMEN:  Soft, nontender with no hepatosplenomegaly or masses.  EXTREMITIES:  No clubbing, cyanosis or edema.  NEUROLOGICAL:  Cranial Nerves II-XII grossly intact.  No focal neurological deficits.  PSYCHIATRIC:  Normal affect and mood.          RECENT LABS:  Results from last 7 days   Lab Units 02/28/19  1602   WBC 10*3/mm3 3.52   NEUTROS ABS 10*3/mm3 2.30   HEMOGLOBIN g/dL 13.8   HEMATOCRIT % 40.2   PLATELETS 10*3/mm3 165        CT NECK, CHEST, ABDOMEN, AND PELVIS WITH IV CONTRAST.  IMPRESSION:  Slight interval decrease in the mediastinal  lymphadenopathy. The examination is otherwise stable and there is no new  Abnormality.    F-18 FDG PET FROM SKULL BASE TO MID THIGH WITH PET/CT FUSION  IMPRESSION:  Stable examination. The remaining anterior " mediastinal  lymphadenopathy is either photopenic or has blood pool level activity.  There is no new hypermetabolic abnormality.      CT NECK, CHEST, ABDOMEN, AND PELVIS WITH IV CONTRAST  IMPRESSION:  1. Further interval decrease in the size of the mediastinal mass. There  is no lymphadenopathy within the neck, abdomen, or pelvis. Further  interval decrease in splenic size.  2. The 8 mm nodular density at the left upper lobe may represent  scarring related to radiation pneumonitis. Likely benign, but should be  reevaluated on the subsequent chest CT.  3. There is significant mucosal thickening with some frothy secretions  within the left maxillary sinus. Acute sinusitis cannot be excluded.  Please correlate clinically.    PET FROM SKULL BASE TO MID THIGH   IMPRESSION:  1.  Anterior mediastinal mass which has slowly decreased in size over  multiple prior exams and is decreased in FDG avidity since prior PET on  07/26/2018 with current FDG uptake similar to that of blood pool.  2.  Irregular interlobular septal thickening and nodular pulmonary  opacification within the left apex which no longer demonstrates FDG  uptake above that of surrounding lung.   3.  No findings of FDG avid disease in the neck, abdomen or pelvis    Assessment/Plan   1.  Primary mediastinal large B-cell lymphoma: The patient presented with dyspnea, cough, and chest pain.  A CT angiogram of the chest 6/8/18 showed a very large tumor mass in the mediastinum encasing multiple vascular structures and narrowing the left mainstem bronchus.  There was direct tumor infiltration in the left upper lobe.  There was a small left pleural effusion.  She underwent a CT-guided biopsy of the mediastinal mass on 6/12/18 and pathology reviewed at [facility] oncology showed diffuse large B-cell lymphoma consistent with a primary diffuse large B-cell lymphoma.  A bone marrow exam was performed on 6/14/18 which was negative for lymphoma.  She underwent a PET scan  6/13/18 which showed a large hypermetabolic mass in the chest involving the anterior mediastinum, left hilum, nearly completely filling the left hemothorax with SUV 19.8.  There was physiologic distribution elsewhere.     The patient was started on IV fluid hydration and allopurinol for prevention of hyperuricemia.  She initiated systemic chemotherapy with DA-EPOCH-R on 6/16/18 and completed the evening of 6/21/18.  She had a infusion reaction to rituximab but was able to complete with additional medications and otherwise tolerated chemotherapy well.  She will require additional premedications with additional rituximab.  Plan is to monitor her blood counts twice per week outpatient and proceed with cycle 2 of chemotherapy day 21.     The patient had significant improvement in shortness of breath, cough, and chest pain by the time of discharge.    · Patient received Neulasta support  · Her white count dropped down to as low as 0.8 low-grade temperature and patient was placed on Levaquin ×5 days starting June 27, 2018, neutropenia AT  11 days posttreatment.  Platelets dropped to 82.  · Her next ZOLADEX injection is due July 12.  · She is due to start chemotherapy on July 9.  · Discussed with Dr. Fox at the [facility] and his suggestion was to do the CT scan and PET scan after 2 cycles and discuss the results with him.  If she has an excellent response early on she would not require any further treatments after completion of 6 cycles of EPOCH/R  · Patient being admitted for cycle 3 of chemotherapy on July 30, 2018.  · She's status post cycle 4 of chemotherapy and CT scan has been reviewed following 4 cycles with continued response.  · She is due for ZOLADEX  injection on October 8, 2018.   · Patient is due to go for cycle 5 of chemotherapy on Monday, September 17, 2018  ·  echocardiogram done August 22, 2018 shows ejection fraction of 59% to 60% with the eldest strain of 19.2%  · Cycle 6 chemotherapy with  dose adjusted EPOCH/R on October 8, 2018.  · Reviewed CT scan and PET scan and the images with the patient and family.  Significant response with decrease in the mediastinal mass to 7.8 cm and activity level is 2.4 a week on PET scan.  · Reviewed repeat PET scan still with 7.8 cm tumor in the mediastinum.  The SUV is 2.4 and this could be scar tissue.  · The patient is due for her next dose of Zoladex today. As noted above, her ANC has been steadily declining over the last several months in the absence of infectious symptoms. This will be further detailed below. She will proceed with Zoladex, as scheduled.   · Discontinue Zoladex  · Reviewed CT as of January 15, 2019 with further improvement  · No plans on radiation to the mediastinal lymph node given the PET scan showed that it is photopenic  · Patient doing very well and is being followed with observation    2.  Pericardial effusion:   a 2-D echocardiogram 6/8/18 showed a left ventricular systolic function 58%.  There was a 2 cm pericardial effusion with no tamponade.  The pericardium appeared thickened.  She had no evidence of volume overload.  It is resolved     3.   FEN: She was monitored very carefully for any evidence of tumor lysis.  She was maintained on IV fluids and allopurinol throughout hospitalization.    her uric acid at discharge was 1.2 but I recommended that she stay on allopurinol twice daily until her next cycle of chemotherapy to prevent hyperuricemia.  She has mild hypokalemia and will be discharged on potassium 20 mEq once per day.  Have ordered an outpatient BMP weekly for monitoring of her renal function and electrolytes  · Potassium is chronically slightly low.  Requires 20 mg by mouth daily. Potassium today is 4.0.      4.  Fertility preservation:  Patient received Zoladex injection  for fertility preservation; this should be continued once monthly during her chemotherapy.    · She is due for her next dose today and will continue this for  2 additional doses.   · Zoladex has been discontinued but patient has not started  menstrual periods     5.  Right upper extremity DVT on Pradaxa. She continues on this with no issues.       Plan   1.  Follow-up with me in mid April prior to which we will obtain a CT scan and PET scan    2.  Continue Pradaxa    3.  Port flush in 6 weeks and follow-up with me in 6 weeks.  After 3 months we'll get the port removed following which Pradaxa can be discontinued    5.  No plans on radiation to the mediastinal mass given that PET scan shows that it is photopenic      Millie Padilla MD   2/28/2019      CC: Dr. Arina Fox, at [facility]

## 2019-03-01 ENCOUNTER — APPOINTMENT (OUTPATIENT)
Dept: ONCOLOGY | Facility: CLINIC | Age: 24
End: 2019-03-01

## 2019-03-01 ENCOUNTER — APPOINTMENT (OUTPATIENT)
Dept: ONCOLOGY | Facility: HOSPITAL | Age: 24
End: 2019-03-01

## 2019-03-05 ENCOUNTER — CLINICAL SUPPORT (OUTPATIENT)
Dept: OTHER | Facility: HOSPITAL | Age: 24
End: 2019-03-05

## 2019-03-05 DIAGNOSIS — C85.28 MEDIASTINAL (THYMIC) LARGE B-CELL LYMPHOMA OF LYMPH NODES OF MULTIPLE SITES (HCC): Primary | ICD-10-CM

## 2019-03-05 PROCEDURE — G0463 HOSPITAL OUTPT CLINIC VISIT: HCPCS

## 2019-03-05 NOTE — PROGRESS NOTES
Patient seen by Dr. Webb for genetic counseling only. If she decides to proceed with genetic testing she will need the 83 gene panel and PRF1. Her port flush is April 5th in the [facility] office.

## 2019-04-05 ENCOUNTER — HOSPITAL ENCOUNTER (OUTPATIENT)
Dept: PET IMAGING | Facility: HOSPITAL | Age: 24
Discharge: HOME | End: 2019-04-05
Admitting: INTERNAL MEDICINE

## 2019-04-05 ENCOUNTER — INFUSION (OUTPATIENT)
Dept: ONCOLOGY | Facility: HOSPITAL | Age: 24
End: 2019-04-05

## 2019-04-05 DIAGNOSIS — Z45.2 ENCOUNTER FOR FITTING AND ADJUSTMENT OF VASCULAR CATHETER: Primary | ICD-10-CM

## 2019-04-05 DIAGNOSIS — C85.28 MEDIASTINAL LARGE B-CELL LYMPHOMA OF LYMPH NODES OF MULTIPLE REGIONS (HCC): ICD-10-CM

## 2019-04-05 LAB
ALBUMIN SERPL-MCNC: 4.5 G/DL (ref 3.5–5.2)
ALBUMIN/GLOB SERPL: 1.8 G/DL (ref 1.1–2.4)
ALP SERPL-CCNC: 73 U/L (ref 38–116)
ALT SERPL W P-5'-P-CCNC: 16 U/L (ref 0–33)
ANION GAP SERPL CALCULATED.3IONS-SCNC: 12.8 MMOL/L
AST SERPL-CCNC: 20 U/L (ref 0–32)
BASOPHILS # BLD AUTO: 0.01 10*3/MM3 (ref 0–0.2)
BASOPHILS NFR BLD AUTO: 0.5 % (ref 0–1.5)
BILIRUB SERPL-MCNC: 0.4 MG/DL (ref 0.2–1.2)
BUN BLD-MCNC: 12 MG/DL (ref 6–20)
BUN/CREAT SERPL: 17.6 (ref 7.3–30)
CALCIUM SPEC-SCNC: 9.5 MG/DL (ref 8.5–10.2)
CHLORIDE SERPL-SCNC: 103 MMOL/L (ref 98–107)
CO2 SERPL-SCNC: 24.2 MMOL/L (ref 22–29)
CREAT BLD-MCNC: 0.68 MG/DL (ref 0.6–1.1)
CREAT BLDA-MCNC: 0.7 MG/DL (ref 0.6–1.3)
DEPRECATED RDW RBC AUTO: 38.8 FL (ref 37–54)
EOSINOPHIL # BLD AUTO: 0.04 10*3/MM3 (ref 0–0.4)
EOSINOPHIL NFR BLD AUTO: 1.8 % (ref 0.3–6.2)
ERYTHROCYTE [DISTWIDTH] IN BLOOD BY AUTOMATED COUNT: 11.6 % (ref 12.3–15.4)
GFR SERPL CREATININE-BSD FRML MDRD: 106 ML/MIN/1.73
GFR SERPL CREATININE-BSD FRML MDRD: 129 ML/MIN/1.73
GLOBULIN UR ELPH-MCNC: 2.5 GM/DL (ref 1.8–3.5)
GLUCOSE BLD-MCNC: 88 MG/DL (ref 74–124)
HCT VFR BLD AUTO: 40.9 % (ref 34–46.6)
HGB BLD-MCNC: 13.9 G/DL (ref 12–15.9)
IMM GRANULOCYTES # BLD AUTO: 0 10*3/MM3 (ref 0–0.05)
IMM GRANULOCYTES NFR BLD AUTO: 0 % (ref 0–0.5)
LDH SERPL-CCNC: 154 U/L (ref 99–259)
LYMPHOCYTES # BLD AUTO: 0.71 10*3/MM3 (ref 0.7–3.1)
LYMPHOCYTES NFR BLD AUTO: 32.1 % (ref 19.6–45.3)
MCH RBC QN AUTO: 31.1 PG (ref 26.6–33)
MCHC RBC AUTO-ENTMCNC: 34 G/DL (ref 31.5–35.7)
MCV RBC AUTO: 91.5 FL (ref 79–97)
MONOCYTES # BLD AUTO: 0.28 10*3/MM3 (ref 0.1–0.9)
MONOCYTES NFR BLD AUTO: 12.7 % (ref 5–12)
NEUTROPHILS # BLD AUTO: 1.17 10*3/MM3 (ref 1.4–7)
NEUTROPHILS NFR BLD AUTO: 52.9 % (ref 42.7–76)
NRBC BLD AUTO-RTO: 0 /100 WBC (ref 0–0)
PLATELET # BLD AUTO: 176 10*3/MM3 (ref 140–450)
PMV BLD AUTO: 9.9 FL (ref 6–12)
POTASSIUM BLD-SCNC: 4.2 MMOL/L (ref 3.5–4.7)
PROT SERPL-MCNC: 7 G/DL (ref 6.3–8)
RBC # BLD AUTO: 4.47 10*6/MM3 (ref 3.77–5.28)
SODIUM BLD-SCNC: 140 MMOL/L (ref 134–145)
WBC NRBC COR # BLD: 2.21 10*3/MM3 (ref 3.4–10.8)

## 2019-04-05 PROCEDURE — 70491 CT SOFT TISSUE NECK W/DYE: CPT

## 2019-04-05 PROCEDURE — 80053 COMPREHEN METABOLIC PANEL: CPT | Performed by: INTERNAL MEDICINE

## 2019-04-05 PROCEDURE — 96523 IRRIG DRUG DELIVERY DEVICE: CPT | Performed by: INTERNAL MEDICINE

## 2019-04-05 PROCEDURE — 85025 COMPLETE CBC W/AUTO DIFF WBC: CPT | Performed by: INTERNAL MEDICINE

## 2019-04-05 PROCEDURE — 83615 LACTATE (LD) (LDH) ENZYME: CPT | Performed by: INTERNAL MEDICINE

## 2019-04-05 PROCEDURE — 25010000002 IOPAMIDOL 61 % SOLUTION: Performed by: INTERNAL MEDICINE

## 2019-04-05 PROCEDURE — 74177 CT ABD & PELVIS W/CONTRAST: CPT

## 2019-04-05 PROCEDURE — 71260 CT THORAX DX C+: CPT

## 2019-04-05 PROCEDURE — 0 DIATRIZOATE MEGLUMINE & SODIUM PER 1 ML: Performed by: INTERNAL MEDICINE

## 2019-04-05 PROCEDURE — 82565 ASSAY OF CREATININE: CPT

## 2019-04-05 RX ADMIN — IOPAMIDOL 85 ML: 612 INJECTION, SOLUTION INTRAVENOUS at 09:25

## 2019-04-05 RX ADMIN — DIATRIZOATE MEGLUMINE AND DIATRIZOATE SODIUM 30 ML: 660; 100 LIQUID ORAL; RECTAL at 08:35

## 2019-04-09 ENCOUNTER — INFUSION (OUTPATIENT)
Dept: ONCOLOGY | Facility: HOSPITAL | Age: 24
End: 2019-04-09

## 2019-04-09 ENCOUNTER — HOSPITAL ENCOUNTER (OUTPATIENT)
Dept: PET IMAGING | Facility: HOSPITAL | Age: 24
Discharge: HOME | End: 2019-04-09
Admitting: INTERNAL MEDICINE

## 2019-04-09 ENCOUNTER — HOSPITAL ENCOUNTER (OUTPATIENT)
Dept: PET IMAGING | Facility: HOSPITAL | Age: 24
Discharge: HOME | End: 2019-04-09

## 2019-04-09 DIAGNOSIS — Z45.2 FITTING AND ADJUSTMENT OF VASCULAR CATHETER: ICD-10-CM

## 2019-04-09 DIAGNOSIS — C85.28 MEDIASTINAL LARGE B-CELL LYMPHOMA OF LYMPH NODES OF MULTIPLE REGIONS (HCC): ICD-10-CM

## 2019-04-09 LAB — GLUCOSE BLDC GLUCOMTR-MCNC: 94 MG/DL (ref 70–130)

## 2019-04-09 PROCEDURE — 78815 PET IMAGE W/CT SKULL-THIGH: CPT

## 2019-04-09 PROCEDURE — 96523 IRRIG DRUG DELIVERY DEVICE: CPT | Performed by: INTERNAL MEDICINE

## 2019-04-09 PROCEDURE — A9552 F18 FDG: HCPCS | Performed by: INTERNAL MEDICINE

## 2019-04-09 PROCEDURE — 82962 GLUCOSE BLOOD TEST: CPT

## 2019-04-09 PROCEDURE — 0 FLUDEOXYGLUCOSE F18 SOLUTION: Performed by: INTERNAL MEDICINE

## 2019-04-09 RX ADMIN — FLUDEOXYGLUCOSE F18 1 DOSE: 300 INJECTION INTRAVENOUS at 09:08

## 2019-04-12 ENCOUNTER — OFFICE VISIT (OUTPATIENT)
Dept: ONCOLOGY | Facility: CLINIC | Age: 24
End: 2019-04-12

## 2019-04-12 ENCOUNTER — APPOINTMENT (OUTPATIENT)
Dept: LAB | Facility: HOSPITAL | Age: 24
End: 2019-04-12

## 2019-04-12 VITALS
DIASTOLIC BLOOD PRESSURE: 63 MMHG | HEIGHT: 62 IN | WEIGHT: 144.7 LBS | OXYGEN SATURATION: 95 % | TEMPERATURE: 98.3 F | RESPIRATION RATE: 16 BRPM | HEART RATE: 56 BPM | BODY MASS INDEX: 26.63 KG/M2 | SYSTOLIC BLOOD PRESSURE: 92 MMHG

## 2019-04-12 DIAGNOSIS — C85.28 MEDIASTINAL LARGE B-CELL LYMPHOMA OF LYMPH NODES OF MULTIPLE REGIONS (HCC): ICD-10-CM

## 2019-04-12 DIAGNOSIS — C85.90 LYMPHOMA, UNSPECIFIED BODY REGION, UNSPECIFIED LYMPHOMA TYPE (HCC): Primary | ICD-10-CM

## 2019-04-12 DIAGNOSIS — R59.1 LYMPHADENOPATHY: ICD-10-CM

## 2019-04-12 DIAGNOSIS — Z98.890 HISTORY OF REMOVAL OF PORT-A-CATH: ICD-10-CM

## 2019-04-12 PROCEDURE — 99215 OFFICE O/P EST HI 40 MIN: CPT | Performed by: INTERNAL MEDICINE

## 2019-04-12 PROCEDURE — G0463 HOSPITAL OUTPT CLINIC VISIT: HCPCS | Performed by: INTERNAL MEDICINE

## 2019-04-12 NOTE — PROGRESS NOTES
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 She did initiate EPOCH-R  in the hospital on 06/16/2018.  Treated 6 cycles of treatment.  Followed with observation    History of Present Illness     The patient is a 24 y.o. female with the above-mentioned history, with history of mediastinal large B-cell lymphoma status post completion of  6  cycles of chemotherapy with dose adusted EPOCH-R in October 2018.  CT imaging following therapy did demonstrate a response.  A repeat PET scan revealed a residual mediastinal tumor measuring 7.8 cm.  Patient has seen Dr. Fox at the [facility] and given that the PET scan showed that the mediastinal lymph node was photopenic, there is no plans on mediastinal lymph node biopsy or  radiation treatment.  Discussion was done between Dr. Fox and myself.    After completion of chemotherapy, Zoladex has been discontinued and now she is started back her menstrual periods.  She is currently asymptomatic except has occasional twitches in the left upper part of the chest.  She has not lost weight.  She has got good appetite.  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  She wants the port removed.    Patient had a CT scan and PET scan and I have reviewed it personally with the patient and family and discussed in length.  CT scan shows decrease in the ill-defined mediastinal mass since January 2019.  Repeat CT suggested in 3 months.  PET scan also has been reviewed and discussed with patient    Past Medical History, Past Surgical History, Social History, Family History have been reviewed and are without significant changes except as mentioned.    Oncologic history:.   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  She saw Dr. Arina Cohen on last Friday.  The reason the patient was referred to Dr. Cohen was because they thought she had cardiomegaly on chest x-ray.  However a chest x-ray  was ordered which showedThe chest x-ray showed extensive abnormal increased density throughout the anterior mediastinum extending into the left hilar region and left perihilar region and is most likely due to confluent lymphadenopathy.     CT angiogram of the chest did not show pulmonary embolism but showed     there is a large conglomerate  mass encases multiple vascular structures of the mediastinum and left  hilar region that is most likely extensive confluent lymphadenopathy.  The primary differential diagnostic considerations would be lymphoma.  The tumor posteriorly displaces the pulmonary arteries and the left and  moderately narrows the main pulmonary artery. The tumor also posteriorly  displaces the trachea and markedly narrows the left mainstem bronchus.  The pulmonary veins in the left are also encased and narrowed. The mass  encases and markedly narrows the SVC. The large edy mass measures  approximately 19.8 x 13.7 x 14.0 cm in diameter. Enlarged lymph nodes  are also present in the left supraclavicular region where they appear to  produce occlusion of the left internal jugular vein. There is no  axillary lymphadenopathy. There are no filling defects within the  pulmonary arteries. There is no CT evidence of pulmonary embolism. There  is a small left pleural effusion. A small pericardial effusion measuring  8 mm in maximum thickness is also present. There is compressive  atelectasis of the left lung. Patchy airspace opacities are also present  in the superior aspect of the left upper lobe. These are most likely due  to direct tumor infiltration of the lung parenchyma, but could also  represent postobstructive pneumonia. The right lung is well expanded and  clear. Images through the upper abdomen partially show what could be a  edy mass in the retroperitoneum posterior and inferior to the  pancreas. Further evaluation with a CT scan of the abdomen and pelvis is  recommended. Bone window images  demonstrate patchy sclerosis in the C7  and T1 and T2 and T3 and T4 vertebral body suspicious for bone  involvement with lymphoma.     IMPRESSION:  Very large tumor mass in the mediastinum encasing multiple  vascular structures and also moderately narrowing the left mainstem  bronchus as described in more detail above. Direct tumor infiltration of  the left upper lobe is also suspected. Small left pleural effusion and a  small pericardial effusion. There may also be partially visualized  lymphadenopathy in the upper abdomen. Patchy areas of sclerosis are also  noted in the C7 and T1 and T2 and T3 and T4 vertebral bodies suspicious  for osseous metastatic disease. The findings are consistent with  Lymphoma.     Dr. Cohen felt that she had a small pericardial effusion.  Patient has been symptomatic with cough which is worsening which started back in February 2018 and worsened over the last 4 months.  Patient also has some shortness of breath with walking up the steps.  She has not lost weight.  She say she is reasonable appetite.  She does have fever kind of low-grade fever and night sweats.  She is more fatigued compared to before.  She was referred to us because of concern that this could be lymphoma.  She does not have any tissue diagnosis yet.  Patient does complain of back pain.    Echo done on admission June 12, 2018 by Dr. Fitzgerald showed that the patient's posterior and appears large primary leukemia the left ventricle and apex.  There is no tamponade.  The pericardium appears thickened pointing to involvement of the pericardium into the mediastinal process.  Dr. Fitzgerald felt that it would be difficult to do pericardial synthesis as the mediastinal mass covers the anterior aspect of the heart.  Ejection fraction is 58%  CT-guided needle biopsy June 12, 2018 was negative  LDH is elevated.  Uric acid is high.  MRI thoracic spine, negative  CT abdomen pelvis negative  Scan July 13, 2018 shows large infiltrative edy  mass in the anterior mediastinum and left hilar region that nearly completely fills the left hemithorax and shows intense hypermetabolism with an SUV of 19.8.  No foci of pathologic hypermetabolism are identified elsewhere in the chest, neck abdomen or pelvis.    Pathology was consistent with primary mediastinal large B-cell lymphoma.    Patient was seen by Dr. Melgar.  He discussed harvesting eggs versus Zoladex plus oral contraceptives versus Zoladex alone for fertility preservation.  Due to large size of the tumor and rapid initiation of treatment needed the patient was not able to have aches harvested done.  Because she is at increased risk of thrombosis with the use of oral contraceptives secondary to malignancy he recommended Zoladex alone.  Patient received first dose of Zoladex 3.6 mg subcutaneous on Belia 15, 2018.    Patient started on EPOCH/R on June 16, 2018    Patient had a reaction to Rituxan which improved with steroids, Benadryl and Pepcid.  She had to receive it slow.  She started having itching over her EARS , sore throat.  She was given IV steroids Benadryl and Pepcid and following that she was started at a slower rate and she completed it.    Right upper extremity Doppler ultrasound showed new superficial vein thrombosis around the PICC line with no extension into the deep system.  Repeat Doppler was ordered.    A Doppler ultrasound initially showed superficial thrombophlebitis.  She was on prophylactic Arixtra.  A Doppler was repeated 2 days later on 6/20/18  which showed extension of thrombus into the axillary and brachial veins.  She was therefore started on Xarelto 15 mg one every 12 hours and the PICC line was removed as soon as chemotherapy completed on 6/20/18.  She will take 15 mg of Xarelto every 12 hours for 21 days and then transition to 20 mg once a day.  The patient will have CBC performed twice a week.  If the platelet count drops below 50,000, anticoagulation will need to be  temporarily held  Patient was started on acyclovir/fluconazole/Bactrim  Bone marrow done June 14, 2018, morphology was negative for lymphoma    Fertility preservation, Zoladex is next due July 12, 2018.  CT scan and PET scan showing significant response after cycle 2 of chemotherapy  Next dose of Lupron August 13, 2018  Status post 5 cycles  OF epoch/r and is due cycle 6  On oct 8th.    Patient admitted October 20, 2018 and discharged October 22, 2018 when she was admitted with neutropenic fever.  She was treated with broad-spectrum antibiotics.  Her respiratory viral panel was positive for parainfluenza virus.    PET scan with significant improvement but residual 7.8 cm lesion without any activity.  Reviewed CT scan from January 15, 2019    Patient seen by Dr. Fox at the [facility], agreed with observation at present no plans on radiation.    CT scan and PET scan from April 5 and April 9, 2019 has been reviewed and discussed with patient.  There is further decrease in the mediastinal lymphadenopathy.    Review of Systems   Constitutional: Negative for appetite change, chills, diaphoresis, fatigue, fever and unexpected weight change.        Hot flashes in the evenings 04/12/19   HENT: Negative for hearing loss, sore throat and trouble swallowing.    Respiratory: Negative for cough, chest tightness, shortness of breath and wheezing.    Cardiovascular: Negative for chest pain, palpitations and leg swelling.   Gastrointestinal: Negative for abdominal distention, abdominal pain, constipation, diarrhea, nausea and vomiting.   Genitourinary: Negative for dysuria, frequency, hematuria and urgency.   Musculoskeletal: Negative for joint swelling.        No muscle weakness.   Skin: Negative for rash and wound.   Neurological: Negative for seizures, syncope, speech difficulty, weakness, numbness and headaches.   Hematological: Negative for adenopathy. Does not bruise/bleed easily.   Psychiatric/Behavioral: Negative  "for behavioral problems, confusion and suicidal ideas.   Patient complains of migraine headaches.    Medications:  The current medication list was reviewed in the EMR    ALLERGIES:  No Known Allergies    Objective      Vitals:    04/12/19 0920   BP: 92/63   Pulse: 56   Resp: 16   Temp: 98.3 °F (36.8 °C)   TempSrc: Oral   SpO2: 95%   Weight: 65.6 kg (144 lb 11.2 oz)   Height: 157.5 cm (62.01\")   PainSc:   1   PainLoc: Breast     Current Status 4/12/2019   ECOG score 0       Physical Exam       GENERAL:  Well-developed, well-nourished in no acute distress.   NECK:  Supple with good range of motion; no thyromegaly or masses, no JVD.  LYMPHATICS:  No cervical, supraclavicular, axillary or inguinal adenopathy.  CHEST:  Lungs clear to auscultation. Good airflow.  CARDIAC:  Regular rate and rhythm without murmurs, rubs or gallops. Normal S1,S2.  ABDOMEN:  Soft, nontender with no hepatosplenomegaly or masses.  EXTREMITIES:  No clubbing, cyanosis or edema.  NEUROLOGICAL:  Cranial Nerves II-XII grossly intact.  No focal neurological deficits.  PSYCHIATRIC:  Normal affect and mood.          RECENT LABS:           CT OF THE CHEST, ABDOMEN AND PELVIS WITH CONTRAST 04/05/2019  IMPRESSION:  1. Interval decrease in size of the ill-defined mediastinal mass since  the previous study of 01/15/2019.  2. No pathologic lymphadenopathy or other evidence of neoplasm in the  abdomen and pelvis.  3. Recommend follow-up CT of the chest, abdomen and pelvis with contrast  in approximately 3 months to 4 months.    F-18 FDG PET FROM SKULL BASE TO MID THIGH WITH PET/CT FUSION  IMPRESSION:  1.  Ill-defined soft tissue attenuation insinuating within the superior  mediastinum and within the pericardium along the bilateral heart  borders. While the overall thickness is mildly improved since  01/15/2019, there is an area of increased FDG uptake along the superior  aspect of the right heart border which appears to have mildly increased  in FDG uptake " since prior PET/CT. Findings are concerning for residual  malignancy. Close attention follow-up is recommended.  2.  Irregular septal thickening with superimposed pulmonary nodularity  and ground glass opacification within the left apex and left upper lobe  has improved since prior PET/CT. The relatively focal area of mild FDG  uptake within the left upper lobe is stable to decreased in degree of  uptake. Continued attention on follow-up of this area is recommended as  differential considerations include reactive changes and/or residual  lymphomatous involvement.      CT NECK, CHEST, ABDOMEN, AND PELVIS WITH IV CONTRAST.  IMPRESSION:  Slight interval decrease in the mediastinal  lymphadenopathy. The examination is otherwise stable and there is no new  Abnormality.    F-18 FDG PET FROM SKULL BASE TO MID THIGH WITH PET/CT FUSION  IMPRESSION:  Stable examination. The remaining anterior mediastinal  lymphadenopathy is either photopenic or has blood pool level activity.  There is no new hypermetabolic abnormality.      CT NECK, CHEST, ABDOMEN, AND PELVIS WITH IV CONTRAST  IMPRESSION:  1. Further interval decrease in the size of the mediastinal mass. There  is no lymphadenopathy within the neck, abdomen, or pelvis. Further  interval decrease in splenic size.  2. The 8 mm nodular density at the left upper lobe may represent  scarring related to radiation pneumonitis. Likely benign, but should be  reevaluated on the subsequent chest CT.  3. There is significant mucosal thickening with some frothy secretions  within the left maxillary sinus. Acute sinusitis cannot be excluded.  Please correlate clinically.    PET FROM SKULL BASE TO MID THIGH   IMPRESSION:  1.  Anterior mediastinal mass which has slowly decreased in size over  multiple prior exams and is decreased in FDG avidity since prior PET on  07/26/2018 with current FDG uptake similar to that of blood pool.  2.  Irregular interlobular septal thickening and nodular  pulmonary  opacification within the left apex which no longer demonstrates FDG  uptake above that of surrounding lung.   3.  No findings of FDG avid disease in the neck, abdomen or pelvis    Assessment/Plan   1.  Primary mediastinal large B-cell lymphoma: The patient presented with dyspnea, cough, and chest pain.  A CT angiogram of the chest 6/8/18 showed a very large tumor mass in the mediastinum encasing multiple vascular structures and narrowing the left mainstem bronchus.  There was direct tumor infiltration in the left upper lobe.  There was a small left pleural effusion.  She underwent a CT-guided biopsy of the mediastinal mass on 6/12/18 and pathology reviewed at [facility] oncology showed diffuse large B-cell lymphoma consistent with a primary diffuse large B-cell lymphoma.  A bone marrow exam was performed on 6/14/18 which was negative for lymphoma.  She underwent a PET scan 6/13/18 which showed a large hypermetabolic mass in the chest involving the anterior mediastinum, left hilum, nearly completely filling the left hemothorax with SUV 19.8.  There was physiologic distribution elsewhere.     The patient was started on IV fluid hydration and allopurinol for prevention of hyperuricemia.  She initiated systemic chemotherapy with DA-EPOCH-R on 6/16/18 and completed the evening of 6/21/18.  She had a infusion reaction to rituximab but was able to complete with additional medications and otherwise tolerated chemotherapy well.  She will require additional premedications with additional rituximab.  Plan is to monitor her blood counts twice per week outpatient and proceed with cycle 2 of chemotherapy day 21.     The patient had significant improvement in shortness of breath, cough, and chest pain by the time of discharge.    · Patient received Neulasta support  · Her white count dropped down to as low as 0.8 low-grade temperature and patient was placed on Levaquin ×5 days starting June 27, 2018, neutropenia AT  11  days posttreatment.  Platelets dropped to 82.  · Her next ZOLADEX injection is due July 12.  · She is due to start chemotherapy on July 9.  · Discussed with Dr. Fox at the [facility] and his suggestion was to do the CT scan and PET scan after 2 cycles and discuss the results with him.  If she has an excellent response early on she would not require any further treatments after completion of 6 cycles of EPOCH/R  · Patient being admitted for cycle 3 of chemotherapy on July 30, 2018.  · She's status post cycle 4 of chemotherapy and CT scan has been reviewed following 4 cycles with continued response.  · She is due for ZOLADEX  injection on October 8, 2018.   · Patient is due to go for cycle 5 of chemotherapy on Monday, September 17, 2018  ·  echocardiogram done August 22, 2018 shows ejection fraction of 59% to 60% with the eldest strain of 19.2%  · Cycle 6 chemotherapy with dose adjusted EPOCH/R on October 8, 2018.  · Reviewed CT scan and PET scan and the images with the patient and family.  Significant response with decrease in the mediastinal mass to 7.8 cm and activity level is 2.4 a week on PET scan.  · Reviewed repeat PET scan still with 7.8 cm tumor in the mediastinum.  The SUV is 2.4 and this could be scar tissue.  · The patient is due for her next dose of Zoladex today. As noted above, her ANC has been steadily declining over the last several months in the absence of infectious symptoms. This will be further detailed below. She will proceed with Zoladex, as scheduled.   · Discontinue Zoladex  · Reviewed CT as of January 15, 2019 with further improvement  · No plans on radiation to the mediastinal lymph node given the PET scan showed that it is photopenic  · Reviewed CT from April 5, 2019 with further decrease in the mediastinal lymph node.  Reviewed PET scan.  PET scan shows overall improvement in the thickness within the pericardium.  The slight area of increased uptake in the superior aspect of  the right heart border which appears to be mildly increased they are concerned of residual malignancy.  However the irregular septal thickening in the left apex and left upper lobe have improved and the FDG uptake in the left upper lobe has decreased continued follow-up is recommended.   2.  Pericardial effusion:   a 2-D echocardiogram 6/8/18 showed a left ventricular systolic function 58%.  There was a 2 cm pericardial effusion with no tamponade.  The pericardium appeared thickened.  She had no evidence of volume overload.  It is resolved     3.   FEN: She was monitored very carefully for any evidence of tumor lysis.  She was maintained on IV fluids and allopurinol throughout hospitalization.    her uric acid at discharge was 1.2 but I recommended that she stay on allopurinol twice daily until her next cycle of chemotherapy to prevent hyperuricemia.  She has mild hypokalemia and will be discharged on potassium 20 mEq once per day.  Have ordered an outpatient BMP weekly for monitoring of her renal function and electrolytes  · Potassium is chronically slightly low.  Requires 20 mg by mouth daily. Potassium today is 4.0.      4.  Fertility preservation:  Patient received Zoladex injection  for fertility preservation; this should be continued once monthly during her chemotherapy.    · She is due for her next dose today and will continue this for 2 additional doses.   · Zoladex has been discontinued but patient has NOW started  menstrual periods     5.  Right upper extremity DVT on Pradaxa. She continues on this with no issues.     6.  Status post port placement, patient wants port removed and I think it is reasonable but will need to hold Pradaxa      Plan   1.  Discussed the results of PET scan with Dr. Fox at the [facility] as the area in the right heart border shows mild increase in uptake but the left upper lobe shows decrease in uptake.  Given mixed uptake results Dr. Fox wants to see the PET  scan.    2.  scheduled follow-up with Dr. Triplett for port removal.  Discussed with him today.  She will follow up with Dr. Triplett next Tuesday but will await Dr. Fox input prior to removal of the port as he is going to review her PET scan results.  If there is a concern then we may have to repeat the PET scan in 3 months.    3.  CT scan of the neck chest abdomen pelvis scheduled in 3 months with follow-up with me.      Millie Padilla MD   4/12/2019      CC: Dr. Arina Fox, at [facility]

## 2019-04-15 ENCOUNTER — DOCUMENTATION (OUTPATIENT)
Dept: PHYSICAL THERAPY | Facility: HOSPITAL | Age: 24
End: 2019-04-15

## 2019-04-15 DIAGNOSIS — M54.6 CHRONIC MIDLINE THORACIC BACK PAIN: ICD-10-CM

## 2019-04-15 DIAGNOSIS — C83.30 DIFFUSE LARGE B-CELL LYMPHOMA, UNSPECIFIED BODY REGION (HCC): ICD-10-CM

## 2019-04-15 DIAGNOSIS — R53.1 GENERALIZED WEAKNESS: ICD-10-CM

## 2019-04-15 DIAGNOSIS — D64.81 ANEMIA ASSOCIATED WITH CHEMOTHERAPY: ICD-10-CM

## 2019-04-15 DIAGNOSIS — I82.621 ARM DVT (DEEP VENOUS THROMBOEMBOLISM), ACUTE, RIGHT (HCC): ICD-10-CM

## 2019-04-15 DIAGNOSIS — J98.59 MEDIASTINAL MASS: ICD-10-CM

## 2019-04-15 DIAGNOSIS — G89.29 CHRONIC MIDLINE THORACIC BACK PAIN: ICD-10-CM

## 2019-04-15 DIAGNOSIS — C85.28 MEDIASTINAL LARGE B-CELL LYMPHOMA OF LYMPH NODES OF MULTIPLE REGIONS (HCC): ICD-10-CM

## 2019-04-15 DIAGNOSIS — C85.22 MEDIASTINAL (THYMIC) LARGE B-CELL LYMPHOMA OF INTRATHORACIC LYMPH NODES (HCC): Primary | ICD-10-CM

## 2019-04-15 DIAGNOSIS — T45.1X5A PERIPHERAL NEUROPATHY DUE TO CHEMOTHERAPY (HCC): ICD-10-CM

## 2019-04-15 DIAGNOSIS — T45.1X5A ANEMIA ASSOCIATED WITH CHEMOTHERAPY: ICD-10-CM

## 2019-04-15 DIAGNOSIS — M25.511 CHRONIC RIGHT SHOULDER PAIN: ICD-10-CM

## 2019-04-15 DIAGNOSIS — R59.1 LYMPHADENOPATHY: ICD-10-CM

## 2019-04-15 DIAGNOSIS — G62.0 PERIPHERAL NEUROPATHY DUE TO CHEMOTHERAPY (HCC): ICD-10-CM

## 2019-04-15 DIAGNOSIS — G89.29 CHRONIC RIGHT SHOULDER PAIN: ICD-10-CM

## 2019-04-15 NOTE — THERAPY DISCHARGE NOTE
Outpatient Physical Therapy Discharge Summary         Patient Name: Vikki Durham  : 1995  MRN: [id number]    Today's Date: 4/15/2019    Visit Dx:    ICD-10-CM ICD-9-CM   1. 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 Anemia associated with chemotherapy D64.81 285.3    T45.1X5A E933.1   3. Arm DVT (deep venous thromboembolism), acute, right (CMS/[facility]) I82.621 453.82   4. Diffuse large B-cell lymphoma, unspecified body region (CMS/[facility]) C83.30 202.80   5. 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 Lymphadenopathy R59.1 785.6   7. Mediastinal mass J98.59 786.6   8. Chronic midline thoracic back pain M54.6 724.1    G89.29 338.29   9. Chronic right shoulder pain M25.511 719.41    G89.29 338.29   10. Generalized weakness R53.1 780.79   11. Peripheral neuropathy due to chemotherapy (CMS/[facility]) G62.0 357.7    T45.1X5A E933.1       PT OP Goals     Row Name 04/15/19 0800          Long Term Goals    LTG 1  Initiate introductory level training at [facility] exercise program at [facility] to allow for transition to gym exercise program and self-care of condition.  -SC     LTG 1 Progress  Met  -SC       User Key  (r) = Recorded By, (t) = Taken By, (c) = Cosigned By    Initials Name Provider Type    Faina Means, PT Physical Therapist          OP PT Discharge Summary  Date of Discharge: 04/15/19  Reason for Discharge: All goals achieved  Outcomes Achieved: Able to achieve all goals within established timeline  Discharge Destination: Home with home program, Outpatient services  Discharge Instructions/Additional Comments: transfer to [facility].       Faina Huang, PT  4/15/2019

## 2019-04-22 ENCOUNTER — TELEPHONE (OUTPATIENT)
Dept: ONCOLOGY | Facility: HOSPITAL | Age: 24
End: 2019-04-22

## 2019-04-22 NOTE — TELEPHONE ENCOUNTER
Pt's mother wants to know if Dr. Fox has reviewed her PET scan. Dr. Padilla called him and he has not. Our office will get in contact with their medical records so he can review and a decision about her port can be made. Pt's mother V/U and had no further questions.

## 2019-04-22 NOTE — TELEPHONE ENCOUNTER
----- Message from Divine Cheatham sent at 4/22/2019  3:38 PM EDT -----  Contact: [phone]  Has questions regarding pt's port.

## 2019-05-01 ENCOUNTER — TELEPHONE (OUTPATIENT)
Dept: ONCOLOGY | Facility: CLINIC | Age: 24
End: 2019-05-01

## 2019-05-01 ENCOUNTER — TELEPHONE (OUTPATIENT)
Dept: ONCOLOGY | Facility: HOSPITAL | Age: 24
End: 2019-05-01

## 2019-05-01 DIAGNOSIS — C85.28 MEDIASTINAL LARGE B-CELL LYMPHOMA OF LYMPH NODES OF MULTIPLE REGIONS (HCC): Primary | ICD-10-CM

## 2019-05-01 NOTE — TELEPHONE ENCOUNTER
----- Message from Divine Cheatham sent at 5/1/2019  9:38 AM EDT -----  Contact: [phone]  Lizzeth mom   Asking about second opinion with Dr. Fox? Pt has not heard anything about getting in for appt.        Pt's mom calling waiting to see if we had heard from Dr. Fox regarding the PET scan. They have not heard anything about it yet. They are needing his opinion on the PET scan so they know if they can go through with the port removal. 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 D/W Dr. Padilla. Per Dr. GIRARD, Dr. Fox has reviewed the PET scan and pt is OK for port removal. Informed pt's mother and she v/u. Referral placed and scheduling notified this needs to be done ASAP.

## 2019-05-01 NOTE — TELEPHONE ENCOUNTER
----- Message from Cristiana Maxwell RN sent at 5/1/2019 11:18 AM EDT -----  FYI    I have placed a referral to vascular surgery for an urgent consultation for port removal. Pt starts school in a few weeks.

## 2019-05-08 ENCOUNTER — ANESTHESIA EVENT (OUTPATIENT)
Dept: PERIOP | Facility: HOSPITAL | Age: 24
End: 2019-05-08

## 2019-05-08 ENCOUNTER — HOSPITAL ENCOUNTER (OUTPATIENT)
Facility: HOSPITAL | Age: 24
Setting detail: HOSPITAL OUTPATIENT SURGERY
Discharge: HOME | End: 2019-05-08
Attending: SURGERY | Admitting: SURGERY

## 2019-05-08 ENCOUNTER — ANESTHESIA (OUTPATIENT)
Dept: PERIOP | Facility: HOSPITAL | Age: 24
End: 2019-05-08

## 2019-05-08 VITALS
BODY MASS INDEX: 26.29 KG/M2 | SYSTOLIC BLOOD PRESSURE: 108 MMHG | HEART RATE: 65 BPM | WEIGHT: 142.86 LBS | DIASTOLIC BLOOD PRESSURE: 72 MMHG | HEIGHT: 62 IN | TEMPERATURE: 97.6 F | OXYGEN SATURATION: 100 % | RESPIRATION RATE: 16 BRPM

## 2019-05-08 LAB
B-HCG UR QL: NEGATIVE
INTERNAL NEGATIVE CONTROL: NEGATIVE
INTERNAL POSITIVE CONTROL: POSITIVE
Lab: NORMAL

## 2019-05-08 PROCEDURE — 25010000002 PROPOFOL 10 MG/ML EMULSION: Performed by: NURSE ANESTHETIST, CERTIFIED REGISTERED

## 2019-05-08 PROCEDURE — 81025 URINE PREGNANCY TEST: CPT | Performed by: SURGERY

## 2019-05-08 PROCEDURE — 25010000002 FENTANYL CITRATE (PF) 100 MCG/2ML SOLUTION: Performed by: NURSE ANESTHETIST, CERTIFIED REGISTERED

## 2019-05-08 PROCEDURE — 25010000002 MIDAZOLAM PER 1 MG: Performed by: ANESTHESIOLOGY

## 2019-05-08 PROCEDURE — 25010000003 CEFAZOLIN IN DEXTROSE 2-4 GM/100ML-% SOLUTION: Performed by: SURGERY

## 2019-05-08 RX ORDER — SODIUM CHLORIDE 0.9 % (FLUSH) 0.9 %
3-10 SYRINGE (ML) INJECTION AS NEEDED
Status: DISCONTINUED | OUTPATIENT
Start: 2019-05-08 | End: 2019-05-08 | Stop reason: HOSPADM

## 2019-05-08 RX ORDER — MIDAZOLAM HYDROCHLORIDE 1 MG/ML
2 INJECTION INTRAMUSCULAR; INTRAVENOUS
Status: DISCONTINUED | OUTPATIENT
Start: 2019-05-08 | End: 2019-05-08 | Stop reason: HOSPADM

## 2019-05-08 RX ORDER — MAGNESIUM HYDROXIDE 1200 MG/15ML
LIQUID ORAL AS NEEDED
Status: DISCONTINUED | OUTPATIENT
Start: 2019-05-08 | End: 2019-05-08 | Stop reason: HOSPADM

## 2019-05-08 RX ORDER — HYDROCODONE BITARTRATE AND ACETAMINOPHEN 7.5; 325 MG/1; MG/1
1 TABLET ORAL ONCE AS NEEDED
Status: DISCONTINUED | OUTPATIENT
Start: 2019-05-08 | End: 2019-05-08 | Stop reason: HOSPADM

## 2019-05-08 RX ORDER — PROMETHAZINE HYDROCHLORIDE 25 MG/ML
12.5 INJECTION, SOLUTION INTRAMUSCULAR; INTRAVENOUS ONCE AS NEEDED
Status: DISCONTINUED | OUTPATIENT
Start: 2019-05-08 | End: 2019-05-08 | Stop reason: HOSPADM

## 2019-05-08 RX ORDER — FLUMAZENIL 0.1 MG/ML
0.2 INJECTION INTRAVENOUS AS NEEDED
Status: DISCONTINUED | OUTPATIENT
Start: 2019-05-08 | End: 2019-05-08 | Stop reason: HOSPADM

## 2019-05-08 RX ORDER — NALOXONE HCL 0.4 MG/ML
0.2 VIAL (ML) INJECTION AS NEEDED
Status: DISCONTINUED | OUTPATIENT
Start: 2019-05-08 | End: 2019-05-08 | Stop reason: HOSPADM

## 2019-05-08 RX ORDER — LIDOCAINE HYDROCHLORIDE 20 MG/ML
INJECTION, SOLUTION INFILTRATION; PERINEURAL AS NEEDED
Status: DISCONTINUED | OUTPATIENT
Start: 2019-05-08 | End: 2019-05-08 | Stop reason: SURG

## 2019-05-08 RX ORDER — PROMETHAZINE HYDROCHLORIDE 25 MG/1
25 SUPPOSITORY RECTAL ONCE AS NEEDED
Status: DISCONTINUED | OUTPATIENT
Start: 2019-05-08 | End: 2019-05-08 | Stop reason: HOSPADM

## 2019-05-08 RX ORDER — SODIUM CHLORIDE, SODIUM LACTATE, POTASSIUM CHLORIDE, CALCIUM CHLORIDE 600; 310; 30; 20 MG/100ML; MG/100ML; MG/100ML; MG/100ML
9 INJECTION, SOLUTION INTRAVENOUS CONTINUOUS PRN
Status: DISCONTINUED | OUTPATIENT
Start: 2019-05-08 | End: 2019-05-08 | Stop reason: HOSPADM

## 2019-05-08 RX ORDER — HYDRALAZINE HYDROCHLORIDE 20 MG/ML
5 INJECTION INTRAMUSCULAR; INTRAVENOUS
Status: DISCONTINUED | OUTPATIENT
Start: 2019-05-08 | End: 2019-05-08 | Stop reason: HOSPADM

## 2019-05-08 RX ORDER — CEFAZOLIN SODIUM 2 G/100ML
2 INJECTION, SOLUTION INTRAVENOUS ONCE
Status: COMPLETED | OUTPATIENT
Start: 2019-05-08 | End: 2019-05-08

## 2019-05-08 RX ORDER — PROPOFOL 10 MG/ML
VIAL (ML) INTRAVENOUS CONTINUOUS PRN
Status: DISCONTINUED | OUTPATIENT
Start: 2019-05-08 | End: 2019-05-08 | Stop reason: SURG

## 2019-05-08 RX ORDER — ACETAMINOPHEN 325 MG/1
650 TABLET ORAL ONCE AS NEEDED
Status: DISCONTINUED | OUTPATIENT
Start: 2019-05-08 | End: 2019-05-08 | Stop reason: HOSPADM

## 2019-05-08 RX ORDER — HYDROMORPHONE HYDROCHLORIDE 1 MG/ML
0.5 INJECTION, SOLUTION INTRAMUSCULAR; INTRAVENOUS; SUBCUTANEOUS
Status: DISCONTINUED | OUTPATIENT
Start: 2019-05-08 | End: 2019-05-08 | Stop reason: HOSPADM

## 2019-05-08 RX ORDER — MIDAZOLAM HYDROCHLORIDE 1 MG/ML
1 INJECTION INTRAMUSCULAR; INTRAVENOUS
Status: DISCONTINUED | OUTPATIENT
Start: 2019-05-08 | End: 2019-05-08 | Stop reason: HOSPADM

## 2019-05-08 RX ORDER — LABETALOL HYDROCHLORIDE 5 MG/ML
5 INJECTION, SOLUTION INTRAVENOUS
Status: DISCONTINUED | OUTPATIENT
Start: 2019-05-08 | End: 2019-05-08 | Stop reason: HOSPADM

## 2019-05-08 RX ORDER — ONDANSETRON 2 MG/ML
4 INJECTION INTRAMUSCULAR; INTRAVENOUS ONCE AS NEEDED
Status: DISCONTINUED | OUTPATIENT
Start: 2019-05-08 | End: 2019-05-08 | Stop reason: HOSPADM

## 2019-05-08 RX ORDER — PROMETHAZINE HYDROCHLORIDE 25 MG/1
25 TABLET ORAL ONCE AS NEEDED
Status: DISCONTINUED | OUTPATIENT
Start: 2019-05-08 | End: 2019-05-08 | Stop reason: HOSPADM

## 2019-05-08 RX ORDER — OXYCODONE AND ACETAMINOPHEN 7.5; 325 MG/1; MG/1
1 TABLET ORAL ONCE AS NEEDED
Status: DISCONTINUED | OUTPATIENT
Start: 2019-05-08 | End: 2019-05-08 | Stop reason: HOSPADM

## 2019-05-08 RX ORDER — FAMOTIDINE 10 MG/ML
20 INJECTION, SOLUTION INTRAVENOUS
Status: COMPLETED | OUTPATIENT
Start: 2019-05-08 | End: 2019-05-08

## 2019-05-08 RX ORDER — DIPHENHYDRAMINE HCL 25 MG
25 CAPSULE ORAL
Status: DISCONTINUED | OUTPATIENT
Start: 2019-05-08 | End: 2019-05-08 | Stop reason: HOSPADM

## 2019-05-08 RX ORDER — DIPHENHYDRAMINE HYDROCHLORIDE 50 MG/ML
12.5 INJECTION INTRAMUSCULAR; INTRAVENOUS
Status: DISCONTINUED | OUTPATIENT
Start: 2019-05-08 | End: 2019-05-08 | Stop reason: HOSPADM

## 2019-05-08 RX ORDER — PROPOFOL 10 MG/ML
VIAL (ML) INTRAVENOUS AS NEEDED
Status: DISCONTINUED | OUTPATIENT
Start: 2019-05-08 | End: 2019-05-08 | Stop reason: SURG

## 2019-05-08 RX ORDER — EPHEDRINE SULFATE 50 MG/ML
5 INJECTION, SOLUTION INTRAVENOUS ONCE AS NEEDED
Status: DISCONTINUED | OUTPATIENT
Start: 2019-05-08 | End: 2019-05-08 | Stop reason: HOSPADM

## 2019-05-08 RX ORDER — HYDROCODONE BITARTRATE AND ACETAMINOPHEN 5; 325 MG/1; MG/1
1 TABLET ORAL EVERY 6 HOURS PRN
Qty: 15 TABLET | Refills: 0 | Status: SHIPPED | OUTPATIENT
Start: 2019-05-08 | End: 2019-12-17

## 2019-05-08 RX ORDER — FENTANYL CITRATE 50 UG/ML
50 INJECTION, SOLUTION INTRAMUSCULAR; INTRAVENOUS
Status: DISCONTINUED | OUTPATIENT
Start: 2019-05-08 | End: 2019-05-08 | Stop reason: HOSPADM

## 2019-05-08 RX ORDER — FENTANYL CITRATE 50 UG/ML
INJECTION, SOLUTION INTRAMUSCULAR; INTRAVENOUS AS NEEDED
Status: DISCONTINUED | OUTPATIENT
Start: 2019-05-08 | End: 2019-05-08 | Stop reason: SURG

## 2019-05-08 RX ORDER — SODIUM CHLORIDE 0.9 % (FLUSH) 0.9 %
3 SYRINGE (ML) INJECTION EVERY 12 HOURS SCHEDULED
Status: DISCONTINUED | OUTPATIENT
Start: 2019-05-08 | End: 2019-05-08 | Stop reason: HOSPADM

## 2019-05-08 RX ADMIN — LIDOCAINE HYDROCHLORIDE 60 MG: 20 INJECTION, SOLUTION INFILTRATION; PERINEURAL at 13:22

## 2019-05-08 RX ADMIN — SODIUM CHLORIDE, POTASSIUM CHLORIDE, SODIUM LACTATE AND CALCIUM CHLORIDE: 600; 310; 30; 20 INJECTION, SOLUTION INTRAVENOUS at 13:09

## 2019-05-08 RX ADMIN — FENTANYL CITRATE 25 MCG: 50 INJECTION INTRAMUSCULAR; INTRAVENOUS at 13:33

## 2019-05-08 RX ADMIN — PROPOFOL 125 MCG/KG/MIN: 10 INJECTION, EMULSION INTRAVENOUS at 13:22

## 2019-05-08 RX ADMIN — CEFAZOLIN SODIUM 2 G: 2 INJECTION, SOLUTION INTRAVENOUS at 13:21

## 2019-05-08 RX ADMIN — FAMOTIDINE 20 MG: 10 INJECTION INTRAVENOUS at 11:37

## 2019-05-08 RX ADMIN — MIDAZOLAM 0.5 MG: 1 INJECTION INTRAMUSCULAR; INTRAVENOUS at 11:37

## 2019-05-08 RX ADMIN — PROPOFOL 100 MG: 10 INJECTION, EMULSION INTRAVENOUS at 13:22

## 2019-05-08 NOTE — OP NOTE
Vikki Marva   1995 05/08/19   Farhan Hurt MD      Preoperative Diagnosis: History of nonHodgkins lymphoma    Postoperative Diagnosis: same as above    Procedure Performed: removal of right IJ mediport    Surgeon: Farhan Hurt MD    Assistant: Varsha RAZA , provided critical assistance in exposure, retraction, and suctioning that overall decrease blood loss and operative time.    Anesthesia: Local with MAC    Estimated Blood Loss: minimal    Specimen: none    Complications: none    Dispo: PACU    Indication for procedure: 24 y.o. female with a history of non- Hodgkins lymphoma, now in remission, ready for mediport removal.       Description of procedure: The patient was taken to the operating room and placed in the supine position.  The right chest was prepped and draped in the usual sterile fashion.  Under local anesthesia the port insertion incision was opened with a 15 blade scalpel and Bovie electrocautery was used to dissect down to the port. I excised her scar in an elliptical fashion as it was fairly widened.  The port was freed up in all directions including removing the 2 stitches holding it to the chest wall.  The capsule was excised in its entirety.  A figure-of-eight stitch was placed around the catheter tunnel in the pocket prior to removing the catheter, and then the catheter and the port were removed in 1 piece.  Hemostasis was obtained.  The incision was irrigated with saline.  It was closed using 3-0 Vicryl in the deep layer and then 4-0 Vicryl in the skin.  Dermabond was used.  All the counts are correct.  No complications.          Farhan Hurt MD  05/08/19

## 2019-05-08 NOTE — ANESTHESIA PREPROCEDURE EVALUATION
Anesthesia Evaluation     Patient summary reviewed                Airway   Mallampati: II  No difficulty expected  Dental      Pulmonary    Cardiovascular     Rhythm: regular        Neuro/Psych  GI/Hepatic/Renal/Endo      Musculoskeletal     Abdominal    Substance History      OB/GYN          Other      history of cancer remission                    Anesthesia Plan    ASA 2     MAC     Anesthetic plan, all risks, benefits, and alternatives have been provided, discussed and informed consent has been obtained with: patient.  Use of blood products discussed with patient .

## 2019-05-08 NOTE — ANESTHESIA POSTPROCEDURE EVALUATION
Patient: Vikki Durham    Procedure Summary     Date:  05/08/19 Room / Location:  [facility] OR 06 / [facility] MAIN OR    Anesthesia Start:  1309 Anesthesia Stop:  1401    Procedure:  MEDIPORT REMOVEAL (N/A ) Diagnosis:      Surgeon:  Farhan Hurt MD Provider:  Ayan Alex MD    Anesthesia Type:  MAC ASA Status:  2          Anesthesia Type: MAC  Last vitals  BP   108/72 (05/08/19 1425)   Temp   36.4 °C (97.6 °F) (05/08/19 1415)   Pulse   65 (05/08/19 1425)   Resp   16 (05/08/19 1425)     SpO2   100 % (05/08/19 1425)     Post Anesthesia Care and Evaluation    Patient location during evaluation: PACU  Patient participation: complete - patient participated  Level of consciousness: awake and alert  Pain management: adequate  Airway patency: patent  Anesthetic complications: No anesthetic complications    Cardiovascular status: acceptable  Respiratory status: acceptable  Hydration status: acceptable    Comments: --------------------            05/08/19               1425     --------------------   BP:       108/72     Pulse:      65       Resp:       16       Temp:                SpO2:      100%     --------------------

## 2019-07-22 ENCOUNTER — HOSPITAL ENCOUNTER (OUTPATIENT)
Dept: PET IMAGING | Facility: HOSPITAL | Age: 24
Discharge: HOME | End: 2019-07-22
Admitting: INTERNAL MEDICINE

## 2019-07-22 DIAGNOSIS — C85.90 LYMPHOMA, UNSPECIFIED BODY REGION, UNSPECIFIED LYMPHOMA TYPE (HCC): ICD-10-CM

## 2019-07-22 LAB — CREAT BLDA-MCNC: 0.6 MG/DL (ref 0.6–1.3)

## 2019-07-22 PROCEDURE — 0 DIATRIZOATE MEGLUMINE & SODIUM PER 1 ML: Performed by: INTERNAL MEDICINE

## 2019-07-22 PROCEDURE — 74177 CT ABD & PELVIS W/CONTRAST: CPT

## 2019-07-22 PROCEDURE — 70491 CT SOFT TISSUE NECK W/DYE: CPT

## 2019-07-22 PROCEDURE — 82565 ASSAY OF CREATININE: CPT

## 2019-07-22 PROCEDURE — 71260 CT THORAX DX C+: CPT

## 2019-07-22 PROCEDURE — 25010000002 IOPAMIDOL 61 % SOLUTION: Performed by: INTERNAL MEDICINE

## 2019-07-22 RX ADMIN — DIATRIZOATE MEGLUMINE AND DIATRIZOATE SODIUM 30 ML: 660; 100 LIQUID ORAL; RECTAL at 08:18

## 2019-07-22 RX ADMIN — IOPAMIDOL 85 ML: 612 INJECTION, SOLUTION INTRAVENOUS at 09:20

## 2019-07-25 ENCOUNTER — LAB (OUTPATIENT)
Dept: LAB | Facility: HOSPITAL | Age: 24
End: 2019-07-25

## 2019-07-25 ENCOUNTER — OFFICE VISIT (OUTPATIENT)
Dept: ONCOLOGY | Facility: CLINIC | Age: 24
End: 2019-07-25

## 2019-07-25 VITALS
BODY MASS INDEX: 25.89 KG/M2 | DIASTOLIC BLOOD PRESSURE: 81 MMHG | HEIGHT: 62 IN | RESPIRATION RATE: 12 BRPM | WEIGHT: 140.7 LBS | HEART RATE: 73 BPM | SYSTOLIC BLOOD PRESSURE: 110 MMHG | OXYGEN SATURATION: 98 % | TEMPERATURE: 99 F

## 2019-07-25 DIAGNOSIS — R59.1 LYMPHADENOPATHY: ICD-10-CM

## 2019-07-25 DIAGNOSIS — C85.28 MEDIASTINAL LARGE B-CELL LYMPHOMA OF LYMPH NODES OF MULTIPLE REGIONS (HCC): Primary | ICD-10-CM

## 2019-07-25 DIAGNOSIS — C85.29 MEDIASTINAL LARGE B-CELL LYMPHOMA OF SOLID ORGAN EXCLUDING SPLEEN (HCC): Primary | ICD-10-CM

## 2019-07-25 DIAGNOSIS — R53.83 OTHER FATIGUE: ICD-10-CM

## 2019-07-25 LAB
ALBUMIN SERPL-MCNC: 4.9 G/DL (ref 3.5–5.2)
ALBUMIN/GLOB SERPL: 2.1 G/DL (ref 1.1–2.4)
ALP SERPL-CCNC: 53 U/L (ref 38–116)
ALT SERPL W P-5'-P-CCNC: 10 U/L (ref 0–33)
ANION GAP SERPL CALCULATED.3IONS-SCNC: 10.3 MMOL/L (ref 5–15)
AST SERPL-CCNC: 16 U/L (ref 0–32)
BASOPHILS # BLD AUTO: 0.02 10*3/MM3 (ref 0–0.2)
BASOPHILS NFR BLD AUTO: 0.3 % (ref 0–1.5)
BILIRUB SERPL-MCNC: 0.2 MG/DL (ref 0.2–1.2)
BUN BLD-MCNC: 11 MG/DL (ref 6–20)
BUN/CREAT SERPL: 16.4 (ref 7.3–30)
CALCIUM SPEC-SCNC: 9.7 MG/DL (ref 8.5–10.2)
CHLORIDE SERPL-SCNC: 103 MMOL/L (ref 98–107)
CO2 SERPL-SCNC: 27.7 MMOL/L (ref 22–29)
CREAT BLD-MCNC: 0.67 MG/DL (ref 0.6–1.1)
DEPRECATED RDW RBC AUTO: 39.4 FL (ref 37–54)
EOSINOPHIL # BLD AUTO: 0.03 10*3/MM3 (ref 0–0.4)
EOSINOPHIL NFR BLD AUTO: 0.5 % (ref 0.3–6.2)
ERYTHROCYTE [DISTWIDTH] IN BLOOD BY AUTOMATED COUNT: 11.1 % (ref 12.3–15.4)
GFR SERPL CREATININE-BSD FRML MDRD: 108 ML/MIN/1.73
GFR SERPL CREATININE-BSD FRML MDRD: 131 ML/MIN/1.73
GLOBULIN UR ELPH-MCNC: 2.3 GM/DL (ref 1.8–3.5)
GLUCOSE BLD-MCNC: 97 MG/DL (ref 74–124)
HCT VFR BLD AUTO: 41.8 % (ref 34–46.6)
HGB BLD-MCNC: 13.9 G/DL (ref 12–15.9)
IMM GRANULOCYTES # BLD AUTO: 0.01 10*3/MM3 (ref 0–0.05)
IMM GRANULOCYTES NFR BLD AUTO: 0.2 % (ref 0–0.5)
LYMPHOCYTES # BLD AUTO: 0.95 10*3/MM3 (ref 0.7–3.1)
LYMPHOCYTES NFR BLD AUTO: 15.2 % (ref 19.6–45.3)
MCH RBC QN AUTO: 31.9 PG (ref 26.6–33)
MCHC RBC AUTO-ENTMCNC: 33.3 G/DL (ref 31.5–35.7)
MCV RBC AUTO: 95.9 FL (ref 79–97)
MONOCYTES # BLD AUTO: 0.48 10*3/MM3 (ref 0.1–0.9)
MONOCYTES NFR BLD AUTO: 7.7 % (ref 5–12)
NEUTROPHILS # BLD AUTO: 4.75 10*3/MM3 (ref 1.7–7)
NEUTROPHILS NFR BLD AUTO: 76.1 % (ref 42.7–76)
NRBC BLD AUTO-RTO: 0 /100 WBC (ref 0–0.2)
PLATELET # BLD AUTO: 194 10*3/MM3 (ref 140–450)
PMV BLD AUTO: 9.3 FL (ref 6–12)
POTASSIUM BLD-SCNC: 4.1 MMOL/L (ref 3.5–4.7)
PROT SERPL-MCNC: 7.2 G/DL (ref 6.3–8)
RBC # BLD AUTO: 4.36 10*6/MM3 (ref 3.77–5.28)
SODIUM BLD-SCNC: 141 MMOL/L (ref 134–145)
TSH SERPL DL<=0.05 MIU/L-ACNC: 2.31 MIU/ML (ref 0.27–4.2)
WBC NRBC COR # BLD: 6.24 10*3/MM3 (ref 3.4–10.8)

## 2019-07-25 PROCEDURE — 83540 ASSAY OF IRON: CPT | Performed by: INTERNAL MEDICINE

## 2019-07-25 PROCEDURE — 84466 ASSAY OF TRANSFERRIN: CPT | Performed by: INTERNAL MEDICINE

## 2019-07-25 PROCEDURE — 99214 OFFICE O/P EST MOD 30 MIN: CPT | Performed by: INTERNAL MEDICINE

## 2019-07-25 PROCEDURE — 36415 COLL VENOUS BLD VENIPUNCTURE: CPT | Performed by: INTERNAL MEDICINE

## 2019-07-25 PROCEDURE — 85025 COMPLETE CBC W/AUTO DIFF WBC: CPT

## 2019-07-25 PROCEDURE — 80053 COMPREHEN METABOLIC PANEL: CPT

## 2019-07-25 PROCEDURE — 84443 ASSAY THYROID STIM HORMONE: CPT | Performed by: INTERNAL MEDICINE

## 2019-07-25 PROCEDURE — 82728 ASSAY OF FERRITIN: CPT | Performed by: INTERNAL MEDICINE

## 2019-07-25 NOTE — PROGRESS NOTES
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 She did initiate EPOCH-R  in the hospital on 06/16/2018.  Treated 6 cycles of treatment.  Followed with observation    History of Present Illness     The patient is a 24 y.o. female with the above-mentioned history, with history of mediastinal large B-cell lymphoma status post completion of 6 cycles of chemotherapy with dose adusted EPOCH-R in October 2018. Overall, patient is doing well. Recent CT neck, chest abdomen pelvis scan from 07/22/2019 showed further interval decrease in the mediastinal lymphadenopathy. There is no new abnormality within the chest. There is no lymphadenopathy or evidence for metastatic disease within the neck, abdomen, or pelvis. Patient underwent port removal on 05/08/2019.     Today, patient notes that she feels dizziness and fatigue for 1 week when she has a menstrual period. Her menstrual period lasts about 1 week and first couple days she has heavy menstrual flow. Patient notes her symptoms during menstrual period are similar to her chemotherapy side effects/symptoms.     CBC done today is normal with hemoglobin of 13.9.     Past Medical History, Past Surgical History, Social History, Family History have been reviewed and are without significant changes except as mentioned.    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  She saw Dr. Arina Cohen on last Friday.  The reason the patient was referred to Dr. Cohen was because they thought she had cardiomegaly on chest x-ray.  However a chest x-ray was ordered which showedThe chest x-ray showed extensive abnormal increased density throughout the anterior mediastinum extending into the left hilar region and left perihilar region and is most likely due to confluent lymphadenopathy.     CT angiogram of the chest did not show pulmonary embolism but showed     there is a large  conglomerate  mass encases multiple vascular structures of the mediastinum and left  hilar region that is most likely extensive confluent lymphadenopathy.  The primary differential diagnostic considerations would be lymphoma.  The tumor posteriorly displaces the pulmonary arteries and the left and  moderately narrows the main pulmonary artery. The tumor also posteriorly  displaces the trachea and markedly narrows the left mainstem bronchus.  The pulmonary veins in the left are also encased and narrowed. The mass  encases and markedly narrows the SVC. The large edy mass measures  approximately 19.8 x 13.7 x 14.0 cm in diameter. Enlarged lymph nodes  are also present in the left supraclavicular region where they appear to  produce occlusion of the left internal jugular vein. There is no  axillary lymphadenopathy. There are no filling defects within the  pulmonary arteries. There is no CT evidence of pulmonary embolism. There  is a small left pleural effusion. A small pericardial effusion measuring  8 mm in maximum thickness is also present. There is compressive  atelectasis of the left lung. Patchy airspace opacities are also present  in the superior aspect of the left upper lobe. These are most likely due  to direct tumor infiltration of the lung parenchyma, but could also  represent postobstructive pneumonia. The right lung is well expanded and  clear. Images through the upper abdomen partially show what could be a  edy mass in the retroperitoneum posterior and inferior to the  pancreas. Further evaluation with a CT scan of the abdomen and pelvis is  recommended. Bone window images demonstrate patchy sclerosis in the C7  and T1 and T2 and T3 and T4 vertebral body suspicious for bone  involvement with lymphoma.     IMPRESSION:  Very large tumor mass in the mediastinum encasing multiple  vascular structures and also moderately narrowing the left mainstem  bronchus as described in more detail above. Direct tumor  infiltration of  the left upper lobe is also suspected. Small left pleural effusion and a  small pericardial effusion. There may also be partially visualized  lymphadenopathy in the upper abdomen. Patchy areas of sclerosis are also  noted in the C7 and T1 and T2 and T3 and T4 vertebral bodies suspicious  for osseous metastatic disease. The findings are consistent with  Lymphoma.     Dr. Cohen felt that she had a small pericardial effusion.  Patient has been symptomatic with cough which is worsening which started back in February 2018 and worsened over the last 4 months.  Patient also has some shortness of breath with walking up the steps.  She has not lost weight.  She say she is reasonable appetite.  She does have fever kind of low-grade fever and night sweats.  She is more fatigued compared to before.  She was referred to us because of concern that this could be lymphoma.  She does not have any tissue diagnosis yet.  Patient does complain of back pain.    Echo done on admission June 12, 2018 by Dr. Fitzgerald showed that the patient's posterior and appears large primary leukemia the left ventricle and apex.  There is no tamponade.  The pericardium appears thickened pointing to involvement of the pericardium into the mediastinal process.  Dr. Fitzgerald felt that it would be difficult to do pericardial synthesis as the mediastinal mass covers the anterior aspect of the heart.  Ejection fraction is 58%  CT-guided needle biopsy June 12, 2018 was negative  LDH is elevated.  Uric acid is high.  MRI thoracic spine, negative  CT abdomen pelvis negative  Scan July 13, 2018 shows large infiltrative edy mass in the anterior mediastinum and left hilar region that nearly completely fills the left hemithorax and shows intense hypermetabolism with an SUV of 19.8.  No foci of pathologic hypermetabolism are identified elsewhere in the chest, neck abdomen or pelvis.    Pathology was consistent with primary mediastinal large B-cell  lymphoma.    Patient was seen by Dr. Melgar.  He discussed harvesting eggs versus Zoladex plus oral contraceptives versus Zoladex alone for fertility preservation.  Due to large size of the tumor and rapid initiation of treatment needed the patient was not able to have aches harvested done.  Because she is at increased risk of thrombosis with the use of oral contraceptives secondary to malignancy he recommended Zoladex alone.  Patient received first dose of Zoladex 3.6 mg subcutaneous on Belia 15, 2018.    Patient started on EPOCH/R on June 16, 2018    Patient had a reaction to Rituxan which improved with steroids, Benadryl and Pepcid.  She had to receive it slow.  She started having itching over her EARS , sore throat.  She was given IV steroids Benadryl and Pepcid and following that she was started at a slower rate and she completed it.    Right upper extremity Doppler ultrasound showed new superficial vein thrombosis around the PICC line with no extension into the deep system.  Repeat Doppler was ordered.    A Doppler ultrasound initially showed superficial thrombophlebitis.  She was on prophylactic Arixtra.  A Doppler was repeated 2 days later on 6/20/18  which showed extension of thrombus into the axillary and brachial veins.  She was therefore started on Xarelto 15 mg one every 12 hours and the PICC line was removed as soon as chemotherapy completed on 6/20/18.  She will take 15 mg of Xarelto every 12 hours for 21 days and then transition to 20 mg once a day.  The patient will have CBC performed twice a week.  If the platelet count drops below 50,000, anticoagulation will need to be temporarily held  Patient was started on acyclovir/fluconazole/Bactrim  Bone marrow done June 14, 2018, morphology was negative for lymphoma    Fertility preservation, Zoladex is next due July 12, 2018.  CT scan and PET scan showing significant response after cycle 2 of chemotherapy  Next dose of Lupron August 13, 2018  Status post  5 cycles  OF epoch/r and is due cycle 6 on oct 8th.    Patient admitted October 20, 2018 and discharged October 22, 2018 when she was admitted with neutropenic fever.  She was treated with broad-spectrum antibiotics.  Her respiratory viral panel was positive for parainfluenza virus.    PET scan with significant improvement but residual 7.8 cm lesion without any activity.  Reviewed CT scan from January 15, 2019    Patient seen by Dr. Fox at the [facility], agreed with observation at present no plans on radiation.    CT scan and PET scan from April 5 and April 9, 2019 has been reviewed and discussed with patient.  There is further decrease in the mediastinal lymphadenopathy.    CT chest abdomen pelvis July 22, 2019 with further decrease in the mediastinal lymphadenopathy significantly    Review of Systems   Constitutional: Negative for appetite change, chills, diaphoresis, fatigue, fever and unexpected weight change.        Hot flashes in the evenings 04/12/19   HENT: Negative for hearing loss, sore throat and trouble swallowing.    Respiratory: Negative for cough, chest tightness, shortness of breath and wheezing.    Cardiovascular: Negative for chest pain, palpitations and leg swelling.   Gastrointestinal: Negative for abdominal distention, abdominal pain, constipation, diarrhea, nausea and vomiting.   Genitourinary: Negative for dysuria, frequency, hematuria and urgency.   Musculoskeletal: Negative for joint swelling.        No muscle weakness.   Skin: Negative for rash and wound.   Neurological: Negative for seizures, syncope, speech difficulty, weakness, numbness and headaches.   Hematological: Negative for adenopathy. Does not bruise/bleed easily.   Psychiatric/Behavioral: Negative for behavioral problems, confusion and suicidal ideas.       Medications:  The current medication list was reviewed in the EMR    ALLERGIES:  No Known Allergies    Objective      Vitals:    07/25/19 1632   BP: 110/81  "  Pulse: 73   Resp: 12   Temp: 99 °F (37.2 °C)   TempSrc: Oral   SpO2: 98%   Weight: 63.8 kg (140 lb 11.2 oz)   Height: 158 cm (62.21\")  Comment: new height   PainSc: 0-No pain     Current Status 7/25/2019   ECOG score 0       Physical Exam   GENERAL:  Well-developed, well-nourished in no acute distress.   SKIN:  Warm, dry without rashes, purpura or petechiae.  NECK:  Supple with good range of motion; no thyromegaly or masses, no JVD.  LYMPHATICS:  No cervical, supraclavicular, axillary or inguinal adenopathy.  CHEST:  Lungs clear to auscultation. Good airflow.  CARDIAC:  Regular rate and rhythm without murmurs, rubs or gallops. Normal S1,S2.  ABDOMEN:  Soft, nontender with no hepatosplenomegaly or masses.  EXTREMITIES:  No clubbing, cyanosis or edema.  NEUROLOGICAL:  Cranial Nerves II-XII grossly intact.  No focal neurological deficits.  PSYCHIATRIC:  Normal affect and mood.          RECENT LABS:  Results from last 7 days   Lab Units 07/25/19  1619   WBC 10*3/mm3 6.24   NEUTROS ABS 10*3/mm3 4.75   HEMOGLOBIN g/dL 13.9   HEMATOCRIT % 41.8   PLATELETS 10*3/mm3 194         CT OF THE NECK, CHEST, ABDOMEN AND PELVIS WITH CONTRAST 07/22/2019  IMPRESSION:  Further interval decrease in the mediastinal  lymphadenopathy. There is no new abnormality within the chest. There is  no lymphadenopathy or evidence for metastatic disease within the neck,  abdomen, or pelvis.    PET SCAN 04/09/2019:  IMPRESSION:  1.  Ill-defined soft tissue attenuation insinuating within the superior  mediastinum and within the pericardium along the bilateral heart  borders. While the overall thickness is mildly improved since  01/15/2019, there is an area of increased FDG uptake along the superior  aspect of the right heart border which appears to have mildly increased  in FDG uptake since prior PET/CT. Findings are concerning for residual  malignancy. Close attention follow-up is recommended.  2.  Irregular septal thickening with superimposed " pulmonary nodularity  and ground glass opacification within the left apex and left upper lobe  has improved since prior PET/CT. The relatively focal area of mild FDG  uptake within the left upper lobe is stable to decreased in degree of  uptake. Continued attention on follow-up of this area is recommended as  differential considerations include reactive changes and/or residual  lymphomatous involvement.    Assessment/Plan   1.  Primary mediastinal large B-cell lymphoma: The patient presented with dyspnea, cough, and chest pain.  A CT angiogram of the chest 6/8/18 showed a very large tumor mass in the mediastinum encasing multiple vascular structures and narrowing the left mainstem bronchus.  There was direct tumor infiltration in the left upper lobe.  There was a small left pleural effusion.  She underwent a CT-guided biopsy of the mediastinal mass on 6/12/18 and pathology reviewed at [facility] oncology showed diffuse large B-cell lymphoma consistent with a primary diffuse large B-cell lymphoma.  A bone marrow exam was performed on 6/14/18 which was negative for lymphoma.  She underwent a PET scan 6/13/18 which showed a large hypermetabolic mass in the chest involving the anterior mediastinum, left hilum, nearly completely filling the left hemothorax with SUV 19.8.  There was physiologic distribution elsewhere.     The patient was started on IV fluid hydration and allopurinol for prevention of hyperuricemia.  She initiated systemic chemotherapy with DA-EPOCH-R on 6/16/18 and completed the evening of 6/21/18.  She had a infusion reaction to rituximab but was able to complete with additional medications and otherwise tolerated chemotherapy well.  She will require additional premedications with additional rituximab.  Plan is to monitor her blood counts twice per week outpatient and proceed with cycle 2 of chemotherapy day 21.     The patient had significant improvement in shortness of breath, cough, and chest pain by  the time of discharge.    · Patient received Neulasta support  · Her white count dropped down to as low as 0.8 low-grade temperature and patient was placed on Levaquin ×5 days starting June 27, 2018, neutropenia AT  11 days posttreatment.  Platelets dropped to 82.  · Her next ZOLADEX injection is due July 12.  · She is due to start chemotherapy on July 9.  · Discussed with Dr. Fox at the [facility] and his suggestion was to do the CT scan and PET scan after 2 cycles and discuss the results with him.  If she has an excellent response early on she would not require any further treatments after completion of 6 cycles of EPOCH/R  · Patient being admitted for cycle 3 of chemotherapy on July 30, 2018.  · She's status post cycle 4 of chemotherapy and CT scan has been reviewed following 4 cycles with continued response.  · She is due for ZOLADEX  injection on October 8, 2018.   · Patient is due to go for cycle 5 of chemotherapy on Monday, September 17, 2018  ·  echocardiogram done August 22, 2018 shows ejection fraction of 59% to 60% with the eldest strain of 19.2%  · Cycle 6 chemotherapy with dose adjusted EPOCH/R on October 8, 2018.  · Reviewed CT scan and PET scan and the images with the patient and family.  Significant response with decrease in the mediastinal mass to 7.8 cm and activity level is 2.4 a week on PET scan.  · Reviewed repeat PET scan still with 7.8 cm tumor in the mediastinum.  The SUV is 2.4 and this could be scar tissue.  · The patient is due for her next dose of Zoladex today. As noted above, her ANC has been steadily declining over the last several months in the absence of infectious symptoms. This will be further detailed below. She will proceed with Zoladex, as scheduled.   · Discontinue Zoladex  · Reviewed CT as of January 15, 2019 with further improvement  · No plans on radiation to the mediastinal lymph node given the PET scan showed that it is photopenic  · Reviewed CT from April 5,  2019 with further decrease in the mediastinal lymph node.  Reviewed PET scan.  PET scan shows overall improvement in the thickness within the pericardium. The slight area of increased uptake in the superior aspect of the right heart border which appears to be mildly increased they are concerned of residual malignancy.  However the irregular septal thickening in the left apex and left upper lobe have improved and the FDG uptake in the left upper lobe has decreased continued follow-up is recommended.   · Recent CT neck, chest abdomen pelvis scan from 07/22/2019 showed further interval decrease in the mediastinal lymphadenopathy. There is no new abnormality within the chest. There is no lymphadenopathy or evidence for metastatic disease within the neck, abdomen, or pelvis.    2.  Pericardial effusion:   a 2-D echocardiogram 6/8/18 showed a left ventricular systolic function 58%.  There was a 2 cm pericardial effusion with no tamponade.  The pericardium appeared thickened.  She had no evidence of volume overload.  It is resolved     3.   FEN: She was monitored very carefully for any evidence of tumor lysis.  She was maintained on IV fluids and allopurinol throughout hospitalization.    her uric acid at discharge was 1.2 but I recommended that she stay on allopurinol twice daily until her next cycle of chemotherapy to prevent hyperuricemia.  She has mild hypokalemia and will be discharged on potassium 20 mEq once per day.  Have ordered an outpatient BMP weekly for monitoring of her renal function and electrolytes  · Potassium is chronically slightly low.  Requires 20 mg by mouth daily. Potassium today is 4.0.      4. Fertility preservation:  Patient received Zoladex injection  for fertility preservation; this should be continued once monthly during her chemotherapy.    · She is due for her next dose today and will continue this for 2 additional doses.   · Zoladex has been discontinued but patient has now started   menstrual periods     5. Right upper extremity DVT on Pradaxa. She continues on this with no issues.     6. Status post port placement, patient wants port removed and I think it is reasonable but will need to hold Pradaxa    7. Fatigue:  Patient complains of fatigue and dizziness with her menstrual periods; symptoms can last up to 1 week. Hemoglobin today is normal. Ordered iron profile, ferritin and TSH to further evaluate.   Advised patient to follow-up with her GYN to further discuss her menstrual symptoms.     Plan   1. CT-scans reviewed with the patient today.  2. Iron profile, ferritin, and TSH ordered today to evaluate reasons for fatigue.   3. Follow-up with me in 3 months with labs.   4. CT-scans to be repeated in 6 months.   5. Patient appears to have hormonal changes as she gets dizzy during menstrual.'s and feels like she is receiving chemotherapy.  We have discussed with her to follow-up with her gynecologist as this could all be related to her hormonal changes.    I, Lakeisha Trimble, acted as scribe for Millie Padilla MD  in documenting the service or procedure. To the best of my knowledge, I recorded what was dictated by MD Millie Asif MD    CC: Dr. Arina Fox, at [facility]

## 2019-07-26 LAB
FERRITIN SERPL-MCNC: 61.1 NG/ML (ref 11–207)
IRON 24H UR-MRATE: 65 MCG/DL (ref 37–145)
IRON SATN MFR SERPL: 18 % (ref 14–48)
TIBC SERPL-MCNC: 351 MCG/DL (ref 249–505)
TRANSFERRIN SERPL-MCNC: 251 MG/DL (ref 200–360)

## 2019-08-15 ENCOUNTER — TELEPHONE (OUTPATIENT)
Dept: ONCOLOGY | Facility: HOSPITAL | Age: 24
End: 2019-08-15

## 2019-11-07 DIAGNOSIS — C85.28 MEDIASTINAL LARGE B-CELL LYMPHOMA OF LYMPH NODES OF MULTIPLE REGIONS (HCC): Primary | ICD-10-CM

## 2019-11-08 ENCOUNTER — TELEPHONE (OUTPATIENT)
Dept: ONCOLOGY | Facility: CLINIC | Age: 24
End: 2019-11-08

## 2019-11-08 NOTE — TELEPHONE ENCOUNTER
----- Message from Dayanara Banerjee, RN sent at 11/7/2019  4:18 PM EST -----  Regarding: RE: CT SCAN  Orders placed  Thanks  kevan  ----- Message -----  From: Louann Corcoran RegSched Rep  Sent: 11/7/2019   4:07 PM  To: Renuka Van, Oncology Nurses  Subject: CT SCAN                                          Pt needs ct scan prior to md priscilla't. Need this order plz. Note to self: 11th only in pm, open on the 12th and 13th also.    Thank you!    Mady

## 2019-12-12 ENCOUNTER — HOSPITAL ENCOUNTER (OUTPATIENT)
Dept: PET IMAGING | Facility: HOSPITAL | Age: 24
Discharge: HOME | End: 2019-12-12
Admitting: INTERNAL MEDICINE

## 2019-12-12 DIAGNOSIS — C85.28 MEDIASTINAL LARGE B-CELL LYMPHOMA OF LYMPH NODES OF MULTIPLE REGIONS (HCC): ICD-10-CM

## 2019-12-12 LAB — CREAT BLDA-MCNC: 0.8 MG/DL (ref 0.6–1.3)

## 2019-12-12 PROCEDURE — 70491 CT SOFT TISSUE NECK W/DYE: CPT

## 2019-12-12 PROCEDURE — 82565 ASSAY OF CREATININE: CPT

## 2019-12-12 PROCEDURE — 71260 CT THORAX DX C+: CPT

## 2019-12-12 PROCEDURE — 25010000002 IOPAMIDOL 61 % SOLUTION: Performed by: INTERNAL MEDICINE

## 2019-12-12 PROCEDURE — 74177 CT ABD & PELVIS W/CONTRAST: CPT

## 2019-12-12 PROCEDURE — 0 DIATRIZOATE MEGLUMINE & SODIUM PER 1 ML: Performed by: INTERNAL MEDICINE

## 2019-12-12 RX ADMIN — IOPAMIDOL 100 ML: 612 INJECTION, SOLUTION INTRAVENOUS at 14:23

## 2019-12-12 RX ADMIN — DIATRIZOATE MEGLUMINE AND DIATRIZOATE SODIUM 30 ML: 660; 100 LIQUID ORAL; RECTAL at 13:35

## 2019-12-17 ENCOUNTER — LAB (OUTPATIENT)
Dept: LAB | Facility: HOSPITAL | Age: 24
End: 2019-12-17

## 2019-12-17 ENCOUNTER — OFFICE VISIT (OUTPATIENT)
Dept: ONCOLOGY | Facility: CLINIC | Age: 24
End: 2019-12-17

## 2019-12-17 VITALS
RESPIRATION RATE: 16 BRPM | DIASTOLIC BLOOD PRESSURE: 63 MMHG | HEART RATE: 70 BPM | OXYGEN SATURATION: 97 % | SYSTOLIC BLOOD PRESSURE: 99 MMHG | TEMPERATURE: 98.2 F | HEIGHT: 62 IN | WEIGHT: 137.8 LBS | BODY MASS INDEX: 25.36 KG/M2

## 2019-12-17 DIAGNOSIS — R53.83 FATIGUE, UNSPECIFIED TYPE: ICD-10-CM

## 2019-12-17 DIAGNOSIS — C85.28 MEDIASTINAL LARGE B-CELL LYMPHOMA OF LYMPH NODES OF MULTIPLE REGIONS (HCC): ICD-10-CM

## 2019-12-17 DIAGNOSIS — C85.28 MEDIASTINAL LARGE B-CELL LYMPHOMA OF LYMPH NODES OF MULTIPLE REGIONS (HCC): Primary | ICD-10-CM

## 2019-12-17 DIAGNOSIS — N28.1 RENAL CYST: ICD-10-CM

## 2019-12-17 DIAGNOSIS — N83.201 CYST OF RIGHT OVARY: ICD-10-CM

## 2019-12-17 LAB
ALBUMIN SERPL-MCNC: 4.2 G/DL (ref 3.5–5.2)
ALBUMIN/GLOB SERPL: 2 G/DL (ref 1.1–2.4)
ALP SERPL-CCNC: 43 U/L (ref 38–116)
ALT SERPL W P-5'-P-CCNC: 13 U/L (ref 0–33)
ANION GAP SERPL CALCULATED.3IONS-SCNC: 10.3 MMOL/L (ref 5–15)
AST SERPL-CCNC: 17 U/L (ref 0–32)
BASOPHILS # BLD AUTO: 0.02 10*3/MM3 (ref 0–0.2)
BASOPHILS NFR BLD AUTO: 0.6 % (ref 0–1.5)
BILIRUB SERPL-MCNC: 0.4 MG/DL (ref 0.2–1.2)
BUN BLD-MCNC: 8 MG/DL (ref 6–20)
BUN/CREAT SERPL: 10.8 (ref 7.3–30)
CALCIUM SPEC-SCNC: 8.9 MG/DL (ref 8.5–10.2)
CHLORIDE SERPL-SCNC: 104 MMOL/L (ref 98–107)
CO2 SERPL-SCNC: 25.7 MMOL/L (ref 22–29)
CREAT BLD-MCNC: 0.74 MG/DL (ref 0.6–1.1)
DEPRECATED RDW RBC AUTO: 42 FL (ref 37–54)
EOSINOPHIL # BLD AUTO: 0.04 10*3/MM3 (ref 0–0.4)
EOSINOPHIL NFR BLD AUTO: 1.2 % (ref 0.3–6.2)
ERYTHROCYTE [DISTWIDTH] IN BLOOD BY AUTOMATED COUNT: 11.6 % (ref 12.3–15.4)
GFR SERPL CREATININE-BSD FRML MDRD: 117 ML/MIN/1.73
GFR SERPL CREATININE-BSD FRML MDRD: 96 ML/MIN/1.73
GLOBULIN UR ELPH-MCNC: 2.1 GM/DL (ref 1.8–3.5)
GLUCOSE BLD-MCNC: 69 MG/DL (ref 74–124)
HCT VFR BLD AUTO: 40 % (ref 34–46.6)
HGB BLD-MCNC: 13.1 G/DL (ref 12–15.9)
IMM GRANULOCYTES # BLD AUTO: 0.01 10*3/MM3 (ref 0–0.05)
IMM GRANULOCYTES NFR BLD AUTO: 0.3 % (ref 0–0.5)
LYMPHOCYTES # BLD AUTO: 0.72 10*3/MM3 (ref 0.7–3.1)
LYMPHOCYTES NFR BLD AUTO: 21.8 % (ref 19.6–45.3)
MCH RBC QN AUTO: 32.3 PG (ref 26.6–33)
MCHC RBC AUTO-ENTMCNC: 32.8 G/DL (ref 31.5–35.7)
MCV RBC AUTO: 98.8 FL (ref 79–97)
MONOCYTES # BLD AUTO: 0.33 10*3/MM3 (ref 0.1–0.9)
MONOCYTES NFR BLD AUTO: 10 % (ref 5–12)
NEUTROPHILS # BLD AUTO: 2.19 10*3/MM3 (ref 1.7–7)
NEUTROPHILS NFR BLD AUTO: 66.1 % (ref 42.7–76)
NRBC BLD AUTO-RTO: 0 /100 WBC (ref 0–0.2)
PLATELET # BLD AUTO: 150 10*3/MM3 (ref 140–450)
PMV BLD AUTO: 9.3 FL (ref 6–12)
POTASSIUM BLD-SCNC: 3.8 MMOL/L (ref 3.5–4.7)
PROT SERPL-MCNC: 6.3 G/DL (ref 6.3–8)
RBC # BLD AUTO: 4.05 10*6/MM3 (ref 3.77–5.28)
SODIUM BLD-SCNC: 140 MMOL/L (ref 134–145)
WBC NRBC COR # BLD: 3.31 10*3/MM3 (ref 3.4–10.8)

## 2019-12-17 PROCEDURE — 36415 COLL VENOUS BLD VENIPUNCTURE: CPT

## 2019-12-17 PROCEDURE — 80053 COMPREHEN METABOLIC PANEL: CPT

## 2019-12-17 PROCEDURE — 85025 COMPLETE CBC W/AUTO DIFF WBC: CPT

## 2019-12-17 PROCEDURE — 99214 OFFICE O/P EST MOD 30 MIN: CPT | Performed by: INTERNAL MEDICINE

## 2019-12-17 RX ORDER — MULTIVIT WITH MINERALS/LUTEIN
250 TABLET ORAL DAILY
Status: ON HOLD | COMMUNITY
End: 2022-08-30

## 2019-12-17 RX ORDER — OMEGA-3S/DHA/EPA/FISH OIL/D3 300MG-1000
400 CAPSULE ORAL DAILY
Status: ON HOLD | COMMUNITY
End: 2022-08-30

## 2019-12-17 NOTE — PROGRESS NOTES
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 She did initiate EPOCH-R  in the hospital on 06/16/2018.  Treated 6 cycles of treatment.  Followed with observation    History of Present Illness     The patient is a 24 y.o. female with the above-mentioned history, with history of mediastinal large B-cell lymphoma status post completion of 6 cycles of chemotherapy with dose adusted EPOCH-R in October 2018. Overall, patient is doing well. Recent CT neck, chest abdomen pelvis scan from 07/22/2019 showed further interval decrease in the mediastinal lymphadenopathy. There is no new abnormality within the chest. There is no lymphadenopathy or evidence for metastatic disease within the neck, abdomen, or pelvis. Patient underwent port removal on 05/08/2019.     She reports doing well overall.  Her hair has been growing back.  She doesn't feel much fatigue.  She denies any fevers, chills, and night sweats.  She has lost some weight and has been dieting.  She has started going to school again.  She is physically active.  Her port has been removed.    We reviewed with patient the CT scans from 12/12/19 which show mediastinal lymphadenopathy which has decreased in size.  There is also a small left renal cyst and a 2 cm partially involuted right ovarian cyst.  She does have some pain in her abdomen not associated with her menstrual cycle.  I have advised her to consult her OB/GYN regarding the ovarian cyst.    Now the mediastinal lymphnode is 1 cm , likely scar  tissue with further reduction compared to prior CT scan.     Patient's labs from 12/17/19 show WBC 3.31 which is likely due to chemotherapy.  CMP pending.      Past Medical History, Past Surgical History, Social History, Family History have been reviewed and are without significant changes except as mentioned.    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  She saw   Arina Cohen on last Friday.  The reason the patient was referred to Dr. Cohen was because they thought she had cardiomegaly on chest x-ray.  However a chest x-ray was ordered which showedThe chest x-ray showed extensive abnormal increased density throughout the anterior mediastinum extending into the left hilar region and left perihilar region and is most likely due to confluent lymphadenopathy.     CT angiogram of the chest did not show pulmonary embolism but showed     there is a large conglomerate  mass encases multiple vascular structures of the mediastinum and left  hilar region that is most likely extensive confluent lymphadenopathy.  The primary differential diagnostic considerations would be lymphoma.  The tumor posteriorly displaces the pulmonary arteries and the left and  moderately narrows the main pulmonary artery. The tumor also posteriorly  displaces the trachea and markedly narrows the left mainstem bronchus.  The pulmonary veins in the left are also encased and narrowed. The mass  encases and markedly narrows the SVC. The large edy mass measures  approximately 19.8 x 13.7 x 14.0 cm in diameter. Enlarged lymph nodes  are also present in the left supraclavicular region where they appear to  produce occlusion of the left internal jugular vein. There is no  axillary lymphadenopathy. There are no filling defects within the  pulmonary arteries. There is no CT evidence of pulmonary embolism. There  is a small left pleural effusion. A small pericardial effusion measuring  8 mm in maximum thickness is also present. There is compressive  atelectasis of the left lung. Patchy airspace opacities are also present  in the superior aspect of the left upper lobe. These are most likely due  to direct tumor infiltration of the lung parenchyma, but could also  represent postobstructive pneumonia. The right lung is well expanded and  clear. Images through the upper abdomen partially show what could be a  edy mass in  the retroperitoneum posterior and inferior to the  pancreas. Further evaluation with a CT scan of the abdomen and pelvis is  recommended. Bone window images demonstrate patchy sclerosis in the C7  and T1 and T2 and T3 and T4 vertebral body suspicious for bone  involvement with lymphoma.     IMPRESSION:  Very large tumor mass in the mediastinum encasing multiple  vascular structures and also moderately narrowing the left mainstem  bronchus as described in more detail above. Direct tumor infiltration of  the left upper lobe is also suspected. Small left pleural effusion and a  small pericardial effusion. There may also be partially visualized  lymphadenopathy in the upper abdomen. Patchy areas of sclerosis are also  noted in the C7 and T1 and T2 and T3 and T4 vertebral bodies suspicious  for osseous metastatic disease. The findings are consistent with  Lymphoma.     Dr. Cohen felt that she had a small pericardial effusion.  Patient has been symptomatic with cough which is worsening which started back in February 2018 and worsened over the last 4 months.  Patient also has some shortness of breath with walking up the steps.  She has not lost weight.  She say she is reasonable appetite.  She does have fever kind of low-grade fever and night sweats.  She is more fatigued compared to before.  She was referred to us because of concern that this could be lymphoma.  She does not have any tissue diagnosis yet.  Patient does complain of back pain.    Echo done on admission June 12, 2018 by Dr. Fitzgerald showed that the patient's posterior and appears large primary leukemia the left ventricle and apex.  There is no tamponade.  The pericardium appears thickened pointing to involvement of the pericardium into the mediastinal process.  Dr. Fitzgerald felt that it would be difficult to do pericardial synthesis as the mediastinal mass covers the anterior aspect of the heart.  Ejection fraction is 58%  CT-guided needle biopsy June 12, 2018 was  negative  LDH is elevated.  Uric acid is high.  MRI thoracic spine, negative  CT abdomen pelvis negative  Scan July 13, 2018 shows large infiltrative edy mass in the anterior mediastinum and left hilar region that nearly completely fills the left hemithorax and shows intense hypermetabolism with an SUV of 19.8.  No foci of pathologic hypermetabolism are identified elsewhere in the chest, neck abdomen or pelvis.    Pathology was consistent with primary mediastinal large B-cell lymphoma.    Patient was seen by Dr. Melgar.  He discussed harvesting eggs versus Zoladex plus oral contraceptives versus Zoladex alone for fertility preservation.  Due to large size of the tumor and rapid initiation of treatment needed the patient was not able to have aches harvested done.  Because she is at increased risk of thrombosis with the use of oral contraceptives secondary to malignancy he recommended Zoladex alone.  Patient received first dose of Zoladex 3.6 mg subcutaneous on Belia 15, 2018.    Patient started on EPOCH/R on June 16, 2018    Patient had a reaction to Rituxan which improved with steroids, Benadryl and Pepcid.  She had to receive it slow.  She started having itching over her EARS , sore throat.  She was given IV steroids Benadryl and Pepcid and following that she was started at a slower rate and she completed it.    Right upper extremity Doppler ultrasound showed new superficial vein thrombosis around the PICC line with no extension into the deep system.  Repeat Doppler was ordered.    A Doppler ultrasound initially showed superficial thrombophlebitis.  She was on prophylactic Arixtra.  A Doppler was repeated 2 days later on 6/20/18  which showed extension of thrombus into the axillary and brachial veins.  She was therefore started on Xarelto 15 mg one every 12 hours and the PICC line was removed as soon as chemotherapy completed on 6/20/18.  She will take 15 mg of Xarelto every 12 hours for 21 days and then  transition to 20 mg once a day.  The patient will have CBC performed twice a week.  If the platelet count drops below 50,000, anticoagulation will need to be temporarily held  Patient was started on acyclovir/fluconazole/Bactrim  Bone marrow done June 14, 2018, morphology was negative for lymphoma    Fertility preservation, Zoladex is next due July 12, 2018.  CT scan and PET scan showing significant response after cycle 2 of chemotherapy  Next dose of Lupron August 13, 2018  Status post 5 cycles  OF epoch/r and is due cycle 6 on oct 8th.    Patient admitted October 20, 2018 and discharged October 22, 2018 when she was admitted with neutropenic fever.  She was treated with broad-spectrum antibiotics.  Her respiratory viral panel was positive for parainfluenza virus.    PET scan with significant improvement but residual 7.8 cm lesion without any activity.  Reviewed CT scan from January 15, 2019    Patient seen by Dr. Fox at the [facility], agreed with observation at present no plans on radiation.    CT scan and PET scan from April 5 and April 9, 2019 has been reviewed and discussed with patient.  There is further decrease in the mediastinal lymphadenopathy.    CT chest abdomen pelvis July 22, 2019 with further decrease in the mediastinal lymphadenopathy significantly    We reviewed with patient the CT scans from 12/12/19 which show mediastinal lymphadenopathy which has decreased in size.   There is also a small left renal cyst and a 2 cm partially involuted right ovarian cyst.     Review of Systems   Constitutional: Negative for appetite change, chills, diaphoresis, fatigue, fever and unexpected weight change.        Hot flashes in the evenings 04/12/19   HENT: Negative for hearing loss, sore throat and trouble swallowing.    Respiratory: Negative for cough, chest tightness, shortness of breath and wheezing.    Cardiovascular: Negative for chest pain, palpitations and leg swelling.   Gastrointestinal:  "Negative for abdominal distention, abdominal pain, constipation, diarrhea, nausea and vomiting.   Genitourinary: Negative for dysuria, frequency, hematuria and urgency.   Musculoskeletal: Negative for joint swelling.        No muscle weakness.   Skin: Negative for rash and wound.   Neurological: Negative for seizures, syncope, speech difficulty, weakness, numbness and headaches.   Hematological: Negative for adenopathy. Does not bruise/bleed easily.   Psychiatric/Behavioral: Negative for behavioral problems, confusion and suicidal ideas.       Medications:  The current medication list was reviewed in the EMR    ALLERGIES:  No Known Allergies    Objective      Vitals:    12/17/19 0857   BP: 99/63   Pulse: 70   Resp: 16   Temp: 98.2 °F (36.8 °C)   TempSrc: Oral   SpO2: 97%   Weight: 62.5 kg (137 lb 12.8 oz)   Height: 158 cm (62.21\")   PainSc:   3   PainLoc: Breast  Comment: Lt breast and rt chest wall pain comes and goes.     Current Status 12/17/2019   ECOG score 0       Physical Exam   GENERAL:  Well-developed, well-nourished in no acute distress.   SKIN:  Warm, dry without rashes, purpura or petechiae.  NECK:  Supple with good range of motion; no thyromegaly or masses, no JVD.  LYMPHATICS:  No cervical, supraclavicular, axillary or inguinal adenopathy.  CHEST:  Lungs clear to auscultation. Good airflow.  CARDIAC:  Regular rate and rhythm without murmurs, rubs or gallops. Normal S1,S2.  ABDOMEN:  Soft, nontender with no hepatosplenomegaly or masses.  EXTREMITIES:  No clubbing, cyanosis or edema.  NEUROLOGICAL:  Cranial Nerves II-XII grossly intact.  No focal neurological deficits.  PSYCHIATRIC:  Normal affect and mood.      RECENT LABS:  Results from last 7 days   Lab Units 12/17/19  0858   WBC 10*3/mm3 3.31*   NEUTROS ABS 10*3/mm3 2.19   HEMOGLOBIN g/dL 13.1   HEMATOCRIT % 40.0   PLATELETS 10*3/mm3 150         CT OF THE NECK, CHEST, ABDOMEN AND PELVIS WITH CONTRAST 12/22/2019   IMPRESSION:  1. Infiltrative " mediastinal lymphadenopathy exhibits mild continued  decrease in size. No new abnormality. No edy enlargement the neck,  abdomen or pelvis. Normal splenic size.  2. Small left renal cyst. 2 cm partially involuted right ovarian cyst.      Assessment/Plan   1.  Primary mediastinal large B-cell lymphoma: The patient presented with dyspnea, cough, and chest pain.  A CT angiogram of the chest 6/8/18 showed a very large tumor mass in the mediastinum encasing multiple vascular structures and narrowing the left mainstem bronchus.  There was direct tumor infiltration in the left upper lobe.  There was a small left pleural effusion.  She underwent a CT-guided biopsy of the mediastinal mass on 6/12/18 and pathology reviewed at [facility] oncology showed diffuse large B-cell lymphoma consistent with a primary diffuse large B-cell lymphoma.  A bone marrow exam was performed on 6/14/18 which was negative for lymphoma.  She underwent a PET scan 6/13/18 which showed a large hypermetabolic mass in the chest involving the anterior mediastinum, left hilum, nearly completely filling the left hemothorax with SUV 19.8.  There was physiologic distribution elsewhere.     The patient was started on IV fluid hydration and allopurinol for prevention of hyperuricemia.  She initiated systemic chemotherapy with DA-EPOCH-R on 6/16/18 and completed the evening of 6/21/18.  She had a infusion reaction to rituximab but was able to complete with additional medications and otherwise tolerated chemotherapy well.  She will require additional premedications with additional rituximab.  Plan is to monitor her blood counts twice per week outpatient and proceed with cycle 2 of chemotherapy day 21.     The patient had significant improvement in shortness of breath, cough, and chest pain by the time of discharge.    · Patient received Neulasta support  · Her white count dropped down to as low as 0.8 low-grade temperature and patient was placed on Levaquin ×5  days starting June 27, 2018, neutropenia AT  11 days posttreatment.  Platelets dropped to 82.  · Her next ZOLADEX injection is due July 12.  · She is due to start chemotherapy on July 9.  · Discussed with Dr. Fox at the [facility] and his suggestion was to do the CT scan and PET scan after 2 cycles and discuss the results with him.  If she has an excellent response early on she would not require any further treatments after completion of 6 cycles of EPOCH/R  · Patient being admitted for cycle 3 of chemotherapy on July 30, 2018.  · She's status post cycle 4 of chemotherapy and CT scan has been reviewed following 4 cycles with continued response.  · She is due for ZOLADEX  injection on October 8, 2018.   · Patient is due to go for cycle 5 of chemotherapy on Monday, September 17, 2018  ·  echocardiogram done August 22, 2018 shows ejection fraction of 59% to 60% with the eldest strain of 19.2%  · Cycle 6 chemotherapy with dose adjusted EPOCH/R on October 8, 2018.  · Reviewed CT scan and PET scan and the images with the patient and family.  Significant response with decrease in the mediastinal mass to 7.8 cm and activity level is 2.4 a week on PET scan.  · Reviewed repeat PET scan still with 7.8 cm tumor in the mediastinum.  The SUV is 2.4 and this could be scar tissue.  · The patient is due for her next dose of Zoladex today. As noted above, her ANC has been steadily declining over the last several months in the absence of infectious symptoms. This will be further detailed below. She will proceed with Zoladex, as scheduled.   · Discontinue Zoladex  · Reviewed CT as of January 15, 2019 with further improvement  · No plans on radiation to the mediastinal lymph node given the PET scan showed that it is photopenic  · Reviewed CT from April 5, 2019 with further decrease in the mediastinal lymph node.  Reviewed PET scan.  PET scan shows overall improvement in the thickness within the pericardium. The slight area  of increased uptake in the superior aspect of the right heart border which appears to be mildly increased they are concerned of residual malignancy.  However the irregular septal thickening in the left apex and left upper lobe have improved and the FDG uptake in the left upper lobe has decreased continued follow-up is recommended.   · Recent CT neck, chest abdomen pelvis scan from 12/12/19 showed further interval decrease in the mediastinal lymphadenopathy. There is no new abnormality within the chest. There is no lymphadenopathy or evidence for metastatic disease within the neck, abdomen, or pelvis.    2.  Pericardial effusion:   a 2-D echocardiogram 6/8/18 showed a left ventricular systolic function 58%.  There was a 2 cm pericardial effusion with no tamponade.  The pericardium appeared thickened.  She had no evidence of volume overload.  It is resolved     3.   FEN: She was monitored very carefully for any evidence of tumor lysis.  She was maintained on IV fluids and allopurinol throughout hospitalization.    her uric acid at discharge was 1.2 but I recommended that she stay on allopurinol twice daily until her next cycle of chemotherapy to prevent hyperuricemia.  She has mild hypokalemia and will be discharged on potassium 20 mEq once per day.  Have ordered an outpatient BMP weekly for monitoring of her renal function and electrolytes  · Potassium is chronically slightly low.  Requires 20 mg by mouth daily. Potassium today is 4.0.      4. Fertility preservation:  Patient received Zoladex injection  for fertility preservation; this should be continued once monthly during her chemotherapy.    · She is due for her next dose today and will continue this for 2 additional doses.   · Zoladex has been discontinued but patient has now started  menstrual periods     5. Right upper extremity DVT on Pradaxa. She continues on this with no issues.     6. Status post port placement, patient wants port removed and I think it is  reasonable but will need to hold Pradaxa  · S/p port removal     7. Fatigue:  Patient complains of fatigue and dizziness with her menstrual periods; symptoms can last up to 1 week. Hemoglobin today is normal. Ordered iron profile, ferritin and TSH to further evaluate.   Advised patient to follow-up with her GYN to further discuss her menstrual symptoms.     8. Renal and Ovarian cysts.  We will continue to monitor.  · I have advised patient to consult her OB/GYN for ovarian cysts.    9. Family history of cancer.  Her mother was diagnosed with cholangiocarcinoma.    Plan   1. We reviewed with patient the CT scans from 12/12/19 which show mediastinal lymphadenopathy which has decreased in size.  There is also a small left renal cyst and a 2 cm partially involuted right ovarian cyst.   2. Pt has shown excellent response and likely scar tissue present.  3. pts mother will take her to her obgyn for evaluation of ovarian cyst.  4. Follow up with Randy in 3 months with labs.    I, Aylin Ortiz, acted as scribe for Millie Padilla MD  in documenting the service or procedure. To the best of my knowledge, I recorded what was dictated by MD Millie Asif MD  12/17/19      CC: Dr. Arina Fox, at [facility]

## 2020-03-02 ENCOUNTER — TELEPHONE (OUTPATIENT)
Dept: ONCOLOGY | Facility: CLINIC | Age: 25
End: 2020-03-02

## 2020-03-02 NOTE — TELEPHONE ENCOUNTER
Pt and mother of pt requesting a CT Scan disc of the image taken Dec 2019.       Pt Contact: [phone]

## 2020-03-13 NOTE — PROGRESS NOTES
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 She did initiate EPOCH-R  in the hospital on 06/16/2018.  Treated 6 cycles of treatment.  Followed with observation    History of Present Illness     The patient is a 25 y.o. female with the above-mentioned history, with history of mediastinal large B-cell lymphoma status post completion of 6 cycles of chemotherapy with dose adusted EPOCH-R in October 2018.  Patient has no complaints.  She is no chest pain shortness of breath or cough.  She has not lost weight    Past Medical History, Past Surgical History, Social History, Family History have been reviewed and are without significant changes except as mentioned.    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  She saw Dr. Arina Cohen on last Friday.  The reason the patient was referred to Dr. Cohen was because they thought she had cardiomegaly on chest x-ray.  However a chest x-ray was ordered which showedThe chest x-ray showed extensive abnormal increased density throughout the anterior mediastinum extending into the left hilar region and left perihilar region and is most likely due to confluent lymphadenopathy.     CT angiogram of the chest did not show pulmonary embolism but showed     there is a large conglomerate  mass encases multiple vascular structures of the mediastinum and left  hilar region that is most likely extensive confluent lymphadenopathy.  The primary differential diagnostic considerations would be lymphoma.  The tumor posteriorly displaces the pulmonary arteries and the left and  moderately narrows the main pulmonary artery. The tumor also posteriorly  displaces the trachea and markedly narrows the left mainstem bronchus.  The pulmonary veins in the left are also encased and narrowed. The mass  encases and markedly narrows the SVC. The large edy mass measures  approximately 19.8 x 13.7 x 14.0  cm in diameter. Enlarged lymph nodes  are also present in the left supraclavicular region where they appear to  produce occlusion of the left internal jugular vein. There is no  axillary lymphadenopathy. There are no filling defects within the  pulmonary arteries. There is no CT evidence of pulmonary embolism. There  is a small left pleural effusion. A small pericardial effusion measuring  8 mm in maximum thickness is also present. There is compressive  atelectasis of the left lung. Patchy airspace opacities are also present  in the superior aspect of the left upper lobe. These are most likely due  to direct tumor infiltration of the lung parenchyma, but could also  represent postobstructive pneumonia. The right lung is well expanded and  clear. Images through the upper abdomen partially show what could be a  edy mass in the retroperitoneum posterior and inferior to the  pancreas. Further evaluation with a CT scan of the abdomen and pelvis is  recommended. Bone window images demonstrate patchy sclerosis in the C7  and T1 and T2 and T3 and T4 vertebral body suspicious for bone  involvement with lymphoma.     IMPRESSION:  Very large tumor mass in the mediastinum encasing multiple  vascular structures and also moderately narrowing the left mainstem  bronchus as described in more detail above. Direct tumor infiltration of  the left upper lobe is also suspected. Small left pleural effusion and a  small pericardial effusion. There may also be partially visualized  lymphadenopathy in the upper abdomen. Patchy areas of sclerosis are also  noted in the C7 and T1 and T2 and T3 and T4 vertebral bodies suspicious  for osseous metastatic disease. The findings are consistent with  Lymphoma.     Dr. Cohen felt that she had a small pericardial effusion.  Patient has been symptomatic with cough which is worsening which started back in February 2018 and worsened over the last 4 months.  Patient also has some shortness of breath with  walking up the steps.  She has not lost weight.  She say she is reasonable appetite.  She does have fever kind of low-grade fever and night sweats.  She is more fatigued compared to before.  She was referred to us because of concern that this could be lymphoma.  She does not have any tissue diagnosis yet.  Patient does complain of back pain.    Echo done on admission June 12, 2018 by Dr. Fitzgerald showed that the patient's posterior and appears large primary leukemia the left ventricle and apex.  There is no tamponade.  The pericardium appears thickened pointing to involvement of the pericardium into the mediastinal process.  Dr. Fitzgerald felt that it would be difficult to do pericardial synthesis as the mediastinal mass covers the anterior aspect of the heart.  Ejection fraction is 58%  CT-guided needle biopsy June 12, 2018 was negative  LDH is elevated.  Uric acid is high.  MRI thoracic spine, negative  CT abdomen pelvis negative  Scan July 13, 2018 shows large infiltrative edy mass in the anterior mediastinum and left hilar region that nearly completely fills the left hemithorax and shows intense hypermetabolism with an SUV of 19.8.  No foci of pathologic hypermetabolism are identified elsewhere in the chest, neck abdomen or pelvis.    Pathology was consistent with primary mediastinal large B-cell lymphoma.    Patient was seen by Dr. Melgar.  He discussed harvesting eggs versus Zoladex plus oral contraceptives versus Zoladex alone for fertility preservation.  Due to large size of the tumor and rapid initiation of treatment needed the patient was not able to have aches harvested done.  Because she is at increased risk of thrombosis with the use of oral contraceptives secondary to malignancy he recommended Zoladex alone.  Patient received first dose of Zoladex 3.6 mg subcutaneous on Belia 15, 2018.    Patient started on EPOCH/R on June 16, 2018    Patient had a reaction to Rituxan which improved with steroids, Benadryl  and Pepcid.  She had to receive it slow.  She started having itching over her EARS , sore throat.  She was given IV steroids Benadryl and Pepcid and following that she was started at a slower rate and she completed it.    Right upper extremity Doppler ultrasound showed new superficial vein thrombosis around the PICC line with no extension into the deep system.  Repeat Doppler was ordered.    A Doppler ultrasound initially showed superficial thrombophlebitis.  She was on prophylactic Arixtra.  A Doppler was repeated 2 days later on 6/20/18  which showed extension of thrombus into the axillary and brachial veins.  She was therefore started on Xarelto 15 mg one every 12 hours and the PICC line was removed as soon as chemotherapy completed on 6/20/18.  She will take 15 mg of Xarelto every 12 hours for 21 days and then transition to 20 mg once a day.  The patient will have CBC performed twice a week.  If the platelet count drops below 50,000, anticoagulation will need to be temporarily held  Patient was started on acyclovir/fluconazole/Bactrim  Bone marrow done June 14, 2018, morphology was negative for lymphoma    Fertility preservation, Zoladex is next due July 12, 2018.  CT scan and PET scan showing significant response after cycle 2 of chemotherapy  Next dose of Lupron August 13, 2018  Status post 5 cycles  OF epoch/r and is due cycle 6 on oct 8th.    Patient admitted October 20, 2018 and discharged October 22, 2018 when she was admitted with neutropenic fever.  She was treated with broad-spectrum antibiotics.  Her respiratory viral panel was positive for parainfluenza virus.    PET scan with significant improvement but residual 7.8 cm lesion without any activity.  Reviewed CT scan from January 15, 2019    Patient seen by Dr. Fox at the [facility], agreed with observation at present no plans on radiation.    CT scan and PET scan from April 5 and April 9, 2019 has been reviewed and discussed with patient.   "There is further decrease in the mediastinal lymphadenopathy.    CT chest abdomen pelvis July 22, 2019 with further decrease in the mediastinal lymphadenopathy significantly    We reviewed with patient the CT scans from 12/12/19 which show mediastinal lymphadenopathy which has decreased in size.   There is also a small left renal cyst and a 2 cm partially involuted right ovarian cyst.         Review of Systems   Constitutional: Negative for appetite change, chills, diaphoresis, fatigue, fever and unexpected weight change.        Hot flashes in the evenings 04/12/19   HENT: Negative for hearing loss, sore throat and trouble swallowing.    Respiratory: Negative for cough, chest tightness, shortness of breath and wheezing.    Cardiovascular: Negative for chest pain, palpitations and leg swelling.   Gastrointestinal: Negative for abdominal distention, abdominal pain, constipation, diarrhea, nausea and vomiting.   Genitourinary: Negative for dysuria, frequency, hematuria and urgency.   Musculoskeletal: Negative for joint swelling.        No muscle weakness.   Skin: Negative for rash and wound.   Neurological: Negative for seizures, syncope, speech difficulty, weakness, numbness and headaches.   Hematological: Negative for adenopathy. Does not bruise/bleed easily.   Psychiatric/Behavioral: Negative for behavioral problems, confusion and suicidal ideas.       Medications:  The current medication list was reviewed in the EMR    ALLERGIES:  No Known Allergies    Objective      Vitals:    03/16/20 1012   BP: 107/71   Pulse: 70   Resp: 16   Temp: 98.4 °F (36.9 °C)   TempSrc: Oral   SpO2: 96%   Weight: 64 kg (141 lb 3.2 oz)   Height: 158 cm (62.21\")   PainSc: 0-No pain     Current Status 3/16/2020   ECOG score 0       Physical Exam   GENERAL:  Well-developed, well-nourished in no acute distress.   SKIN:  Warm, dry without rashes, purpura or petechiae.  NECK:  Supple with good range of motion; no thyromegaly or masses, no " JVD.  LYMPHATICS:  No cervical, supraclavicular, axillary or inguinal adenopathy.  CHEST:  Lungs clear to auscultation. Good airflow.  CARDIAC:  Regular rate and rhythm without murmurs, rubs or gallops. Normal S1,S2.  ABDOMEN:  Soft, nontender with no hepatosplenomegaly or masses.  EXTREMITIES:  No clubbing, cyanosis or edema.  NEUROLOGICAL:  Cranial Nerves II-XII grossly intact.  No focal neurological deficits.  PSYCHIATRIC:  Normal affect and mood.      RECENT LABS:  Results from last 7 days   Lab Units 03/16/20  0954   WBC 10*3/mm3 3.49   NEUTROS ABS 10*3/mm3 1.84   HEMOGLOBIN g/dL 14.6   HEMATOCRIT % 44.2   PLATELETS 10*3/mm3 181           Assessment/Plan   1.  Primary mediastinal large B-cell lymphoma: The patient presented with dyspnea, cough, and chest pain.  A CT angiogram of the chest 6/8/18 showed a very large tumor mass in the mediastinum encasing multiple vascular structures and narrowing the left mainstem bronchus.  There was direct tumor infiltration in the left upper lobe.  There was a small left pleural effusion.  She underwent a CT-guided biopsy of the mediastinal mass on 6/12/18 and pathology reviewed at [facility] oncology showed diffuse large B-cell lymphoma consistent with a primary diffuse large B-cell lymphoma.  A bone marrow exam was performed on 6/14/18 which was negative for lymphoma.  She underwent a PET scan 6/13/18 which showed a large hypermetabolic mass in the chest involving the anterior mediastinum, left hilum, nearly completely filling the left hemothorax with SUV 19.8.  There was physiologic distribution elsewhere.     The patient was started on IV fluid hydration and allopurinol for prevention of hyperuricemia.  She initiated systemic chemotherapy with DA-EPOCH-R on 6/16/18 and completed the evening of 6/21/18.  She had a infusion reaction to rituximab but was able to complete with additional medications and otherwise tolerated chemotherapy well.  She will require additional  premedications with additional rituximab.  Plan is to monitor her blood counts twice per week outpatient and proceed with cycle 2 of chemotherapy day 21.     The patient had significant improvement in shortness of breath, cough, and chest pain by the time of discharge.    · Patient received Neulasta support  · Her white count dropped down to as low as 0.8 low-grade temperature and patient was placed on Levaquin ×5 days starting June 27, 2018, neutropenia AT  11 days posttreatment.  Platelets dropped to 82.  · Her next ZOLADEX injection is due July 12.  · She is due to start chemotherapy on July 9.  · Discussed with Dr. Fox at the [facility] and his suggestion was to do the CT scan and PET scan after 2 cycles and discuss the results with him.  If she has an excellent response early on she would not require any further treatments after completion of 6 cycles of EPOCH/R  · Patient being admitted for cycle 3 of chemotherapy on July 30, 2018.  · She's status post cycle 4 of chemotherapy and CT scan has been reviewed following 4 cycles with continued response.  · She is due for ZOLADEX  injection on October 8, 2018.   · Patient is due to go for cycle 5 of chemotherapy on Monday, September 17, 2018  ·  echocardiogram done August 22, 2018 shows ejection fraction of 59% to 60% with the eldest strain of 19.2%  · Cycle 6 chemotherapy with dose adjusted EPOCH/R on October 8, 2018.  · Reviewed CT scan and PET scan and the images with the patient and family.  Significant response with decrease in the mediastinal mass to 7.8 cm and activity level is 2.4 a week on PET scan.  · Reviewed repeat PET scan still with 7.8 cm tumor in the mediastinum.  The SUV is 2.4 and this could be scar tissue.  · The patient is due for her next dose of Zoladex today. As noted above, her ANC has been steadily declining over the last several months in the absence of infectious symptoms. This will be further detailed below. She will proceed  with Zoladex, as scheduled.   · Discontinue Zoladex  · Reviewed CT as of January 15, 2019 with further improvement  · No plans on radiation to the mediastinal lymph node given the PET scan showed that it is photopenic  · Reviewed CT from April 5, 2019 with further decrease in the mediastinal lymph node.  Reviewed PET scan.  PET scan shows overall improvement in the thickness within the pericardium. The slight area of increased uptake in the superior aspect of the right heart border which appears to be mildly increased they are concerned of residual malignancy.  However the irregular septal thickening in the left apex and left upper lobe have improved and the FDG uptake in the left upper lobe has decreased continued follow-up is recommended.   · Recent CT neck, chest abdomen pelvis scan from 12/12/19 showed further interval decrease in the mediastinal lymphadenopathy. There is no new abnormality within the chest. There is no lymphadenopathy or evidence for metastatic disease within the neck, abdomen, or pelvis.    2.  Pericardial effusion:   a 2-D echocardiogram 6/8/18 showed a left ventricular systolic function 58%.  There was a 2 cm pericardial effusion with no tamponade.  The pericardium appeared thickened.  She had no evidence of volume overload.  It is resolved     3.   FEN: She was monitored very carefully for any evidence of tumor lysis.  She was maintained on IV fluids and allopurinol throughout hospitalization.    her uric acid at discharge was 1.2 but I recommended that she stay on allopurinol twice daily until her next cycle of chemotherapy to prevent hyperuricemia.  She has mild hypokalemia and will be discharged on potassium 20 mEq once per day.  Have ordered an outpatient BMP weekly for monitoring of her renal function and electrolytes  · Potassium is chronically slightly low.  Requires 20 mg by mouth daily. Potassium today is 4.0.      4. Fertility preservation:  Patient received Zoladex injection  for  fertility preservation; this should be continued once monthly during her chemotherapy.    · She is due for her next dose today and will continue this for 2 additional doses.   · Zoladex has been discontinued but patient has now started  menstrual periods     5. Right upper extremity DVT on Pradaxa. She continues on this with no issues.     6. Status post port placement, patient wants port removed and I think it is reasonable but will need to hold Pradaxa  · S/p port removal     7. Fatigue:  Patient complains of fatigue and dizziness with her menstrual periods; symptoms can last up to 1 week. Hemoglobin today is normal. Ordered iron profile, ferritin and TSH to further evaluate.   Advised patient to follow-up with her GYN to further discuss her menstrual symptoms.     8. Renal and Ovarian cysts.  We will continue to monitor.  · I have advised patient to consult her OB/GYN for ovarian cysts.    9. Family history of cancer.  Her mother was diagnosed with cholangiocarcinoma.    Plan   1. We reviewed with patient the CT scans from 12/12/19 which show mediastinal lymphadenopathy which has decreased in size.  There is also a small left renal cyst and a 2 cm partially involuted right ovarian cyst.   2. Pt has shown excellent response and likely scar tissue present.  3. pts mother will take her to her obgyn for evaluation of ovarian cyst.  Patient has appointment with OB/GYN today.  4. Follow up with Randy in 3 months with labs and CT scan prior.  5.     I, Aylin Ortiz, acted as scribe for Millie Padilla MD  in documenting the service or procedure. To the best of my knowledge, I recorded what was dictated by MD Millie Asif MD        CC: Dr. Arina Fox, at [facility]

## 2020-03-16 ENCOUNTER — LAB (OUTPATIENT)
Dept: LAB | Facility: HOSPITAL | Age: 25
End: 2020-03-16

## 2020-03-16 ENCOUNTER — OFFICE VISIT (OUTPATIENT)
Dept: ONCOLOGY | Facility: CLINIC | Age: 25
End: 2020-03-16

## 2020-03-16 VITALS
DIASTOLIC BLOOD PRESSURE: 71 MMHG | HEART RATE: 70 BPM | BODY MASS INDEX: 25.98 KG/M2 | OXYGEN SATURATION: 96 % | WEIGHT: 141.2 LBS | TEMPERATURE: 98.4 F | SYSTOLIC BLOOD PRESSURE: 107 MMHG | RESPIRATION RATE: 16 BRPM | HEIGHT: 62 IN

## 2020-03-16 DIAGNOSIS — N83.201 CYST OF RIGHT OVARY: ICD-10-CM

## 2020-03-16 DIAGNOSIS — I31.39 PERICARDIAL EFFUSION: ICD-10-CM

## 2020-03-16 DIAGNOSIS — C85.28 MEDIASTINAL LARGE B-CELL LYMPHOMA OF LYMPH NODES OF MULTIPLE REGIONS (HCC): ICD-10-CM

## 2020-03-16 DIAGNOSIS — C85.28 MEDIASTINAL LARGE B-CELL LYMPHOMA OF LYMPH NODES OF MULTIPLE REGIONS (HCC): Primary | ICD-10-CM

## 2020-03-16 DIAGNOSIS — N28.1 RENAL CYST: ICD-10-CM

## 2020-03-16 LAB
ALBUMIN SERPL-MCNC: 4.6 G/DL (ref 3.5–5.2)
ALBUMIN/GLOB SERPL: 2.1 G/DL (ref 1.1–2.4)
ALP SERPL-CCNC: 43 U/L (ref 38–116)
ALT SERPL W P-5'-P-CCNC: 18 U/L (ref 0–33)
ANION GAP SERPL CALCULATED.3IONS-SCNC: 11.2 MMOL/L (ref 5–15)
AST SERPL-CCNC: 22 U/L (ref 0–32)
BASOPHILS # BLD AUTO: 0.03 10*3/MM3 (ref 0–0.2)
BASOPHILS NFR BLD AUTO: 0.9 % (ref 0–1.5)
BILIRUB SERPL-MCNC: 0.4 MG/DL (ref 0.2–1.2)
BUN BLD-MCNC: 12 MG/DL (ref 6–20)
BUN/CREAT SERPL: 16.2 (ref 7.3–30)
CALCIUM SPEC-SCNC: 9.3 MG/DL (ref 8.5–10.2)
CHLORIDE SERPL-SCNC: 102 MMOL/L (ref 98–107)
CO2 SERPL-SCNC: 26.8 MMOL/L (ref 22–29)
CREAT BLD-MCNC: 0.74 MG/DL (ref 0.6–1.1)
DEPRECATED RDW RBC AUTO: 41.1 FL (ref 37–54)
EOSINOPHIL # BLD AUTO: 0.03 10*3/MM3 (ref 0–0.4)
EOSINOPHIL NFR BLD AUTO: 0.9 % (ref 0.3–6.2)
ERYTHROCYTE [DISTWIDTH] IN BLOOD BY AUTOMATED COUNT: 11.3 % (ref 12.3–15.4)
GFR SERPL CREATININE-BSD FRML MDRD: 116 ML/MIN/1.73
GFR SERPL CREATININE-BSD FRML MDRD: 96 ML/MIN/1.73
GLOBULIN UR ELPH-MCNC: 2.2 GM/DL (ref 1.8–3.5)
GLUCOSE BLD-MCNC: 102 MG/DL (ref 74–124)
HCT VFR BLD AUTO: 44.2 % (ref 34–46.6)
HGB BLD-MCNC: 14.6 G/DL (ref 12–15.9)
IMM GRANULOCYTES # BLD AUTO: 0.01 10*3/MM3 (ref 0–0.05)
IMM GRANULOCYTES NFR BLD AUTO: 0.3 % (ref 0–0.5)
LYMPHOCYTES # BLD AUTO: 1.2 10*3/MM3 (ref 0.7–3.1)
LYMPHOCYTES NFR BLD AUTO: 34.4 % (ref 19.6–45.3)
MCH RBC QN AUTO: 32.3 PG (ref 26.6–33)
MCHC RBC AUTO-ENTMCNC: 33 G/DL (ref 31.5–35.7)
MCV RBC AUTO: 97.8 FL (ref 79–97)
MONOCYTES # BLD AUTO: 0.38 10*3/MM3 (ref 0.1–0.9)
MONOCYTES NFR BLD AUTO: 10.9 % (ref 5–12)
NEUTROPHILS # BLD AUTO: 1.84 10*3/MM3 (ref 1.7–7)
NEUTROPHILS NFR BLD AUTO: 52.6 % (ref 42.7–76)
NRBC BLD AUTO-RTO: 0 /100 WBC (ref 0–0.2)
PLATELET # BLD AUTO: 181 10*3/MM3 (ref 140–450)
PMV BLD AUTO: 9.4 FL (ref 6–12)
POTASSIUM BLD-SCNC: 4.1 MMOL/L (ref 3.5–4.7)
PROT SERPL-MCNC: 6.8 G/DL (ref 6.3–8)
RBC # BLD AUTO: 4.52 10*6/MM3 (ref 3.77–5.28)
SODIUM BLD-SCNC: 140 MMOL/L (ref 134–145)
WBC NRBC COR # BLD: 3.49 10*3/MM3 (ref 3.4–10.8)

## 2020-03-16 PROCEDURE — 80053 COMPREHEN METABOLIC PANEL: CPT

## 2020-03-16 PROCEDURE — 99213 OFFICE O/P EST LOW 20 MIN: CPT | Performed by: INTERNAL MEDICINE

## 2020-03-16 PROCEDURE — 36415 COLL VENOUS BLD VENIPUNCTURE: CPT

## 2020-03-16 PROCEDURE — 85025 COMPLETE CBC W/AUTO DIFF WBC: CPT

## 2020-06-09 ENCOUNTER — HOSPITAL ENCOUNTER (OUTPATIENT)
Dept: PET IMAGING | Facility: HOSPITAL | Age: 25
Discharge: HOME | End: 2020-06-09
Admitting: INTERNAL MEDICINE

## 2020-06-09 DIAGNOSIS — C85.28 MEDIASTINAL LARGE B-CELL LYMPHOMA OF LYMPH NODES OF MULTIPLE REGIONS (HCC): ICD-10-CM

## 2020-06-09 LAB — CREAT BLDA-MCNC: 0.8 MG/DL (ref 0.6–1.3)

## 2020-06-09 PROCEDURE — 71260 CT THORAX DX C+: CPT

## 2020-06-09 PROCEDURE — 0 DIATRIZOATE MEGLUMINE & SODIUM PER 1 ML: Performed by: INTERNAL MEDICINE

## 2020-06-09 PROCEDURE — 74177 CT ABD & PELVIS W/CONTRAST: CPT

## 2020-06-09 PROCEDURE — 70491 CT SOFT TISSUE NECK W/DYE: CPT

## 2020-06-09 PROCEDURE — 82565 ASSAY OF CREATININE: CPT

## 2020-06-09 PROCEDURE — 25010000002 IOPAMIDOL 61 % SOLUTION: Performed by: INTERNAL MEDICINE

## 2020-06-09 RX ADMIN — IOPAMIDOL 85 ML: 612 INJECTION, SOLUTION INTRAVENOUS at 09:30

## 2020-06-09 RX ADMIN — DIATRIZOATE MEGLUMINE AND DIATRIZOATE SODIUM 30 ML: 660; 100 LIQUID ORAL; RECTAL at 08:50

## 2020-06-15 ENCOUNTER — LAB (OUTPATIENT)
Dept: LAB | Facility: HOSPITAL | Age: 25
End: 2020-06-15

## 2020-06-15 ENCOUNTER — OFFICE VISIT (OUTPATIENT)
Dept: ONCOLOGY | Facility: CLINIC | Age: 25
End: 2020-06-15

## 2020-06-15 VITALS
BODY MASS INDEX: 26.26 KG/M2 | TEMPERATURE: 97.7 F | RESPIRATION RATE: 16 BRPM | OXYGEN SATURATION: 94 % | HEART RATE: 59 BPM | SYSTOLIC BLOOD PRESSURE: 96 MMHG | DIASTOLIC BLOOD PRESSURE: 64 MMHG | HEIGHT: 62 IN | WEIGHT: 142.7 LBS

## 2020-06-15 DIAGNOSIS — C85.28 MEDIASTINAL LARGE B-CELL LYMPHOMA OF LYMPH NODES OF MULTIPLE REGIONS (HCC): ICD-10-CM

## 2020-06-15 DIAGNOSIS — C85.22 MEDIASTINAL (THYMIC) LARGE B-CELL LYMPHOMA INTRATHORACIC LYMPH NODES (HCC): Primary | ICD-10-CM

## 2020-06-15 LAB
ALBUMIN SERPL-MCNC: 4.6 G/DL (ref 3.5–5.2)
ALBUMIN/GLOB SERPL: 2 G/DL (ref 1.1–2.4)
ALP SERPL-CCNC: 40 U/L (ref 38–116)
ALT SERPL W P-5'-P-CCNC: 18 U/L (ref 0–33)
ANION GAP SERPL CALCULATED.3IONS-SCNC: 10.5 MMOL/L (ref 5–15)
AST SERPL-CCNC: 19 U/L (ref 0–32)
BASOPHILS # BLD AUTO: 0.01 10*3/MM3 (ref 0–0.2)
BASOPHILS NFR BLD AUTO: 0.3 % (ref 0–1.5)
BILIRUB SERPL-MCNC: 0.2 MG/DL (ref 0.2–1.2)
BUN BLD-MCNC: 14 MG/DL (ref 6–20)
BUN/CREAT SERPL: 16.3 (ref 7.3–30)
CALCIUM SPEC-SCNC: 9.1 MG/DL (ref 8.5–10.2)
CHLORIDE SERPL-SCNC: 103 MMOL/L (ref 98–107)
CO2 SERPL-SCNC: 27.5 MMOL/L (ref 22–29)
CREAT BLD-MCNC: 0.86 MG/DL (ref 0.6–1.1)
DEPRECATED RDW RBC AUTO: 39.8 FL (ref 37–54)
EOSINOPHIL # BLD AUTO: 0.02 10*3/MM3 (ref 0–0.4)
EOSINOPHIL NFR BLD AUTO: 0.5 % (ref 0.3–6.2)
ERYTHROCYTE [DISTWIDTH] IN BLOOD BY AUTOMATED COUNT: 11.2 % (ref 12.3–15.4)
GFR SERPL CREATININE-BSD FRML MDRD: 80 ML/MIN/1.73
GFR SERPL CREATININE-BSD FRML MDRD: 97 ML/MIN/1.73
GLOBULIN UR ELPH-MCNC: 2.3 GM/DL (ref 1.8–3.5)
GLUCOSE BLD-MCNC: 91 MG/DL (ref 74–124)
HCT VFR BLD AUTO: 40.3 % (ref 34–46.6)
HGB BLD-MCNC: 13.4 G/DL (ref 12–15.9)
IMM GRANULOCYTES # BLD AUTO: 0.01 10*3/MM3 (ref 0–0.05)
IMM GRANULOCYTES NFR BLD AUTO: 0.3 % (ref 0–0.5)
LDH SERPL-CCNC: 147 U/L (ref 99–259)
LYMPHOCYTES # BLD AUTO: 1.4 10*3/MM3 (ref 0.7–3.1)
LYMPHOCYTES NFR BLD AUTO: 35.5 % (ref 19.6–45.3)
MCH RBC QN AUTO: 32.2 PG (ref 26.6–33)
MCHC RBC AUTO-ENTMCNC: 33.3 G/DL (ref 31.5–35.7)
MCV RBC AUTO: 96.9 FL (ref 79–97)
MONOCYTES # BLD AUTO: 0.36 10*3/MM3 (ref 0.1–0.9)
MONOCYTES NFR BLD AUTO: 9.1 % (ref 5–12)
NEUTROPHILS # BLD AUTO: 2.14 10*3/MM3 (ref 1.7–7)
NEUTROPHILS NFR BLD AUTO: 54.3 % (ref 42.7–76)
NRBC BLD AUTO-RTO: 0 /100 WBC (ref 0–0.2)
PLATELET # BLD AUTO: 185 10*3/MM3 (ref 140–450)
PMV BLD AUTO: 9.9 FL (ref 6–12)
POTASSIUM BLD-SCNC: 4.3 MMOL/L (ref 3.5–4.7)
PROT SERPL-MCNC: 6.9 G/DL (ref 6.3–8)
RBC # BLD AUTO: 4.16 10*6/MM3 (ref 3.77–5.28)
SODIUM BLD-SCNC: 141 MMOL/L (ref 134–145)
WBC NRBC COR # BLD: 3.94 10*3/MM3 (ref 3.4–10.8)

## 2020-06-15 PROCEDURE — 99213 OFFICE O/P EST LOW 20 MIN: CPT | Performed by: INTERNAL MEDICINE

## 2020-06-15 PROCEDURE — 80053 COMPREHEN METABOLIC PANEL: CPT

## 2020-06-15 PROCEDURE — 36415 COLL VENOUS BLD VENIPUNCTURE: CPT

## 2020-06-15 PROCEDURE — 83615 LACTATE (LD) (LDH) ENZYME: CPT

## 2020-06-15 PROCEDURE — 85025 COMPLETE CBC W/AUTO DIFF WBC: CPT

## 2020-06-15 NOTE — PROGRESS NOTES
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 She did initiate EPOCH-R  in the hospital on 06/16/2018.  Treated 6 cycles of treatment.  Completed October 2018 followed with observation    History of Present Illness     The patient is a 25 y.o. female with the above-mentioned history, with history of mediastinal large B-cell lymphoma status post completion of 6 cycles of chemotherapy with dose adusted EPOCH-R in October 2018.  Patient has no complaints.  Currently she denies any complaints.  She had a CT scan of the neck chest abdomen pelvis June 9, 2020 and she has stable scar tissue in the mediastinum.  She is totally asymptomatic.  She has not lost weight, no fever or night sweats.  Is got a good appetite.    Past Medical History, Past Surgical History, Social History, Family History have been reviewed and are without significant changes except as mentioned.    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  She saw Dr. Arina Cohen on last Friday.  The reason the patient was referred to Dr. Cohen was because they thought she had cardiomegaly on chest x-ray.  However a chest x-ray was ordered which showedThe chest x-ray showed extensive abnormal increased density throughout the anterior mediastinum extending into the left hilar region and left perihilar region and is most likely due to confluent lymphadenopathy.     CT angiogram of the chest did not show pulmonary embolism but showed     there is a large conglomerate  mass encases multiple vascular structures of the mediastinum and left  hilar region that is most likely extensive confluent lymphadenopathy.  The primary differential diagnostic considerations would be lymphoma.  The tumor posteriorly displaces the pulmonary arteries and the left and  moderately narrows the main pulmonary artery. The tumor also posteriorly  displaces the trachea and markedly  narrows the left mainstem bronchus.  The pulmonary veins in the left are also encased and narrowed. The mass  encases and markedly narrows the SVC. The large edy mass measures  approximately 19.8 x 13.7 x 14.0 cm in diameter. Enlarged lymph nodes  are also present in the left supraclavicular region where they appear to  produce occlusion of the left internal jugular vein. There is no  axillary lymphadenopathy. There are no filling defects within the  pulmonary arteries. There is no CT evidence of pulmonary embolism. There  is a small left pleural effusion. A small pericardial effusion measuring  8 mm in maximum thickness is also present. There is compressive  atelectasis of the left lung. Patchy airspace opacities are also present  in the superior aspect of the left upper lobe. These are most likely due  to direct tumor infiltration of the lung parenchyma, but could also  represent postobstructive pneumonia. The right lung is well expanded and  clear. Images through the upper abdomen partially show what could be a  edy mass in the retroperitoneum posterior and inferior to the  pancreas. Further evaluation with a CT scan of the abdomen and pelvis is  recommended. Bone window images demonstrate patchy sclerosis in the C7  and T1 and T2 and T3 and T4 vertebral body suspicious for bone  involvement with lymphoma.     IMPRESSION:  Very large tumor mass in the mediastinum encasing multiple  vascular structures and also moderately narrowing the left mainstem  bronchus as described in more detail above. Direct tumor infiltration of  the left upper lobe is also suspected. Small left pleural effusion and a  small pericardial effusion. There may also be partially visualized  lymphadenopathy in the upper abdomen. Patchy areas of sclerosis are also  noted in the C7 and T1 and T2 and T3 and T4 vertebral bodies suspicious  for osseous metastatic disease. The findings are consistent with  Lymphoma.     Dr. Cohen felt that she  had a small pericardial effusion.  Patient has been symptomatic with cough which is worsening which started back in February 2018 and worsened over the last 4 months.  Patient also has some shortness of breath with walking up the steps.  She has not lost weight.  She say she is reasonable appetite.  She does have fever kind of low-grade fever and night sweats.  She is more fatigued compared to before.  She was referred to us because of concern that this could be lymphoma.  She does not have any tissue diagnosis yet.  Patient does complain of back pain.    Echo done on admission June 12, 2018 by Dr. Fitzgerald showed that the patient's posterior and appears large primary leukemia the left ventricle and apex.  There is no tamponade.  The pericardium appears thickened pointing to involvement of the pericardium into the mediastinal process.  Dr. Fitzgerald felt that it would be difficult to do pericardial synthesis as the mediastinal mass covers the anterior aspect of the heart.  Ejection fraction is 58%  CT-guided needle biopsy June 12, 2018 was negative  LDH is elevated.  Uric acid is high.  MRI thoracic spine, negative  CT abdomen pelvis negative  Scan July 13, 2018 shows large infiltrative edy mass in the anterior mediastinum and left hilar region that nearly completely fills the left hemithorax and shows intense hypermetabolism with an SUV of 19.8.  No foci of pathologic hypermetabolism are identified elsewhere in the chest, neck abdomen or pelvis.    Pathology was consistent with primary mediastinal large B-cell lymphoma.    Patient was seen by Dr. Melgar.  He discussed harvesting eggs versus Zoladex plus oral contraceptives versus Zoladex alone for fertility preservation.  Due to large size of the tumor and rapid initiation of treatment needed the patient was not able to have aches harvested done.  Because she is at increased risk of thrombosis with the use of oral contraceptives secondary to malignancy he recommended  Zoladex alone.  Patient received first dose of Zoladex 3.6 mg subcutaneous on Belia 15, 2018.    Patient started on EPOCH/R on June 16, 2018    Patient had a reaction to Rituxan which improved with steroids, Benadryl and Pepcid.  She had to receive it slow.  She started having itching over her EARS , sore throat.  She was given IV steroids Benadryl and Pepcid and following that she was started at a slower rate and she completed it.    Right upper extremity Doppler ultrasound showed new superficial vein thrombosis around the PICC line with no extension into the deep system.  Repeat Doppler was ordered.    A Doppler ultrasound initially showed superficial thrombophlebitis.  She was on prophylactic Arixtra.  A Doppler was repeated 2 days later on 6/20/18  which showed extension of thrombus into the axillary and brachial veins.  She was therefore started on Xarelto 15 mg one every 12 hours and the PICC line was removed as soon as chemotherapy completed on 6/20/18.  She will take 15 mg of Xarelto every 12 hours for 21 days and then transition to 20 mg once a day.  The patient will have CBC performed twice a week.  If the platelet count drops below 50,000, anticoagulation will need to be temporarily held  Patient was started on acyclovir/fluconazole/Bactrim  Bone marrow done June 14, 2018, morphology was negative for lymphoma    Fertility preservation, Zoladex is next due July 12, 2018.  CT scan and PET scan showing significant response after cycle 2 of chemotherapy  Next dose of Lupron August 13, 2018  Status post 5 cycles  OF epoch/r and is due cycle 6 on oct 8th.    Patient admitted October 20, 2018 and discharged October 22, 2018 when she was admitted with neutropenic fever.  She was treated with broad-spectrum antibiotics.  Her respiratory viral panel was positive for parainfluenza virus.    PET scan with significant improvement but residual 7.8 cm lesion without any activity.  Reviewed CT scan from January 15,  "2019    Patient seen by Dr. Fox at the [facility], agreed with observation at present no plans on radiation.    CT scan and PET scan from April 5 and April 9, 2019 has been reviewed and discussed with patient.  There is further decrease in the mediastinal lymphadenopathy.    CT chest abdomen pelvis July 22, 2019 with further decrease in the mediastinal lymphadenopathy significantly    We reviewed with patient the CT scans from 12/12/19 which show mediastinal lymphadenopathy which has decreased in size.   There is also a small left renal cyst and a 2 cm partially involuted right ovarian cyst.         Review of Systems   Constitutional: Negative for appetite change, chills, diaphoresis, fatigue, fever and unexpected weight change.        Hot flashes in the evenings 04/12/19   HENT: Negative for hearing loss, sore throat and trouble swallowing.    Respiratory: Negative for cough, chest tightness, shortness of breath and wheezing.    Cardiovascular: Negative for chest pain, palpitations and leg swelling.   Gastrointestinal: Negative for abdominal distention, abdominal pain, constipation, diarrhea, nausea and vomiting.   Genitourinary: Negative for dysuria, frequency, hematuria and urgency.   Musculoskeletal: Negative for joint swelling.        No muscle weakness.   Skin: Negative for rash and wound.   Neurological: Negative for seizures, syncope, speech difficulty, weakness, numbness and headaches.   Hematological: Negative for adenopathy. Does not bruise/bleed easily.   Psychiatric/Behavioral: Negative for behavioral problems, confusion and suicidal ideas.   Review of systems updated as of Belia 15, 2020 unchanged    Medications:  The current medication list was reviewed in the EMR    ALLERGIES:  No Known Allergies    Objective      Vitals:    06/15/20 1552   BP: 96/64   Pulse: 59   Resp: 16   Temp: 97.7 °F (36.5 °C)   TempSrc: Skin   SpO2: 94%   Weight: 64.7 kg (142 lb 11.2 oz)   Height: 158 cm (62.21\") "   PainSc: 0-No pain     Current Status 6/15/2020   ECOG score 0       Physical Exam       CONSTITUTIONAL:  Vital signs reviewed.  No distress, looks comfortable.  EYES:  Conjunctiva and lids unremarkable.  PERRLA  EARS,NOSE,MOUTH,THROAT:  Ears and nose appear unremarkable.  Lips, teeth, gums appear unremarkable.  RESPIRATORY:  Normal respiratory effort.  Lungs clear to auscultation bilaterally.  CARDIOVASCULAR:  Normal S1, S2.  No murmurs rubs or gallops.  No significant lower extremity edema.  GASTROINTESTINAL: Abdomen appears unremarkable.  Nontender.  No hepatomegaly.  No splenomegaly.  LYMPHATIC:  No cervical, supraclavicular, axillary lymphadenopathy.  SKIN:  Warm.  No rashes.  PSYCHIATRIC:  Normal judgment and insight.  Normal mood and affect.          RECENT LABS:  Results from last 7 days   Lab Units 06/15/20  1547   WBC 10*3/mm3 3.94   NEUTROS ABS 10*3/mm3 2.14   HEMOGLOBIN g/dL 13.4   HEMATOCRIT % 40.3   PLATELETS 10*3/mm3 185           CT NECK, CHEST, ABDOMEN, AND PELVIS WITH IV CONTRAST 06/09/20  IMPRESSION:  1.  Infiltrative soft tissue thickening within the mediastinum and along  the pericardium is grossly unchanged since 12/12/2019.  2.  Short segments of wall thickening involving the ascending colon and  hepatic flexure with associated asymmetric stranding along the right  paracolic gutter. While findings may be related to asymmetric collapse  of these bowel loops, and mild colitis could appear similar, and  correlation with patient history is recommended.  3.  Other findings as above.      Assessment/Plan   1.  Primary mediastinal large B-cell lymphoma: The patient presented with dyspnea, cough, and chest pain.  A CT angiogram of the chest 6/8/18 showed a very large tumor mass in the mediastinum encasing multiple vascular structures and narrowing the left mainstem bronchus.  There was direct tumor infiltration in the left upper lobe.  There was a small left pleural effusion.  She underwent a  CT-guided biopsy of the mediastinal mass on 6/12/18 and pathology reviewed at [facility] oncology showed diffuse large B-cell lymphoma consistent with a primary diffuse large B-cell lymphoma.  A bone marrow exam was performed on 6/14/18 which was negative for lymphoma.  She underwent a PET scan 6/13/18 which showed a large hypermetabolic mass in the chest involving the anterior mediastinum, left hilum, nearly completely filling the left hemothorax with SUV 19.8.  There was physiologic distribution elsewhere.     The patient was started on IV fluid hydration and allopurinol for prevention of hyperuricemia.  She initiated systemic chemotherapy with DA-EPOCH-R on 6/16/18 and completed the evening of 6/21/18.  She had a infusion reaction to rituximab but was able to complete with additional medications and otherwise tolerated chemotherapy well.  She will require additional premedications with additional rituximab.  Plan is to monitor her blood counts twice per week outpatient and proceed with cycle 2 of chemotherapy day 21.     The patient had significant improvement in shortness of breath, cough, and chest pain by the time of discharge.    · Patient received Neulasta support  · Her white count dropped down to as low as 0.8 low-grade temperature and patient was placed on Levaquin ×5 days starting June 27, 2018, neutropenia AT  11 days posttreatment.  Platelets dropped to 82.  · Her next ZOLADEX injection is due July 12.  · She is due to start chemotherapy on July 9.  · Discussed with Dr. Fox at the [facility] and his suggestion was to do the CT scan and PET scan after 2 cycles and discuss the results with him.  If she has an excellent response early on she would not require any further treatments after completion of 6 cycles of EPOCH/R  · Patient being admitted for cycle 3 of chemotherapy on July 30, 2018.  · She's status post cycle 4 of chemotherapy and CT scan has been reviewed following 4 cycles with  continued response.  · She is due for ZOLADEX  injection on October 8, 2018.   · Patient is due to go for cycle 5 of chemotherapy on Monday, September 17, 2018  ·  echocardiogram done August 22, 2018 shows ejection fraction of 59% to 60% with the eldest strain of 19.2%  · Cycle 6 chemotherapy with dose adjusted EPOCH/R on October 8, 2018.  · Reviewed CT scan and PET scan and the images with the patient and family.  Significant response with decrease in the mediastinal mass to 7.8 cm and activity level is 2.4 a week on PET scan.  · Reviewed repeat PET scan still with 7.8 cm tumor in the mediastinum.  The SUV is 2.4 and this could be scar tissue.  · The patient is due for her next dose of Zoladex today. As noted above, her ANC has been steadily declining over the last several months in the absence of infectious symptoms. This will be further detailed below. She will proceed with Zoladex, as scheduled.   · Discontinue Zoladex  · Reviewed CT as of January 15, 2019 with further improvement  · No plans on radiation to the mediastinal lymph node given the PET scan showed that it is photopenic  · Reviewed CT from April 5, 2019 with further decrease in the mediastinal lymph node.  Reviewed PET scan.  PET scan shows overall improvement in the thickness within the pericardium. The slight area of increased uptake in the superior aspect of the right heart border which appears to be mildly increased they are concerned of residual malignancy.  However the irregular septal thickening in the left apex and left upper lobe have improved and the FDG uptake in the left upper lobe has decreased continued follow-up is recommended.   Recent CT neck, chest abdomen pelvis scan from June 9, 2020 showed stable mediastinal scar tissue.  No evidence of malignancy    2.  Pericardial effusion:   a 2-D echocardiogram 6/8/18 showed a left ventricular systolic function 58%.  There was a 2 cm pericardial effusion with no tamponade.  The pericardium  appeared thickened.  She had no evidence of volume overload.  It is resolved     3. Fertility preservation:  Patient received Zoladex injection  for fertility preservation; this should be continued once monthly during her chemotherapy.    · She is due for her next dose today and will continue this for 2 additional doses.   · Zoladex has been discontinued but patient has now started  menstrual periods     4. Right upper extremity DVT on Pradaxa. She continues on this with no issues.     5. Status post port placement, patient wants port removed and I think it is reasonable but will need to hold Pradaxa  · S/p port removal     6. Renal and Ovarian cysts.  We will continue to monitor.  · I have advised patient to consult her OB/GYN for ovarian cysts.  · CT scan does not show evidence of any ovarian cyst from June 9/2020    7. Family history of cancer.  Her mother was diagnosed with cholangiocarcinoma.    Plan  1. Reviewed CT scan from June 9, 2020 without any progressive disease  2. Follow-up in 3 months with labs with CT scan to be done in 6 months from now  3. Patient currently does not show evidence of recurrence and is doing well.    Millie Padilla MD        CC: Dr. Arina Fox, at [facility]

## 2020-09-01 DIAGNOSIS — C85.22 MEDIASTINAL (THYMIC) LARGE B-CELL LYMPHOMA INTRATHORACIC LYMPH NODES (HCC): Primary | ICD-10-CM

## 2020-09-08 ENCOUNTER — APPOINTMENT (OUTPATIENT)
Dept: LAB | Facility: HOSPITAL | Age: 25
End: 2020-09-08

## 2020-09-08 DIAGNOSIS — C85.22 MEDIASTINAL (THYMIC) LARGE B-CELL LYMPHOMA INTRATHORACIC LYMPH NODES (HCC): Primary | ICD-10-CM

## 2020-09-09 ENCOUNTER — TELEPHONE (OUTPATIENT)
Dept: ONCOLOGY | Facility: CLINIC | Age: 25
End: 2020-09-09

## 2020-09-09 NOTE — TELEPHONE ENCOUNTER
----- Message from Millie Padilla MD sent at 9/8/2020  3:28 PM EDT -----  Regarding: RE: PT CANCELLED 3 MONTH MD APPT FOR TODAY 9/8  That is fine.  Please schedule her for CT scan of the neck chest abdomen pelvis with contrast in 3 months and have her see me after CT scan done in 3 months with labs CBC CMP.  Oncology nurses can place the order for the CT scan.  Please notify the patient    Millie Padilla MD      ----- Message -----  From: Lisa Shah  Sent: 9/8/2020   8:07 AM EDT  To: Millie Padilla MD  Subject: PT CANCELLED 3 MONTH MD APPT FOR TODAY 9/8       Dr Padilla  PT cancelled her MD appt for today 9/8- PT states she does not understand why she needs to come in to the office for appt- she is typically a 6month ret she states- she would like to get scans ordered and make her appt after the scans in 3 months?  What would you like me to do?  Thank you Susan

## 2020-12-08 ENCOUNTER — TELEPHONE (OUTPATIENT)
Dept: ONCOLOGY | Facility: CLINIC | Age: 25
End: 2020-12-08

## 2020-12-08 NOTE — TELEPHONE ENCOUNTER
Patient called she had to reschedule her CT scan please reschedule her appts for the 15th any day starting the 17th. Please leave info on VM she will be at work     Best call back number [phone]

## 2020-12-09 ENCOUNTER — APPOINTMENT (OUTPATIENT)
Dept: PET IMAGING | Facility: HOSPITAL | Age: 25
End: 2020-12-09

## 2020-12-15 ENCOUNTER — APPOINTMENT (OUTPATIENT)
Dept: LAB | Facility: HOSPITAL | Age: 25
End: 2020-12-15

## 2020-12-16 ENCOUNTER — HOSPITAL ENCOUNTER (OUTPATIENT)
Dept: PET IMAGING | Facility: HOSPITAL | Age: 25
Discharge: HOME | End: 2020-12-16
Admitting: INTERNAL MEDICINE

## 2020-12-16 DIAGNOSIS — C85.22 MEDIASTINAL (THYMIC) LARGE B-CELL LYMPHOMA INTRATHORACIC LYMPH NODES (HCC): ICD-10-CM

## 2020-12-16 LAB — CREAT BLDA-MCNC: 0.8 MG/DL (ref 0.6–1.3)

## 2020-12-16 PROCEDURE — 70491 CT SOFT TISSUE NECK W/DYE: CPT

## 2020-12-16 PROCEDURE — 25010000002 IOPAMIDOL 61 % SOLUTION: Performed by: INTERNAL MEDICINE

## 2020-12-16 PROCEDURE — 71260 CT THORAX DX C+: CPT

## 2020-12-16 PROCEDURE — 0 DIATRIZOATE MEGLUMINE & SODIUM PER 1 ML: Performed by: INTERNAL MEDICINE

## 2020-12-16 PROCEDURE — 82565 ASSAY OF CREATININE: CPT

## 2020-12-16 PROCEDURE — 74177 CT ABD & PELVIS W/CONTRAST: CPT

## 2020-12-16 RX ADMIN — DIATRIZOATE MEGLUMINE AND DIATRIZOATE SODIUM 30 ML: 660; 100 LIQUID ORAL; RECTAL at 10:30

## 2020-12-16 RX ADMIN — IOPAMIDOL 85 ML: 612 INJECTION, SOLUTION INTRAVENOUS at 11:30

## 2020-12-21 ENCOUNTER — OFFICE VISIT (OUTPATIENT)
Dept: LAB | Facility: HOSPITAL | Age: 25
End: 2020-12-21

## 2020-12-21 ENCOUNTER — OFFICE VISIT (OUTPATIENT)
Dept: ONCOLOGY | Facility: CLINIC | Age: 25
End: 2020-12-21

## 2020-12-21 VITALS
HEART RATE: 53 BPM | RESPIRATION RATE: 16 BRPM | OXYGEN SATURATION: 97 % | WEIGHT: 132.5 LBS | DIASTOLIC BLOOD PRESSURE: 64 MMHG | HEIGHT: 62 IN | SYSTOLIC BLOOD PRESSURE: 102 MMHG | TEMPERATURE: 97.5 F | BODY MASS INDEX: 24.38 KG/M2

## 2020-12-21 DIAGNOSIS — C85.22 MEDIASTINAL (THYMIC) LARGE B-CELL LYMPHOMA INTRATHORACIC LYMPH NODES (HCC): ICD-10-CM

## 2020-12-21 DIAGNOSIS — C85.22 MEDIASTINAL (THYMIC) LARGE B-CELL LYMPHOMA INTRATHORACIC LYMPH NODES (HCC): Primary | ICD-10-CM

## 2020-12-21 LAB
ALBUMIN SERPL-MCNC: 4.8 G/DL (ref 3.5–5.2)
ALBUMIN/GLOB SERPL: 2 G/DL (ref 1.1–2.4)
ALP SERPL-CCNC: 38 U/L (ref 38–116)
ALT SERPL W P-5'-P-CCNC: 22 U/L (ref 0–33)
ANION GAP SERPL CALCULATED.3IONS-SCNC: 7.6 MMOL/L (ref 5–15)
AST SERPL-CCNC: 22 U/L (ref 0–32)
BASOPHILS # BLD AUTO: 0.02 10*3/MM3 (ref 0–0.2)
BASOPHILS NFR BLD AUTO: 0.7 % (ref 0–1.5)
BILIRUB SERPL-MCNC: 0.3 MG/DL (ref 0.2–1.2)
BUN SERPL-MCNC: 8 MG/DL (ref 6–20)
BUN/CREAT SERPL: 10.5 (ref 7.3–30)
CALCIUM SPEC-SCNC: 9.8 MG/DL (ref 8.5–10.2)
CHLORIDE SERPL-SCNC: 102 MMOL/L (ref 98–107)
CO2 SERPL-SCNC: 30.4 MMOL/L (ref 22–29)
CREAT SERPL-MCNC: 0.76 MG/DL (ref 0.6–1.1)
DEPRECATED RDW RBC AUTO: 41.9 FL (ref 37–54)
EOSINOPHIL # BLD AUTO: 0.01 10*3/MM3 (ref 0–0.4)
EOSINOPHIL NFR BLD AUTO: 0.3 % (ref 0.3–6.2)
ERYTHROCYTE [DISTWIDTH] IN BLOOD BY AUTOMATED COUNT: 11.5 % (ref 12.3–15.4)
GFR SERPL CREATININE-BSD FRML MDRD: 112 ML/MIN/1.73
GFR SERPL CREATININE-BSD FRML MDRD: 93 ML/MIN/1.73
GLOBULIN UR ELPH-MCNC: 2.4 GM/DL (ref 1.8–3.5)
GLUCOSE SERPL-MCNC: 93 MG/DL (ref 74–124)
HCT VFR BLD AUTO: 43.8 % (ref 34–46.6)
HGB BLD-MCNC: 14.3 G/DL (ref 12–15.9)
IMM GRANULOCYTES # BLD AUTO: 0.01 10*3/MM3 (ref 0–0.05)
IMM GRANULOCYTES NFR BLD AUTO: 0.3 % (ref 0–0.5)
LDH SERPL-CCNC: 162 U/L (ref 99–259)
LYMPHOCYTES # BLD AUTO: 1.2 10*3/MM3 (ref 0.7–3.1)
LYMPHOCYTES NFR BLD AUTO: 40.3 % (ref 19.6–45.3)
MCH RBC QN AUTO: 32.4 PG (ref 26.6–33)
MCHC RBC AUTO-ENTMCNC: 32.6 G/DL (ref 31.5–35.7)
MCV RBC AUTO: 99.1 FL (ref 79–97)
MONOCYTES # BLD AUTO: 0.25 10*3/MM3 (ref 0.1–0.9)
MONOCYTES NFR BLD AUTO: 8.4 % (ref 5–12)
NEUTROPHILS NFR BLD AUTO: 1.49 10*3/MM3 (ref 1.7–7)
NEUTROPHILS NFR BLD AUTO: 50 % (ref 42.7–76)
NRBC BLD AUTO-RTO: 0 /100 WBC (ref 0–0.2)
PLATELET # BLD AUTO: 182 10*3/MM3 (ref 140–450)
PMV BLD AUTO: 10 FL (ref 6–12)
POTASSIUM SERPL-SCNC: 4 MMOL/L (ref 3.5–4.7)
PROT SERPL-MCNC: 7.2 G/DL (ref 6.3–8)
RBC # BLD AUTO: 4.42 10*6/MM3 (ref 3.77–5.28)
SODIUM SERPL-SCNC: 140 MMOL/L (ref 134–145)
WBC # BLD AUTO: 2.98 10*3/MM3 (ref 3.4–10.8)

## 2020-12-21 PROCEDURE — 80053 COMPREHEN METABOLIC PANEL: CPT

## 2020-12-21 PROCEDURE — 36415 COLL VENOUS BLD VENIPUNCTURE: CPT

## 2020-12-21 PROCEDURE — 99213 OFFICE O/P EST LOW 20 MIN: CPT | Performed by: INTERNAL MEDICINE

## 2020-12-21 PROCEDURE — 85025 COMPLETE CBC W/AUTO DIFF WBC: CPT

## 2020-12-21 PROCEDURE — 83615 LACTATE (LD) (LDH) ENZYME: CPT

## 2020-12-21 NOTE — PROGRESS NOTES
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 She did initiate EPOCH-R  in the hospital on 06/16/2018.  Treated 6 cycles of treatment.  Completed October 2018 followed with observation    History of Present Illness     The patient is a 25 y.o. female with the above-mentioned history, with history of mediastinal large B-cell lymphoma status post completion of 6 cycles of chemotherapy with dose adusted EPOCH-R in October 2018.  Patient has no complaints.  Currently she denies any complaints.  She had a CT scan of the neck chest abdomen pelvis June 9, 2020 and she has stable scar tissue in the mediastinum.  She is totally asymptomatic.  She has not lost weight, no fever or night sweats.  Is got a good appetite.    Interval history: Patient is doing very well without complaints.  Reviewed recent CT scan and is negative for mets except for some thickening around the heart which is stable from before in the mediastinum.  No B symptoms    Past Medical History, Past Surgical History, Social History, Family History have been reviewed and are without significant changes except as mentioned.    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  She saw Dr. Arina Cohen on last Friday.  The reason the patient was referred to Dr. Cohen was because they thought she had cardiomegaly on chest x-ray.  However a chest x-ray was ordered which showedThe chest x-ray showed extensive abnormal increased density throughout the anterior mediastinum extending into the left hilar region and left perihilar region and is most likely due to confluent lymphadenopathy.     CT angiogram of the chest did not show pulmonary embolism but showed     there is a large conglomerate  mass encases multiple vascular structures of the mediastinum and left  hilar region that is most likely extensive confluent lymphadenopathy.  The primary differential  diagnostic considerations would be lymphoma.  The tumor posteriorly displaces the pulmonary arteries and the left and  moderately narrows the main pulmonary artery. The tumor also posteriorly  displaces the trachea and markedly narrows the left mainstem bronchus.  The pulmonary veins in the left are also encased and narrowed. The mass  encases and markedly narrows the SVC. The large edy mass measures  approximately 19.8 x 13.7 x 14.0 cm in diameter. Enlarged lymph nodes  are also present in the left supraclavicular region where they appear to  produce occlusion of the left internal jugular vein. There is no  axillary lymphadenopathy. There are no filling defects within the  pulmonary arteries. There is no CT evidence of pulmonary embolism. There  is a small left pleural effusion. A small pericardial effusion measuring  8 mm in maximum thickness is also present. There is compressive  atelectasis of the left lung. Patchy airspace opacities are also present  in the superior aspect of the left upper lobe. These are most likely due  to direct tumor infiltration of the lung parenchyma, but could also  represent postobstructive pneumonia. The right lung is well expanded and  clear. Images through the upper abdomen partially show what could be a  edy mass in the retroperitoneum posterior and inferior to the  pancreas. Further evaluation with a CT scan of the abdomen and pelvis is  recommended. Bone window images demonstrate patchy sclerosis in the C7  and T1 and T2 and T3 and T4 vertebral body suspicious for bone  involvement with lymphoma.     IMPRESSION:  Very large tumor mass in the mediastinum encasing multiple  vascular structures and also moderately narrowing the left mainstem  bronchus as described in more detail above. Direct tumor infiltration of  the left upper lobe is also suspected. Small left pleural effusion and a  small pericardial effusion. There may also be partially visualized  lymphadenopathy in the  upper abdomen. Patchy areas of sclerosis are also  noted in the C7 and T1 and T2 and T3 and T4 vertebral bodies suspicious  for osseous metastatic disease. The findings are consistent with  Lymphoma.     Dr. Cohen felt that she had a small pericardial effusion.  Patient has been symptomatic with cough which is worsening which started back in February 2018 and worsened over the last 4 months.  Patient also has some shortness of breath with walking up the steps.  She has not lost weight.  She say she is reasonable appetite.  She does have fever kind of low-grade fever and night sweats.  She is more fatigued compared to before.  She was referred to us because of concern that this could be lymphoma.  She does not have any tissue diagnosis yet.  Patient does complain of back pain.    Echo done on admission June 12, 2018 by Dr. Fitzgerald showed that the patient's posterior and appears large primary leukemia the left ventricle and apex.  There is no tamponade.  The pericardium appears thickened pointing to involvement of the pericardium into the mediastinal process.  Dr. Fitzgerald felt that it would be difficult to do pericardial synthesis as the mediastinal mass covers the anterior aspect of the heart.  Ejection fraction is 58%  CT-guided needle biopsy June 12, 2018 was negative  LDH is elevated.  Uric acid is high.  MRI thoracic spine, negative  CT abdomen pelvis negative  Scan July 13, 2018 shows large infiltrative edy mass in the anterior mediastinum and left hilar region that nearly completely fills the left hemithorax and shows intense hypermetabolism with an SUV of 19.8.  No foci of pathologic hypermetabolism are identified elsewhere in the chest, neck abdomen or pelvis.    Pathology was consistent with primary mediastinal large B-cell lymphoma.    Patient was seen by Dr. Melgar.  He discussed harvesting eggs versus Zoladex plus oral contraceptives versus Zoladex alone for fertility preservation.  Due to large size of the  tumor and rapid initiation of treatment needed the patient was not able to have aches harvested done.  Because she is at increased risk of thrombosis with the use of oral contraceptives secondary to malignancy he recommended Zoladex alone.  Patient received first dose of Zoladex 3.6 mg subcutaneous on Belia 15, 2018.    Patient started on EPOCH/R on June 16, 2018    Patient had a reaction to Rituxan which improved with steroids, Benadryl and Pepcid.  She had to receive it slow.  She started having itching over her EARS , sore throat.  She was given IV steroids Benadryl and Pepcid and following that she was started at a slower rate and she completed it.    Right upper extremity Doppler ultrasound showed new superficial vein thrombosis around the PICC line with no extension into the deep system.  Repeat Doppler was ordered.    A Doppler ultrasound initially showed superficial thrombophlebitis.  She was on prophylactic Arixtra.  A Doppler was repeated 2 days later on 6/20/18  which showed extension of thrombus into the axillary and brachial veins.  She was therefore started on Xarelto 15 mg one every 12 hours and the PICC line was removed as soon as chemotherapy completed on 6/20/18.  She will take 15 mg of Xarelto every 12 hours for 21 days and then transition to 20 mg once a day.  The patient will have CBC performed twice a week.  If the platelet count drops below 50,000, anticoagulation will need to be temporarily held  Patient was started on acyclovir/fluconazole/Bactrim  Bone marrow done June 14, 2018, morphology was negative for lymphoma    Fertility preservation, Zoladex is next due July 12, 2018.  CT scan and PET scan showing significant response after cycle 2 of chemotherapy  Next dose of Lupron August 13, 2018  Status post 5 cycles  OF epoch/r and is due cycle 6 on oct 8th.    Patient admitted October 20, 2018 and discharged October 22, 2018 when she was admitted with neutropenic fever.  She was treated with  broad-spectrum antibiotics.  Her respiratory viral panel was positive for parainfluenza virus.    PET scan with significant improvement but residual 7.8 cm lesion without any activity.  Reviewed CT scan from January 15, 2019    Patient seen by Dr. Fox at the [facility], agreed with observation at present no plans on radiation.    CT scan and PET scan from April 5 and April 9, 2019 has been reviewed and discussed with patient.  There is further decrease in the mediastinal lymphadenopathy.    CT chest abdomen pelvis July 22, 2019 with further decrease in the mediastinal lymphadenopathy significantly    We reviewed with patient the CT scans from 12/12/19 which show mediastinal lymphadenopathy which has decreased in size.   There is also a small left renal cyst and a 2 cm partially involuted right ovarian cyst.         Review of Systems   Constitutional: Negative for appetite change, chills, diaphoresis, fatigue, fever and unexpected weight change.        Hot flashes in the evenings 04/12/19   HENT: Negative for hearing loss, sore throat and trouble swallowing.    Respiratory: Negative for cough, chest tightness, shortness of breath and wheezing.    Cardiovascular: Negative for chest pain, palpitations and leg swelling.   Gastrointestinal: Negative for abdominal distention, abdominal pain, constipation, diarrhea, nausea and vomiting.   Genitourinary: Negative for dysuria, frequency, hematuria and urgency.   Musculoskeletal: Negative for joint swelling.        No muscle weakness.   Skin: Negative for rash and wound.   Neurological: Negative for seizures, syncope, speech difficulty, weakness, numbness and headaches.   Hematological: Negative for adenopathy. Does not bruise/bleed easily.   Psychiatric/Behavioral: Negative for behavioral problems, confusion and suicidal ideas.   All other systems reviewed and are negative.  I have reviewed and confirmed the accuracy of the ROS as documented by the MA/LPN/RN  "Millie Padilla MD        Medications:  The current medication list was reviewed in the EMR    ALLERGIES:  No Known Allergies    Objective      Vitals:    12/21/20 1033   BP: 102/64   Pulse: 53   Resp: 16   Temp: 97.5 °F (36.4 °C)   TempSrc: Temporal   SpO2: 97%   Weight: 60.1 kg (132 lb 8 oz)   Height: 158 cm (62.21\")   PainSc: 0-No pain     Current Status 12/21/2020   ECOG score 0       Physical Exam       CONSTITUTIONAL:  Vital signs reviewed.  No distress, looks comfortable.  EYES:  Conjunctiva and lids unremarkable.  PERRLA  EARS,NOSE,MOUTH,THROAT:  Ears and nose appear unremarkable.  Lips, teeth, gums appear unremarkable.  RESPIRATORY:  Normal respiratory effort.  Lungs clear to auscultation bilaterally.  CARDIOVASCULAR:  Normal S1, S2.  No murmurs rubs or gallops.  No significant lower extremity edema.  GASTROINTESTINAL: Abdomen appears unremarkable.  Nontender.  No hepatomegaly.  No splenomegaly.  LYMPHATIC:  No cervical, supraclavicular, axillary lymphadenopathy.  SKIN:  Warm.  No rashes.  PSYCHIATRIC:  Normal judgment and insight.  Normal mood and affect.    I have reexamined the patient and the results are consistent with the previously documented exam. Millie Padilla MD         RECENT LABS:  Results from last 7 days   Lab Units 12/21/20  1105   WBC 10*3/mm3 2.98*   NEUTROS ABS 10*3/mm3 1.49*   HEMOGLOBIN g/dL 14.3   HEMATOCRIT % 43.8   PLATELETS 10*3/mm3 182           CT NECK, CHEST, ABDOMEN, AND PELVIS WITH IV CONTRAST 12/16/20    IMPRESSION:  1. There is no change in the sliver of residual soft tissue along the  left aspect of the mediastinum. There is no lymphadenopathy within the  neck, abdomen, or pelvis and there is no new abnormality.  2. Significant constipation.      CT NECK, CHEST, ABDOMEN, AND PELVIS WITH IV CONTRAST 06/09/20  IMPRESSION:  1.  Infiltrative soft tissue thickening within the mediastinum and along  the pericardium is grossly unchanged since 12/12/2019.  2.  Short segments of " wall thickening involving the ascending colon and  hepatic flexure with associated asymmetric stranding along the right  paracolic gutter. While findings may be related to asymmetric collapse  of these bowel loops, and mild colitis could appear similar, and  correlation with patient history is recommended.  3.  Other findings as above.      Assessment/Plan   1.  Primary mediastinal large B-cell lymphoma: The patient presented with dyspnea, cough, and chest pain.  A CT angiogram of the chest 6/8/18 showed a very large tumor mass in the mediastinum encasing multiple vascular structures and narrowing the left mainstem bronchus.  There was direct tumor infiltration in the left upper lobe.  There was a small left pleural effusion.  She underwent a CT-guided biopsy of the mediastinal mass on 6/12/18 and pathology reviewed at [facility] oncology showed diffuse large B-cell lymphoma consistent with a primary diffuse large B-cell lymphoma.  A bone marrow exam was performed on 6/14/18 which was negative for lymphoma.  She underwent a PET scan 6/13/18 which showed a large hypermetabolic mass in the chest involving the anterior mediastinum, left hilum, nearly completely filling the left hemothorax with SUV 19.8.  There was physiologic distribution elsewhere.     The patient was started on IV fluid hydration and allopurinol for prevention of hyperuricemia.  She initiated systemic chemotherapy with DA-EPOCH-R on 6/16/18 and completed the evening of 6/21/18.  She had a infusion reaction to rituximab but was able to complete with additional medications and otherwise tolerated chemotherapy well.  She will require additional premedications with additional rituximab.  Plan is to monitor her blood counts twice per week outpatient and proceed with cycle 2 of chemotherapy day 21.     The patient had significant improvement in shortness of breath, cough, and chest pain by the time of discharge.    · Patient received Neulasta  support  · Her white count dropped down to as low as 0.8 low-grade temperature and patient was placed on Levaquin ×5 days starting June 27, 2018, neutropenia AT  11 days posttreatment.  Platelets dropped to 82.  · Her next ZOLADEX injection is due July 12.  · She is due to start chemotherapy on July 9.  · Discussed with Dr. Fox at the [facility] and his suggestion was to do the CT scan and PET scan after 2 cycles and discuss the results with him.  If she has an excellent response early on she would not require any further treatments after completion of 6 cycles of EPOCH/R  · Patient being admitted for cycle 3 of chemotherapy on July 30, 2018.  · She's status post cycle 4 of chemotherapy and CT scan has been reviewed following 4 cycles with continued response.  · She is due for ZOLADEX  injection on October 8, 2018.   · Patient is due to go for cycle 5 of chemotherapy on Monday, September 17, 2018  ·  echocardiogram done August 22, 2018 shows ejection fraction of 59% to 60% with the eldest strain of 19.2%  · Cycle 6 chemotherapy with dose adjusted EPOCH/R on October 8, 2018.  · Reviewed CT scan and PET scan and the images with the patient and family.  Significant response with decrease in the mediastinal mass to 7.8 cm and activity level is 2.4 a week on PET scan.  · Reviewed repeat PET scan still with 7.8 cm tumor in the mediastinum.  The SUV is 2.4 and this could be scar tissue.  · The patient is due for her next dose of Zoladex today. As noted above, her ANC has been steadily declining over the last several months in the absence of infectious symptoms. This will be further detailed below. She will proceed with Zoladex, as scheduled.   · Discontinue Zoladex  · Reviewed CT as of January 15, 2019 with further improvement  · No plans on radiation to the mediastinal lymph node given the PET scan showed that it is photopenic  · Reviewed CT from April 5, 2019 with further decrease in the mediastinal lymph  node.  Reviewed PET scan.  PET scan shows overall improvement in the thickness within the pericardium. The slight area of increased uptake in the superior aspect of the right heart border which appears to be mildly increased they are concerned of residual malignancy.  However the irregular septal thickening in the left apex and left upper lobe have improved and the FDG uptake in the left upper lobe has decreased continued follow-up is recommended.   · Currently doing very well.  Reviewed CT scan December 2020 and negative    2.  Pericardial effusion:   a 2-D echocardiogram 6/8/18 showed a left ventricular systolic function 58%.  There was a 2 cm pericardial effusion with no tamponade.  The pericardium appeared thickened.  She had no evidence of volume overload.  It is resolved     3. Fertility preservation:  Patient received Zoladex injection  for fertility preservation; this should be continued once monthly during her chemotherapy.    · She is due for her next dose today and will continue this for 2 additional doses.   · Zoladex has been discontinued but patient has now started  menstrual periods     4. Right upper extremity DVT on Pradaxa. She continues on this with no issues.  Stable    5. Status post port placement, patient wants port removed and I think it is reasonable but will need to hold Pradaxa  · S/p port removal     6. Renal and Ovarian cysts.  We will continue to monitor.  · I have advised patient to consult her OB/GYN for ovarian cysts.  · CT scan does not show evidence of any ovarian cyst from June 9/2020    7. Family history of cancer.  Her mother was diagnosed with cholangiocarcinoma.    Plan  · Reviewed CT scan negative  · Follow-up 6 months with labs  · We will obtain CT scan 1 week prior to MD visit  · Patient go back to lab to get labs drawn today      Millie Padilla MD        CC: Dr. Arina Fox, at [facility]

## 2021-01-19 ENCOUNTER — TELEPHONE (OUTPATIENT)
Dept: ONCOLOGY | Facility: CLINIC | Age: 26
End: 2021-01-19

## 2021-01-19 NOTE — TELEPHONE ENCOUNTER
Call to let Ms. Durham know that it should be fine for her to receive the covid vaccine per Dr. Padilla.  Patient verbalized understanding.

## 2021-04-16 ENCOUNTER — BULK ORDERING (OUTPATIENT)
Dept: CASE MANAGEMENT | Facility: OTHER | Age: 26
End: 2021-04-16

## 2021-04-16 DIAGNOSIS — Z23 IMMUNIZATION DUE: ICD-10-CM

## 2021-06-04 ENCOUNTER — TELEPHONE (OUTPATIENT)
Dept: ONCOLOGY | Facility: CLINIC | Age: 26
End: 2021-06-04

## 2021-06-04 NOTE — TELEPHONE ENCOUNTER
Caller: MARINE    Relationship to patient:     Best call back number: [phone]    Chief complaint:PT NEEDING TO R/S     Type of visit: CT AND VITAL AND F/U     Requested date: PT AVAI 6/18 AND 6/24-6/30     If rescheduling, when is the original appointment: 6/7 AND 6/14    Additional notes

## 2021-06-07 ENCOUNTER — HOSPITAL ENCOUNTER (OUTPATIENT)
Dept: PET IMAGING | Facility: HOSPITAL | Age: 26
End: 2021-06-07

## 2021-06-14 ENCOUNTER — APPOINTMENT (OUTPATIENT)
Dept: LAB | Facility: HOSPITAL | Age: 26
End: 2021-06-14

## 2021-06-24 ENCOUNTER — HOSPITAL ENCOUNTER (OUTPATIENT)
Dept: PET IMAGING | Facility: HOSPITAL | Age: 26
Discharge: HOME | End: 2021-06-24
Admitting: INTERNAL MEDICINE

## 2021-06-24 DIAGNOSIS — C85.22 MEDIASTINAL (THYMIC) LARGE B-CELL LYMPHOMA INTRATHORACIC LYMPH NODES (HCC): ICD-10-CM

## 2021-06-24 LAB — CREAT BLDA-MCNC: 0.7 MG/DL (ref 0.6–1.3)

## 2021-06-24 PROCEDURE — 0 DIATRIZOATE MEGLUMINE & SODIUM PER 1 ML: Performed by: INTERNAL MEDICINE

## 2021-06-24 PROCEDURE — 25010000002 IOPAMIDOL 61 % SOLUTION: Performed by: INTERNAL MEDICINE

## 2021-06-24 PROCEDURE — 74177 CT ABD & PELVIS W/CONTRAST: CPT

## 2021-06-24 PROCEDURE — 71260 CT THORAX DX C+: CPT

## 2021-06-24 PROCEDURE — 70491 CT SOFT TISSUE NECK W/DYE: CPT

## 2021-06-24 PROCEDURE — 82565 ASSAY OF CREATININE: CPT

## 2021-06-24 RX ADMIN — DIATRIZOATE MEGLUMINE AND DIATRIZOATE SODIUM 30 ML: 660; 100 LIQUID ORAL; RECTAL at 13:05

## 2021-06-24 RX ADMIN — IOPAMIDOL 85 ML: 612 INJECTION, SOLUTION INTRAVENOUS at 14:05

## 2021-06-30 ENCOUNTER — LAB (OUTPATIENT)
Dept: OTHER | Facility: HOSPITAL | Age: 26
End: 2021-06-30

## 2021-06-30 ENCOUNTER — APPOINTMENT (OUTPATIENT)
Dept: OTHER | Facility: HOSPITAL | Age: 26
End: 2021-06-30

## 2021-06-30 ENCOUNTER — OFFICE VISIT (OUTPATIENT)
Dept: ONCOLOGY | Facility: CLINIC | Age: 26
End: 2021-06-30

## 2021-06-30 VITALS
SYSTOLIC BLOOD PRESSURE: 104 MMHG | DIASTOLIC BLOOD PRESSURE: 71 MMHG | BODY MASS INDEX: 26.55 KG/M2 | WEIGHT: 144.3 LBS | OXYGEN SATURATION: 99 % | HEIGHT: 62 IN | TEMPERATURE: 97.8 F | HEART RATE: 77 BPM | RESPIRATION RATE: 16 BRPM

## 2021-06-30 DIAGNOSIS — C85.22 MEDIASTINAL (THYMIC) LARGE B-CELL LYMPHOMA INTRATHORACIC LYMPH NODES (HCC): Primary | ICD-10-CM

## 2021-06-30 LAB
ALBUMIN SERPL-MCNC: 4.6 G/DL (ref 3.5–5.2)
ALBUMIN/GLOB SERPL: 2.2 G/DL
ALP SERPL-CCNC: 41 U/L (ref 39–117)
ALT SERPL W P-5'-P-CCNC: 16 U/L (ref 1–33)
ANION GAP SERPL CALCULATED.3IONS-SCNC: 11.1 MMOL/L (ref 5–15)
AST SERPL-CCNC: 21 U/L (ref 1–32)
BASOPHILS # BLD AUTO: 0.02 10*3/MM3 (ref 0–0.2)
BASOPHILS NFR BLD AUTO: 0.4 % (ref 0–1.5)
BILIRUB SERPL-MCNC: 0.3 MG/DL (ref 0–1.2)
BUN SERPL-MCNC: 11 MG/DL (ref 6–20)
BUN/CREAT SERPL: 15.7 (ref 7–25)
CALCIUM SPEC-SCNC: 9.6 MG/DL (ref 8.6–10.5)
CHLORIDE SERPL-SCNC: 101 MMOL/L (ref 98–107)
CO2 SERPL-SCNC: 26.9 MMOL/L (ref 22–29)
CREAT SERPL-MCNC: 0.7 MG/DL (ref 0.57–1)
DEPRECATED RDW RBC AUTO: 41.4 FL (ref 37–54)
EOSINOPHIL # BLD AUTO: 0.03 10*3/MM3 (ref 0–0.4)
EOSINOPHIL NFR BLD AUTO: 0.7 % (ref 0.3–6.2)
ERYTHROCYTE [DISTWIDTH] IN BLOOD BY AUTOMATED COUNT: 11.6 % (ref 12.3–15.4)
GFR SERPL CREATININE-BSD FRML MDRD: 101 ML/MIN/1.73
GFR SERPL CREATININE-BSD FRML MDRD: 123 ML/MIN/1.73
GLOBULIN UR ELPH-MCNC: 2.1 GM/DL
GLUCOSE SERPL-MCNC: 87 MG/DL (ref 65–99)
HCT VFR BLD AUTO: 41.3 % (ref 34–46.6)
HGB BLD-MCNC: 13.6 G/DL (ref 12–15.9)
IMM GRANULOCYTES # BLD AUTO: 0.02 10*3/MM3 (ref 0–0.05)
IMM GRANULOCYTES NFR BLD AUTO: 0.4 % (ref 0–0.5)
LYMPHOCYTES # BLD AUTO: 1.35 10*3/MM3 (ref 0.7–3.1)
LYMPHOCYTES NFR BLD AUTO: 29.3 % (ref 19.6–45.3)
MCH RBC QN AUTO: 31.6 PG (ref 26.6–33)
MCHC RBC AUTO-ENTMCNC: 32.9 G/DL (ref 31.5–35.7)
MCV RBC AUTO: 95.8 FL (ref 79–97)
MONOCYTES # BLD AUTO: 0.38 10*3/MM3 (ref 0.1–0.9)
MONOCYTES NFR BLD AUTO: 8.3 % (ref 5–12)
NEUTROPHILS NFR BLD AUTO: 2.8 10*3/MM3 (ref 1.7–7)
NEUTROPHILS NFR BLD AUTO: 60.9 % (ref 42.7–76)
NRBC BLD AUTO-RTO: 0 /100 WBC (ref 0–0.2)
PLATELET # BLD AUTO: 208 10*3/MM3 (ref 140–450)
PMV BLD AUTO: 10.6 FL (ref 6–12)
POTASSIUM SERPL-SCNC: 4.2 MMOL/L (ref 3.5–5.2)
PROT SERPL-MCNC: 6.7 G/DL (ref 6–8.5)
RBC # BLD AUTO: 4.31 10*6/MM3 (ref 3.77–5.28)
SODIUM SERPL-SCNC: 139 MMOL/L (ref 136–145)
WBC # BLD AUTO: 4.6 10*3/MM3 (ref 3.4–10.8)

## 2021-06-30 PROCEDURE — 36415 COLL VENOUS BLD VENIPUNCTURE: CPT | Performed by: INTERNAL MEDICINE

## 2021-06-30 PROCEDURE — 85025 COMPLETE CBC W/AUTO DIFF WBC: CPT | Performed by: INTERNAL MEDICINE

## 2021-06-30 PROCEDURE — 99213 OFFICE O/P EST LOW 20 MIN: CPT | Performed by: INTERNAL MEDICINE

## 2021-06-30 PROCEDURE — 80053 COMPREHEN METABOLIC PANEL: CPT | Performed by: INTERNAL MEDICINE

## 2021-06-30 RX ORDER — ZINC GLUCONATE 50 MG
50 TABLET ORAL DAILY
Status: ON HOLD | COMMUNITY
Start: 2021-04-08 | End: 2022-08-30

## 2021-06-30 NOTE — PROGRESS NOTES
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 She did initiate EPOCH-R  in the hospital on 06/16/2018.  Treated 6 cycles of treatment.  Completed October 2018 followed with observation    History of Present Illness     The patient is a 26 y.o. female with the above-mentioned history, with history of mediastinal large B-cell lymphoma status post completion of 6 cycles of chemotherapy with dose adusted EPOCH-R in October 2018.  . 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  She has gained weight.  She is got a good appetite.        Past Medical History, Past Surgical History, Social History, Family History have been reviewed and are without significant changes except as mentioned.    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  She saw Dr. Arina Cohen on last Friday.  The reason the patient was referred to Dr. Cohen was because they thought she had cardiomegaly on chest x-ray.  However a chest x-ray was ordered which showedThe chest x-ray showed extensive abnormal increased density throughout the anterior mediastinum extending into the left hilar region and left perihilar region and is most likely due to confluent lymphadenopathy.     CT angiogram of the chest did not show pulmonary embolism but showed     there is a large conglomerate  mass encases multiple vascular structures of the mediastinum and left  hilar region that is most likely extensive confluent lymphadenopathy.  The primary differential diagnostic considerations would be lymphoma.  The tumor posteriorly displaces the pulmonary arteries and the left and  moderately narrows the main pulmonary artery. The tumor also posteriorly  displaces the trachea and markedly narrows the left mainstem bronchus.  The pulmonary veins in the left are also encased and narrowed. The mass  encases and markedly narrows the SVC. The large  edy mass measures  approximately 19.8 x 13.7 x 14.0 cm in diameter. Enlarged lymph nodes  are also present in the left supraclavicular region where they appear to  produce occlusion of the left internal jugular vein. There is no  axillary lymphadenopathy. There are no filling defects within the  pulmonary arteries. There is no CT evidence of pulmonary embolism. There  is a small left pleural effusion. A small pericardial effusion measuring  8 mm in maximum thickness is also present. There is compressive  atelectasis of the left lung. Patchy airspace opacities are also present  in the superior aspect of the left upper lobe. These are most likely due  to direct tumor infiltration of the lung parenchyma, but could also  represent postobstructive pneumonia. The right lung is well expanded and  clear. Images through the upper abdomen partially show what could be a  edy mass in the retroperitoneum posterior and inferior to the  pancreas. Further evaluation with a CT scan of the abdomen and pelvis is  recommended. Bone window images demonstrate patchy sclerosis in the C7  and T1 and T2 and T3 and T4 vertebral body suspicious for bone  involvement with lymphoma.     IMPRESSION:  Very large tumor mass in the mediastinum encasing multiple  vascular structures and also moderately narrowing the left mainstem  bronchus as described in more detail above. Direct tumor infiltration of  the left upper lobe is also suspected. Small left pleural effusion and a  small pericardial effusion. There may also be partially visualized  lymphadenopathy in the upper abdomen. Patchy areas of sclerosis are also  noted in the C7 and T1 and T2 and T3 and T4 vertebral bodies suspicious  for osseous metastatic disease. The findings are consistent with  Lymphoma.     Dr. Cohen felt that she had a small pericardial effusion.  Patient has been symptomatic with cough which is worsening which started back in February 2018 and worsened over the last 4  months.  Patient also has some shortness of breath with walking up the steps.  She has not lost weight.  She say she is reasonable appetite.  She does have fever kind of low-grade fever and night sweats.  She is more fatigued compared to before.  She was referred to us because of concern that this could be lymphoma.  She does not have any tissue diagnosis yet.  Patient does complain of back pain.    Echo done on admission June 12, 2018 by Dr. Fitzgerald showed that the patient's posterior and appears large primary leukemia the left ventricle and apex.  There is no tamponade.  The pericardium appears thickened pointing to involvement of the pericardium into the mediastinal process.  Dr. Fitzgerald felt that it would be difficult to do pericardial synthesis as the mediastinal mass covers the anterior aspect of the heart.  Ejection fraction is 58%  CT-guided needle biopsy June 12, 2018 was negative  LDH is elevated.  Uric acid is high.  MRI thoracic spine, negative  CT abdomen pelvis negative  Scan July 13, 2018 shows large infiltrative edy mass in the anterior mediastinum and left hilar region that nearly completely fills the left hemithorax and shows intense hypermetabolism with an SUV of 19.8.  No foci of pathologic hypermetabolism are identified elsewhere in the chest, neck abdomen or pelvis.    Pathology was consistent with primary mediastinal large B-cell lymphoma.    Patient was seen by Dr. Melgar.  He discussed harvesting eggs versus Zoladex plus oral contraceptives versus Zoladex alone for fertility preservation.  Due to large size of the tumor and rapid initiation of treatment needed the patient was not able to have aches harvested done.  Because she is at increased risk of thrombosis with the use of oral contraceptives secondary to malignancy he recommended Zoladex alone.  Patient received first dose of Zoladex 3.6 mg subcutaneous on Belia 15, 2018.    Patient started on EPOCH/R on June 16, 2018    Patient had a  reaction to Rituxan which improved with steroids, Benadryl and Pepcid.  She had to receive it slow.  She started having itching over her EARS , sore throat.  She was given IV steroids Benadryl and Pepcid and following that she was started at a slower rate and she completed it.    Right upper extremity Doppler ultrasound showed new superficial vein thrombosis around the PICC line with no extension into the deep system.  Repeat Doppler was ordered.    A Doppler ultrasound initially showed superficial thrombophlebitis.  She was on prophylactic Arixtra.  A Doppler was repeated 2 days later on 6/20/18  which showed extension of thrombus into the axillary and brachial veins.  She was therefore started on Xarelto 15 mg one every 12 hours and the PICC line was removed as soon as chemotherapy completed on 6/20/18.  She will take 15 mg of Xarelto every 12 hours for 21 days and then transition to 20 mg once a day.  The patient will have CBC performed twice a week.  If the platelet count drops below 50,000, anticoagulation will need to be temporarily held  Patient was started on acyclovir/fluconazole/Bactrim  Bone marrow done June 14, 2018, morphology was negative for lymphoma    Fertility preservation, Zoladex is next due July 12, 2018.  CT scan and PET scan showing significant response after cycle 2 of chemotherapy  Next dose of Lupron August 13, 2018  Status post 5 cycles  OF epoch/r and is due cycle 6 on oct 8th.    Patient admitted October 20, 2018 and discharged October 22, 2018 when she was admitted with neutropenic fever.  She was treated with broad-spectrum antibiotics.  Her respiratory viral panel was positive for parainfluenza virus.    PET scan with significant improvement but residual 7.8 cm lesion without any activity.  Reviewed CT scan from January 15, 2019    Patient seen by Dr. Fox at the [facility], agreed with observation at present no plans on radiation.    CT scan and PET scan from April 5 and  "April 9, 2019 has been reviewed and discussed with patient.  There is further decrease in the mediastinal lymphadenopathy.    CT chest abdomen pelvis July 22, 2019 with further decrease in the mediastinal lymphadenopathy significantly    We reviewed with patient the CT scans from 12/12/19 which show mediastinal lymphadenopathy which has decreased in size.   There is also a small left renal cyst and a 2 cm partially involuted right ovarian cyst.         Review of Systems   Constitutional: Negative for appetite change, chills, diaphoresis, fatigue, fever and unexpected weight change.        Hot flashes in the evenings 04/12/19   HENT: Negative for hearing loss, sore throat and trouble swallowing.    Respiratory: Negative for cough, chest tightness, shortness of breath and wheezing.    Cardiovascular: Negative for chest pain, palpitations and leg swelling.   Gastrointestinal: Negative for abdominal distention, abdominal pain, constipation, diarrhea, nausea and vomiting.   Genitourinary: Negative for dysuria, frequency, hematuria and urgency.   Musculoskeletal: Negative for joint swelling.        No muscle weakness.   Skin: Negative for rash and wound.   Neurological: Negative for seizures, syncope, speech difficulty, weakness, numbness and headaches.   Hematological: Negative for adenopathy. Does not bruise/bleed easily.   Psychiatric/Behavioral: Negative for behavioral problems, confusion and suicidal ideas.   All other systems reviewed and are negative.  I have reviewed and confirmed the accuracy of the ROS as documented by the MA/LPN/RN Millie Padilla MD        Medications:  The current medication list was reviewed in the EMR    ALLERGIES:    Allergies   Allergen Reactions   • Rituximab Itching     Ear itching       Objective      Vitals:    06/30/21 1035   BP: 104/71   Pulse: 77   Resp: 16   Temp: 97.8 °F (36.6 °C)   TempSrc: Temporal   SpO2: 99%   Weight: 65.5 kg (144 lb 4.8 oz)   Height: 158 cm (62.21\") "   PainSc: 0-No pain     Current Status 6/30/2021   ECOG score 0       Physical Exam         CONSTITUTIONAL:  Vital signs reviewed.  Alert and oriented x3  No distress, looks comfortable.  EYES:  Conjunctiva and lids unremarkable.  Extraocular eye movements intact.  HEENT: No evidence of lymphadenopathy, no thyromegaly  RESPIRATORY:  Normal respiratory effort.  , no rales  or wheezing, clear   CARDIOVASCULAR:  Regular rate and rhythm, no murmur  No significant lower extremity edema.  Abdomen: Soft nontender positive bowel sounds no hepatosplenomegaly  SKIN: No wounds.  No rashes.  MUSCULOSKELETAL/EXTREMITIES: No clubbing or cyanosis.  No apparent unilateral weakness.  NEURO: CN 2-12 appear intact. No focal neurological deficits noted.  PSYCHIATRIC:  Normal judgment and insight.  Normal mood and affect.      RECENT LABS:  Results from last 7 days   Lab Units 06/30/21  1138   WBC 10*3/mm3 4.60   NEUTROS ABS 10*3/mm3 2.80   HEMOGLOBIN g/dL 13.6   HEMATOCRIT % 41.3   PLATELETS 10*3/mm3 208           CT NECK, CHEST, ABDOMEN, AND PELVIS WITH IV CONTRAST 12/16/20    IMPRESSION:  1. There is no change in the sliver of residual soft tissue along the  left aspect of the mediastinum. There is no lymphadenopathy within the  neck, abdomen, or pelvis and there is no new abnormality.  2. Significant constipation.      CT NECK, CHEST, ABDOMEN, AND PELVIS WITH IV CONTRAST 06/09/20  IMPRESSION:  1.  Infiltrative soft tissue thickening within the mediastinum and along  the pericardium is grossly unchanged since 12/12/2019.  2.  Short segments of wall thickening involving the ascending colon and  hepatic flexure with associated asymmetric stranding along the right  paracolic gutter. While findings may be related to asymmetric collapse  of these bowel loops, and mild colitis could appear similar, and  correlation with patient history is recommended.  3.  Other findings as above.      Assessment/Plan   1.  Primary mediastinal large B-cell  lymphoma: The patient presented with dyspnea, cough, and chest pain.  A CT angiogram of the chest 6/8/18 showed a very large tumor mass in the mediastinum encasing multiple vascular structures and narrowing the left mainstem bronchus.  There was direct tumor infiltration in the left upper lobe.  There was a small left pleural effusion.  She underwent a CT-guided biopsy of the mediastinal mass on 6/12/18 and pathology reviewed at [facility] oncology showed diffuse large B-cell lymphoma consistent with a primary diffuse large B-cell lymphoma.  A bone marrow exam was performed on 6/14/18 which was negative for lymphoma.  She underwent a PET scan 6/13/18 which showed a large hypermetabolic mass in the chest involving the anterior mediastinum, left hilum, nearly completely filling the left hemothorax with SUV 19.8.  There was physiologic distribution elsewhere.     The patient was started on IV fluid hydration and allopurinol for prevention of hyperuricemia.  She initiated systemic chemotherapy with DA-EPOCH-R on 6/16/18 and completed the evening of 6/21/18.  She had a infusion reaction to rituximab but was able to complete with additional medications and otherwise tolerated chemotherapy well.  She will require additional premedications with additional rituximab.  Plan is to monitor her blood counts twice per week outpatient and proceed with cycle 2 of chemotherapy day 21.     The patient had significant improvement in shortness of breath, cough, and chest pain by the time of discharge.    · Patient received Neulasta support  · Her white count dropped down to as low as 0.8 low-grade temperature and patient was placed on Levaquin ×5 days starting June 27, 2018, neutropenia AT  11 days posttreatment.  Platelets dropped to 82.  · Her next ZOLADEX injection is due July 12.  · She is due to start chemotherapy on July 9.  · Discussed with Dr. Fox at the [facility] and his suggestion was to do the CT scan and PET  scan after 2 cycles and discuss the results with him.  If she has an excellent response early on she would not require any further treatments after completion of 6 cycles of EPOCH/R  · Patient being admitted for cycle 3 of chemotherapy on July 30, 2018.  · She's status post cycle 4 of chemotherapy and CT scan has been reviewed following 4 cycles with continued response.  · She is due for ZOLADEX  injection on October 8, 2018.   · Patient is due to go for cycle 5 of chemotherapy on Monday, September 17, 2018  ·  echocardiogram done August 22, 2018 shows ejection fraction of 59% to 60% with the eldest strain of 19.2%  · Cycle 6 chemotherapy with dose adjusted EPOCH/R on October 8, 2018.  · Reviewed CT scan and PET scan and the images with the patient and family.  Significant response with decrease in the mediastinal mass to 7.8 cm and activity level is 2.4 a week on PET scan.  · Reviewed repeat PET scan still with 7.8 cm tumor in the mediastinum.  The SUV is 2.4 and this could be scar tissue.  · The patient is due for her next dose of Zoladex today. As noted above, her ANC has been steadily declining over the last several months in the absence of infectious symptoms. This will be further detailed below. She will proceed with Zoladex, as scheduled.   · Discontinue Zoladex  · Reviewed CT as of January 15, 2019 with further improvement  · No plans on radiation to the mediastinal lymph node given the PET scan showed that it is photopenic  · Reviewed CT scan from June 2021.  And there is no evidence of disease    2.  Pericardial effusion:   a 2-D echocardiogram 6/8/18 showed a left ventricular systolic function 58%.  There was a 2 cm pericardial effusion with no tamponade.  The pericardium appeared thickened.  She had no evidence of volume overload.  It is resolved     3. Fertility preservation:  Patient received Zoladex injection  for fertility preservation; this should be continued once monthly during her chemotherapy.     · She is due for her next dose today and will continue this for 2 additional doses.   · Zoladex has been discontinued but patient has now started  menstrual periods     4. Right upper extremity DVT on Pradaxa. She continues on this with no issues.  Stable    5. Status post port placement, patient wants port removed and I think it is reasonable but will need to hold Pradaxa  · S/p port removal     6. Renal and Ovarian cysts.  We will continue to monitor.  · I have advised patient to consult her OB/GYN for ovarian cysts.  · CT scan does not show evidence of any ovarian cyst from June 9/2020    7. Family history of cancer.  Her mother was diagnosed with cholangiocarcinoma.    Plan  · Reviewed CT scan negative  · Patient to go back to lab today  · Follow-up 6 months with labs      Millie Padilla MD        CC: Dr. Arina Fox, at [facility]

## 2021-12-15 ENCOUNTER — LAB (OUTPATIENT)
Dept: OTHER | Facility: HOSPITAL | Age: 26
End: 2021-12-15

## 2021-12-15 ENCOUNTER — OFFICE VISIT (OUTPATIENT)
Dept: ONCOLOGY | Facility: CLINIC | Age: 26
End: 2021-12-15

## 2021-12-15 VITALS
HEART RATE: 51 BPM | HEIGHT: 62 IN | DIASTOLIC BLOOD PRESSURE: 66 MMHG | TEMPERATURE: 97.7 F | BODY MASS INDEX: 28.23 KG/M2 | SYSTOLIC BLOOD PRESSURE: 112 MMHG | WEIGHT: 153.4 LBS | RESPIRATION RATE: 16 BRPM | OXYGEN SATURATION: 98 %

## 2021-12-15 DIAGNOSIS — C85.22 MEDIASTINAL (THYMIC) LARGE B-CELL LYMPHOMA INTRATHORACIC LYMPH NODES (HCC): ICD-10-CM

## 2021-12-15 DIAGNOSIS — C85.22 MEDIASTINAL (THYMIC) LARGE B-CELL LYMPHOMA INTRATHORACIC LYMPH NODES (HCC): Primary | ICD-10-CM

## 2021-12-15 LAB
ALBUMIN SERPL-MCNC: 4.7 G/DL (ref 3.5–5.2)
ALBUMIN/GLOB SERPL: 2 G/DL
ALP SERPL-CCNC: 43 U/L (ref 39–117)
ALT SERPL W P-5'-P-CCNC: 18 U/L (ref 1–33)
ANION GAP SERPL CALCULATED.3IONS-SCNC: 8.3 MMOL/L (ref 5–15)
AST SERPL-CCNC: 21 U/L (ref 1–32)
BASOPHILS # BLD AUTO: 0.03 10*3/MM3 (ref 0–0.2)
BASOPHILS NFR BLD AUTO: 0.7 % (ref 0–1.5)
BILIRUB SERPL-MCNC: 0.2 MG/DL (ref 0–1.2)
BUN SERPL-MCNC: 15 MG/DL (ref 6–20)
BUN/CREAT SERPL: 18.8 (ref 7–25)
CALCIUM SPEC-SCNC: 9 MG/DL (ref 8.6–10.5)
CHLORIDE SERPL-SCNC: 103 MMOL/L (ref 98–107)
CO2 SERPL-SCNC: 25.7 MMOL/L (ref 22–29)
CREAT SERPL-MCNC: 0.8 MG/DL (ref 0.57–1)
DEPRECATED RDW RBC AUTO: 43.7 FL (ref 37–54)
EOSINOPHIL # BLD AUTO: 0.02 10*3/MM3 (ref 0–0.4)
EOSINOPHIL NFR BLD AUTO: 0.5 % (ref 0.3–6.2)
ERYTHROCYTE [DISTWIDTH] IN BLOOD BY AUTOMATED COUNT: 12.2 % (ref 12.3–15.4)
GFR SERPL CREATININE-BSD FRML MDRD: 105 ML/MIN/1.73
GFR SERPL CREATININE-BSD FRML MDRD: 87 ML/MIN/1.73
GLOBULIN UR ELPH-MCNC: 2.4 GM/DL
GLUCOSE SERPL-MCNC: 100 MG/DL (ref 65–99)
HCT VFR BLD AUTO: 41.5 % (ref 34–46.6)
HGB BLD-MCNC: 13.6 G/DL (ref 12–15.9)
IMM GRANULOCYTES # BLD AUTO: 0.01 10*3/MM3 (ref 0–0.05)
IMM GRANULOCYTES NFR BLD AUTO: 0.2 % (ref 0–0.5)
LYMPHOCYTES # BLD AUTO: 0.96 10*3/MM3 (ref 0.7–3.1)
LYMPHOCYTES NFR BLD AUTO: 23 % (ref 19.6–45.3)
MCH RBC QN AUTO: 31.7 PG (ref 26.6–33)
MCHC RBC AUTO-ENTMCNC: 32.8 G/DL (ref 31.5–35.7)
MCV RBC AUTO: 96.7 FL (ref 79–97)
MONOCYTES # BLD AUTO: 0.33 10*3/MM3 (ref 0.1–0.9)
MONOCYTES NFR BLD AUTO: 7.9 % (ref 5–12)
NEUTROPHILS NFR BLD AUTO: 2.83 10*3/MM3 (ref 1.7–7)
NEUTROPHILS NFR BLD AUTO: 67.7 % (ref 42.7–76)
NRBC BLD AUTO-RTO: 0 /100 WBC (ref 0–0.2)
PLATELET # BLD AUTO: 174 10*3/MM3 (ref 140–450)
PMV BLD AUTO: 10.1 FL (ref 6–12)
POTASSIUM SERPL-SCNC: 4.1 MMOL/L (ref 3.5–5.2)
PROT SERPL-MCNC: 7.1 G/DL (ref 6–8.5)
RBC # BLD AUTO: 4.29 10*6/MM3 (ref 3.77–5.28)
SODIUM SERPL-SCNC: 137 MMOL/L (ref 136–145)
WBC NRBC COR # BLD: 4.18 10*3/MM3 (ref 3.4–10.8)

## 2021-12-15 PROCEDURE — 80053 COMPREHEN METABOLIC PANEL: CPT | Performed by: INTERNAL MEDICINE

## 2021-12-15 PROCEDURE — 36415 COLL VENOUS BLD VENIPUNCTURE: CPT

## 2021-12-15 PROCEDURE — 99213 OFFICE O/P EST LOW 20 MIN: CPT | Performed by: INTERNAL MEDICINE

## 2021-12-15 PROCEDURE — 85025 COMPLETE CBC W/AUTO DIFF WBC: CPT | Performed by: INTERNAL MEDICINE

## 2021-12-15 NOTE — PROGRESS NOTES
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 She did initiate EPOCH-R  in the hospital on 06/16/2018.  Treated 6 cycles of treatment.  Completed October 2018 followed with observation    History of Present Illness     The patient is a 26 y.o. female with the above-mentioned history, with history of mediastinal large B-cell lymphoma status post completion of 6 cycles of chemotherapy with dose adusted EPOCH-R in October 2018.  . 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      Patient denies any fever night sweats or weight loss.  She gained weight about 9 pounds since the last time.  She is upset as her mother got diagnosed with metastatic lung cancer and is currently being switched to hospice.  Patient took off from her dental school and is currently spending time with her mother.        Past Medical History, Past Surgical History, Social History, Family History have been reviewed and are without significant changes except as mentioned.    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  She saw Dr. Arina Cohen on last Friday.  The reason the patient was referred to Dr. Cohen was because they thought she had cardiomegaly on chest x-ray.  However a chest x-ray was ordered which showedThe chest x-ray showed extensive abnormal increased density throughout the anterior mediastinum extending into the left hilar region and left perihilar region and is most likely due to confluent lymphadenopathy.     CT angiogram of the chest did not show pulmonary embolism but showed     there is a large conglomerate  mass encases multiple vascular structures of the mediastinum and left  hilar region that is most likely extensive confluent lymphadenopathy.  The primary differential diagnostic considerations would be lymphoma.  The tumor posteriorly displaces the pulmonary arteries and the left  and  moderately narrows the main pulmonary artery. The tumor also posteriorly  displaces the trachea and markedly narrows the left mainstem bronchus.  The pulmonary veins in the left are also encased and narrowed. The mass  encases and markedly narrows the SVC. The large edy mass measures  approximately 19.8 x 13.7 x 14.0 cm in diameter. Enlarged lymph nodes  are also present in the left supraclavicular region where they appear to  produce occlusion of the left internal jugular vein. There is no  axillary lymphadenopathy. There are no filling defects within the  pulmonary arteries. There is no CT evidence of pulmonary embolism. There  is a small left pleural effusion. A small pericardial effusion measuring  8 mm in maximum thickness is also present. There is compressive  atelectasis of the left lung. Patchy airspace opacities are also present  in the superior aspect of the left upper lobe. These are most likely due  to direct tumor infiltration of the lung parenchyma, but could also  represent postobstructive pneumonia. The right lung is well expanded and  clear. Images through the upper abdomen partially show what could be a  edy mass in the retroperitoneum posterior and inferior to the  pancreas. Further evaluation with a CT scan of the abdomen and pelvis is  recommended. Bone window images demonstrate patchy sclerosis in the C7  and T1 and T2 and T3 and T4 vertebral body suspicious for bone  involvement with lymphoma.     IMPRESSION:  Very large tumor mass in the mediastinum encasing multiple  vascular structures and also moderately narrowing the left mainstem  bronchus as described in more detail above. Direct tumor infiltration of  the left upper lobe is also suspected. Small left pleural effusion and a  small pericardial effusion. There may also be partially visualized  lymphadenopathy in the upper abdomen. Patchy areas of sclerosis are also  noted in the C7 and T1 and T2 and T3 and T4 vertebral bodies  suspicious  for osseous metastatic disease. The findings are consistent with  Lymphoma.     Dr. Cohen felt that she had a small pericardial effusion.  Patient has been symptomatic with cough which is worsening which started back in February 2018 and worsened over the last 4 months.  Patient also has some shortness of breath with walking up the steps.  She has not lost weight.  She say she is reasonable appetite.  She does have fever kind of low-grade fever and night sweats.  She is more fatigued compared to before.  She was referred to us because of concern that this could be lymphoma.  She does not have any tissue diagnosis yet.  Patient does complain of back pain.    Echo done on admission June 12, 2018 by Dr. Fitzgerald showed that the patient's posterior and appears large primary leukemia the left ventricle and apex.  There is no tamponade.  The pericardium appears thickened pointing to involvement of the pericardium into the mediastinal process.  Dr. Fitzgerald felt that it would be difficult to do pericardial synthesis as the mediastinal mass covers the anterior aspect of the heart.  Ejection fraction is 58%  CT-guided needle biopsy June 12, 2018 was negative  LDH is elevated.  Uric acid is high.  MRI thoracic spine, negative  CT abdomen pelvis negative  Scan July 13, 2018 shows large infiltrative edy mass in the anterior mediastinum and left hilar region that nearly completely fills the left hemithorax and shows intense hypermetabolism with an SUV of 19.8.  No foci of pathologic hypermetabolism are identified elsewhere in the chest, neck abdomen or pelvis.    Pathology was consistent with primary mediastinal large B-cell lymphoma.    Patient was seen by Dr. Melgar.  He discussed harvesting eggs versus Zoladex plus oral contraceptives versus Zoladex alone for fertility preservation.  Due to large size of the tumor and rapid initiation of treatment needed the patient was not able to have aches harvested done.  Because  she is at increased risk of thrombosis with the use of oral contraceptives secondary to malignancy he recommended Zoladex alone.  Patient received first dose of Zoladex 3.6 mg subcutaneous on Belia 15, 2018.    Patient started on EPOCH/R on June 16, 2018    Patient had a reaction to Rituxan which improved with steroids, Benadryl and Pepcid.  She had to receive it slow.  She started having itching over her EARS , sore throat.  She was given IV steroids Benadryl and Pepcid and following that she was started at a slower rate and she completed it.    Right upper extremity Doppler ultrasound showed new superficial vein thrombosis around the PICC line with no extension into the deep system.  Repeat Doppler was ordered.    A Doppler ultrasound initially showed superficial thrombophlebitis.  She was on prophylactic Arixtra.  A Doppler was repeated 2 days later on 6/20/18  which showed extension of thrombus into the axillary and brachial veins.  She was therefore started on Xarelto 15 mg one every 12 hours and the PICC line was removed as soon as chemotherapy completed on 6/20/18.  She will take 15 mg of Xarelto every 12 hours for 21 days and then transition to 20 mg once a day.  The patient will have CBC performed twice a week.  If the platelet count drops below 50,000, anticoagulation will need to be temporarily held  Patient was started on acyclovir/fluconazole/Bactrim  Bone marrow done June 14, 2018, morphology was negative for lymphoma    Fertility preservation, Zoladex is next due July 12, 2018.  CT scan and PET scan showing significant response after cycle 2 of chemotherapy  Next dose of Lupron August 13, 2018  Status post 5 cycles  OF epoch/r and is due cycle 6 on oct 8th.    Patient admitted October 20, 2018 and discharged October 22, 2018 when she was admitted with neutropenic fever.  She was treated with broad-spectrum antibiotics.  Her respiratory viral panel was positive for parainfluenza virus.    PET scan with  significant improvement but residual 7.8 cm lesion without any activity.  Reviewed CT scan from January 15, 2019    Patient seen by Dr. Fox at the [facility], agreed with observation at present no plans on radiation.    CT scan and PET scan from April 5 and April 9, 2019 has been reviewed and discussed with patient.  There is further decrease in the mediastinal lymphadenopathy.    CT chest abdomen pelvis July 22, 2019 with further decrease in the mediastinal lymphadenopathy significantly    We reviewed with patient the CT scans from 12/12/19 which show mediastinal lymphadenopathy which has decreased in size.   There is also a small left renal cyst and a 2 cm partially involuted right ovarian cyst.         Review of Systems   Constitutional: Negative for appetite change, chills, diaphoresis, fatigue, fever and unexpected weight change.        Hot flashes in the evenings 04/12/19   HENT: Negative for hearing loss, sore throat and trouble swallowing.    Respiratory: Negative for cough, chest tightness, shortness of breath and wheezing.    Cardiovascular: Negative for chest pain, palpitations and leg swelling.   Gastrointestinal: Negative for abdominal distention, abdominal pain, constipation, diarrhea, nausea and vomiting.   Genitourinary: Negative for dysuria, frequency, hematuria and urgency.   Musculoskeletal: Negative for joint swelling.        No muscle weakness.   Skin: Negative for rash and wound.   Neurological: Negative for seizures, syncope, speech difficulty, weakness, numbness and headaches.   Hematological: Negative for adenopathy. Does not bruise/bleed easily.   Psychiatric/Behavioral: Negative for behavioral problems, confusion and suicidal ideas.   All other systems reviewed and are negative.  I have reviewed and confirmed the accuracy of the ROS as documented by the MA/LPN/RN Millie Padilla MD        Medications:  The current medication list was reviewed in the EMR    ALLERGIES:   "  Allergies   Allergen Reactions   • Rituximab Itching     Ear itching       Objective      Vitals:    12/15/21 1029   BP: 112/66   Pulse: 51   Resp: 16   Temp: 97.7 °F (36.5 °C)   TempSrc: Temporal   SpO2: 98%   Weight: 69.6 kg (153 lb 6.4 oz)   Height: 158 cm (62.21\")   PainSc: 0-No pain     Current Status 12/15/2021   ECOG score 0       Physical Exam         CONSTITUTIONAL:  Vital signs reviewed.  No distress, looks comfortable.  EYES:  Conjunctivae and lids unremarkable.  PERRLA  EARS,NOSE,MOUTH,THROAT:  Ears and nose appear unremarkable.  Lips, teeth, gums appear unremarkable.  RESPIRATORY:  Normal respiratory effort.  Lungs clear to auscultation bilaterally.  CARDIOVASCULAR:  Normal S1, S2.  No murmurs rubs or gallops.  No significant lower extremity edema.  GASTROINTESTINAL: Abdomen appears unremarkable.  Nontender.  No hepatomegaly.  No splenomegaly.  LYMPHATIC:  No cervical, supraclavicular, axillary lymphadenopathy.  SKIN:  Warm.  No rashes.  PSYCHIATRIC:  Normal judgment and insight.  Normal mood and affect.        RECENT LABS:  Results from last 7 days   Lab Units 12/15/21  1032   WBC 10*3/mm3 4.18   NEUTROS ABS 10*3/mm3 2.83   HEMOGLOBIN g/dL 13.6   HEMATOCRIT % 41.5   PLATELETS 10*3/mm3 174           Assessment/Plan   1.  Primary mediastinal large B-cell lymphoma: The patient presented with dyspnea, cough, and chest pain.  A CT angiogram of the chest 6/8/18 showed a very large tumor mass in the mediastinum encasing multiple vascular structures and narrowing the left mainstem bronchus.  There was direct tumor infiltration in the left upper lobe.  There was a small left pleural effusion.  She underwent a CT-guided biopsy of the mediastinal mass on 6/12/18 and pathology reviewed at [facility] oncology showed diffuse large B-cell lymphoma consistent with a primary diffuse large B-cell lymphoma.  A bone marrow exam was performed on 6/14/18 which was negative for lymphoma.  She underwent a PET scan 6/13/18 " which showed a large hypermetabolic mass in the chest involving the anterior mediastinum, left hilum, nearly completely filling the left hemothorax with SUV 19.8.  There was physiologic distribution elsewhere.     The patient was started on IV fluid hydration and allopurinol for prevention of hyperuricemia.  She initiated systemic chemotherapy with DA-EPOCH-R on 6/16/18 and completed the evening of 6/21/18.  She had a infusion reaction to rituximab but was able to complete with additional medications and otherwise tolerated chemotherapy well.  She will require additional premedications with additional rituximab.  Plan is to monitor her blood counts twice per week outpatient and proceed with cycle 2 of chemotherapy day 21.     The patient had significant improvement in shortness of breath, cough, and chest pain by the time of discharge.    · Patient received Neulasta support  · Her white count dropped down to as low as 0.8 low-grade temperature and patient was placed on Levaquin ×5 days starting June 27, 2018, neutropenia AT  11 days posttreatment.  Platelets dropped to 82.  · Her next ZOLADEX injection is due July 12.  · She is due to start chemotherapy on July 9.  · Discussed with Dr. Fox at the [facility] and his suggestion was to do the CT scan and PET scan after 2 cycles and discuss the results with him.  If she has an excellent response early on she would not require any further treatments after completion of 6 cycles of EPOCH/R  · Patient being admitted for cycle 3 of chemotherapy on July 30, 2018.  · She's status post cycle 4 of chemotherapy and CT scan has been reviewed following 4 cycles with continued response.  · She is due for ZOLADEX  injection on October 8, 2018.   · Patient is due to go for cycle 5 of chemotherapy on Monday, September 17, 2018  ·  echocardiogram done August 22, 2018 shows ejection fraction of 59% to 60% with the eldest strain of 19.2%  · Cycle 6 chemotherapy with dose  adjusted EPOCH/R on October 8, 2018.  · Reviewed CT scan and PET scan and the images with the patient and family.  Significant response with decrease in the mediastinal mass to 7.8 cm and activity level is 2.4 a week on PET scan.  · Reviewed repeat PET scan still with 7.8 cm tumor in the mediastinum.  The SUV is 2.4 and this could be scar tissue.  · The patient is due for her next dose of Zoladex today. As noted above, her ANC has been steadily declining over the last several months in the absence of infectious symptoms. This will be further detailed below. She will proceed with Zoladex, as scheduled.   · Discontinue Zoladex  · Reviewed CT as of January 15, 2019 with further improvement  · No plans on radiation to the mediastinal lymph node given the PET scan showed that it is photopenic  · Reviewed CT scan from June 2021.  And there is no evidence of disease  · December 15, 2021: Patient doing very well.  Clinically no evidence of lymphadenopathy and she does not have any B symptoms    2.  Pericardial effusion:   a 2-D echocardiogram 6/8/18 showed a left ventricular systolic function 58%.  There was a 2 cm pericardial effusion with no tamponade.  The pericardium appeared thickened.  She had no evidence of volume overload.  It is resolved     3. Fertility preservation:  Patient received Zoladex injection  for fertility preservation; this should be continued once monthly during her chemotherapy.    · She is due for her next dose today and will continue this for 2 additional doses.   · Zoladex has been discontinued but patient has now started  menstrual periods     4. Right upper extremity DVT on Pradaxa. She continues on this with no issues.  Stable    5. Status post port placement, patient wants port removed and I think it is reasonable but will need to hold Pradaxa  · S/p port removal     6. Renal and Ovarian cysts.  We will continue to monitor.  · I have advised patient to consult her OB/GYN for ovarian  cysts.  · CT scan does not show evidence of any ovarian cyst from June 9/2020    7. Family history of cancer.  Her mother was diagnosed with cholangiocarcinoma.  · Patient's mother now diagnosed with metastatic lung cancer and currently is being transferred to hospice    Plan  · Clinically patient does not show evidence of any recurrence of her lymphoma  · Patient is anxious and upset secondary to her mother being diagnosed with metastatic lung cancer and is currently transferred to hospice  · Follow-up with me in 6 months with labs and CT scan 1 week prior    Millie Padilla MD        CC: Dr. Arina Fox, at [facility]

## 2022-06-08 ENCOUNTER — HOSPITAL ENCOUNTER (OUTPATIENT)
Dept: PET IMAGING | Facility: HOSPITAL | Age: 27
Discharge: HOME | End: 2022-06-08
Admitting: INTERNAL MEDICINE

## 2022-06-08 DIAGNOSIS — C85.22 MEDIASTINAL (THYMIC) LARGE B-CELL LYMPHOMA INTRATHORACIC LYMPH NODES: ICD-10-CM

## 2022-06-08 PROCEDURE — 70491 CT SOFT TISSUE NECK W/DYE: CPT

## 2022-06-08 PROCEDURE — 0 DIATRIZOATE MEGLUMINE & SODIUM PER 1 ML: Performed by: INTERNAL MEDICINE

## 2022-06-08 PROCEDURE — 74177 CT ABD & PELVIS W/CONTRAST: CPT

## 2022-06-08 PROCEDURE — 71260 CT THORAX DX C+: CPT

## 2022-06-08 RX ADMIN — DIATRIZOATE MEGLUMINE AND DIATRIZOATE SODIUM 30 ML: 660; 100 LIQUID ORAL; RECTAL at 08:38

## 2022-06-15 ENCOUNTER — LAB (OUTPATIENT)
Dept: OTHER | Facility: HOSPITAL | Age: 27
End: 2022-06-15

## 2022-06-15 ENCOUNTER — OFFICE VISIT (OUTPATIENT)
Dept: ONCOLOGY | Facility: CLINIC | Age: 27
End: 2022-06-15

## 2022-06-15 VITALS
DIASTOLIC BLOOD PRESSURE: 74 MMHG | SYSTOLIC BLOOD PRESSURE: 119 MMHG | RESPIRATION RATE: 16 BRPM | BODY MASS INDEX: 29 KG/M2 | OXYGEN SATURATION: 97 % | TEMPERATURE: 97.1 F | HEART RATE: 73 BPM | HEIGHT: 62 IN | WEIGHT: 157.6 LBS

## 2022-06-15 DIAGNOSIS — C85.22 MEDIASTINAL (THYMIC) LARGE B-CELL LYMPHOMA INTRATHORACIC LYMPH NODES: ICD-10-CM

## 2022-06-15 DIAGNOSIS — C85.22 MEDIASTINAL (THYMIC) LARGE B-CELL LYMPHOMA INTRATHORACIC LYMPH NODES: Primary | ICD-10-CM

## 2022-06-15 LAB
ALBUMIN SERPL-MCNC: 4.5 G/DL (ref 3.5–5.2)
ALBUMIN/GLOB SERPL: 2.3 G/DL
ALP SERPL-CCNC: 40 U/L (ref 39–117)
ALT SERPL W P-5'-P-CCNC: 12 U/L (ref 1–33)
ANION GAP SERPL CALCULATED.3IONS-SCNC: 7.4 MMOL/L (ref 5–15)
AST SERPL-CCNC: 18 U/L (ref 1–32)
BASOPHILS # BLD AUTO: 0.03 10*3/MM3 (ref 0–0.2)
BASOPHILS NFR BLD AUTO: 0.7 % (ref 0–1.5)
BILIRUB SERPL-MCNC: 0.2 MG/DL (ref 0–1.2)
BUN SERPL-MCNC: 14 MG/DL (ref 6–20)
BUN/CREAT SERPL: 17.7 (ref 7–25)
CALCIUM SPEC-SCNC: 9 MG/DL (ref 8.6–10.5)
CHLORIDE SERPL-SCNC: 105 MMOL/L (ref 98–107)
CO2 SERPL-SCNC: 28.6 MMOL/L (ref 22–29)
CREAT SERPL-MCNC: 0.79 MG/DL (ref 0.57–1)
DEPRECATED RDW RBC AUTO: 41.2 FL (ref 37–54)
EGFRCR SERPLBLD CKD-EPI 2021: 105.3 ML/MIN/1.73
EOSINOPHIL # BLD AUTO: 0.03 10*3/MM3 (ref 0–0.4)
EOSINOPHIL NFR BLD AUTO: 0.7 % (ref 0.3–6.2)
ERYTHROCYTE [DISTWIDTH] IN BLOOD BY AUTOMATED COUNT: 11.9 % (ref 12.3–15.4)
GLOBULIN UR ELPH-MCNC: 2 GM/DL
GLUCOSE SERPL-MCNC: 86 MG/DL (ref 65–99)
HCT VFR BLD AUTO: 39 % (ref 34–46.6)
HGB BLD-MCNC: 13.1 G/DL (ref 12–15.9)
IMM GRANULOCYTES # BLD AUTO: 0.02 10*3/MM3 (ref 0–0.05)
IMM GRANULOCYTES NFR BLD AUTO: 0.5 % (ref 0–0.5)
LYMPHOCYTES # BLD AUTO: 1.47 10*3/MM3 (ref 0.7–3.1)
LYMPHOCYTES NFR BLD AUTO: 33.9 % (ref 19.6–45.3)
MCH RBC QN AUTO: 31.5 PG (ref 26.6–33)
MCHC RBC AUTO-ENTMCNC: 33.6 G/DL (ref 31.5–35.7)
MCV RBC AUTO: 93.8 FL (ref 79–97)
MONOCYTES # BLD AUTO: 0.46 10*3/MM3 (ref 0.1–0.9)
MONOCYTES NFR BLD AUTO: 10.6 % (ref 5–12)
NEUTROPHILS NFR BLD AUTO: 2.33 10*3/MM3 (ref 1.7–7)
NEUTROPHILS NFR BLD AUTO: 53.6 % (ref 42.7–76)
NRBC BLD AUTO-RTO: 0 /100 WBC (ref 0–0.2)
PLATELET # BLD AUTO: 206 10*3/MM3 (ref 140–450)
PMV BLD AUTO: 9.6 FL (ref 6–12)
POTASSIUM SERPL-SCNC: 3.9 MMOL/L (ref 3.5–5.2)
PROT SERPL-MCNC: 6.5 G/DL (ref 6–8.5)
RBC # BLD AUTO: 4.16 10*6/MM3 (ref 3.77–5.28)
SODIUM SERPL-SCNC: 141 MMOL/L (ref 136–145)
WBC NRBC COR # BLD: 4.34 10*3/MM3 (ref 3.4–10.8)

## 2022-06-15 PROCEDURE — 99214 OFFICE O/P EST MOD 30 MIN: CPT | Performed by: INTERNAL MEDICINE

## 2022-06-15 PROCEDURE — 85025 COMPLETE CBC W/AUTO DIFF WBC: CPT | Performed by: INTERNAL MEDICINE

## 2022-06-15 PROCEDURE — 36415 COLL VENOUS BLD VENIPUNCTURE: CPT

## 2022-06-15 PROCEDURE — 83615 LACTATE (LD) (LDH) ENZYME: CPT

## 2022-06-15 PROCEDURE — 80053 COMPREHEN METABOLIC PANEL: CPT | Performed by: INTERNAL MEDICINE

## 2022-06-15 NOTE — PROGRESS NOTES
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 She did initiate EPOCH-R  in the hospital on 06/16/2018.  Treated 6 cycles of treatment.  Completed October 2018 followed with observation    History of Present Illness     The patient is a 27 y.o. female with the above-mentioned history, with history of mediastinal large B-cell lymphoma status post completion of 6 cycles of chemotherapy with dose adusted EPOCH-R in October 2018.  . 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      Interval history:    Patient is doing well without any complaints.  She has gained weight and she is got a good appetite.  I reviewed the CT scan.  There is a right neck lymph node which is increased from 0.3 cm to 0.6 cm but is not palpable.  There a few liver lesions which are very small, unsure and repeat CT suggested in 3 months but CT chest is negative.  She denies any chest pain shortness of breath or cough.  No fever or night sweats.  And she has not lost weight    Past Medical History, Past Surgical History, Social History, Family History have been reviewed and are without significant changes except as mentioned.    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  She saw Dr. Arina Cohen on last Friday.  The reason the patient was referred to Dr. Cohen was because they thought she had cardiomegaly on chest x-ray.  However a chest x-ray was ordered which showedThe chest x-ray showed extensive abnormal increased density throughout the anterior mediastinum extending into the left hilar region and left perihilar region and is most likely due to confluent lymphadenopathy.     CT angiogram of the chest did not show pulmonary embolism but showed     there is a large conglomerate  mass encases multiple vascular structures of the mediastinum and left  hilar region that is most likely extensive confluent  lymphadenopathy.  The primary differential diagnostic considerations would be lymphoma.  The tumor posteriorly displaces the pulmonary arteries and the left and  moderately narrows the main pulmonary artery. The tumor also posteriorly  displaces the trachea and markedly narrows the left mainstem bronchus.  The pulmonary veins in the left are also encased and narrowed. The mass  encases and markedly narrows the SVC. The large edy mass measures  approximately 19.8 x 13.7 x 14.0 cm in diameter. Enlarged lymph nodes  are also present in the left supraclavicular region where they appear to  produce occlusion of the left internal jugular vein. There is no  axillary lymphadenopathy. There are no filling defects within the  pulmonary arteries. There is no CT evidence of pulmonary embolism. There  is a small left pleural effusion. A small pericardial effusion measuring  8 mm in maximum thickness is also present. There is compressive  atelectasis of the left lung. Patchy airspace opacities are also present  in the superior aspect of the left upper lobe. These are most likely due  to direct tumor infiltration of the lung parenchyma, but could also  represent postobstructive pneumonia. The right lung is well expanded and  clear. Images through the upper abdomen partially show what could be a  edy mass in the retroperitoneum posterior and inferior to the  pancreas. Further evaluation with a CT scan of the abdomen and pelvis is  recommended. Bone window images demonstrate patchy sclerosis in the C7  and T1 and T2 and T3 and T4 vertebral body suspicious for bone  involvement with lymphoma.     IMPRESSION:  Very large tumor mass in the mediastinum encasing multiple  vascular structures and also moderately narrowing the left mainstem  bronchus as described in more detail above. Direct tumor infiltration of  the left upper lobe is also suspected. Small left pleural effusion and a  small pericardial effusion. There may also be  partially visualized  lymphadenopathy in the upper abdomen. Patchy areas of sclerosis are also  noted in the C7 and T1 and T2 and T3 and T4 vertebral bodies suspicious  for osseous metastatic disease. The findings are consistent with  Lymphoma.     Dr. Cohen felt that she had a small pericardial effusion.  Patient has been symptomatic with cough which is worsening which started back in February 2018 and worsened over the last 4 months.  Patient also has some shortness of breath with walking up the steps.  She has not lost weight.  She say she is reasonable appetite.  She does have fever kind of low-grade fever and night sweats.  She is more fatigued compared to before.  She was referred to us because of concern that this could be lymphoma.  She does not have any tissue diagnosis yet.  Patient does complain of back pain.    Echo done on admission June 12, 2018 by Dr. Fitzgerald showed that the patient's posterior and appears large primary leukemia the left ventricle and apex.  There is no tamponade.  The pericardium appears thickened pointing to involvement of the pericardium into the mediastinal process.  Dr. Fitzgerald felt that it would be difficult to do pericardial synthesis as the mediastinal mass covers the anterior aspect of the heart.  Ejection fraction is 58%  CT-guided needle biopsy June 12, 2018 was negative  LDH is elevated.  Uric acid is high.  MRI thoracic spine, negative  CT abdomen pelvis negative  Scan July 13, 2018 shows large infiltrative edy mass in the anterior mediastinum and left hilar region that nearly completely fills the left hemithorax and shows intense hypermetabolism with an SUV of 19.8.  No foci of pathologic hypermetabolism are identified elsewhere in the chest, neck abdomen or pelvis.    Pathology was consistent with primary mediastinal large B-cell lymphoma.    Patient was seen by Dr. Melgar.  He discussed harvesting eggs versus Zoladex plus oral contraceptives versus Zoladex alone for  fertility preservation.  Due to large size of the tumor and rapid initiation of treatment needed the patient was not able to have aches harvested done.  Because she is at increased risk of thrombosis with the use of oral contraceptives secondary to malignancy he recommended Zoladex alone.  Patient received first dose of Zoladex 3.6 mg subcutaneous on Belia 15, 2018.    Patient started on EPOCH/R on June 16, 2018    Patient had a reaction to Rituxan which improved with steroids, Benadryl and Pepcid.  She had to receive it slow.  She started having itching over her EARS , sore throat.  She was given IV steroids Benadryl and Pepcid and following that she was started at a slower rate and she completed it.    Right upper extremity Doppler ultrasound showed new superficial vein thrombosis around the PICC line with no extension into the deep system.  Repeat Doppler was ordered.    A Doppler ultrasound initially showed superficial thrombophlebitis.  She was on prophylactic Arixtra.  A Doppler was repeated 2 days later on 6/20/18  which showed extension of thrombus into the axillary and brachial veins.  She was therefore started on Xarelto 15 mg one every 12 hours and the PICC line was removed as soon as chemotherapy completed on 6/20/18.  She will take 15 mg of Xarelto every 12 hours for 21 days and then transition to 20 mg once a day.  The patient will have CBC performed twice a week.  If the platelet count drops below 50,000, anticoagulation will need to be temporarily held  Patient was started on acyclovir/fluconazole/Bactrim  Bone marrow done June 14, 2018, morphology was negative for lymphoma    Fertility preservation, Zoladex is next due July 12, 2018.  CT scan and PET scan showing significant response after cycle 2 of chemotherapy  Next dose of Lupron August 13, 2018  Status post 5 cycles  OF epoch/r and is due cycle 6 on oct 8th.    Patient admitted October 20, 2018 and discharged October 22, 2018 when she was  admitted with neutropenic fever.  She was treated with broad-spectrum antibiotics.  Her respiratory viral panel was positive for parainfluenza virus.    PET scan with significant improvement but residual 7.8 cm lesion without any activity.  Reviewed CT scan from January 15, 2019    Patient seen by Dr. Fox at the [facility], agreed with observation at present no plans on radiation.    CT scan and PET scan from April 5 and April 9, 2019 has been reviewed and discussed with patient.  There is further decrease in the mediastinal lymphadenopathy.    CT chest abdomen pelvis July 22, 2019 with further decrease in the mediastinal lymphadenopathy significantly    We reviewed with patient the CT scans from 12/12/19 which show mediastinal lymphadenopathy which has decreased in size.   There is also a small left renal cyst and a 2 cm partially involuted right ovarian cyst.         Review of Systems   Constitutional: Negative for appetite change, chills, diaphoresis, fatigue, fever and unexpected weight change.        Hot flashes in the evenings 04/12/19   HENT: Negative for hearing loss, sore throat and trouble swallowing.    Respiratory: Negative for cough, chest tightness, shortness of breath and wheezing.    Cardiovascular: Negative for chest pain, palpitations and leg swelling.   Gastrointestinal: Negative for abdominal distention, abdominal pain, constipation, diarrhea, nausea and vomiting.   Genitourinary: Negative for dysuria, frequency, hematuria and urgency.   Musculoskeletal: Negative for joint swelling.        No muscle weakness.   Skin: Negative for rash and wound.   Neurological: Negative for seizures, syncope, speech difficulty, weakness, numbness and headaches.   Hematological: Negative for adenopathy. Does not bruise/bleed easily.   Psychiatric/Behavioral: Positive for sleep disturbance. Negative for behavioral problems, confusion and suicidal ideas.   All other systems reviewed and are negative.  I  "have reviewed and confirmed the accuracy of the ROS as documented by the MA/LPN/RN Millie Padilla MD        Medications:  The current medication list was reviewed in the EMR    ALLERGIES:    Allergies   Allergen Reactions   • Rituximab Itching     Ear itching       Objective      Vitals:    06/15/22 1608   BP: 119/74   Pulse: 73   Resp: 16   Temp: 97.1 °F (36.2 °C)   TempSrc: Temporal   SpO2: 97%   Weight: 71.5 kg (157 lb 9.6 oz)   Height: 158 cm (62.21\")   PainSc: 0-No pain     Current Status 6/15/2022   ECOG score 0       Physical Exam           CONSTITUTIONAL:  Vital signs reviewed.  No distress, looks comfortable.    RESPIRATORY:  Normal respiratory effort.  Lungs clear to auscultation bilaterally.  CARDIOVASCULAR:  Normal S1, S2.  No murmurs rubs or gallops.  No significant lower extremity edema.  GASTROINTESTINAL: Abdomen appears unremarkable.  Nontender.  No hepatomegaly.  No splenomegaly.  LYMPHATIC:  No cervical, supraclavicular, axillary lymphadenopathy.  SKIN:  Warm.  No rashes.  PSYCHIATRIC:  Normal judgment and insight.  Normal mood and affect.    RECENT LABS:  Results from last 7 days   Lab Units 06/15/22  1616   WBC 10*3/mm3 4.34   NEUTROS ABS 10*3/mm3 2.33   HEMOGLOBIN g/dL 13.1   HEMATOCRIT % 39.0   PLATELETS 10*3/mm3 206           Assessment & Plan   1.  Primary mediastinal large B-cell lymphoma: The patient presented with dyspnea, cough, and chest pain.  A CT angiogram of the chest 6/8/18 showed a very large tumor mass in the mediastinum encasing multiple vascular structures and narrowing the left mainstem bronchus.  There was direct tumor infiltration in the left upper lobe.  There was a small left pleural effusion.  She underwent a CT-guided biopsy of the mediastinal mass on 6/12/18 and [facility] showed diffuse large B-cell lymphoma consistent with a primary diffuse large B-cell lymphoma.  A bone marrow exam was performed on 6/14/18 which was negative for " lymphoma.  She underwent a PET scan 6/13/18 which showed a large hypermetabolic mass in the chest involving the anterior mediastinum, left hilum, nearly completely filling the left hemothorax with SUV 19.8.  There was physiologic distribution elsewhere.     The patient was started on IV fluid hydration and allopurinol for prevention of hyperuricemia.  She initiated systemic chemotherapy with DA-EPOCH-R on 6/16/18 and completed the evening of 6/21/18.  She had a infusion reaction to rituximab but was able to complete with additional medications and otherwise tolerated chemotherapy well.  She will require additional premedications with additional rituximab.  Plan is to monitor her blood counts twice per week outpatient and proceed with cycle 2 of chemotherapy day 21.     The patient had significant improvement in shortness of breath, cough, and chest pain by the time of discharge.    · Patient received Neulasta support  · Her white count dropped down to as low as 0.8 low-grade temperature and patient was placed on Levaquin ×5 days starting June 27, 2018, neutropenia AT  11 days posttreatment.  Platelets dropped to 82.  · Her next ZOLADEX injection is due July 12.  · She is due to start chemotherapy on July 9.  · Discussed with Dr. Fox at the [facility] and his suggestion was to do the CT scan and PET scan after 2 cycles and discuss the results with him.  If she has an excellent response early on she would not require any further treatments after completion of 6 cycles of EPOCH/R  · Patient being admitted for cycle 3 of chemotherapy on July 30, 2018.  · She's status post cycle 4 of chemotherapy and CT scan has been reviewed following 4 cycles with continued response.  · She is due for ZOLADEX  injection on October 8, 2018.   · Patient is due to go for cycle 5 of chemotherapy on Monday, September 17, 2018  ·  echocardiogram done August 22, 2018 shows ejection fraction of 59% to 60% with the eldest strain of  19.2%  · Cycle 6 chemotherapy with dose adjusted EPOCH/R on October 8, 2018.  · Reviewed CT scan and PET scan and the images with the patient and family.  Significant response with decrease in the mediastinal mass to 7.8 cm and activity level is 2.4 a week on PET scan.  · Reviewed repeat PET scan still with 7.8 cm tumor in the mediastinum.  The SUV is 2.4 and this could be scar tissue.  · The patient is due for her next dose of Zoladex today. As noted above, her ANC has been steadily declining over the last several months in the absence of infectious symptoms. This will be further detailed below. She will proceed with Zoladex, as scheduled.   · Discontinue Zoladex  · Reviewed CT as of January 15, 2019 with further improvement  · No plans on radiation to the mediastinal lymph node given the PET scan showed that it is photopenic  · Reviewed CT scan from June 2021.  And there is no evidence of disease  · Belia 15, 2022: Patient asymptomatic.  CT scan shows minimal increase of a small lymph node in the right neck from 0.3- 0.6 cm.  Questionable lesion in the liver very small follow-up in CT scan in 3 months.  Patient is asymptomatic    2.  Pericardial effusion:   a 2-D echocardiogram 6/8/18 showed a left ventricular systolic function 58%.  There was a 2 cm pericardial effusion with no tamponade.  The pericardium appeared thickened.  She had no evidence of volume overload.  It is resolved     3. Fertility preservation:  Patient received Zoladex injection  for fertility preservation; this should be continued once monthly during her chemotherapy.    · She is due for her next dose today and will continue this for 2 additional doses.   · Zoladex has been discontinued but patient has now started  menstrual periods     4. Right upper extremity DVT on Pradaxa. She continues on this with no issues.  Stable    5. Status post port placement, patient wants port removed and I think it is reasonable but will need to hold  Pradaxa  · S/p port removal     6. Renal and Ovarian cysts.  We will continue to monitor.  · I have advised patient to consult her OB/GYN for ovarian cysts.  · CT scan does not show evidence of any ovarian cyst from June 9/2020    7. Family history of cancer.  Her mother was diagnosed with cholangiocarcinoma.  · Patient's mother now diagnosed with metastatic lung cancer and currently is being transferred to hospice    Plan  · Clinically patient does not show evidence of any recurrence of her lymphoma  · Reviewed CT scan and discussed in length with patient.  · Minimal increase of the neck node which is very small and questionable lesion in the liver but patient is asymptomatic.  · We will need to repeat CT scan in 3 months and see her in 3 months    I spent 30 total minutes, face-to-face, caring for Vikki today.  Greater than 50% of this time involved counseling and/or coordination of care as documented within this note.      Millie Padilla MD        CC: Dr. Arina Fox, at [facility]

## 2022-06-16 LAB — LDH SERPL-CCNC: 215 U/L (ref 135–214)

## 2022-08-29 ENCOUNTER — APPOINTMENT (OUTPATIENT)
Dept: GENERAL RADIOLOGY | Facility: HOSPITAL | Age: 27
End: 2022-08-29

## 2022-08-29 ENCOUNTER — APPOINTMENT (OUTPATIENT)
Dept: CT IMAGING | Facility: HOSPITAL | Age: 27
End: 2022-08-29

## 2022-08-29 ENCOUNTER — HOSPITAL ENCOUNTER (OUTPATIENT)
Facility: HOSPITAL | Age: 27
Setting detail: OBSERVATION
Discharge: HOME | End: 2022-08-30
Attending: EMERGENCY MEDICINE | Admitting: EMERGENCY MEDICINE

## 2022-08-29 DIAGNOSIS — I26.93 SINGLE SUBSEGMENTAL PULMONARY EMBOLISM WITHOUT ACUTE COR PULMONALE: Primary | ICD-10-CM

## 2022-08-29 PROBLEM — I26.99 PULMONARY EMBOLISM: Status: ACTIVE | Noted: 2022-08-29

## 2022-08-29 LAB
ALBUMIN SERPL-MCNC: 4.5 G/DL (ref 3.5–5.2)
ALBUMIN/GLOB SERPL: 2.3 G/DL
ALP SERPL-CCNC: 39 U/L (ref 39–117)
ALT SERPL W P-5'-P-CCNC: 12 U/L (ref 1–33)
ANION GAP SERPL CALCULATED.3IONS-SCNC: 7.9 MMOL/L (ref 5–15)
AST SERPL-CCNC: 16 U/L (ref 1–32)
BASOPHILS # BLD AUTO: 0.02 10*3/MM3 (ref 0–0.2)
BASOPHILS NFR BLD AUTO: 0.4 % (ref 0–1.5)
BILIRUB SERPL-MCNC: 0.2 MG/DL (ref 0–1.2)
BUN SERPL-MCNC: 15 MG/DL (ref 6–20)
BUN/CREAT SERPL: 23.1 (ref 7–25)
CALCIUM SPEC-SCNC: 9.3 MG/DL (ref 8.6–10.5)
CHLORIDE SERPL-SCNC: 103 MMOL/L (ref 98–107)
CO2 SERPL-SCNC: 26.1 MMOL/L (ref 22–29)
CREAT SERPL-MCNC: 0.65 MG/DL (ref 0.57–1)
DEPRECATED RDW RBC AUTO: 41.8 FL (ref 37–54)
EGFRCR SERPLBLD CKD-EPI 2021: 123.9 ML/MIN/1.73
EOSINOPHIL # BLD AUTO: 0.02 10*3/MM3 (ref 0–0.4)
EOSINOPHIL NFR BLD AUTO: 0.4 % (ref 0.3–6.2)
ERYTHROCYTE [DISTWIDTH] IN BLOOD BY AUTOMATED COUNT: 12 % (ref 12.3–15.4)
GLOBULIN UR ELPH-MCNC: 2 GM/DL
GLUCOSE SERPL-MCNC: 109 MG/DL (ref 65–99)
HCT VFR BLD AUTO: 40.7 % (ref 34–46.6)
HGB BLD-MCNC: 13.7 G/DL (ref 12–15.9)
IMM GRANULOCYTES # BLD AUTO: 0.01 10*3/MM3 (ref 0–0.05)
IMM GRANULOCYTES NFR BLD AUTO: 0.2 % (ref 0–0.5)
LIPASE SERPL-CCNC: 26 U/L (ref 13–60)
LYMPHOCYTES # BLD AUTO: 1.58 10*3/MM3 (ref 0.7–3.1)
LYMPHOCYTES NFR BLD AUTO: 29.2 % (ref 19.6–45.3)
MCH RBC QN AUTO: 31.7 PG (ref 26.6–33)
MCHC RBC AUTO-ENTMCNC: 33.7 G/DL (ref 31.5–35.7)
MCV RBC AUTO: 94.2 FL (ref 79–97)
MONOCYTES # BLD AUTO: 0.37 10*3/MM3 (ref 0.1–0.9)
MONOCYTES NFR BLD AUTO: 6.8 % (ref 5–12)
NEUTROPHILS NFR BLD AUTO: 3.42 10*3/MM3 (ref 1.7–7)
NEUTROPHILS NFR BLD AUTO: 63 % (ref 42.7–76)
NRBC BLD AUTO-RTO: 0 /100 WBC (ref 0–0.2)
PLATELET # BLD AUTO: 205 10*3/MM3 (ref 140–450)
PMV BLD AUTO: 10 FL (ref 6–12)
POTASSIUM SERPL-SCNC: 3.7 MMOL/L (ref 3.5–5.2)
PROT SERPL-MCNC: 6.5 G/DL (ref 6–8.5)
QT INTERVAL: 438 MS
RBC # BLD AUTO: 4.32 10*6/MM3 (ref 3.77–5.28)
SARS-COV-2 RNA RESP QL NAA+PROBE: NOT DETECTED
SODIUM SERPL-SCNC: 137 MMOL/L (ref 136–145)
TROPONIN T SERPL-MCNC: <0.01 NG/ML (ref 0–0.03)
WBC NRBC COR # BLD: 5.42 10*3/MM3 (ref 3.4–10.8)

## 2022-08-29 PROCEDURE — G0378 HOSPITAL OBSERVATION PER HR: HCPCS

## 2022-08-29 PROCEDURE — U0003 INFECTIOUS AGENT DETECTION BY NUCLEIC ACID (DNA OR RNA); SEVERE ACUTE RESPIRATORY SYNDROME CORONAVIRUS 2 (SARS-COV-2) (CORONAVIRUS DISEASE [COVID-19]), AMPLIFIED PROBE TECHNIQUE, MAKING USE OF HIGH THROUGHPUT TECHNOLOGIES AS DESCRIBED BY CMS-2020-01-R: HCPCS | Performed by: EMERGENCY MEDICINE

## 2022-08-29 PROCEDURE — 84484 ASSAY OF TROPONIN QUANT: CPT | Performed by: EMERGENCY MEDICINE

## 2022-08-29 PROCEDURE — 93005 ELECTROCARDIOGRAM TRACING: CPT | Performed by: EMERGENCY MEDICINE

## 2022-08-29 PROCEDURE — 0 IOPAMIDOL PER 1 ML: Performed by: EMERGENCY MEDICINE

## 2022-08-29 PROCEDURE — 99284 EMERGENCY DEPT VISIT MOD MDM: CPT

## 2022-08-29 PROCEDURE — 85025 COMPLETE CBC W/AUTO DIFF WBC: CPT | Performed by: EMERGENCY MEDICINE

## 2022-08-29 PROCEDURE — 93005 ELECTROCARDIOGRAM TRACING: CPT

## 2022-08-29 PROCEDURE — C9803 HOPD COVID-19 SPEC COLLECT: HCPCS

## 2022-08-29 PROCEDURE — 71275 CT ANGIOGRAPHY CHEST: CPT

## 2022-08-29 PROCEDURE — 93010 ELECTROCARDIOGRAM REPORT: CPT | Performed by: INTERNAL MEDICINE

## 2022-08-29 PROCEDURE — 80053 COMPREHEN METABOLIC PANEL: CPT | Performed by: EMERGENCY MEDICINE

## 2022-08-29 PROCEDURE — 71045 X-RAY EXAM CHEST 1 VIEW: CPT

## 2022-08-29 PROCEDURE — 83690 ASSAY OF LIPASE: CPT | Performed by: EMERGENCY MEDICINE

## 2022-08-29 RX ORDER — SODIUM CHLORIDE 0.9 % (FLUSH) 0.9 %
10 SYRINGE (ML) INJECTION AS NEEDED
Status: DISCONTINUED | OUTPATIENT
Start: 2022-08-29 | End: 2022-08-30 | Stop reason: HOSPADM

## 2022-08-29 RX ORDER — SODIUM CHLORIDE 0.9 % (FLUSH) 0.9 %
10 SYRINGE (ML) INJECTION EVERY 12 HOURS SCHEDULED
Status: DISCONTINUED | OUTPATIENT
Start: 2022-08-29 | End: 2022-08-30 | Stop reason: HOSPADM

## 2022-08-29 RX ADMIN — IOPAMIDOL 95 ML: 755 INJECTION, SOLUTION INTRAVENOUS at 20:31

## 2022-08-29 RX ADMIN — APIXABAN 5 MG: 5 TABLET, FILM COATED ORAL at 21:59

## 2022-08-30 ENCOUNTER — APPOINTMENT (OUTPATIENT)
Dept: CARDIOLOGY | Facility: HOSPITAL | Age: 27
End: 2022-08-30

## 2022-08-30 ENCOUNTER — APPOINTMENT (OUTPATIENT)
Dept: MRI IMAGING | Facility: HOSPITAL | Age: 27
End: 2022-08-30

## 2022-08-30 VITALS
HEIGHT: 62 IN | OXYGEN SATURATION: 98 % | SYSTOLIC BLOOD PRESSURE: 107 MMHG | HEART RATE: 58 BPM | RESPIRATION RATE: 18 BRPM | DIASTOLIC BLOOD PRESSURE: 67 MMHG | WEIGHT: 151.9 LBS | BODY MASS INDEX: 27.95 KG/M2 | TEMPERATURE: 98.7 F

## 2022-08-30 LAB
ANION GAP SERPL CALCULATED.3IONS-SCNC: 9 MMOL/L (ref 5–15)
AT III PPP CHRO-ACNC: 138 % (ref 90–134)
BH CV LOWER VASCULAR LEFT COMMON FEMORAL AUGMENT: NORMAL
BH CV LOWER VASCULAR LEFT COMMON FEMORAL COMPETENT: NORMAL
BH CV LOWER VASCULAR LEFT COMMON FEMORAL COMPRESS: NORMAL
BH CV LOWER VASCULAR LEFT COMMON FEMORAL PHASIC: NORMAL
BH CV LOWER VASCULAR LEFT COMMON FEMORAL SPONT: NORMAL
BH CV LOWER VASCULAR LEFT DISTAL FEMORAL COMPRESS: NORMAL
BH CV LOWER VASCULAR LEFT GASTRONEMIUS COMPRESS: NORMAL
BH CV LOWER VASCULAR LEFT GREATER SAPH AK COMPRESS: NORMAL
BH CV LOWER VASCULAR LEFT GREATER SAPH BK COMPRESS: NORMAL
BH CV LOWER VASCULAR LEFT LESSER SAPH COMPRESS: NORMAL
BH CV LOWER VASCULAR LEFT MID FEMORAL AUGMENT: NORMAL
BH CV LOWER VASCULAR LEFT MID FEMORAL COMPETENT: NORMAL
BH CV LOWER VASCULAR LEFT MID FEMORAL COMPRESS: NORMAL
BH CV LOWER VASCULAR LEFT MID FEMORAL PHASIC: NORMAL
BH CV LOWER VASCULAR LEFT MID FEMORAL SPONT: NORMAL
BH CV LOWER VASCULAR LEFT PERONEAL COMPRESS: NORMAL
BH CV LOWER VASCULAR LEFT POPLITEAL AUGMENT: NORMAL
BH CV LOWER VASCULAR LEFT POPLITEAL COMPETENT: NORMAL
BH CV LOWER VASCULAR LEFT POPLITEAL COMPRESS: NORMAL
BH CV LOWER VASCULAR LEFT POPLITEAL PHASIC: NORMAL
BH CV LOWER VASCULAR LEFT POPLITEAL SPONT: NORMAL
BH CV LOWER VASCULAR LEFT POSTERIOR TIBIAL COMPRESS: NORMAL
BH CV LOWER VASCULAR LEFT PROFUNDA FEMORAL COMPRESS: NORMAL
BH CV LOWER VASCULAR LEFT PROXIMAL FEMORAL COMPRESS: NORMAL
BH CV LOWER VASCULAR LEFT SAPHENOFEMORAL JUNCTION COMPRESS: NORMAL
BH CV LOWER VASCULAR RIGHT COMMON FEMORAL AUGMENT: NORMAL
BH CV LOWER VASCULAR RIGHT COMMON FEMORAL COMPETENT: NORMAL
BH CV LOWER VASCULAR RIGHT COMMON FEMORAL COMPRESS: NORMAL
BH CV LOWER VASCULAR RIGHT COMMON FEMORAL PHASIC: NORMAL
BH CV LOWER VASCULAR RIGHT COMMON FEMORAL SPONT: NORMAL
BH CV LOWER VASCULAR RIGHT DISTAL FEMORAL COMPRESS: NORMAL
BH CV LOWER VASCULAR RIGHT GASTRONEMIUS COMPRESS: NORMAL
BH CV LOWER VASCULAR RIGHT GREATER SAPH AK COMPRESS: NORMAL
BH CV LOWER VASCULAR RIGHT GREATER SAPH BK COMPRESS: NORMAL
BH CV LOWER VASCULAR RIGHT LESSER SAPH COMPRESS: NORMAL
BH CV LOWER VASCULAR RIGHT MID FEMORAL AUGMENT: NORMAL
BH CV LOWER VASCULAR RIGHT MID FEMORAL COMPETENT: NORMAL
BH CV LOWER VASCULAR RIGHT MID FEMORAL COMPRESS: NORMAL
BH CV LOWER VASCULAR RIGHT MID FEMORAL PHASIC: NORMAL
BH CV LOWER VASCULAR RIGHT MID FEMORAL SPONT: NORMAL
BH CV LOWER VASCULAR RIGHT PERONEAL COMPRESS: NORMAL
BH CV LOWER VASCULAR RIGHT POPLITEAL AUGMENT: NORMAL
BH CV LOWER VASCULAR RIGHT POPLITEAL COMPETENT: NORMAL
BH CV LOWER VASCULAR RIGHT POPLITEAL COMPRESS: NORMAL
BH CV LOWER VASCULAR RIGHT POPLITEAL PHASIC: NORMAL
BH CV LOWER VASCULAR RIGHT POPLITEAL SPONT: NORMAL
BH CV LOWER VASCULAR RIGHT POSTERIOR TIBIAL COMPRESS: NORMAL
BH CV LOWER VASCULAR RIGHT PROFUNDA FEMORAL COMPRESS: NORMAL
BH CV LOWER VASCULAR RIGHT PROXIMAL FEMORAL COMPRESS: NORMAL
BH CV LOWER VASCULAR RIGHT SAPHENOFEMORAL JUNCTION COMPRESS: NORMAL
BH CV UPPER VENOUS LEFT AXILLARY AUGMENT: NORMAL
BH CV UPPER VENOUS LEFT AXILLARY COMPRESS: NORMAL
BH CV UPPER VENOUS LEFT AXILLARY PHASIC: NORMAL
BH CV UPPER VENOUS LEFT AXILLARY SPONT: NORMAL
BH CV UPPER VENOUS LEFT BASILIC FOREARM COMPRESS: NORMAL
BH CV UPPER VENOUS LEFT BASILIC UPPER COMPRESS: NORMAL
BH CV UPPER VENOUS LEFT BRACHIAL COMPRESS: NORMAL
BH CV UPPER VENOUS LEFT CEPHALIC FOREARM COMPRESS: NORMAL
BH CV UPPER VENOUS LEFT CEPHALIC UPPER COMPRESS: NORMAL
BH CV UPPER VENOUS LEFT INTERNAL JUGULAR AUGMENT: NORMAL
BH CV UPPER VENOUS LEFT INTERNAL JUGULAR COMPRESS: NORMAL
BH CV UPPER VENOUS LEFT INTERNAL JUGULAR PHASIC: NORMAL
BH CV UPPER VENOUS LEFT INTERNAL JUGULAR SPONT: NORMAL
BH CV UPPER VENOUS LEFT RADIAL COMPRESS: NORMAL
BH CV UPPER VENOUS LEFT SUBCLAVIAN AUGMENT: NORMAL
BH CV UPPER VENOUS LEFT SUBCLAVIAN COMPRESS: NORMAL
BH CV UPPER VENOUS LEFT SUBCLAVIAN PHASIC: NORMAL
BH CV UPPER VENOUS LEFT SUBCLAVIAN SPONT: NORMAL
BH CV UPPER VENOUS LEFT ULNAR COMPRESS: NORMAL
BH CV UPPER VENOUS RIGHT INTERNAL JUGULAR AUGMENT: NORMAL
BH CV UPPER VENOUS RIGHT INTERNAL JUGULAR COMPRESS: NORMAL
BH CV UPPER VENOUS RIGHT INTERNAL JUGULAR PHASIC: NORMAL
BH CV UPPER VENOUS RIGHT INTERNAL JUGULAR SPONT: NORMAL
BH CV UPPER VENOUS RIGHT SUBCLAVIAN AUGMENT: NORMAL
BH CV UPPER VENOUS RIGHT SUBCLAVIAN COMPRESS: NORMAL
BH CV UPPER VENOUS RIGHT SUBCLAVIAN PHASIC: NORMAL
BH CV UPPER VENOUS RIGHT SUBCLAVIAN SPONT: NORMAL
BUN SERPL-MCNC: 11 MG/DL (ref 6–20)
BUN/CREAT SERPL: 19 (ref 7–25)
CALCIUM SPEC-SCNC: 8.8 MG/DL (ref 8.6–10.5)
CHLORIDE SERPL-SCNC: 104 MMOL/L (ref 98–107)
CO2 SERPL-SCNC: 25 MMOL/L (ref 22–29)
CREAT SERPL-MCNC: 0.58 MG/DL (ref 0.57–1)
D DIMER PPP FEU-MCNC: <0.27 MCGFEU/ML (ref 0–0.49)
DEPRECATED RDW RBC AUTO: 40.8 FL (ref 37–54)
EGFRCR SERPLBLD CKD-EPI 2021: 127.4 ML/MIN/1.73
ERYTHROCYTE [DISTWIDTH] IN BLOOD BY AUTOMATED COUNT: 12.1 % (ref 12.3–15.4)
FACT VIII ACT/NOR PPP: 125 % ACTIVITY (ref 60–150)
GLUCOSE SERPL-MCNC: 88 MG/DL (ref 65–99)
HCT VFR BLD AUTO: 41.4 % (ref 34–46.6)
HGB BLD-MCNC: 14 G/DL (ref 12–15.9)
Lab: NORMAL
MAXIMAL PREDICTED HEART RATE: 193 BPM
MAXIMAL PREDICTED HEART RATE: 193 BPM
MCH RBC QN AUTO: 31.5 PG (ref 26.6–33)
MCHC RBC AUTO-ENTMCNC: 33.8 G/DL (ref 31.5–35.7)
MCV RBC AUTO: 93.2 FL (ref 79–97)
PLATELET # BLD AUTO: 205 10*3/MM3 (ref 140–450)
PMV BLD AUTO: 9.9 FL (ref 6–12)
POTASSIUM SERPL-SCNC: 3.7 MMOL/L (ref 3.5–5.2)
PROT C ACT/NOR PPP: 115 % (ref 86–163)
PROT S ACT/NOR PPP: 123 % (ref 70–127)
RBC # BLD AUTO: 4.44 10*6/MM3 (ref 3.77–5.28)
SODIUM SERPL-SCNC: 138 MMOL/L (ref 136–145)
STRESS TARGET HR: 164 BPM
STRESS TARGET HR: 164 BPM
WBC NRBC COR # BLD: 4.26 10*3/MM3 (ref 3.4–10.8)

## 2022-08-30 PROCEDURE — 0 GADOBENATE DIMEGLUMINE 529 MG/ML SOLUTION: Performed by: EMERGENCY MEDICINE

## 2022-08-30 PROCEDURE — 70553 MRI BRAIN STEM W/O & W/DYE: CPT

## 2022-08-30 PROCEDURE — A9577 INJ MULTIHANCE: HCPCS | Performed by: EMERGENCY MEDICINE

## 2022-08-30 PROCEDURE — 85306 CLOT INHIBIT PROT S FREE: CPT | Performed by: INTERNAL MEDICINE

## 2022-08-30 PROCEDURE — 85613 RUSSELL VIPER VENOM DILUTED: CPT | Performed by: INTERNAL MEDICINE

## 2022-08-30 PROCEDURE — 85705 THROMBOPLASTIN INHIBITION: CPT | Performed by: INTERNAL MEDICINE

## 2022-08-30 PROCEDURE — 93971 EXTREMITY STUDY: CPT

## 2022-08-30 PROCEDURE — 85379 FIBRIN DEGRADATION QUANT: CPT | Performed by: INTERNAL MEDICINE

## 2022-08-30 PROCEDURE — 93356 MYOCRD STRAIN IMG SPCKL TRCK: CPT

## 2022-08-30 PROCEDURE — 85732 THROMBOPLASTIN TIME PARTIAL: CPT | Performed by: INTERNAL MEDICINE

## 2022-08-30 PROCEDURE — 86146 BETA-2 GLYCOPROTEIN ANTIBODY: CPT | Performed by: INTERNAL MEDICINE

## 2022-08-30 PROCEDURE — G0378 HOSPITAL OBSERVATION PER HR: HCPCS

## 2022-08-30 PROCEDURE — 93306 TTE W/DOPPLER COMPLETE: CPT | Performed by: INTERNAL MEDICINE

## 2022-08-30 PROCEDURE — 93306 TTE W/DOPPLER COMPLETE: CPT

## 2022-08-30 PROCEDURE — 85027 COMPLETE CBC AUTOMATED: CPT | Performed by: NURSE PRACTITIONER

## 2022-08-30 PROCEDURE — 99215 OFFICE O/P EST HI 40 MIN: CPT | Performed by: INTERNAL MEDICINE

## 2022-08-30 PROCEDURE — 93356 MYOCRD STRAIN IMG SPCKL TRCK: CPT | Performed by: INTERNAL MEDICINE

## 2022-08-30 PROCEDURE — 93970 EXTREMITY STUDY: CPT

## 2022-08-30 PROCEDURE — 85670 THROMBIN TIME PLASMA: CPT | Performed by: INTERNAL MEDICINE

## 2022-08-30 PROCEDURE — 86147 CARDIOLIPIN ANTIBODY EA IG: CPT | Performed by: INTERNAL MEDICINE

## 2022-08-30 PROCEDURE — 81241 F5 GENE: CPT | Performed by: INTERNAL MEDICINE

## 2022-08-30 PROCEDURE — 81240 F2 GENE: CPT | Performed by: INTERNAL MEDICINE

## 2022-08-30 PROCEDURE — 85240 CLOT FACTOR VIII AHG 1 STAGE: CPT | Performed by: INTERNAL MEDICINE

## 2022-08-30 PROCEDURE — 85300 ANTITHROMBIN III ACTIVITY: CPT | Performed by: INTERNAL MEDICINE

## 2022-08-30 PROCEDURE — 85303 CLOT INHIBIT PROT C ACTIVITY: CPT | Performed by: INTERNAL MEDICINE

## 2022-08-30 PROCEDURE — 80048 BASIC METABOLIC PNL TOTAL CA: CPT | Performed by: NURSE PRACTITIONER

## 2022-08-30 RX ORDER — APIXABAN 5 MG (74)
5 KIT ORAL TAKE AS DIRECTED
Qty: 74 TABLET | Refills: 0 | Status: SHIPPED | OUTPATIENT
Start: 2022-08-30 | End: 2022-09-21

## 2022-08-30 RX ADMIN — APIXABAN 5 MG: 5 TABLET, FILM COATED ORAL at 00:41

## 2022-08-30 RX ADMIN — Medication 10 ML: at 08:58

## 2022-08-30 RX ADMIN — Medication 10 ML: at 00:05

## 2022-08-30 RX ADMIN — APIXABAN 10 MG: 5 TABLET, FILM COATED ORAL at 08:56

## 2022-08-30 RX ADMIN — GADOBENATE DIMEGLUMINE 14 ML: 529 INJECTION, SOLUTION INTRAVENOUS at 17:19

## 2022-08-31 ENCOUNTER — TELEPHONE (OUTPATIENT)
Dept: ONCOLOGY | Facility: CLINIC | Age: 27
End: 2022-08-31

## 2022-08-31 LAB
AORTIC ARCH: 1.4 CM
ASCENDING AORTA: 2.5 CM
BH CV ECHO LEFT VENTRICLE GLOBAL LONGITUDINAL STRAIN: -20.2 %
BH CV ECHO MEAS - ACS: 2.22 CM
BH CV ECHO MEAS - AO MAX PG: 8.5 MMHG
BH CV ECHO MEAS - AO MEAN PG: 4.6 MMHG
BH CV ECHO MEAS - AO ROOT DIAM: 2.8 CM
BH CV ECHO MEAS - AO V2 MAX: 145.8 CM/SEC
BH CV ECHO MEAS - AO V2 VTI: 29.7 CM
BH CV ECHO MEAS - AVA(I,D): 2.03 CM2
BH CV ECHO MEAS - EDV(CUBED): 134.8 ML
BH CV ECHO MEAS - EF_3D-VOL: 64 %
BH CV ECHO MEAS - ESV(CUBED): 25.6 ML
BH CV ECHO MEAS - FS: 42.6 %
BH CV ECHO MEAS - IVS/LVPW: 0.88 CM
BH CV ECHO MEAS - IVSD: 0.71 CM
BH CV ECHO MEAS - LV MASS(C)D: 132.5 GRAMS
BH CV ECHO MEAS - LV MAX PG: 3.6 MMHG
BH CV ECHO MEAS - LV MEAN PG: 2.3 MMHG
BH CV ECHO MEAS - LV V1 MAX: 95.2 CM/SEC
BH CV ECHO MEAS - LV V1 VTI: 19.4 CM
BH CV ECHO MEAS - LVIDD: 5.1 CM
BH CV ECHO MEAS - LVIDS: 2.9 CM
BH CV ECHO MEAS - LVOT AREA: 3.1 CM2
BH CV ECHO MEAS - LVOT DIAM: 1.99 CM
BH CV ECHO MEAS - LVPWD: 0.81 CM
BH CV ECHO MEAS - MV A DUR: 0.13 SEC
BH CV ECHO MEAS - MV A MAX VEL: 29.8 CM/SEC
BH CV ECHO MEAS - MV DEC SLOPE: 170.1 CM/SEC2
BH CV ECHO MEAS - MV DEC TIME: 0.37 MSEC
BH CV ECHO MEAS - MV E MAX VEL: 45.9 CM/SEC
BH CV ECHO MEAS - MV E/A: 1.54
BH CV ECHO MEAS - MV MAX PG: 1.59 MMHG
BH CV ECHO MEAS - MV MEAN PG: 0.7 MMHG
BH CV ECHO MEAS - MV P1/2T: 102.2 MSEC
BH CV ECHO MEAS - MV V2 VTI: 24.3 CM
BH CV ECHO MEAS - MVA(P1/2T): 2.15 CM2
BH CV ECHO MEAS - MVA(VTI): 2.49 CM2
BH CV ECHO MEAS - PA ACC TIME: 0.15 SEC
BH CV ECHO MEAS - PA PR(ACCEL): 12.5 MMHG
BH CV ECHO MEAS - PA V2 MAX: 87.6 CM/SEC
BH CV ECHO MEAS - PULM A REVS DUR: 0.15 SEC
BH CV ECHO MEAS - PULM A REVS VEL: 32.1 CM/SEC
BH CV ECHO MEAS - PULM DIAS VEL: 54.6 CM/SEC
BH CV ECHO MEAS - PULM S/D: 1.04
BH CV ECHO MEAS - PULM SYS VEL: 56.8 CM/SEC
BH CV ECHO MEAS - QP/QS: 0.66
BH CV ECHO MEAS - RV MAX PG: 1.54 MMHG
BH CV ECHO MEAS - RV V1 MAX: 62.1 CM/SEC
BH CV ECHO MEAS - RV V1 VTI: 12.8 CM
BH CV ECHO MEAS - RVOT DIAM: 1.99 CM
BH CV ECHO MEAS - SUP REN AO DIAM: 1.8 CM
BH CV ECHO MEAS - SV(LVOT): 60.5 ML
BH CV ECHO MEAS - SV(RVOT): 39.9 ML
BH CV ECHO MEAS - TAPSE (>1.6): 1.34 CM
BH CV XLRA - RV BASE: 2.48 CM
BH CV XLRA - RV LENGTH: 6 CM
BH CV XLRA - RV MID: 2.5 CM
BH CV XLRA - TDI S': 8.1 CM/SEC
CARDIOLIPIN IGG SER IA-ACNC: <9 GPL U/ML (ref 0–14)
CARDIOLIPIN IGM SER IA-ACNC: <9 MPL U/ML (ref 0–12)
F5 GENE MUT ANL BLD/T: NORMAL
FACTOR II, DNA ANALYSIS: NORMAL
LEFT ATRIUM VOLUME INDEX: 19.6 ML/M2
MAXIMAL PREDICTED HEART RATE: 193 BPM
SINUS: 2.6 CM
STJ: 2.08 CM
STRESS TARGET HR: 164 BPM

## 2022-08-31 NOTE — TELEPHONE ENCOUNTER
Caller: Vikki Durham    Relationship: Self    Best call back number:[phone]      What was the call regarding: PATIENT CALLED SHE WENT TO THE  ER Monday 8-29-22 AND SHE WAS ADMITTED FOR CHEST PAIN, PULMONARY  EMBOLISM. THEY STARTED ON BLOOD THINNERS (ELIQUIS) AND SHE WAS ADVIED TO CALL DR AMEZQUITA AND SEE IF SHE NEEDED TO BE SEEN SOONER     Do you require a callback: YES

## 2022-08-31 NOTE — TELEPHONE ENCOUNTER
Returned call to patient after reviewing notes.  Her hospital follow up is scheduled for 9/21/2022.  She is currently on the Eliquis starter pack, she has follow up with pcp in 1 week.  She will contact our office with any problems between now and her appointment.

## 2022-09-01 ENCOUNTER — TELEPHONE (OUTPATIENT)
Dept: ONCOLOGY | Facility: CLINIC | Age: 27
End: 2022-09-01

## 2022-09-01 DIAGNOSIS — C85.22 MEDIASTINAL (THYMIC) LARGE B-CELL LYMPHOMA INTRATHORACIC LYMPH NODES: Primary | ICD-10-CM

## 2022-09-01 DIAGNOSIS — R93.1 ABNORMAL ECHOCARDIOGRAM: ICD-10-CM

## 2022-09-01 LAB
APTT SCREEN TO CONFIRM RATIO: 0.92 RATIO (ref 0–1.34)
CONFIRM APTT/NORMAL: 43 SEC (ref 0–47.6)
DRVVT SCREEN TO CONFIRM RATIO: 1 RATIO (ref 0.8–1.2)
LA 2 SCREEN W REFLEX-IMP: ABNORMAL
MIXING DRVVT: 46.1 SEC (ref 0–40.4)
SCREEN APTT: 41 SEC (ref 0–51.9)
SCREEN DRVVT: 51.8 SEC (ref 0–47)
THROMBIN TIME: 17.7 SEC (ref 0–23)

## 2022-09-01 NOTE — TELEPHONE ENCOUNTER
Call to Ms. Durham to let her know referral is being made to Dr. Reese, Cardiologist.  Patient did not answer and no room to leave voice mail. DL

## 2022-09-01 NOTE — PROGRESS NOTES
Referral to cardiology, Dr. Reese, re: recent hospital stay with abnormal ECHO results BEREKET Padilla

## 2022-09-02 ENCOUNTER — TELEPHONE (OUTPATIENT)
Dept: ONCOLOGY | Facility: CLINIC | Age: 27
End: 2022-09-02

## 2022-09-02 LAB
B2 GLYCOPROT1 IGA SER-ACNC: <9 GPI IGA UNITS (ref 0–25)
B2 GLYCOPROT1 IGG SER-ACNC: <9 GPI IGG UNITS (ref 0–20)
B2 GLYCOPROT1 IGM SER-ACNC: <9 GPI IGM UNITS (ref 0–32)

## 2022-09-02 NOTE — TELEPHONE ENCOUNTER
Have attempted to reach patient to let her know a referral has been made to cardiologist, Dr. Reese, but patient did not answer and her voice mail is full, and unable to leave a message.  Attempted to reach her mother, as well, and no answer with the same message the voice mail is full, and unable to leave a message.

## 2022-09-06 ENCOUNTER — TELEPHONE (OUTPATIENT)
Dept: ONCOLOGY | Facility: CLINIC | Age: 27
End: 2022-09-06

## 2022-09-06 NOTE — TELEPHONE ENCOUNTER
Received return call from Ms. Durham, and able to update her that Dr. Padilla has referred her to Dr. Reese, Cardiologist, and that since it has been hard for this RN to reach her, they have possibly tried as well.  Provided her the cardiology office number to call, and see if she could be set up for appointment. Reviewed appointments for CT scans and follow up appointment with Dr. Padilla in near future.  Patient verbalized understanding of all.

## 2022-09-06 NOTE — TELEPHONE ENCOUNTER
Call to patient again this morning, as this RN has not been able to reach her or have not been able to leave message on her phone.  Voice mail did accept a message this morning, and was able to leave message and request call back to direct number of [phone].

## 2022-09-07 ENCOUNTER — APPOINTMENT (OUTPATIENT)
Dept: GENERAL RADIOLOGY | Facility: HOSPITAL | Age: 27
End: 2022-09-07

## 2022-09-07 ENCOUNTER — HOSPITAL ENCOUNTER (EMERGENCY)
Facility: HOSPITAL | Age: 27
Discharge: HOME | End: 2022-09-07
Attending: EMERGENCY MEDICINE | Admitting: EMERGENCY MEDICINE

## 2022-09-07 ENCOUNTER — APPOINTMENT (OUTPATIENT)
Dept: CT IMAGING | Facility: HOSPITAL | Age: 27
End: 2022-09-07

## 2022-09-07 VITALS
HEIGHT: 62 IN | RESPIRATION RATE: 16 BRPM | TEMPERATURE: 97.6 F | SYSTOLIC BLOOD PRESSURE: 116 MMHG | OXYGEN SATURATION: 97 % | WEIGHT: 155 LBS | BODY MASS INDEX: 28.52 KG/M2 | HEART RATE: 62 BPM | DIASTOLIC BLOOD PRESSURE: 102 MMHG

## 2022-09-07 DIAGNOSIS — Z86.711 HISTORY OF PULMONARY EMBOLISM: ICD-10-CM

## 2022-09-07 DIAGNOSIS — R42 DIZZINESS: Primary | ICD-10-CM

## 2022-09-07 LAB
ALBUMIN SERPL-MCNC: 4.9 G/DL (ref 3.5–5.2)
ALBUMIN/GLOB SERPL: 2.7 G/DL
ALP SERPL-CCNC: 45 U/L (ref 39–117)
ALT SERPL W P-5'-P-CCNC: 14 U/L (ref 1–33)
ANION GAP SERPL CALCULATED.3IONS-SCNC: 9 MMOL/L (ref 5–15)
AST SERPL-CCNC: 16 U/L (ref 1–32)
BASOPHILS # BLD AUTO: 0.03 10*3/MM3 (ref 0–0.2)
BASOPHILS NFR BLD AUTO: 0.7 % (ref 0–1.5)
BILIRUB SERPL-MCNC: 0.2 MG/DL (ref 0–1.2)
BUN SERPL-MCNC: 13 MG/DL (ref 6–20)
BUN/CREAT SERPL: 17.1 (ref 7–25)
CALCIUM SPEC-SCNC: 9.3 MG/DL (ref 8.6–10.5)
CHLORIDE SERPL-SCNC: 102 MMOL/L (ref 98–107)
CO2 SERPL-SCNC: 29 MMOL/L (ref 22–29)
CREAT SERPL-MCNC: 0.76 MG/DL (ref 0.57–1)
D DIMER PPP FEU-MCNC: <0.27 MCGFEU/ML (ref 0–0.49)
DEPRECATED RDW RBC AUTO: 43.3 FL (ref 37–54)
EGFRCR SERPLBLD CKD-EPI 2021: 110.3 ML/MIN/1.73
EOSINOPHIL # BLD AUTO: 0.03 10*3/MM3 (ref 0–0.4)
EOSINOPHIL NFR BLD AUTO: 0.7 % (ref 0.3–6.2)
ERYTHROCYTE [DISTWIDTH] IN BLOOD BY AUTOMATED COUNT: 12.3 % (ref 12.3–15.4)
GLOBULIN UR ELPH-MCNC: 1.8 GM/DL
GLUCOSE SERPL-MCNC: 91 MG/DL (ref 65–99)
HCG SERPL QL: NEGATIVE
HCT VFR BLD AUTO: 41.1 % (ref 34–46.6)
HGB BLD-MCNC: 13.6 G/DL (ref 12–15.9)
IMM GRANULOCYTES # BLD AUTO: 0.02 10*3/MM3 (ref 0–0.05)
IMM GRANULOCYTES NFR BLD AUTO: 0.5 % (ref 0–0.5)
INR PPP: 1.02 (ref 0.9–1.1)
LYMPHOCYTES # BLD AUTO: 1.18 10*3/MM3 (ref 0.7–3.1)
LYMPHOCYTES NFR BLD AUTO: 27.1 % (ref 19.6–45.3)
MCH RBC QN AUTO: 31.9 PG (ref 26.6–33)
MCHC RBC AUTO-ENTMCNC: 33.1 G/DL (ref 31.5–35.7)
MCV RBC AUTO: 96.3 FL (ref 79–97)
MONOCYTES # BLD AUTO: 0.3 10*3/MM3 (ref 0.1–0.9)
MONOCYTES NFR BLD AUTO: 6.9 % (ref 5–12)
NEUTROPHILS NFR BLD AUTO: 2.79 10*3/MM3 (ref 1.7–7)
NEUTROPHILS NFR BLD AUTO: 64.1 % (ref 42.7–76)
NRBC BLD AUTO-RTO: 0 /100 WBC (ref 0–0.2)
PLATELET # BLD AUTO: 208 10*3/MM3 (ref 140–450)
PMV BLD AUTO: 10.1 FL (ref 6–12)
POTASSIUM SERPL-SCNC: 3.6 MMOL/L (ref 3.5–5.2)
PROT SERPL-MCNC: 6.7 G/DL (ref 6–8.5)
PROTHROMBIN TIME: 13.3 SECONDS (ref 11.7–14.2)
QT INTERVAL: 418 MS
RBC # BLD AUTO: 4.27 10*6/MM3 (ref 3.77–5.28)
SODIUM SERPL-SCNC: 140 MMOL/L (ref 136–145)
TROPONIN T SERPL-MCNC: <0.01 NG/ML (ref 0–0.03)
WBC NRBC COR # BLD: 4.35 10*3/MM3 (ref 3.4–10.8)

## 2022-09-07 PROCEDURE — 84484 ASSAY OF TROPONIN QUANT: CPT | Performed by: EMERGENCY MEDICINE

## 2022-09-07 PROCEDURE — 93005 ELECTROCARDIOGRAM TRACING: CPT | Performed by: EMERGENCY MEDICINE

## 2022-09-07 PROCEDURE — 80053 COMPREHEN METABOLIC PANEL: CPT | Performed by: EMERGENCY MEDICINE

## 2022-09-07 PROCEDURE — 93010 ELECTROCARDIOGRAM REPORT: CPT | Performed by: INTERNAL MEDICINE

## 2022-09-07 PROCEDURE — 85610 PROTHROMBIN TIME: CPT | Performed by: EMERGENCY MEDICINE

## 2022-09-07 PROCEDURE — 84703 CHORIONIC GONADOTROPIN ASSAY: CPT | Performed by: EMERGENCY MEDICINE

## 2022-09-07 PROCEDURE — 71045 X-RAY EXAM CHEST 1 VIEW: CPT

## 2022-09-07 PROCEDURE — 85379 FIBRIN DEGRADATION QUANT: CPT | Performed by: EMERGENCY MEDICINE

## 2022-09-07 PROCEDURE — 99283 EMERGENCY DEPT VISIT LOW MDM: CPT

## 2022-09-07 PROCEDURE — 70450 CT HEAD/BRAIN W/O DYE: CPT

## 2022-09-07 PROCEDURE — 85025 COMPLETE CBC W/AUTO DIFF WBC: CPT | Performed by: EMERGENCY MEDICINE

## 2022-09-07 RX ORDER — SODIUM CHLORIDE 0.9 % (FLUSH) 0.9 %
10 SYRINGE (ML) INJECTION AS NEEDED
Status: DISCONTINUED | OUTPATIENT
Start: 2022-09-07 | End: 2022-09-07 | Stop reason: HOSPADM

## 2022-09-07 NOTE — ED NOTES
Pt complains of having headache that comes and goes that is predominately on the left side, pt states she has no headache at this time

## 2022-09-07 NOTE — ED PROVIDER NOTES
EMERGENCY DEPARTMENT ENCOUNTER    Room Number:  16/16  Date of encounter:  9/7/2022  PCP: Blayne Echeverria MD  Historian: Patient      HPI:  Chief Complaint: Dizziness        Context: Vikki Durham is a 27 y.o. female who presents to the ED c/o intermittent dizziness, off balance and left arm discomfort since admission for PE last month.  The patient is on Eliquis and states she has been compliant.  While admitted for her PE the patient had an MRI of her brain that was unremarkable, left upper extremity and bilateral lower extremity Dopplers that were negative and echocardiogram is unremarkable except for a small patent foramen ovale.  Patient has a history of B-cell lymphoma in 2018.  Patient states that she has had some dizziness when standing or walking since last night.  She states it was worse this morning but better when she sits and even better when she lies down.  The patient called her PCP and referred to the emergency room for further evaluation and care.  The patient denies trouble with speech, trouble with thought, trouble swallowing, weakness or focal neurodeficit.  She reports a mild headache.    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   • Hypokalemia 07/14/2018   • Arm DVT (deep venous thromboembolism), acute, right (HCC) 07/14/2018   • Elevated liver enzymes 07/14/2018   • Leukocytopenia 07/15/2018   • Anemia associated with chemotherapy 07/15/2018   • Fitting and adjustment of vascular catheter 08/10/2018   • 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) 10/08/2018   • Fever and chills 10/21/2018   • Pancytopenia (HCC) 10/21/2018   • Parainfluenza virus infection 10/22/2018   • Fatigue 12/17/2019   • Renal cyst 12/17/2019   • Cyst of right ovary 12/17/2019   • Pericardial effusion 03/16/2020   • Pulmonary embolism (HCC) 08/29/2022     Resolved Ambulatory Problems     Diagnosis Date Noted   • Lymphadenopathy 06/11/2018   • Diffuse large B-cell lymphoma (HCC) 07/09/2018   • Lymphoma (HCC) 07/30/2018     Past Medical History:   Diagnosis Date   • Diffuse large B cell lymphoma (HCC) 06/2018   • Fracture of lower leg 2000   • H/O Lung mass 06/2018   • History of DVT (deep vein thrombosis) 2018         PAST SURGICAL HISTORY  Past Surgical History:   Procedure Laterality Date   • OTHER SURGICAL HISTORY  06/12/2018    CT-GUIDED BIOPSY OF MEDIASTINAL MASS   • VENOUS ACCESS DEVICE (PORT) INSERTION Right 7/31/2018    Procedure: RIGHT MEDIPORT PLACEMENT;  Surgeon: Farhan Hurt MD;  Location: [facility];  Service: Vascular   • VENOUS ACCESS DEVICE (PORT) REMOVAL N/A 5/8/2019    Procedure: MEDIPORT REMOVEAL;  Surgeon: Farhan Hurt MD;  Location: [facility];  Service: Vascular         FAMILY HISTORY  Family History   Problem Relation Age of Onset   • Thyroid disease Mother    • Glaucoma Mother    • Lung cancer Maternal Grandmother    • Hypertension Paternal Grandmother    • Diabetes Paternal Grandmother    • Malig Hyperthermia Neg Hx          SOCIAL HISTORY  Social History     Socioeconomic History   • Marital status: Single   Tobacco Use   • Smoking status: Never Smoker   • Smokeless tobacco: Never Used   Vaping Use   • Vaping Use: Never used   Substance and Sexual Activity   • Alcohol use: No   • Drug use: No   • Sexual activity: Not Currently         ALLERGIES  Rituximab        REVIEW OF SYSTEMS  Review of Systems     Patient denies chest pain, fevers, chills, cough, abdominal pain, vomiting,  diarrhea, lower extreme pain, extreme swelling or focal neurodeficit  All systems reviewed and negative except for those discussed in HPI.     PHYSICAL EXAM    I have reviewed the triage vital signs and nursing notes.    ED Triage Vitals   Temp Heart Rate Resp BP SpO2   09/07/22 1057 09/07/22 1057 09/07/22 1057 09/07/22 1121 09/07/22 1057   97.6 °F (36.4 °C) 65 16 107/67 95 %      Temp src Heart Rate Source Patient Position BP Location FiO2 (%)   09/07/22 1057 09/07/22 1057 -- -- --   Tympanic Monitor          GENERAL: 27-year-old well developed, well nourished in no acute distress  HENT: NCAT, neck supple, trachea midline  EYES: no scleral icterus, PERRL, normal conjunctivae  CV: regular rhythm, regular rate, no murmur  RESPIRATORY: unlabored effort, CTAB  ABDOMEN: soft, nontender, nondistended, bowel sounds present  MUSCULOSKELETAL: no gross deformity, no pedal edema, no calf tenderness  NEURO: alert,  sensory and motor function of extremities intact, speech clear, mental status normal: NIHSS of 0  SKIN: warm, dry, no rash  PSYCH:  Appropriate mood and affect      PPE  Pt does not present with symptoms for COVID19; however, I was wearing a mask and goggles throughout all patient interaction.    Vital signs and nursing notes reviewed.      LAB RESULTS  Recent Results (from the past 24 hour(s))   Comprehensive Metabolic Panel    Collection Time: 09/07/22 11:38 AM    Specimen: Blood   Result Value Ref Range    Glucose 91 65 - 99 mg/dL    BUN 13 6 - 20 mg/dL    Creatinine 0.76 0.57 - 1.00 mg/dL    Sodium 140 136 - 145 mmol/L    Potassium 3.6 3.5 - 5.2 mmol/L    Chloride 102 98 - 107 mmol/L    CO2 29.0 22.0 - 29.0 mmol/L    Calcium 9.3 8.6 - 10.5 mg/dL    Total Protein 6.7 6.0 - 8.5 g/dL    Albumin 4.90 3.50 - 5.20 g/dL    ALT (SGPT) 14 1 - 33 U/L    AST (SGOT) 16 1 - 32 U/L    Alkaline Phosphatase 45 39 - 117 U/L    Total Bilirubin 0.2 0.0 - 1.2 mg/dL    Globulin 1.8 gm/dL    A/G Ratio 2.7 g/dL    BUN/Creatinine Ratio  17.1 7.0 - 25.0    Anion Gap 9.0 5.0 - 15.0 mmol/L    eGFR 110.3 >60.0 mL/min/1.73   Protime-INR    Collection Time: 09/07/22 11:38 AM    Specimen: Blood   Result Value Ref Range    Protime 13.3 11.7 - 14.2 Seconds    INR 1.02 0.90 - 1.10   D-dimer, Quantitative    Collection Time: 09/07/22 11:38 AM    Specimen: Blood   Result Value Ref Range    D-Dimer, Quantitative <0.27 0.00 - 0.49 MCGFEU/mL   Troponin    Collection Time: 09/07/22 11:38 AM    Specimen: Blood   Result Value Ref Range    Troponin T <0.010 0.000 - 0.030 ng/mL   hCG, Serum, Qualitative    Collection Time: 09/07/22 11:38 AM    Specimen: Blood   Result Value Ref Range    HCG Qualitative Negative Negative   CBC Auto Differential    Collection Time: 09/07/22 11:38 AM    Specimen: Blood   Result Value Ref Range    WBC 4.35 3.40 - 10.80 10*3/mm3    RBC 4.27 3.77 - 5.28 10*6/mm3    Hemoglobin 13.6 12.0 - 15.9 g/dL    Hematocrit 41.1 34.0 - 46.6 %    MCV 96.3 79.0 - 97.0 fL    MCH 31.9 26.6 - 33.0 pg    MCHC 33.1 31.5 - 35.7 g/dL    RDW 12.3 12.3 - 15.4 %    RDW-SD 43.3 37.0 - 54.0 fl    MPV 10.1 6.0 - 12.0 fL    Platelets 208 140 - 450 10*3/mm3    Neutrophil % 64.1 42.7 - 76.0 %    Lymphocyte % 27.1 19.6 - 45.3 %    Monocyte % 6.9 5.0 - 12.0 %    Eosinophil % 0.7 0.3 - 6.2 %    Basophil % 0.7 0.0 - 1.5 %    Immature Grans % 0.5 0.0 - 0.5 %    Neutrophils, Absolute 2.79 1.70 - 7.00 10*3/mm3    Lymphocytes, Absolute 1.18 0.70 - 3.10 10*3/mm3    Monocytes, Absolute 0.30 0.10 - 0.90 10*3/mm3    Eosinophils, Absolute 0.03 0.00 - 0.40 10*3/mm3    Basophils, Absolute 0.03 0.00 - 0.20 10*3/mm3    Immature Grans, Absolute 0.02 0.00 - 0.05 10*3/mm3    nRBC 0.0 0.0 - 0.2 /100 WBC   ECG 12 Lead    Collection Time: 09/07/22 11:40 AM   Result Value Ref Range    QT Interval 418 ms       Ordered the above labs and independently reviewed the results.        RADIOLOGY  CT Head Without Contrast    Result Date: 9/7/2022  CT HEAD WITHOUT CONTRAST  HISTORY: Dizziness.   COMPARISON: MRI brain 08/30/2022.  FINDINGS: The brain ventricles are symmetrical. There is no evidence of hemorrhage, hydrocephalus or of abnormal extra-axial fluid. No focal area of decreased attenuation to suggest acute infarction is identified.      No acute process identified. Further evaluation could be performed with an MRI examination of the brain as indicated.   Radiation dose reduction techniques were utilized, including automated exposure control and exposure modulation based on body size.  This report was finalized on 9/7/2022 2:30 PM by Dr. Arie Beaulieu M.D.      XR Chest 1 View    Result Date: 9/7/2022  Portable chest radiograph  HISTORY:Shortness of breath  TECHNIQUE: Single AP portable radiograph of the chest  COMPARISON:Chest radiograph 08/29/2022      FINDINGS AND IMPRESSION: Sequela of prior granulomatous disease is present. No pulmonary consolidation, pleural effusion or pneumothorax is seen. Cardiac silhouette is within normal limits for size.  This report was finalized on 9/7/2022 11:57 AM by Dr. Oscar Finch M.D.        I ordered the above noted radiological studies. Independently reviewed by me and discussed with radiologist.  See dictation above for official radiology interpretation.      PROCEDURES    Procedures        MEDICATIONS GIVEN IN ER    Medications   sodium chloride 0.9 % flush 10 mL (has no administration in time range)         PROGRESS, DATA ANALYSIS, CONSULTS, AND MEDICAL DECISION MAKING    All labs have been independently reviewed by me.  All radiology studies have been reviewed by me and discussed with radiologist dictating report.   EKG's independently reviewed by me.  Discussion below represents my analysis of pertinent findings related to patient's condition, differential diagnosis, treatment plan and final disposition.      ED Course as of 09/07/22 1520   Wed Sep 07, 2022   1127 NIHSS:  0-->Alert: keenly responsive  0-->Answers both questions correctly  0-->Performs  both tasks correctly  0=normal  0=No visual loss  0=Normal symmetric movement  0-->No drift: limb holds 90 (or 45) degrees for full 10 secs  0-->No drift: limb holds 90 (or 45) degrees for full 10 secs  0-->No drift: limb holds 90 (or 45) degrees for full 10 secs  0-->No drift: limb holds 90 (or 45) degrees for full 10 secs  0=Absent  0=Normal; no sensory loss  0=No aphasia, normal  0=Normal  0=No abnormality  Total score: 0 [GP]   1404 Patient's head CT, EKG, troponin, D-dimer, chest x-ray, CBC and CMP are all normal. [GP]   1413 On repeat examination the patient is resting comfortably in the room without symptoms.  The patient has been ambulatory in the emergency room without difficulty.  I advised her of her unremarkable work-up.  The patient has an appointment to see her PCP tomorrow morning and cardiology in 5 days.  At this point I believe the patient is stable for discharge and outpatient follow-up.  I encouraged the patient to continue taking her Eliquis, push fluids and to keep her appointments with her PCP and cardiologist as scheduled.  Patient understands and agrees with the plan. [GP]      ED Course User Index  [GP] Alfonso Rodriguez MD           AS OF 15:20 EDT VITALS:    BP - (!) 116/102  HR - 62  TEMP - 97.6 °F (36.4 °C) (Tympanic)  02 SATS - 97%        DIAGNOSIS  Final diagnoses:   Dizziness   History of pulmonary embolism         DISPOSITION  Discharged        EMR Dragon/Transcription disclaimer:   Much of this encounter note is an electronic transcription/translation of spoken language to printed text.        Alfonso Rodriguez MD  09/07/22 [id number]

## 2022-09-07 NOTE — DISCHARGE INSTRUCTIONS
Go home and rest tonight.  Push fluids.  Take your Eliquis as prescribed.  Follow-up with your PCP tomorrow and cardiology on Monday as scheduled.  Return if worse

## 2022-09-07 NOTE — ED NOTES
Pt c/o dizziness since yesterday at 1800hrs.  Pt was instructed by PCP to come to the ER, pt recently admitted with a PE.  Pt was placed on Eliquis and has been compliant.

## 2022-09-13 ENCOUNTER — HOSPITAL ENCOUNTER (OUTPATIENT)
Dept: PET IMAGING | Facility: HOSPITAL | Age: 27
Discharge: HOME | End: 2022-09-13
Admitting: INTERNAL MEDICINE

## 2022-09-13 DIAGNOSIS — C85.22 MEDIASTINAL (THYMIC) LARGE B-CELL LYMPHOMA INTRATHORACIC LYMPH NODES: ICD-10-CM

## 2022-09-13 LAB — CREAT BLDA-MCNC: 0.7 MG/DL (ref 0.6–1.3)

## 2022-09-13 PROCEDURE — 70491 CT SOFT TISSUE NECK W/DYE: CPT

## 2022-09-13 PROCEDURE — 0 DIATRIZOATE MEGLUMINE & SODIUM PER 1 ML: Performed by: INTERNAL MEDICINE

## 2022-09-13 PROCEDURE — 74177 CT ABD & PELVIS W/CONTRAST: CPT

## 2022-09-13 PROCEDURE — 25010000002 IOPAMIDOL 61 % SOLUTION: Performed by: INTERNAL MEDICINE

## 2022-09-13 PROCEDURE — 82565 ASSAY OF CREATININE: CPT

## 2022-09-13 RX ADMIN — DIATRIZOATE MEGLUMINE AND DIATRIZOATE SODIUM 30 ML: 660; 100 LIQUID ORAL; RECTAL at 07:05

## 2022-09-13 RX ADMIN — IOPAMIDOL 85 ML: 612 INJECTION, SOLUTION INTRAVENOUS at 08:01

## 2022-09-14 ENCOUNTER — OFFICE VISIT (OUTPATIENT)
Dept: CARDIOLOGY | Facility: CLINIC | Age: 27
End: 2022-09-14

## 2022-09-14 VITALS
BODY MASS INDEX: 29 KG/M2 | HEIGHT: 62 IN | DIASTOLIC BLOOD PRESSURE: 72 MMHG | SYSTOLIC BLOOD PRESSURE: 108 MMHG | WEIGHT: 157.6 LBS | HEART RATE: 63 BPM

## 2022-09-14 DIAGNOSIS — Q21.12 PFO (PATENT FORAMEN OVALE): ICD-10-CM

## 2022-09-14 DIAGNOSIS — I26.93 SINGLE SUBSEGMENTAL PULMONARY EMBOLISM WITHOUT ACUTE COR PULMONALE: Primary | ICD-10-CM

## 2022-09-14 PROCEDURE — 99203 OFFICE O/P NEW LOW 30 MIN: CPT | Performed by: STUDENT IN AN ORGANIZED HEALTH CARE EDUCATION/TRAINING PROGRAM

## 2022-09-14 PROCEDURE — 93000 ELECTROCARDIOGRAM COMPLETE: CPT | Performed by: STUDENT IN AN ORGANIZED HEALTH CARE EDUCATION/TRAINING PROGRAM

## 2022-09-14 NOTE — PROGRESS NOTES
Subjective:     Encounter Date:09/14/2022      Patient ID: Vikki Durham is a 27 y.o. female.    Chief Complaint:  PE, PFO, dizziness    HPI:   26 yo F with a PMH of large B cell lymphoma, recent diagnosis of segmental PE who presents for initial evaluation of patent foramen ovale.  Patient admitted to the hospital August 29 presenting with chest pain found to have a left upper lobe pulmonary embolism.  During work-up she underwent an echocardiogram with bubble study which revealed a small PFO and normal EF.  Of note, she also had an MRI of her brain done which was normal.  Started on Eliquis and discharged.  Since discharge she has experienced dizziness and mild LEIWS.  She returned to the ED at request of her PCP a couple days ago to evaluate dizziness.  A CT of her head was done at that time which was also normal.      The following portions of the patient's history were reviewed and updated as appropriate: allergies, current medications, past family history, past medical history, past social history, past surgical history and problem list.     REVIEW OF SYSTEMS:   All systems reviewed.  Pertinent positives identified in HPI.  All other systems are negative.    Past Medical History:   Diagnosis Date   • Diffuse large B cell lymphoma (HCC) 06/2018   • Fracture of lower leg 2000    L   • H/O Lung mass 06/2018   • H/O Pericardial effusion    • History of DVT (deep vein thrombosis) 2018    RIGHT ARM AT PICC LINE SITE       Family History   Problem Relation Age of Onset   • Lung cancer Mother    • Thyroid disease Mother    • Glaucoma Mother    • Liver cancer Mother    • Hypertension Maternal Grandmother    • Lung cancer Maternal Grandmother    • Hypertension Paternal Grandmother    • Diabetes Paternal Grandmother    • Stroke Paternal Grandfather    • Malig Hyperthermia Neg Hx        Social History     Socioeconomic History   • Marital status: Single   • Number of children: 0   Tobacco Use   • Smoking status: Never  Smoker   • Smokeless tobacco: Never Used   • Tobacco comment: no caffeine use   Vaping Use   • Vaping Use: Never used   Substance and Sexual Activity   • Alcohol use: No   • Drug use: No   • Sexual activity: Not Currently       Allergies   Allergen Reactions   • Rituximab Itching     Ear itching       Past Surgical History:   Procedure Laterality Date   • OTHER SURGICAL HISTORY  06/12/2018    CT-GUIDED BIOPSY OF MEDIASTINAL MASS   • VENOUS ACCESS DEVICE (PORT) INSERTION Right 7/31/2018    Procedure: RIGHT MEDIPORT PLACEMENT;  Surgeon: Farhan Hurt MD;  Location: [facility] OR;  Service: Vascular   • VENOUS ACCESS DEVICE (PORT) REMOVAL N/A 5/8/2019    Procedure: MEDIPORT REMOVEAL;  Surgeon: Farhan Hurt MD;  Location: [facility] OR;  Service: Vascular         ECG 12 Lead    Date/Time: 9/14/2022 3:45 PM  Performed by: Pool Gonsales MD  Authorized by: Pool Gonsales MD   Comparison: compared with previous ECG from 9/7/2022  Similar to previous ECG  Rhythm: sinus rhythm  Rate: normal  QRS axis: normal    Clinical impression: normal ECG               Objective:         PHYSICAL EXAM:  GEN: well appearing, in NAD   HEENT: NCAT, EOMI, moist mucus membranes   Respiratory: CTAB, no wheezes, rales or rhonchi  CV: normal rate, regular rhythm, normal S1, S2, no murmurs, rubs, gallops, +2 radial, DP and carotid pulses b/l  GI: soft, nontender, nondistended  MSK: no edema  Skin: no rash, warm, dry  Heme/Lymph: no bruising or bleeding  Psych: organized thought, normal behavior and affect  Neuro: Alert and Oriented x 3, grossly normal motor function        Assessment:         (I26.93) Single subsegmental pulmonary embolism without acute cor pulmonale (HCC) - Plan: ECG 12 Lead    (Q21.1) PFO (patent foramen ovale)       Plan:       - Hematology ordered hypercoagulable work-up. Of these, anticardiolipin IgG and IgM are negative, beta-2 glycoprotein is negative, factor V Leiden is negative, protein C and protein S  are normal factor II and factor VIII activity are normal.  She did have a positive screen of DRVVT which remained positive upon mixing, though no lupus anticoagulant was detected.  She is to follow-up with hematology service to discuss these results and possibility of lifelong AC.    - With respect to her PFO, this was an incidental finding.  She has no indication for the PFO to be closed.  Echo was otherwise normal.    Dr Padilla and Dr Echeverria, thank you very much for referring this kind patient to me. Please call me with any questions or concerns.         Pool Gonsales MD  22  [facility]    Outpatient Encounter Medications as of 2022   Medication Sig Dispense Refill   • Apixaban Starter Pack (Eliquis DVT/PE Starter Pack) tablet therapy pack Take two 5 mg tablets by mouth every 12 hours for 7 days. Followed by one 5 mg tablet every 12 hours. (Dispense starter pack if available) 74 tablet 0   • [] diatrizoate meglumine-sodium (GASTROGRAFIN) 66-10 % oral solution 30 mL      • [] iopamidol (ISOVUE-300) 61 % injection 100 mL        No facility-administered encounter medications on file as of 2022.

## 2022-09-21 ENCOUNTER — OFFICE VISIT (OUTPATIENT)
Dept: ONCOLOGY | Facility: CLINIC | Age: 27
End: 2022-09-21

## 2022-09-21 ENCOUNTER — LAB (OUTPATIENT)
Dept: OTHER | Facility: HOSPITAL | Age: 27
End: 2022-09-21

## 2022-09-21 ENCOUNTER — HOSPITAL ENCOUNTER (OUTPATIENT)
Dept: CT IMAGING | Facility: HOSPITAL | Age: 27
Discharge: HOME | End: 2022-09-21
Admitting: INTERNAL MEDICINE

## 2022-09-21 VITALS
SYSTOLIC BLOOD PRESSURE: 99 MMHG | RESPIRATION RATE: 16 BRPM | OXYGEN SATURATION: 96 % | HEART RATE: 67 BPM | TEMPERATURE: 98.2 F | WEIGHT: 158.8 LBS | BODY MASS INDEX: 29.22 KG/M2 | HEIGHT: 62 IN | DIASTOLIC BLOOD PRESSURE: 66 MMHG

## 2022-09-21 DIAGNOSIS — I26.99 PULMONARY EMBOLISM WITHOUT ACUTE COR PULMONALE, UNSPECIFIED CHRONICITY, UNSPECIFIED PULMONARY EMBOLISM TYPE: ICD-10-CM
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
## 2022-09-21 DIAGNOSIS — C85.22 MEDIASTINAL (THYMIC) LARGE B-CELL LYMPHOMA INTRATHORACIC LYMPH NODES: ICD-10-CM

## 2022-09-21 DIAGNOSIS — C85.20 MEDIASTINAL (THYMIC) LARGE B-CELL LYMPHOMA, UNSPECIFIED BODY REGION: ICD-10-CM
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
## 2022-09-21 LAB
ALBUMIN SERPL-MCNC: 4.5 G/DL (ref 3.5–5.2)
ALBUMIN/GLOB SERPL: 2 G/DL
ALP SERPL-CCNC: 44 U/L (ref 39–117)
ALT SERPL W P-5'-P-CCNC: 12 U/L (ref 1–33)
ANION GAP SERPL CALCULATED.3IONS-SCNC: 9.1 MMOL/L (ref 5–15)
AST SERPL-CCNC: 15 U/L (ref 1–32)
BASOPHILS # BLD AUTO: 0.03 10*3/MM3 (ref 0–0.2)
BASOPHILS NFR BLD AUTO: 0.6 % (ref 0–1.5)
BILIRUB SERPL-MCNC: <0.2 MG/DL (ref 0–1.2)
BUN SERPL-MCNC: 15 MG/DL (ref 6–20)
BUN/CREAT SERPL: 19.7 (ref 7–25)
CALCIUM SPEC-SCNC: 9.3 MG/DL (ref 8.6–10.5)
CHLORIDE SERPL-SCNC: 103 MMOL/L (ref 98–107)
CO2 SERPL-SCNC: 27.9 MMOL/L (ref 22–29)
CREAT SERPL-MCNC: 0.76 MG/DL (ref 0.57–1)
DEPRECATED RDW RBC AUTO: 41.4 FL (ref 37–54)
EGFRCR SERPLBLD CKD-EPI 2021: 110.3 ML/MIN/1.73
EOSINOPHIL # BLD AUTO: 0.02 10*3/MM3 (ref 0–0.4)
EOSINOPHIL NFR BLD AUTO: 0.4 % (ref 0.3–6.2)
ERYTHROCYTE [DISTWIDTH] IN BLOOD BY AUTOMATED COUNT: 12 % (ref 12.3–15.4)
GLOBULIN UR ELPH-MCNC: 2.3 GM/DL
GLUCOSE SERPL-MCNC: 98 MG/DL (ref 65–99)
HCT VFR BLD AUTO: 39.5 % (ref 34–46.6)
HGB BLD-MCNC: 13.3 G/DL (ref 12–15.9)
IMM GRANULOCYTES # BLD AUTO: 0.03 10*3/MM3 (ref 0–0.05)
IMM GRANULOCYTES NFR BLD AUTO: 0.6 % (ref 0–0.5)
LDH SERPL-CCNC: 157 U/L (ref 135–214)
LYMPHOCYTES # BLD AUTO: 1.29 10*3/MM3 (ref 0.7–3.1)
LYMPHOCYTES NFR BLD AUTO: 26.1 % (ref 19.6–45.3)
MCH RBC QN AUTO: 31.7 PG (ref 26.6–33)
MCHC RBC AUTO-ENTMCNC: 33.7 G/DL (ref 31.5–35.7)
MCV RBC AUTO: 94 FL (ref 79–97)
MONOCYTES # BLD AUTO: 0.42 10*3/MM3 (ref 0.1–0.9)
MONOCYTES NFR BLD AUTO: 8.5 % (ref 5–12)
NEUTROPHILS NFR BLD AUTO: 3.16 10*3/MM3 (ref 1.7–7)
NEUTROPHILS NFR BLD AUTO: 63.8 % (ref 42.7–76)
NRBC BLD AUTO-RTO: 0 /100 WBC (ref 0–0.2)
PLATELET # BLD AUTO: 205 10*3/MM3 (ref 140–450)
PMV BLD AUTO: 9.9 FL (ref 6–12)
POTASSIUM SERPL-SCNC: 4 MMOL/L (ref 3.5–5.2)
PROT SERPL-MCNC: 6.8 G/DL (ref 6–8.5)
RBC # BLD AUTO: 4.2 10*6/MM3 (ref 3.77–5.28)
SODIUM SERPL-SCNC: 140 MMOL/L (ref 136–145)
WBC NRBC COR # BLD: 4.95 10*3/MM3 (ref 3.4–10.8)

## 2022-09-21 PROCEDURE — 36415 COLL VENOUS BLD VENIPUNCTURE: CPT

## 2022-09-21 PROCEDURE — 80053 COMPREHEN METABOLIC PANEL: CPT | Performed by: INTERNAL MEDICINE

## 2022-09-21 PROCEDURE — 99215 OFFICE O/P EST HI 40 MIN: CPT | Performed by: INTERNAL MEDICINE

## 2022-09-21 PROCEDURE — 70450 CT HEAD/BRAIN W/O DYE: CPT

## 2022-09-21 PROCEDURE — 85025 COMPLETE CBC W/AUTO DIFF WBC: CPT | Performed by: INTERNAL MEDICINE

## 2022-09-21 PROCEDURE — 83615 LACTATE (LD) (LDH) ENZYME: CPT | Performed by: INTERNAL MEDICINE

## 2022-09-21 NOTE — PROGRESS NOTES
Subjective      REASONS FOR FOLLOW UP:    1.  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 She did initiate EPOCH-R  in the hospital on 06/16/2018.  Treated 6 cycles of treatment.  Completed October 2018 followed with observation    2.  Admitted August 29, 2022 with pulmonary embolism left upper lobe.  Currently on Eliquis  · Hypercoagulable work-up showedFactor VIII 125%, anticardiolipin antibody negative, beta-2 glycoprotein antibody negative, lupus anticoagulant not detected, D-dimer less than 0.27, Antithrombin %, protein C activity 115%, protein S activity 123% prothrombin gene mutation negative, factor V Leiden negative.  Echo normal  · Echo normal  · CT angiogram left upper lobe pulmonary embolism  · CT abdomen pelvis negative, CT neck 0.6 mm submandibular gland stable  · No B symptoms    History of Present Illness     The patient is a 27 y.o. female with the above-mentioned history, with history of mediastinal large B-cell lymphoma status post completion of 6 cycles of chemotherapy with dose adusted EPOCH-R in October 2018.  . 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      Interval history:  Patient was admitted August 29, 2022 when she complained of left-sided chest discomfort and some leg cramping.  She underwent CT angiogram of the chest which showed left upper lobe pulmonary embolism.  She was started on Eliquis.  Bilateral Doppler extremities were done which showed that it was normal and duplex of the left upper extremity was normal.  Dr. Real was consulted.  Hypercoagulable work-up was done.  An echocardiogram was done which was negative.  MRI of the brain was unremarkable.  She was placed on Eliquis 10 mg twice daily for a week and subsequently to be switched to 5 mg twice daily.  Her admit admission labs were normal.    Patient underwent CT abdomen pelvis which did not show any new or lymphadenopathy previously described 6 mm left submandibular node  was unchanged.  CT neck showed the left submandibular gland    Patient went to the ER on September 7, 2022 with dizziness.  So she underwent CT of the head for dizziness and no acute process was identified.  Also a chest x-ray was obtained which was negative.  Currently her dizziness is resolved.  She likely was not hydrated well.    Currently start 5 mg p.o. twice daily of Eliquis.  She denies any active bleeding    She did complain of hitting her head and hence we obtained a stat CT of the head today which was negative for any bleed    Past Medical History, Past Surgical History, Social History, Family History have been reviewed and are without significant changes except as mentioned.    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  She saw Dr. Arina Cohen on last Friday.  The reason the patient was referred to Dr. Cohen was because they thought she had cardiomegaly on chest x-ray.  However a chest x-ray was ordered which showedThe chest x-ray showed extensive abnormal increased density throughout the anterior mediastinum extending into the left hilar region and left perihilar region and is most likely due to confluent lymphadenopathy.     CT angiogram of the chest did not show pulmonary embolism but showed     there is a large conglomerate  mass encases multiple vascular structures of the mediastinum and left  hilar region that is most likely extensive confluent lymphadenopathy.  The primary differential diagnostic considerations would be lymphoma.  The tumor posteriorly displaces the pulmonary arteries and the left and  moderately narrows the main pulmonary artery. The tumor also posteriorly  displaces the trachea and markedly narrows the left mainstem bronchus.  The pulmonary veins in the left are also encased and narrowed. The mass  encases and markedly narrows the SVC. The large edy mass measures  approximately 19.8 x 13.7 x 14.0 cm in diameter.  Enlarged lymph nodes  are also present in the left supraclavicular region where they appear to  produce occlusion of the left internal jugular vein. There is no  axillary lymphadenopathy. There are no filling defects within the  pulmonary arteries. There is no CT evidence of pulmonary embolism. There  is a small left pleural effusion. A small pericardial effusion measuring  8 mm in maximum thickness is also present. There is compressive  atelectasis of the left lung. Patchy airspace opacities are also present  in the superior aspect of the left upper lobe. These are most likely due  to direct tumor infiltration of the lung parenchyma, but could also  represent postobstructive pneumonia. The right lung is well expanded and  clear. Images through the upper abdomen partially show what could be a  edy mass in the retroperitoneum posterior and inferior to the  pancreas. Further evaluation with a CT scan of the abdomen and pelvis is  recommended. Bone window images demonstrate patchy sclerosis in the C7  and T1 and T2 and T3 and T4 vertebral body suspicious for bone  involvement with lymphoma.     IMPRESSION:  Very large tumor mass in the mediastinum encasing multiple  vascular structures and also moderately narrowing the left mainstem  bronchus as described in more detail above. Direct tumor infiltration of  the left upper lobe is also suspected. Small left pleural effusion and a  small pericardial effusion. There may also be partially visualized  lymphadenopathy in the upper abdomen. Patchy areas of sclerosis are also  noted in the C7 and T1 and T2 and T3 and T4 vertebral bodies suspicious  for osseous metastatic disease. The findings are consistent with  Lymphoma.     Dr. Cohen felt that she had a small pericardial effusion.  Patient has been symptomatic with cough which is worsening which started back in February 2018 and worsened over the last 4 months.  Patient also has some shortness of breath with walking up the  steps.  She has not lost weight.  She say she is reasonable appetite.  She does have fever kind of low-grade fever and night sweats.  She is more fatigued compared to before.  She was referred to us because of concern that this could be lymphoma.  She does not have any tissue diagnosis yet.  Patient does complain of back pain.    Echo done on admission June 12, 2018 by Dr. Fitzgerald showed that the patient's posterior and appears large primary leukemia the left ventricle and apex.  There is no tamponade.  The pericardium appears thickened pointing to involvement of the pericardium into the mediastinal process.  Dr. Fitzgerald felt that it would be difficult to do pericardial synthesis as the mediastinal mass covers the anterior aspect of the heart.  Ejection fraction is 58%  CT-guided needle biopsy June 12, 2018 was negative  LDH is elevated.  Uric acid is high.  MRI thoracic spine, negative  CT abdomen pelvis negative  Scan July 13, 2018 shows large infiltrative edy mass in the anterior mediastinum and left hilar region that nearly completely fills the left hemithorax and shows intense hypermetabolism with an SUV of 19.8.  No foci of pathologic hypermetabolism are identified elsewhere in the chest, neck abdomen or pelvis.    Pathology was consistent with primary mediastinal large B-cell lymphoma.    Patient was seen by Dr. Melgar.  He discussed harvesting eggs versus Zoladex plus oral contraceptives versus Zoladex alone for fertility preservation.  Due to large size of the tumor and rapid initiation of treatment needed the patient was not able to have aches harvested done.  Because she is at increased risk of thrombosis with the use of oral contraceptives secondary to malignancy he recommended Zoladex alone.  Patient received first dose of Zoladex 3.6 mg subcutaneous on Belia 15, 2018.    Patient started on EPOCH/R on June 16, 2018    Patient had a reaction to Rituxan which improved with steroids, Benadryl and Pepcid.   She had to receive it slow.  She started having itching over her EARS , sore throat.  She was given IV steroids Benadryl and Pepcid and following that she was started at a slower rate and she completed it.    Right upper extremity Doppler ultrasound showed new superficial vein thrombosis around the PICC line with no extension into the deep system.  Repeat Doppler was ordered.    A Doppler ultrasound initially showed superficial thrombophlebitis.  She was on prophylactic Arixtra.  A Doppler was repeated 2 days later on 6/20/18  which showed extension of thrombus into the axillary and brachial veins.  She was therefore started on Xarelto 15 mg one every 12 hours and the PICC line was removed as soon as chemotherapy completed on 6/20/18.  She will take 15 mg of Xarelto every 12 hours for 21 days and then transition to 20 mg once a day.  The patient will have CBC performed twice a week.  If the platelet count drops below 50,000, anticoagulation will need to be temporarily held  Patient was started on acyclovir/fluconazole/Bactrim  Bone marrow done June 14, 2018, morphology was negative for lymphoma    Fertility preservation, Zoladex is next due July 12, 2018.  CT scan and PET scan showing significant response after cycle 2 of chemotherapy  Next dose of Lupron August 13, 2018  Status post 5 cycles  OF epoch/r and is due cycle 6 on oct 8th.    Patient admitted October 20, 2018 and discharged October 22, 2018 when she was admitted with neutropenic fever.  She was treated with broad-spectrum antibiotics.  Her respiratory viral panel was positive for parainfluenza virus.    PET scan with significant improvement but residual 7.8 cm lesion without any activity.  Reviewed CT scan from January 15, 2019    Patient seen by Dr. Fox at the [facility], agreed with observation at present no plans on radiation.    CT scan and PET scan from April 5 and April 9, 2019 has been reviewed and discussed with patient.  There is  "further decrease in the mediastinal lymphadenopathy.    CT chest abdomen pelvis July 22, 2019 with further decrease in the mediastinal lymphadenopathy significantly    We reviewed with patient the CT scans from 12/12/19 which show mediastinal lymphadenopathy which has decreased in size.   There is also a small left renal cyst and a 2 cm partially involuted right ovarian cyst.         Review of Systems   Constitutional: Negative for appetite change, chills, diaphoresis, fatigue, fever and unexpected weight change.        Hot flashes in the evenings 04/12/19   HENT: Negative for hearing loss, sore throat and trouble swallowing.    Respiratory: Negative for cough, chest tightness, shortness of breath and wheezing.    Cardiovascular: Negative for chest pain, palpitations and leg swelling.   Gastrointestinal: Negative for abdominal distention, abdominal pain, constipation, diarrhea, nausea and vomiting.   Genitourinary: Negative for dysuria, frequency, hematuria and urgency.   Musculoskeletal: Negative for joint swelling.        No muscle weakness.   Skin: Negative for rash and wound.   Neurological: Negative for seizures, syncope, speech difficulty, weakness, numbness and headaches.   Hematological: Negative for adenopathy. Does not bruise/bleed easily.   Psychiatric/Behavioral: Positive for sleep disturbance. Negative for behavioral problems, confusion and suicidal ideas.   All other systems reviewed and are negative.  I have reviewed and confirmed the accuracy of the ROS as documented by the MA/LPN/RN Millie Padilla MD        Medications:  The current medication list was reviewed in the EMR    ALLERGIES:    Allergies   Allergen Reactions   • Rituximab Itching     Ear itching       Objective      Vitals:    09/21/22 1246   BP: 99/66   Pulse: 67   Resp: 16   Temp: 98.2 °F (36.8 °C)   TempSrc: Temporal   SpO2: 96%   Weight: 72 kg (158 lb 12.8 oz)   Height: 157 cm (61.81\")   PainSc: 0-No pain     Current Status 9/21/2022 "   ECOG score 0       Physical Exam           CONSTITUTIONAL:  Vital signs reviewed.  No distress, looks comfortable.    RESPIRATORY:  Normal respiratory effort.  Lungs clear to auscultation bilaterally.  CARDIOVASCULAR:  Normal S1, S2.  No murmurs rubs or gallops.  No significant lower extremity edema.  GASTROINTESTINAL: Abdomen appears unremarkable.  Nontender.  No hepatomegaly.  No splenomegaly.  LYMPHATIC:  No cervical, supraclavicular, axillary lymphadenopathy.  SKIN:  Warm.  No rashes.  PSYCHIATRIC:  Normal judgment and insight.  Normal mood and affect.  I have reviewed and confirmed the accuracy of the ROS as documented by the MA/LPN/RN Millie Padilla MD      RECENT LABS:  Results from last 7 days   Lab Units 09/21/22  1241   WBC 10*3/mm3 4.95   NEUTROS ABS 10*3/mm3 3.16   HEMOGLOBIN g/dL 13.3   HEMATOCRIT % 39.5   PLATELETS 10*3/mm3 205       MRI Brain With & Without Contrast [[id number]] Collected: 08/30/22 1747        Updated: 08/30/22 1815     Narrative:       MRI OF THE BRAIN WITH AND WITHOUT CONTRAST      CLINICAL HISTORY: Lightheadedness. History of non-Hodgkin's lymphoma.  Left-sided chest and left arm pain.     TECHNIQUE: MRI of the brain was obtained with sagittal pre and  postgadolinium T1, axial pre and postgadolinium T1, coronal  postgadolinium T1, axial postgadolinium 3-D SPGR, axial FLAIR, axial T2,  axial diffusion, and axial gradient echo images.     FINDINGS:     The ventricles, sulci, and cisterns are age-appropriate. The midline  intracranial anatomy is within normal limits. There are no abnormal foci  of restricted diffusion. No significant white matter abnormality is  appreciated. There are no abnormal foci of susceptibility artifact. The  major intracranial flow related signal voids are within normal limits.  No abnormal foci of contrast enhancement are noted within the brain  parenchyma. The midline intracranial anatomy is unremarkable.        Impression:          Unremarkable MRI of  the brain.     This report was finalized on 8/30/2022 6:12 PM by Dr. Anthony Briggs M.D.        XR Chest 1 View [[id number]] Collected: 08/29/22 2130       Updated: 08/30/22 0728     Narrative:       PORTABLE CHEST     HISTORY: Chest pain. Lymphoma.     COMPARISON: CT chest 06/08/2022.     FINDINGS: A single portable view of the chest demonstrates the heart to  be within normal limits in size. There is no evidence of infiltrate,  effusion or congestive failure. Further evaluation could be performed  with a CT examination of the chest as indicated.     This report was finalized on 8/30/2022 7:25 AM by Dr. Arie Beaulieu M.D.        CT Angiogram Chest [[id number]] Collected: 08/29/22 2152       Updated: 08/29/22 2316     Narrative:       CT ANGIOGRAM OF THE CHEST WITH CONTRAST INCLUDING RECONSTRUCTION IMAGES  08/29/2022     HISTORY: History of DVT. Now having some chest pain. Possible pulmonary  embolus.     TECHNIQUE: Following the intravenous contrast injection CT angiography  was performed through the chest.     Sagittal, coronal and 3-D reconstruction images were reviewed.     FINDINGS: The pulmonary arterial system is well opacified. There is a  small slightly ill-defined filling defect in the distal left pulmonary  artery which may extend into the origin of the left upper lobe pulmonary  artery. No right pulmonary emboli are seen.     There is some vague increased soft tissue in the region of the left  hilum similar to the appearance on the CT of the chest from 06/08/2022.  Mild increased attenuation of the anterior mediastinum is seen and this  also appears stable.     No lung masses are seen. No significant pulmonary infiltrates are seen.        Impression:       1. Small slightly irregular filling defect in the distal left pulmonary  artery which may extend into the base of the left upper lobe pulmonary  artery and is compatible with small pulmonary embolus.  2. Findings were discussed with the ER  physician.  3. Short-term follow-up CT angiogram of the chest suggested.           Radiation dose reduction techniques were utilized, including automated  exposure control and exposure modulation based on body size.     This report was finalized on 8/29/2022 11:13 PM by Dr. Eliseo Templeton M.D.                I have reviewed the medications.  ---------------------------------------------------------------------------------------------             Assessment & Plan   1.  Primary mediastinal large B-cell lymphoma: The patient presented with dyspnea, cough, and chest pain.  A CT angiogram of the chest 6/8/18 showed a very large tumor mass in the mediastinum encasing multiple vascular structures and narrowing the left mainstem bronchus.  There was direct tumor infiltration in the left upper lobe.  There was a small left pleural effusion.  She underwent a CT-guided biopsy of the mediastinal mass on 6/12/18 and pathology reviewed at [facility] oncology showed diffuse large B-cell lymphoma consistent with a primary diffuse large B-cell lymphoma.  A bone marrow exam was performed on 6/14/18 which was negative for lymphoma.  She underwent a PET scan 6/13/18 which showed a large hypermetabolic mass in the chest involving the anterior mediastinum, left hilum, nearly completely filling the left hemothorax with SUV 19.8.  There was physiologic distribution elsewhere.     The patient was started on IV fluid hydration and allopurinol for prevention of hyperuricemia.  She initiated systemic chemotherapy with DA-EPOCH-R on 6/16/18 and completed the evening of 6/21/18.  She had a infusion reaction to rituximab but was able to complete with additional medications and otherwise tolerated chemotherapy well.  She will require additional premedications with additional rituximab.  Plan is to monitor her blood counts twice per week outpatient and proceed with cycle 2 of chemotherapy day 21.     The patient had significant improvement in  shortness of breath, cough, and chest pain by the time of discharge.    · Patient received Neulasta support  · Her white count dropped down to as low as 0.8 low-grade temperature and patient was placed on Levaquin ×5 days starting June 27, 2018, neutropenia AT  11 days posttreatment.  Platelets dropped to 82.  · Her next ZOLADEX injection is due July 12.  · She is due to start chemotherapy on July 9.  · Discussed with Dr. Fox at the [facility] and his suggestion was to do the CT scan and PET scan after 2 cycles and discuss the results with him.  If she has an excellent response early on she would not require any further treatments after completion of 6 cycles of EPOCH/R  · Patient being admitted for cycle 3 of chemotherapy on July 30, 2018.  · She's status post cycle 4 of chemotherapy and CT scan has been reviewed following 4 cycles with continued response.  · She is due for ZOLADEX  injection on October 8, 2018.   · Patient is due to go for cycle 5 of chemotherapy on Monday, September 17, 2018  ·  echocardiogram done August 22, 2018 shows ejection fraction of 59% to 60% with the eldest strain of 19.2%  · Cycle 6 chemotherapy with dose adjusted EPOCH/R on October 8, 2018.  · Reviewed CT scan and PET scan and the images with the patient and family.  Significant response with decrease in the mediastinal mass to 7.8 cm and activity level is 2.4 a week on PET scan.  · Reviewed repeat PET scan still with 7.8 cm tumor in the mediastinum.  The SUV is 2.4 and this could be scar tissue.  · The patient is due for her next dose of Zoladex today. As noted above, her ANC has been steadily declining over the last several months in the absence of infectious symptoms. This will be further detailed below. She will proceed with Zoladex, as scheduled.   · Discontinue Zoladex  · Reviewed CT as of January 15, 2019 with further improvement  · No plans on radiation to the mediastinal lymph node given the PET scan showed that  it is photopenic  · Reviewed CT scan from 2021.  And there is no evidence of disease  · Belia 15, 2022: Patient asymptomatic.  CT scan shows minimal increase of a small lymph node in the right neck from 0.3- 0.6 cm.  Questionable lesion in the liver very small follow-up in CT scan in 3 months.  Patient is asymptomatic  · 2022: Admitted with pulmonary embolism and started on Eliquis tolerating well no evidence of malignancy by CT scan of the neck chest abdomen pelvis 2022    2.  Pericardial effusion:   a 2-D echocardiogram 18 showed a left ventricular systolic function 58%.  There was a 2 cm pericardial effusion with no tamponade.  The pericardium appeared thickened.  She had no evidence of volume overload.  It is resolved     3. Fertility preservation:  Patient received Zoladex injection  for fertility preservation; this should be continued once monthly during her chemotherapy.    · She is due for her next dose today and will continue this for 2 additional doses.   · Zoladex has been discontinued but patient has now started  menstrual periods     4. Right upper extremity DVT on Pradaxa. She continues on this with no issues.  Stable    5. Status post port placement, patient wants port removed and I think it is reasonable but will need to hold Pradaxa  · S/p port removal     6. Renal and Ovarian cysts.  We will continue to monitor.  · I have advised patient to consult her OB/GYN for ovarian cysts.  · CT scan does not show evidence of any ovarian cyst from 2020    7. Family history of cancer.  Her mother was diagnosed with cholangiocarcinoma.  · Patient's mother now diagnosed with metastatic lung cancer and currently is being transferred to hospice  · Patient's mother had     8.  Recent diagnosis of pulmonary embolism,  ·  Doppler ultrasound negative of bilateral lower extremities and left upper extremity  · Echo normal no evidence of PFO  · MRI brain negative  · Hypercoagulable  work-up negative  · Likely this was provoked pulmonary embolism given that she sometimes sits for hours     Plan  · Reviewed recent admission in the hospital for pulmonary embolism  · Reviewed all of the CT scans which did not show evidence of any adenopathy and no evidence of recurrence of lymphoma  · Patient does have pulmonary embolism on CT angiogram of the chest  · Continue Eliquis, so far blood counts are normal and no active bleeding discussed risks and benefits of anticoagulation  · Reviewed Doppler ultrasound of bilateral lower extremity and left upper extremity which is negative  · Reviewed echocardiogram as well as MRI of brain and CT head which are negative  · Reviewed hypercoagulable work-up with patient in length  · Patient completed 10 mg p.o. twice daily of Eliquis and now will start 5 mg p.o. twice daily    I spent 40 total minutes, face-to-face, caring for Vikki today.  Greater than 50% of this time involved counseling and/or coordination of care as documented within this note.        Millie Padilla MD        CC: Dr. Arina Fox, at [facility]

## 2022-10-07 ENCOUNTER — TELEPHONE (OUTPATIENT)
Dept: ONCOLOGY | Facility: CLINIC | Age: 27
End: 2022-10-07

## 2022-10-07 NOTE — TELEPHONE ENCOUNTER
Caller: Vikki Durham    Relationship to patient: Self    Best call back number: [phone]    Chief complaint: PATIENT WANTED TO RESCHEDULE TO ANOTHER Wednesday AFTERNOON    Type of visit: LAB AND FOLLOW UP    Requested date: ANY Wednesday AFTERNOON EXCEPT 12-7-22     If rescheduling, when is the original appointment: 11-16-22     Additional notes:PLEASE CALL PATIENT TO CONFIRM

## 2022-11-14 NOTE — TELEPHONE ENCOUNTER
From: Vikki Durham  To: Office of Millie Padilla MD  Sent: 11/11/2022 10:14 PM EST  Subject: Medication Renewal Request    Refills have been requested for the following medications:     Apixaban Starter Pack tablet therapy pack [Millie Padilla]    Preferred pharmacy: [facility] [id number] AT [facility] [address] [facility] [phone]  - [phone]  Delivery method: Pickup

## 2022-11-21 ENCOUNTER — TELEPHONE (OUTPATIENT)
Dept: ONCOLOGY | Facility: CLINIC | Age: 27
End: 2022-11-21

## 2022-11-30 ENCOUNTER — APPOINTMENT (OUTPATIENT)
Dept: OTHER | Facility: HOSPITAL | Age: 27
End: 2022-11-30

## 2022-12-22 ENCOUNTER — LAB (OUTPATIENT)
Dept: LAB | Facility: HOSPITAL | Age: 27
End: 2022-12-22

## 2022-12-22 ENCOUNTER — OFFICE VISIT (OUTPATIENT)
Dept: ONCOLOGY | Facility: CLINIC | Age: 27
End: 2022-12-22

## 2022-12-22 VITALS
TEMPERATURE: 97.3 F | SYSTOLIC BLOOD PRESSURE: 94 MMHG | BODY MASS INDEX: 30.01 KG/M2 | WEIGHT: 163.1 LBS | OXYGEN SATURATION: 96 % | RESPIRATION RATE: 16 BRPM | DIASTOLIC BLOOD PRESSURE: 61 MMHG | HEART RATE: 55 BPM | HEIGHT: 62 IN

## 2022-12-22 DIAGNOSIS — I26.99 PULMONARY EMBOLISM WITHOUT ACUTE COR PULMONALE, UNSPECIFIED CHRONICITY, UNSPECIFIED PULMONARY EMBOLISM TYPE: ICD-10-CM

## 2022-12-22 DIAGNOSIS — C85.20 MEDIASTINAL (THYMIC) LARGE B-CELL LYMPHOMA, UNSPECIFIED BODY REGION: ICD-10-CM

## 2022-12-22 DIAGNOSIS — I26.99 PULMONARY EMBOLISM WITHOUT ACUTE COR PULMONALE, UNSPECIFIED CHRONICITY, UNSPECIFIED PULMONARY EMBOLISM TYPE: Primary | ICD-10-CM

## 2022-12-22 DIAGNOSIS — R93.1 ABNORMAL ECHOCARDIOGRAM: ICD-10-CM

## 2022-12-22 LAB
ALBUMIN SERPL-MCNC: 4.6 G/DL (ref 3.5–5.2)
ALBUMIN/GLOB SERPL: 1.9 G/DL (ref 1.1–2.4)
ALP SERPL-CCNC: 47 U/L (ref 38–116)
ALT SERPL W P-5'-P-CCNC: 18 U/L (ref 0–33)
ANION GAP SERPL CALCULATED.3IONS-SCNC: 11.3 MMOL/L (ref 5–15)
AST SERPL-CCNC: 24 U/L (ref 0–32)
BASOPHILS # BLD AUTO: 0.03 10*3/MM3 (ref 0–0.2)
BASOPHILS NFR BLD AUTO: 0.5 % (ref 0–1.5)
BILIRUB SERPL-MCNC: 0.3 MG/DL (ref 0.2–1.2)
BUN SERPL-MCNC: 16 MG/DL (ref 6–20)
BUN/CREAT SERPL: 21.9 (ref 7.3–30)
CALCIUM SPEC-SCNC: 9.7 MG/DL (ref 8.5–10.2)
CHLORIDE SERPL-SCNC: 101 MMOL/L (ref 98–107)
CO2 SERPL-SCNC: 25.7 MMOL/L (ref 22–29)
CREAT SERPL-MCNC: 0.73 MG/DL (ref 0.6–1.1)
DEPRECATED RDW RBC AUTO: 43.3 FL (ref 37–54)
EGFRCR SERPLBLD CKD-EPI 2021: 115.8 ML/MIN/1.73
EOSINOPHIL # BLD AUTO: 0.04 10*3/MM3 (ref 0–0.4)
EOSINOPHIL NFR BLD AUTO: 0.6 % (ref 0.3–6.2)
ERYTHROCYTE [DISTWIDTH] IN BLOOD BY AUTOMATED COUNT: 12.3 % (ref 12.3–15.4)
GLOBULIN UR ELPH-MCNC: 2.4 GM/DL (ref 1.8–3.5)
GLUCOSE SERPL-MCNC: 83 MG/DL (ref 74–124)
HCT VFR BLD AUTO: 42.8 % (ref 34–46.6)
HGB BLD-MCNC: 13.4 G/DL (ref 12–15.9)
IMM GRANULOCYTES # BLD AUTO: 0.02 10*3/MM3 (ref 0–0.05)
IMM GRANULOCYTES NFR BLD AUTO: 0.3 % (ref 0–0.5)
LYMPHOCYTES # BLD AUTO: 1.61 10*3/MM3 (ref 0.7–3.1)
LYMPHOCYTES NFR BLD AUTO: 24.8 % (ref 19.6–45.3)
MCH RBC QN AUTO: 30 PG (ref 26.6–33)
MCHC RBC AUTO-ENTMCNC: 31.3 G/DL (ref 31.5–35.7)
MCV RBC AUTO: 95.7 FL (ref 79–97)
MONOCYTES # BLD AUTO: 0.5 10*3/MM3 (ref 0.1–0.9)
MONOCYTES NFR BLD AUTO: 7.7 % (ref 5–12)
NEUTROPHILS NFR BLD AUTO: 4.29 10*3/MM3 (ref 1.7–7)
NEUTROPHILS NFR BLD AUTO: 66.1 % (ref 42.7–76)
NRBC BLD AUTO-RTO: 0 /100 WBC (ref 0–0.2)
PLATELET # BLD AUTO: 239 10*3/MM3 (ref 140–450)
PMV BLD AUTO: 10.1 FL (ref 6–12)
POTASSIUM SERPL-SCNC: 4.2 MMOL/L (ref 3.5–4.7)
PROT SERPL-MCNC: 7 G/DL (ref 6.3–8)
RBC # BLD AUTO: 4.47 10*6/MM3 (ref 3.77–5.28)
SODIUM SERPL-SCNC: 138 MMOL/L (ref 134–145)
WBC NRBC COR # BLD: 6.49 10*3/MM3 (ref 3.4–10.8)

## 2022-12-22 PROCEDURE — 85025 COMPLETE CBC W/AUTO DIFF WBC: CPT

## 2022-12-22 PROCEDURE — 99214 OFFICE O/P EST MOD 30 MIN: CPT | Performed by: INTERNAL MEDICINE

## 2022-12-22 PROCEDURE — 80053 COMPREHEN METABOLIC PANEL: CPT

## 2022-12-22 PROCEDURE — 36415 COLL VENOUS BLD VENIPUNCTURE: CPT

## 2022-12-22 NOTE — PROGRESS NOTES
Subjective      REASONS FOR FOLLOW UP:    1.  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 She did initiate EPOCH-R  in the hospital on 06/16/2018.  Treated 6 cycles of treatment.  Completed October 2018 followed with observation    2.  Admitted August 29, 2022 with pulmonary embolism left upper lobe.  Currently on Eliquis  · Hypercoagulable work-up showedFactor VIII 125%, anticardiolipin antibody negative, beta-2 glycoprotein antibody negative, lupus anticoagulant not detected, D-dimer less than 0.27, Antithrombin %, protein C activity 115%, protein S activity 123% prothrombin gene mutation negative, factor V Leiden negative.  Echo normal  · Echo normal  · CT angiogram left upper lobe pulmonary embolism  · CT abdomen pelvis negative, CT neck 0.6 mm submandibular gland stable  · No B symptoms  · Admitted August 22, 2022 with chest pain, had left upper lobe pulmonary embolism, started on Eliquis.  CT angiogram of the chest abdomen pelvis negative for any progressive malignancy.  September 2022 D-dimer normal,Factor VIII activity 125%, Antithrombin %, anticardiolipin antibody negative beta-2 glycoprotein antibody negative Lupus anticoagulant not detected factor V Leiden normal protein C activity 115%, protein S 100,000 prothrombin gene mutation negative factor VIII activity 115%.  Patient states no risk factors.  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 female with the above-mentioned history, with history of mediastinal large B-cell lymphoma status post completion of 6 cycles of chemotherapy with dose adusted EPOCH-R in October 2018.  . 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      Interval history:  Currently start 5 mg p.o. twice daily of Eliquis.  She denies any active  bleeding      Past Medical History, Past Surgical History, Social History, Family History have been reviewed and are without significant changes except as mentioned.    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  She saw Dr. Arina Cohen on last Friday.  The reason the patient was referred to Dr. Cohen was because they thought she had cardiomegaly on chest x-ray.  However a chest x-ray was ordered which showedThe chest x-ray showed extensive abnormal increased density throughout the anterior mediastinum extending into the left hilar region and left perihilar region and is most likely due to confluent lymphadenopathy.     CT angiogram of the chest did not show pulmonary embolism but showed     there is a large conglomerate  mass encases multiple vascular structures of the mediastinum and left  hilar region that is most likely extensive confluent lymphadenopathy.  The primary differential diagnostic considerations would be lymphoma.  The tumor posteriorly displaces the pulmonary arteries and the left and  moderately narrows the main pulmonary artery. The tumor also posteriorly  displaces the trachea and markedly narrows the left mainstem bronchus.  The pulmonary veins in the left are also encased and narrowed. The mass  encases and markedly narrows the SVC. The large edy mass measures  approximately 19.8 x 13.7 x 14.0 cm in diameter. Enlarged lymph nodes  are also present in the left supraclavicular region where they appear to  produce occlusion of the left internal jugular vein. There is no  axillary lymphadenopathy. There are no filling defects within the  pulmonary arteries. There is no CT evidence of pulmonary embolism. There  is a small left pleural effusion. A small pericardial effusion measuring  8 mm in maximum thickness is also present. There is compressive  atelectasis of the left lung. Patchy airspace opacities are also present  in the superior  aspect of the left upper lobe. These are most likely due  to direct tumor infiltration of the lung parenchyma, but could also  represent postobstructive pneumonia. The right lung is well expanded and  clear. Images through the upper abdomen partially show what could be a  edy mass in the retroperitoneum posterior and inferior to the  pancreas. Further evaluation with a CT scan of the abdomen and pelvis is  recommended. Bone window images demonstrate patchy sclerosis in the C7  and T1 and T2 and T3 and T4 vertebral body suspicious for bone  involvement with lymphoma.     IMPRESSION:  Very large tumor mass in the mediastinum encasing multiple  vascular structures and also moderately narrowing the left mainstem  bronchus as described in more detail above. Direct tumor infiltration of  the left upper lobe is also suspected. Small left pleural effusion and a  small pericardial effusion. There may also be partially visualized  lymphadenopathy in the upper abdomen. Patchy areas of sclerosis are also  noted in the C7 and T1 and T2 and T3 and T4 vertebral bodies suspicious  for osseous metastatic disease. The findings are consistent with  Lymphoma.     Dr. Cohen felt that she had a small pericardial effusion.  Patient has been symptomatic with cough which is worsening which started back in February 2018 and worsened over the last 4 months.  Patient also has some shortness of breath with walking up the steps.  She has not lost weight.  She say she is reasonable appetite.  She does have fever kind of low-grade fever and night sweats.  She is more fatigued compared to before.  She was referred to us because of concern that this could be lymphoma.  She does not have any tissue diagnosis yet.  Patient does complain of back pain.    Echo done on admission June 12, 2018 by Dr. Fitzgerald showed that the patient's posterior and appears large primary leukemia the left ventricle and apex.  There is no tamponade.  The pericardium appears  thickened pointing to involvement of the pericardium into the mediastinal process.  Dr. Fitzgerald felt that it would be difficult to do pericardial synthesis as the mediastinal mass covers the anterior aspect of the heart.  Ejection fraction is 58%  CT-guided needle biopsy June 12, 2018 was negative  LDH is elevated.  Uric acid is high.  MRI thoracic spine, negative  CT abdomen pelvis negative  Scan July 13, 2018 shows large infiltrative edy mass in the anterior mediastinum and left hilar region that nearly completely fills the left hemithorax and shows intense hypermetabolism with an SUV of 19.8.  No foci of pathologic hypermetabolism are identified elsewhere in the chest, neck abdomen or pelvis.    Pathology was consistent with primary mediastinal large B-cell lymphoma.    Patient was seen by Dr. Melgar.  He discussed harvesting eggs versus Zoladex plus oral contraceptives versus Zoladex alone for fertility preservation.  Due to large size of the tumor and rapid initiation of treatment needed the patient was not able to have aches harvested done.  Because she is at increased risk of thrombosis with the use of oral contraceptives secondary to malignancy he recommended Zoladex alone.  Patient received first dose of Zoladex 3.6 mg subcutaneous on Belia 15, 2018.    Patient started on EPOCH/R on June 16, 2018    Patient had a reaction to Rituxan which improved with steroids, Benadryl and Pepcid.  She had to receive it slow.  She started having itching over her EARS , sore throat.  She was given IV steroids Benadryl and Pepcid and following that she was started at a slower rate and she completed it.    Right upper extremity Doppler ultrasound showed new superficial vein thrombosis around the PICC line with no extension into the deep system.  Repeat Doppler was ordered.    A Doppler ultrasound initially showed superficial thrombophlebitis.  She was on prophylactic Arixtra.  A Doppler was repeated 2 days later on 6/20/18   which showed extension of thrombus into the axillary and brachial veins.  She was therefore started on Xarelto 15 mg one every 12 hours and the PICC line was removed as soon as chemotherapy completed on 6/20/18.  She will take 15 mg of Xarelto every 12 hours for 21 days and then transition to 20 mg once a day.  The patient will have CBC performed twice a week.  If the platelet count drops below 50,000, anticoagulation will need to be temporarily held  Patient was started on acyclovir/fluconazole/Bactrim  Bone marrow done June 14, 2018, morphology was negative for lymphoma    Fertility preservation, Zoladex is next due July 12, 2018.  CT scan and PET scan showing significant response after cycle 2 of chemotherapy  Next dose of Lupron August 13, 2018  Status post 5 cycles  OF epoch/r and is due cycle 6 on oct 8th.    Patient admitted October 20, 2018 and discharged October 22, 2018 when she was admitted with neutropenic fever.  She was treated with broad-spectrum antibiotics.  Her respiratory viral panel was positive for parainfluenza virus.    PET scan with significant improvement but residual 7.8 cm lesion without any activity.  Reviewed CT scan from January 15, 2019    Patient seen by Dr. Fox at the [facility], agreed with observation at present no plans on radiation.    CT scan and PET scan from April 5 and April 9, 2019 has been reviewed and discussed with patient.  There is further decrease in the mediastinal lymphadenopathy.    CT chest abdomen pelvis July 22, 2019 with further decrease in the mediastinal lymphadenopathy significantly    We reviewed with patient the CT scans from 12/12/19 which show mediastinal lymphadenopathy which has decreased in size.   There is also a small left renal cyst and a 2 cm partially involuted right ovarian cyst.     Patient was admitted August 29, 2022 when she complained of left-sided chest discomfort and some leg cramping.  She underwent CT angiogram of the chest  which showed left upper lobe pulmonary embolism.  She was started on Eliquis.  Bilateral Doppler extremities were done which showed that it was normal and duplex of the left upper extremity was normal.  Dr. Real was consulted.  Hypercoagulable work-up was done.  An echocardiogram was done which was negative.  MRI of the brain was unremarkable.  She was placed on Eliquis 10 mg twice daily for a week and subsequently to be switched to 5 mg twice daily.  Her admit admission labs were normal.    Patient underwent CT abdomen pelvis which did not show any new or lymphadenopathy previously described 6 mm left submandibular node was unchanged.  CT neck showed the left submandibular gland    Patient went to the ER on September 7, 2022 with dizziness.  So she underwent CT of the head for dizziness and no acute process was identified.  Also a chest x-ray was obtained which was negative.  Currently her dizziness is resolved.  She likely was not hydrated well.    Currently start 5 mg p.o. twice daily of Eliquis.  She denies any active bleeding        Review of Systems   Constitutional: Negative for appetite change, chills, diaphoresis, fatigue, fever and unexpected weight change.        Hot flashes in the evenings 04/12/19   HENT: Negative for hearing loss, sore throat and trouble swallowing.    Respiratory: Negative for cough, chest tightness, shortness of breath and wheezing.    Cardiovascular: Negative for chest pain, palpitations and leg swelling.   Gastrointestinal: Negative for abdominal distention, abdominal pain, constipation, diarrhea, nausea and vomiting.   Genitourinary: Negative for dysuria, frequency, hematuria and urgency.   Musculoskeletal: Negative for joint swelling.        No muscle weakness.   Skin: Negative for rash and wound.   Neurological: Negative for seizures, syncope, speech difficulty, weakness, numbness and headaches.   Hematological: Negative for adenopathy. Does not bruise/bleed easily.  "  Psychiatric/Behavioral: Positive for sleep disturbance. Negative for behavioral problems, confusion and suicidal ideas.   All other systems reviewed and are negative.  I have reviewed and confirmed the accuracy of the ROS as documented by the MA/PATRICIA/PAUL Padilla MD        Medications:  The current medication list was reviewed in the EMR    ALLERGIES:    Allergies   Allergen Reactions   • Rituximab Itching     Ear itching       Objective      Vitals:    12/22/22 1529   BP: 94/61   Pulse: 55   Resp: 16   Temp: 97.3 °F (36.3 °C)   TempSrc: Temporal   SpO2: 96%   Weight: 74 kg (163 lb 1.6 oz)   Height: 157 cm (61.81\")   PainSc: 0-No pain     Current Status 12/22/2022   ECOG score 0       Physical Exam           CONSTITUTIONAL:  Vital signs reviewed.  No distress, looks comfortable.    RESPIRATORY:  Normal respiratory effort.  Lungs clear to auscultation bilaterally.  CARDIOVASCULAR:  Normal S1, S2.  No murmurs rubs or gallops.  No significant lower extremity edema.  GASTROINTESTINAL: Abdomen appears unremarkable.  Nontender.  No hepatomegaly.  No splenomegaly.  LYMPHATIC:  No cervical, supraclavicular, axillary lymphadenopathy.  SKIN:  Warm.  No rashes.  PSYCHIATRIC:  Normal judgment and insight.  Normal mood and affect.  I have reviewed and confirmed the accuracy of the ROS as documented by the MA/LPN/PAUL Padilla MD      RECENT LABS:  Results from last 7 days   Lab Units 12/22/22  1520   WBC 10*3/mm3 6.49   NEUTROS ABS 10*3/mm3 4.29   HEMOGLOBIN g/dL 13.4   HEMATOCRIT % 42.8   PLATELETS 10*3/mm3 239       MRI Brain With & Without Contrast [[id number]] Collected: 08/30/22 1747        Updated: 08/30/22 1815     Narrative:       MRI OF THE BRAIN WITH AND WITHOUT CONTRAST      CLINICAL HISTORY: Lightheadedness. History of non-Hodgkin's lymphoma.  Left-sided chest and left arm pain.     TECHNIQUE: MRI of the brain was obtained with sagittal pre and  postgadolinium T1, axial pre and postgadolinium T1, " coronal  postgadolinium T1, axial postgadolinium 3-D SPGR, axial FLAIR, axial T2,  axial diffusion, and axial gradient echo images.     FINDINGS:     The ventricles, sulci, and cisterns are age-appropriate. The midline  intracranial anatomy is within normal limits. There are no abnormal foci  of restricted diffusion. No significant white matter abnormality is  appreciated. There are no abnormal foci of susceptibility artifact. The  major intracranial flow related signal voids are within normal limits.  No abnormal foci of contrast enhancement are noted within the brain  parenchyma. The midline intracranial anatomy is unremarkable.        Impression:          Unremarkable MRI of the brain.     This report was finalized on 8/30/2022 6:12 PM by Dr. Anthony Briggs M.D.        XR Chest 1 View [[id number]] Collected: 08/29/22 2130       Updated: 08/30/22 0728     Narrative:       PORTABLE CHEST     HISTORY: Chest pain. Lymphoma.     COMPARISON: CT chest 06/08/2022.     FINDINGS: A single portable view of the chest demonstrates the heart to  be within normal limits in size. There is no evidence of infiltrate,  effusion or congestive failure. Further evaluation could be performed  with a CT examination of the chest as indicated.     This report was finalized on 8/30/2022 7:25 AM by Dr. Arie Beaulieu M.D.        CT Angiogram Chest [[id number]] Collected: 08/29/22 2152       Updated: 08/29/22 2316     Narrative:       CT ANGIOGRAM OF THE CHEST WITH CONTRAST INCLUDING RECONSTRUCTION IMAGES  08/29/2022     HISTORY: History of DVT. Now having some chest pain. Possible pulmonary  embolus.     TECHNIQUE: Following the intravenous contrast injection CT angiography  was performed through the chest.     Sagittal, coronal and 3-D reconstruction images were reviewed.     FINDINGS: The pulmonary arterial system is well opacified. There is a  small slightly ill-defined filling defect in the distal left pulmonary  artery which may extend  into the origin of the left upper lobe pulmonary  artery. No right pulmonary emboli are seen.     There is some vague increased soft tissue in the region of the left  hilum similar to the appearance on the CT of the chest from 06/08/2022.  Mild increased attenuation of the anterior mediastinum is seen and this  also appears stable.     No lung masses are seen. No significant pulmonary infiltrates are seen.        Impression:       1. Small slightly irregular filling defect in the distal left pulmonary  artery which may extend into the base of the left upper lobe pulmonary  artery and is compatible with small pulmonary embolus.  2. Findings were discussed with the ER physician.  3. Short-term follow-up CT angiogram of the chest suggested.           Radiation dose reduction techniques were utilized, including automated  exposure control and exposure modulation based on body size.     This report was finalized on 8/29/2022 11:13 PM by Dr. Eliseo Templeton M.D.                I have reviewed the medications.  ---------------------------------------------------------------------------------------------             Assessment & Plan   1.  Primary mediastinal large B-cell lymphoma: The patient presented with dyspnea, cough, and chest pain.  A CT angiogram of the chest 6/8/18 showed a very large tumor mass in the mediastinum encasing multiple vascular structures and narrowing the left mainstem bronchus.  There was direct tumor infiltration in the left upper lobe.  There was a small left pleural effusion.  She underwent a CT-guided biopsy of the mediastinal mass on 6/12/18 and pathology reviewed at [facility] oncology showed diffuse large B-cell lymphoma consistent with a primary diffuse large B-cell lymphoma.  A bone marrow exam was performed on 6/14/18 which was negative for lymphoma.  She underwent a PET scan 6/13/18 which showed a large hypermetabolic mass in the chest involving the anterior mediastinum, left hilum,  nearly completely filling the left hemothorax with SUV 19.8.  There was physiologic distribution elsewhere.     The patient was started on IV fluid hydration and allopurinol for prevention of hyperuricemia.  She initiated systemic chemotherapy with DA-EPOCH-R on 6/16/18 and completed the evening of 6/21/18.  She had a infusion reaction to rituximab but was able to complete with additional medications and otherwise tolerated chemotherapy well.  She will require additional premedications with additional rituximab.  Plan is to monitor her blood counts twice per week outpatient and proceed with cycle 2 of chemotherapy day 21.     The patient had significant improvement in shortness of breath, cough, and chest pain by the time of discharge.    · Patient received Neulasta support  · Her white count dropped down to as low as 0.8 low-grade temperature and patient was placed on Levaquin ×5 days starting June 27, 2018, neutropenia AT  11 days posttreatment.  Platelets dropped to 82.  · Her next ZOLADEX injection is due July 12.  · She is due to start chemotherapy on July 9.  · Discussed with Dr. Fox at the [facility] and his suggestion was to do the CT scan and PET scan after 2 cycles and discuss the results with him.  If she has an excellent response early on she would not require any further treatments after completion of 6 cycles of EPOCH/R  · Patient being admitted for cycle 3 of chemotherapy on July 30, 2018.  · She's status post cycle 4 of chemotherapy and CT scan has been reviewed following 4 cycles with continued response.  · She is due for ZOLADEX  injection on October 8, 2018.   · Patient is due to go for cycle 5 of chemotherapy on Monday, September 17, 2018  ·  echocardiogram done August 22, 2018 shows ejection fraction of 59% to 60% with the eldest strain of 19.2%  · Cycle 6 chemotherapy with dose adjusted EPOCH/R on October 8, 2018.  · Reviewed CT scan and PET scan and the images with the patient and  family.  Significant response with decrease in the mediastinal mass to 7.8 cm and activity level is 2.4 a week on PET scan.  · Reviewed repeat PET scan still with 7.8 cm tumor in the mediastinum.  The SUV is 2.4 and this could be scar tissue.  · The patient is due for her next dose of Zoladex today. As noted above, her ANC has been steadily declining over the last several months in the absence of infectious symptoms. This will be further detailed below. She will proceed with Zoladex, as scheduled.   · Discontinue Zoladex  · Reviewed CT as of January 15, 2019 with further improvement  · No plans on radiation to the mediastinal lymph node given the PET scan showed that it is photopenic  · Reviewed CT scan from June 2021.  And there is no evidence of disease  · Belia 15, 2022: Patient asymptomatic.  CT scan shows minimal increase of a small lymph node in the right neck from 0.3- 0.6 cm.  Questionable lesion in the liver very small follow-up in CT scan in 3 months.  Patient is asymptomatic  · September 2022: Admitted with pulmonary embolism and started on Eliquis tolerating well no evidence of malignancy by CT scan of the neck chest abdomen pelvis August 2022  · December 22, 2022: Patient doing well without any complaints currently on Eliquis 5 mg p.o. twice daily for history of pulmonary embolism since August 2022    2.  Pericardial effusion:   a 2-D echocardiogram 6/8/18 showed a left ventricular systolic function 58%.  There was a 2 cm pericardial effusion with no tamponade.  The pericardium appeared thickened.  She had no evidence of volume overload.  It is resolved     3. Fertility preservation:  Patient received Zoladex injection  for fertility preservation; this should be continued once monthly during her chemotherapy.    · She is due for her next dose today and will continue this for 2 additional doses.   · Zoladex has been discontinued but patient has now started  menstrual periods     4. Right upper extremity  DVT on Pradaxa. She continues on this with no issues.  Stable    5. Status post port placement, patient wants port removed and I think it is reasonable but will need to hold Pradaxa  · S/p port removal     6. Renal and Ovarian cysts.  We will continue to monitor.  · I have advised patient to consult her OB/GYN for ovarian cysts.  · CT scan does not show evidence of any ovarian cyst from 2020    7. Family history of cancer.  Her mother was diagnosed with cholangiocarcinoma.  · Patient's mother now diagnosed with metastatic lung cancer and currently is being transferred to hospice  · Patient's mother had     8.  Recent diagnosis of pulmonary embolism, in 2022  ·  Doppler ultrasound negative of bilateral lower extremities and left upper extremity  · Echo 2022 showed small patent foramen ovale present.  Saline test were positive on the echo  · MRI brain negative  · Hypercoagulable work-up negative  · Likely this was provoked pulmonary embolism given that she sometimes sits for hours   · HyperCard work-up negative  · No particular risk factors    9.  Echo showed EF    Plan  · Continue Eliquis 5 mg p.o. twice daily  · Follow-up in 3 months with CT scan, CT angiogram of the chest along with CT neck abdomen pelvis  · Obtain Doppler ultrasound of bilateral upper and lower extremities  · Patient with PFO followed by cardiology and will need to discuss with cardiology if continued long-term anticoagulation indicated because of PFO    · Hypercoagulable work-up negative  · Follow-up with me in 10 weeks with CT scan Dopplers 1 week prior      Millie Padilla MD        CC: Dr. Arina Fox, at [facility]

## 2022-12-23 PROBLEM — R93.1 ABNORMAL ECHOCARDIOGRAM: Status: ACTIVE | Noted: 2022-12-23

## 2022-12-23 PROBLEM — C85.20 MEDIASTINAL (THYMIC) LARGE B-CELL LYMPHOMA: Status: ACTIVE | Noted: 2018-10-08

## 2023-01-03 ENCOUNTER — APPOINTMENT (OUTPATIENT)
Dept: CT IMAGING | Facility: HOSPITAL | Age: 28
End: 2023-01-03
Payer: MEDICAID

## 2023-01-03 ENCOUNTER — HOSPITAL ENCOUNTER (EMERGENCY)
Facility: HOSPITAL | Age: 28
Discharge: HOME | End: 2023-01-03
Attending: EMERGENCY MEDICINE | Admitting: EMERGENCY MEDICINE
Payer: MEDICAID

## 2023-01-03 VITALS
SYSTOLIC BLOOD PRESSURE: 111 MMHG | DIASTOLIC BLOOD PRESSURE: 69 MMHG | OXYGEN SATURATION: 96 % | WEIGHT: 163.14 LBS | HEART RATE: 59 BPM | HEIGHT: 62 IN | BODY MASS INDEX: 30.02 KG/M2 | TEMPERATURE: 98.7 F | RESPIRATION RATE: 16 BRPM

## 2023-01-03 DIAGNOSIS — S09.90XA CLOSED HEAD INJURY, INITIAL ENCOUNTER: Primary | ICD-10-CM

## 2023-01-03 PROCEDURE — 99282 EMERGENCY DEPT VISIT SF MDM: CPT

## 2023-01-03 PROCEDURE — 99283 EMERGENCY DEPT VISIT LOW MDM: CPT

## 2023-01-03 PROCEDURE — 70450 CT HEAD/BRAIN W/O DYE: CPT

## 2023-01-03 NOTE — ED PROVIDER NOTES
Injury EMERGENCY DEPARTMENT ENCOUNTER    Room Number:  38/38  Date of encounter:  1/4/2023  PCP: Blayne Echeverria MD  Patient Care Team:  Blayne Echeverria MD as PCP - General (Family Medicine)  Angela Cox APRN as Nurse Practitioner (Nurse Practitioner)  Millie Padilla MD as Consulting Physician (Hematology and Oncology)  Arina Cohen MD as Referring Physician (Cardiology)  Millie Padilla MD as Consulting Physician (Hematology and Oncology)   Independent Historians: Patient    HPI:  Chief Complaint: Head injury  A complete HPI/ROS/PMH/PSH/SH/FH are unobtainable due to: Nothing    Chronic or social conditions impacting patient care (social determinants of health): None    Context: Vikki Durham is a 27 y.o. female who presents to the ED c/o having a closed head injury.  She reports that she hit her head this morning.  She reports she was bending down and hit her head.  She states she is on blood thinners for prior PE.  She denies loss of consciousness.  She has no nausea or vomiting.  She has no persistent headache.  She has no seizure.  She went to the nurses office at school and was directed here for further evaluation.  She has no focal weakness or numbness.    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) 06/16/2018   • Hypokalemia 07/14/2018   • Arm DVT (deep venous thromboembolism), acute, right (HCC) 07/14/2018   • Elevated liver enzymes 07/14/2018   • Leukocytopenia 07/15/2018   • Anemia associated with chemotherapy 07/15/2018   • Fitting and adjustment of vascular catheter 08/10/2018   • 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/08/2018   • Fever and chills 10/21/2018   • Pancytopenia (HCC) 10/21/2018   • Parainfluenza virus infection 10/22/2018   • Fatigue 12/17/2019   • Renal cyst 12/17/2019   • Cyst of right ovary 12/17/2019   • Pericardial effusion 03/16/2020   • Pulmonary embolism (HCC) 08/29/2022   • Abnormal echocardiogram 12/23/2022     Resolved Ambulatory Problems     Diagnosis Date Noted   • Lymphadenopathy 06/11/2018   • Diffuse large B-cell lymphoma (HCC) 07/09/2018   • Lymphoma (HCC) 07/30/2018     Past Medical History:   Diagnosis Date   • Diffuse large B cell lymphoma (HCC) 06/2018   • Fracture of lower leg 2000   • H/O Lung mass 06/2018   • History of DVT (deep vein thrombosis) 2018       The patient has started, but not completed, their COVID-19 vaccination series.    PAST SURGICAL HISTORY  Past Surgical History:   Procedure Laterality Date   • OTHER SURGICAL HISTORY  06/12/2018    CT-GUIDED BIOPSY OF MEDIASTINAL MASS   • VENOUS ACCESS DEVICE (PORT) INSERTION Right 7/31/2018    Procedure: RIGHT MEDIPORT PLACEMENT;  Surgeon: Farhan Hurt MD;  Location: [facility] OR;  Service: Vascular   • VENOUS ACCESS DEVICE (PORT) REMOVAL N/A 5/8/2019    Procedure: MEDIPORT REMOVEAL;  Surgeon: Farhan Hurt MD;  Location: [facility] OR;  Service: Vascular         FAMILY HISTORY  Family History   Problem Relation Age of Onset   • Lung cancer Mother    • Thyroid disease Mother    • Glaucoma Mother    • Liver cancer Mother    • Hypertension Maternal Grandmother    • Lung cancer Maternal Grandmother    • Hypertension Paternal Grandmother    •  Diabetes Paternal Grandmother    • Stroke Paternal Grandfather    • Malig Hyperthermia Neg Hx          SOCIAL HISTORY  Social History     Socioeconomic History   • Marital status: Single   • Number of children: 0   Tobacco Use   • Smoking status: Never   • Smokeless tobacco: Never   • Tobacco comments:     no caffeine use   Vaping Use   • Vaping Use: Never used   Substance and Sexual Activity   • Alcohol use: No   • Drug use: No   • Sexual activity: Not Currently         ALLERGIES  Rituximab        REVIEW OF SYSTEMS  Review of Systems   No chest pain, no shortness of breath, no abdominal pain, no nausea, no vomiting, no fever, no chills, no headache, no seizure  All systems reviewed and negative except for those discussed in HPI.       PHYSICAL EXAM    I have reviewed the triage vital signs and nursing notes.    ED Triage Vitals   Temp Heart Rate Resp BP SpO2   01/03/23 1454 01/03/23 1454 01/03/23 1454 01/03/23 1454 01/03/23 1454   98.7 °F (37.1 °C) 90 18 125/74 94 %      Temp src Heart Rate Source Patient Position BP Location FiO2 (%)   -- 01/03/23 1516 01/03/23 1516 01/03/23 1516 --    Monitor Lying Right arm        Physical Exam  GENERAL: Awake, alert, no acute distress  SKIN: Warm, dry  HENT: Normocephalic, atraumatic.  Mild tenderness in the forehead without bruising or open wounds.  EYES: no scleral icterus  CV: regular rhythm, regular rate  RESPIRATORY: normal effort, lungs clear  ABDOMEN: soft, nontender, nondistended  MUSCULOSKELETAL: no deformity, no tenderness in the cervical, thoracic, or lumbar spines.  NEURO: alert, moves all extremities, follows commands          LAB RESULTS  No results found for this or any previous visit (from the past 24 hour(s)).    Ordered the above labs and independently reviewed the results.        RADIOLOGY  No Radiology Exams Resulted Within Past 24 Hours    I ordered the above noted radiological studies. Reviewed by me and discussed with radiologist.  See dictation for  official radiology interpretation.      PROCEDURES    Procedures      MEDICATIONS GIVEN IN ER    Medications - No data to display      PROGRESS, DATA ANALYSIS, CONSULTS, AND MEDICAL DECISION MAKING    All labs have been independently reviewed by me.  All radiology studies have been reviewed by me and discussed with radiologist dictating the report.   EKG's independently viewed and interpreted by me.  Discussion below represents my analysis of pertinent findings related to patient's condition, differential diagnosis, treatment plan and final disposition.    Differential diagnosis includes but is not limited to intracranial hemorrhage, cranial fracture, closed head injury, concussion cervical spine fracture.    ED Course as of 01/04/23 2328 Tue Jan 03, 2023   1610 Patient care transferred from Dr. Poon pending CT head results and discharge home. [MS]   1611 Care to Selina So nurse practitioner.  Pending head CT and reevaluation.  I expect it to be normal and she will be able to be discharged home. [TR]   1805 Patient updated on unremarkable CT of the head.  It did discuss with her that with her being anticoagulated there is a small chance that she could develop a head bleed up to a week out.  Strict return to ER precautions were discussed in detail with patient.  She verbalized understanding, she is well-appearing and stable for discharge at this time. [MS]      ED Course User Index  [MS] Genie So APRN  [TR] Peter Poon MD           PPE: The patient wore a mask and I wore an N95 mask throughout the entire patient encounter.       AS OF 23:28 EST VITALS:    BP - 111/69  HR - 59  TEMP - 98.7 °F (37.1 °C)  O2 SATS - 96%        DIAGNOSIS  Final diagnoses:   Closed head injury, initial encounter         DISPOSITION  ED Disposition     ED Disposition   Discharge    Condition   Stable    Comment   --                Note Disclaimer: At [facility], we believe that sharing information  builds trust and better relationships. You are receiving this note because you recently visited [facility]. It is possible you will see health information before a provider has talked with you about it. This kind of information can be easy to misunderstand. To help you fully understand what it means for your health, we urge you to discuss this note with your provider.       Peter Poon MD  01/03/23 0128       Peter Poon MD  01/04/23 5499

## 2023-01-03 NOTE — ED NOTES
Patient ambulatory on discharge. Discharge instructions provided. Patient verbalized understanding of discharge instructions. Patient is Aox4 with no acute distress noted. VSS

## 2023-01-03 NOTE — ED TRIAGE NOTES
Patient to ER via car for raising her head up and hitting it on a metal bar  Patient denies LOC but is on blood thinners    Patient wearing mask this RN in PPE

## 2023-01-03 NOTE — DISCHARGE INSTRUCTIONS
Return here immediately for any severe headache, seizure, loss of consciousness, uncontrolled nausea or vomiting.

## 2023-01-04 NOTE — ED PROVIDER NOTES
EMERGENCY DEPARTMENT ENCOUNTER    Room number:  38/38  Date Seen:  1/3/2023  Time of transfer:1610  PCP:  Blayne Echeverria MD    Laboratory Results:  No results found for this or any previous visit (from the past 24 hour(s)).  I reviewed the above results.    Radiology:  CT Head Without Contrast   Final Result   1. No acute process.       Radiation dose reduction techniques were utilized, including automated   exposure control and exposure modulation based on body size.       This report was finalized on 1/3/2023 11:41 PM by Dr. Eliseo Templeton M.D.            I reviewed the above results    Medications ordered in ED:  Medications - No data to display    Progress and Consult Notes:  ED Course as of 01/03/23 2356   Tue Jan 03, 2023   1610 Patient care transferred from Dr. Poon pending CT head results and discharge home. [MS]   1611 Care to Selina So nurse practitioner.  Pending head CT and reevaluation.  I expect it to be normal and she will be able to be discharged home. [TR]   1805 Patient updated on unremarkable CT of the head.  It did discuss with her that with her being anticoagulated there is a small chance that she could develop a head bleed up to a week out.  Strict return to ER precautions were discussed in detail with patient.  She verbalized understanding, she is well-appearing and stable for discharge at this time. [MS]      ED Course User Index  [MS] Genie So, BRANNON  [TR] Peter Poon MD         Diagnosis:  Final diagnoses:   Closed head injury, initial encounter       Follow Up:  Blayne Echeverria MD  [address] [id number] [phone]    In 2 days      [facility]  [address] [facility] [address] [phone]    As needed      RX:     Medication List      No changes were made to your prescriptions during this visit.         Provider attestation:  I personally reviewed the past medical  history, past surgical history, social history, family history, current medications, and allergies as they appear in the chart.    The patient was seen and examined by myself and Dr. Poon, who agree with plan.          Genie So, APRN  01/03/23 7350

## 2023-02-24 ENCOUNTER — APPOINTMENT (OUTPATIENT)
Dept: CT IMAGING | Facility: HOSPITAL | Age: 28
End: 2023-02-24
Payer: MEDICAID

## 2023-02-28 ENCOUNTER — HOSPITAL ENCOUNTER (OUTPATIENT)
Dept: CARDIOLOGY | Facility: HOSPITAL | Age: 28
Discharge: HOME | End: 2023-02-28
Payer: MEDICAID

## 2023-02-28 ENCOUNTER — HOSPITAL ENCOUNTER (OUTPATIENT)
Dept: CT IMAGING | Facility: HOSPITAL | Age: 28
Discharge: HOME | End: 2023-02-28
Payer: MEDICAID

## 2023-02-28 ENCOUNTER — TELEPHONE (OUTPATIENT)
Dept: ONCOLOGY | Facility: CLINIC | Age: 28
End: 2023-02-28
Payer: MEDICAID

## 2023-02-28 ENCOUNTER — DOCUMENTATION (OUTPATIENT)
Dept: ONCOLOGY | Facility: CLINIC | Age: 28
End: 2023-02-28
Payer: MEDICAID

## 2023-02-28 ENCOUNTER — HOSPITAL ENCOUNTER (OUTPATIENT)
Facility: HOSPITAL | Age: 28
Setting detail: OBSERVATION
Discharge: HOME | End: 2023-03-02
Attending: EMERGENCY MEDICINE | Admitting: HOSPITALIST
Payer: MEDICAID

## 2023-02-28 DIAGNOSIS — I26.99 PULMONARY EMBOLISM WITHOUT ACUTE COR PULMONALE, UNSPECIFIED CHRONICITY, UNSPECIFIED PULMONARY EMBOLISM TYPE: ICD-10-CM

## 2023-02-28 DIAGNOSIS — C85.20 MEDIASTINAL (THYMIC) LARGE B-CELL LYMPHOMA, UNSPECIFIED BODY REGION: ICD-10-CM

## 2023-02-28 DIAGNOSIS — R93.1 ABNORMAL ECHOCARDIOGRAM: ICD-10-CM

## 2023-02-28 DIAGNOSIS — I26.99 RECURRENT PULMONARY EMBOLISM: ICD-10-CM

## 2023-02-28 DIAGNOSIS — I26.99 ACUTE PULMONARY EMBOLISM WITHOUT ACUTE COR PULMONALE, UNSPECIFIED PULMONARY EMBOLISM TYPE: Primary | ICD-10-CM

## 2023-02-28 PROBLEM — Q21.12 PATENT FORAMEN OVALE: Status: ACTIVE | Noted: 2023-02-28

## 2023-02-28 LAB
ALBUMIN SERPL-MCNC: 4.7 G/DL (ref 3.5–5.2)
ALBUMIN SERPL-MCNC: 4.9 G/DL (ref 3.5–5.2)
ALBUMIN/GLOB SERPL: 2 G/DL
ALBUMIN/GLOB SERPL: 2.1 G/DL
ALP SERPL-CCNC: 49 U/L (ref 39–117)
ALP SERPL-CCNC: 51 U/L (ref 39–117)
ALT SERPL W P-5'-P-CCNC: 18 U/L (ref 1–33)
ALT SERPL W P-5'-P-CCNC: 20 U/L (ref 1–33)
ANION GAP SERPL CALCULATED.3IONS-SCNC: 10.6 MMOL/L (ref 5–15)
ANION GAP SERPL CALCULATED.3IONS-SCNC: 11 MMOL/L (ref 5–15)
AST SERPL-CCNC: 18 U/L (ref 1–32)
AST SERPL-CCNC: 24 U/L (ref 1–32)
BASOPHILS # BLD AUTO: 0.03 10*3/MM3 (ref 0–0.2)
BASOPHILS # BLD AUTO: 0.03 10*3/MM3 (ref 0–0.2)
BASOPHILS NFR BLD AUTO: 0.4 % (ref 0–1.5)
BASOPHILS NFR BLD AUTO: 0.4 % (ref 0–1.5)
BH CV LOWER VASCULAR LEFT COMMON FEMORAL AUGMENT: NORMAL
BH CV LOWER VASCULAR LEFT COMMON FEMORAL COMPETENT: NORMAL
BH CV LOWER VASCULAR LEFT COMMON FEMORAL COMPRESS: NORMAL
BH CV LOWER VASCULAR LEFT COMMON FEMORAL PHASIC: NORMAL
BH CV LOWER VASCULAR LEFT COMMON FEMORAL SPONT: NORMAL
BH CV LOWER VASCULAR LEFT DISTAL FEMORAL COMPRESS: NORMAL
BH CV LOWER VASCULAR LEFT GASTRONEMIUS COMPRESS: NORMAL
BH CV LOWER VASCULAR LEFT GREATER SAPH AK COMPRESS: NORMAL
BH CV LOWER VASCULAR LEFT GREATER SAPH BK COMPRESS: NORMAL
BH CV LOWER VASCULAR LEFT MID FEMORAL AUGMENT: NORMAL
BH CV LOWER VASCULAR LEFT MID FEMORAL COMPETENT: NORMAL
BH CV LOWER VASCULAR LEFT MID FEMORAL COMPRESS: NORMAL
BH CV LOWER VASCULAR LEFT MID FEMORAL PHASIC: NORMAL
BH CV LOWER VASCULAR LEFT MID FEMORAL SPONT: NORMAL
BH CV LOWER VASCULAR LEFT PERONEAL COMPRESS: NORMAL
BH CV LOWER VASCULAR LEFT POPLITEAL AUGMENT: NORMAL
BH CV LOWER VASCULAR LEFT POPLITEAL COMPETENT: NORMAL
BH CV LOWER VASCULAR LEFT POPLITEAL COMPRESS: NORMAL
BH CV LOWER VASCULAR LEFT POPLITEAL PHASIC: NORMAL
BH CV LOWER VASCULAR LEFT POPLITEAL SPONT: NORMAL
BH CV LOWER VASCULAR LEFT POSTERIOR TIBIAL COMPRESS: NORMAL
BH CV LOWER VASCULAR LEFT PROFUNDA FEMORAL COMPRESS: NORMAL
BH CV LOWER VASCULAR LEFT PROXIMAL FEMORAL COMPRESS: NORMAL
BH CV LOWER VASCULAR LEFT SAPHENOFEMORAL JUNCTION COMPRESS: NORMAL
BH CV LOWER VASCULAR RIGHT COMMON FEMORAL AUGMENT: NORMAL
BH CV LOWER VASCULAR RIGHT COMMON FEMORAL COMPETENT: NORMAL
BH CV LOWER VASCULAR RIGHT COMMON FEMORAL COMPRESS: NORMAL
BH CV LOWER VASCULAR RIGHT COMMON FEMORAL PHASIC: NORMAL
BH CV LOWER VASCULAR RIGHT COMMON FEMORAL SPONT: NORMAL
BH CV LOWER VASCULAR RIGHT DISTAL FEMORAL COMPRESS: NORMAL
BH CV LOWER VASCULAR RIGHT GASTRONEMIUS COMPRESS: NORMAL
BH CV LOWER VASCULAR RIGHT GREATER SAPH AK COMPRESS: NORMAL
BH CV LOWER VASCULAR RIGHT GREATER SAPH BK COMPRESS: NORMAL
BH CV LOWER VASCULAR RIGHT MID FEMORAL AUGMENT: NORMAL
BH CV LOWER VASCULAR RIGHT MID FEMORAL COMPETENT: NORMAL
BH CV LOWER VASCULAR RIGHT MID FEMORAL COMPRESS: NORMAL
BH CV LOWER VASCULAR RIGHT MID FEMORAL PHASIC: NORMAL
BH CV LOWER VASCULAR RIGHT MID FEMORAL SPONT: NORMAL
BH CV LOWER VASCULAR RIGHT PERONEAL COMPRESS: NORMAL
BH CV LOWER VASCULAR RIGHT POPLITEAL AUGMENT: NORMAL
BH CV LOWER VASCULAR RIGHT POPLITEAL COMPETENT: NORMAL
BH CV LOWER VASCULAR RIGHT POPLITEAL COMPRESS: NORMAL
BH CV LOWER VASCULAR RIGHT POPLITEAL PHASIC: NORMAL
BH CV LOWER VASCULAR RIGHT POPLITEAL SPONT: NORMAL
BH CV LOWER VASCULAR RIGHT POSTERIOR TIBIAL COMPRESS: NORMAL
BH CV LOWER VASCULAR RIGHT PROFUNDA FEMORAL COMPRESS: NORMAL
BH CV LOWER VASCULAR RIGHT PROXIMAL FEMORAL COMPRESS: NORMAL
BH CV LOWER VASCULAR RIGHT SAPHENOFEMORAL JUNCTION COMPRESS: NORMAL
BH CV UPPER VENOUS LEFT AXILLARY AUGMENT: NORMAL
BH CV UPPER VENOUS LEFT AXILLARY COMPRESS: NORMAL
BH CV UPPER VENOUS LEFT AXILLARY PHASIC: NORMAL
BH CV UPPER VENOUS LEFT AXILLARY SPONT: NORMAL
BH CV UPPER VENOUS LEFT BASILIC FOREARM COMPRESS: NORMAL
BH CV UPPER VENOUS LEFT BASILIC UPPER COMPRESS: NORMAL
BH CV UPPER VENOUS LEFT BRACHIAL COMPRESS: NORMAL
BH CV UPPER VENOUS LEFT CEPHALIC FOREARM COMPRESS: NORMAL
BH CV UPPER VENOUS LEFT CEPHALIC UPPER COMPRESS: NORMAL
BH CV UPPER VENOUS LEFT INTERNAL JUGULAR AUGMENT: NORMAL
BH CV UPPER VENOUS LEFT INTERNAL JUGULAR COMPRESS: NORMAL
BH CV UPPER VENOUS LEFT INTERNAL JUGULAR PHASIC: NORMAL
BH CV UPPER VENOUS LEFT INTERNAL JUGULAR SPONT: NORMAL
BH CV UPPER VENOUS LEFT RADIAL COMPRESS: NORMAL
BH CV UPPER VENOUS LEFT SUBCLAVIAN AUGMENT: NORMAL
BH CV UPPER VENOUS LEFT SUBCLAVIAN COMPRESS: NORMAL
BH CV UPPER VENOUS LEFT SUBCLAVIAN PHASIC: NORMAL
BH CV UPPER VENOUS LEFT SUBCLAVIAN SPONT: NORMAL
BH CV UPPER VENOUS LEFT ULNAR COMPRESS: NORMAL
BH CV UPPER VENOUS RIGHT AXILLARY AUGMENT: NORMAL
BH CV UPPER VENOUS RIGHT AXILLARY COMPRESS: NORMAL
BH CV UPPER VENOUS RIGHT AXILLARY PHASIC: NORMAL
BH CV UPPER VENOUS RIGHT AXILLARY SPONT: NORMAL
BH CV UPPER VENOUS RIGHT BASILIC FOREARM COMPRESS: NORMAL
BH CV UPPER VENOUS RIGHT BASILIC UPPER COMPRESS: NORMAL
BH CV UPPER VENOUS RIGHT BRACHIAL COMPRESS: NORMAL
BH CV UPPER VENOUS RIGHT CEPHALIC FOREARM COMPRESS: NORMAL
BH CV UPPER VENOUS RIGHT CEPHALIC UPPER COMPRESS: NORMAL
BH CV UPPER VENOUS RIGHT INTERNAL JUGULAR AUGMENT: NORMAL
BH CV UPPER VENOUS RIGHT INTERNAL JUGULAR COMPRESS: NORMAL
BH CV UPPER VENOUS RIGHT INTERNAL JUGULAR PHASIC: NORMAL
BH CV UPPER VENOUS RIGHT INTERNAL JUGULAR SPONT: NORMAL
BH CV UPPER VENOUS RIGHT RADIAL COMPRESS: NORMAL
BH CV UPPER VENOUS RIGHT SUBCLAVIAN AUGMENT: NORMAL
BH CV UPPER VENOUS RIGHT SUBCLAVIAN COLOR: 1
BH CV UPPER VENOUS RIGHT SUBCLAVIAN COMPRESS: NORMAL
BH CV UPPER VENOUS RIGHT SUBCLAVIAN PHASIC: NORMAL
BH CV UPPER VENOUS RIGHT SUBCLAVIAN SPONT: NORMAL
BH CV UPPER VENOUS RIGHT SUBCLAVIAN THROMBUS: NORMAL
BH CV UPPER VENOUS RIGHT ULNAR COMPRESS: NORMAL
BILIRUB SERPL-MCNC: 0.2 MG/DL (ref 0–1.2)
BILIRUB SERPL-MCNC: 0.3 MG/DL (ref 0–1.2)
BUN SERPL-MCNC: 12 MG/DL (ref 6–20)
BUN SERPL-MCNC: 16 MG/DL (ref 6–20)
BUN/CREAT SERPL: 16.7 (ref 7–25)
BUN/CREAT SERPL: 20.8 (ref 7–25)
CALCIUM SPEC-SCNC: 9.2 MG/DL (ref 8.6–10.5)
CALCIUM SPEC-SCNC: 9.7 MG/DL (ref 8.6–10.5)
CHLORIDE SERPL-SCNC: 101 MMOL/L (ref 98–107)
CHLORIDE SERPL-SCNC: 102 MMOL/L (ref 98–107)
CO2 SERPL-SCNC: 27 MMOL/L (ref 22–29)
CO2 SERPL-SCNC: 27.4 MMOL/L (ref 22–29)
CREAT SERPL-MCNC: 0.72 MG/DL (ref 0.57–1)
CREAT SERPL-MCNC: 0.77 MG/DL (ref 0.57–1)
DEPRECATED RDW RBC AUTO: 42.1 FL (ref 37–54)
DEPRECATED RDW RBC AUTO: 43.3 FL (ref 37–54)
EGFRCR SERPLBLD CKD-EPI 2021: 107.9 ML/MIN/1.73
EGFRCR SERPLBLD CKD-EPI 2021: 117 ML/MIN/1.73
EOSINOPHIL # BLD AUTO: 0.02 10*3/MM3 (ref 0–0.4)
EOSINOPHIL # BLD AUTO: 0.03 10*3/MM3 (ref 0–0.4)
EOSINOPHIL NFR BLD AUTO: 0.3 % (ref 0.3–6.2)
EOSINOPHIL NFR BLD AUTO: 0.4 % (ref 0.3–6.2)
ERYTHROCYTE [DISTWIDTH] IN BLOOD BY AUTOMATED COUNT: 12.8 % (ref 12.3–15.4)
ERYTHROCYTE [DISTWIDTH] IN BLOOD BY AUTOMATED COUNT: 13 % (ref 12.3–15.4)
GLOBULIN UR ELPH-MCNC: 2.3 GM/DL
GLOBULIN UR ELPH-MCNC: 2.3 GM/DL
GLUCOSE SERPL-MCNC: 87 MG/DL (ref 65–99)
GLUCOSE SERPL-MCNC: 90 MG/DL (ref 65–99)
HCT VFR BLD AUTO: 41.2 % (ref 34–46.6)
HCT VFR BLD AUTO: 43.5 % (ref 34–46.6)
HGB BLD-MCNC: 14 G/DL (ref 12–15.9)
HGB BLD-MCNC: 14.4 G/DL (ref 12–15.9)
IMM GRANULOCYTES # BLD AUTO: 0.03 10*3/MM3 (ref 0–0.05)
IMM GRANULOCYTES # BLD AUTO: 0.04 10*3/MM3 (ref 0–0.05)
IMM GRANULOCYTES NFR BLD AUTO: 0.4 % (ref 0–0.5)
IMM GRANULOCYTES NFR BLD AUTO: 0.6 % (ref 0–0.5)
LYMPHOCYTES # BLD AUTO: 1.26 10*3/MM3 (ref 0.7–3.1)
LYMPHOCYTES # BLD AUTO: 1.63 10*3/MM3 (ref 0.7–3.1)
LYMPHOCYTES NFR BLD AUTO: 17.5 % (ref 19.6–45.3)
LYMPHOCYTES NFR BLD AUTO: 22.6 % (ref 19.6–45.3)
MAXIMAL PREDICTED HEART RATE: 192 BPM
MAXIMAL PREDICTED HEART RATE: 192 BPM
MCH RBC QN AUTO: 30.1 PG (ref 26.6–33)
MCH RBC QN AUTO: 30.4 PG (ref 26.6–33)
MCHC RBC AUTO-ENTMCNC: 33.1 G/DL (ref 31.5–35.7)
MCHC RBC AUTO-ENTMCNC: 34 G/DL (ref 31.5–35.7)
MCV RBC AUTO: 89.4 FL (ref 79–97)
MCV RBC AUTO: 90.8 FL (ref 79–97)
MONOCYTES # BLD AUTO: 0.46 10*3/MM3 (ref 0.1–0.9)
MONOCYTES # BLD AUTO: 0.51 10*3/MM3 (ref 0.1–0.9)
MONOCYTES NFR BLD AUTO: 6.4 % (ref 5–12)
MONOCYTES NFR BLD AUTO: 7.1 % (ref 5–12)
NEUTROPHILS NFR BLD AUTO: 5.03 10*3/MM3 (ref 1.7–7)
NEUTROPHILS NFR BLD AUTO: 5.35 10*3/MM3 (ref 1.7–7)
NEUTROPHILS NFR BLD AUTO: 69.7 % (ref 42.7–76)
NEUTROPHILS NFR BLD AUTO: 74.2 % (ref 42.7–76)
NRBC BLD AUTO-RTO: 0 /100 WBC (ref 0–0.2)
NRBC BLD AUTO-RTO: 0 /100 WBC (ref 0–0.2)
PLATELET # BLD AUTO: 221 10*3/MM3 (ref 140–450)
PLATELET # BLD AUTO: 231 10*3/MM3 (ref 140–450)
PMV BLD AUTO: 10.2 FL (ref 6–12)
PMV BLD AUTO: 9.7 FL (ref 6–12)
POTASSIUM SERPL-SCNC: 3.6 MMOL/L (ref 3.5–5.2)
POTASSIUM SERPL-SCNC: 3.8 MMOL/L (ref 3.5–5.2)
PROT SERPL-MCNC: 7 G/DL (ref 6–8.5)
PROT SERPL-MCNC: 7.2 G/DL (ref 6–8.5)
RBC # BLD AUTO: 4.61 10*6/MM3 (ref 3.77–5.28)
RBC # BLD AUTO: 4.79 10*6/MM3 (ref 3.77–5.28)
SODIUM SERPL-SCNC: 139 MMOL/L (ref 136–145)
SODIUM SERPL-SCNC: 140 MMOL/L (ref 136–145)
STRESS TARGET HR: 163 BPM
STRESS TARGET HR: 163 BPM
WBC NRBC COR # BLD: 7.21 10*3/MM3 (ref 3.4–10.8)
WBC NRBC COR # BLD: 7.21 10*3/MM3 (ref 3.4–10.8)

## 2023-02-28 PROCEDURE — G0378 HOSPITAL OBSERVATION PER HR: HCPCS

## 2023-02-28 PROCEDURE — 93970 EXTREMITY STUDY: CPT

## 2023-02-28 PROCEDURE — 85025 COMPLETE CBC W/AUTO DIFF WBC: CPT | Performed by: INTERNAL MEDICINE

## 2023-02-28 PROCEDURE — 25010000002 ENOXAPARIN PER 10 MG: Performed by: EMERGENCY MEDICINE

## 2023-02-28 PROCEDURE — 85025 COMPLETE CBC W/AUTO DIFF WBC: CPT | Performed by: EMERGENCY MEDICINE

## 2023-02-28 PROCEDURE — 71275 CT ANGIOGRAPHY CHEST: CPT

## 2023-02-28 PROCEDURE — 80053 COMPREHEN METABOLIC PANEL: CPT | Performed by: EMERGENCY MEDICINE

## 2023-02-28 PROCEDURE — 74177 CT ABD & PELVIS W/CONTRAST: CPT

## 2023-02-28 PROCEDURE — 96372 THER/PROPH/DIAG INJ SC/IM: CPT

## 2023-02-28 PROCEDURE — 99284 EMERGENCY DEPT VISIT MOD MDM: CPT

## 2023-02-28 PROCEDURE — 36415 COLL VENOUS BLD VENIPUNCTURE: CPT

## 2023-02-28 PROCEDURE — 80053 COMPREHEN METABOLIC PANEL: CPT | Performed by: INTERNAL MEDICINE

## 2023-02-28 PROCEDURE — 0 DIATRIZOATE MEGLUMINE & SODIUM PER 1 ML: Performed by: INTERNAL MEDICINE

## 2023-02-28 PROCEDURE — 25510000001 IOPAMIDOL 61 % SOLUTION: Performed by: INTERNAL MEDICINE

## 2023-02-28 PROCEDURE — 70491 CT SOFT TISSUE NECK W/DYE: CPT

## 2023-02-28 RX ORDER — ENOXAPARIN SODIUM 100 MG/ML
1 INJECTION SUBCUTANEOUS ONCE
Status: COMPLETED | OUTPATIENT
Start: 2023-02-28 | End: 2023-02-28

## 2023-02-28 RX ADMIN — IOPAMIDOL 95 ML: 612 INJECTION, SOLUTION INTRAVENOUS at 14:04

## 2023-02-28 RX ADMIN — DIATRIZOATE MEGLUMINE AND DIATRIZOATE SODIUM 30 ML: 600; 100 SOLUTION ORAL; RECTAL at 13:00

## 2023-02-28 RX ADMIN — ENOXAPARIN SODIUM 70 MG: 100 INJECTION SUBCUTANEOUS at 19:08

## 2023-02-28 NOTE — TELEPHONE ENCOUNTER
Called to Cardiovascular Tech room, as patient was there having dopplers of her extremities.  Was able to speak with patient, and let her know that Dr. Padilla wants her to go the ER, as she has been notified of something with the CTA of the chest done earlier today, and that is her recommendation.  Patient verbalized understanding.

## 2023-03-01 LAB
ALBUMIN SERPL-MCNC: 3.8 G/DL (ref 3.5–5.2)
ALBUMIN/GLOB SERPL: 1.7 G/DL
ALP SERPL-CCNC: 40 U/L (ref 39–117)
ALT SERPL W P-5'-P-CCNC: 17 U/L (ref 1–33)
ANION GAP SERPL CALCULATED.3IONS-SCNC: 8.8 MMOL/L (ref 5–15)
AST SERPL-CCNC: 19 U/L (ref 1–32)
BASOPHILS # BLD AUTO: 0.03 10*3/MM3 (ref 0–0.2)
BASOPHILS NFR BLD AUTO: 0.6 % (ref 0–1.5)
BILIRUB SERPL-MCNC: <0.2 MG/DL (ref 0–1.2)
BUN SERPL-MCNC: 13 MG/DL (ref 6–20)
BUN/CREAT SERPL: 15.5 (ref 7–25)
CALCIUM SPEC-SCNC: 8.9 MG/DL (ref 8.6–10.5)
CHLORIDE SERPL-SCNC: 106 MMOL/L (ref 98–107)
CO2 SERPL-SCNC: 24.2 MMOL/L (ref 22–29)
CREAT SERPL-MCNC: 0.84 MG/DL (ref 0.57–1)
D DIMER PPP FEU-MCNC: <0.27 MCGFEU/ML (ref 0–0.5)
DEPRECATED RDW RBC AUTO: 43 FL (ref 37–54)
EGFRCR SERPLBLD CKD-EPI 2021: 97.2 ML/MIN/1.73
EOSINOPHIL # BLD AUTO: 0.05 10*3/MM3 (ref 0–0.4)
EOSINOPHIL NFR BLD AUTO: 1.1 % (ref 0.3–6.2)
ERYTHROCYTE [DISTWIDTH] IN BLOOD BY AUTOMATED COUNT: 12.9 % (ref 12.3–15.4)
GLOBULIN UR ELPH-MCNC: 2.3 GM/DL
GLUCOSE SERPL-MCNC: 87 MG/DL (ref 65–99)
HCT VFR BLD AUTO: 38.6 % (ref 34–46.6)
HGB BLD-MCNC: 13.1 G/DL (ref 12–15.9)
IMM GRANULOCYTES # BLD AUTO: 0.01 10*3/MM3 (ref 0–0.05)
IMM GRANULOCYTES NFR BLD AUTO: 0.2 % (ref 0–0.5)
INR PPP: 1.01 (ref 0.9–1.1)
LYMPHOCYTES # BLD AUTO: 1.92 10*3/MM3 (ref 0.7–3.1)
LYMPHOCYTES NFR BLD AUTO: 41.6 % (ref 19.6–45.3)
MCH RBC QN AUTO: 30.5 PG (ref 26.6–33)
MCHC RBC AUTO-ENTMCNC: 33.9 G/DL (ref 31.5–35.7)
MCV RBC AUTO: 89.8 FL (ref 79–97)
MONOCYTES # BLD AUTO: 0.47 10*3/MM3 (ref 0.1–0.9)
MONOCYTES NFR BLD AUTO: 10.2 % (ref 5–12)
NEUTROPHILS NFR BLD AUTO: 2.14 10*3/MM3 (ref 1.7–7)
NEUTROPHILS NFR BLD AUTO: 46.3 % (ref 42.7–76)
NRBC BLD AUTO-RTO: 0 /100 WBC (ref 0–0.2)
PLATELET # BLD AUTO: 195 10*3/MM3 (ref 140–450)
PMV BLD AUTO: 10 FL (ref 6–12)
POTASSIUM SERPL-SCNC: 4.1 MMOL/L (ref 3.5–5.2)
PROT SERPL-MCNC: 6.1 G/DL (ref 6–8.5)
PROTHROMBIN TIME: 13.4 SECONDS (ref 11.7–14.2)
RBC # BLD AUTO: 4.3 10*6/MM3 (ref 3.77–5.28)
SODIUM SERPL-SCNC: 139 MMOL/L (ref 136–145)
WBC NRBC COR # BLD: 4.62 10*3/MM3 (ref 3.4–10.8)

## 2023-03-01 PROCEDURE — 96372 THER/PROPH/DIAG INJ SC/IM: CPT

## 2023-03-01 PROCEDURE — 85610 PROTHROMBIN TIME: CPT | Performed by: HOSPITALIST

## 2023-03-01 PROCEDURE — 85379 FIBRIN DEGRADATION QUANT: CPT | Performed by: INTERNAL MEDICINE

## 2023-03-01 PROCEDURE — 99223 1ST HOSP IP/OBS HIGH 75: CPT | Performed by: INTERNAL MEDICINE

## 2023-03-01 PROCEDURE — 85025 COMPLETE CBC W/AUTO DIFF WBC: CPT | Performed by: INTERNAL MEDICINE

## 2023-03-01 PROCEDURE — 25010000002 ENOXAPARIN PER 10 MG: Performed by: INTERNAL MEDICINE

## 2023-03-01 PROCEDURE — G0378 HOSPITAL OBSERVATION PER HR: HCPCS

## 2023-03-01 PROCEDURE — 80053 COMPREHEN METABOLIC PANEL: CPT | Performed by: INTERNAL MEDICINE

## 2023-03-01 RX ORDER — SODIUM CHLORIDE 0.9 % (FLUSH) 0.9 %
10 SYRINGE (ML) INJECTION AS NEEDED
Status: DISCONTINUED | OUTPATIENT
Start: 2023-03-01 | End: 2023-03-02 | Stop reason: HOSPADM

## 2023-03-01 RX ORDER — ONDANSETRON 2 MG/ML
4 INJECTION INTRAMUSCULAR; INTRAVENOUS EVERY 6 HOURS PRN
Status: DISCONTINUED | OUTPATIENT
Start: 2023-03-01 | End: 2023-03-02 | Stop reason: HOSPADM

## 2023-03-01 RX ORDER — ACETAMINOPHEN 325 MG/1
650 TABLET ORAL EVERY 4 HOURS PRN
Status: DISCONTINUED | OUTPATIENT
Start: 2023-03-01 | End: 2023-03-02 | Stop reason: HOSPADM

## 2023-03-01 RX ORDER — SODIUM CHLORIDE 9 MG/ML
40 INJECTION, SOLUTION INTRAVENOUS AS NEEDED
Status: DISCONTINUED | OUTPATIENT
Start: 2023-03-01 | End: 2023-03-02 | Stop reason: HOSPADM

## 2023-03-01 RX ORDER — ACETAMINOPHEN 160 MG/5ML
650 SOLUTION ORAL EVERY 4 HOURS PRN
Status: DISCONTINUED | OUTPATIENT
Start: 2023-03-01 | End: 2023-03-02 | Stop reason: HOSPADM

## 2023-03-01 RX ORDER — ENOXAPARIN SODIUM 100 MG/ML
1 INJECTION SUBCUTANEOUS EVERY 12 HOURS
Status: DISCONTINUED | OUTPATIENT
Start: 2023-03-01 | End: 2023-03-02 | Stop reason: HOSPADM

## 2023-03-01 RX ORDER — SODIUM CHLORIDE 0.9 % (FLUSH) 0.9 %
10 SYRINGE (ML) INJECTION EVERY 12 HOURS SCHEDULED
Status: DISCONTINUED | OUTPATIENT
Start: 2023-03-01 | End: 2023-03-02 | Stop reason: HOSPADM

## 2023-03-01 RX ORDER — HYDROCODONE BITARTRATE AND ACETAMINOPHEN 5; 325 MG/1; MG/1
1 TABLET ORAL EVERY 4 HOURS PRN
Status: DISCONTINUED | OUTPATIENT
Start: 2023-03-01 | End: 2023-03-02 | Stop reason: HOSPADM

## 2023-03-01 RX ORDER — ACETAMINOPHEN 650 MG/1
650 SUPPOSITORY RECTAL EVERY 4 HOURS PRN
Status: DISCONTINUED | OUTPATIENT
Start: 2023-03-01 | End: 2023-03-02 | Stop reason: HOSPADM

## 2023-03-01 RX ORDER — WARFARIN SODIUM 7.5 MG/1
7.5 TABLET ORAL
Status: COMPLETED | OUTPATIENT
Start: 2023-03-01 | End: 2023-03-01

## 2023-03-01 RX ORDER — NITROGLYCERIN 0.4 MG/1
0.4 TABLET SUBLINGUAL
Status: DISCONTINUED | OUTPATIENT
Start: 2023-03-01 | End: 2023-03-02 | Stop reason: HOSPADM

## 2023-03-01 RX ADMIN — ENOXAPARIN SODIUM 70 MG: 100 INJECTION SUBCUTANEOUS at 06:29

## 2023-03-01 RX ADMIN — WARFARIN 7.5 MG: 7.5 TABLET ORAL at 18:06

## 2023-03-01 RX ADMIN — ENOXAPARIN SODIUM 70 MG: 100 INJECTION SUBCUTANEOUS at 18:06

## 2023-03-01 RX ADMIN — Medication 10 ML: at 20:50

## 2023-03-01 NOTE — PLAN OF CARE
Goal Outcome Evaluation:  Plan of Care Reviewed With: patient        Progress: no change  Outcome Evaluation: admit to floor and orient to unit. Pt denies pain and SOA. RA at this time. SB on monitor. VSS. Lovenox given in ER.

## 2023-03-01 NOTE — PLAN OF CARE
Goal Outcome Evaluation:           Progress: improving  Outcome Evaluation: vss, no c/o cp, tolerating po, pt demostrated lovenox injection, first dose of coumadin given, possible dc tomorrow

## 2023-03-01 NOTE — ED NOTES
Nursing report ED to floor  Vikki Durham  28 y.o.  female    HPI :   Chief Complaint   Patient presents with    abnormal ct       Admitting doctor:   Manjit Marquez MD    Admitting diagnosis:   The encounter diagnosis was Acute pulmonary embolism without acute cor pulmonale, unspecified pulmonary embolism type (HCC).    Code status:   Current Code Status       Date Active Code Status Order ID Comments User Context       2/28/2023 1942 CPR (Attempt to Resuscitate) [id number]  Manjit Marquez MD ED        Question Answer    Code Status (Patient has no pulse and is not breathing) CPR (Attempt to Resuscitate)    Medical Interventions (Patient has pulse or is breathing) Full Support                    Allergies:   Rituximab    Isolation:   No active isolations    Intake and Output  No intake or output data in the 24 hours ending 02/28/23 1946    Weight:       02/28/23  1651   Weight: 72.6 kg (160 lb)       Most recent vitals:   Vitals:    02/28/23 1918 02/28/23 1925 02/28/23 1931 02/28/23 1933   BP:   100/69    BP Location:       Patient Position:       Pulse: 53 50  50   Resp:       Temp:       SpO2: 98% 99%  98%   Weight:       Height:           Active LDAs/IV Access:   Lines, Drains & Airways       Active LDAs       Name Placement date Placement time Site Days    Peripheral IV 02/28/23 1306 Left Antecubital 02/28/23  1306  Antecubital  less than 1                    Labs (abnormal labs have a star):   Labs Reviewed   CBC WITH AUTO DIFFERENTIAL - Abnormal; Notable for the following components:       Result Value    Immature Grans % 0.6 (*)     All other components within normal limits   COMPREHENSIVE METABOLIC PANEL    Narrative:     GFR Normal >60  Chronic Kidney Disease <60  Kidney Failure <15     CBC AND DIFFERENTIAL    Narrative:     The following orders were created for panel order CBC & Differential.  Procedure                               Abnormality         Status                     ---------                                -----------         ------                     CBC Auto Differential[[id number]]        Abnormal            Final result                 Please view results for these tests on the individual orders.       EKG:   No orders to display       Meds given in ED:   Medications   Enoxaparin Sodium (LOVENOX) syringe 70 mg (70 mg Subcutaneous Given 2/28/23 1908)       Imaging results:  CT Soft Tissue Neck With Contrast    Result Date: 2/28/2023  1.  Findings of left pulmonary arterial emboli are present, one of which is new since 08/29/2022. The above findings were discussed with Dr. Padilla    by telephone by Oscar Finch at 3:30 PM on 02/28/2023   . 2.  No findings of adenopathy within the neck, chest or abdomen or pelvis by size criteria. The ill-defined soft tissue thickening along the anterior mediastinum and pericardium appears grossly unchanged since 06/24/2021. 3.   Findings suggestive of left maxillary sinusitis in the appropriate clinical context and correlation with patient history is recommended. 4.  Other findings as above.  This report was finalized on 2/28/2023 3:59 PM by Dr. Oscar Finch M.D.      CT Abdomen Pelvis With Contrast    Result Date: 2/28/2023  1.  Findings of left pulmonary arterial emboli are present, one of which is new since 08/29/2022. The above findings were discussed with Dr. Padilla    by telephone by Oscar Finch at 3:30 PM on 02/28/2023   . 2.  No findings of adenopathy within the neck, chest or abdomen or pelvis by size criteria. The ill-defined soft tissue thickening along the anterior mediastinum and pericardium appears grossly unchanged since 06/24/2021. 3.   Findings suggestive of left maxillary sinusitis in the appropriate clinical context and correlation with patient history is recommended. 4.  Other findings as above.  This report was finalized on 2/28/2023 3:59 PM by Dr. Oscar Finch M.D.      CT Angiogram Chest    Result Date: 2/28/2023  1.  Findings of left  pulmonary arterial emboli are present, one of which is new since 08/29/2022. The above findings were discussed with Dr. Padilla    by telephone by Oscar Finch at 3:30 PM on 02/28/2023   . 2.  No findings of adenopathy within the neck, chest or abdomen or pelvis by size criteria. The ill-defined soft tissue thickening along the anterior mediastinum and pericardium appears grossly unchanged since 06/24/2021. 3.   Findings suggestive of left maxillary sinusitis in the appropriate clinical context and correlation with patient history is recommended. 4.  Other findings as above.  This report was finalized on 2/28/2023 3:59 PM by Dr. Oscar Finch M.D.       Ambulatory status:   - assist    Social issues:   Social History     Socioeconomic History    Marital status: Single    Number of children: 0   Tobacco Use    Smoking status: Never    Smokeless tobacco: Never    Tobacco comments:     no caffeine use   Vaping Use    Vaping Use: Never used   Substance and Sexual Activity    Alcohol use: No    Drug use: No    Sexual activity: Not Currently       NIH Stroke Scale:         Manisha Pena RN  02/28/23 19:46 EST

## 2023-03-01 NOTE — PROGRESS NOTES
[facility] Clinical Pharmacy Services: Warfarin Dosing/Monitoring Consult    Vikki Durham is a 28 y.o. female, estimated creatinine clearance is 94.8 mL/min (by C-G formula based on SCr of 0.84 mg/dL). weighing 75.3 kg (166 lb 1.6 oz).    Results from last 7 days   Lab Units 03/01/23  1131 03/01/23  0424 02/28/23  1755 02/28/23  1308   INR  1.01  --   --   --    HEMOGLOBIN g/dL  --  13.1 14.4 14.0   HEMATOCRIT %  --  38.6 43.5 41.2   PLATELETS 10*3/mm3  --  195 231 221     Prior to admission anticoagulation: patient had been on eliquis 5mg po q12h [facility] Anticoagulation:  Consulting provider: Dr. Villagomez  Start date: 3/1/23  Indication: DVT/PE (active thrombosis)  Target INR: 2 - 3  Expected duration: tbd   Bridge Therapy: Yes  with enoxaparin    Potential food or drug interactions: Acetaminophen: May enhance the anticoag effect of warfarin( usually exceeding 2 gm/day)    Education complete?/Date: No; plan for follow up TBD    Assessment/Plan:  Dose: will give 1 time dose of warfarin 7.5mg po tonight  Monitor for any signs or symptoms of bleeding  Follow up daily INRs and dose adjustments    Pharmacy will continue to follow until discharge or discontinuation of warfarin.     Miriam Dee, [facility]  Clinical Pharmacist

## 2023-03-01 NOTE — PROGRESS NOTES
Name: Vikki Durham ADMIT: 2023   : 1995  PCP: Blayne Echeverria MD    MRN: [id number] LOS: 0 days   AGE/SEX: 28 y.o. female  ROOM: [facility]     Subjective   Subjective   Twinges of chest pain but nothing significant.  Does not feel short of breath    Review of Systems     Objective   Objective   Vital Signs  Temp:  [96 °F (35.6 °C)-99.7 °F (37.6 °C)] 98.2 °F (36.8 °C)  Heart Rate:  [48-72] 54  Resp:  [18] 18  BP: ()/(57-80) 106/57  SpO2:  [95 %-99 %] 97 %  on   ;   Device (Oxygen Therapy): room air  Body mass index is 30.38 kg/m².  Physical Exam  Vitals reviewed.   Constitutional:       General: She is not in acute distress.     Appearance: She is not ill-appearing.   Cardiovascular:      Rate and Rhythm: Regular rhythm. Bradycardia present.   Pulmonary:      Effort: No respiratory distress.      Breath sounds: Normal breath sounds. No wheezing.   Musculoskeletal:      Right lower leg: No edema.      Left lower leg: No edema.   Skin:     General: Skin is warm and dry.      Findings: No rash.   Neurological:      Mental Status: She is alert and oriented to person, place, and time.   Psychiatric:         Mood and Affect: Mood normal.         Behavior: Behavior normal.       Results Review     I reviewed the patient's new clinical results.  Results from last 7 days   Lab Units 23  1755 23  1308   WBC 10*3/mm3 4.62 7.21 7.21   HEMOGLOBIN g/dL 13.1 14.4 14.0   PLATELETS 10*3/mm3 195 231 221     Results from last 7 days   Lab Units 23  0424 23  1755 23  1308   SODIUM mmol/L 139 140 139   POTASSIUM mmol/L 4.1 3.6 3.8   CHLORIDE mmol/L 106 102 101   CO2 mmol/L 24.2 27.0 27.4   BUN mg/dL 13 12 16   CREATININE mg/dL 0.84 0.72 0.77   GLUCOSE mg/dL 87 87 90   EGFR mL/min/1.73 97.2 117.0 107.9     Results from last 7 days   Lab Units 23 23  1755 23  1308   ALBUMIN g/dL 3.8 4.7 4.9   BILIRUBIN mg/dL <0.2 0.3 0.2   ALK PHOS U/L 40 51  49   AST (SGOT) U/L 19 18 24   ALT (SGPT) U/L 17 18 20     Results from last 7 days   Lab Units 03/01/23  0424 02/28/23  1755 02/28/23  1308   CALCIUM mg/dL 8.9 9.2 9.7   ALBUMIN g/dL 3.8 4.7 4.9       No results found for: HGBA1C, POCGLU    CT Soft Tissue Neck With Contrast    Result Date: 2/28/2023  1.  Findings of left pulmonary arterial emboli are present, one of which is new since 08/29/2022. The above findings were discussed with Dr. Padilla    by telephone by Oscar Finch at 3:30 PM on 02/28/2023   . 2.  No findings of adenopathy within the neck, chest or abdomen or pelvis by size criteria. The ill-defined soft tissue thickening along the anterior mediastinum and pericardium appears grossly unchanged since 06/24/2021. 3.   Findings suggestive of left maxillary sinusitis in the appropriate clinical context and correlation with patient history is recommended. 4.  Other findings as above.  This report was finalized on 2/28/2023 3:59 PM by Dr. Oscar Finch M.D.      CT Abdomen Pelvis With Contrast    Result Date: 2/28/2023  1.  Findings of left pulmonary arterial emboli are present, one of which is new since 08/29/2022. The above findings were discussed with Dr. Padilla    by telephone by Oscar Finch at 3:30 PM on 02/28/2023   . 2.  No findings of adenopathy within the neck, chest or abdomen or pelvis by size criteria. The ill-defined soft tissue thickening along the anterior mediastinum and pericardium appears grossly unchanged since 06/24/2021. 3.   Findings suggestive of left maxillary sinusitis in the appropriate clinical context and correlation with patient history is recommended. 4.  Other findings as above.  This report was finalized on 2/28/2023 3:59 PM by Dr. Oscar Finch M.D.      CT Angiogram Chest    Result Date: 2/28/2023  1.  Findings of left pulmonary arterial emboli are present, one of which is new since 08/29/2022. The above findings were discussed with Dr. Padilla    by telephone by  Oscar Finch at 3:30 PM on 02/28/2023   . 2.  No findings of adenopathy within the neck, chest or abdomen or pelvis by size criteria. The ill-defined soft tissue thickening along the anterior mediastinum and pericardium appears grossly unchanged since 06/24/2021. 3.   Findings suggestive of left maxillary sinusitis in the appropriate clinical context and correlation with patient history is recommended. 4.  Other findings as above.  This report was finalized on 2/28/2023 3:59 PM by Dr. Oscar Finch M.D.      I have personally reviewed all medications:  Scheduled Medications  enoxaparin, 1 mg/kg, Subcutaneous, Q12H  sodium chloride, 10 mL, Intravenous, Q12H  warfarin, 7.5 mg, Oral, Once    Infusions  Pharmacy to dose warfarin,     Diet  Diet: Regular/House Diet; Texture: Regular Texture (IDDSI 7); Fluid Consistency: Thin (IDDSI 0)    I have personally reviewed:  [x]  Laboratory   []  Microbiology   [x]  Radiology   [x]  EKG/Telemetry  [x]  Cardiology/Vascular   []  Pathology    [x]  Records       Assessment/Plan     Active Hospital Problems    Diagnosis  POA   • **Recurrent pulmonary embolism (HCC) [I26.99]  Yes   • Patent foramen ovale [Q21.12]  Not Applicable   • Acute pulmonary embolism without acute cor pulmonale, unspecified pulmonary embolism type (HCC) [I26.99]  Yes   • Pulmonary embolism (HCC) [I26.99]  Yes   • 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       28 y.o. female with history of large B-cell lymphoma status posttreatment nearly 5 years ago admitted with Recurrent pulmonary embolism (HCC) despite anticoagulation with Eliquis    Patient reviewed her medication bottle with family and reports that she may have missed about 5 doses over the last month so noncompliance could be playing a role.  Discussed with Dr. Real and plan currently is to transition to warfarin with Lovenox  bridge.  Will instruct patient on administration.    Reviewed records from [facility] office, including hypercoagulable work-up showing negative lupus anticoagulant, factor V Leiden and others, no overt reason for thrombophilia.  CT scan does not show any increase in the size of lymph nodes so no obvious recurrent lymphoma.    Previously a PFO on echo.  Not sure I see any benefit in considering closure as this certainly would not have caused her PE.    Expected Discharge  Expected Discharge Date and Time     Expected Discharge Date Expected Discharge Time    Mar 2, 2023             Jose Villagomez MD  [facility]  03/01/23  14:59 EST

## 2023-03-01 NOTE — CONSULTS
Subjective     REASON FOR CONSULTATION: New left upper lobe pulmonary embolism while on therapeutic Eliquis  Provide an opinion on any further workup or treatment                             REQUESTING PHYSICIAN: [facility]    RECORDS OBTAINED:  Records of the patients history including those obtained from the referring provider were reviewed and summarized in detail.    HISTORY OF PRESENT ILLNESS:  The patient is a 28 y.o. year old female who is here for an opinion about the above issue.    History of Present Illness patient is a 28-year-old female with a history of diffuse large B-cell lymphoma treated with dose adjusted EPOCH in 2018 noted on routine follow-up chest imaging to have a new pulmonary embolism despite being on Eliquis 5 twice daily since 8/30/2022 for a small pulmonary embolism.    On questioning the patient she does states she may have had a little chest pain about 4 days ago was brief and in the left side of her chest.  We talked about compliance with her medications and she thinks maybe couple of times a month she forgets the morning dose but always takes the evening dose and  she missed 5 pills of eliquis in the last month. No recent covid 19 infection or vaccination    Patient did have a prior DVT associated with a port in 2018 was on Pradaxa until her port was removed    Hypercoagulable work-up in August 2022 was completely negative when she presented with her first PE and at that time her D-dimer was interestingly normal as were Doppler of her lower extremities    She is asymptomatic except for intermittent chest pain associated with her menstrual cycles    Repeat Dopplers of lower extremities negative yesterday and she has a chronic clot in the right Subclavian related to her port    D-dimer is pending but was normal in August when she had her small PE    There is no family history of DVT and she is on no other medications except for Carolee extract which she takes every day with her Eliquis and have  asked her to stop this    She is concerned that the clots may be a sign that her lymphoma is coming back.  Scans did not show any evidence of lymphoma and she is 5 years from treatment this is not a typical presentation of recurrent lymphoma    Dimension switching to Coumadin as an alternative anticoagulant and she may need to get home monitoring and will have to go home on Lovenox while she is coumadinized    Past Medical History:   Diagnosis Date   • Diffuse large B cell lymphoma (HCC) 06/2018   • Fracture of lower leg 2000    L   • H/O Lung mass 06/2018   • H/O Pericardial effusion    • History of DVT (deep vein thrombosis) 2018    RIGHT ARM AT PICC LINE SITE        Past Surgical History:   Procedure Laterality Date   • OTHER SURGICAL HISTORY  06/12/2018    CT-GUIDED BIOPSY OF MEDIASTINAL MASS   • VENOUS ACCESS DEVICE (PORT) INSERTION Right 7/31/2018    Procedure: RIGHT MEDIPORT PLACEMENT;  Surgeon: Farhan Hurt MD;  Location: [facility];  Service: Vascular   • VENOUS ACCESS DEVICE (PORT) REMOVAL N/A 5/8/2019    Procedure: MEDIPORT REMOVEAL;  Surgeon: Farhan Hurt MD;  Location: [facility] OR;  Service: Vascular        Current Facility-Administered Medications on File Prior to Encounter   Medication Dose Route Frequency Provider Last Rate Last Admin   • [COMPLETED] diatrizoate meglumine-sodium (GASTROGRAFIN) 66-10 % oral solution 30 mL  30 mL Oral Once in imaging Millie Padilla MD   30 mL at 02/28/23 1300   • [COMPLETED] iopamidol (ISOVUE-300) 61 % injection 100 mL  100 mL Intravenous Once in imaging Millie Padilla MD   95 mL at 02/28/23 1404     Current Outpatient Medications on File Prior to Encounter   Medication Sig Dispense Refill   • apixaban (ELIQUIS) 5 MG tablet tablet Take 1 tablet by mouth 2 (Two) Times a Day. 60 tablet 3        ALLERGIES:    Allergies   Allergen Reactions   • Rituximab Itching     Ear itching        Social History     Socioeconomic History   • Marital  status: Single   • Number of children: 0   Tobacco Use   • Smoking status: Never   • Smokeless tobacco: Never   • Tobacco comments:     no caffeine use   Vaping Use   • Vaping Use: Never used   Substance and Sexual Activity   • Alcohol use: No   • Drug use: No   • Sexual activity: Not Currently        Family History   Problem Relation Age of Onset   • Lung cancer Mother    • Thyroid disease Mother    • Glaucoma Mother    • Liver cancer Mother    • Hypertension Maternal Grandmother    • Lung cancer Maternal Grandmother    • Hypertension Paternal Grandmother    • Diabetes Paternal Grandmother    • Stroke Paternal Grandfather    • Malig Hyperthermia Neg Hx         Review of Systems   Cardiovascular: Positive for chest pain (Intermittent left chest pain).   All other systems reviewed and are negative.       Objective     Vitals:    02/28/23 2001 02/28/23 2024 02/28/23 2343 03/01/23 0710   BP: 108/80 103/73 101/64 98/66   BP Location:  Right arm Right arm Left arm   Patient Position:  Lying Lying Lying   Pulse: 72 64 (!) 48 51   Resp:  18 18 18   Temp:  99.7 °F (37.6 °C) 98.1 °F (36.7 °C) 98.1 °F (36.7 °C)   TempSrc:  Oral Oral Oral   SpO2: 95% 97% 97% 96%   Weight:  75.3 kg (166 lb 1.6 oz)     Height:         Current Status 12/22/2022   ECOG score 0       Physical Exam    CONSTITUTIONAL:  Vital signs reviewed.  No distress, looks comfortable.  EYES:  Conjunctivae and lids unremarkable.  PERRLA  EARS,NOSE,MOUTH,THROAT:  Ears and nose appear unremarkable.  Lips, teeth, gums appear unremarkable.  RESPIRATORY:  Normal respiratory effort.  Lungs clear to auscultation bilaterally.  CARDIOVASCULAR:  Normal S1, S2.  No murmurs rubs or gallops.  No significant lower extremity edema.  GASTROINTESTINAL: Abdomen appears unremarkable.  Nontender.  No hepatomegaly.  No splenomegaly.  LYMPHATIC:  No cervical, supraclavicular, axillary lymphadenopathy.  SKIN:  Warm.  No rashes.  PSYCHIATRIC:  Normal judgment and insight.  Normal mood  and affect.      RECENT LABS:  Hematology WBC   Date Value Ref Range Status   03/01/2023 4.62 3.40 - 10.80 10*3/mm3 Final     RBC   Date Value Ref Range Status   03/01/2023 4.30 3.77 - 5.28 10*6/mm3 Final     Hemoglobin   Date Value Ref Range Status   03/01/2023 13.1 12.0 - 15.9 g/dL Final     Hematocrit   Date Value Ref Range Status   03/01/2023 38.6 34.0 - 46.6 % Final     Platelets   Date Value Ref Range Status   03/01/2023 195 140 - 450 10*3/mm3 Final          Assessment & Plan     1.  Primary mediastinal large B-cell lymphoma: The patient presented with dyspnea, cough, and chest pain.  A CT angiogram of the chest 6/8/18 showed a very large tumor mass in the mediastinum encasing multiple vascular structures and narrowing the left mainstem bronchus.  There was direct tumor infiltration in the left upper lobe.  There was a small left pleural effusion.  She underwent a CT-guided biopsy of the mediastinal mass on 6/12/18 and pathology reviewed at [facility] oncology showed diffuse large B-cell lymphoma consistent with a primary diffuse large B-cell lymphoma.  A bone marrow exam was performed on 6/14/18 which was negative for lymphoma.  She underwent a PET scan 6/13/18 which showed a large hypermetabolic mass in the chest involving the anterior mediastinum, left hilum, nearly completely filling the left hemothorax with SUV 19.8.  There was physiologic distribution elsewhere.     The patient was started on IV fluid hydration and allopurinol for prevention of hyperuricemia.  She initiated systemic chemotherapy with DA-EPOCH-R on 6/16/18 and completed the evening of 6/21/18.  She had a infusion reaction to rituximab but was able to complete with additional medications and otherwise tolerated chemotherapy well.  She will require additional premedications with additional rituximab.  Plan is to monitor her blood counts twice per week outpatient and proceed with cycle 2 of chemotherapy day 21.     The patient had  significant improvement in shortness of breath, cough, and chest pain by the time of discharge.     • Patient received Neulasta support  • Her white count dropped down to as low as 0.8 low-grade temperature and patient was placed on Levaquin ×5 days starting June 27, 2018, neutropenia AT  11 days posttreatment.  Platelets dropped to 82.  • Her next ZOLADEX injection is due July 12.  • She is due to start chemotherapy on July 9.  • Discussed with Dr. Fox at the [facility] and his suggestion was to do the CT scan and PET scan after 2 cycles and discuss the results with him.  If she has an excellent response early on she would not require any further treatments after completion of 6 cycles of EPOCH/R  • Patient being admitted for cycle 3 of chemotherapy on July 30, 2018.  • She's status post cycle 4 of chemotherapy and CT scan has been reviewed following 4 cycles with continued response.  • She is due for ZOLADEX  injection on October 8, 2018.   • Patient is due to go for cycle 5 of chemotherapy on Monday, September 17, 2018  •  echocardiogram done August 22, 2018 shows ejection fraction of 59% to 60% with the eldest strain of 19.2%  • Cycle 6 chemotherapy with dose adjusted EPOCH/R on October 8, 2018.  • Reviewed CT scan and PET scan and the images with the patient and family.  Significant response with decrease in the mediastinal mass to 7.8 cm and activity level is 2.4 a week on PET scan.  • Reviewed repeat PET scan still with 7.8 cm tumor in the mediastinum.  The SUV is 2.4 and this could be scar tissue.  • The patient is due for her next dose of Zoladex today. As noted above, her ANC has been steadily declining over the last several months in the absence of infectious symptoms. This will be further detailed below. She will proceed with Zoladex, as scheduled.   • Discontinue Zoladex  • Reviewed CT as of January 15, 2019 with further improvement  • No plans on radiation to the mediastinal lymph node  given the PET scan showed that it is photopenic  • Reviewed CT scan from June 2021.  And there is no evidence of disease  • Belia 15, 2022: Patient asymptomatic.  CT scan shows minimal increase of a small lymph node in the right neck from 0.3- 0.6 cm.  Questionable lesion in the liver very small follow-up in CT scan in 3 months.  Patient is asymptomatic  • September 2022: Admitted with pulmonary embolism and started on Eliquis tolerating well no evidence of malignancy by CT scan of the neck chest abdomen pelvis August 2022  • December 22, 2022: Patient doing well without any complaints currently on Eliquis 5 mg p.o. twice daily for history of pulmonary embolism since August 2022     2.  Pericardial effusion:   a 2-D echocardiogram 6/8/18 showed a left ventricular systolic function 58%.  There was a 2 cm pericardial effusion with no tamponade.  The pericardium appeared thickened.  She had no evidence of volume overload.  It is resolved     3. Fertility preservation:  Patient received Zoladex injection  for fertility preservation; this should be continued once monthly during her chemotherapy  • She is due for her next dose today and will continue this for 2 additional doses.   • Zoladex has been discontinued but patient has now started  menstrual periods     4. Right upper extremity DVT on Pradaxa. She continues on this with no issues.  Stable Pradaxa stopped after port removed  · Left upper lobe pulmonary embolism 8/30/2022-with associated numbness in the left arm negative brain MRI but finding of a PFO-lower extremity Dopplers and D-dimer normal at admission  · Hypercoagulable work-up negative patient sent home on Eliquis 5 twice daily  · Routine CAT scan dated 2/28/2023 shows new pulmonary embolism despite Eliquis therapy admitted for further evaluation and transition to Coumadin     5. Status post port placement, patient wants port removed and I think it is reasonable but will need to hold Pradaxa  • S/p port  removal      6. Renal and Ovarian cysts.  We will continue to monitor.  • I have advised patient to consult her OB/GYN for ovarian cysts.  • CT scan does not show evidence of any ovarian cyst from 2020     7. Family history of cancer.  Her mother was diagnosed with cholangiocarcinoma.  • Patient's mother now diagnosed with metastatic lung cancer and currently is being transferred to hospice  • Patient's mother had   • ?  Genetic testing      9.  Echo showed PFO     Plan  Agree with Lovenox and transition to Coumadin  Repeat D-dimer testing  Stop all her supplements

## 2023-03-01 NOTE — H&P
Internal medicine history and physical  INTERNAL MEDICINE   Psychiatric       Patient Identification:  Name: Vikki Durham  Age: 28 y.o.  Sex: female  :  1995  MRN: [id number]                   Primary Care Physician: Blayne Echeverria MD                               Date of admission:2023    Chief Complaint: Sent to the emergency room by her oncologist for further management of pulmonary embolism seen on the CT scan of the chest done earlier.    History of Present Illness:   Patient is a 28-year-old female with history of diffuse large B-cell lymphoma for which she has completed chemotherapy in 2018 and has been in observed with periodic CT scans by oncology service.  In this background patient was noted to have acute pulmonary embolism in 2022 and was placed on anticoagulation therapy.  She had extensive hypercoagulable work-up performed by hematology service and it was assessed that the cause of pulmonary embolism could have been a PICC line or the port that she had on and was provoked because of her prolonged sitting and lack of activity due to her work.  She had negative upper and lower extremity venous Doppler at that time.  In this background patient was otherwise feeling fine except for last couple of days noticing episodes of chest tightness and shortness of breath that used to come and go away.  She also felt discomfort in the chest when she had ultrasounds performed.  Patient had echocardiogram performed sometime ago and showed patent fay ovale.  Patient was scheduled to have a CT scan of the chest abdomen pelvis as part of the surveillance of her lymphoma following her treatment that ended in 2018.  She had this scan done earlier today and CT scan chest PE protocol was added to decide upon continuation or stopping anticoagulation therapy for supposedly provoked PE that was found and 2022.  CT scan of the chest PE protocol revealed new  pulmonary embolism resulting in patient being admitted for further work-up and treatment.  Her oral anticoagulation with Eliquis was held and patient was started on subcu Lovenox with intention to transition her to warfarin.  Patient denies any hemoptysis or pleuritic chest pain but did have few episodes of shortness of breath recently.  Patient had recent ER visit for closed head injury in the setting of ongoing anticoagulation therapy and work-up was negative for any intracranial bleed.  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    Past Medical History:  Past Medical History:   Diagnosis Date   • Diffuse large B cell lymphoma (HCC) 06/2018   • Fracture of lower leg 2000    L   • H/O Lung mass 06/2018   • H/O Pericardial effusion    • History of DVT (deep vein thrombosis) 2018    RIGHT ARM AT PICC LINE SITE     Past Surgical History:  Past Surgical History:   Procedure Laterality Date   • OTHER SURGICAL HISTORY  06/12/2018    CT-GUIDED BIOPSY OF MEDIASTINAL MASS   • VENOUS ACCESS DEVICE (PORT) INSERTION Right 7/31/2018    Procedure: RIGHT MEDIPORT PLACEMENT;  Surgeon: Farhan Hurt MD;  Location: [facility];  Service: Vascular   • VENOUS ACCESS DEVICE (PORT) REMOVAL N/A 5/8/2019    Procedure: MEDIPORT REMOVEAL;  Surgeon: Farhan Hurt MD;  Location: [facility] OR;  Service: Vascular      Home Meds:  (Not in a hospital admission)    Current Meds:   No current facility-administered medications for this encounter.    Current Outpatient Medications:   •  apixaban (ELIQUIS) 5 MG tablet tablet, Take 1 tablet by mouth 2 (Two) Times a Day., Disp: 60 tablet, Rfl: 3  Allergies:  Allergies   Allergen Reactions   • Rituximab Itching     Ear itching     Social History:   Social History     Tobacco Use   • Smoking status: Never   • Smokeless tobacco: Never   • Tobacco comments:     no caffeine use   Substance Use Topics   •  "Alcohol use: No      Family History:  Family History   Problem Relation Age of Onset   • Lung cancer Mother    • Thyroid disease Mother    • Glaucoma Mother    • Liver cancer Mother    • Hypertension Maternal Grandmother    • Lung cancer Maternal Grandmother    • Hypertension Paternal Grandmother    • Diabetes Paternal Grandmother    • Stroke Paternal Grandfather    • Malig Hyperthermia Neg Hx           Review of Systems  See history of present illness and past medical history.        Vitals:   /78 (BP Location: Right arm, Patient Position: Sitting)   Pulse 50   Temp 96 °F (35.6 °C)   Resp 18   Ht 157.5 cm (62\")   Wt 72.6 kg (160 lb)   LMP 02/27/2023   SpO2 99%   BMI 29.26 kg/m²   I/O: No intake or output data in the 24 hours ending 02/28/23 1940  Exam:  Patient is examined using the personal protective equipment as per guidelines from infection control for this particular patient as enacted.  Hand washing was performed before and after patient interaction.  General Appearance:    Alert, cooperative, no distress, appears stated age   Head:    Normocephalic, without obvious abnormality, atraumatic   Eyes:    PERRL, conjunctiva/corneas clear, EOM's intact, both eyes   Ears:    Normal external ear canals, both ears   Nose:   Nares normal, septum midline, mucosa normal, no drainage    or sinus tenderness   Throat:   Lips, tongue, gums normal; oral mucosa pink and moist   Neck:   Supple, symmetrical, trachea midline, no adenopathy;     thyroid:  no enlargement/tenderness/nodules; no carotid    bruit or JVD   Back:     Symmetric, no curvature, ROM normal, no CVA tenderness   Lungs:     Clear to auscultation bilaterally, respirations unlabored   Chest Wall:    No tenderness or deformity    Heart:    Regular rate and rhythm, S1 and S2 normal, no murmur, rub   or gallop   Abdomen:     Soft, non-tender, bowel sounds active all four quadrants,     no masses, no hepatomegaly, no splenomegaly   Extremities:   " Extremities normal, atraumatic, no cyanosis or edema   Pulses:   Pulses palpable in all extremities; symmetric all extremities   Skin:   Skin color normal, Skin is warm and dry,  no rashes or palpable lesions   Neurologic:   CNII-XII intact, motor strength grossly intact, sensation grossly intact to light touch, no focal deficits noted       Data Review:      I reviewed the patient's new clinical results.  Results from last 7 days   Lab Units 02/28/23  1755 02/28/23  1308   WBC 10*3/mm3 7.21 7.21   HEMOGLOBIN g/dL 14.4 14.0   PLATELETS 10*3/mm3 231 221     Results from last 7 days   Lab Units 02/28/23  1755 02/28/23  1308   SODIUM mmol/L 140 139   POTASSIUM mmol/L 3.6 3.8   CHLORIDE mmol/L 102 101   CO2 mmol/L 27.0 27.4   BUN mg/dL 12 16   CREATININE mg/dL 0.72 0.77   CALCIUM mg/dL 9.2 9.7   GLUCOSE mg/dL 87 90     CT Soft Tissue Neck With Contrast    Result Date: 2/28/2023  1.  Findings of left pulmonary arterial emboli are present, one of which is new since 08/29/2022. The above findings were discussed with Dr. Padilla    by telephone by Oscar Finch at 3:30 PM on 02/28/2023   . 2.  No findings of adenopathy within the neck, chest or abdomen or pelvis by size criteria. The ill-defined soft tissue thickening along the anterior mediastinum and pericardium appears grossly unchanged since 06/24/2021. 3.   Findings suggestive of left maxillary sinusitis in the appropriate clinical context and correlation with patient history is recommended. 4.  Other findings as above.  This report was finalized on 2/28/2023 3:59 PM by Dr. Oscar Finch M.D.      CT Abdomen Pelvis With Contrast    Result Date: 2/28/2023  1.  Findings of left pulmonary arterial emboli are present, one of which is new since 08/29/2022. The above findings were discussed with Dr. Padilla    by telephone by Oscar Finch at 3:30 PM on 02/28/2023   . 2.  No findings of adenopathy within the neck, chest or abdomen or pelvis by size criteria. The  ill-defined soft tissue thickening along the anterior mediastinum and pericardium appears grossly unchanged since 06/24/2021. 3.   Findings suggestive of left maxillary sinusitis in the appropriate clinical context and correlation with patient history is recommended. 4.  Other findings as above.  This report was finalized on 2/28/2023 3:59 PM by Dr. Oscar Finch M.D.      CT Angiogram Chest    Result Date: 2/28/2023  1.  Findings of left pulmonary arterial emboli are present, one of which is new since 08/29/2022. The above findings were discussed with Dr. Padilla    by telephone by Oscar Finch at 3:30 PM on 02/28/2023   . 2.  No findings of adenopathy within the neck, chest or abdomen or pelvis by size criteria. The ill-defined soft tissue thickening along the anterior mediastinum and pericardium appears grossly unchanged since 06/24/2021. 3.   Findings suggestive of left maxillary sinusitis in the appropriate clinical context and correlation with patient history is recommended. 4.  Other findings as above.  This report was finalized on 2/28/2023 3:59 PM by Dr. Oscar Finch M.D.      Microbiology Results (last 10 days)     ** No results found for the last 240 hours. 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  Plan is to admit the patient, hematology consultation, continue with subcu Lovenox and hold her Eliquis.  Watch for respiratory compromise with continuous  pulse ox monitoring.  · History of patent fay ovale-patient's oncology wanted cardiology evaluation.  Will consult cardiology service.  · History of large B-cell lymphoma status postchemotherapy in 2018 and currently being observed with repeat CT scan of the chest abdomen pelvis for surveillance of progression of disease so far shows stability.  We will consult hematology oncology service.    Manjit Marquez MD   2/28/2023  19:40 EST    Parts of this note may be an electronic transcription/translation of spoken language to printed text using the Dragon dictation system.

## 2023-03-02 VITALS
DIASTOLIC BLOOD PRESSURE: 61 MMHG | BODY MASS INDEX: 30.57 KG/M2 | TEMPERATURE: 98.4 F | SYSTOLIC BLOOD PRESSURE: 98 MMHG | HEART RATE: 56 BPM | RESPIRATION RATE: 20 BRPM | WEIGHT: 166.1 LBS | HEIGHT: 62 IN | OXYGEN SATURATION: 96 %

## 2023-03-02 LAB
HOLD SPECIMEN: NORMAL
INR PPP: 0.98 (ref 0.9–1.1)
PROTHROMBIN TIME: 13.1 SECONDS (ref 11.7–14.2)
WHOLE BLOOD HOLD SPECIMEN: NORMAL

## 2023-03-02 PROCEDURE — 25010000002 ENOXAPARIN PER 10 MG: Performed by: INTERNAL MEDICINE

## 2023-03-02 PROCEDURE — 96372 THER/PROPH/DIAG INJ SC/IM: CPT

## 2023-03-02 PROCEDURE — 99214 OFFICE O/P EST MOD 30 MIN: CPT | Performed by: INTERNAL MEDICINE

## 2023-03-02 PROCEDURE — 85610 PROTHROMBIN TIME: CPT | Performed by: HOSPITALIST

## 2023-03-02 PROCEDURE — 99232 SBSQ HOSP IP/OBS MODERATE 35: CPT | Performed by: INTERNAL MEDICINE

## 2023-03-02 PROCEDURE — G0378 HOSPITAL OBSERVATION PER HR: HCPCS

## 2023-03-02 RX ORDER — WARFARIN SODIUM 7.5 MG/1
7.5 TABLET ORAL
Status: DISCONTINUED | OUTPATIENT
Start: 2023-03-02 | End: 2023-03-02 | Stop reason: HOSPADM

## 2023-03-02 RX ORDER — ENOXAPARIN SODIUM 100 MG/ML
1 INJECTION SUBCUTANEOUS EVERY 12 HOURS
Qty: 8 ML | Refills: 0 | Status: SHIPPED | OUTPATIENT
Start: 2023-03-02

## 2023-03-02 RX ORDER — WARFARIN SODIUM 5 MG/1
5 TABLET ORAL DAILY
Qty: 30 TABLET | Refills: 0 | Status: SHIPPED | OUTPATIENT
Start: 2023-03-02 | End: 2023-03-09 | Stop reason: SDUPTHER

## 2023-03-02 RX ADMIN — ENOXAPARIN SODIUM 70 MG: 100 INJECTION SUBCUTANEOUS at 06:46

## 2023-03-02 NOTE — PROGRESS NOTES
Full note to follow. Had planned early dc this morning but after discussion with Dr. Real, will ask cardiology to see for bradycardia and she would like them to comment on PFO as well. Patient reports regular aerobic exercise (boxing), but has never been told about any bradycardia by prior physicians. She has no symptoms with this.  Still will consider dc later today if no further recs from cardiology.

## 2023-03-02 NOTE — CASE MANAGEMENT/SOCIAL WORK
Case Management Discharge Note      Final Note: Home via family, no additional CCP needs. Denise RN/CCP         Selected Continued Care - Discharged on 3/2/2023 Admission date: 2/28/2023 - Discharge disposition: Home or Self Care    Destination    No services have been selected for the patient.              Durable Medical Equipment    No services have been selected for the patient.              Dialysis/Infusion    No services have been selected for the patient.              Home Medical Care    No services have been selected for the patient.              Therapy    No services have been selected for the patient.              Community Resources    No services have been selected for the patient.              Community & DME    No services have been selected for the patient.                       Final Discharge Disposition Code: 01 - home or self-care

## 2023-03-02 NOTE — NURSING NOTE
"Pt HR SR to SB in the 40s and 50s while awake. While pt has been sleeping, pts HR maintaining low 40s and now dropping to 38-39. When pt is awoken, HR increases to the 50s. Pt reports no symptoms and states \"I didn't even know it was happening.\"   Notified BRANNON Talbert.   APRN states she would leave a note for the rounding MD this morning and for nursing to pass off in report since pt is asymptomatic.    "

## 2023-03-02 NOTE — PROGRESS NOTES
[facility] Clinical Pharmacy Services: Warfarin Dosing/Monitoring Consult    Vikki Durham is a 28 y.o. female, estimated creatinine clearance is 94.8 mL/min (by C-G formula based on SCr of 0.84 mg/dL). weighing 75.3 kg (166 lb 1.6 oz).    Results from last 7 days   Lab Units 03/02/23  0310 03/01/23  1131 03/01/23  0424 02/28/23  1755 02/28/23  1308   INR  0.98 1.01  --   --   --    HEMOGLOBIN g/dL  --   --  13.1 14.4 14.0   HEMATOCRIT %  --   --  38.6 43.5 41.2   PLATELETS 10*3/mm3  --   --  195 231 221     Prior to admission anticoagulation: [facility] Anticoagulation:  Consulting provider: Dr. Villagomez  Start date: 3/1/23  Indication: DVT/PE (active thrombosis)  Target INR: 2 - 3  Expected duration: tbd   Bridge Therapy: Yes  with enoxaparin    Potential food or drug interactions:   - Acetaminophen- dose dependent    Education complete?/Date: No; plan for follow up TBD    Assessment/Plan:  Dose: Will give another one time dose of warfarin 7.5 mg tonight.   Monitor for any signs or symptoms of bleeding  Follow up daily INRs and dose adjustments    Pharmacy will continue to follow until discharge or discontinuation of warfarin.     Kelton Narvaez [facility]  Clinical Pharmacist

## 2023-03-02 NOTE — PROGRESS NOTES
Subjective   REASON FOR CONSULTATION: New left upper lobe pulmonary embolism while on therapeutic Eliquis    History of Present Illness  patient is a 28-year-old female with a history of diffuse large B-cell lymphoma treated with dose adjusted EPOCH in 2018 noted on routine follow-up chest imaging to have a new pulmonary embolism despite being on Eliquis 5 twice daily since 8/30/2022 for a small pulmonary embolism.     On questioning the patient she does states she may have had a little chest pain about 4 days ago was brief and in the left side of her chest.  We talked about compliance with her medications and she thinks maybe couple of times a month she forgets the morning dose but always takes the evening dose and  she missed 5 pills of eliquis in the last month. No recent covid 19 infection or vaccination     Patient did have a prior DVT associated with a port in 2018 was on Pradaxa until her port was removed     Hypercoagulable work-up in August 2022 was completely negative when she presented with her first PE and at that time her D-dimer was interestingly normal as were Doppler of her lower extremities     She is asymptomatic except for intermittent chest pain associated with her menstrual cycles     Repeat Dopplers of lower extremities negative yesterday and she has a chronic clot in the right Subclavian related to her port     D-dimer is pending but was normal in August when she had her small PE     There is no family history of DVT and she is on no other medications except for Carolee extract which she takes every day with her Eliquis and have asked her to stop this     She is concerned that the clots may be a sign that her lymphoma is coming back.  Scans did not show any evidence of lymphoma and she is 5 years from treatment this is not a typical presentation of recurrent lymphoma     Dimension switching to Coumadin as an alternative anticoagulant and she may need to get home monitoring and will have to go  home on Lovenox while she is coumadinized     Interval history\  3/2/2023  Feels fine-significant bradycardia heart rate of 39  Tolerating Lovenox and learning how to give herself shots  Coumadin started  We will check INR in our office Monday  ?  Repeat echo to see if there is clot in the right atrium      Past Medical History, Past Surgical History, Social History, Family History have been reviewed and are without significant changes except as mentioned.    Review of Systems   Cardiovascular: Positive for chest pain (mild left chest).   Psychiatric/Behavioral: The patient is not nervous/anxious.    All other systems reviewed and are negative.     A comprehensive 14 point review of systems was performed and was negative except as mentioned.    Medications:  The current medication list was reviewed in the EMR    ALLERGIES:    Allergies   Allergen Reactions   • Rituximab Itching     Ear itching       Objective      Vitals:    03/02/23 0354 03/02/23 0357 03/02/23 0550 03/02/23 0727   BP:    98/61   BP Location:    Left arm   Patient Position:    Lying   Pulse: (!) 38 (!) 39 (!) 43 56   Resp:    20   Temp:    98.4 °F (36.9 °C)   TempSrc:    Oral   SpO2:    96%   Weight:       Height:         Current Status 12/22/2022   ECOG score 0       Physical Exam  CONSTITUTIONAL:  Vital signs reviewed.  No distress, looks comfortable.  EYES:  Conjunctivae and lids unremarkable.  PERRLA  EARS,NOSE,MOUTH,THROAT:  Ears and nose appear unremarkable.  Lips, teeth, gums appear unremarkable.  RESPIRATORY:  Normal respiratory effort.  Lungs clear to auscultation bilaterally.  CARDIOVASCULAR:  Normal S1, S2.  No murmurs rubs or gallops.  No significant lower extremity edema.  GASTROINTESTINAL: Abdomen appears unremarkable.  Nontender.  No hepatomegaly.  No splenomegaly.  LYMPHATIC:  No cervical, supraclavicular, axillary lymphadenopathy.  SKIN:  Warm.  No rashes.  PSYCHIATRIC:  Normal judgment and insight.  Normal mood and  affect.    RECENT LABS:  Hematology WBC   Date Value Ref Range Status   03/01/2023 4.62 3.40 - 10.80 10*3/mm3 Final     RBC   Date Value Ref Range Status   03/01/2023 4.30 3.77 - 5.28 10*6/mm3 Final     Hemoglobin   Date Value Ref Range Status   03/01/2023 13.1 12.0 - 15.9 g/dL Final     Hematocrit   Date Value Ref Range Status   03/01/2023 38.6 34.0 - 46.6 % Final     Platelets   Date Value Ref Range Status   03/01/2023 195 140 - 450 10*3/mm3 Final              Assessment & Plan     1.  Primary mediastinal large B-cell lymphoma: The patient presented with dyspnea, cough, and chest pain.  A CT angiogram of the chest 6/8/18 showed a very large tumor mass in the mediastinum encasing multiple vascular structures and narrowing the left mainstem bronchus.  There was direct tumor infiltration in the left upper lobe.  There was a small left pleural effusion.  She underwent a CT-guided biopsy of the mediastinal mass on 6/12/18 and pathology reviewed at [facility] oncology showed diffuse large B-cell lymphoma consistent with a primary diffuse large B-cell lymphoma.  A bone marrow exam was performed on 6/14/18 which was negative for lymphoma.  She underwent a PET scan 6/13/18 which showed a large hypermetabolic mass in the chest involving the anterior mediastinum, left hilum, nearly completely filling the left hemothorax with SUV 19.8.  There was physiologic distribution elsewhere.     The patient was started on IV fluid hydration and allopurinol for prevention of hyperuricemia.  She initiated systemic chemotherapy with DA-EPOCH-R on 6/16/18 and completed the evening of 6/21/18.  She had a infusion reaction to rituximab but was able to complete with additional medications and otherwise tolerated chemotherapy well.  She will require additional premedications with additional rituximab.  Plan is to monitor her blood counts twice per week outpatient and proceed with cycle 2 of chemotherapy day 21.     The patient had significant  improvement in shortness of breath, cough, and chest pain by the time of discharge.     • Patient received Neulasta support  • Her white count dropped down to as low as 0.8 low-grade temperature and patient was placed on Levaquin ×5 days starting June 27, 2018, neutropenia AT  11 days posttreatment.  Platelets dropped to 82.  • Her next ZOLADEX injection is due July 12.  • She is due to start chemotherapy on July 9.  • Discussed with Dr. Fox at the [facility] and his suggestion was to do the CT scan and PET scan after 2 cycles and discuss the results with him.  If she has an excellent response early on she would not require any further treatments after completion of 6 cycles of EPOCH/R  • Patient being admitted for cycle 3 of chemotherapy on July 30, 2018.  • She's status post cycle 4 of chemotherapy and CT scan has been reviewed following 4 cycles with continued response.  • She is due for ZOLADEX  injection on October 8, 2018.   • Patient is due to go for cycle 5 of chemotherapy on Monday, September 17, 2018  •  echocardiogram done August 22, 2018 shows ejection fraction of 59% to 60% with the eldest strain of 19.2%  • Cycle 6 chemotherapy with dose adjusted EPOCH/R on October 8, 2018.  • Reviewed CT scan and PET scan and the images with the patient and family.  Significant response with decrease in the mediastinal mass to 7.8 cm and activity level is 2.4 a week on PET scan.  • Reviewed repeat PET scan still with 7.8 cm tumor in the mediastinum.  The SUV is 2.4 and this could be scar tissue.  • The patient is due for her next dose of Zoladex today. As noted above, her ANC has been steadily declining over the last several months in the absence of infectious symptoms. This will be further detailed below. She will proceed with Zoladex, as scheduled.   • Discontinue Zoladex  • Reviewed CT as of January 15, 2019 with further improvement  • No plans on radiation to the mediastinal lymph node given the PET  scan showed that it is photopenic  • Reviewed CT scan from June 2021.  And there is no evidence of disease  • Belia 15, 2022: Patient asymptomatic.  CT scan shows minimal increase of a small lymph node in the right neck from 0.3- 0.6 cm.  Questionable lesion in the liver very small follow-up in CT scan in 3 months.  Patient is asymptomatic  • September 2022: Admitted with pulmonary embolism and started on Eliquis tolerating well no evidence of malignancy by CT scan of the neck chest abdomen pelvis August 2022  • December 22, 2022: Patient doing well without any complaints currently on Eliquis 5 mg p.o. twice daily for history of pulmonary embolism since August 2022     2.  Pericardial effusion:   a 2-D echocardiogram 6/8/18 showed a left ventricular systolic function 58%.  There was a 2 cm pericardial effusion with no tamponade.  The pericardium appeared thickened.  She had no evidence of volume overload.  It is resolved     3. Fertility preservation:  Patient received Zoladex injection  for fertility preservation; this should be continued once monthly during her chemotherapy  • She is due for her next dose today and will continue this for 2 additional doses.   • Zoladex has been discontinued but patient has now started  menstrual periods     4. Right upper extremity DVT on Pradaxa. She continues on this with no issues.  Stable Pradaxa stopped after port removed  • Left upper lobe pulmonary embolism 8/30/2022-with associated numbness in the left arm negative brain MRI but finding of a PFO-lower extremity Dopplers and D-dimer normal at admission  • Hypercoagulable work-up negative patient sent home on Eliquis 5 twice daily  • Routine CAT scan dated 2/28/2023 shows new pulmonary embolism despite Eliquis therapy admitted for further evaluation and transition to Coumadin     5. Status post port placement, patient wants port removed and I think it is reasonable but will need to hold Pradaxa  • S/p port removal      6.  Renal and Ovarian cysts.  We will continue to monitor.  • I have advised patient to consult her OB/GYN for ovarian cysts.  • CT scan does not show evidence of any ovarian cyst from 2020     7. Family history of cancer.  Her mother was diagnosed with cholangiocarcinoma.  • Patient's mother now diagnosed with metastatic lung cancer and currently is being transferred to hospice  • Patient's mother had   • ?  Genetic testing      9.  Echo showed PFO     PLAN  1.lovenox transition to coumadin  2.  Cardiology opinion?  Repeat echo to look for clot in the right atrium ?  with her unusual presentation with normal D-dimer and no clots in her legs or arms may be worth looking again at her heart also her bradycardia is fairly low at 39  3.  We will move her appointment with Dr. Padilla from tomorrow to 2 weeks from now and we will check her INR is in the office Monday and Thursday and check genetic testing at her next visit    Will not follow thank you                3/2/2023      CC:

## 2023-03-02 NOTE — PLAN OF CARE
Goal Outcome Evaluation:  Plan of Care Reviewed With: patient           Outcome Evaluation: See previous note about HR in the 30s-40s while sleeping. Pt asymptomatic. AM labs pending.

## 2023-03-02 NOTE — CONSULTS
Date of Consultation: 23    Referral Provider: Dr. Villagomez     Reason for Consultation: Sinus bradycardia, PFO.    Encounter Provider: Juno Rivera MD    Group of Service: [facility]     Patient Name: Vikki Durham    :1995    Chief complaint: Management of pulmonary embolism.    History of Present Illness:       This is a very pleasant 28 year old female with a history of diffuse large B-cell lymphoma.  She has seen Dr. Gonsales in our office in 2022.  She was found to have a left upper lobe pulmonary embolism in 2022, possibly secondary to a PICC line or port.  She underwent a hypercoagulable work-up at that time.  She also had an echocardiogram on 2022 which showed an ejection fraction of 60 to 65% and a small PFO on agitated saline study during Valsalva.    She was anticoagulated initially for several months.  As part of her lymphoma surveillance, she underwent a CT angiogram of the chest on 2023 which showed a new pulmonary embolism.  She was subsequently admitted to the hospital and initiated on anticoagulation.  She was found to have a chronic right upper extremity DVT in her subclavian vein.  Her lower extremity Doppler was normal.  She was initiated on Lovenox, and is switching to warfarin.    She did have episodes of bradycardia, mainly in the 40s and 50s.  However, she occasionally had down to 38 to 39 bpm.  This was sinus bradycardia, and she was completely asymptomatic.  She is fairly active as an outpatient and actually boxes.  She does not have any exertional symptoms.  She also has had no syncope or near syncope.  Cardiology was consulted because of bradycardia, as well as to give an opinion on the PFO which was previously noted.  She has never had a stroke or other neurological event.    Past Medical History:   Diagnosis Date   • Diffuse large B cell lymphoma (HCC) 2018   • Fracture of lower leg     L   • H/O Lung mass 2018    • H/O Pericardial effusion    • History of DVT (deep vein thrombosis) 2018    RIGHT ARM AT PICC LINE SITE         Past Surgical History:   Procedure Laterality Date   • OTHER SURGICAL HISTORY  06/12/2018    CT-GUIDED BIOPSY OF MEDIASTINAL MASS   • VENOUS ACCESS DEVICE (PORT) INSERTION Right 7/31/2018    Procedure: RIGHT MEDIPORT PLACEMENT;  Surgeon: Farhan Hurt MD;  Location: [facility];  Service: Vascular   • VENOUS ACCESS DEVICE (PORT) REMOVAL N/A 5/8/2019    Procedure: MEDIPORT REMOVEAL;  Surgeon: Farhan Hurt MD;  Location: [facility];  Service: Vascular         Allergies   Allergen Reactions   • Rituximab Itching     Ear itching         No current facility-administered medications on file prior to encounter.     Current Outpatient Medications on File Prior to Encounter   Medication Sig Dispense Refill   • [DISCONTINUED] apixaban (ELIQUIS) 5 MG tablet tablet Take 1 tablet by mouth 2 (Two) Times a Day. 60 tablet 3         Social History     Socioeconomic History   • Marital status: Single   • Number of children: 0   Tobacco Use   • Smoking status: Never   • Smokeless tobacco: Never   • Tobacco comments:     no caffeine use   Vaping Use   • Vaping Use: Never used   Substance and Sexual Activity   • Alcohol use: No   • Drug use: No   • Sexual activity: Not Currently         Family History   Problem Relation Age of Onset   • Lung cancer Mother    • Thyroid disease Mother    • Glaucoma Mother    • Liver cancer Mother    • Hypertension Maternal Grandmother    • Lung cancer Maternal Grandmother    • Hypertension Paternal Grandmother    • Diabetes Paternal Grandmother    • Stroke Paternal Grandfather    • Malig Hyperthermia Neg Hx        REVIEW OF SYSTEMS:   Pertinent positives are noted in the HPI above.  Otherwise, all other systems were reviewed and are negative.       Objective:     Vitals:    03/02/23 0354 03/02/23 0357 03/02/23 0550 03/02/23 0727   BP:    98/61   BP Location:     "Left arm   Patient Position:    Lying   Pulse: (!) 38 (!) 39 (!) 43 56   Resp:    20   Temp:    98.4 °F (36.9 °C)   TempSrc:    Oral   SpO2:    96%   Weight:       Height:         Body mass index is 30.38 kg/m².  Flowsheet Rows    Flowsheet Row First Filed Value   Admission Height 157.5 cm (62\") Documented at 02/28/2023 1651   Admission Weight 72.6 kg (160 lb) Documented at 02/28/2023 1651           General:    No acute distress, alert and oriented x4, pleasant                   Head:    Normocephalic, atraumatic.   Eyes:          Conjunctivae and sclerae normal, no icterus, PERRLA   Throat:   No oral lesions, no thrush, oral mucosa moist.    Neck:   Supple, trachea midline.   Lungs:     Clear to auscultation bilaterally     Heart:    Regular rhythm and mildly bradycardic rate.  No murmurs, gallops, or rubs noted.   Abdomen:     Soft, non-tender, non-distended, positive bowel sounds.    Extremities:   No clubbing, cyanosis, or edema.     Pulses:   Pulses palpable and equal bilaterally.    Skin:   No bleeding or rash.   Neuro:   Non-focal.  Moves all extremities well.    Psychiatric:   Normal mood and affect.           Lab Review:                Results from last 7 days   Lab Units 03/01/23  0424   SODIUM mmol/L 139   POTASSIUM mmol/L 4.1   CHLORIDE mmol/L 106   CO2 mmol/L 24.2   BUN mg/dL 13   CREATININE mg/dL 0.84   GLUCOSE mg/dL 87   CALCIUM mg/dL 8.9         Results from last 7 days   Lab Units 03/01/23  0424   WBC 10*3/mm3 4.62   HEMOGLOBIN g/dL 13.1   HEMATOCRIT % 38.6   PLATELETS 10*3/mm3 195     Results from last 7 days   Lab Units 03/02/23  0310 03/01/23  1131   INR  0.98 1.01                     Assessment:   1.  Diffuse large B-cell lymphoma, treated in 2018  2.  Pulmonary embolism in August 2022, now with incidentally discovered recurrent PE  3.  Chronic right subclavian vein DVT  4.  Sinus bradycardia, asymptomatic  5.  Small PFO by echocardiogram in August 2022  6.  History of migraine headaches (none " recently)    Plan:       Again, the patient is completely asymptomatic.  She has no exertional symptoms and boxes routinely.  She is very active as an outpatient.  I suspect that her sinus bradycardia is her normal heart rate and results from a heightened vagal tone from increased exercise.  I do not feel that there is any pathology.  There was no evidence of heart block or significant pauses.  I did review her telemetry in detail.  Additionally, most of the more significant bradycardia occurs when she is sleeping in the early morning hours.    With regards to the small PFO, she has never had a neurological event such as a stroke.  She does have a history of migraines when she was younger, but none recently.  There really is no indication to close the PFO at this time.  She is already going to be anticoagulated with Lovenox being transitioned to warfarin.  I did not feel that repeating an echocardiogram here would offer much benefit or change her management significantly.    Discussed with Dr. Villagomez.  She is stable for discharge from a cardiac perspective.  I told her to perhaps follow-up with Dr. Jacobs in the next 6 to 8 months.    Thank you very much for this consult.    Chip Rivera MD

## 2023-03-03 ENCOUNTER — APPOINTMENT (OUTPATIENT)
Dept: CT IMAGING | Facility: HOSPITAL | Age: 28
End: 2023-03-03
Payer: MEDICAID

## 2023-03-03 ENCOUNTER — ANTICOAGULATION VISIT (OUTPATIENT)
Dept: PHARMACY | Facility: HOSPITAL | Age: 28
End: 2023-03-03
Payer: MEDICAID

## 2023-03-03 ENCOUNTER — HOSPITAL ENCOUNTER (EMERGENCY)
Facility: HOSPITAL | Age: 28
Discharge: HOME | End: 2023-03-03
Attending: EMERGENCY MEDICINE | Admitting: EMERGENCY MEDICINE
Payer: MEDICAID

## 2023-03-03 ENCOUNTER — TELEPHONE (OUTPATIENT)
Dept: ONCOLOGY | Facility: CLINIC | Age: 28
End: 2023-03-03
Payer: MEDICAID

## 2023-03-03 ENCOUNTER — TELEPHONE (OUTPATIENT)
Dept: PHARMACY | Facility: HOSPITAL | Age: 28
End: 2023-03-03
Payer: MEDICAID

## 2023-03-03 VITALS
DIASTOLIC BLOOD PRESSURE: 78 MMHG | OXYGEN SATURATION: 97 % | HEART RATE: 62 BPM | HEIGHT: 62 IN | SYSTOLIC BLOOD PRESSURE: 110 MMHG | WEIGHT: 160 LBS | RESPIRATION RATE: 16 BRPM | TEMPERATURE: 97.7 F | BODY MASS INDEX: 29.44 KG/M2

## 2023-03-03 DIAGNOSIS — S09.90XA CHI (CLOSED HEAD INJURY), INITIAL ENCOUNTER: Primary | ICD-10-CM

## 2023-03-03 DIAGNOSIS — I26.99 RECURRENT PULMONARY EMBOLISM: Primary | ICD-10-CM

## 2023-03-03 PROCEDURE — 70450 CT HEAD/BRAIN W/O DYE: CPT

## 2023-03-03 PROCEDURE — 99283 EMERGENCY DEPT VISIT LOW MDM: CPT

## 2023-03-03 NOTE — DISCHARGE INSTRUCTIONS
You have been seen for a head injury.  Your imaging in the emergency department was negative for any acute intracranial pathology.  Because you are on an anticoagulant, there is a small risk of a delayed intracranial bleed for up to 1 week from the time of injury.  Please have family members keep a close eye on you over the next several days.  Please make sure you have close follow-up with your primary care physician.  Please return to the emergency department immediately for headache, visual disturbance, confusion, weakness, nausea or vomiting, or any other concerning symptom.

## 2023-03-03 NOTE — PROGRESS NOTES
Spoke with patient today to confirm she is using her enoxaparin injections and warfarin as prescribed when she was discharged.  They instructed her to take warfarin 7.5mg on 3/2 then 5mg daily along with twice daily enoxaparin 70mg.  She did seek ED care for head trauma yesterday without issues.     Initial referral to  [facility] but they are exclusively for [facility] at this time.    She will come to heme/onc clinic on 3/6 for INR check and meet with [facility] pharmacist here.    She may need enoxaparin to be refilled next week.
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
## 2023-03-06 ENCOUNTER — ANTICOAGULATION VISIT (OUTPATIENT)
Dept: ONCOLOGY | Facility: HOSPITAL | Age: 28
End: 2023-03-06
Payer: MEDICAID

## 2023-03-06 ENCOUNTER — ANTICOAGULATION VISIT (OUTPATIENT)
Dept: LAB | Facility: HOSPITAL | Age: 28
End: 2023-03-06
Payer: MEDICAID

## 2023-03-06 DIAGNOSIS — I26.99 RECURRENT PULMONARY EMBOLISM: Primary | ICD-10-CM

## 2023-03-06 DIAGNOSIS — Z79.01 ANTICOAGULATED ON COUMADIN: ICD-10-CM

## 2023-03-06 DIAGNOSIS — I26.99 PULMONARY EMBOLISM WITHOUT ACUTE COR PULMONALE, UNSPECIFIED CHRONICITY, UNSPECIFIED PULMONARY EMBOLISM TYPE: ICD-10-CM

## 2023-03-06 DIAGNOSIS — Z79.01 ANTICOAGULATION MONITORING BY PHARMACIST: ICD-10-CM

## 2023-03-06 LAB
INR PPP: 2.5 (ref 0.9–1.1)
PROTHROMBIN TIME: 29.7 SECONDS (ref 11–13.5)

## 2023-03-06 PROCEDURE — 36416 COLLJ CAPILLARY BLOOD SPEC: CPT

## 2023-03-06 PROCEDURE — 85610 PROTHROMBIN TIME: CPT

## 2023-03-06 PROCEDURE — G0463 HOSPITAL OUTPT CLINIC VISIT: HCPCS

## 2023-03-06 NOTE — PROGRESS NOTES
Anticoagulation Clinic Progress Note    Patient's visit was held in office today.    Anticoagulation Summary  As of 3/6/2023    INR goal:  2.0-3.0   TTR:  --   INR used for dosin.50 (3/6/2023)   Warfarin maintenance plan:  5 mg (5 mg x 1) every day; Starting 3/6/2023   Weekly warfarin total:  35 mg   No change documented:  Billie Mir RPH   Plan last modified:  Billie Mir RPH (3/3/2023)   Next INR check:  3/9/2023   Priority:  Critical   Target end date:      Indications    Recurrent pulmonary embolism (HCC) [I26.99]             Anticoagulation Episode Summary     INR check location:      Preferred lab:      Send INR reminders to:  [facility] CBC ANTICOAG POOL    Comments:  [facility]      Anticoagulation Care Providers     Provider Role Specialty Phone number    Millie Padilla MD Referring Hematology and Oncology [phone]          Clinic Interview:      Education:  Vikki Durham is a new warfarin patient in the Hematology/Oncology Anticoagulation Clinic. We discussed the following and provided written information:    1) Warfarin's indication, mechanism, and dosing  2) Enforced the importance of taking warfarin as instructed and at the same time every day, preferably in the evening so that we can make dose adjustments more easily following subsequent clinic visits  3) What she should do about a missed dose; pts can take missed doses within about 12 hours of their usual scheduled dose, but she was instructed on the importance of not doubling up on doses unless told to do so by the Anticoagulation Clinic.  4) Explained possible side effects of warfarin therapy, including increased risk of bleeding and discussed signs/symptoms of bleeding or further clotting.   5) Discussed monitoring of warfarin, the INR, goal INR range, and the frequency of monitoring.  6) Reviewed drug/food/tobacco/EtOH interactions with emphasis on understanding how green, leafy vegetables interact with warfarin.  7)  Instructed the pt to inform her physician/pharmacist of any new medications and reminded her to inform us of any dietary changes  8) Explained that clinic visits would be required more frequently in the first few weeks of therapy then if stable dosing is achieved the patient will follow up in clinic every 4 weeks, on average.    She expressed understanding of the information provided and has no additional questions at this time.    Vikki Durham was presented with a copy of the Patient Rights and Responsibilities. she expressed verbal consent and signed agreement to receive care in the Anticoagulation Clinic under the current collaborative care agreement between pharmacists and Hematology/Oncology Clinic providers.      INR History:  Anticoagulation Monitoring 3/3/2023 3/6/2023   INR - 2.50   INR Date - 3/6/2023   INR Goal 2.0-3.0 2.0-3.0   Trend - Same   Last Week Total 15 mg 30 mg   Next Week Total 35 mg 35 mg   Sun 5 mg -   Mon - 5 mg   Tue - 5 mg   Wed - 5 mg   Thu - -   Fri 5 mg -   Sat 5 mg -   Visit Report - -       Plan:  1. INR is Therapeutic today- see above in Anticoagulation Summary.   Will instruct Vikki Durham to Continue their warfarin regimen- see above in Anticoagulation Summary. She will stop enoxaparin injections after tomorrow PM   2. Follow up in 3 days  3. Patient declines warfarin refills.  4. Verbal and written information provided. Patient expresses understanding and has no further questions at this time.    Billie Mir RP

## 2023-03-06 NOTE — PATIENT INSTRUCTIONS
Continue enoxaparin injections through tomorrow.  Last dose on Tues PM, then stop injections.  Continue warfarin 5mg daily.  Recheck INR on Thursday.

## 2023-03-09 ENCOUNTER — APPOINTMENT (OUTPATIENT)
Dept: LAB | Facility: HOSPITAL | Age: 28
End: 2023-03-09
Payer: MEDICAID

## 2023-03-09 ENCOUNTER — APPOINTMENT (OUTPATIENT)
Dept: ONCOLOGY | Facility: HOSPITAL | Age: 28
End: 2023-03-09
Payer: MEDICAID

## 2023-03-09 ENCOUNTER — ANTICOAGULATION VISIT (OUTPATIENT)
Dept: ONCOLOGY | Facility: HOSPITAL | Age: 28
End: 2023-03-09
Payer: MEDICAID

## 2023-03-09 ENCOUNTER — ANTICOAGULATION VISIT (OUTPATIENT)
Dept: LAB | Facility: HOSPITAL | Age: 28
End: 2023-03-09
Payer: MEDICAID

## 2023-03-09 DIAGNOSIS — Z79.01 ANTICOAGULATED ON COUMADIN: ICD-10-CM

## 2023-03-09 DIAGNOSIS — I26.99 PULMONARY EMBOLISM WITHOUT ACUTE COR PULMONALE, UNSPECIFIED CHRONICITY, UNSPECIFIED PULMONARY EMBOLISM TYPE: ICD-10-CM

## 2023-03-09 DIAGNOSIS — I26.99 RECURRENT PULMONARY EMBOLISM: Primary | ICD-10-CM

## 2023-03-09 DIAGNOSIS — Z79.01 ANTICOAGULATION MONITORING BY PHARMACIST: ICD-10-CM

## 2023-03-09 LAB
INR PPP: 2.5 (ref 0.9–1.1)
PROTHROMBIN TIME: 29.4 SECONDS (ref 11–13.5)

## 2023-03-09 PROCEDURE — 36416 COLLJ CAPILLARY BLOOD SPEC: CPT

## 2023-03-09 PROCEDURE — 85610 PROTHROMBIN TIME: CPT

## 2023-03-09 PROCEDURE — G0463 HOSPITAL OUTPT CLINIC VISIT: HCPCS

## 2023-03-09 RX ORDER — WARFARIN SODIUM 5 MG/1
TABLET ORAL
Qty: 90 TABLET | Refills: 1 | Status: SHIPPED | OUTPATIENT
Start: 2023-03-09 | End: 2023-03-23 | Stop reason: SDUPTHER

## 2023-03-09 NOTE — PROGRESS NOTES
Anticoagulation Clinic Progress Note    Patient's visit was held in office today. Patient agreed to pharmacist care per [facility].      Anticoagulation Summary  As of 3/9/2023    INR goal:  2.0-3.0   TTR:  --   INR used for dosin.50 (3/9/2023)   Warfarin maintenance plan:  5 mg (5 mg x 1) every day; Starting 3/9/2023   Weekly warfarin total:  35 mg   No change documented:  Billie Mir RPH   Plan last modified:  Billie Mir RPH (3/3/2023)   Next INR check:  3/13/2023   Priority:  Critical   Target end date:      Indications    Recurrent pulmonary embolism (HCC) [I26.99]             Anticoagulation Episode Summary     INR check location:      Preferred lab:      Send INR reminders to:  [facility] CBC ANTICOAG POOL    Comments:  [facility]      Anticoagulation Care Providers     Provider Role Specialty Phone number    Millie Padilla MD Referring Hematology and Oncology [phone]          Clinic Interview:  Patient Findings     Negatives:  Signs/symptoms of thrombosis, Signs/symptoms of bleeding,   Laboratory test error suspected, Change in health, Change in alcohol use,   Change in activity, Upcoming invasive procedure, Emergency department   visit, Upcoming dental procedure, Missed doses, Extra doses, Change in   medications, Change in diet/appetite, Hospital admission, Bruising, Other   complaints      Clinical Outcomes     Negatives:  Major bleeding event, Thromboembolic event,   Anticoagulation-related hospital admission, Anticoagulation-related ED   visit, Anticoagulation-related fatality        INR History:  Anticoagulation Monitoring 3/3/2023 3/6/2023 3/9/2023   INR - 2.50 2.50   INR Date - 3/6/2023 3/9/2023   INR Goal 2.0-3.0 2.0-3.0 2.0-3.0   Trend - Same Same   Last Week Total 15 mg 30 mg 37.5 mg   Next Week Total 35 mg 35 mg 35 mg   Sun 5 mg - 5 mg   Mon - 5 mg -   Tue - 5 mg -   Wed - 5 mg -   Thu - - 5 mg   Fri 5 mg - 5 mg   Sat 5 mg - 5 mg   Visit Report - - -   Some recent data might be  hidden       Plan:  1. INR is Therapeutic today- see above in Anticoagulation Summary.  Will instruct Vikki Durham to Continue their warfarin regimen- see above in Anticoagulation Summary.  2. Follow up in 4 days.  Will extend interval to one week if INR remains stable.  3. Patient desires warfarin refills. Sent Rx to [facility] retail pharmacy.  4. Verbal and written information provided. Patient expresses understanding and has no further questions at this time.    Billie Mir RPH

## 2023-03-10 NOTE — DISCHARGE SUMMARY
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        Hospital Course     Ms. Durham is a 28 y.o. female with a history of non-Hodgkin's lymphoma treated in  along with PE noted in 2022 who presented to [facility] on advice of her hematologist who had done outpatient CT which incidentally discovered recurrent PE.  Please see the admitting history and physical for further details.  She had been taking Eliquis at home and is mostly compliant with it though she admits to missing about 5 doses of the medicine in the past month.  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  Interestingly her D-dimer is normal.  She already has had extensive hypercoagulable work-up which was unremarkable and is under the care of Dr. Padilla at [facility].  They consulted here and recommended transitioning to Lovenox/warfarin which we have done.  Patient is able to give herself injections and is comfortable doing so has been instructed on follow-up of INR levels.  She was noted to be bradycardic throughout most of her hospital stay and hematology requested cardiac consultation which was done this morning and Dr. Rivera did not feel additional work-up needed to be done.  Patient is  Quality 265: Biopsy Follow-Up: Biopsy results reviewed, communicated, tracked, and documented Detail Level: Zone quite active and does cardiac exercise which may be causing a slow resting heart rate.  Most of the significant bradycardia was while she is asleep which is insignificant.  She does have a previously noted PFO which is tiny and not felt to be an issue at this time per cardiology.      Day of Discharge     Subjective:  No complaints.    Physical Exam:  Temp:  [98.4 °F (36.9 °C)-98.8 °F (37.1 °C)] 98.4 °F (36.9 °C)  Heart Rate:  [38-56] 56  Resp:  [20] 20  BP: ()/(61-63) 98/61  Body mass index is 30.38 kg/m².  Physical Exam  Vitals reviewed.   Constitutional:       General: She is not in acute distress.     Appearance: She is not ill-appearing.   Cardiovascular:      Rate and Rhythm: Regular rhythm. Bradycardia present.   Pulmonary:      Effort: No respiratory distress.      Breath sounds: Normal breath sounds. No wheezing.   Musculoskeletal:      Right lower leg: No edema.      Left lower leg: No edema.   Skin:     General: Skin is warm and dry.      Findings: No rash.   Neurological:      Mental Status: She is alert and oriented to person, place, and time.   Psychiatric:         Mood and Affect: Mood normal.         Behavior: Behavior normal.         Consultants     Consult Orders (all) (From admission, onward)     Start     Ordered    03/02/23 0947  Inpatient Cardiology Consult  Once,   Status:  Canceled        Specialty:  Cardiology  Provider:  Pool Gonsales MD    03/02/23 0947    02/28/23 1854  [facility] (on-call MD unless specified) Details  Once,   Status:  Canceled        Specialty:  Hospitalist  Provider:  Manjit Marquez MD    02/28/23 1853    02/28/23 1731  Hematology and Oncology (on-call MD unless specified)  Once        Specialty:  Hematology and Oncology  Provider:  Rodger Jimenez Jr., MD    02/28/23 1730              Procedures       Imaging Results (All)     None        Results for orders placed during the hospital encounter of 02/28/23    Duplex Venous Lower Extremity - Bilateral CAR    Interpretation  Summary  •  Normal bilateral lower extremity venous duplex scan.      Pertinent Labs     Results from last 7 days   Lab Units 03/01/23  0424 02/28/23  1755 02/28/23  1308   WBC 10*3/mm3 4.62 7.21 7.21   HEMOGLOBIN g/dL 13.1 14.4 14.0   PLATELETS 10*3/mm3 195 231 221     Results from last 7 days   Lab Units 03/01/23  0424 02/28/23  1755 02/28/23  1308   SODIUM mmol/L 139 140 139   POTASSIUM mmol/L 4.1 3.6 3.8   CHLORIDE mmol/L 106 102 101   CO2 mmol/L 24.2 27.0 27.4   BUN mg/dL 13 12 16   CREATININE mg/dL 0.84 0.72 0.77   GLUCOSE mg/dL 87 87 90   EGFR mL/min/1.73 97.2 117.0 107.9     Results from last 7 days   Lab Units 03/01/23 0424 02/28/23  1755 02/28/23  1308   ALBUMIN g/dL 3.8 4.7 4.9   BILIRUBIN mg/dL <0.2 0.3 0.2   ALK PHOS U/L 40 51 49   AST (SGOT) U/L 19 18 24   ALT (SGPT) U/L 17 18 20     Results from last 7 days   Lab Units 03/01/23 0424 02/28/23  1755 02/28/23  1308   CALCIUM mg/dL 8.9 9.2 9.7   ALBUMIN g/dL 3.8 4.7 4.9       Results from last 7 days   Lab Units 03/01/23  1131   D DIMER QUANT MCGFEU/mL <0.27           Invalid input(s): LDLCALC          Test Results Pending at Discharge       Discharge Details        Discharge Medications      New Medications      Instructions Start Date   Enoxaparin Sodium 80 MG/0.8ML solution prefilled syringe syringe  Commonly known as: LOVENOX   Waste 0.07 mL and inject 0.73 mL under the skin into the appropriate area as directed Every 12 (Twelve) Hours. Indications: DVT/PE (active thrombosis)      warfarin 5 MG tablet  Commonly known as: Coumadin   Take 1 tablet by mouth Daily, or as directed by clinic to achieve INR 2-3         Stop These Medications    apixaban 5 MG tablet tablet  Commonly known as: ELIQUIS            Allergies   Allergen Reactions   • Rituximab Itching     Ear itching       Discharge Disposition:  Home or Self Care      Discharge Diet:  No active diet order      Discharge Activity:       CODE STATUS:    Code Status and Medical Interventions:  "  Ordered at: 02/28/23 1942     Code Status (Patient has no pulse and is not breathing):    CPR (Attempt to Resuscitate)     Medical Interventions (Patient has pulse or is breathing):    Full Support       Future Appointments   Date Time Provider Department Center   3/23/2023  4:00 PM VITALS ONLY [facility]   3/23/2023  4:20 PM Millie Padilla MD [facility]     Additional Instructions for the Follow-ups that You Need to Schedule     Ambulatory Referral to Anticoagulation Monitoring   As directed       Select To DEPT SPEC to filter TO DEPT       Send INR reminders to the \"clinical pool\" of the TO DEPT     (ie:   [facility] CLINIC  or [facility] CLINICAL POOL)     Discharge Follow-up with Specialty: CBC group per Dr Real instructions   As directed      Specialty: COLLEEN group per Dr Real instructions            Follow-up Information     Blayne Echeverria MD Follow up in 1 week(s).    Specialty: Family Medicine  Contact information:  [address] [place], [place] [id number] [phone]                         Additional Instructions for the Follow-ups that You Need to Schedule     Ambulatory Referral to Anticoagulation Monitoring   As directed       Select To DEPT SPEC to filter TO DEPT       Send INR reminders to the \"clinical pool\" of the TO DEPT     (ie:  [facility] CLINIC  or [facility] CLINICAL POOL)     Discharge Follow-up with Specialty: CBC group per Dr Real instructions   As directed      Specialty: COLLE[facility] group per Dr Real instructions           Time Spent on Discharge:  Greater than 30 minutes      Jose Villagomez MD  [facility]  03/02/23  16:28 EST            "

## 2023-03-13 ENCOUNTER — ANTICOAGULATION VISIT (OUTPATIENT)
Dept: LAB | Facility: HOSPITAL | Age: 28
End: 2023-03-13
Payer: MEDICAID

## 2023-03-13 ENCOUNTER — ANTICOAGULATION VISIT (OUTPATIENT)
Dept: ONCOLOGY | Facility: HOSPITAL | Age: 28
End: 2023-03-13
Payer: MEDICAID

## 2023-03-13 DIAGNOSIS — I26.99 PULMONARY EMBOLISM WITHOUT ACUTE COR PULMONALE, UNSPECIFIED CHRONICITY, UNSPECIFIED PULMONARY EMBOLISM TYPE: ICD-10-CM

## 2023-03-13 DIAGNOSIS — I26.99 RECURRENT PULMONARY EMBOLISM: Primary | ICD-10-CM

## 2023-03-13 DIAGNOSIS — Z79.01 ANTICOAGULATION MONITORING BY PHARMACIST: ICD-10-CM

## 2023-03-13 DIAGNOSIS — Z79.01 ANTICOAGULATED ON COUMADIN: ICD-10-CM

## 2023-03-13 LAB
INR PPP: 2.6 (ref 0.9–1.1)
PROTHROMBIN TIME: 30.9 SECONDS (ref 11–13.5)

## 2023-03-13 PROCEDURE — 85610 PROTHROMBIN TIME: CPT

## 2023-03-13 PROCEDURE — G0463 HOSPITAL OUTPT CLINIC VISIT: HCPCS

## 2023-03-13 PROCEDURE — 36416 COLLJ CAPILLARY BLOOD SPEC: CPT

## 2023-03-13 NOTE — PROGRESS NOTES
Anticoagulation Clinic Progress Note    Patient's visit was held in office today.    Anticoagulation Summary  As of 3/13/2023    INR goal:  2.0-3.0   TTR:  100.0 % (1 d)   INR used for dosin.60 (3/13/2023)   Warfarin maintenance plan:  5 mg (5 mg x 1) every day; Starting 3/13/2023   Weekly warfarin total:  35 mg   Plan last modified:  Billie Mir RPH (3/3/2023)   Next INR check:  3/20/2023   Priority:  High   Target end date:      Indications    Recurrent pulmonary embolism (HCC) [I26.99]             Anticoagulation Episode Summary     INR check location:      Preferred lab:      Send INR reminders to:   [facility] ANTICOAG POOL    Comments:  [facility]      Anticoagulation Care Providers     Provider Role Specialty Phone number    Millie Padilla MD Referring Hematology and Oncology [phone]          Clinic Interview:  Patient Findings     Negatives:  Signs/symptoms of thrombosis, Signs/symptoms of bleeding,   Laboratory test error suspected, Change in health, Change in alcohol use,   Change in activity, Upcoming invasive procedure, Emergency department   visit, Upcoming dental procedure, Missed doses, Extra doses, Change in   medications, Change in diet/appetite, Hospital admission, Bruising, Other   complaints      Clinical Outcomes     Negatives:  Major bleeding event, Thromboembolic event,   Anticoagulation-related hospital admission, Anticoagulation-related ED   visit, Anticoagulation-related fatality        INR History:  Anticoagulation Monitoring 3/6/2023 3/9/2023 3/13/2023   INR 2.50 2.50 2.60   INR Date 3/6/2023 3/9/2023 3/13/2023   INR Goal 2.0-3.0 2.0-3.0 2.0-3.0   Trend Same Same Same   Last Week Total 30 mg 37.5 mg 35 mg   Next Week Total 35 mg 35 mg 35 mg   Sun - 5 mg 5 mg   Mon 5 mg - 5 mg   Tue 5 mg - 5 mg   Wed 5 mg - 5 mg   Thu - 5 mg 5 mg   Fri - 5 mg 5 mg   Sat - 5 mg 5 mg   Visit Report - - -   Some recent data might be hidden       Plan:  1. INR is Therapeutic today- see  above in Anticoagulation Summary.  Will instruct Vikki Durham to Continue their warfarin regimen- see above in Anticoagulation Summary.  2. Follow up in 1 week  3. Patient declines warfarin refills.  4. Verbal and written information provided. Patient expresses understanding and has no further questions at this time.    Billie Mir [facility]

## 2023-03-20 ENCOUNTER — LAB (OUTPATIENT)
Dept: LAB | Facility: HOSPITAL | Age: 28
End: 2023-03-20
Payer: MEDICAID

## 2023-03-20 ENCOUNTER — ANTICOAGULATION VISIT (OUTPATIENT)
Dept: ONCOLOGY | Facility: HOSPITAL | Age: 28
End: 2023-03-20
Payer: MEDICAID

## 2023-03-20 DIAGNOSIS — I26.99 RECURRENT PULMONARY EMBOLISM: Primary | ICD-10-CM

## 2023-03-20 DIAGNOSIS — Z79.01 ANTICOAGULATION MONITORING BY PHARMACIST: ICD-10-CM

## 2023-03-20 DIAGNOSIS — Z79.01 ANTICOAGULATED ON COUMADIN: ICD-10-CM

## 2023-03-20 DIAGNOSIS — I26.99 PULMONARY EMBOLISM WITHOUT ACUTE COR PULMONALE, UNSPECIFIED CHRONICITY, UNSPECIFIED PULMONARY EMBOLISM TYPE: ICD-10-CM

## 2023-03-20 LAB
INR PPP: 2.3 (ref 0.9–1.1)
PROTHROMBIN TIME: 27.6 SECONDS (ref 11–13.5)

## 2023-03-20 PROCEDURE — 36416 COLLJ CAPILLARY BLOOD SPEC: CPT

## 2023-03-20 PROCEDURE — G0463 HOSPITAL OUTPT CLINIC VISIT: HCPCS

## 2023-03-20 PROCEDURE — 85610 PROTHROMBIN TIME: CPT

## 2023-03-20 NOTE — PROGRESS NOTES
Anticoagulation Clinic Progress Note    Patient's visit was held in office today.    Anticoagulation Summary  As of 3/20/2023    INR goal:  2.0-3.0   TTR:  100.0 % (1.1 wk)   INR used for dosin.30 (3/20/2023)   Warfarin maintenance plan:  5 mg (5 mg x 1) every day; Starting 3/20/2023   Weekly warfarin total:  35 mg   No change documented:  Billie Mir RPH   Plan last modified:  Billie Mir RPH (3/3/2023)   Next INR check:  3/23/2023   Priority:  High   Target end date:      Indications    Recurrent pulmonary embolism (HCC) [I26.99]             Anticoagulation Episode Summary     INR check location:      Preferred lab:      Send INR reminders to:  [facility] ANTICOAG POOL    Comments:  [facility]      Anticoagulation Care Providers     Provider Role Specialty Phone number    Millie Padilla MD Referring Hematology and Oncology [phone]          Clinic Interview:  Patient Findings     Negatives:  Signs/symptoms of thrombosis, Signs/symptoms of bleeding,   Laboratory test error suspected, Change in health, Change in alcohol use,   Change in activity, Upcoming invasive procedure, Emergency department   visit, Upcoming dental procedure, Missed doses, Extra doses, Change in   medications, Change in diet/appetite, Hospital admission, Bruising, Other   complaints      Clinical Outcomes     Negatives:  Major bleeding event, Thromboembolic event,   Anticoagulation-related hospital admission, Anticoagulation-related ED   visit, Anticoagulation-related fatality        INR History:  Anticoagulation Monitoring 3/9/2023 3/13/2023 3/20/2023   INR 2.50 2.60 2.30   INR Date 3/9/2023 3/13/2023 3/20/2023   INR Goal 2.0-3.0 2.0-3.0 2.0-3.0   Trend Same Same Same   Last Week Total 37.5 mg 35 mg 35 mg   Next Week Total 35 mg 35 mg 35 mg   Sun 5 mg 5 mg -   Mon - 5 mg 5 mg   Tue - 5 mg 5 mg   Wed - 5 mg 5 mg   Thu 5 mg 5 mg -   Fri 5 mg 5 mg -   Sat 5 mg 5 mg -   Visit Report - - -   Some recent data might be hidden        Plan:  1. INR is Therapeutic today- see above in Anticoagulation Summary.  Will instruct Vikki Durham to Continue their warfarin regimen- see above in Anticoagulation Summary.  2. Follow up in 3 days  3. Patient declines warfarin refills.  4. Verbal and written information provided. Patient expresses understanding and has no further questions at this time.    Billie Mir [facility]

## 2023-03-23 ENCOUNTER — ANTICOAGULATION VISIT (OUTPATIENT)
Dept: ONCOLOGY | Facility: HOSPITAL | Age: 28
End: 2023-03-23
Payer: MEDICAID

## 2023-03-23 ENCOUNTER — OFFICE VISIT (OUTPATIENT)
Dept: ONCOLOGY | Facility: CLINIC | Age: 28
End: 2023-03-23
Payer: MEDICAID

## 2023-03-23 ENCOUNTER — LAB (OUTPATIENT)
Dept: LAB | Facility: HOSPITAL | Age: 28
End: 2023-03-23
Payer: MEDICAID

## 2023-03-23 VITALS
TEMPERATURE: 97.1 F | RESPIRATION RATE: 16 BRPM | OXYGEN SATURATION: 97 % | HEART RATE: 60 BPM | BODY MASS INDEX: 30.6 KG/M2 | SYSTOLIC BLOOD PRESSURE: 99 MMHG | WEIGHT: 166.3 LBS | HEIGHT: 62 IN | DIASTOLIC BLOOD PRESSURE: 62 MMHG

## 2023-03-23 DIAGNOSIS — Z79.01 ANTICOAGULATED ON COUMADIN: ICD-10-CM

## 2023-03-23 DIAGNOSIS — I26.99 RECURRENT PULMONARY EMBOLISM: Primary | ICD-10-CM

## 2023-03-23 DIAGNOSIS — I26.99 PULMONARY EMBOLISM WITHOUT ACUTE COR PULMONALE, UNSPECIFIED CHRONICITY, UNSPECIFIED PULMONARY EMBOLISM TYPE: ICD-10-CM

## 2023-03-23 DIAGNOSIS — Z79.01 ANTICOAGULATION MONITORING BY PHARMACIST: ICD-10-CM

## 2023-03-23 DIAGNOSIS — C85.20 MEDIASTINAL (THYMIC) LARGE B-CELL LYMPHOMA, UNSPECIFIED BODY REGION: ICD-10-CM

## 2023-03-23 DIAGNOSIS — I26.99 PULMONARY EMBOLISM WITHOUT ACUTE COR PULMONALE, UNSPECIFIED CHRONICITY, UNSPECIFIED PULMONARY EMBOLISM TYPE: Primary | ICD-10-CM

## 2023-03-23 LAB
ALBUMIN SERPL-MCNC: 4.4 G/DL (ref 3.5–5.2)
ALBUMIN/GLOB SERPL: 2 G/DL (ref 1.1–2.4)
ALP SERPL-CCNC: 40 U/L (ref 38–116)
ALT SERPL W P-5'-P-CCNC: 20 U/L (ref 0–33)
ANION GAP SERPL CALCULATED.3IONS-SCNC: 13 MMOL/L (ref 5–15)
AST SERPL-CCNC: 20 U/L (ref 0–32)
BASOPHILS # BLD AUTO: 0.03 10*3/MM3 (ref 0–0.2)
BASOPHILS NFR BLD AUTO: 0.6 % (ref 0–1.5)
BILIRUB SERPL-MCNC: <0.2 MG/DL (ref 0.2–1.2)
BUN SERPL-MCNC: 16 MG/DL (ref 6–20)
BUN/CREAT SERPL: 20.8 (ref 7.3–30)
CALCIUM SPEC-SCNC: 9 MG/DL (ref 8.5–10.2)
CHLORIDE SERPL-SCNC: 104 MMOL/L (ref 98–107)
CO2 SERPL-SCNC: 23 MMOL/L (ref 22–29)
CREAT SERPL-MCNC: 0.77 MG/DL (ref 0.6–1.1)
DEPRECATED RDW RBC AUTO: 44.7 FL (ref 37–54)
EGFRCR SERPLBLD CKD-EPI 2021: 107.9 ML/MIN/1.73
EOSINOPHIL # BLD AUTO: 0.04 10*3/MM3 (ref 0–0.4)
EOSINOPHIL NFR BLD AUTO: 0.8 % (ref 0.3–6.2)
ERYTHROCYTE [DISTWIDTH] IN BLOOD BY AUTOMATED COUNT: 13.1 % (ref 12.3–15.4)
GLOBULIN UR ELPH-MCNC: 2.2 GM/DL (ref 1.8–3.5)
GLUCOSE SERPL-MCNC: 94 MG/DL (ref 74–124)
HCT VFR BLD AUTO: 37.6 % (ref 34–46.6)
HGB BLD-MCNC: 12.3 G/DL (ref 12–15.9)
IMM GRANULOCYTES # BLD AUTO: 0.02 10*3/MM3 (ref 0–0.05)
IMM GRANULOCYTES NFR BLD AUTO: 0.4 % (ref 0–0.5)
INR PPP: 2.8 (ref 0.9–1.1)
LYMPHOCYTES # BLD AUTO: 1.68 10*3/MM3 (ref 0.7–3.1)
LYMPHOCYTES NFR BLD AUTO: 34.4 % (ref 19.6–45.3)
MCH RBC QN AUTO: 30.4 PG (ref 26.6–33)
MCHC RBC AUTO-ENTMCNC: 32.7 G/DL (ref 31.5–35.7)
MCV RBC AUTO: 92.8 FL (ref 79–97)
MONOCYTES # BLD AUTO: 0.45 10*3/MM3 (ref 0.1–0.9)
MONOCYTES NFR BLD AUTO: 9.2 % (ref 5–12)
NEUTROPHILS NFR BLD AUTO: 2.66 10*3/MM3 (ref 1.7–7)
NEUTROPHILS NFR BLD AUTO: 54.6 % (ref 42.7–76)
NRBC BLD AUTO-RTO: 0 /100 WBC (ref 0–0.2)
PLATELET # BLD AUTO: 197 10*3/MM3 (ref 140–450)
PMV BLD AUTO: 9.6 FL (ref 6–12)
POTASSIUM SERPL-SCNC: 3.9 MMOL/L (ref 3.5–4.7)
PROT SERPL-MCNC: 6.6 G/DL (ref 6.3–8)
PROTHROMBIN TIME: 33.3 SECONDS (ref 11–13.5)
RBC # BLD AUTO: 4.05 10*6/MM3 (ref 3.77–5.28)
SODIUM SERPL-SCNC: 140 MMOL/L (ref 134–145)
WBC NRBC COR # BLD: 4.88 10*3/MM3 (ref 3.4–10.8)

## 2023-03-23 PROCEDURE — 80053 COMPREHEN METABOLIC PANEL: CPT

## 2023-03-23 PROCEDURE — 1159F MED LIST DOCD IN RCRD: CPT | Performed by: INTERNAL MEDICINE

## 2023-03-23 PROCEDURE — 85610 PROTHROMBIN TIME: CPT

## 2023-03-23 PROCEDURE — 1126F AMNT PAIN NOTED NONE PRSNT: CPT | Performed by: INTERNAL MEDICINE

## 2023-03-23 PROCEDURE — 36415 COLL VENOUS BLD VENIPUNCTURE: CPT

## 2023-03-23 PROCEDURE — 1160F RVW MEDS BY RX/DR IN RCRD: CPT | Performed by: INTERNAL MEDICINE

## 2023-03-23 PROCEDURE — 85025 COMPLETE CBC W/AUTO DIFF WBC: CPT

## 2023-03-23 PROCEDURE — G0463 HOSPITAL OUTPT CLINIC VISIT: HCPCS

## 2023-03-23 PROCEDURE — 99215 OFFICE O/P EST HI 40 MIN: CPT | Performed by: INTERNAL MEDICINE

## 2023-03-23 RX ORDER — WARFARIN SODIUM 5 MG/1
TABLET ORAL
Qty: 90 TABLET | Refills: 1 | Status: SHIPPED | OUTPATIENT
Start: 2023-03-23

## 2023-03-23 NOTE — PROGRESS NOTES
Subjective      REASONS FOR FOLLOW UP:    1.  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 She did initiate EPOCH-R  in the hospital on 06/16/2018.  Treated 6 cycles of treatment.  Completed October 2018 followed with observation    2.  Admitted August 29, 2022 with pulmonary embolism left upper lobe.  Currently on Eliquis  · Hypercoagulable work-up showedFactor VIII 125%, anticardiolipin antibody negative, beta-2 glycoprotein antibody negative, lupus anticoagulant not detected, D-dimer less than 0.27, Antithrombin %, protein C activity 115%, protein S activity 123% prothrombin gene mutation negative, factor V Leiden negative.  Echo normal  · Echo normal  · CT angiogram left upper lobe pulmonary embolism  · CT abdomen pelvis negative, CT neck 0.6 mm submandibular gland stable  · No B symptoms  · Admitted August 22, 2022 with chest pain, had left upper lobe pulmonary embolism, started on Eliquis.  CT angiogram of the chest abdomen pelvis negative for any progressive malignancy.  September 2022 D-dimer normal,Factor VIII activity 125%, Antithrombin %, anticardiolipin antibody negative beta-2 glycoprotein antibody negative Lupus anticoagulant not detected factor V Leiden normal protein C activity 115%, protein S 100,000 prothrombin gene mutation negative factor VIII activity 115%.  Patient states no risk factors.  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 female with the above-mentioned history, with history of mediastinal large B-cell lymphoma status post completion of 6 cycles of chemotherapy with dose adusted EPOCH-R in October 2018.  . 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      Interval history:  Patient was admitted much post to [facility], [place], operated by [facility].  Patient had gone for CT chest as a  follow-up of her pulmonary embolism and was found to have a new pulmonary embolism despite Eliquis 5 mg twice daily since August 30, 2022.  She had complained of chest pain 4 days prior to the CT chest.  Patient apparently has been forgetting couple of times a month to take her Eliquis.  She missed 5 pills of Eliquis in the last month prior to admission.  She has not had any COVID 19 infection or vaccination.  She did have a prior DVT associated with port in 2018 for which she was on Pradaxa until the port was removed.  In August 2022 her hypercoagulable work-up was negative when she presented with her first pulmonary embolism and at that time D-dimer was normal as well Dopplers of her lower extremities.    During this admission patient was asymptomatic except intermittent chest pain associated with her menstrual cycles.  Repeat Dopplers of the lower extremities were negative except a chronic clot in the subclavian on the right side related to her port.  In August 2022 she had a small pulmonary embolism.  She was anxious that clot could be a sign of her lymphoma returning.  But she is 5 years from treatment and usually unusual to develop recurrent lymphoma.  Patient was seen by Dr. Real during the hospitalization and suggested echocardiogram in order to evaluate the PFO.  And repeat D-dimer.  Cardiology was consulted.  She had bradycardia as well.  She does do boxing.    Her last echo was August 30, 2022 which showed ejection fraction of 60-65%.  And a small PFO on saline study.  Cardiology felt that she has sinus bradycardia, patient has never had a stroke.  Cardiology did not feel the need to close the PFO.  She is going to be anticoagulated and a repeat echo would not benefit or change her management significantly per cardiology.  Patient currently is on Coumadin and we are checking her INR every 2 weeks.  The pharmacy is checking her INR.  Her INR is 2.8 today.  She will follow the Coumadin protocol.    CT  angiogram of the chest showed ill-defined thickening along the anterior aspect of the mediastinum measuring up to 0.7 cm is present no significant pericardial effusion.  There is a more focal linear filling defect within the main left pulmonary artery with extension into the left upper lobe pulmonary artery as seen on August 29, 2022.  There is also a focal linear filling defect within the left interlobar pulmonary artery and extending into the left upper lobe pulmonary artery anteriorly which is new since August 29, 2022.  Right ventricle to left ventricular ratio 0.5.  CT abdomen pelvis is negative subcentimeter hepatic lesions are too small to characterize.  The gallbladder pancreas spleen adrenal glands and kidneys are unremarkable.  No lymphadenopathy seen in the pelvis or the abdomen.          Past Medical History, Past Surgical History, Social History, Family History have been reviewed and are without significant changes except as mentioned.    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  She saw Dr. Arina Cohen on last Friday.  The reason the patient was referred to Dr. Cohen was because they thought she had cardiomegaly on chest x-ray.  However a chest x-ray was ordered which showedThe chest x-ray showed extensive abnormal increased density throughout the anterior mediastinum extending into the left hilar region and left perihilar region and is most likely due to confluent lymphadenopathy.     CT angiogram of the chest did not show pulmonary embolism but showed     there is a large conglomerate  mass encases multiple vascular structures of the mediastinum and left  hilar region that is most likely extensive confluent lymphadenopathy.  The primary differential diagnostic considerations would be lymphoma.  The tumor posteriorly displaces the pulmonary arteries and the left and  moderately narrows the main pulmonary artery. The tumor also  posteriorly  displaces the trachea and markedly narrows the left mainstem bronchus.  The pulmonary veins in the left are also encased and narrowed. The mass  encases and markedly narrows the SVC. The large edy mass measures  approximately 19.8 x 13.7 x 14.0 cm in diameter. Enlarged lymph nodes  are also present in the left supraclavicular region where they appear to  produce occlusion of the left internal jugular vein. There is no  axillary lymphadenopathy. There are no filling defects within the  pulmonary arteries. There is no CT evidence of pulmonary embolism. There  is a small left pleural effusion. A small pericardial effusion measuring  8 mm in maximum thickness is also present. There is compressive  atelectasis of the left lung. Patchy airspace opacities are also present  in the superior aspect of the left upper lobe. These are most likely due  to direct tumor infiltration of the lung parenchyma, but could also  represent postobstructive pneumonia. The right lung is well expanded and  clear. Images through the upper abdomen partially show what could be a  edy mass in the retroperitoneum posterior and inferior to the  pancreas. Further evaluation with a CT scan of the abdomen and pelvis is  recommended. Bone window images demonstrate patchy sclerosis in the C7  and T1 and T2 and T3 and T4 vertebral body suspicious for bone  involvement with lymphoma.     IMPRESSION:  Very large tumor mass in the mediastinum encasing multiple  vascular structures and also moderately narrowing the left mainstem  bronchus as described in more detail above. Direct tumor infiltration of  the left upper lobe is also suspected. Small left pleural effusion and a  small pericardial effusion. There may also be partially visualized  lymphadenopathy in the upper abdomen. Patchy areas of sclerosis are also  noted in the C7 and T1 and T2 and T3 and T4 vertebral bodies suspicious  for osseous metastatic disease. The findings are  consistent with  Lymphoma.     Dr. Cohen felt that she had a small pericardial effusion.  Patient has been symptomatic with cough which is worsening which started back in February 2018 and worsened over the last 4 months.  Patient also has some shortness of breath with walking up the steps.  She has not lost weight.  She say she is reasonable appetite.  She does have fever kind of low-grade fever and night sweats.  She is more fatigued compared to before.  She was referred to us because of concern that this could be lymphoma.  She does not have any tissue diagnosis yet.  Patient does complain of back pain.    Echo done on admission June 12, 2018 by Dr. Fitzgerald showed that the patient's posterior and appears large primary leukemia the left ventricle and apex.  There is no tamponade.  The pericardium appears thickened pointing to involvement of the pericardium into the mediastinal process.  Dr. Fitzgerald felt that it would be difficult to do pericardial synthesis as the mediastinal mass covers the anterior aspect of the heart.  Ejection fraction is 58%  CT-guided needle biopsy June 12, 2018 was negative  LDH is elevated.  Uric acid is high.  MRI thoracic spine, negative  CT abdomen pelvis negative  Scan July 13, 2018 shows large infiltrative edy mass in the anterior mediastinum and left hilar region that nearly completely fills the left hemithorax and shows intense hypermetabolism with an SUV of 19.8.  No foci of pathologic hypermetabolism are identified elsewhere in the chest, neck abdomen or pelvis.    Pathology was consistent with primary mediastinal large B-cell lymphoma.    Patient was seen by Dr. Melgar.  He discussed harvesting eggs versus Zoladex plus oral contraceptives versus Zoladex alone for fertility preservation.  Due to large size of the tumor and rapid initiation of treatment needed the patient was not able to have aches harvested done.  Because she is at increased risk of thrombosis with the use of oral  contraceptives secondary to malignancy he recommended Zoladex alone.  Patient received first dose of Zoladex 3.6 mg subcutaneous on Belia 15, 2018.    Patient started on EPOCH/R on June 16, 2018    Patient had a reaction to Rituxan which improved with steroids, Benadryl and Pepcid.  She had to receive it slow.  She started having itching over her EARS , sore throat.  She was given IV steroids Benadryl and Pepcid and following that she was started at a slower rate and she completed it.    Right upper extremity Doppler ultrasound showed new superficial vein thrombosis around the PICC line with no extension into the deep system.  Repeat Doppler was ordered.    A Doppler ultrasound initially showed superficial thrombophlebitis.  She was on prophylactic Arixtra.  A Doppler was repeated 2 days later on 6/20/18  which showed extension of thrombus into the axillary and brachial veins.  She was therefore started on Xarelto 15 mg one every 12 hours and the PICC line was removed as soon as chemotherapy completed on 6/20/18.  She will take 15 mg of Xarelto every 12 hours for 21 days and then transition to 20 mg once a day.  The patient will have CBC performed twice a week.  If the platelet count drops below 50,000, anticoagulation will need to be temporarily held  Patient was started on acyclovir/fluconazole/Bactrim  Bone marrow done June 14, 2018, morphology was negative for lymphoma    Fertility preservation, Zoladex is next due July 12, 2018.  CT scan and PET scan showing significant response after cycle 2 of chemotherapy  Next dose of Lupron August 13, 2018  Status post 5 cycles  OF epoch/r and is due cycle 6 on oct 8th.    Patient admitted October 20, 2018 and discharged October 22, 2018 when she was admitted with neutropenic fever.  She was treated with broad-spectrum antibiotics.  Her respiratory viral panel was positive for parainfluenza virus.    PET scan with significant improvement but residual 7.8 cm lesion without  any activity.  Reviewed CT scan from January 15, 2019    Patient seen by Dr. Fox at the [facility], agreed with observation at present no plans on radiation.    CT scan and PET scan from April 5 and April 9, 2019 has been reviewed and discussed with patient.  There is further decrease in the mediastinal lymphadenopathy.    CT chest abdomen pelvis July 22, 2019 with further decrease in the mediastinal lymphadenopathy significantly    We reviewed with patient the CT scans from 12/12/19 which show mediastinal lymphadenopathy which has decreased in size.   There is also a small left renal cyst and a 2 cm partially involuted right ovarian cyst.     Patient was admitted August 29, 2022 when she complained of left-sided chest discomfort and some leg cramping.  She underwent CT angiogram of the chest which showed left upper lobe pulmonary embolism.  She was started on Eliquis.  Bilateral Doppler extremities were done which showed that it was normal and duplex of the left upper extremity was normal.  Dr. Real was consulted.  Hypercoagulable work-up was done.  An echocardiogram was done which was negative.  MRI of the brain was unremarkable.  She was placed on Eliquis 10 mg twice daily for a week and subsequently to be switched to 5 mg twice daily.  Her admit admission labs were normal.    Patient underwent CT abdomen pelvis which did not show any new or lymphadenopathy previously described 6 mm left submandibular node was unchanged.  CT neck showed the left submandibular gland    Patient went to the ER on September 7, 2022 with dizziness.  So she underwent CT of the head for dizziness and no acute process was identified.  Also a chest x-ray was obtained which was negative.  Currently her dizziness is resolved.  She likely was not hydrated well.    Currently start 5 mg p.o. twice daily of Eliquis.  She denies any active bleeding        Review of Systems   Constitutional: Negative for appetite change, chills,  "diaphoresis, fatigue, fever and unexpected weight change.        Hot flashes in the evenings 04/12/19   HENT: Negative for hearing loss, sore throat and trouble swallowing.    Respiratory: Negative for cough, chest tightness, shortness of breath and wheezing.    Cardiovascular: Negative for chest pain, palpitations and leg swelling.   Gastrointestinal: Negative for abdominal distention, abdominal pain, constipation, diarrhea, nausea and vomiting.   Genitourinary: Negative for dysuria, frequency, hematuria and urgency.   Musculoskeletal: Negative for joint swelling.        No muscle weakness.   Skin: Negative for rash and wound.   Neurological: Negative for seizures, syncope, speech difficulty, weakness, numbness and headaches.   Hematological: Negative for adenopathy. Does not bruise/bleed easily.   Psychiatric/Behavioral: Positive for sleep disturbance. Negative for behavioral problems, confusion and suicidal ideas.   All other systems reviewed and are negative.  I have reviewed and confirmed the accuracy of the ROS as documented by the MA/LPN/RN Millie Padilla MD        Medications:  The current medication list was reviewed in the EMR    ALLERGIES:    Allergies   Allergen Reactions   • Rituximab Itching     Ear itching       Objective      Vitals:    03/23/23 1635   BP: 99/62   Pulse: 60   Resp: 16   Temp: 97.1 °F (36.2 °C)   TempSrc: Temporal   SpO2: 97%   Weight: 75.4 kg (166 lb 4.8 oz)   Height: 157.5 cm (62.01\")   PainSc: 0-No pain     Current Status 3/23/2023   ECOG score 0       Physical Exam           CONSTITUTIONAL:  Vital signs reviewed.  No distress, looks comfortable.    RESPIRATORY:  Normal respiratory effort.  Lungs clear to auscultation bilaterally.  CARDIOVASCULAR:  Normal S1, S2.  No murmurs rubs or gallops.  No significant lower extremity edema.  GASTROINTESTINAL: Abdomen appears unremarkable.  Nontender.  No hepatomegaly.  No splenomegaly.  LYMPHATIC:  No cervical, supraclavicular, axillary " lymphadenopathy.  SKIN:  Warm.  No rashes.  PSYCHIATRIC:  Normal judgment and insight.  Normal mood and affect.  I have reviewed and confirmed the accuracy of the ROS as documented by the MA/LPN/RN Millie Padilla MD      RECENT LABS:  Results from last 7 days   Lab Units 03/23/23  1536   WBC 10*3/mm3 4.88   NEUTROS ABS 10*3/mm3 2.66   HEMOGLOBIN g/dL 12.3   HEMATOCRIT % 37.6   PLATELETS 10*3/mm3 197     Results from last 7 days   Lab Units 03/23/23  1536 03/20/23  1636   INR  2.80* 2.30*     MRI Brain With & Without Contrast [[id number]] Collected: 08/30/22 1747        Updated: 08/30/22 1815     Narrative:       MRI OF THE BRAIN WITH AND WITHOUT CONTRAST      CLINICAL HISTORY: Lightheadedness. History of non-Hodgkin's lymphoma.  Left-sided chest and left arm pain.     TECHNIQUE: MRI of the brain was obtained with sagittal pre and  postgadolinium T1, axial pre and postgadolinium T1, coronal  postgadolinium T1, axial postgadolinium 3-D SPGR, axial FLAIR, axial T2,  axial diffusion, and axial gradient echo images.     FINDINGS:     The ventricles, sulci, and cisterns are age-appropriate. The midline  intracranial anatomy is within normal limits. There are no abnormal foci  of restricted diffusion. No significant white matter abnormality is  appreciated. There are no abnormal foci of susceptibility artifact. The  major intracranial flow related signal voids are within normal limits.  No abnormal foci of contrast enhancement are noted within the brain  parenchyma. The midline intracranial anatomy is unremarkable.        Impression:          Unremarkable MRI of the brain.     This report was finalized on 8/30/2022 6:12 PM by Dr. Anthony Briggs M.D.        XR Chest 1 View [[id number]] Collected: 08/29/22 2130       Updated: 08/30/22 0728     Narrative:       PORTABLE CHEST     HISTORY: Chest pain. Lymphoma.     COMPARISON: CT chest 06/08/2022.     FINDINGS: A single portable view of the chest demonstrates the heart to  be  within normal limits in size. There is no evidence of infiltrate,  effusion or congestive failure. Further evaluation could be performed  with a CT examination of the chest as indicated.     This report was finalized on 8/30/2022 7:25 AM by Dr. Arie Beaulieu M.D.        CT Angiogram Chest [[id number]] Collected: 08/29/22 2152       Updated: 08/29/22 2316     Narrative:       CT ANGIOGRAM OF THE CHEST WITH CONTRAST INCLUDING RECONSTRUCTION IMAGES  08/29/2022     HISTORY: History of DVT. Now having some chest pain. Possible pulmonary  embolus.     TECHNIQUE: Following the intravenous contrast injection CT angiography  was performed through the chest.     Sagittal, coronal and 3-D reconstruction images were reviewed.     FINDINGS: The pulmonary arterial system is well opacified. There is a  small slightly ill-defined filling defect in the distal left pulmonary  artery which may extend into the origin of the left upper lobe pulmonary  artery. No right pulmonary emboli are seen.     There is some vague increased soft tissue in the region of the left  hilum similar to the appearance on the CT of the chest from 06/08/2022.  Mild increased attenuation of the anterior mediastinum is seen and this  also appears stable.     No lung masses are seen. No significant pulmonary infiltrates are seen.        Impression:       1. Small slightly irregular filling defect in the distal left pulmonary  artery which may extend into the base of the left upper lobe pulmonary  artery and is compatible with small pulmonary embolus.  2. Findings were discussed with the ER physician.  3. Short-term follow-up CT angiogram of the chest suggested.           Radiation dose reduction techniques were utilized, including automated  exposure control and exposure modulation based on body size.     This report was finalized on 8/29/2022 11:13 PM by Dr. Eliseo Templeton M.D.                I have reviewed the  medications.  ---------------------------------------------------------------------------------------------             Assessment & Plan   1.  Primary mediastinal large B-cell lymphoma: The patient presented with dyspnea, cough, and chest pain.  A CT angiogram of the chest 6/8/18 showed a very large tumor mass in the mediastinum encasing multiple vascular structures and narrowing the left mainstem bronchus.  There was direct tumor infiltration in the left upper lobe.  There was a small left pleural effusion.  She underwent a CT-guided biopsy of the mediastinal mass on 6/12/18 and pathology reviewed at [facility] oncology showed diffuse large B-cell lymphoma consistent with a primary diffuse large B-cell lymphoma.  A bone marrow exam was performed on 6/14/18 which was negative for lymphoma.  She underwent a PET scan 6/13/18 which showed a large hypermetabolic mass in the chest involving the anterior mediastinum, left hilum, nearly completely filling the left hemothorax with SUV 19.8.  There was physiologic distribution elsewhere.     The patient was started on IV fluid hydration and allopurinol for prevention of hyperuricemia.  She initiated systemic chemotherapy with DA-EPOCH-R on 6/16/18 and completed the evening of 6/21/18.  She had a infusion reaction to rituximab but was able to complete with additional medications and otherwise tolerated chemotherapy well.  She will require additional premedications with additional rituximab.  Plan is to monitor her blood counts twice per week outpatient and proceed with cycle 2 of chemotherapy day 21.     The patient had significant improvement in shortness of breath, cough, and chest pain by the time of discharge.    · Patient received Neulasta support  · Her white count dropped down to as low as 0.8 low-grade temperature and patient was placed on Levaquin ×5 days starting June 27, 2018, neutropenia AT  11 days posttreatment.  Platelets dropped to 82.  · Her next ZOLADEX  injection is due July 12.  · She is due to start chemotherapy on July 9.  · Discussed with Dr. Fox at the [facility] and his suggestion was to do the CT scan and PET scan after 2 cycles and discuss the results with him.  If she has an excellent response early on she would not require any further treatments after completion of 6 cycles of EPOCH/R  · Patient being admitted for cycle 3 of chemotherapy on July 30, 2018.  · She's status post cycle 4 of chemotherapy and CT scan has been reviewed following 4 cycles with continued response.  · She is due for ZOLADEX  injection on October 8, 2018.   · Patient is due to go for cycle 5 of chemotherapy on Monday, September 17, 2018  ·  echocardiogram done August 22, 2018 shows ejection fraction of 59% to 60% with the eldest strain of 19.2%  · Cycle 6 chemotherapy with dose adjusted EPOCH/R on October 8, 2018.  · Reviewed CT scan and PET scan and the images with the patient and family.  Significant response with decrease in the mediastinal mass to 7.8 cm and activity level is 2.4 a week on PET scan.  · Reviewed repeat PET scan still with 7.8 cm tumor in the mediastinum.  The SUV is 2.4 and this could be scar tissue.  · The patient is due for her next dose of Zoladex today. As noted above, her ANC has been steadily declining over the last several months in the absence of infectious symptoms. This will be further detailed below. She will proceed with Zoladex, as scheduled.   · Discontinue Zoladex  · Reviewed CT as of January 15, 2019 with further improvement  · No plans on radiation to the mediastinal lymph node given the PET scan showed that it is photopenic  · Reviewed CT scan from June 2021.  And there is no evidence of disease  · Belia 15, 2022: Patient asymptomatic.  CT scan shows minimal increase of a small lymph node in the right neck from 0.3- 0.6 cm.  Questionable lesion in the liver very small follow-up in CT scan in 3 months.  Patient is  asymptomatic  · September 2022: Admitted with pulmonary embolism and started on Eliquis tolerating well no evidence of malignancy by CT scan of the neck chest abdomen pelvis August 2022  · December 22, 2022: Patient doing well without any complaints currently on Eliquis 5 mg p.o. twice daily for history of pulmonary embolism since August 2022  · March 1, 2023: Patient admitted after having CT angiogram of the chest as a follow-up of pulmonary embolism.  Patient had been non  compliant about 5 times with her Eliquis.  She had rare episode of chest pain prior to the CT angiogram during her menstrual period.  CT angiogram showed a new pulmonary embolism and patient got admitted and switched from Eliquis to Coumadin with INR checks.  Currently her INR is 2.8 and she will follow the Coumadin protocol per the pharmacy nurse.  Patient was also seen by cardiology and they did not feel the need to do echocardiogram.  They did not feel the need to close the PFO as patient will be anticoagulated chronically.  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  She will require referral to hematologist where and we will try to do so she can get an appointment in the third or fourth week of May 2023.    2.  Pericardial effusion:   a 2-D echocardiogram 6/8/18 showed a left ventricular systolic function 58%.  There was a 2 cm pericardial effusion with no tamponade.  The pericardium appeared thickened.  She had no evidence of volume overload.  It is resolved     3. Fertility preservation:  Patient received Zoladex injection  for fertility preservation; this should be continued once monthly during her chemotherapy.    · She is due for her next dose today and will continue this for 2 additional doses.   · Zoladex has been  discontinued but patient has now started  menstrual periods     4. Right upper extremity DVT on Pradaxa. She continues on this with no issues.  Stable    5. Status post port placement, patient wants port removed and I think it is reasonable but will need to hold Pradaxa  · S/p port removal     6. Renal and Ovarian cysts.  We will continue to monitor.  · I have advised patient to consult her OB/GYN for ovarian cysts.  · CT scan does not show evidence of any ovarian cyst from 2020    7. Family history of cancer.  Her mother was diagnosed with cholangiocarcinoma.  · Patient's mother now diagnosed with metastatic lung cancer and currently is being transferred to hospice  · Patient's mother had     8.  Recent diagnosis of pulmonary embolism, in 2022  ·  Doppler ultrasound negative of bilateral lower extremities and left upper extremity  · Echo 2022 showed small patent foramen ovale present.  Saline test were positive on the echo  · MRI brain negative  · Hypercoagulable work-up negative  · Likely this was provoked pulmonary embolism given that she sometimes sits for hours   · HyperCard work-up negative  · No particular risk factors    9.  Echo showed EF    10.  Admitted 2023 with a new pulmonary embolism which occurred on Eliquis though there was some mention of patient being noncompliant about 5 times prior to developing this.    Plan  · Patient had new onset pulmonary embolism in 2023  · She was not compliant with her Eliquis about 5 times prior to that  · She had intermittent chest pains prior to admission in 2023 which she attributed to her menstrual cycles  · She does have a PFO but very small and cardiology did not feel the need to close it as she was going to be on chronic anticoagulation  · D-dimer was normal at the time of this admission and Doppler ultrasound did not show any new clots in the lower extremities and had a chronic subclavian clot secondary to  port in the past  · Cardiology did not order echo  · Continue Coumadin with INR of 2.8 today  · Patient will be working in [facility] for 1 year and needs referral to hematologist there end of May 2023 as she will be there for 1 year and come back  · I did discuss about her seeing a hematologist at [facility] but she lives an hour away from their.  · Follow-up for every 2 week PT/INR and CBC check.  Nurse practitioner in 2 months and follow-up with me in 4 months.  Given her anxiety will refer her to Juanis Ellison.      I spent 45 total minutes, face-to-face, caring for Vikki today.  Greater than 50% of this time involved counseling and/or coordination of care as documented within this note.      Millie Padilla MD            CC: Dr. Arina Fox, at [facility]

## 2023-03-23 NOTE — PROGRESS NOTES
Anticoagulation Clinic Progress Note    Patient's visit was held in office today.    Anticoagulation Summary  As of 3/23/2023    INR goal:  2.0-3.0   TTR:  100.0 % (1.6 wk)   INR used for dosin.80 (3/23/2023)   Warfarin maintenance plan:  5 mg (5 mg x 1) every day; Starting 3/23/2023   Weekly warfarin total:  35 mg   No change documented:  Billie Mir RPH   Plan last modified:  Billie Mir RPH (3/3/2023)   Next INR check:  4/3/2023   Priority:  High   Target end date:      Indications    Recurrent pulmonary embolism (HCC) [I26.99]             Anticoagulation Episode Summary     INR check location:      Preferred lab:      Send INR reminders to:  [facility] CBC ANTICOAG POOL    Comments:  [facility]      Anticoagulation Care Providers     Provider Role Specialty Phone number    Millie Padilla MD Referring Hematology and Oncology [phone]          Clinic Interview:  Patient Findings     Negatives:  Signs/symptoms of thrombosis, Signs/symptoms of bleeding,   Laboratory test error suspected, Change in health, Change in alcohol use,   Change in activity, Upcoming invasive procedure, Emergency department   visit, Upcoming dental procedure, Missed doses, Extra doses, Change in   medications, Change in diet/appetite, Hospital admission, Bruising, Other   complaints      Clinical Outcomes     Negatives:  Major bleeding event, Thromboembolic event,   Anticoagulation-related hospital admission, Anticoagulation-related ED   visit, Anticoagulation-related fatality        INR History:  Anticoagulation Monitoring 3/13/2023 3/20/2023 3/23/2023   INR 2.60 2.30 2.80   INR Date 3/13/2023 3/20/2023 3/23/2023   INR Goal 2.0-3.0 2.0-3.0 2.0-3.0   Trend Same Same Same   Last Week Total 35 mg 35 mg 35 mg   Next Week Total 35 mg 35 mg 35 mg   Sun 5 mg - 5 mg   Mon 5 mg 5 mg 5 mg   Tue 5 mg 5 mg 5 mg   Wed 5 mg 5 mg 5 mg   Thu 5 mg - 5 mg   Fri 5 mg - 5 mg   Sat 5 mg - 5 mg   Visit Report - - -   Some recent data might  be hidden       Plan:  1. INR is Therapeutic today- see above in Anticoagulation Summary.  Will instruct Vikki Durham to Continue their warfarin regimen- see above in Anticoagulation Summary.  2. Follow up in 2 weeks  3. Patient declines and desires warfarin refills.--sent Rx to [facility] today  4. Verbal and written information provided. Patient expresses understanding and has no further questions at this time.    Billie Mir [facility]

## 2023-03-31 ENCOUNTER — TELEPHONE (OUTPATIENT)
Dept: ONCOLOGY | Facility: CLINIC | Age: 28
End: 2023-03-31
Payer: MEDICAID

## 2023-04-04 ENCOUNTER — LAB (OUTPATIENT)
Dept: LAB | Facility: HOSPITAL | Age: 28
End: 2023-04-04
Payer: MEDICAID

## 2023-04-04 ENCOUNTER — ANTICOAGULATION VISIT (OUTPATIENT)
Dept: ONCOLOGY | Facility: HOSPITAL | Age: 28
End: 2023-04-04
Payer: MEDICAID

## 2023-04-04 DIAGNOSIS — Z79.01 ANTICOAGULATED ON COUMADIN: ICD-10-CM

## 2023-04-04 DIAGNOSIS — I26.99 RECURRENT PULMONARY EMBOLISM: Primary | ICD-10-CM

## 2023-04-04 DIAGNOSIS — Z79.01 ANTICOAGULATION MONITORING BY PHARMACIST: ICD-10-CM

## 2023-04-04 DIAGNOSIS — I26.99 PULMONARY EMBOLISM WITHOUT ACUTE COR PULMONALE, UNSPECIFIED CHRONICITY, UNSPECIFIED PULMONARY EMBOLISM TYPE: ICD-10-CM

## 2023-04-04 LAB
INR PPP: 3.5 (ref 0.9–1.1)
PROTHROMBIN TIME: 42.3 SECONDS (ref 11–13.5)

## 2023-04-04 PROCEDURE — 36416 COLLJ CAPILLARY BLOOD SPEC: CPT

## 2023-04-04 PROCEDURE — 85610 PROTHROMBIN TIME: CPT

## 2023-04-04 NOTE — PROGRESS NOTES
Anticoagulation Clinic Progress Note    Patient's visit was held via telephone today.    Anticoagulation Summary  As of 4/4/2023    INR goal:  2.0-3.0   TTR:  64.2 % (3.3 wk)   INR used for dosing:  3.50 (4/4/2023)   Warfarin maintenance plan:  2.5 mg (5 mg x 0.5) every Sun; 5 mg (5 mg x 1) all other days; Starting 4/4/2023   Weekly warfarin total:  32.5 mg   Plan last modified:  Billie Mir RPH (4/4/2023)   Next INR check:  4/20/2023   Priority:  High   Target end date:      Indications    Recurrent pulmonary embolism [I26.99]             Anticoagulation Episode Summary     INR check location:      Preferred lab:      Send INR reminders to:   [facility] CBC ANTICOAG POOL    Comments:  [facility]      Anticoagulation Care Providers     Provider Role Specialty Phone number    Millie Padilla MD Referring Hematology and Oncology [phone]          Drug interactions: has remained unchanged.  Diet: has remained unchanged.    Clinic Interview:  No pertinent clinical findings have been reported.    INR History:  Anticoagulation Monitoring 3/20/2023 3/23/2023 4/4/2023   INR 2.30 2.80 3.50   INR Date 3/20/2023 3/23/2023 4/4/2023   INR Goal 2.0-3.0 2.0-3.0 2.0-3.0   Trend Same Same Down   Last Week Total 35 mg 35 mg 35 mg   Next Week Total 35 mg 35 mg 30 mg   Sun - 5 mg 2.5 mg   Mon 5 mg 5 mg 5 mg   Tue 5 mg 5 mg 2.5 mg (4/4); Otherwise 5 mg   Wed 5 mg 5 mg 5 mg   Thu - 5 mg 5 mg   Fri - 5 mg 5 mg   Sat - 5 mg 5 mg   Visit Report - - -   Some recent data might be hidden       Plan:  1. INR is Supratherapeutic today- see above in Anticoagulation Summary.   Will instruct Vikki Durham to Change their warfarin regimen- see above in Anticoagulation Summary.  2. Follow up in 2 weeks  3.They have been instructed to call if any changes in medications, doses, concerns, etc. Left VM with instructions and to call back with updates or questions.    Billie Mir RPH

## 2023-04-20 ENCOUNTER — ANTICOAGULATION VISIT (OUTPATIENT)
Dept: ONCOLOGY | Facility: HOSPITAL | Age: 28
End: 2023-04-20
Payer: MEDICAID

## 2023-04-20 ENCOUNTER — LAB (OUTPATIENT)
Dept: LAB | Facility: HOSPITAL | Age: 28
End: 2023-04-20
Payer: MEDICAID

## 2023-04-20 DIAGNOSIS — I26.99 RECURRENT PULMONARY EMBOLISM: Primary | ICD-10-CM

## 2023-04-20 DIAGNOSIS — I26.99 PULMONARY EMBOLISM WITHOUT ACUTE COR PULMONALE, UNSPECIFIED CHRONICITY, UNSPECIFIED PULMONARY EMBOLISM TYPE: ICD-10-CM

## 2023-04-20 LAB
BASOPHILS # BLD AUTO: 0.05 10*3/MM3 (ref 0–0.2)
BASOPHILS NFR BLD AUTO: 0.6 % (ref 0–1.5)
DEPRECATED RDW RBC AUTO: 41 FL (ref 37–54)
EOSINOPHIL # BLD AUTO: 0.04 10*3/MM3 (ref 0–0.4)
EOSINOPHIL NFR BLD AUTO: 0.5 % (ref 0.3–6.2)
ERYTHROCYTE [DISTWIDTH] IN BLOOD BY AUTOMATED COUNT: 12.6 % (ref 12.3–15.4)
HCT VFR BLD AUTO: 40.4 % (ref 34–46.6)
HGB BLD-MCNC: 13.8 G/DL (ref 12–15.9)
IMM GRANULOCYTES # BLD AUTO: 0.05 10*3/MM3 (ref 0–0.05)
IMM GRANULOCYTES NFR BLD AUTO: 0.6 % (ref 0–0.5)
INR PPP: 2.7 (ref 0.9–1.1)
LYMPHOCYTES # BLD AUTO: 1.5 10*3/MM3 (ref 0.7–3.1)
LYMPHOCYTES NFR BLD AUTO: 18.7 % (ref 19.6–45.3)
MCH RBC QN AUTO: 30.3 PG (ref 26.6–33)
MCHC RBC AUTO-ENTMCNC: 34.2 G/DL (ref 31.5–35.7)
MCV RBC AUTO: 88.8 FL (ref 79–97)
MONOCYTES # BLD AUTO: 0.51 10*3/MM3 (ref 0.1–0.9)
MONOCYTES NFR BLD AUTO: 6.4 % (ref 5–12)
NEUTROPHILS NFR BLD AUTO: 5.88 10*3/MM3 (ref 1.7–7)
NEUTROPHILS NFR BLD AUTO: 73.2 % (ref 42.7–76)
NRBC BLD AUTO-RTO: 0 /100 WBC (ref 0–0.2)
PLATELET # BLD AUTO: 203 10*3/MM3 (ref 140–450)
PMV BLD AUTO: 10.5 FL (ref 6–12)
PROTHROMBIN TIME: 32.6 SECONDS (ref 11–13.5)
RBC # BLD AUTO: 4.55 10*6/MM3 (ref 3.77–5.28)
WBC NRBC COR # BLD: 8.03 10*3/MM3 (ref 3.4–10.8)

## 2023-04-20 PROCEDURE — G0463 HOSPITAL OUTPT CLINIC VISIT: HCPCS

## 2023-04-20 PROCEDURE — 85610 PROTHROMBIN TIME: CPT

## 2023-04-20 PROCEDURE — 36415 COLL VENOUS BLD VENIPUNCTURE: CPT

## 2023-04-20 PROCEDURE — 85025 COMPLETE CBC W/AUTO DIFF WBC: CPT

## 2023-04-20 NOTE — PROGRESS NOTES
Anticoagulation Clinic Progress Note    Patient's visit was held in office today.    Anticoagulation Summary  As of 2023    INR goal:  2.0-3.0   TTR:  53.2 % (1.3 mo)   INR used for dosin.70 (2023)   Warfarin maintenance plan:  2.5 mg (5 mg x 0.5) every Sun; 5 mg (5 mg x 1) all other days; Starting 2023   Weekly warfarin total:  32.5 mg   No change documented:  Billie Mir RPH   Plan last modified:  Billie Mir RPH (2023)   Next INR check:  2023   Priority:  High   Target end date:      Indications    Recurrent pulmonary embolism [I26.99]             Anticoagulation Episode Summary     INR check location:      Preferred lab:      Send INR reminders to:  [facility] CBC ANTICOAG POOL    Comments:  [facility]      Anticoagulation Care Providers     Provider Role Specialty Phone number    Millie Padilla MD Referring Hematology and Oncology [phone]          Clinic Interview:  Patient Findings     Negatives:  Signs/symptoms of thrombosis, Signs/symptoms of bleeding,   Laboratory test error suspected, Change in health, Change in alcohol use,   Change in activity, Upcoming invasive procedure, Emergency department   visit, Upcoming dental procedure, Missed doses, Extra doses, Change in   medications, Change in diet/appetite, Hospital admission, Bruising, Other   complaints      Clinical Outcomes     Negatives:  Major bleeding event, Thromboembolic event,   Anticoagulation-related hospital admission, Anticoagulation-related ED   visit, Anticoagulation-related fatality        INR History:      Latest Ref Rng & Units 3/20/2023     4:36 PM 3/23/2023     3:36 PM 3/23/2023     4:00 PM 2023     7:51 AM 2023     8:00 AM 2023     2:54 PM 2023     3:00 PM   Anticoagulation Monitoring   INR    2.80  3.50  2.70   INR Date    3/23/2023  2023  2023   INR Goal    2.0-3.0  2.0-3.0  2.0-3.0   Trend    Same  Down  Same   Last Week Total    35 mg  35 mg  32.5 mg   Next  Week Total    35 mg  30 mg  32.5 mg   Sun    5 mg  2.5 mg  2.5 mg   Mon    5 mg  5 mg  5 mg   Tue    5 mg  2.5 mg (4/4); Otherwise 5 mg  5 mg   Wed    5 mg  5 mg  5 mg   Thu    5 mg  5 mg  5 mg   Fri    5 mg  5 mg  5 mg   Sat    5 mg  5 mg  5 mg   Historical INR 0.90 - 1.10 2.30   2.80    3.50  C  2.70      Visit Report   Report Report          C Corrected result       Plan:  1. INR is Therapeutic today- see above in Anticoagulation Summary.  Will instruct Vikki Durham to Continue their warfarin regimen- see above in Anticoagulation Summary.  2. Follow up in 2 weeks  3. Patient declines warfarin refills.  4. Verbal and written information provided. Patient expresses understanding and has no further questions at this time.  5. Gave Anilas home test flier to apply when she moves in near future and will have new insurance coverage.    Billie Mir [facility]

## 2023-04-28 ENCOUNTER — TELEPHONE (OUTPATIENT)
Dept: ONCOLOGY | Facility: CLINIC | Age: 28
End: 2023-04-28
Payer: MEDICAID

## 2023-04-28 NOTE — TELEPHONE ENCOUNTER
"    Caller: Vikki Durham \"Mica\"    Relationship to patient: Self    Best call back number: [phone]    Type of visit: LAB, ANTI & F/U1     Requested date: MORNING, CALL TO [facility]     If rescheduling, when is the original appointment: 5/4/2023 & 5/18/2023      "

## 2023-05-04 ENCOUNTER — LAB (OUTPATIENT)
Dept: LAB | Facility: HOSPITAL | Age: 28
End: 2023-05-04
Payer: MEDICAID

## 2023-05-04 ENCOUNTER — ANTICOAGULATION VISIT (OUTPATIENT)
Dept: ONCOLOGY | Facility: HOSPITAL | Age: 28
End: 2023-05-04
Payer: MEDICAID

## 2023-05-04 DIAGNOSIS — I26.99 RECURRENT PULMONARY EMBOLISM: Primary | ICD-10-CM

## 2023-05-04 DIAGNOSIS — I26.99 PULMONARY EMBOLISM WITHOUT ACUTE COR PULMONALE, UNSPECIFIED CHRONICITY, UNSPECIFIED PULMONARY EMBOLISM TYPE: ICD-10-CM

## 2023-05-04 LAB
BASOPHILS # BLD AUTO: 0.05 10*3/MM3 (ref 0–0.2)
BASOPHILS NFR BLD AUTO: 1.1 % (ref 0–1.5)
DEPRECATED RDW RBC AUTO: 41.9 FL (ref 37–54)
EOSINOPHIL # BLD AUTO: 0.03 10*3/MM3 (ref 0–0.4)
EOSINOPHIL NFR BLD AUTO: 0.6 % (ref 0.3–6.2)
ERYTHROCYTE [DISTWIDTH] IN BLOOD BY AUTOMATED COUNT: 12.7 % (ref 12.3–15.4)
HCT VFR BLD AUTO: 40.1 % (ref 34–46.6)
HGB BLD-MCNC: 13.4 G/DL (ref 12–15.9)
IMM GRANULOCYTES # BLD AUTO: 0.11 10*3/MM3 (ref 0–0.05)
IMM GRANULOCYTES NFR BLD AUTO: 2.4 % (ref 0–0.5)
INR PPP: 2.6 (ref 0.9–1.1)
LYMPHOCYTES # BLD AUTO: 1.69 10*3/MM3 (ref 0.7–3.1)
LYMPHOCYTES NFR BLD AUTO: 36.1 % (ref 19.6–45.3)
MCH RBC QN AUTO: 30.3 PG (ref 26.6–33)
MCHC RBC AUTO-ENTMCNC: 33.4 G/DL (ref 31.5–35.7)
MCV RBC AUTO: 90.7 FL (ref 79–97)
MONOCYTES # BLD AUTO: 0.39 10*3/MM3 (ref 0.1–0.9)
MONOCYTES NFR BLD AUTO: 8.3 % (ref 5–12)
NEUTROPHILS NFR BLD AUTO: 2.41 10*3/MM3 (ref 1.7–7)
NEUTROPHILS NFR BLD AUTO: 51.5 % (ref 42.7–76)
NRBC BLD AUTO-RTO: 0.9 /100 WBC (ref 0–0.2)
PLATELET # BLD AUTO: 207 10*3/MM3 (ref 140–450)
PMV BLD AUTO: 10.3 FL (ref 6–12)
PROTHROMBIN TIME: 31.6 SECONDS (ref 11–13.5)
RBC # BLD AUTO: 4.42 10*6/MM3 (ref 3.77–5.28)
WBC NRBC COR # BLD: 4.68 10*3/MM3 (ref 3.4–10.8)

## 2023-05-04 PROCEDURE — 85610 PROTHROMBIN TIME: CPT

## 2023-05-04 PROCEDURE — 85025 COMPLETE CBC W/AUTO DIFF WBC: CPT

## 2023-05-04 PROCEDURE — 36415 COLL VENOUS BLD VENIPUNCTURE: CPT

## 2023-05-04 NOTE — PROGRESS NOTES
Anticoagulation Clinic Progress Note    Left VM with instructions and to call back with updates or questions.    Anticoagulation Summary  As of 2023    INR goal:  2.0-3.0   TTR:  65.3 % (1.8 mo)   INR used for dosin.60 (2023)   Warfarin maintenance plan:  2.5 mg (5 mg x 0.5) every Sun; 5 mg (5 mg x 1) all other days; Starting 2023   Weekly warfarin total:  32.5 mg   No change documented:  Ora Sandhu RPH   Plan last modified:  Billie Mir RPH (2023)   Next INR check:  2023   Priority:  High   Target end date:      Indications    Recurrent pulmonary embolism [I26.99]             Anticoagulation Episode Summary     INR check location:      Preferred lab:      Send INR reminders to:  [facility] CBC ANTICOAG POOL    Comments:  [facility]      Anticoagulation Care Providers     Provider Role Specialty Phone number    Millie Padilla MD Referring Hematology and Oncology [phone]          Drug interactions: has remained unchanged.  Diet: has remained unchanged.    Clinic Interview:  No pertinent clinical findings have been reported.    INR History:      Latest Ref Rng & Units 3/23/2023     4:00 PM 2023     7:51 AM 2023     8:00 AM 2023     2:54 PM 2023     3:00 PM 2023     7:36 AM 2023     8:30 AM   Anticoagulation Monitoring   INR  2.80  3.50  2.70  2.60   INR Date  3/23/2023  2023  2023  2023   INR Goal  2.0-3.0  2.0-3.0  2.0-3.0  2.0-3.0   Trend  Same  Down  Same  Same   Last Week Total  35 mg  35 mg  32.5 mg  32.5 mg   Next Week Total  35 mg  30 mg  32.5 mg  32.5 mg   Sun  5 mg  2.5 mg  2.5 mg  2.5 mg   Mon  5 mg  5 mg  5 mg  5 mg   Tue  5 mg  2.5 mg (); Otherwise 5 mg  5 mg  5 mg   Wed  5 mg  5 mg  5 mg  5 mg   Thu  5 mg  5 mg  5 mg  5 mg   Fri  5 mg  5 mg  5 mg  5 mg   Sat  5 mg  5 mg  5 mg  5 mg   Historical INR 0.90 - 1.10  3.50  C  2.70    2.60      Visit Report  Report            C Corrected result       Plan:  1. INR is  Therapeutic today- see above in Anticoagulation Summary.   Will instruct Vikki Durham to Continue their warfarin regimen- see above in Anticoagulation Summary.  2. Follow up in 2 weeks  3.They have been instructed to call if any changes in medications, doses, concerns, etc. Patient expresses understanding and has no further questions at this time.    Ora Sandhu RP

## 2023-05-18 ENCOUNTER — OFFICE VISIT (OUTPATIENT)
Dept: ONCOLOGY | Facility: CLINIC | Age: 28
End: 2023-05-18
Payer: MEDICAID

## 2023-05-18 ENCOUNTER — LAB (OUTPATIENT)
Dept: LAB | Facility: HOSPITAL | Age: 28
End: 2023-05-18
Payer: MEDICAID

## 2023-05-18 ENCOUNTER — ANTICOAGULATION VISIT (OUTPATIENT)
Dept: ONCOLOGY | Facility: HOSPITAL | Age: 28
End: 2023-05-18
Payer: MEDICAID

## 2023-05-18 VITALS
OXYGEN SATURATION: 97 % | SYSTOLIC BLOOD PRESSURE: 116 MMHG | TEMPERATURE: 96.9 F | BODY MASS INDEX: 30.44 KG/M2 | DIASTOLIC BLOOD PRESSURE: 72 MMHG | HEIGHT: 62 IN | HEART RATE: 57 BPM | WEIGHT: 165.4 LBS | RESPIRATION RATE: 18 BRPM

## 2023-05-18 DIAGNOSIS — I26.99 RECURRENT PULMONARY EMBOLISM: ICD-10-CM

## 2023-05-18 DIAGNOSIS — C85.28 MEDIASTINAL LARGE B-CELL LYMPHOMA OF LYMPH NODES OF MULTIPLE REGIONS: Primary | ICD-10-CM

## 2023-05-18 DIAGNOSIS — Z79.899 HIGH RISK MEDICATION USE: ICD-10-CM

## 2023-05-18 DIAGNOSIS — I26.99 PULMONARY EMBOLISM WITHOUT ACUTE COR PULMONALE, UNSPECIFIED CHRONICITY, UNSPECIFIED PULMONARY EMBOLISM TYPE: ICD-10-CM

## 2023-05-18 DIAGNOSIS — Z79.01 CHRONIC ANTICOAGULATION: ICD-10-CM

## 2023-05-18 DIAGNOSIS — I26.99 RECURRENT PULMONARY EMBOLISM: Primary | ICD-10-CM

## 2023-05-18 LAB
BASOPHILS # BLD AUTO: 0.04 10*3/MM3 (ref 0–0.2)
BASOPHILS NFR BLD AUTO: 1 % (ref 0–1.5)
DEPRECATED RDW RBC AUTO: 42.8 FL (ref 37–54)
EOSINOPHIL # BLD AUTO: 0.04 10*3/MM3 (ref 0–0.4)
EOSINOPHIL NFR BLD AUTO: 1 % (ref 0.3–6.2)
ERYTHROCYTE [DISTWIDTH] IN BLOOD BY AUTOMATED COUNT: 12.7 % (ref 12.3–15.4)
HCT VFR BLD AUTO: 40 % (ref 34–46.6)
HGB BLD-MCNC: 13.2 G/DL (ref 12–15.9)
IMM GRANULOCYTES # BLD AUTO: 0.02 10*3/MM3 (ref 0–0.05)
IMM GRANULOCYTES NFR BLD AUTO: 0.5 % (ref 0–0.5)
INR PPP: 2.7 (ref 0.9–1.1)
LYMPHOCYTES # BLD AUTO: 1.51 10*3/MM3 (ref 0.7–3.1)
LYMPHOCYTES NFR BLD AUTO: 36.9 % (ref 19.6–45.3)
MCH RBC QN AUTO: 30.4 PG (ref 26.6–33)
MCHC RBC AUTO-ENTMCNC: 33 G/DL (ref 31.5–35.7)
MCV RBC AUTO: 92.2 FL (ref 79–97)
MONOCYTES # BLD AUTO: 0.31 10*3/MM3 (ref 0.1–0.9)
MONOCYTES NFR BLD AUTO: 7.6 % (ref 5–12)
NEUTROPHILS NFR BLD AUTO: 2.17 10*3/MM3 (ref 1.7–7)
NEUTROPHILS NFR BLD AUTO: 53 % (ref 42.7–76)
NRBC BLD AUTO-RTO: 0 /100 WBC (ref 0–0.2)
PLATELET # BLD AUTO: 189 10*3/MM3 (ref 140–450)
PMV BLD AUTO: 10.8 FL (ref 6–12)
PROTHROMBIN TIME: 32.5 SECONDS (ref 11–13.5)
RBC # BLD AUTO: 4.34 10*6/MM3 (ref 3.77–5.28)
WBC NRBC COR # BLD: 4.09 10*3/MM3 (ref 3.4–10.8)

## 2023-05-18 PROCEDURE — 36415 COLL VENOUS BLD VENIPUNCTURE: CPT

## 2023-05-18 PROCEDURE — 85610 PROTHROMBIN TIME: CPT

## 2023-05-18 PROCEDURE — 85025 COMPLETE CBC W/AUTO DIFF WBC: CPT

## 2023-05-18 PROCEDURE — G0463 HOSPITAL OUTPT CLINIC VISIT: HCPCS

## 2023-05-18 NOTE — PROGRESS NOTES
Referral to Hematologist, Dr. 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  WANDA. Dr. Padilla

## 2023-05-18 NOTE — PROGRESS NOTES
Subjective      REASONS FOR FOLLOW UP:    1.  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 She did initiate EPOCH-R  in the hospital on 06/16/2018.  Treated 6 cycles of treatment.  Completed October 2018 followed with observation    2.  Admitted August 29, 2022 with pulmonary embolism left upper lobe.  Currently on Eliquis  · Hypercoagulable work-up showedFactor VIII 125%, anticardiolipin antibody negative, beta-2 glycoprotein antibody negative, lupus anticoagulant not detected, D-dimer less than 0.27, Antithrombin %, protein C activity 115%, protein S activity 123% prothrombin gene mutation negative, factor V Leiden negative.  Echo normal  · Echo normal  · CT angiogram left upper lobe pulmonary embolism  · CT abdomen pelvis negative, CT neck 0.6 mm submandibular gland stable  · No B symptoms  · Admitted August 22, 2022 with chest pain, had left upper lobe pulmonary embolism, started on Eliquis.  CT angiogram of the chest abdomen pelvis negative for any progressive malignancy.  September 2022 D-dimer normal,Factor VIII activity 125%, Antithrombin %, anticardiolipin antibody negative beta-2 glycoprotein antibody negative Lupus anticoagulant not detected factor V Leiden normal protein C activity 115%, protein S 100,000 prothrombin gene mutation negative factor VIII activity 115%.  Patient states no risk factors.  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 female with the above-mentioned history who returns today for follow-up specifically for ongoing Coumadin therapy in the setting of recently diagnosed PE.  She has been very stable on Coumadin 5 mg daily with INR that is therapeutic today at 2.7.  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  We will plan to see her 1 more time before that  and then the plan is to transition her to hematology at [facility].  She denies other concerns today.      Past Medical History, Past Surgical History, Social History, Family History have been reviewed and are without significant changes except as mentioned.    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  She saw Dr. Arina Cohen on last Friday.  The reason the patient was referred to Dr. Cohen was because they thought she had cardiomegaly on chest x-ray.  However a chest x-ray was ordered which showedThe chest x-ray showed extensive abnormal increased density throughout the anterior mediastinum extending into the left hilar region and left perihilar region and is most likely due to confluent lymphadenopathy.     CT angiogram of the chest did not show pulmonary embolism but showed     there is a large conglomerate  mass encases multiple vascular structures of the mediastinum and left  hilar region that is most likely extensive confluent lymphadenopathy.  The primary differential diagnostic considerations would be lymphoma.  The tumor posteriorly displaces the pulmonary arteries and the left and  moderately narrows the main pulmonary artery. The tumor also posteriorly  displaces the trachea and markedly narrows the left mainstem bronchus.  The pulmonary veins in the left are also encased and narrowed. The mass  encases and markedly narrows the SVC. The large edy mass measures  approximately 19.8 x 13.7 x 14.0 cm in diameter. Enlarged lymph nodes  are also present in the left supraclavicular region where they appear to  produce occlusion of the left internal jugular vein. There is no  axillary lymphadenopathy. There are no filling defects within the  pulmonary arteries. There is no CT evidence of pulmonary embolism. There  is a small left pleural effusion. A small pericardial effusion measuring  8 mm in maximum thickness is also present. There is  compressive  atelectasis of the left lung. Patchy airspace opacities are also present  in the superior aspect of the left upper lobe. These are most likely due  to direct tumor infiltration of the lung parenchyma, but could also  represent postobstructive pneumonia. The right lung is well expanded and  clear. Images through the upper abdomen partially show what could be a  edy mass in the retroperitoneum posterior and inferior to the  pancreas. Further evaluation with a CT scan of the abdomen and pelvis is  recommended. Bone window images demonstrate patchy sclerosis in the C7  and T1 and T2 and T3 and T4 vertebral body suspicious for bone  involvement with lymphoma.     IMPRESSION:  Very large tumor mass in the mediastinum encasing multiple  vascular structures and also moderately narrowing the left mainstem  bronchus as described in more detail above. Direct tumor infiltration of  the left upper lobe is also suspected. Small left pleural effusion and a  small pericardial effusion. There may also be partially visualized  lymphadenopathy in the upper abdomen. Patchy areas of sclerosis are also  noted in the C7 and T1 and T2 and T3 and T4 vertebral bodies suspicious  for osseous metastatic disease. The findings are consistent with  Lymphoma.     Dr. Cohen felt that she had a small pericardial effusion.  Patient has been symptomatic with cough which is worsening which started back in February 2018 and worsened over the last 4 months.  Patient also has some shortness of breath with walking up the steps.  She has not lost weight.  She say she is reasonable appetite.  She does have fever kind of low-grade fever and night sweats.  She is more fatigued compared to before.  She was referred to us because of concern that this could be lymphoma.  She does not have any tissue diagnosis yet.  Patient does complain of back pain.    Echo done on admission June 12, 2018 by Dr. Fitzgerald showed that the patient's posterior and appears  large primary leukemia the left ventricle and apex.  There is no tamponade.  The pericardium appears thickened pointing to involvement of the pericardium into the mediastinal process.  Dr. Fitzgerald felt that it would be difficult to do pericardial synthesis as the mediastinal mass covers the anterior aspect of the heart.  Ejection fraction is 58%  CT-guided needle biopsy June 12, 2018 was negative  LDH is elevated.  Uric acid is high.  MRI thoracic spine, negative  CT abdomen pelvis negative  Scan July 13, 2018 shows large infiltrative edy mass in the anterior mediastinum and left hilar region that nearly completely fills the left hemithorax and shows intense hypermetabolism with an SUV of 19.8.  No foci of pathologic hypermetabolism are identified elsewhere in the chest, neck abdomen or pelvis.    Pathology was consistent with primary mediastinal large B-cell lymphoma.    Patient was seen by Dr. Melgar.  He discussed harvesting eggs versus Zoladex plus oral contraceptives versus Zoladex alone for fertility preservation.  Due to large size of the tumor and rapid initiation of treatment needed the patient was not able to have aches harvested done.  Because she is at increased risk of thrombosis with the use of oral contraceptives secondary to malignancy he recommended Zoladex alone.  Patient received first dose of Zoladex 3.6 mg subcutaneous on Belia 15, 2018.    Patient started on EPOCH/R on June 16, 2018    Patient had a reaction to Rituxan which improved with steroids, Benadryl and Pepcid.  She had to receive it slow.  She started having itching over her EARS , sore throat.  She was given IV steroids Benadryl and Pepcid and following that she was started at a slower rate and she completed it.    Right upper extremity Doppler ultrasound showed new superficial vein thrombosis around the PICC line with no extension into the deep system.  Repeat Doppler was ordered.    A Doppler ultrasound initially showed superficial  thrombophlebitis.  She was on prophylactic Arixtra.  A Doppler was repeated 2 days later on 6/20/18  which showed extension of thrombus into the axillary and brachial veins.  She was therefore started on Xarelto 15 mg one every 12 hours and the PICC line was removed as soon as chemotherapy completed on 6/20/18.  She will take 15 mg of Xarelto every 12 hours for 21 days and then transition to 20 mg once a day.  The patient will have CBC performed twice a week.  If the platelet count drops below 50,000, anticoagulation will need to be temporarily held  Patient was started on acyclovir/fluconazole/Bactrim  Bone marrow done June 14, 2018, morphology was negative for lymphoma    Fertility preservation, Zoladex is next due July 12, 2018.  CT scan and PET scan showing significant response after cycle 2 of chemotherapy  Next dose of Lupron August 13, 2018  Status post 5 cycles  OF epoch/r and is due cycle 6 on oct 8th.    Patient admitted October 20, 2018 and discharged October 22, 2018 when she was admitted with neutropenic fever.  She was treated with broad-spectrum antibiotics.  Her respiratory viral panel was positive for parainfluenza virus.    PET scan with significant improvement but residual 7.8 cm lesion without any activity.  Reviewed CT scan from January 15, 2019    Patient seen by Dr. Fox at the [facility], agreed with observation at present no plans on radiation.    CT scan and PET scan from April 5 and April 9, 2019 has been reviewed and discussed with patient.  There is further decrease in the mediastinal lymphadenopathy.    CT chest abdomen pelvis July 22, 2019 with further decrease in the mediastinal lymphadenopathy significantly    We reviewed with patient the CT scans from 12/12/19 which show mediastinal lymphadenopathy which has decreased in size.   There is also a small left renal cyst and a 2 cm partially involuted right ovarian cyst.     Patient was admitted August 29, 2022 when she  complained of left-sided chest discomfort and some leg cramping.  She underwent CT angiogram of the chest which showed left upper lobe pulmonary embolism.  She was started on Eliquis.  Bilateral Doppler extremities were done which showed that it was normal and duplex of the left upper extremity was normal.  Dr. Real was consulted.  Hypercoagulable work-up was done.  An echocardiogram was done which was negative.  MRI of the brain was unremarkable.  She was placed on Eliquis 10 mg twice daily for a week and subsequently to be switched to 5 mg twice daily.  Her admit admission labs were normal.    Patient underwent CT abdomen pelvis which did not show any new or lymphadenopathy previously described 6 mm left submandibular node was unchanged.  CT neck showed the left submandibular gland    Patient went to the ER on September 7, 2022 with dizziness.  So she underwent CT of the head for dizziness and no acute process was identified.  Also a chest x-ray was obtained which was negative.  Currently her dizziness is resolved.  She likely was not hydrated well.    Currently start 5 mg p.o. twice daily of Eliquis.  She denies any active bleeding      Patient was admitted much post to [facility].  Patient had gone for CT chest as a follow-up of her pulmonary embolism and was found to have a new pulmonary embolism despite Eliquis 5 mg twice daily since August 30, 2022.  She had complained of chest pain 4 days prior to the CT chest.  Patient apparently has been forgetting couple of times a month to take her Eliquis.  She missed 5 pills of Eliquis in the last month prior to admission.  She has not had any COVID 19 infection or vaccination.  She did have a prior DVT associated with port in 2018 for which she was on Pradaxa until the port was removed.  In August 2022 her hypercoagulable work-up was negative when she presented with her first pulmonary embolism and at that time D-dimer was normal as well Dopplers of her lower  extremities.    During this admission patient was asymptomatic except intermittent chest pain associated with her menstrual cycles.  Repeat Dopplers of the lower extremities were negative except a chronic clot in the subclavian on the right side related to her port.  In August 2022 she had a small pulmonary embolism.  She was anxious that clot could be a sign of her lymphoma returning.  But she is 5 years from treatment and usually unusual to develop recurrent lymphoma.  Patient was seen by Dr. Real during the hospitalization and suggested echocardiogram in order to evaluate the PFO.  And repeat D-dimer.  Cardiology was consulted.  She had bradycardia as well.  She does do boxing.    Her last echo was August 30, 2022 which showed ejection fraction of 60-65%.  And a small PFO on saline study.  Cardiology felt that she has sinus bradycardia, patient has never had a stroke.  Cardiology did not feel the need to close the PFO.  She is going to be anticoagulated and a repeat echo would not benefit or change her management significantly per cardiology.  Patient currently is on Coumadin and we are checking her INR every 2 weeks.  The pharmacy is checking her INR.  Her INR is 2.8 today.  She will follow the Coumadin protocol.    CT angiogram of the chest showed ill-defined thickening along the anterior aspect of the mediastinum measuring up to 0.7 cm is present no significant pericardial effusion.  There is a more focal linear filling defect within the main left pulmonary artery with extension into the left upper lobe pulmonary artery as seen on August 29, 2022.  There is also a focal linear filling defect within the left interlobar pulmonary artery and extending into the left upper lobe pulmonary artery anteriorly which is new since August 29, 2022.  Right ventricle to left ventricular ratio 0.5.  CT abdomen pelvis is negative subcentimeter hepatic lesions are too small to characterize.  The gallbladder pancreas spleen  "adrenal glands and kidneys are unremarkable.  No lymphadenopathy seen in the pelvis or the abdomen.        Review of Systems   Constitutional: Negative for appetite change, chills, diaphoresis, fatigue, fever and unexpected weight change.   HENT: Negative for hearing loss, sore throat and trouble swallowing.    Respiratory: Negative for cough, chest tightness, shortness of breath and wheezing.    Cardiovascular: Negative for chest pain, palpitations and leg swelling.   Gastrointestinal: Negative for abdominal distention, abdominal pain, constipation, diarrhea, nausea and vomiting.   Genitourinary: Negative for dysuria, frequency, hematuria and urgency.   Musculoskeletal: Negative for joint swelling.        No muscle weakness.   Skin: Negative for rash and wound.   Neurological: Negative for seizures, syncope, speech difficulty, weakness, numbness and headaches.   Hematological: Negative for adenopathy. Does not bruise/bleed easily.   Psychiatric/Behavioral: Negative for behavioral problems, confusion and suicidal ideas.   All other systems reviewed and are negative.    Medications:  The current medication list was reviewed in the EMR    ALLERGIES:    Allergies   Allergen Reactions   • Rituximab Itching     Ear itching       Objective      Vitals:    05/18/23 0907   BP: 116/72   Pulse: 57   Resp: 18   Temp: 96.9 °F (36.1 °C)   TempSrc: Temporal   SpO2: 97%   Weight: 75 kg (165 lb 6.4 oz)   Height: 157.5 cm (62.01\")   PainSc: 0-No pain         5/18/2023     8:57 AM   Current Status   ECOG score 0       Physical Exam       CONSTITUTIONAL:  Vital signs reviewed.  No distress, looks comfortable.  RESPIRATORY:  Normal respiratory effort.  Lungs clear to auscultation bilaterally.  CARDIOVASCULAR:  Normal S1, S2.  No murmurs rubs or gallops.  No significant lower extremity edema.  GASTROINTESTINAL: Abdomen appears unremarkable.  Nontender.  No hepatomegaly.  No splenomegaly.  LYMPHATIC:  No cervical, supraclavicular, " axillary lymphadenopathy.  SKIN:  Warm.  No rashes.  PSYCHIATRIC:  Normal judgment and insight.  Normal mood and affect.    I have reexamined the patient and the results are consistent with the previously documented exam. BRANNON Maxwell       RECENT LABS:  Results from last 7 days   Lab Units 05/18/23  0846   WBC 10*3/mm3 4.09   NEUTROS ABS 10*3/mm3 2.17   HEMOGLOBIN g/dL 13.2   HEMATOCRIT % 40.0   PLATELETS 10*3/mm3 189     Results from last 7 days   Lab Units 05/18/23  0846   INR  2.70*     MRI Brain With & Without Contrast [[id number]] Collected: 08/30/22 1747        Updated: 08/30/22 1815     Narrative:       MRI OF THE BRAIN WITH AND WITHOUT CONTRAST      CLINICAL HISTORY: Lightheadedness. History of non-Hodgkin's lymphoma.  Left-sided chest and left arm pain.     TECHNIQUE: MRI of the brain was obtained with sagittal pre and  postgadolinium T1, axial pre and postgadolinium T1, coronal  postgadolinium T1, axial postgadolinium 3-D SPGR, axial FLAIR, axial T2,  axial diffusion, and axial gradient echo images.     FINDINGS:     The ventricles, sulci, and cisterns are age-appropriate. The midline  intracranial anatomy is within normal limits. There are no abnormal foci  of restricted diffusion. No significant white matter abnormality is  appreciated. There are no abnormal foci of susceptibility artifact. The  major intracranial flow related signal voids are within normal limits.  No abnormal foci of contrast enhancement are noted within the brain  parenchyma. The midline intracranial anatomy is unremarkable.        Impression:          Unremarkable MRI of the brain.     This report was finalized on 8/30/2022 6:12 PM by Dr. Anthony Briggs M.D.        XR Chest 1 View [[id number]] Collected: 08/29/22 2130       Updated: 08/30/22 0728     Narrative:       PORTABLE CHEST     HISTORY: Chest pain. Lymphoma.     COMPARISON: CT chest 06/08/2022.     FINDINGS: A single portable view of the chest demonstrates the  heart to  be within normal limits in size. There is no evidence of infiltrate,  effusion or congestive failure. Further evaluation could be performed  with a CT examination of the chest as indicated.     This report was finalized on 8/30/2022 7:25 AM by Dr. Arie Beaulieu M.D.        CT Angiogram Chest [[id number]] Collected: 08/29/22 2152       Updated: 08/29/22 2316     Narrative:       CT ANGIOGRAM OF THE CHEST WITH CONTRAST INCLUDING RECONSTRUCTION IMAGES  08/29/2022     HISTORY: History of DVT. Now having some chest pain. Possible pulmonary  embolus.     TECHNIQUE: Following the intravenous contrast injection CT angiography  was performed through the chest.     Sagittal, coronal and 3-D reconstruction images were reviewed.     FINDINGS: The pulmonary arterial system is well opacified. There is a  small slightly ill-defined filling defect in the distal left pulmonary  artery which may extend into the origin of the left upper lobe pulmonary  artery. No right pulmonary emboli are seen.     There is some vague increased soft tissue in the region of the left  hilum similar to the appearance on the CT of the chest from 06/08/2022.  Mild increased attenuation of the anterior mediastinum is seen and this  also appears stable.     No lung masses are seen. No significant pulmonary infiltrates are seen.        Impression:       1. Small slightly irregular filling defect in the distal left pulmonary  artery which may extend into the base of the left upper lobe pulmonary  artery and is compatible with small pulmonary embolus.  2. Findings were discussed with the ER physician.  3. Short-term follow-up CT angiogram of the chest suggested.           Radiation dose reduction techniques were utilized, including automated  exposure control and exposure modulation based on body size.     This report was finalized on 8/29/2022 11:13 PM by Dr. Eliseo Templeton M.D.                I have reviewed the  medications.  ---------------------------------------------------------------------------------------------             Assessment & Plan   1.  Primary mediastinal large B-cell lymphoma: The patient presented with dyspnea, cough, and chest pain.  A CT angiogram of the chest 6/8/18 showed a very large tumor mass in the mediastinum encasing multiple vascular structures and narrowing the left mainstem bronchus.  There was direct tumor infiltration in the left upper lobe.  There was a small left pleural effusion.  She underwent a CT-guided biopsy of the mediastinal mass on 6/12/18 and pathology reviewed at [facility] oncology showed diffuse large B-cell lymphoma consistent with a primary diffuse large B-cell lymphoma.  A bone marrow exam was performed on 6/14/18 which was negative for lymphoma.  She underwent a PET scan 6/13/18 which showed a large hypermetabolic mass in the chest involving the anterior mediastinum, left hilum, nearly completely filling the left hemothorax with SUV 19.8.  There was physiologic distribution elsewhere.     The patient was started on IV fluid hydration and allopurinol for prevention of hyperuricemia.  She initiated systemic chemotherapy with DA-EPOCH-R on 6/16/18 and completed the evening of 6/21/18.  She had a infusion reaction to rituximab but was able to complete with additional medications and otherwise tolerated chemotherapy well.  She will require additional premedications with additional rituximab.  Plan is to monitor her blood counts twice per week outpatient and proceed with cycle 2 of chemotherapy day 21.     The patient had significant improvement in shortness of breath, cough, and chest pain by the time of discharge.    · Patient received Neulasta support  · Her white count dropped down to as low as 0.8 low-grade temperature and patient was placed on Levaquin ×5 days starting June 27, 2018, neutropenia AT  11 days posttreatment.  Platelets dropped to 82.  · Her next ZOLADEX  injection is due July 12.  · She is due to start chemotherapy on July 9.  · Discussed with Dr. Fox at the [facility] [place] and his suggestion was to do the CT scan and PET scan after 2 cycles and discuss the results with him.  If she has an excellent response early on she would not require any further treatments after completion of 6 cycles of EPOCH/R  · Patient being admitted for cycle 3 of chemotherapy on July 30, 2018.  · She's status post cycle 4 of chemotherapy and CT scan has been reviewed following 4 cycles with continued response.  · She is due for ZOLADEX  injection on October 8, 2018.   · Patient is due to go for cycle 5 of chemotherapy on Monday, September 17, 2018  ·  echocardiogram done August 22, 2018 shows ejection fraction of 59% to 60% with the eldest strain of 19.2%  · Cycle 6 chemotherapy with dose adjusted EPOCH/R on October 8, 2018.  · Reviewed CT scan and PET scan and the images with the patient and family.  Significant response with decrease in the mediastinal mass to 7.8 cm and activity level is 2.4 a week on PET scan.  · Reviewed repeat PET scan still with 7.8 cm tumor in the mediastinum.  The SUV is 2.4 and this could be scar tissue.  · The patient is due for her next dose of Zoladex today. As noted above, her ANC has been steadily declining over the last several months in the absence of infectious symptoms. This will be further detailed below. She will proceed with Zoladex, as scheduled.   · Discontinue Zoladex  · Reviewed CT as of January 15, 2019 with further improvement  · No plans on radiation to the mediastinal lymph node given the PET scan showed that it is photopenic  · Reviewed CT scan from June 2021.  And there is no evidence of disease  · Belia 15, 2022: Patient asymptomatic.  CT scan shows minimal increase of a small lymph node in the right neck from 0.3- 0.6 cm.  Questionable lesion in the liver very small follow-up in CT scan in 3 months.  Patient is asymptomatic    2.   Pericardial effusion:   a 2-D echocardiogram 18 showed a left ventricular systolic function 58%.  There was a 2 cm pericardial effusion with no tamponade.  The pericardium appeared thickened.  She had no evidence of volume overload.  It is resolved     3. Fertility preservation:  Patient received Zoladex injection  for fertility preservation; this should be continued once monthly during her chemotherapy.    · She is due for her next dose today and will continue this for 2 additional doses.   · Zoladex has been discontinued but patient has now started  menstrual periods     4. Right upper extremity DVT on Pradaxa. She continues on this with no issues.  Stable    5. Status post port placement, patient wants port removed and I think it is reasonable but will need to hold Pradaxa  · S/p port removal     6. Renal and Ovarian cysts.  We will continue to monitor.  · I have advised patient to consult her OB/GYN for ovarian cysts.  · CT scan does not show evidence of any ovarian cyst from 2020    7. Family history of cancer.  Her mother was diagnosed with cholangiocarcinoma.  · Patient's mother now diagnosed with metastatic lung cancer and currently is being transferred to hospice  · Patient's mother had     8.  Recent diagnosis of pulmonary embolism, in 2022  ·  Doppler ultrasound negative of bilateral lower extremities and left upper extremity  · Echo 2022 showed small patent foramen ovale present.  Saline test were positive on the echo  · MRI brain negative  · Hypercoagulable work-up negative  · Likely this was provoked pulmonary embolism given that she sometimes sits for hours   · HyperCard work-up negative  · No particular risk factors    9.  Echo showed EF    10.  Admitted 2023 with a new pulmonary embolism which occurred on Eliquis though there was some mention of patient being noncompliant about 5 times prior to developing this.  · 2022: Admitted with  pulmonary embolism and started on Eliquis tolerating well no evidence of malignancy by CT scan of the neck chest abdomen pelvis August 2022  · December 22, 2022: Patient doing well without any complaints currently on Eliquis 5 mg p.o. twice daily for history of pulmonary embolism since August 2022  · March 1, 2023: Patient admitted after having CT angiogram of the chest as a follow-up of pulmonary embolism.  Patient had been non  compliant about 5 times with her Eliquis.  She had rare episode of chest pain prior to the CT angiogram during her menstrual period.  CT angiogram showed a new pulmonary embolism and patient got admitted and switched from Eliquis to Coumadin with INR checks.  Currently her INR is 2.8 and she will follow the Coumadin protocol per the pharmacy nurse.  Patient was also seen by cardiology and they did not feel the need to do echocardiogram.  They did not feel the need to close the PFO as patient will be anticoagulated chronically.  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  We are in the process of referring her to hematology at [facility] which is about an hour away from their.      Plan  · Patient remains therapeutic on Coumadin 5 mg daily with INR of 2.7 today.  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  We are in the process of getting her referred to hematology at [facility] which will be about an hour away from her in [place].  · We will plan to see her back 1 more time 6/15/2023 for PT/INR and coag pharmacy review prior to her move.  · Patient does ultimately plan to move back here after her residency and I encouraged her to call us to reestablish once she is back in the [place] area.    OF NOTE: I did discuss with the patient having a home PT/INR  monitor for convenience since she will be still an hour away from the [facility] at her new home in [place].  Patient states her insurance is going to change when she moves to [place] for dental residency and therefore she cannot yet set this up.  Ultimately her goal would be to have home monitoring.    This patient is on high risk drug therapy requiring intensive monitoring for toxicity.        BRANNON Maxwell            CC: Dr. Arina Fox, at [facility]

## 2023-05-18 NOTE — PROGRESS NOTES
Anticoagulation Clinic Progress Note    Patient's visit was held in office today.    Anticoagulation Summary  As of 2023    INR goal:  2.0-3.0   TTR:  72.5 % (2.2 mo)   INR used for dosin.70 (2023)   Warfarin maintenance plan:  2.5 mg (5 mg x 0.5) every Sun; 5 mg (5 mg x 1) all other days; Starting 2023   Weekly warfarin total:  32.5 mg   No change documented:  Billie Mir RPH   Plan last modified:  Billie Mir RPH (2023)   Next INR check:  6/15/2023   Priority:  High   Target end date:      Indications    Recurrent pulmonary embolism [I26.99]             Anticoagulation Episode Summary     INR check location:      Preferred lab:      Send INR reminders to:  [facility] CBC ANTICOAG POOL    Comments:  [facility]      Anticoagulation Care Providers     Provider Role Specialty Phone number    Millie Padilla MD Referring Hematology and Oncology [phone]          Clinic Interview:  Patient Findings     Negatives:  Signs/symptoms of thrombosis, Signs/symptoms of bleeding,   Laboratory test error suspected, Change in health, Change in alcohol use,   Change in activity, Upcoming invasive procedure, Emergency department   visit, Upcoming dental procedure, Missed doses, Extra doses, Change in   medications, Change in diet/appetite, Hospital admission, Bruising, Other   complaints      Clinical Outcomes     Negatives:  Major bleeding event, Thromboembolic event,   Anticoagulation-related hospital admission, Anticoagulation-related ED   visit, Anticoagulation-related fatality        INR History:      Latest Ref Rng & Units 2023     8:00 AM 2023     2:54 PM 2023     3:00 PM 2023     7:36 AM 2023     8:30 AM 2023     8:46 AM 2023     9:00 AM   Anticoagulation Monitoring   INR  3.50  2.70  2.60  2.70   INR Date  2023   INR Goal  2.0-3.0  2.0-3.0  2.0-3.0  2.0-3.0   Trend  Down  Same  Same  Same   Last Week Total  35 mg  32.5  mg  32.5 mg  32.5 mg   Next Week Total  30 mg  32.5 mg  32.5 mg  32.5 mg   Sun  2.5 mg  2.5 mg  2.5 mg  2.5 mg   Mon  5 mg  5 mg  5 mg  5 mg   Tue  2.5 mg (4/4); Otherwise 5 mg  5 mg  5 mg  5 mg   Wed  5 mg  5 mg  5 mg  5 mg   Thu  5 mg  5 mg  5 mg  5 mg   Fri  5 mg  5 mg  5 mg  5 mg   Sat  5 mg  5 mg  5 mg  5 mg   Historical INR 0.90 - 1.10  2.70    2.60    2.70      Visit Report       Report Report       Plan:  1. INR is Therapeutic today- see above in Anticoagulation Summary.  Will instruct Vikki Durham to Continue their warfarin regimen- see above in Anticoagulation Summary.  2. Follow up in 1 month  3. Patient declines warfarin refills.  4. Verbal and written information provided. Patient expresses understanding and has no further questions at this time.  5. She will be moving out of state after appointment in one month.    Billie Mir RP

## 2023-05-23 ENCOUNTER — TELEPHONE (OUTPATIENT)
Dept: ONCOLOGY | Facility: CLINIC | Age: 28
End: 2023-05-23
Payer: MEDICAID

## 2023-05-25 RX ORDER — WARFARIN SODIUM 5 MG/1
TABLET ORAL
Qty: 90 TABLET | Refills: 1 | Status: SHIPPED | OUTPATIENT
Start: 2023-05-25

## 2023-06-15 ENCOUNTER — ANTICOAGULATION VISIT (OUTPATIENT)
Dept: ONCOLOGY | Facility: HOSPITAL | Age: 28
End: 2023-06-15
Payer: MEDICAID

## 2023-06-15 ENCOUNTER — LAB (OUTPATIENT)
Dept: LAB | Facility: HOSPITAL | Age: 28
End: 2023-06-15
Payer: MEDICAID

## 2023-06-15 DIAGNOSIS — I26.99 PULMONARY EMBOLISM WITHOUT ACUTE COR PULMONALE, UNSPECIFIED CHRONICITY, UNSPECIFIED PULMONARY EMBOLISM TYPE: ICD-10-CM

## 2023-06-15 DIAGNOSIS — I26.99 RECURRENT PULMONARY EMBOLISM: Primary | ICD-10-CM

## 2023-06-15 LAB
BASOPHILS # BLD AUTO: 0.02 10*3/MM3 (ref 0–0.2)
BASOPHILS NFR BLD AUTO: 0.5 % (ref 0–1.5)
DEPRECATED RDW RBC AUTO: 40.5 FL (ref 37–54)
EOSINOPHIL # BLD AUTO: 0.04 10*3/MM3 (ref 0–0.4)
EOSINOPHIL NFR BLD AUTO: 0.9 % (ref 0.3–6.2)
ERYTHROCYTE [DISTWIDTH] IN BLOOD BY AUTOMATED COUNT: 12.2 % (ref 12.3–15.4)
HCT VFR BLD AUTO: 38.8 % (ref 34–46.6)
HGB BLD-MCNC: 12.8 G/DL (ref 12–15.9)
IMM GRANULOCYTES # BLD AUTO: 0.04 10*3/MM3 (ref 0–0.05)
IMM GRANULOCYTES NFR BLD AUTO: 0.9 % (ref 0–0.5)
INR PPP: 3.76 (ref 0.9–1.1)
LYMPHOCYTES # BLD AUTO: 1.67 10*3/MM3 (ref 0.7–3.1)
LYMPHOCYTES NFR BLD AUTO: 38.9 % (ref 19.6–45.3)
MCH RBC QN AUTO: 29.6 PG (ref 26.6–33)
MCHC RBC AUTO-ENTMCNC: 33 G/DL (ref 31.5–35.7)
MCV RBC AUTO: 89.8 FL (ref 79–97)
MONOCYTES # BLD AUTO: 0.5 10*3/MM3 (ref 0.1–0.9)
MONOCYTES NFR BLD AUTO: 11.7 % (ref 5–12)
NEUTROPHILS NFR BLD AUTO: 2.02 10*3/MM3 (ref 1.7–7)
NEUTROPHILS NFR BLD AUTO: 47.1 % (ref 42.7–76)
NRBC BLD AUTO-RTO: 0 /100 WBC (ref 0–0.2)
PLATELET # BLD AUTO: 209 10*3/MM3 (ref 140–450)
PMV BLD AUTO: 10.1 FL (ref 6–12)
PROTHROMBIN TIME: 38 SECONDS (ref 11.7–14.2)
RBC # BLD AUTO: 4.32 10*6/MM3 (ref 3.77–5.28)
WBC NRBC COR # BLD: 4.29 10*3/MM3 (ref 3.4–10.8)

## 2023-06-15 PROCEDURE — 85025 COMPLETE CBC W/AUTO DIFF WBC: CPT

## 2023-06-15 PROCEDURE — 85610 PROTHROMBIN TIME: CPT | Performed by: INTERNAL MEDICINE

## 2023-06-15 PROCEDURE — G0463 HOSPITAL OUTPT CLINIC VISIT: HCPCS

## 2023-06-15 PROCEDURE — 36415 COLL VENOUS BLD VENIPUNCTURE: CPT

## 2023-06-15 NOTE — PROGRESS NOTES
Anticoagulation Clinic Progress Note    Patient's visit was held in office today.    Anticoagulation Summary  As of 6/15/2023      INR goal:  2.0-3.0   TTR:  59.5 % (3.2 mo)   INR used for dosing:     Warfarin maintenance plan:  2.5 mg (5 mg x 0.5) every Sun; 5 mg (5 mg x 1) all other days; Starting 6/15/2023   Weekly warfarin total:  32.5 mg   Plan last modified:  Billie Mir RPH (4/4/2023)   Next INR check:  6/19/2023   Priority:  High   Target end date:      Indications    Recurrent pulmonary embolism [I26.99]                 Anticoagulation Episode Summary       INR check location:      Preferred lab:      Send INR reminders to:  [facility] CBC ANTICOAG POOL    Comments:  [facility]          Anticoagulation Care Providers       Provider Role Specialty Phone number    Millie Padilla MD Referring Hematology and Oncology [phone]            Clinic Interview:  Patient Findings     Positives:  Change in diet/appetite    Comments:  Has been eating a food with mugwort in it.      Clinical Outcomes     Comments:  Has been eating a food with mugwort in it.        INR History:      Latest Ref Rng & Units 4/20/2023     3:00 PM 5/4/2023     7:36 AM 5/4/2023     8:30 AM 5/18/2023     8:46 AM 5/18/2023     9:00 AM 6/15/2023     8:45 AM 6/15/2023     8:47 AM   Anticoagulation Monitoring   INR  2.70  2.60  2.70     INR Date  4/20/2023 5/4/2023 5/18/2023     INR Goal  2.0-3.0  2.0-3.0  2.0-3.0 2.0-3.0    Trend  Same  Same  Same Same    Last Week Total  32.5 mg  32.5 mg  32.5 mg 32.5 mg    Next Week Total  32.5 mg  32.5 mg  32.5 mg 32.5 mg    Sun  2.5 mg  2.5 mg  2.5 mg 2.5 mg    Mon  5 mg  5 mg  5 mg -    Tue  5 mg  5 mg  5 mg -    Wed  5 mg  5 mg  5 mg -    Thu  5 mg  5 mg  5 mg 5 mg    Fri  5 mg  5 mg  5 mg 5 mg    Sat  5 mg  5 mg  5 mg 5 mg    Historical INR 0.90 - 1.10  2.60   2.70    3.76    Visit Report     Report Report         Plan:  1. INR is Supratherapeutic today- see above in Anticoagulation  Summary.  Will instruct Vikki Durham to hold today's dose and then resume normal dosing. Patient is also going to stop eating Mugwort.  2. Follow up in 4 days  3. Patient declines warfarin refills.  4. Verbal and written information provided. Patient expresses understanding and has no further questions at this time.    Tonya Piedra [facility]

## 2023-06-19 ENCOUNTER — ANTICOAGULATION VISIT (OUTPATIENT)
Dept: ONCOLOGY | Facility: HOSPITAL | Age: 28
End: 2023-06-19
Payer: MEDICAID

## 2023-06-19 DIAGNOSIS — I26.99 RECURRENT PULMONARY EMBOLISM: Primary | ICD-10-CM

## 2023-06-19 PROCEDURE — G0463 HOSPITAL OUTPT CLINIC VISIT: HCPCS

## 2023-06-19 NOTE — PROGRESS NOTES
Anticoagulation Clinic Progress Note    Patient's visit was held in office today.    Anticoagulation Summary  As of 2023      INR goal:  2.0-3.0   TTR:  58.9 % (3.3 mo)   INR used for dosin.40 (2023)   Warfarin maintenance plan:  2.5 mg (5 mg x 0.5) every Sun; 5 mg (5 mg x 1) all other days; Starting 2023   Weekly warfarin total:  32.5 mg   No change documented:  Billie Mir RPH   Plan last modified:  Billie Mir RPH (2023)   Next INR check:  2023   Priority:  Maintenance   Target end date:      Indications    Recurrent pulmonary embolism [I26.99]                 Anticoagulation Episode Summary       INR check location:      Preferred lab:      Send INR reminders to:  [facility] CBC ANTICOAG POOL    Comments:  [facility]          Anticoagulation Care Providers       Provider Role Specialty Phone number    Millie Padilla MD Referring Hematology and Oncology [phone]            Clinic Interview:  removed all Mugwort products from her diet which may have caused her recent supratherapeutic INR.      INR History:      Latest Ref Rng & Units 2023     8:30 AM 2023     8:46 AM 2023     9:00 AM 6/15/2023     8:45 AM 6/15/2023     8:47 AM 2023     7:52 AM 2023     8:00 AM   Anticoagulation Monitoring   INR  2.60  2.70 3.76   2.40   INR Date  2023  2023 6/15/2023   2023   INR Goal  2.0-3.0  2.0-3.0 2.0-3.0   2.0-3.0   Trend  Same  Same Same   Same   Last Week Total  32.5 mg  32.5 mg 32.5 mg   27.5 mg   Next Week Total  32.5 mg  32.5 mg 27.5 mg   32.5 mg   Sun  2.5 mg  2.5 mg 2.5 mg   2.5 mg   Mon  5 mg  5 mg -   5 mg   Tue  5 mg  5 mg -   5 mg   Wed  5 mg  5 mg -   5 mg   Thu  5 mg  5 mg Hold (6/15)   5 mg   Fri  5 mg  5 mg 5 mg   5 mg   Sat  5 mg  5 mg 5 mg   5 mg   Historical INR 0.90 - 1.10  2.70    3.76  2.40     Visit Report   Report Report           Plan:  1. INR is Therapeutic today- see above in Anticoagulation Summary.  Will instruct  Vikki Durham to Continue their warfarin regimen- see above in Anticoagulation Summary.  2. Follow up in 1 month  3. Patient declines warfarin refills.  4. Verbal information provided. Patient expresses understanding and has no further questions at this time.  5. 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     Billie Mir RPH

## 2023-10-27 ENCOUNTER — OFFICE VISIT (OUTPATIENT)
Dept: FAMILY MEDICINE CLINIC | Age: 28
End: 2023-10-27
Payer: COMMERCIAL

## 2023-10-27 VITALS
SYSTOLIC BLOOD PRESSURE: 109 MMHG | HEART RATE: 70 BPM | DIASTOLIC BLOOD PRESSURE: 66 MMHG | BODY MASS INDEX: 30.36 KG/M2 | RESPIRATION RATE: 16 BRPM | HEIGHT: 62 IN | WEIGHT: 165 LBS | TEMPERATURE: 98.8 F | OXYGEN SATURATION: 97 %

## 2023-10-27 DIAGNOSIS — Z76.89 ENCOUNTER TO ESTABLISH CARE: Primary | ICD-10-CM

## 2023-10-27 PROCEDURE — 99203 OFFICE O/P NEW LOW 30 MIN: CPT

## 2023-10-27 RX ORDER — WARFARIN SODIUM 5 MG/1
5 TABLET ORAL EVERY OTHER DAY
COMMUNITY

## 2023-10-27 RX ORDER — CHOLECALCIFEROL (VITAMIN D3) 1250 MCG
1 CAPSULE ORAL DAILY
COMMUNITY

## 2023-10-27 RX ORDER — WARFARIN SODIUM 2.5 MG/1
2.5 TABLET ORAL EVERY OTHER DAY
COMMUNITY

## 2023-10-27 RX ORDER — MAGNESIUM GLUCONATE 27 MG(500)
500 TABLET ORAL DAILY
COMMUNITY

## 2023-10-27 SDOH — ECONOMIC STABILITY: INCOME INSECURITY: HOW HARD IS IT FOR YOU TO PAY FOR THE VERY BASICS LIKE FOOD, HOUSING, MEDICAL CARE, AND HEATING?: NOT HARD AT ALL

## 2023-10-27 SDOH — ECONOMIC STABILITY: HOUSING INSECURITY
IN THE LAST 12 MONTHS, WAS THERE A TIME WHEN YOU DID NOT HAVE A STEADY PLACE TO SLEEP OR SLEPT IN A SHELTER (INCLUDING NOW)?: NO

## 2023-10-27 SDOH — ECONOMIC STABILITY: FOOD INSECURITY: WITHIN THE PAST 12 MONTHS, THE FOOD YOU BOUGHT JUST DIDN'T LAST AND YOU DIDN'T HAVE MONEY TO GET MORE.: NEVER TRUE

## 2023-10-27 SDOH — ECONOMIC STABILITY: FOOD INSECURITY: WITHIN THE PAST 12 MONTHS, YOU WORRIED THAT YOUR FOOD WOULD RUN OUT BEFORE YOU GOT MONEY TO BUY MORE.: NEVER TRUE

## 2023-10-27 ASSESSMENT — PATIENT HEALTH QUESTIONNAIRE - PHQ9
SUM OF ALL RESPONSES TO PHQ QUESTIONS 1-9: 2
2. FEELING DOWN, DEPRESSED OR HOPELESS: 1
SUM OF ALL RESPONSES TO PHQ QUESTIONS 1-9: 2
1. LITTLE INTEREST OR PLEASURE IN DOING THINGS: 1
SUM OF ALL RESPONSES TO PHQ9 QUESTIONS 1 & 2: 2
SUM OF ALL RESPONSES TO PHQ QUESTIONS 1-9: 2
SUM OF ALL RESPONSES TO PHQ QUESTIONS 1-9: 2

## 2023-10-27 ASSESSMENT — LIFESTYLE VARIABLES: HOW OFTEN DO YOU HAVE A DRINK CONTAINING ALCOHOL: NEVER

## 2023-10-27 NOTE — PROGRESS NOTES
[address]  FAMILY MEDICINE RESIDENCY PROGRAM  DATE OF VISIT : 10/27/2023    Patient : Indy Marshall   Age : 29 y.o.    : 1995   MRN : [id number]   ______________________________________________________________________    Chief Complaint :   Chief Complaint   Patient presents with    Establish Care       HPI : Indy Marshall is 29 y.o. female who presented to the clinic today for establishing care. History of unprovoked PE  [facility] Dr. Lucero Santos  Had INR checked there. 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 Dec 211. 61years old  Father - prediabetes  Paternal grandma - diabetes    Migraines  Having prodrome every so often but not full blown  Pain usually behind the R eye    Occasionally having shortness of breath  Achiness on the L chest wall where the cancer was  Last imaging was     Last Visit  : Visit date not found    Health Maintenance :  Health Maintenance Due   Topic Date Due    Hepatitis B vaccine (1 of 3 - 3-dose series) Never done    COVID-19 Vaccine (1) Never done    Varicella vaccine (1 of 2 - 2-dose childhood series) Never done    Depression Screen  Never done    HIV screen  Never done    Hepatitis C screen  Never done    DTaP/Tdap/Td vaccine (1 - Tdap) Never done    Pap smear  Never done    Flu vaccine (1) Never done       Review of Systems :  A Extended Review of symptoms obtained during Physical exam was otherwise unremarkable.     ______________________________________________________________________    Physical Exam :  Last 3 weights:    Wt Readings

## 2023-10-29 PROBLEM — G43.119 INTRACTABLE MIGRAINE WITH AURA WITHOUT STATUS MIGRAINOSUS: Status: ACTIVE | Noted: 2023-10-29

## 2023-11-27 ENCOUNTER — TELEPHONE (OUTPATIENT)
Dept: FAMILY MEDICINE CLINIC | Age: 28
End: 2023-11-27

## 2023-11-27 DIAGNOSIS — N92.6 ABNORMAL MENSTRUAL PERIODS: Primary | ICD-10-CM

## 2023-11-27 NOTE — TELEPHONE ENCOUNTER
----- Message from Pravin Puente sent at 11/27/2023 12:14 PM EST -----  Subject: Appointment Request    Reason for Call: Established Patient Appointment needed: Routine Physical   Exam    QUESTIONS    Reason for appointment request? Available appointments did not meet   patient need     Additional Information for Provider? Pt would like to come in and have a   Phy. Exam to check on what's going on with her. Pt recently had vaginal   bleeding from 11/22-11/26/23 and stated she knows it was not from her   menstrual cycle and would like to be checked. Pt is on Blood thinners. Also there was an appt with Yue Kay on 11/28/23 but Pt didn't take it   due to that PCP being a resident and Pt was told that since she is a   resident she cant schedule to see one.  Please contact to schedule a Phy   exam as soon as possible.   ---------------------------------------------------------------------------  --------------  Leah Cannon [facility]  [phone]; OK to leave message on voicemail,Do not leave any message,   patient will call back for answer  ---------------------------------------------------------------------------  --------------  SCRIPT ANSWERS

## 2023-11-29 ENCOUNTER — HOSPITAL ENCOUNTER (OUTPATIENT)
Age: 28
Discharge: HOME | End: 2023-11-29
Payer: COMMERCIAL

## 2023-11-29 DIAGNOSIS — N92.6 ABNORMAL MENSTRUAL PERIODS: ICD-10-CM

## 2023-11-29 LAB
ALBUMIN SERPL-MCNC: 4.5 G/DL (ref 3.5–5.2)
ALP SERPL-CCNC: 47 U/L (ref 35–104)
ALT SERPL-CCNC: 16 U/L (ref 0–32)
ANION GAP SERPL CALCULATED.3IONS-SCNC: 16 MMOL/L (ref 7–16)
AST SERPL-CCNC: 21 U/L (ref 0–31)
BASOPHILS # BLD: 0.03 K/UL (ref 0–0.2)
BASOPHILS NFR BLD: 1 % (ref 0–2)
BILIRUB SERPL-MCNC: 0.2 MG/DL (ref 0–1.2)
BUN SERPL-MCNC: 13 MG/DL (ref 6–20)
CALCIUM SERPL-MCNC: 9.3 MG/DL (ref 8.6–10.2)
CHLORIDE SERPL-SCNC: 101 MMOL/L (ref 98–107)
CO2 SERPL-SCNC: 23 MMOL/L (ref 22–29)
CREAT SERPL-MCNC: 0.8 MG/DL (ref 0.5–1)
EOSINOPHIL # BLD: 0.03 K/UL (ref 0.05–0.5)
EOSINOPHILS RELATIVE PERCENT: 1 % (ref 0–6)
ERYTHROCYTE [DISTWIDTH] IN BLOOD BY AUTOMATED COUNT: 13.9 % (ref 11.5–15)
GFR SERPL CREATININE-BSD FRML MDRD: >60 ML/MIN/1.73M2
GLUCOSE SERPL-MCNC: 84 MG/DL (ref 74–99)
HCT VFR BLD AUTO: 41 % (ref 34–48)
HGB BLD-MCNC: 12.8 G/DL (ref 11.5–15.5)
IMM GRANULOCYTES # BLD AUTO: <0.03 K/UL (ref 0–0.58)
IMM GRANULOCYTES NFR BLD: 0 % (ref 0–5)
LYMPHOCYTES NFR BLD: 1.66 K/UL (ref 1.5–4)
LYMPHOCYTES RELATIVE PERCENT: 32 % (ref 20–42)
MCH RBC QN AUTO: 28.3 PG (ref 26–35)
MCHC RBC AUTO-ENTMCNC: 31.2 G/DL (ref 32–34.5)
MCV RBC AUTO: 90.7 FL (ref 80–99.9)
MONOCYTES NFR BLD: 0.47 K/UL (ref 0.1–0.95)
MONOCYTES NFR BLD: 9 % (ref 2–12)
NEUTROPHILS NFR BLD: 58 % (ref 43–80)
NEUTS SEG NFR BLD: 3.05 K/UL (ref 1.8–7.3)
PLATELET # BLD AUTO: 210 K/UL (ref 130–450)
PMV BLD AUTO: 10 FL (ref 7–12)
POTASSIUM SERPL-SCNC: 4.1 MMOL/L (ref 3.5–5)
PROT SERPL-MCNC: 7.2 G/DL (ref 6.4–8.3)
RBC # BLD AUTO: 4.52 M/UL (ref 3.5–5.5)
SODIUM SERPL-SCNC: 140 MMOL/L (ref 132–146)
TSH SERPL DL<=0.05 MIU/L-ACNC: 1.65 UIU/ML (ref 0.27–4.2)
WBC OTHER # BLD: 5.3 K/UL (ref 4.5–11.5)

## 2023-11-29 PROCEDURE — 84443 ASSAY THYROID STIM HORMONE: CPT

## 2023-11-29 PROCEDURE — 80053 COMPREHEN METABOLIC PANEL: CPT

## 2023-11-29 PROCEDURE — 36415 COLL VENOUS BLD VENIPUNCTURE: CPT

## 2023-11-29 PROCEDURE — 85025 COMPLETE CBC W/AUTO DIFF WBC: CPT

## 2023-12-05 ENCOUNTER — OFFICE VISIT (OUTPATIENT)
Dept: FAMILY MEDICINE CLINIC | Age: 28
End: 2023-12-05
Payer: COMMERCIAL

## 2023-12-05 VITALS
OXYGEN SATURATION: 97 % | DIASTOLIC BLOOD PRESSURE: 58 MMHG | BODY MASS INDEX: 30.18 KG/M2 | SYSTOLIC BLOOD PRESSURE: 116 MMHG | TEMPERATURE: 98.1 F | HEIGHT: 62 IN | WEIGHT: 164 LBS | RESPIRATION RATE: 18 BRPM | HEART RATE: 63 BPM

## 2023-12-05 DIAGNOSIS — N92.6 IRREGULAR MENSES: ICD-10-CM

## 2023-12-05 DIAGNOSIS — Z12.4 CERVICAL CANCER SCREENING: Primary | ICD-10-CM

## 2023-12-05 PROCEDURE — 99214 OFFICE O/P EST MOD 30 MIN: CPT | Performed by: FAMILY MEDICINE

## 2023-12-05 ASSESSMENT — ENCOUNTER SYMPTOMS
DIARRHEA: 0
COUGH: 0
VOMITING: 0
CONSTIPATION: 0
NAUSEA: 0
SHORTNESS OF BREATH: 1
ABDOMINAL PAIN: 1

## 2023-12-05 NOTE — PROGRESS NOTES
[facility]  [address]   Livia Streeter, [address]  Edi Martinez MD     Patient: Sayda Bourne  YOB: 1995  Visit Date: 12/5/23    Edvin Cole is a 29y.o. year old female here today for   Chief Complaint   Patient presents with    Menstrual Problem       HPI  Patient is a 29year old female with history of PE and mediastinal large B-cell lymphoma    Currently seeing Dr. Sykes Greek emaunel in [address] Sees dr. Sonal Dang every couple months. Moved here a couple months ago for residency. . Was doing CT scan every 6 months with prior oncologist.   She is from [facility]. Left lung and mediastinum. Large diffuse b cell. Is going to go back home to [facility] after residency. 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 Usually has very regular period. 11/22 to the 11/25 had abnormal uterine bleeding. 3-4 looked like dry blood thought maybe it was coming from the vulva felt like she could feel a bump in her vulva bilaterally. Not as tender now and no further bleeding. LMP ended on Nov 1 -nov 6   Has not yet started her period. Has never been sexually active. Has never had a Pap smear   Has never missed a period in the past.   No blood in stool or urine. No easy bruising. Last INR 2.1 on Nov 30     Gets light achy chest pain where her cancer was when she has her period. Has felt like she couldn't breath sometimes but feels like it may be anxiety. Has been more fatigued than usual even after 8-9 hours of sleeps. Review of Systems   Constitutional:  Positive for fatigue. Negative for chills, fever and unexpected weight change. Respiratory:  Positive for shortness of breath. Negative for cough. Cardiovascular:  Positive for chest pain and leg swelling. Negative for palpitations. Gastrointestinal:  Positive for abdominal pain (left sided abdominal tenderness).  Negative for constipation, diarrhea,

## 2023-12-08 LAB — GYNECOLOGY CYTOLOGY REPORT: NORMAL

## 2024-01-15 NOTE — TELEPHONE ENCOUNTER
Reached out to patient regarding recent referral for anticoagulation management. The referral was placed by hospitalist, Dr. Villagomez. Patient stated she does not follow with [facility] and only saw Dr. Rivera in hospital. The medication management clinic is only able to accept referrals from [facility] per our CCA. Explained this to patient and recommended to patient to reach out to PCP (Dr. Blayne Echeverria) today at [facility] to schedule an INR check for early next week (preferably Monday). I am going to CC Dr. Rivera and hospitalist on this note and if referral is placed by [facility], we will follow back up with patient to schedule INR check with [facility].    Update: Have routed to Dr. Padilla from heme/onc.  
15-Delgado-2024 17:15

## 2024-03-20 ENCOUNTER — HOSPITAL ENCOUNTER (EMERGENCY)
Age: 29
Discharge: HOME | End: 2024-03-20
Attending: EMERGENCY MEDICINE
Payer: COMMERCIAL

## 2024-03-20 VITALS
WEIGHT: 154 LBS | RESPIRATION RATE: 18 BRPM | BODY MASS INDEX: 28.34 KG/M2 | HEIGHT: 62 IN | OXYGEN SATURATION: 98 % | TEMPERATURE: 97.9 F | DIASTOLIC BLOOD PRESSURE: 62 MMHG | SYSTOLIC BLOOD PRESSURE: 112 MMHG | HEART RATE: 54 BPM

## 2024-03-20 DIAGNOSIS — Z77.21 EXPOSURE TO BLOODBORNE PATHOGEN: Primary | ICD-10-CM

## 2024-03-20 PROCEDURE — 90471 IMMUNIZATION ADMIN: CPT | Performed by: EMERGENCY MEDICINE

## 2024-03-20 PROCEDURE — 86317 IMMUNOASSAY INFECTIOUS AGENT: CPT

## 2024-03-20 PROCEDURE — 90714 TD VACC NO PRESV 7 YRS+ IM: CPT | Performed by: EMERGENCY MEDICINE

## 2024-03-20 PROCEDURE — 86803 HEPATITIS C AB TEST: CPT

## 2024-03-20 PROCEDURE — 6360000002 HC RX W HCPCS: Performed by: EMERGENCY MEDICINE

## 2024-03-20 PROCEDURE — 87389 HIV-1 AG W/HIV-1&-2 AB AG IA: CPT

## 2024-03-20 PROCEDURE — 99284 EMERGENCY DEPT VISIT MOD MDM: CPT

## 2024-03-20 RX ADMIN — CLOSTRIDIUM TETANI TOXOID ANTIGEN (FORMALDEHYDE INACTIVATED) AND CORYNEBACTERIUM DIPHTHERIAE TOXOID ANTIGEN (FORMALDEHYDE INACTIVATED) 0.5 ML: 5; 2 INJECTION, SUSPENSION INTRAMUSCULAR at 20:43

## 2024-03-20 ASSESSMENT — PAIN - FUNCTIONAL ASSESSMENT: PAIN_FUNCTIONAL_ASSESSMENT: NONE - DENIES PAIN

## 2024-03-21 LAB
HBV SURFACE AB SERPL IA-ACNC: 348.85 MIU/ML (ref 0–9.99)
HCV AB SERPL QL IA: NONREACTIVE
HIV 1+2 AB+HIV1 P24 AG SERPL QL IA: NONREACTIVE

## 2024-03-21 NOTE — ED PROVIDER NOTES
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 female presenting to the ED for blood-borne pathogen exposure.  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  She was directed to the ED by occupational health.  She denies any associated symptoms.  History is notable for non-Hodgkin lymphoma status post treatment, and pulmonary embolism on warfarin.    Chart review: hx of NHL s/p treatment, PE on warfarin      Review of Systems:     Review of Systems  Pertinent positives and negatives as stated in HPI     --------------------------------------------- PAST HISTORY ---------------------------------------------  Past Medical History:   Past Medical History:   Diagnosis Date    Non-Hodgkin lymphoma (HCC)     2018, finished treatment in Oct 2018    Pulmonary embolism (HCC)     x 2 PE       Past Surgical History:   Past Surgical History:   Procedure Laterality Date    XR MIDLINE EQUAL OR GREATER THAN 5 YEARS  7/30/2018    XR MIDLINE EQUAL OR GREATER THAN 5 YEARS 7/30/2018    XR MIDLINE EQUAL OR GREATER THAN 5 YEARS  7/10/2018    XR MIDLINE EQUAL OR GREATER THAN 5 YEARS 7/10/2018       Social History:   Social History     Socioeconomic History    Marital status: Single     Spouse name: None    Number of children: None    Years of education: None    Highest education level: None   Tobacco Use    Smoking status: Never    Smokeless tobacco: Never   Vaping Use    Vaping Use: Never used   Substance and Sexual Activity    Alcohol use: Never    Drug use: Never     Social Determinants of Health     Financial Resource

## 2024-04-18 ENCOUNTER — OFFICE VISIT (OUTPATIENT)
Dept: FAMILY MEDICINE CLINIC | Age: 29
End: 2024-04-18

## 2024-04-18 ENCOUNTER — ANTI-COAG VISIT (OUTPATIENT)
Dept: FAMILY MEDICINE CLINIC | Age: 29
End: 2024-04-18

## 2024-04-18 VITALS
OXYGEN SATURATION: 97 % | WEIGHT: 161 LBS | BODY MASS INDEX: 29.63 KG/M2 | RESPIRATION RATE: 20 BRPM | TEMPERATURE: 98.1 F | HEIGHT: 62 IN | DIASTOLIC BLOOD PRESSURE: 58 MMHG | SYSTOLIC BLOOD PRESSURE: 107 MMHG | HEART RATE: 65 BPM

## 2024-04-18 DIAGNOSIS — Z79.01 ANTICOAGULATION MANAGEMENT ENCOUNTER: ICD-10-CM

## 2024-04-18 DIAGNOSIS — R53.83 OTHER FATIGUE: Primary | ICD-10-CM

## 2024-04-18 DIAGNOSIS — Z51.81 ANTICOAGULATION MANAGEMENT ENCOUNTER: ICD-10-CM

## 2024-04-18 DIAGNOSIS — R07.9 CHEST PAIN, UNSPECIFIED TYPE: ICD-10-CM

## 2024-04-18 DIAGNOSIS — N92.6 ABNORMAL MENSTRUAL PERIODS: ICD-10-CM

## 2024-04-18 LAB
ALBUMIN: 4.5 G/DL (ref 3.5–5.2)
ALP BLD-CCNC: 43 U/L (ref 35–104)
ALT SERPL-CCNC: 13 U/L (ref 0–32)
ANION GAP SERPL CALCULATED.3IONS-SCNC: 9 MMOL/L (ref 7–16)
AST SERPL-CCNC: 19 U/L (ref 0–31)
BASOPHILS ABSOLUTE: 0.04 K/UL (ref 0–0.2)
BASOPHILS RELATIVE PERCENT: 1 % (ref 0–2)
BILIRUB SERPL-MCNC: 0.2 MG/DL (ref 0–1.2)
BUN BLDV-MCNC: 12 MG/DL (ref 6–20)
CALCIUM SERPL-MCNC: 9.3 MG/DL (ref 8.6–10.2)
CHLORIDE BLD-SCNC: 102 MMOL/L (ref 98–107)
CO2: 28 MMOL/L (ref 22–29)
CREAT SERPL-MCNC: 0.8 MG/DL (ref 0.5–1)
EOSINOPHILS ABSOLUTE: 0.02 K/UL (ref 0.05–0.5)
EOSINOPHILS RELATIVE PERCENT: 0 % (ref 0–6)
FOLATE: 9.2 NG/ML (ref 4.8–24.2)
GFR SERPL CREATININE-BSD FRML MDRD: >90 ML/MIN/1.73M2
GLUCOSE BLD-MCNC: 85 MG/DL (ref 74–99)
HCT VFR BLD CALC: 40 % (ref 34–48)
HEMOGLOBIN: 12.8 G/DL (ref 11.5–15.5)
IMMATURE GRANULOCYTES %: 0 % (ref 0–5)
IMMATURE GRANULOCYTES ABSOLUTE: <0.03 K/UL (ref 0–0.58)
INTERNATIONAL NORMALIZATION RATIO, POC: 1.7
LYMPHOCYTES ABSOLUTE: 1.31 K/UL (ref 1.5–4)
LYMPHOCYTES RELATIVE PERCENT: 29 % (ref 20–42)
MCH RBC QN AUTO: 29.6 PG (ref 26–35)
MCHC RBC AUTO-ENTMCNC: 32 G/DL (ref 32–34.5)
MCV RBC AUTO: 92.6 FL (ref 80–99.9)
MONOCYTES ABSOLUTE: 0.39 K/UL (ref 0.1–0.95)
MONOCYTES RELATIVE PERCENT: 9 % (ref 2–12)
NEUTROPHILS ABSOLUTE: 2.74 K/UL (ref 1.8–7.3)
NEUTROPHILS RELATIVE PERCENT: 61 % (ref 43–80)
PDW BLD-RTO: 12.7 % (ref 11.5–15)
PLATELET # BLD: 264 K/UL (ref 130–450)
PMV BLD AUTO: 10.6 FL (ref 7–12)
POTASSIUM SERPL-SCNC: 4.3 MMOL/L (ref 3.5–5)
PROTHROMBIN TIME, POC: 20.7
RBC # BLD: 4.32 M/UL (ref 3.5–5.5)
SODIUM BLD-SCNC: 139 MMOL/L (ref 132–146)
TOTAL PROTEIN: 7 G/DL (ref 6.4–8.3)
VITAMIN B-12: 392 PG/ML (ref 211–946)
WBC # BLD: 4.5 K/UL (ref 4.5–11.5)

## 2024-04-18 ASSESSMENT — PATIENT HEALTH QUESTIONNAIRE - PHQ9
SUM OF ALL RESPONSES TO PHQ QUESTIONS 1-9: 1
SUM OF ALL RESPONSES TO PHQ QUESTIONS 1-9: 1
SUM OF ALL RESPONSES TO PHQ9 QUESTIONS 1 & 2: 1
1. LITTLE INTEREST OR PLEASURE IN DOING THINGS: NOT AT ALL
2. FEELING DOWN, DEPRESSED OR HOPELESS: SEVERAL DAYS
SUM OF ALL RESPONSES TO PHQ QUESTIONS 1-9: 1
SUM OF ALL RESPONSES TO PHQ QUESTIONS 1-9: 1

## 2024-04-18 NOTE — PROGRESS NOTES
[facility]  [address] [place] [address]  [phone]  Raymond Burgos MD     Patient: Agnieszka Francois  YOB: 1995  Visit Date: 4/18/24    Agnieszka is a 29 y.o. year old female here today for   Chief Complaint   Patient presents with    Vaginal Bleeding     Between periods.     Fatigue     X 1 Month       HPI  Was having a lot of fatigue . In March had a hard time. Especially when on her period. This is usually  not the case for her.     Had an emotionally tough time in March.   She is on coumadin . Her last INR was . She has rescheduled the missed heme/onc appointment.   Ok to get INR today .   They had increased her coumadin dose .     Mood currently is doing better for the last 2-3 weeks. Not interested in medication or counseling.   Is sleeping ok.   Has been having a lot of vivid dreams and feeling anxious but sleeps a full 7-8 hours.     Has some bleeding between periods. This started this year.   Is having periods every 26-29. Having spotting between periods. Periods are heavy , last about 8 days.     Has chest pain around her period. In the past few days too. But has been exercising. Pain is only on the left side, is a sharp pain. Happens randomly. Doesn't last very long.     9 weeks left of her residency and then will be moving.     Review of Systems   Constitutional:  Positive for fatigue. Negative for chills, fever and unexpected weight change.   Respiratory:  Negative for cough and shortness of breath.    Cardiovascular:  Positive for chest pain and leg swelling. Negative for palpitations.   Gastrointestinal:  Negative for abdominal pain, constipation, diarrhea, nausea and vomiting.   Genitourinary:  Positive for vaginal bleeding. Negative for hematuria.       Current Outpatient Medications on File Prior to Visit   Medication Sig Dispense Refill    warfarin (COUMADIN) 2.5 MG tablet Take 1 tablet by mouth daily Tues and thurs      warfarin (COUMADIN) 5 MG tablet Take

## 2024-04-18 NOTE — PATIENT INSTRUCTIONS
Take 2.5mg on Tues   Take 5mg on all other days   Recheck INR in 1 week.   Get labs today   Follow up 1 month or sooner as needed.

## 2024-04-24 NOTE — RESULT ENCOUNTER NOTE
Left message informing patient that blood work is good but vit b12 is on the low end of normal. Informed that vit b12 supplement is recommended.

## 2024-04-25 ENCOUNTER — TELEPHONE (OUTPATIENT)
Dept: FAMILY MEDICINE CLINIC | Age: 29
End: 2024-04-25

## 2024-04-25 NOTE — TELEPHONE ENCOUNTER
----- Message from Raymond Burgos MD sent at 4/24/2024  3:02 PM EDT -----  Please remind patinet to come in for INR tomorrow or Friday

## 2024-04-25 NOTE — TELEPHONE ENCOUNTER
----- Message from Anaselvin Perez sent at 4/25/2024 12:46 PM EDT -----  Subject: Message to Provider    QUESTIONS  Information for Provider? PT wanting to see if she can come in on Tuesday   for INR.   ---------------------------------------------------------------------------  --------------  CALL BACK INFO  [phone]; OK to leave message on voicemail  ---------------------------------------------------------------------------  --------------  SCRIPT ANSWERS  undefined

## 2024-05-02 ENCOUNTER — NURSE ONLY (OUTPATIENT)
Dept: FAMILY MEDICINE CLINIC | Age: 29
End: 2024-05-02
Payer: COMMERCIAL

## 2024-05-02 ENCOUNTER — ANTI-COAG VISIT (OUTPATIENT)
Dept: FAMILY MEDICINE CLINIC | Age: 29
End: 2024-05-02

## 2024-05-02 DIAGNOSIS — Z51.81 ANTICOAGULATION MANAGEMENT ENCOUNTER: Primary | ICD-10-CM

## 2024-05-02 DIAGNOSIS — Z79.01 ANTICOAGULATION MANAGEMENT ENCOUNTER: Primary | ICD-10-CM

## 2024-05-02 LAB
INR BLD: 2.1
INTERNATIONAL NORMALIZATION RATIO, POC: 2.1
PROTHROMBIN TIME, POC: 25

## 2024-05-02 PROCEDURE — 85610 PROTHROMBIN TIME: CPT | Performed by: FAMILY MEDICINE

## 2024-05-02 PROCEDURE — 93793 ANTICOAG MGMT PT WARFARIN: CPT | Performed by: FAMILY MEDICINE

## 2024-05-13 ENCOUNTER — TELEPHONE (OUTPATIENT)
Dept: OBGYN | Age: 29
End: 2024-05-13

## 2024-05-13 NOTE — TELEPHONE ENCOUNTER
Called patient to schedule apt, she wants to call around to see if she can get in sooner elsewhere.

## 2024-06-13 ENCOUNTER — TELEPHONE (OUTPATIENT)
Dept: ONCOLOGY | Facility: CLINIC | Age: 29
End: 2024-06-13

## 2024-06-13 NOTE — TELEPHONE ENCOUNTER
[facility] received a fax that requires your attention. The document has been indexed to the patient’s chart for your review.      Reason for sending: RECEIVED REFERRAL AND RECORDS FOR ESTABLISHED PATIENT    Documents Description: REFERRAL 06/13/24, DEMO 06/13/24, INSURANCE CARD 06/13/24, PROGRESS NOTE 07/20/23, 10/12/23, 01/04/24, 05/09/24, LAB 10/12/23, 10/16/23, 10/26/23, 11/02/23, 11/09/23, 11/30/23, 12/21/23, 01/04/24, 03/01/24, 05/09/24, 06/06/24.> INDEXED IN CHART    Name of Sender: LINDA SANZ [phone]    Date Indexed: 06/13/24    Notes (if needed): THANKS!

## 2024-07-19 ENCOUNTER — OFFICE VISIT (OUTPATIENT)
Dept: ONCOLOGY | Facility: CLINIC | Age: 29
End: 2024-07-19
Payer: MEDICAID

## 2024-07-19 ENCOUNTER — LAB (OUTPATIENT)
Dept: LAB | Facility: HOSPITAL | Age: 29
End: 2024-07-19
Payer: MEDICAID

## 2024-07-19 VITALS
HEART RATE: 57 BPM | WEIGHT: 159.2 LBS | DIASTOLIC BLOOD PRESSURE: 65 MMHG | TEMPERATURE: 98.4 F | OXYGEN SATURATION: 98 % | BODY MASS INDEX: 29.11 KG/M2 | SYSTOLIC BLOOD PRESSURE: 98 MMHG

## 2024-07-19 DIAGNOSIS — T45.1X5A ANEMIA ASSOCIATED WITH CHEMOTHERAPY: ICD-10-CM

## 2024-07-19 DIAGNOSIS — I82.621 ARM DVT (DEEP VENOUS THROMBOEMBOLISM), ACUTE, RIGHT: ICD-10-CM

## 2024-07-19 DIAGNOSIS — D64.81 ANEMIA ASSOCIATED WITH CHEMOTHERAPY: ICD-10-CM

## 2024-07-19 DIAGNOSIS — I26.99 PULMONARY EMBOLISM, UNSPECIFIED CHRONICITY, UNSPECIFIED PULMONARY EMBOLISM TYPE, UNSPECIFIED WHETHER ACUTE COR PULMONALE PRESENT: Primary | ICD-10-CM

## 2024-07-19 DIAGNOSIS — I26.99 PULMONARY EMBOLISM WITHOUT ACUTE COR PULMONALE, UNSPECIFIED CHRONICITY, UNSPECIFIED PULMONARY EMBOLISM TYPE: ICD-10-CM

## 2024-07-19 DIAGNOSIS — C85.28 MEDIASTINAL LARGE B-CELL LYMPHOMA OF LYMPH NODES OF MULTIPLE REGIONS: Primary | ICD-10-CM

## 2024-07-19 DIAGNOSIS — C85.28 MEDIASTINAL LARGE B-CELL LYMPHOMA OF LYMPH NODES OF MULTIPLE REGIONS: ICD-10-CM

## 2024-07-19 DIAGNOSIS — Z79.01 ANTICOAGULATION MONITORING BY PHARMACIST: ICD-10-CM

## 2024-07-19 LAB
ALBUMIN SERPL-MCNC: 4.4 G/DL (ref 3.5–5.2)
ALBUMIN/GLOB SERPL: 1.9 G/DL
ALP SERPL-CCNC: 37 U/L (ref 39–117)
ALT SERPL W P-5'-P-CCNC: 13 U/L (ref 1–33)
ANION GAP SERPL CALCULATED.3IONS-SCNC: 8.3 MMOL/L (ref 5–15)
AST SERPL-CCNC: 22 U/L (ref 1–32)
BASOPHILS # BLD AUTO: 0.03 10*3/MM3 (ref 0–0.2)
BASOPHILS NFR BLD AUTO: 0.6 % (ref 0–1.5)
BILIRUB SERPL-MCNC: 0.2 MG/DL (ref 0–1.2)
BUN SERPL-MCNC: 14 MG/DL (ref 6–20)
BUN/CREAT SERPL: 17.5 (ref 7–25)
CALCIUM SPEC-SCNC: 9.1 MG/DL (ref 8.6–10.5)
CHLORIDE SERPL-SCNC: 102 MMOL/L (ref 98–107)
CO2 SERPL-SCNC: 27.7 MMOL/L (ref 22–29)
CREAT SERPL-MCNC: 0.8 MG/DL (ref 0.57–1)
DEPRECATED RDW RBC AUTO: 45.1 FL (ref 37–54)
EGFRCR SERPLBLD CKD-EPI 2021: 102.4 ML/MIN/1.73
EOSINOPHIL # BLD AUTO: 0.02 10*3/MM3 (ref 0–0.4)
EOSINOPHIL NFR BLD AUTO: 0.4 % (ref 0.3–6.2)
ERYTHROCYTE [DISTWIDTH] IN BLOOD BY AUTOMATED COUNT: 13.5 % (ref 12.3–15.4)
GLOBULIN UR ELPH-MCNC: 2.3 GM/DL
GLUCOSE SERPL-MCNC: 100 MG/DL (ref 65–99)
HCT VFR BLD AUTO: 38.3 % (ref 34–46.6)
HGB BLD-MCNC: 12.1 G/DL (ref 12–15.9)
IMM GRANULOCYTES # BLD AUTO: 0.01 10*3/MM3 (ref 0–0.05)
IMM GRANULOCYTES NFR BLD AUTO: 0.2 % (ref 0–0.5)
INR PPP: 2.94 (ref 0.9–1.1)
LYMPHOCYTES # BLD AUTO: 1.39 10*3/MM3 (ref 0.7–3.1)
LYMPHOCYTES NFR BLD AUTO: 29 % (ref 19.6–45.3)
MCH RBC QN AUTO: 28.7 PG (ref 26.6–33)
MCHC RBC AUTO-ENTMCNC: 31.6 G/DL (ref 31.5–35.7)
MCV RBC AUTO: 91 FL (ref 79–97)
MONOCYTES # BLD AUTO: 0.42 10*3/MM3 (ref 0.1–0.9)
MONOCYTES NFR BLD AUTO: 8.8 % (ref 5–12)
NEUTROPHILS NFR BLD AUTO: 2.92 10*3/MM3 (ref 1.7–7)
NEUTROPHILS NFR BLD AUTO: 61 % (ref 42.7–76)
NRBC BLD AUTO-RTO: 0 /100 WBC (ref 0–0.2)
PLATELET # BLD AUTO: 232 10*3/MM3 (ref 140–450)
PMV BLD AUTO: 9.4 FL (ref 6–12)
POTASSIUM SERPL-SCNC: 4.4 MMOL/L (ref 3.5–5.2)
PROT SERPL-MCNC: 6.7 G/DL (ref 6–8.5)
PROTHROMBIN TIME: 30.9 SECONDS (ref 11.7–14.2)
RBC # BLD AUTO: 4.21 10*6/MM3 (ref 3.77–5.28)
SODIUM SERPL-SCNC: 138 MMOL/L (ref 136–145)
WBC NRBC COR # BLD AUTO: 4.79 10*3/MM3 (ref 3.4–10.8)

## 2024-07-19 PROCEDURE — 85610 PROTHROMBIN TIME: CPT

## 2024-07-19 PROCEDURE — 80053 COMPREHEN METABOLIC PANEL: CPT

## 2024-07-19 PROCEDURE — 85025 COMPLETE CBC W/AUTO DIFF WBC: CPT

## 2024-07-26 NOTE — PROGRESS NOTES
Subjective      REASONS FOR FOLLOW UP:    1.  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 She did initiate EPOCH-R  in the hospital on 06/16/2018.  Treated 6 cycles of treatment.  Completed October 2018 followed with observation    2.  Admitted August 29, 2022 with pulmonary embolism left upper lobe.  Currently on Eliquis  Hypercoagulable work-up showedFactor VIII 125%, anticardiolipin antibody negative, beta-2 glycoprotein antibody negative, lupus anticoagulant not detected, D-dimer less than 0.27, Antithrombin %, protein C activity 115%, protein S activity 123% prothrombin gene mutation negative, factor V Leiden negative.  Echo normal  Echo normal  CT angiogram left upper lobe pulmonary embolism  CT abdomen pelvis negative, CT neck 0.6 mm submandibular gland stable  No B symptoms  Admitted August 22, 2022 with chest pain, had left upper lobe pulmonary embolism, started on Eliquis.  CT angiogram of the chest abdomen pelvis negative for any progressive malignancy.  September 2022 D-dimer normal,Factor VIII activity 125%, Antithrombin %, anticardiolipin antibody negative beta-2 glycoprotein antibody negative Lupus anticoagulant not detected factor V Leiden normal protein C activity 115%, protein S 100,000 prothrombin gene mutation negative factor VIII activity 115%.  Patient states no risk factors.  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 female with the above-mentioned history who returns today for follow-up specifically for ongoing Coumadin therapy in the setting of recently diagnosed PE.  She has been very stable on Coumadin 5 mg daily with INR that is therapeutic today at 2.7.  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  We will plan to see her 1 more time before that and then the  plan is to transition her to hematology at [facility] [place].  She denies other concerns today.      July 19, 2024:    Patient has been on chronic anticoagulation.  She was out of the city for a year and then she has come back.  She is on Coumadin now and she has to follow-up with the INR.  She likes to be hooked up to the Coumadin clinic in our office.  She otherwise has gained weight.  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  She does not have any nausea vomiting headache abdominal pain fatigue.  She is doing relatively better.  Her Coumadin dosing will be adjusted in the coag clinic.    She does not have any fever weight loss or night sweats.  Her mediastinal high-grade lymphoma is without evidence of disease      Past Medical History, Past Surgical History, Social History, Family History have been reviewed and are without significant changes except as mentioned.    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  She saw Dr. Arina Cohen on last Friday.  The reason the patient was referred to Dr. Cohen was because they thought she had cardiomegaly on chest x-ray.  However a chest x-ray was ordered which showedThe chest x-ray showed extensive abnormal increased density throughout the anterior mediastinum extending into the left hilar region and left perihilar region and is most likely due to confluent lymphadenopathy.     CT angiogram of the chest did not show pulmonary embolism but showed     there is a large conglomerate  mass encases multiple vascular structures of the mediastinum and left  hilar region that is most likely extensive confluent lymphadenopathy.  The primary differential diagnostic considerations would be lymphoma.  The tumor posteriorly displaces the pulmonary arteries and the left and  moderately narrows the main pulmonary artery. The tumor also posteriorly  displaces the trachea and markedly narrows the left mainstem bronchus.  The  pulmonary veins in the left are also encased and narrowed. The mass  encases and markedly narrows the SVC. The large edy mass measures  approximately 19.8 x 13.7 x 14.0 cm in diameter. Enlarged lymph nodes  are also present in the left supraclavicular region where they appear to  produce occlusion of the left internal jugular vein. There is no  axillary lymphadenopathy. There are no filling defects within the  pulmonary arteries. There is no CT evidence of pulmonary embolism. There  is a small left pleural effusion. A small pericardial effusion measuring  8 mm in maximum thickness is also present. There is compressive  atelectasis of the left lung. Patchy airspace opacities are also present  in the superior aspect of the left upper lobe. These are most likely due  to direct tumor infiltration of the lung parenchyma, but could also  represent postobstructive pneumonia. The right lung is well expanded and  clear. Images through the upper abdomen partially show what could be a  edy mass in the retroperitoneum posterior and inferior to the  pancreas. Further evaluation with a CT scan of the abdomen and pelvis is  recommended. Bone window images demonstrate patchy sclerosis in the C7  and T1 and T2 and T3 and T4 vertebral body suspicious for bone  involvement with lymphoma.     IMPRESSION:  Very large tumor mass in the mediastinum encasing multiple  vascular structures and also moderately narrowing the left mainstem  bronchus as described in more detail above. Direct tumor infiltration of  the left upper lobe is also suspected. Small left pleural effusion and a  small pericardial effusion. There may also be partially visualized  lymphadenopathy in the upper abdomen. Patchy areas of sclerosis are also  noted in the C7 and T1 and T2 and T3 and T4 vertebral bodies suspicious  for osseous metastatic disease. The findings are consistent with  Lymphoma.     Dr. Cohen felt that she had a small pericardial effusion.  Patient  has been symptomatic with cough which is worsening which started back in February 2018 and worsened over the last 4 months.  Patient also has some shortness of breath with walking up the steps.  She has not lost weight.  She say she is reasonable appetite.  She does have fever kind of low-grade fever and night sweats.  She is more fatigued compared to before.  She was referred to us because of concern that this could be lymphoma.  She does not have any tissue diagnosis yet.  Patient does complain of back pain.    Echo done on admission June 12, 2018 by Dr. Fitzgerald showed that the patient's posterior and appears large primary leukemia the left ventricle and apex.  There is no tamponade.  The pericardium appears thickened pointing to involvement of the pericardium into the mediastinal process.  Dr. Fitzgerald felt that it would be difficult to do pericardial synthesis as the mediastinal mass covers the anterior aspect of the heart.  Ejection fraction is 58%  CT-guided needle biopsy June 12, 2018 was negative  LDH is elevated.  Uric acid is high.  MRI thoracic spine, negative  CT abdomen pelvis negative  Scan July 13, 2018 shows large infiltrative edy mass in the anterior mediastinum and left hilar region that nearly completely fills the left hemithorax and shows intense hypermetabolism with an SUV of 19.8.  No foci of pathologic hypermetabolism are identified elsewhere in the chest, neck abdomen or pelvis.    Pathology was consistent with primary mediastinal large B-cell lymphoma.    Patient was seen by Dr. Melgar.  He discussed harvesting eggs versus Zoladex plus oral contraceptives versus Zoladex alone for fertility preservation.  Due to large size of the tumor and rapid initiation of treatment needed the patient was not able to have aches harvested done.  Because she is at increased risk of thrombosis with the use of oral contraceptives secondary to malignancy he recommended Zoladex alone.  Patient received first dose  of Zoladex 3.6 mg subcutaneous on Belia 15, 2018.    Patient started on EPOCH/R on June 16, 2018    Patient had a reaction to Rituxan which improved with steroids, Benadryl and Pepcid.  She had to receive it slow.  She started having itching over her EARS , sore throat.  She was given IV steroids Benadryl and Pepcid and following that she was started at a slower rate and she completed it.    Right upper extremity Doppler ultrasound showed new superficial vein thrombosis around the PICC line with no extension into the deep system.  Repeat Doppler was ordered.    A Doppler ultrasound initially showed superficial thrombophlebitis.  She was on prophylactic Arixtra.  A Doppler was repeated 2 days later on 6/20/18  which showed extension of thrombus into the axillary and brachial veins.  She was therefore started on Xarelto 15 mg one every 12 hours and the PICC line was removed as soon as chemotherapy completed on 6/20/18.  She will take 15 mg of Xarelto every 12 hours for 21 days and then transition to 20 mg once a day.  The patient will have CBC performed twice a week.  If the platelet count drops below 50,000, anticoagulation will need to be temporarily held  Patient was started on acyclovir/fluconazole/Bactrim  Bone marrow done June 14, 2018, morphology was negative for lymphoma    Fertility preservation, Zoladex is next due July 12, 2018.  CT scan and PET scan showing significant response after cycle 2 of chemotherapy  Next dose of Lupron August 13, 2018  Status post 5 cycles  OF epoch/r and is due cycle 6 on oct 8th.    Patient admitted October 20, 2018 and discharged October 22, 2018 when she was admitted with neutropenic fever.  She was treated with broad-spectrum antibiotics.  Her respiratory viral panel was positive for parainfluenza virus.    PET scan with significant improvement but residual 7.8 cm lesion without any activity.  Reviewed CT scan from January 15, 2019    Patient seen by Dr. Fox at the [facility]  cancer [facility], agreed with observation at present no plans on radiation.    CT scan and PET scan from April 5 and April 9, 2019 has been reviewed and discussed with patient.  There is further decrease in the mediastinal lymphadenopathy.    CT chest abdomen pelvis July 22, 2019 with further decrease in the mediastinal lymphadenopathy significantly    We reviewed with patient the CT scans from 12/12/19 which show mediastinal lymphadenopathy which has decreased in size.   There is also a small left renal cyst and a 2 cm partially involuted right ovarian cyst.     Patient was admitted August 29, 2022 when she complained of left-sided chest discomfort and some leg cramping.  She underwent CT angiogram of the chest which showed left upper lobe pulmonary embolism.  She was started on Eliquis.  Bilateral Doppler extremities were done which showed that it was normal and duplex of the left upper extremity was normal.  Dr. Real was consulted.  Hypercoagulable work-up was done.  An echocardiogram was done which was negative.  MRI of the brain was unremarkable.  She was placed on Eliquis 10 mg twice daily for a week and subsequently to be switched to 5 mg twice daily.  Her admit admission labs were normal.    Patient underwent CT abdomen pelvis which did not show any new or lymphadenopathy previously described 6 mm left submandibular node was unchanged.  CT neck showed the left submandibular gland    Patient went to the ER on September 7, 2022 with dizziness.  So she underwent CT of the head for dizziness and no acute process was identified.  Also a chest x-ray was obtained which was negative.  Currently her dizziness is resolved.  She likely was not hydrated well.    Currently start 5 mg p.o. twice daily of Eliquis.  She denies any active bleeding      Patient was admitted much post to [facility].  Patient had gone for CT chest as a follow-up of her pulmonary embolism and was found to have a new pulmonary embolism  despite Eliquis 5 mg twice daily since August 30, 2022.  She had complained of chest pain 4 days prior to the CT chest.  Patient apparently has been forgetting couple of times a month to take her Eliquis.  She missed 5 pills of Eliquis in the last month prior to admission.  She has not had any COVID 19 infection or vaccination.  She did have a prior DVT associated with port in 2018 for which she was on Pradaxa until the port was removed.  In August 2022 her hypercoagulable work-up was negative when she presented with her first pulmonary embolism and at that time D-dimer was normal as well Dopplers of her lower extremities.    During this admission patient was asymptomatic except intermittent chest pain associated with her menstrual cycles.  Repeat Dopplers of the lower extremities were negative except a chronic clot in the subclavian on the right side related to her port.  In August 2022 she had a small pulmonary embolism.  She was anxious that clot could be a sign of her lymphoma returning.  But she is 5 years from treatment and usually unusual to develop recurrent lymphoma.  Patient was seen by Dr. Real during the hospitalization and suggested echocardiogram in order to evaluate the PFO.  And repeat D-dimer.  Cardiology was consulted.  She had bradycardia as well.  She does do boxing.    Her last echo was August 30, 2022 which showed ejection fraction of 60-65%.  And a small PFO on saline study.  Cardiology felt that she has sinus bradycardia, patient has never had a stroke.  Cardiology did not feel the need to close the PFO.  She is going to be anticoagulated and a repeat echo would not benefit or change her management significantly per cardiology.  Patient currently is on Coumadin and we are checking her INR every 2 weeks.  The pharmacy is checking her INR.  Her INR is 2.8 today.  She will follow the Coumadin protocol.    CT angiogram of the chest showed ill-defined thickening along the anterior aspect of the  mediastinum measuring up to 0.7 cm is present no significant pericardial effusion.  There is a more focal linear filling defect within the main left pulmonary artery with extension into the left upper lobe pulmonary artery as seen on August 29, 2022.  There is also a focal linear filling defect within the left interlobar pulmonary artery and extending into the left upper lobe pulmonary artery anteriorly which is new since August 29, 2022.  Right ventricle to left ventricular ratio 0.5.  CT abdomen pelvis is negative subcentimeter hepatic lesions are too small to characterize.  The gallbladder pancreas spleen adrenal glands and kidneys are unremarkable.  No lymphadenopathy seen in the pelvis or the abdomen.        Review of Systems   Constitutional:  Negative for appetite change, chills, diaphoresis, fatigue, fever and unexpected weight change.   HENT:  Negative for hearing loss, sore throat and trouble swallowing.    Respiratory:  Negative for cough, chest tightness, shortness of breath and wheezing.    Cardiovascular:  Negative for chest pain, palpitations and leg swelling.   Gastrointestinal:  Negative for abdominal distention, abdominal pain, constipation, diarrhea, nausea and vomiting.   Genitourinary:  Negative for dysuria, frequency, hematuria and urgency.   Musculoskeletal:  Negative for joint swelling.        No muscle weakness.   Skin:  Negative for rash and wound.   Neurological:  Negative for seizures, syncope, speech difficulty, weakness, numbness and headaches.   Hematological:  Negative for adenopathy. Does not bruise/bleed easily.   Psychiatric/Behavioral:  Negative for behavioral problems, confusion and suicidal ideas.    All other systems reviewed and are negative.    Medications:  The current medication list was reviewed in the EMR    ALLERGIES:    Allergies   Allergen Reactions    Rituximab Itching     Ear itching       Objective      Vitals:    07/19/24 1659   BP: 98/65   Pulse: 57   Temp: 98.4  °F (36.9 °C)   TempSrc: Oral   SpO2: 98%   Weight: 72.2 kg (159 lb 3.2 oz)   PainSc: 0-No pain         5/18/2023     8:57 AM   Current Status   ECOG score 0       Physical Exam       CONSTITUTIONAL:  Vital signs reviewed.  No distress, looks comfortable.  RESPIRATORY:  Normal respiratory effort.  Lungs clear to auscultation bilaterally.  CARDIOVASCULAR:  Normal S1, S2.  No murmurs rubs or gallops.  No significant lower extremity edema.  GASTROINTESTINAL: Abdomen appears unremarkable.  Nontender.  No hepatomegaly.  No splenomegaly.  LYMPHATIC:  No cervical, supraclavicular, axillary lymphadenopathy.  SKIN:  Warm.  No rashes.  PSYCHIATRIC:  Normal judgment and insight.  Normal mood and affect.    I have reexamined the patient and the results are consistent with the previously documented exam. Millie Padilla MD       RECENT LABS:              MRI Brain With & Without Contrast [[id number]] Collected: 08/30/22 1747        Updated: 08/30/22 1815     Narrative:       MRI OF THE BRAIN WITH AND WITHOUT CONTRAST      CLINICAL HISTORY: Lightheadedness. History of non-Hodgkin's lymphoma.  Left-sided chest and left arm pain.     TECHNIQUE: MRI of the brain was obtained with sagittal pre and  postgadolinium T1, axial pre and postgadolinium T1, coronal  postgadolinium T1, axial postgadolinium 3-D SPGR, axial FLAIR, axial T2,  axial diffusion, and axial gradient echo images.     FINDINGS:     The ventricles, sulci, and cisterns are age-appropriate. The midline  intracranial anatomy is within normal limits. There are no abnormal foci  of restricted diffusion. No significant white matter abnormality is  appreciated. There are no abnormal foci of susceptibility artifact. The  major intracranial flow related signal voids are within normal limits.  No abnormal foci of contrast enhancement are noted within the brain  parenchyma. The midline intracranial anatomy is unremarkable.        Impression:          Unremarkable MRI of the brain.      This report was finalized on 8/30/2022 6:12 PM by Dr. Anthony Briggs M.D.        XR Chest 1 View [[id number]] Collected: 08/29/22 2130       Updated: 08/30/22 0728     Narrative:       PORTABLE CHEST     HISTORY: Chest pain. Lymphoma.     COMPARISON: CT chest 06/08/2022.     FINDINGS: A single portable view of the chest demonstrates the heart to  be within normal limits in size. There is no evidence of infiltrate,  effusion or congestive failure. Further evaluation could be performed  with a CT examination of the chest as indicated.     This report was finalized on 8/30/2022 7:25 AM by Dr. Arie Beaulieu M.D.        CT Angiogram Chest [[id number]] Collected: 08/29/22 2152       Updated: 08/29/22 2316     Narrative:       CT ANGIOGRAM OF THE CHEST WITH CONTRAST INCLUDING RECONSTRUCTION IMAGES  08/29/2022     HISTORY: History of DVT. Now having some chest pain. Possible pulmonary  embolus.     TECHNIQUE: Following the intravenous contrast injection CT angiography  was performed through the chest.     Sagittal, coronal and 3-D reconstruction images were reviewed.     FINDINGS: The pulmonary arterial system is well opacified. There is a  small slightly ill-defined filling defect in the distal left pulmonary  artery which may extend into the origin of the left upper lobe pulmonary  artery. No right pulmonary emboli are seen.     There is some vague increased soft tissue in the region of the left  hilum similar to the appearance on the CT of the chest from 06/08/2022.  Mild increased attenuation of the anterior mediastinum is seen and this  also appears stable.     No lung masses are seen. No significant pulmonary infiltrates are seen.        Impression:       1. Small slightly irregular filling defect in the distal left pulmonary  artery which may extend into the base of the left upper lobe pulmonary  artery and is compatible with small pulmonary embolus.  2. Findings were discussed with the ER physician.  3. Short-term  follow-up CT angiogram of the chest suggested.           Radiation dose reduction techniques were utilized, including automated  exposure control and exposure modulation based on body size.     This report was finalized on 8/29/2022 11:13 PM by Dr. Eliseo Templeton M.D.                I have reviewed the medications.  ---------------------------------------------------------------------------------------------             Assessment & Plan   1.  Primary mediastinal large B-cell lymphoma: The patient presented with dyspnea, cough, and chest pain.  A CT angiogram of the chest 6/8/18 showed a very large tumor mass in the mediastinum encasing multiple vascular structures and narrowing the left mainstem bronchus.  There was direct tumor infiltration in the left upper lobe.  There was a small left pleural effusion.  She underwent a CT-guided biopsy of the mediastinal mass on 6/12/18 and pathology reviewed at [facility] oncology showed diffuse large B-cell lymphoma consistent with a primary diffuse large B-cell lymphoma.  A bone marrow exam was performed on 6/14/18 which was negative for lymphoma.  She underwent a PET scan 6/13/18 which showed a large hypermetabolic mass in the chest involving the anterior mediastinum, left hilum, nearly completely filling the left hemothorax with SUV 19.8.  There was physiologic distribution elsewhere.     The patient was started on IV fluid hydration and allopurinol for prevention of hyperuricemia.  She initiated systemic chemotherapy with DA-EPOCH-R on 6/16/18 and completed the evening of 6/21/18.  She had a infusion reaction to rituximab but was able to complete with additional medications and otherwise tolerated chemotherapy well.  She will require additional premedications with additional rituximab.  Plan is to monitor her blood counts twice per week outpatient and proceed with cycle 2 of chemotherapy day 21.     The patient had significant improvement in shortness of breath,  cough, and chest pain by the time of discharge.    Patient received Neulasta support  Her white count dropped down to as low as 0.8 low-grade temperature and patient was placed on Levaquin ×5 days starting June 27, 2018, neutropenia AT  11 days posttreatment.  Platelets dropped to 82.  Her next ZOLADEX injection is due July 12.  She is due to start chemotherapy on July 9.  Discussed with Dr. Fox at the [facility] and his suggestion was to do the CT scan and PET scan after 2 cycles and discuss the results with him.  If she has an excellent response early on she would not require any further treatments after completion of 6 cycles of EPOCH/R  Patient being admitted for cycle 3 of chemotherapy on July 30, 2018.  She's status post cycle 4 of chemotherapy and CT scan has been reviewed following 4 cycles with continued response.  She is due for ZOLADEX  injection on October 8, 2018.   Patient is due to go for cycle 5 of chemotherapy on Monday, September 17, 2018   echocardiogram done August 22, 2018 shows ejection fraction of 59% to 60% with the eldest strain of 19.2%  Cycle 6 chemotherapy with dose adjusted EPOCH/R on October 8, 2018.  Reviewed CT scan and PET scan and the images with the patient and family.  Significant response with decrease in the mediastinal mass to 7.8 cm and activity level is 2.4 a week on PET scan.  Reviewed repeat PET scan still with 7.8 cm tumor in the mediastinum.  The SUV is 2.4 and this could be scar tissue.  The patient is due for her next dose of Zoladex today. As noted above, her ANC has been steadily declining over the last several months in the absence of infectious symptoms. This will be further detailed below. She will proceed with Zoladex, as scheduled.   Discontinue Zoladex  Reviewed CT as of January 15, 2019 with further improvement  No plans on radiation to the mediastinal lymph node given the PET scan showed that it is photopenic  Reviewed CT scan from June 2021.  And  there is no evidence of disease  Belia 15, 2022: Patient asymptomatic.  CT scan shows minimal increase of a small lymph node in the right neck from 0.3- 0.6 cm.  Questionable lesion in the liver very small follow-up in CT scan in 3 months.  Patient is asymptomatic  2024: Patient has returned back to [place].  She was out of town for couple of years when she was not followed here.  She has been on chronic Coumadin therapy and her INR will be followed in the Coumadin clinic.    2.  Pericardial effusion:   a 2-D echocardiogram 18 showed a left ventricular systolic function 58%.  There was a 2 cm pericardial effusion with no tamponade.  The pericardium appeared thickened.  She had no evidence of volume overload.  It is resolved     3. Fertility preservation:  Patient received Zoladex injection  for fertility preservation; this should be continued once monthly during her chemotherapy.    She is due for her next dose today and will continue this for 2 additional doses.   Zoladex has been discontinued but patient has now started  menstrual periods     4. Right upper extremity DVT on Pradaxa. She continues on this with no issues.  Stable    5. Status post port placement, patient wants port removed and I think it is reasonable but will need to hold Pradaxa  S/p port removal     6. Renal and Ovarian cysts.  We will continue to monitor.  I have advised patient to consult her OB/GYN for ovarian cysts.  CT scan does not show evidence of any ovarian cyst from 2020    7. Family history of cancer.  Her mother was diagnosed with cholangiocarcinoma.  Patient's mother now diagnosed with metastatic lung cancer and currently is being transferred to hospice  Patient's mother had     8.  Recent diagnosis of pulmonary embolism, in 2022   Doppler ultrasound negative of bilateral lower extremities and left upper extremity  Echo 2022 showed small patent foramen ovale present.  Saline test  were positive on the echo  MRI brain negative  Hypercoagulable work-up negative  Likely this was provoked pulmonary embolism given that she sometimes sits for hours   HyperCard work-up negative  No particular risk factors    9.  Echo showed EF    10.  Admitted March 1, 2023 with a new pulmonary embolism which occurred on Eliquis though there was some mention of patient being noncompliant about 5 times prior to developing this.  September 2022: Admitted with pulmonary embolism and started on Eliquis tolerating well no evidence of malignancy by CT scan of the neck chest abdomen pelvis August 2022 December 22, 2022: Patient doing well without any complaints currently on Eliquis 5 mg p.o. twice daily for history of pulmonary embolism since August 2022 March 1, 2023: Patient admitted after having CT angiogram of the chest as a follow-up of pulmonary embolism.  Patient had been non  compliant about 5 times with her Eliquis.  She had rare episode of chest pain prior to the CT angiogram during her menstrual period.  CT angiogram showed a new pulmonary embolism and patient got admitted and switched from Eliquis to Coumadin with INR checks.  Currently her INR is 2.8 and she will follow the Coumadin protocol per the pharmacy nurse.  Patient was also seen by cardiology and they did not feel the need to do echocardiogram.  They did not feel the need to close the PFO as patient will be anticoagulated chronically.  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  We are in the process of referring her to hematology at [facility] which is about an hour away from their.   July 19, 2024: Patient doing well without evidence of disease currently on Coumadin chronically    Plan  Patient was followed in [place] for the last couple of years and she  has returned back to [place]  Currently she has no evidence of disease  She is on chronic Coumadin her INR goal is 2-3  Patient will be followed in the Coumadin clinic  She will follow-up with Dr Melgar in 3 months with labs    This patient is on high risk drug therapy requiring intensive monitoring for toxicity.        Millie Padilla MD            CC: Dr. Arina Fox, at [facility]

## 2024-07-29 ENCOUNTER — ANTICOAGULATION VISIT (OUTPATIENT)
Dept: ONCOLOGY | Facility: HOSPITAL | Age: 29
End: 2024-07-29
Payer: COMMERCIAL

## 2024-07-29 ENCOUNTER — LAB (OUTPATIENT)
Dept: LAB | Facility: HOSPITAL | Age: 29
End: 2024-07-29
Payer: COMMERCIAL

## 2024-07-29 DIAGNOSIS — I82.621 ARM DVT (DEEP VENOUS THROMBOEMBOLISM), ACUTE, RIGHT: ICD-10-CM

## 2024-07-29 DIAGNOSIS — C85.28 MEDIASTINAL LARGE B-CELL LYMPHOMA OF LYMPH NODES OF MULTIPLE REGIONS: ICD-10-CM

## 2024-07-29 DIAGNOSIS — Z79.01 ANTICOAGULATION MONITORING BY PHARMACIST: ICD-10-CM

## 2024-07-29 DIAGNOSIS — I26.99 PULMONARY EMBOLISM WITHOUT ACUTE COR PULMONALE, UNSPECIFIED CHRONICITY, UNSPECIFIED PULMONARY EMBOLISM TYPE: ICD-10-CM

## 2024-07-29 LAB
BASOPHILS # BLD AUTO: 0.02 10*3/MM3 (ref 0–0.2)
BASOPHILS NFR BLD AUTO: 0.4 % (ref 0–1.5)
DEPRECATED RDW RBC AUTO: 44.3 FL (ref 37–54)
EOSINOPHIL # BLD AUTO: 0.01 10*3/MM3 (ref 0–0.4)
EOSINOPHIL NFR BLD AUTO: 0.2 % (ref 0.3–6.2)
ERYTHROCYTE [DISTWIDTH] IN BLOOD BY AUTOMATED COUNT: 13.8 % (ref 12.3–15.4)
HCT VFR BLD AUTO: 35.2 % (ref 34–46.6)
HGB BLD-MCNC: 11.4 G/DL (ref 12–15.9)
IMM GRANULOCYTES # BLD AUTO: 0.07 10*3/MM3 (ref 0–0.05)
IMM GRANULOCYTES NFR BLD AUTO: 1.3 % (ref 0–0.5)
INR PPP: 2.8 (ref 0.9–1.1)
LYMPHOCYTES # BLD AUTO: 1.2 10*3/MM3 (ref 0.7–3.1)
LYMPHOCYTES NFR BLD AUTO: 22.6 % (ref 19.6–45.3)
MCH RBC QN AUTO: 28.5 PG (ref 26.6–33)
MCHC RBC AUTO-ENTMCNC: 32.4 G/DL (ref 31.5–35.7)
MCV RBC AUTO: 88 FL (ref 79–97)
MONOCYTES # BLD AUTO: 0.52 10*3/MM3 (ref 0.1–0.9)
MONOCYTES NFR BLD AUTO: 9.8 % (ref 5–12)
NEUTROPHILS NFR BLD AUTO: 3.48 10*3/MM3 (ref 1.7–7)
NEUTROPHILS NFR BLD AUTO: 65.7 % (ref 42.7–76)
NRBC BLD AUTO-RTO: 0 /100 WBC (ref 0–0.2)
PLATELET # BLD AUTO: 202 10*3/MM3 (ref 140–450)
PMV BLD AUTO: 10.6 FL (ref 6–12)
PROTHROMBIN TIME: 34.2 SECONDS (ref 11–13.5)
RBC # BLD AUTO: 4 10*6/MM3 (ref 3.77–5.28)
WBC NRBC COR # BLD AUTO: 5.3 10*3/MM3 (ref 3.4–10.8)

## 2024-07-29 PROCEDURE — 85025 COMPLETE CBC W/AUTO DIFF WBC: CPT

## 2024-07-29 PROCEDURE — G0463 HOSPITAL OUTPT CLINIC VISIT: HCPCS

## 2024-07-29 PROCEDURE — 85610 PROTHROMBIN TIME: CPT

## 2024-07-29 PROCEDURE — 36415 COLL VENOUS BLD VENIPUNCTURE: CPT

## 2024-07-29 NOTE — PROGRESS NOTES
Anticoagulation Clinic Progress Note    Patient's visit was held in office today.    Anticoagulation Summary  As of 2024      INR goal:  2.0-3.0   TTR:  --   INR used for dosin.80 (2024)   Warfarin maintenance plan:  2.5 mg (5 mg x 0.5) every Tue; 5 mg (5 mg x 1) all other days   Weekly warfarin total:  32.5 mg   Plan last modified:  Billie Mir RPH (2024)   Next INR check:  2024   Target end date:               Anticoagulation Episode Summary       INR check location:      Preferred lab:      Send INR reminders to:   [facility] CBC ANTICOAG POOL    Comments:              Clinic Interview:  Patient Findings     Negatives:  Signs/symptoms of thrombosis, Signs/symptoms of bleeding,   Laboratory test error suspected, Change in health, Change in alcohol use,   Change in activity, Upcoming invasive procedure, Emergency department   visit, Upcoming dental procedure, Missed doses, Extra doses, Change in   medications, Change in diet/appetite, Hospital admission, Bruising, Other   complaints      Clinical Outcomes     Negatives:  Major bleeding event, Thromboembolic event,   Anticoagulation-related hospital admission, Anticoagulation-related ED   visit, Anticoagulation-related fatality   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       INR History:      Latest Ref Rng & Units 6/15/2023     8:45 AM 6/15/2023     8:47 AM 2023     7:52 AM 2023     8:00 AM 2024    11:42 AM 2024     9:36 AM 2024    10:00 AM   Anticoagulation Monitoring   INR  3.76   2.40   2.80   INR Date  6/15/2023   2023   2024   INR Goal  2.0-3.0   2.0-3.0   2.0-3.0   Trend  Same   Same      Last Week Total  32.5 mg   27.5 mg   5 mg   Next Week Total  12.5 mg   0 mg   32.5 mg   Sun  2.5 mg   -   5 mg   Mon  -   -   5 mg   Tue  -   -   2.5 mg   Wed  -   -   5 mg   Thu  Hold (6/15)   -   5 mg   Fri  5 mg   -   5 mg   Sat  5 mg   -   5 mg   Historical INR 0.90 - 1.10  3.76  2.40    2.94  2.80     Visit Report      Report         Plan:  1. INR is Therapeutic today- see above in Anticoagulation Summary.  Will instruct Vikki Durham to Continue their warfarin regimen at 2.5mg on Tues, 5mg on all other days- see above in Anticoagulation Summary.  2. Follow up in 1 month  3. Patient declines warfarin refills but we changed her preferred pharmacy to [facility] on [address].  4. Verbal and written information provided. Patient expresses understanding and has no further questions at this time.    Billie Mir [facility]

## 2024-08-19 ENCOUNTER — TELEPHONE (OUTPATIENT)
Dept: ONCOLOGY | Facility: CLINIC | Age: 29
End: 2024-08-19

## 2024-08-22 ENCOUNTER — TELEPHONE (OUTPATIENT)
Dept: OBSTETRICS AND GYNECOLOGY | Age: 29
End: 2024-08-22

## 2024-08-26 ENCOUNTER — LAB (OUTPATIENT)
Dept: LAB | Facility: HOSPITAL | Age: 29
End: 2024-08-26
Payer: COMMERCIAL

## 2024-08-26 ENCOUNTER — ANTICOAGULATION VISIT (OUTPATIENT)
Dept: ONCOLOGY | Facility: HOSPITAL | Age: 29
End: 2024-08-26
Payer: COMMERCIAL

## 2024-08-26 DIAGNOSIS — I82.621 ARM DVT (DEEP VENOUS THROMBOEMBOLISM), ACUTE, RIGHT: ICD-10-CM

## 2024-08-26 DIAGNOSIS — C85.28 MEDIASTINAL LARGE B-CELL LYMPHOMA OF LYMPH NODES OF MULTIPLE REGIONS: ICD-10-CM

## 2024-08-26 LAB
BASOPHILS # BLD AUTO: 0.03 10*3/MM3 (ref 0–0.2)
BASOPHILS NFR BLD AUTO: 0.6 % (ref 0–1.5)
DEPRECATED RDW RBC AUTO: 42.9 FL (ref 37–54)
EOSINOPHIL # BLD AUTO: 0.02 10*3/MM3 (ref 0–0.4)
EOSINOPHIL NFR BLD AUTO: 0.4 % (ref 0.3–6.2)
ERYTHROCYTE [DISTWIDTH] IN BLOOD BY AUTOMATED COUNT: 13.8 % (ref 12.3–15.4)
HCT VFR BLD AUTO: 36.1 % (ref 34–46.6)
HGB BLD-MCNC: 11.7 G/DL (ref 12–15.9)
IMM GRANULOCYTES # BLD AUTO: 0.04 10*3/MM3 (ref 0–0.05)
IMM GRANULOCYTES NFR BLD AUTO: 0.8 % (ref 0–0.5)
INR PPP: 3.9 (ref 0.9–1.1)
LYMPHOCYTES # BLD AUTO: 1.4 10*3/MM3 (ref 0.7–3.1)
LYMPHOCYTES NFR BLD AUTO: 27 % (ref 19.6–45.3)
MCH RBC QN AUTO: 27.9 PG (ref 26.6–33)
MCHC RBC AUTO-ENTMCNC: 32.4 G/DL (ref 31.5–35.7)
MCV RBC AUTO: 86 FL (ref 79–97)
MONOCYTES # BLD AUTO: 0.51 10*3/MM3 (ref 0.1–0.9)
MONOCYTES NFR BLD AUTO: 9.8 % (ref 5–12)
NEUTROPHILS NFR BLD AUTO: 3.19 10*3/MM3 (ref 1.7–7)
NEUTROPHILS NFR BLD AUTO: 61.4 % (ref 42.7–76)
NRBC BLD AUTO-RTO: 0 /100 WBC (ref 0–0.2)
PLATELET # BLD AUTO: 235 10*3/MM3 (ref 140–450)
PMV BLD AUTO: 10 FL (ref 6–12)
PROTHROMBIN TIME: 47 SECONDS (ref 11–13.5)
RBC # BLD AUTO: 4.2 10*6/MM3 (ref 3.77–5.28)
WBC NRBC COR # BLD AUTO: 5.19 10*3/MM3 (ref 3.4–10.8)

## 2024-08-26 PROCEDURE — 85025 COMPLETE CBC W/AUTO DIFF WBC: CPT

## 2024-08-26 PROCEDURE — 36415 COLL VENOUS BLD VENIPUNCTURE: CPT

## 2024-08-26 PROCEDURE — 85610 PROTHROMBIN TIME: CPT

## 2024-08-26 PROCEDURE — G0463 HOSPITAL OUTPT CLINIC VISIT: HCPCS

## 2024-08-26 NOTE — PROGRESS NOTES
Anticoagulation Clinic Progress Note    Patient's visit was held in office today.    Anticoagulation Summary  As of 8/26/2024      INR goal:  2.0-3.0   TTR:  0.0% (2.6 wk)   INR used for dosing:  3.90 (8/26/2024)   Warfarin maintenance plan:  2.5 mg (5 mg x 0.5) every Tue; 5 mg (5 mg x 1) all other days   Weekly warfarin total:  32.5 mg   Plan last modified:  Billie Mir RPH (7/29/2024)   Next INR check:  9/5/2024   Target end date:               Anticoagulation Episode Summary       INR check location:      Preferred lab:      Send INR reminders to:   [facility] CBC ANTICOAG POOL    Comments:              Clinic Interview:  Patient Findings     Negatives:  Signs/symptoms of thrombosis, Signs/symptoms of bleeding,   Laboratory test error suspected, Change in health, Change in alcohol use,   Change in activity, Upcoming invasive procedure, Emergency department   visit, Upcoming dental procedure, Missed doses, Extra doses, Change in   medications, Change in diet/appetite, Hospital admission, Bruising, Other   complaints    Comments:  States maybe increased greens, no alcohol, no swelling, no   diarrhea, no new meds---no good reason found for INR supratherapeutic   today.      Clinical Outcomes     Negatives:  Major bleeding event, Thromboembolic event,   Anticoagulation-related hospital admission, Anticoagulation-related ED   visit, Anticoagulation-related fatality    Comments:  States maybe increased greens, no alcohol, no swelling, no   diarrhea, no new meds---no good reason found for INR supratherapeutic   today.        INR History:      Latest Ref Rng & Units 6/19/2023     7:52 AM 6/19/2023     8:00 AM 7/19/2024    11:42 AM 7/29/2024     9:36 AM 7/29/2024    10:00 AM 8/26/2024     9:50 AM 8/26/2024    10:00 AM   Anticoagulation Monitoring   INR   2.40   2.80  3.90   INR Date   6/19/2023 7/29/2024 8/26/2024   INR Goal   2.0-3.0   2.0-3.0  2.0-3.0   Trend   Same     Same   Last Week Total   27.5 mg   5 mg  32.5  mg   Next Week Total   0 mg   32.5 mg  27.5 mg   Sun   -   5 mg  5 mg   Mon   -   5 mg  Hold (8/26); Otherwise 5 mg   Tue   -   2.5 mg  2.5 mg   Wed   -   5 mg  5 mg   Thu   -   5 mg  5 mg   Fri   -   5 mg  5 mg   Sat   -   5 mg  5 mg   Historical INR 0.90 - 1.10 2.40   2.94  2.80   3.90     Visit Report    Report           Plan:  1. INR is Supratherapeutic today- see above in Anticoagulation Summary.  Will instruct Vikki Durham to HOLD warfarin today, then Continue their warfarin regimen- see above in Anticoagulation Summary.  2. Follow up in 1 week---to determine if dose needs to be adjusted in previously stable patient.  3. Patient declines warfarin refills.  4. Verbal and written information provided. Patient expresses understanding and has no further questions at this time.    Billie Mir [facility]

## 2024-09-05 ENCOUNTER — ANTICOAGULATION VISIT (OUTPATIENT)
Dept: ONCOLOGY | Facility: HOSPITAL | Age: 29
End: 2024-09-05
Payer: COMMERCIAL

## 2024-09-05 ENCOUNTER — LAB (OUTPATIENT)
Dept: LAB | Facility: HOSPITAL | Age: 29
End: 2024-09-05
Payer: COMMERCIAL

## 2024-09-05 DIAGNOSIS — C85.28 MEDIASTINAL LARGE B-CELL LYMPHOMA OF LYMPH NODES OF MULTIPLE REGIONS: ICD-10-CM

## 2024-09-05 DIAGNOSIS — I26.99 PULMONARY EMBOLISM WITHOUT ACUTE COR PULMONALE, UNSPECIFIED CHRONICITY, UNSPECIFIED PULMONARY EMBOLISM TYPE: ICD-10-CM

## 2024-09-05 DIAGNOSIS — Z79.01 ANTICOAGULATION MONITORING BY PHARMACIST: ICD-10-CM

## 2024-09-05 DIAGNOSIS — I82.621 ARM DVT (DEEP VENOUS THROMBOEMBOLISM), ACUTE, RIGHT: ICD-10-CM

## 2024-09-05 LAB
BASOPHILS # BLD AUTO: 0.03 10*3/MM3 (ref 0–0.2)
BASOPHILS NFR BLD AUTO: 0.7 % (ref 0–1.5)
DEPRECATED RDW RBC AUTO: 44.2 FL (ref 37–54)
EOSINOPHIL # BLD AUTO: 0.02 10*3/MM3 (ref 0–0.4)
EOSINOPHIL NFR BLD AUTO: 0.5 % (ref 0.3–6.2)
ERYTHROCYTE [DISTWIDTH] IN BLOOD BY AUTOMATED COUNT: 13.9 % (ref 12.3–15.4)
HCT VFR BLD AUTO: 36 % (ref 34–46.6)
HGB BLD-MCNC: 11.8 G/DL (ref 12–15.9)
IMM GRANULOCYTES # BLD AUTO: 0.03 10*3/MM3 (ref 0–0.05)
IMM GRANULOCYTES NFR BLD AUTO: 0.7 % (ref 0–0.5)
INR PPP: 2.5 (ref 0.9–1.1)
LYMPHOCYTES # BLD AUTO: 1.25 10*3/MM3 (ref 0.7–3.1)
LYMPHOCYTES NFR BLD AUTO: 30.6 % (ref 19.6–45.3)
MCH RBC QN AUTO: 28.4 PG (ref 26.6–33)
MCHC RBC AUTO-ENTMCNC: 32.8 G/DL (ref 31.5–35.7)
MCV RBC AUTO: 86.5 FL (ref 79–97)
MONOCYTES # BLD AUTO: 0.37 10*3/MM3 (ref 0.1–0.9)
MONOCYTES NFR BLD AUTO: 9 % (ref 5–12)
NEUTROPHILS NFR BLD AUTO: 2.39 10*3/MM3 (ref 1.7–7)
NEUTROPHILS NFR BLD AUTO: 58.5 % (ref 42.7–76)
NRBC BLD AUTO-RTO: 0 /100 WBC (ref 0–0.2)
PLATELET # BLD AUTO: 246 10*3/MM3 (ref 140–450)
PMV BLD AUTO: 9.8 FL (ref 6–12)
PROTHROMBIN TIME: 30.6 SECONDS (ref 11–13.5)
RBC # BLD AUTO: 4.16 10*6/MM3 (ref 3.77–5.28)
WBC NRBC COR # BLD AUTO: 4.09 10*3/MM3 (ref 3.4–10.8)

## 2024-09-05 PROCEDURE — 85610 PROTHROMBIN TIME: CPT

## 2024-09-05 PROCEDURE — G0463 HOSPITAL OUTPT CLINIC VISIT: HCPCS

## 2024-09-05 PROCEDURE — 85025 COMPLETE CBC W/AUTO DIFF WBC: CPT

## 2024-09-05 PROCEDURE — 36415 COLL VENOUS BLD VENIPUNCTURE: CPT

## 2024-09-05 NOTE — PROGRESS NOTES
Anticoagulation Clinic Progress Note    Patient's visit was held in office today.    Anticoagulation Summary  As of 2024      INR goal:  2.0-3.0   TTR:  12.5% (4 wk)   INR used for dosin.50 (2024)   Warfarin maintenance plan:  2.5 mg (5 mg x 0.5) every Tue; 5 mg (5 mg x 1) all other days   Weekly warfarin total:  32.5 mg   No change documented:  Elizabet Alvarez RPH   Plan last modified:  Billie Mir RPH (2024)   Next INR check:  2024   Target end date:               Anticoagulation Episode Summary       INR check location:      Preferred lab:      Send INR reminders to:   [facility] ON[facility] CBC ANTICOAG POOL    Comments:              Clinic Interview:  Patient Findings     Negatives:  Signs/symptoms of thrombosis, Signs/symptoms of bleeding,   Laboratory test error suspected, Change in health, Change in alcohol use,   Change in activity, Upcoming invasive procedure, Emergency department   visit, Upcoming dental procedure, Missed doses, Extra doses, Change in   medications, Change in diet/appetite, Hospital admission, Bruising, Other   complaints      Clinical Outcomes     Negatives:  Major bleeding event, Thromboembolic event,   Anticoagulation-related hospital admission, Anticoagulation-related ED   visit, Anticoagulation-related fatality        INR History:      Latest Ref Rng & Units 2024    11:42 AM 2024     9:36 AM 2024    10:00 AM 2024     9:50 AM 2024    10:00 AM 2024     7:58 AM 2024     8:00 AM   Anticoagulation Monitoring   INR    2.80  3.90  2.50   INR Date    2024   INR Goal    2.0-3.0  2.0-3.0  2.0-3.0   Trend      Same  Same   Last Week Total    5 mg  32.5 mg  32.5 mg   Next Week Total    32.5 mg  27.5 mg  32.5 mg   Sun    5 mg  5 mg  5 mg   Mon    5 mg  Hold (); Otherwise 5 mg  5 mg   Tue    2.5 mg  2.5 mg  2.5 mg   Wed    5 mg  5 mg  5 mg   Thu    5 mg  5 mg  5 mg   Fri    5 mg  5 mg  5 mg   Sat    5 mg  5 mg  5 mg    Historical INR 0.90 - 1.10 2.94  2.80   3.90   2.50     Visit Report  Report             Plan:  1. INR is Therapeutic today- see above in Anticoagulation Summary.  Will instruct Vikki Durham to Continue their warfarin regimen- see above in Anticoagulation Summary.  2. Follow up in 2 weeks  3. Patient declines warfarin refills.  4. Verbal and written information provided. Patient expresses understanding and has no further questions at this time.    Elizabet Alvarez [facility]

## 2024-09-23 ENCOUNTER — ANTICOAGULATION VISIT (OUTPATIENT)
Dept: ONCOLOGY | Facility: HOSPITAL | Age: 29
End: 2024-09-23
Payer: COMMERCIAL

## 2024-09-23 ENCOUNTER — LAB (OUTPATIENT)
Dept: LAB | Facility: HOSPITAL | Age: 29
End: 2024-09-23
Payer: COMMERCIAL

## 2024-09-23 DIAGNOSIS — Z79.01 ANTICOAGULATION MONITORING BY PHARMACIST: ICD-10-CM

## 2024-09-23 DIAGNOSIS — I82.621 ARM DVT (DEEP VENOUS THROMBOEMBOLISM), ACUTE, RIGHT: ICD-10-CM

## 2024-09-23 DIAGNOSIS — I26.99 PULMONARY EMBOLISM WITHOUT ACUTE COR PULMONALE, UNSPECIFIED CHRONICITY, UNSPECIFIED PULMONARY EMBOLISM TYPE: ICD-10-CM

## 2024-09-23 DIAGNOSIS — C85.28 MEDIASTINAL LARGE B-CELL LYMPHOMA OF LYMPH NODES OF MULTIPLE REGIONS: ICD-10-CM

## 2024-09-23 LAB
BASOPHILS # BLD AUTO: 0.05 10*3/MM3 (ref 0–0.2)
BASOPHILS NFR BLD AUTO: 1 % (ref 0–1.5)
DEPRECATED RDW RBC AUTO: 44.8 FL (ref 37–54)
EOSINOPHIL # BLD AUTO: 0.03 10*3/MM3 (ref 0–0.4)
EOSINOPHIL NFR BLD AUTO: 0.6 % (ref 0.3–6.2)
ERYTHROCYTE [DISTWIDTH] IN BLOOD BY AUTOMATED COUNT: 14.2 % (ref 12.3–15.4)
HCT VFR BLD AUTO: 36.9 % (ref 34–46.6)
HGB BLD-MCNC: 12 G/DL (ref 12–15.9)
IMM GRANULOCYTES # BLD AUTO: 0.08 10*3/MM3 (ref 0–0.05)
IMM GRANULOCYTES NFR BLD AUTO: 1.6 % (ref 0–0.5)
INR PPP: 2.4 (ref 0.9–1.1)
LYMPHOCYTES # BLD AUTO: 1.76 10*3/MM3 (ref 0.7–3.1)
LYMPHOCYTES NFR BLD AUTO: 34.8 % (ref 19.6–45.3)
MCH RBC QN AUTO: 28.1 PG (ref 26.6–33)
MCHC RBC AUTO-ENTMCNC: 32.5 G/DL (ref 31.5–35.7)
MCV RBC AUTO: 86.4 FL (ref 79–97)
MONOCYTES # BLD AUTO: 0.65 10*3/MM3 (ref 0.1–0.9)
MONOCYTES NFR BLD AUTO: 12.8 % (ref 5–12)
NEUTROPHILS NFR BLD AUTO: 2.49 10*3/MM3 (ref 1.7–7)
NEUTROPHILS NFR BLD AUTO: 49.2 % (ref 42.7–76)
NRBC BLD AUTO-RTO: 0 /100 WBC (ref 0–0.2)
PLATELET # BLD AUTO: 238 10*3/MM3 (ref 140–450)
PMV BLD AUTO: 10.1 FL (ref 6–12)
PROTHROMBIN TIME: 28.7 SECONDS (ref 11–13.5)
RBC # BLD AUTO: 4.27 10*6/MM3 (ref 3.77–5.28)
WBC NRBC COR # BLD AUTO: 5.06 10*3/MM3 (ref 3.4–10.8)

## 2024-09-23 PROCEDURE — 36415 COLL VENOUS BLD VENIPUNCTURE: CPT

## 2024-09-23 PROCEDURE — G0463 HOSPITAL OUTPT CLINIC VISIT: HCPCS

## 2024-09-23 PROCEDURE — 85025 COMPLETE CBC W/AUTO DIFF WBC: CPT

## 2024-09-23 PROCEDURE — 85610 PROTHROMBIN TIME: CPT

## 2024-09-23 RX ORDER — WARFARIN SODIUM 5 MG/1
TABLET ORAL
Qty: 84 TABLET | Refills: 1 | Status: SHIPPED | OUTPATIENT
Start: 2024-09-23

## 2024-10-28 ENCOUNTER — LAB (OUTPATIENT)
Dept: LAB | Facility: HOSPITAL | Age: 29
End: 2024-10-28
Payer: COMMERCIAL

## 2024-10-28 ENCOUNTER — OFFICE VISIT (OUTPATIENT)
Dept: ONCOLOGY | Facility: CLINIC | Age: 29
End: 2024-10-28
Payer: COMMERCIAL

## 2024-10-28 ENCOUNTER — ANTICOAGULATION VISIT (OUTPATIENT)
Dept: ONCOLOGY | Facility: HOSPITAL | Age: 29
End: 2024-10-28
Payer: COMMERCIAL

## 2024-10-28 VITALS
HEART RATE: 64 BPM | BODY MASS INDEX: 31.28 KG/M2 | HEIGHT: 62 IN | SYSTOLIC BLOOD PRESSURE: 110 MMHG | WEIGHT: 170 LBS | OXYGEN SATURATION: 98 % | DIASTOLIC BLOOD PRESSURE: 72 MMHG

## 2024-10-28 DIAGNOSIS — C85.28 MEDIASTINAL LARGE B-CELL LYMPHOMA OF LYMPH NODES OF MULTIPLE REGIONS: ICD-10-CM

## 2024-10-28 DIAGNOSIS — D64.9 NORMOCHROMIC NORMOCYTIC ANEMIA: ICD-10-CM

## 2024-10-28 DIAGNOSIS — I26.99 PULMONARY EMBOLISM WITHOUT ACUTE COR PULMONALE, UNSPECIFIED CHRONICITY, UNSPECIFIED PULMONARY EMBOLISM TYPE: ICD-10-CM

## 2024-10-28 DIAGNOSIS — Z79.01 CHRONIC ANTICOAGULATION: ICD-10-CM

## 2024-10-28 DIAGNOSIS — C85.28 MEDIASTINAL LARGE B-CELL LYMPHOMA OF LYMPH NODES OF MULTIPLE REGIONS: Primary | ICD-10-CM

## 2024-10-28 DIAGNOSIS — I82.621 ARM DVT (DEEP VENOUS THROMBOEMBOLISM), ACUTE, RIGHT: ICD-10-CM

## 2024-10-28 LAB
ALBUMIN SERPL-MCNC: 4.2 G/DL (ref 3.5–5.2)
ALBUMIN/GLOB SERPL: 1.8 G/DL
ALP SERPL-CCNC: 44 U/L (ref 39–117)
ALT SERPL W P-5'-P-CCNC: 23 U/L (ref 1–33)
ANION GAP SERPL CALCULATED.3IONS-SCNC: 11.6 MMOL/L (ref 5–15)
AST SERPL-CCNC: 29 U/L (ref 1–32)
BASOPHILS # BLD AUTO: 0.04 10*3/MM3 (ref 0–0.2)
BASOPHILS NFR BLD AUTO: 0.9 % (ref 0–1.5)
BILIRUB SERPL-MCNC: 0.3 MG/DL (ref 0–1.2)
BUN SERPL-MCNC: 11 MG/DL (ref 6–20)
BUN/CREAT SERPL: 13.4 (ref 7–25)
CALCIUM SPEC-SCNC: 8.7 MG/DL (ref 8.6–10.5)
CHLORIDE SERPL-SCNC: 102 MMOL/L (ref 98–107)
CO2 SERPL-SCNC: 24.4 MMOL/L (ref 22–29)
CREAT SERPL-MCNC: 0.82 MG/DL (ref 0.57–1)
DEPRECATED RDW RBC AUTO: 46.1 FL (ref 37–54)
EGFRCR SERPLBLD CKD-EPI 2021: 99.4 ML/MIN/1.73
EOSINOPHIL # BLD AUTO: 0.02 10*3/MM3 (ref 0–0.4)
EOSINOPHIL NFR BLD AUTO: 0.5 % (ref 0.3–6.2)
ERYTHROCYTE [DISTWIDTH] IN BLOOD BY AUTOMATED COUNT: 14.3 % (ref 12.3–15.4)
FERRITIN SERPL-MCNC: <8 NG/ML (ref 13–150)
FOLATE SERPL-MCNC: 9.86 NG/ML (ref 4.78–24.2)
GLOBULIN UR ELPH-MCNC: 2.3 GM/DL
GLUCOSE SERPL-MCNC: 121 MG/DL (ref 65–99)
HCT VFR BLD AUTO: 37.9 % (ref 34–46.6)
HGB BLD-MCNC: 11.5 G/DL (ref 12–15.9)
IMM GRANULOCYTES # BLD AUTO: 0.02 10*3/MM3 (ref 0–0.05)
IMM GRANULOCYTES NFR BLD AUTO: 0.5 % (ref 0–0.5)
INR PPP: 3.11 (ref 0.9–1.1)
IRON 24H UR-MRATE: 57 MCG/DL (ref 37–145)
IRON SATN MFR SERPL: 12 % (ref 20–50)
LYMPHOCYTES # BLD AUTO: 1.04 10*3/MM3 (ref 0.7–3.1)
LYMPHOCYTES NFR BLD AUTO: 24.5 % (ref 19.6–45.3)
MCH RBC QN AUTO: 26.6 PG (ref 26.6–33)
MCHC RBC AUTO-ENTMCNC: 30.3 G/DL (ref 31.5–35.7)
MCV RBC AUTO: 87.7 FL (ref 79–97)
MONOCYTES # BLD AUTO: 0.43 10*3/MM3 (ref 0.1–0.9)
MONOCYTES NFR BLD AUTO: 10.1 % (ref 5–12)
NEUTROPHILS NFR BLD AUTO: 2.69 10*3/MM3 (ref 1.7–7)
NEUTROPHILS NFR BLD AUTO: 63.5 % (ref 42.7–76)
NRBC BLD AUTO-RTO: 0 /100 WBC (ref 0–0.2)
PLATELET # BLD AUTO: 257 10*3/MM3 (ref 140–450)
PMV BLD AUTO: 9.8 FL (ref 6–12)
POTASSIUM SERPL-SCNC: 4.3 MMOL/L (ref 3.5–5.2)
PROT SERPL-MCNC: 6.5 G/DL (ref 6–8.5)
PROTHROMBIN TIME: 32.3 SECONDS (ref 11.7–14.2)
RBC # BLD AUTO: 4.32 10*6/MM3 (ref 3.77–5.28)
SODIUM SERPL-SCNC: 138 MMOL/L (ref 136–145)
TIBC SERPL-MCNC: 471 MCG/DL (ref 298–536)
TRANSFERRIN SERPL-MCNC: 316 MG/DL (ref 200–360)
VIT B12 BLD-MCNC: 483 PG/ML (ref 211–946)
WBC NRBC COR # BLD AUTO: 4.24 10*3/MM3 (ref 3.4–10.8)

## 2024-10-28 PROCEDURE — 83540 ASSAY OF IRON: CPT | Performed by: INTERNAL MEDICINE

## 2024-10-28 PROCEDURE — 99214 OFFICE O/P EST MOD 30 MIN: CPT | Performed by: INTERNAL MEDICINE

## 2024-10-28 PROCEDURE — G0463 HOSPITAL OUTPT CLINIC VISIT: HCPCS

## 2024-10-28 PROCEDURE — 82728 ASSAY OF FERRITIN: CPT | Performed by: INTERNAL MEDICINE

## 2024-10-28 PROCEDURE — 80053 COMPREHEN METABOLIC PANEL: CPT

## 2024-10-28 PROCEDURE — 85025 COMPLETE CBC W/AUTO DIFF WBC: CPT

## 2024-10-28 PROCEDURE — 85610 PROTHROMBIN TIME: CPT | Performed by: INTERNAL MEDICINE

## 2024-10-28 PROCEDURE — 82607 VITAMIN B-12: CPT | Performed by: INTERNAL MEDICINE

## 2024-10-28 PROCEDURE — 82746 ASSAY OF FOLIC ACID SERUM: CPT | Performed by: INTERNAL MEDICINE

## 2024-10-28 PROCEDURE — 84466 ASSAY OF TRANSFERRIN: CPT | Performed by: INTERNAL MEDICINE

## 2024-10-28 PROCEDURE — 36415 COLL VENOUS BLD VENIPUNCTURE: CPT

## 2024-10-28 RX ORDER — WARFARIN SODIUM 5 MG/1
TABLET ORAL
Qty: 90 TABLET | Refills: 1 | Status: SHIPPED | OUTPATIENT
Start: 2024-10-28

## 2024-10-28 NOTE — PROGRESS NOTES
Anticoagulation Clinic Progress Note    Patient's visit was held in office today.    Anticoagulation Summary  As of 10/28/2024      INR goal:  2.0-3.0   TTR:  62.9% (2.7 mo)   INR used for dosing:  3.11 (10/28/2024)   Warfarin maintenance plan:  2.5 mg (5 mg x 0.5) every Tue; 5 mg (5 mg x 1) all other days   Weekly warfarin total:  32.5 mg   Plan last modified:  Billie Mir RPH (7/29/2024)   Next INR check:  11/4/2024   Target end date:  --             Anticoagulation Episode Summary       INR check location:  --    Preferred lab:  --    Send INR reminders to:   [facility] CBC ANTICOAG POOL    Comments:  --            Clinic Interview:  Patient Findings     Positives:  Change in health, Change in diet/appetite    Negatives:  Signs/symptoms of thrombosis, Signs/symptoms of bleeding,   Laboratory test error suspected, Change in alcohol use, Change in   activity, Upcoming invasive procedure, Emergency department visit,   Upcoming dental procedure, Missed doses, Extra doses, Change in   medications, Hospital admission, Bruising, Other complaints    Comments:  States she has likely not had her usual greens. Diarrhea noted   yesterday. She does state that she had some spotting after her monthly   cycle was completed.      Clinical Outcomes     Negatives:  Major bleeding event, Thromboembolic event,   Anticoagulation-related hospital admission, Anticoagulation-related ED   visit, Anticoagulation-related fatality    Comments:  States she has likely not had her usual greens. Diarrhea noted   yesterday. She does state that she had some spotting after her monthly   cycle was completed.        INR History:      Latest Ref Rng & Units 8/26/2024    10:00 AM 9/5/2024     7:58 AM 9/5/2024     8:00 AM 9/23/2024    10:01 AM 9/23/2024    10:30 AM 10/28/2024     9:15 AM 10/28/2024     9:34 AM   Anticoagulation Monitoring   INR  3.90  2.50  2.40 3.11    INR Date  8/26/2024  9/5/2024  9/23/2024 10/28/2024    INR Goal  2.0-3.0  2.0-3.0   2.0-3.0 2.0-3.0    Trend  Same  Same  Same Same    Last Week Total  32.5 mg  32.5 mg  32.5 mg 32.5 mg    Next Week Total  27.5 mg  32.5 mg  32.5 mg 30 mg    Sun  5 mg  5 mg  5 mg 5 mg    Mon  Hold (8/26); Otherwise 5 mg  5 mg  5 mg 2.5 mg (10/28)    Tue  2.5 mg  2.5 mg  2.5 mg 2.5 mg    Wed  5 mg  5 mg  5 mg 5 mg    Thu  5 mg  5 mg  5 mg 5 mg    Fri  5 mg  5 mg  5 mg 5 mg    Sat  5 mg  5 mg  5 mg 5 mg    Historical INR 0.90 - 1.10  2.50   2.40    3.11    Visit Report       Report Report       Plan:  1. INR is Supratherapeutic today- see above in Anticoagulation Summary.  Will instruct Vikki Durham to take 1/2 dose (2.5mg) today then Continue their warfarin regimen of 2.5mg on Tues, 5mg on all other days- see above in Anticoagulation Summary.  2. Follow up in 1 week  3. Patient desires warfarin refills.  4. Verbal and written information provided. Patient expresses understanding and has no further questions at this time.    Billie Mir [facility]

## 2024-10-28 NOTE — PROGRESS NOTES
"Subjective     CHIEF COMPLAINT:      Chief Complaint   Patient presents with    Follow-up     HISTORY OF PRESENT ILLNESS:     Vikki Durham is a 29 y.o. female patient who returns today for follow up on her history of lymphoma and pulmonary embolism.  She is on Coumadin currently taking 5 mg daily except on Tuesdays when she takes 2.5 mg.  She is tolerating Coumadin with no problem with bleeding.  No chest pain or shortness of breath at present.  She recently had a short episode of shortness of breath while in bed but resolved spontaneously.    Patient reports heavy menstrual bleeding especially on the first day of her menstrual cycle.  Bleeding improves usually afterwards.  However, she is having some spotting between the periods and she is going to see GYN in December.    ROS:  Pertinent ROS is in the HPI.     Past medical, surgical, social and family history were reviewed.     MEDICATIONS:    Current Outpatient Medications:     warfarin (Coumadin) 5 MG tablet, Take one tablet by mouth daily or as directed by clinic, Disp: 90 tablet, Rfl: 1    warfarin (COUMADIN) 5 MG tablet, Take 1/2 tablet (2.5mg) on Tues and 1 tablet (5mg) all other days OR as directed by clinic, Disp: 84 tablet, Rfl: 1    Objective     VITAL SIGNS:     Vitals:    10/28/24 1009   BP: 110/72   Pulse: 64   SpO2: 98%   Weight: 77.1 kg (170 lb)   Height: 157.5 cm (62\")   PainSc: 0-No pain     Body mass index is 31.09 kg/m².     Wt Readings from Last 5 Encounters:   10/28/24 77.1 kg (170 lb)   07/19/24 72.2 kg (159 lb 3.2 oz)   05/18/23 75 kg (165 lb 6.4 oz)   03/23/23 75.4 kg (166 lb 4.8 oz)   03/03/23 72.6 kg (160 lb)     PHYSICAL EXAMINATION:   GENERAL: The patient appears in good general condition, not in acute distress.   SKIN: No Ecchymosis.  EYES: No jaundice. No Pallor.  LYMPHATIC: No cervical or supraclavicular lymphadenopathy.  CHEST: Normal respiratory effort. Normal breathing sounds bilaterally. No added sounds.  CVS: Normal S1 and S2. " No Murmur.  ABDOMEN: Soft. No tenderness. No Hepatomegaly. No Splenomegaly. No masses.  EXTREMITIES: No edema.  No calf tenderness.    DIAGNOSTIC DATA:     Results from last 7 days   Lab Units 10/28/24  0923   WBC 10*3/mm3 4.24   NEUTROS ABS 10*3/mm3 2.69   HEMOGLOBIN g/dL 11.5*   HEMATOCRIT % 37.9   PLATELETS 10*3/mm3 257     Results from last 7 days   Lab Units 10/28/24  0923   SODIUM mmol/L 138   POTASSIUM mmol/L 4.3   CHLORIDE mmol/L 102   CO2 mmol/L 24.4   BUN mg/dL 11   CREATININE mg/dL 0.82   CALCIUM mg/dL 8.7   ALBUMIN g/dL 4.2   BILIRUBIN mg/dL 0.3   ALK PHOS U/L 44   ALT (SGPT) U/L 23   AST (SGOT) U/L 29   GLUCOSE mg/dL 121*         Lab 10/28/24  0923   IRON 57   IRON SATURATION (TSAT) 12*   TIBC 471   TRANSFERRIN 316   FERRITIN <8.00*      Component      Latest Ref Rng 10/28/2024   Protime      11.7 - 14.2 Seconds 32.3 (H)    INR      0.90 - 1.10  3.11 (H)       Assessment & Plan    *Primary mediastinal large B-cell lymphoma.  She had abnormal chest x-ray and was initially suspected of having cardiomegaly.  CT revealed lymphadenopathy with conglomerate of lymph node masses displacing the pulmonary arteries and encasing and narrowing the SVC.  The large mass measured 19.8 x 13.7 x 14.0 cm.  She received dose adjusted R-EPOCH x 6 cycles between June 2018 and October 2018.  She is doing well with no evidence of recurrence.    *Pulmonary embolism.  She was diagnosed with pulmonary embolism involving the left upper lobe on 8/29/2022.  Hypercoagulable workup revealed factor VIII activity 125%.  Antiphospholipid antibody testing including lupus anticoagulant was negative.  Antithrombin %.  Protein C activity 150% protein S activity 123%.  Testing for prothrombin and factor V Leiden mutations was negative.  Patient was placed on anticoagulation with Eliquis.  New pulmonary embolism diagnosed on 3/1/2023.    It developed while on Eliquis.  She was switched from Eliquis to Coumadin.  She is taking Coumadin  regularly.  She is currently on Coumadin 5 mg daily except on Tuesdays when she takes 2.5 mg.  10/20/2024: INR 3.11.    *Normochromic normocytic anemia.  She has history of iron deficiency anemia.  She has history of low normal vitamin B12 level.  10/28/2024: Hemoglobin 11.5.  Iron studies revealed severe iron deficiency with ferritin <8 and transferrin saturation of 12%.    PLAN:    1.  The dose of Coumadin will be adjusted to 2.5 mg today and tomorrow.  After that, she will take 5 mg daily.  2.  Repeat PT/INR in 1 week with coag review.  3.  Start ferrous sulfate 325 mg daily.  4.  She will return monthly for PT/INR with coag review.  5.  Follow-up in 6 months.  Will obtain CBC PT/INR CMP ferritin and iron panel.        Kimberley Melgar MD  10/28/24

## 2024-10-29 ENCOUNTER — TELEPHONE (OUTPATIENT)
Dept: ONCOLOGY | Facility: CLINIC | Age: 29
End: 2024-10-29
Payer: COMMERCIAL

## 2024-10-29 RX ORDER — FERROUS SULFATE 325(65) MG
325 TABLET ORAL
Qty: 30 TABLET | Refills: 2 | Status: SHIPPED | OUTPATIENT
Start: 2024-10-29

## 2024-10-29 NOTE — TELEPHONE ENCOUNTER
----- Message from Kimberley Melgar sent at 10/28/2024  5:55 PM EDT -----  Please inform the patient that labs showed iron deficiency.  I recommend starting ferrous sulfate 325 mg daily.    Thank you

## 2024-10-29 NOTE — TELEPHONE ENCOUNTER
Called patient and left a detailed voicemail. Sent script. Requested a call back at [phone] to let know she received the message. Rashmi Delgadillo RN

## 2024-10-31 NOTE — TELEPHONE ENCOUNTER
"  Caller: Vikki Durham \"[employer]\"    Relationship: Self    Best call back number: [phone]    What was the call regarding: PATIENT CALLING BACK AND WANTED VIVIAN TO KNOW SHE DID GET THE MESSAGE.  NO CALL BACK IS NEEDED  "

## 2024-11-04 ENCOUNTER — ANTICOAGULATION VISIT (OUTPATIENT)
Dept: ONCOLOGY | Facility: HOSPITAL | Age: 29
End: 2024-11-04
Payer: COMMERCIAL

## 2024-11-04 ENCOUNTER — LAB (OUTPATIENT)
Dept: LAB | Facility: HOSPITAL | Age: 29
End: 2024-11-04
Payer: COMMERCIAL

## 2024-11-04 DIAGNOSIS — I82.621 ARM DVT (DEEP VENOUS THROMBOEMBOLISM), ACUTE, RIGHT: ICD-10-CM

## 2024-11-04 DIAGNOSIS — I26.99 PULMONARY EMBOLISM WITHOUT ACUTE COR PULMONALE, UNSPECIFIED CHRONICITY, UNSPECIFIED PULMONARY EMBOLISM TYPE: ICD-10-CM

## 2024-11-04 DIAGNOSIS — C85.28 MEDIASTINAL LARGE B-CELL LYMPHOMA OF LYMPH NODES OF MULTIPLE REGIONS: ICD-10-CM

## 2024-11-04 DIAGNOSIS — Z79.01 ANTICOAGULATION MONITORING BY PHARMACIST: ICD-10-CM

## 2024-11-04 LAB
INR PPP: 3.1 (ref 0.9–1.1)
PROTHROMBIN TIME: 37.3 SECONDS (ref 11–13.5)

## 2024-11-04 PROCEDURE — 36416 COLLJ CAPILLARY BLOOD SPEC: CPT

## 2024-11-04 PROCEDURE — 85610 PROTHROMBIN TIME: CPT

## 2024-11-04 PROCEDURE — G0463 HOSPITAL OUTPT CLINIC VISIT: HCPCS

## 2024-11-04 NOTE — PROGRESS NOTES
Anticoagulation Clinic Progress Note    Patient's visit was held in office today.    Anticoagulation Summary  As of 11/4/2024      INR goal:  2.0-3.0   TTR:  57.9% (2.9 mo)   INR used for dosing:  3.10 (11/4/2024)   Warfarin maintenance plan:  2.5 mg (5 mg x 0.5) every Mon, Fri; 5 mg (5 mg x 1) all other days   Weekly warfarin total:  30 mg   Plan last modified:  Billie Mir RPH (11/4/2024)   Next INR check:  11/18/2024   Target end date:  --             Anticoagulation Episode Summary       INR check location:  --    Preferred lab:  --    Send INR reminders to:   [facility] CBC ANTICOAG POOL    Comments:  --            Clinic Interview:  Patient Findings     Negatives:  Signs/symptoms of thrombosis, Signs/symptoms of bleeding,   Laboratory test error suspected, Change in health, Change in alcohol use,   Change in activity, Upcoming invasive procedure, Emergency department   visit, Upcoming dental procedure, Missed doses, Extra doses, Change in   medications, Change in diet/appetite, Hospital admission, Bruising, Other   complaints      Clinical Outcomes     Negatives:  Major bleeding event, Thromboembolic event,   Anticoagulation-related hospital admission, Anticoagulation-related ED   visit, Anticoagulation-related fatality        INR History:      Latest Ref Rng & Units 9/5/2024     8:00 AM 9/23/2024    10:01 AM 9/23/2024    10:30 AM 10/28/2024     9:15 AM 10/28/2024     9:34 AM 11/4/2024    10:21 AM 11/4/2024    10:30 AM   Anticoagulation Monitoring   INR  2.50  2.40 3.11   3.10   INR Date  9/5/2024  9/23/2024 10/28/2024   11/4/2024   INR Goal  2.0-3.0  2.0-3.0 2.0-3.0   2.0-3.0   Trend  Same  Same Same   Down   Last Week Total  32.5 mg  32.5 mg 32.5 mg   30 mg   Next Week Total  32.5 mg  32.5 mg 30 mg   30 mg   Sun  5 mg  5 mg 5 mg   5 mg   Mon  5 mg  5 mg 2.5 mg (10/28)   2.5 mg   Tue  2.5 mg  2.5 mg 2.5 mg   5 mg   Wed  5 mg  5 mg 5 mg   5 mg   Thu  5 mg  5 mg 5 mg   5 mg   Fri  5 mg  5 mg 5 mg   2.5 mg    Sat  5 mg  5 mg 5 mg   5 mg   Historical INR 0.90 - 1.10  2.40    3.11  3.10     Visit Report     Report Report         Plan:  1. INR is Supratherapeutic today- see above in Anticoagulation Summary.  Will instruct Vikki Durham to Change their warfarin regimen to 2.5mg on M/F and 5mg on all other days of the week- see above in Anticoagulation Summary.  2. Follow up in 2 weeks  3. Verbal and written information provided. Patient expresses understanding and has no further questions at this time.    Billie Mir RP

## 2024-11-18 ENCOUNTER — LAB (OUTPATIENT)
Dept: LAB | Facility: HOSPITAL | Age: 29
End: 2024-11-18
Payer: COMMERCIAL

## 2024-11-18 ENCOUNTER — ANTICOAGULATION VISIT (OUTPATIENT)
Dept: ONCOLOGY | Facility: HOSPITAL | Age: 29
End: 2024-11-18
Payer: COMMERCIAL

## 2024-11-18 DIAGNOSIS — I26.99 RECURRENT PULMONARY EMBOLISM: ICD-10-CM

## 2024-11-18 DIAGNOSIS — I82.621 ARM DVT (DEEP VENOUS THROMBOEMBOLISM), ACUTE, RIGHT: ICD-10-CM

## 2024-11-18 LAB
INR PPP: 4.2 (ref 0.9–1.1)
PROTHROMBIN TIME: 50.1 SECONDS (ref 11–13.5)

## 2024-11-18 PROCEDURE — 85610 PROTHROMBIN TIME: CPT

## 2024-11-18 PROCEDURE — G0463 HOSPITAL OUTPT CLINIC VISIT: HCPCS

## 2024-11-18 PROCEDURE — 36416 COLLJ CAPILLARY BLOOD SPEC: CPT

## 2024-11-18 NOTE — PROGRESS NOTES
Anticoagulation Clinic Progress Note    Patient's visit was held in office today.    Anticoagulation Summary  As of 2024      INR goal:  2.0-3.0   TTR:  50.0% (3.4 mo)   INR used for dosin.20 (2024)   Warfarin maintenance plan:  2.5 mg (5 mg x 0.5) every Mon, Wed, Fri; 5 mg (5 mg x 1) all other days   Weekly warfarin total:  27.5 mg   Plan last modified:  Billie Mir RPH (2024)   Next INR check:  2024   Target end date:  --             Anticoagulation Episode Summary       INR check location:  --    Preferred lab:  --    Send INR reminders to:   [facility] CBC ANTICOAG POOL    Comments:  --            Clinic Interview:  Patient Findings     Positives:  Signs/symptoms of bleeding, Change in medications    Negatives:  Signs/symptoms of thrombosis, Laboratory test error   suspected, Change in health, Change in alcohol use, Change in activity,   Upcoming invasive procedure, Emergency department visit, Upcoming dental   procedure, Missed doses, Extra doses, Change in diet/appetite, Hospital   admission, Bruising, Other complaints    Comments:  Started ferrous sulfate per hematology for low iron.  Has had   some bleeding between menses that is more that spotting but is   intermittant. Has new gynecology provider appointment mid Dec.      Clinical Outcomes     Negatives:  Major bleeding event, Thromboembolic event,   Anticoagulation-related hospital admission, Anticoagulation-related ED   visit, Anticoagulation-related fatality    Comments:  Started ferrous sulfate per hematology for low iron.  Has had   some bleeding between menses that is more that spotting but is   intermittant. Has new gynecology provider appointment mid Dec.        INR History:      Latest Ref Rng & Units 2024    10:30 AM 10/28/2024     9:15 AM 10/28/2024     9:34 AM 2024    10:21 AM 2024    10:30 AM 2024     9:30 AM 2024     9:32 AM   Anticoagulation Monitoring   INR  2.40 3.11   3.10 4.20     INR Date  9/23/2024 10/28/2024   11/4/2024 11/18/2024    INR Goal  2.0-3.0 2.0-3.0   2.0-3.0 2.0-3.0    Trend  Same Same   Down Down    Last Week Total  32.5 mg 32.5 mg   30 mg 30 mg    Next Week Total  32.5 mg 30 mg   30 mg 25 mg    Sun  5 mg 5 mg   5 mg 5 mg    Mon  5 mg 2.5 mg (10/28)   2.5 mg Hold (11/18); Otherwise 2.5 mg    Tue  2.5 mg 2.5 mg   5 mg 5 mg    Wed  5 mg 5 mg   5 mg 2.5 mg    Thu  5 mg 5 mg   5 mg 5 mg    Fri  5 mg 5 mg   2.5 mg 2.5 mg    Sat  5 mg 5 mg   5 mg 5 mg    Historical INR 0.90 - 1.10   3.11  3.10    4.20    Visit Report   Report Report           Plan:  1. INR is Supratherapeutic today- see above in Anticoagulation Summary.  Will instruct Vikki Durham to HOLD warfarin today, then Change their warfarin regimen to 2.5mg MWF, 5mg on all other days- see above in Anticoagulation Summary.  2. Follow up in 2 weeks  3. Patient declines warfarin refills.  4. Verbal and written information provided. Patient expresses understanding and has no further questions at this time.    Billie Mir RP

## 2024-12-02 ENCOUNTER — LAB (OUTPATIENT)
Dept: LAB | Facility: HOSPITAL | Age: 29
End: 2024-12-02
Payer: COMMERCIAL

## 2024-12-02 ENCOUNTER — ANTICOAGULATION VISIT (OUTPATIENT)
Dept: ONCOLOGY | Facility: HOSPITAL | Age: 29
End: 2024-12-02
Payer: COMMERCIAL

## 2024-12-02 DIAGNOSIS — I26.99 RECURRENT PULMONARY EMBOLISM: ICD-10-CM

## 2024-12-02 DIAGNOSIS — I82.621 ARM DVT (DEEP VENOUS THROMBOEMBOLISM), ACUTE, RIGHT: ICD-10-CM

## 2024-12-02 LAB
INR PPP: 2.7 (ref 0.9–1.1)
PROTHROMBIN TIME: 31.8 SECONDS (ref 11–13.5)

## 2024-12-02 PROCEDURE — G0463 HOSPITAL OUTPT CLINIC VISIT: HCPCS

## 2024-12-02 PROCEDURE — 85610 PROTHROMBIN TIME: CPT

## 2024-12-02 NOTE — PROGRESS NOTES
Anticoagulation Clinic Progress Note    Patient's visit was held in office today.    Anticoagulation Summary  As of 2024      INR goal:  2.0-3.0   TTR:  46.4% (3.9 mo)   INR used for dosin.70 (2024)   Warfarin maintenance plan:  2.5 mg (5 mg x 0.5) every Mon, Wed, Fri; 5 mg (5 mg x 1) all other days   Weekly warfarin total:  27.5 mg   Plan last modified:  Billie Mir RPH (2024)   Next INR check:  2024   Target end date:  --             Anticoagulation Episode Summary       INR check location:  --    Preferred lab:  --    Send INR reminders to:   [facility]    Comments:  --            Clinic Interview:  Patient Findings     Negatives:  Signs/symptoms of thrombosis, Signs/symptoms of bleeding,   Laboratory test error suspected, Change in health, Change in alcohol use,   Change in activity, Upcoming invasive procedure, Emergency department   visit, Upcoming dental procedure, Missed doses, Extra doses, Change in   medications, Change in diet/appetite, Hospital admission, Bruising, Other   complaints      Clinical Outcomes     Negatives:  Major bleeding event, Thromboembolic event,   Anticoagulation-related hospital admission, Anticoagulation-related ED   visit, Anticoagulation-related fatality        INR History:      Latest Ref Rng & Units 10/28/2024     9:34 AM 2024    10:21 AM 2024    10:30 AM 2024     9:30 AM 2024     9:32 AM 2024    10:11 AM 2024    10:30 AM   Anticoagulation Monitoring   INR    3.10 4.20   2.70   INR Date    2024   INR Goal    2.0-3.0 2.0-3.0   2.0-3.0   Trend    Down Down   Same   Last Week Total    30 mg 30 mg   27.5 mg   Next Week Total    30 mg 25 mg   27.5 mg   Sun    5 mg 5 mg   5 mg   Mon    2.5 mg Hold (); Otherwise 2.5 mg   2.5 mg   Tue    5 mg 5 mg   5 mg   Wed    5 mg 2.5 mg   2.5 mg   Thu    5 mg 5 mg   5 mg   Fri    2.5 mg 2.5 mg   2.5 mg   Sat    5 mg 5 mg   5 mg    Historical INR 0.90 - 1.10 3.11  3.10    4.20  2.70     Visit Report  Report             Plan:  1. INR is Therapeutic today- see above in Anticoagulation Summary.  Will instruct Vikki Durham to Continue their warfarin regimen at 2.5mg on MWF, 5mg on all other days- see above in Anticoagulation Summary.  2. Follow up in 1 month or will call if out of cycle menstrual bleeding noted over next 2 weeks  3. Patient declines warfarin refills.  4. Verbal and written information provided. Patient expresses understanding and has no further questions at this time.  5 Will start application process for home testing machine through [employer]    Billie Mir RP

## 2024-12-14 NOTE — PROGRESS NOTES
Subjective     Chief Complaint   Patient presents with    Gynecologic Exam     New GYN. Pt would like to Discuss Abnormal Bleeding.        History of Present Illness    Vikki Durham is a 29 y.o.  who is scheduled for a new pt exam. She notes she has had irregular bleeding between menses for the last year. The bleeding seems to occur a few days after the completion of her menses then stops.   Her menses are regular every 28-30 days, lasting  6-8 days, denies pain.   She had a recent pelvic u/s at [facility] that was normal.     She was dx in 2018 with lymphoma. She had a spontaneous PE following treatment. She reports her oncologist evaluated and she was neg for thrombophilia.   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 She is from [place].   Obstetric History:  OB History          0    Para   0    Term   0       0    AB   0    Living   0         SAB   0    IAB   0    Ectopic   0    Molar   0    Multiple   0    Live Births   0               Menstrual History:     Patient's last menstrual period was 2024 (exact date).         Current contraception: abstinence  History of abnormal Pap smear: no  Received Gardasil immunization: yes  Perform regular self breast exam: no  Family history of uterine or ovarian cancer: no  Family History of colon cancer: no  Family history of breast cancer: yes - pat aunt, dx in her early 50's, also pat great aunt    Mammogram: not indicated.  Colonoscopy: not indicated.  DEXA: not indicated.    Calcium/Vitamin D: adequate intake    The following portions of the patient's history were reviewed and updated as appropriate: allergies, current medications, past family history, past medical history, past social history, past surgical history, and problem list.    Review of Systems    Review of Systems   Constitutional: Negative for fatigue.   Respiratory: Negative for shortness of breath.    Gastrointestinal:  "Negative for abdominal pain.   Genitourinary: Positive for light spotting a few days after menses; neg for pelvic pain or discharge  Neurological: Positive for occ migraine headaches.   Psychiatric/Behavioral: Negative for dysphoric mood.         Objective   Physical Exam    /70 (BP Location: Right arm, Patient Position: Sitting, Cuff Size: Adult)   Ht 157.5 cm (62\")   Wt 78.8 kg (173 lb 12.8 oz)   LMP 12/04/2024 (Exact Date)   BMI 31.79 kg/m²   General:   Alert, in no distress   Heart: regular rate and rhythm   Lungs: clear to auscultation bilaterally   Breast: Inspection is negative. Left breast is without masses, retractions, nipple discharge or axillary adenopathy. Right breast is without masses, retractions, nipple discharge or axillary adenopathy.     Neck: Supple, no thyromegaly   Abdomen: Soft, no tenderness or guarding   Pelvis: External genitalia: normal general appearance  Urinary system: urethral meatus normal  Vaginal: normal mucosa without prolapse or lesions  Cervix: normal appearance  Adnexa: no  masses or tenderness  Uterus: normal, nontender   Extremities: Normal without edema   Neurologic: Alert and oriented   Psychiatric: Normal affect, judgment and mood     Assessment & Plan   Diagnoses and all orders for this visit:    1. Encounter for gynecological examination (Primary)  -     IGP, Apt HPV,rfx 16 / 18,45    2. Encounter for screening for human papillomavirus (HPV)  -     IGP, Apt HPV,rfx 16 / 18,45    3. Irregular bleeding  -     NuSwab BV & Candida - Swab, Vagina    4. Family history of cancer  Comments:  offered genetic testing and pt declines, she believes she had labs with oncology in the past        All questions answered.  Breast self exam technique reviewed and patient encouraged to perform self-exam monthly.  Discussed healthy lifestyle modifications.  Recommended 30 minutes of aerobic exercise five times per week.  Advised pt to call if she does not receive results of pap " within 2 weeks    Pt declines expected need for contraception.  Advised her to call if she does not receive results of pap and swab within 2 weeks. If negative, suspect cycle related spotting. Would observe and pt agrees

## 2024-12-16 ENCOUNTER — OFFICE VISIT (OUTPATIENT)
Dept: OBSTETRICS AND GYNECOLOGY | Age: 29
End: 2024-12-16
Payer: COMMERCIAL

## 2024-12-16 VITALS
BODY MASS INDEX: 31.98 KG/M2 | DIASTOLIC BLOOD PRESSURE: 70 MMHG | SYSTOLIC BLOOD PRESSURE: 114 MMHG | HEIGHT: 62 IN | WEIGHT: 173.8 LBS

## 2024-12-16 DIAGNOSIS — Z01.419 ENCOUNTER FOR GYNECOLOGICAL EXAMINATION: Primary | ICD-10-CM

## 2024-12-16 DIAGNOSIS — Z80.9 FAMILY HISTORY OF CANCER: ICD-10-CM

## 2024-12-16 DIAGNOSIS — N92.6 IRREGULAR BLEEDING: ICD-10-CM

## 2024-12-16 DIAGNOSIS — Z11.51 ENCOUNTER FOR SCREENING FOR HUMAN PAPILLOMAVIRUS (HPV): ICD-10-CM

## 2024-12-16 PROBLEM — Z00.00 HEALTH CARE MAINTENANCE: Status: RESOLVED | Noted: 2017-06-13 | Resolved: 2024-12-16

## 2024-12-17 LAB
A VAGINAE DNA VAG QL NAA+PROBE: NORMAL SCORE
BVAB2 DNA VAG QL NAA+PROBE: NORMAL SCORE
C ALBICANS DNA VAG QL NAA+PROBE: NEGATIVE
C GLABRATA DNA VAG QL NAA+PROBE: NEGATIVE
MEGA1 DNA VAG QL NAA+PROBE: NORMAL SCORE

## 2024-12-21 LAB
CYTOLOGIST CVX/VAG CYTO: NORMAL
CYTOLOGY CVX/VAG DOC CYTO: NORMAL
CYTOLOGY CVX/VAG DOC THIN PREP: NORMAL
DX ICD CODE: NORMAL
HPV I/H RISK 4 DNA CVX QL PROBE+SIG AMP: NEGATIVE
Lab: NORMAL
OTHER STN SPEC: NORMAL
STAT OF ADQ CVX/VAG CYTO-IMP: NORMAL

## 2024-12-30 ENCOUNTER — ANTICOAGULATION VISIT (OUTPATIENT)
Dept: ONCOLOGY | Facility: HOSPITAL | Age: 29
End: 2024-12-30
Payer: COMMERCIAL

## 2024-12-30 ENCOUNTER — LAB (OUTPATIENT)
Dept: LAB | Facility: HOSPITAL | Age: 29
End: 2024-12-30
Payer: COMMERCIAL

## 2024-12-30 DIAGNOSIS — I26.99 RECURRENT PULMONARY EMBOLISM: ICD-10-CM

## 2024-12-30 DIAGNOSIS — I82.621 ARM DVT (DEEP VENOUS THROMBOEMBOLISM), ACUTE, RIGHT: ICD-10-CM

## 2024-12-30 LAB
INR PPP: 3.5 (ref 0.9–1.1)
PROTHROMBIN TIME: 41.8 SECONDS (ref 11–13.5)

## 2024-12-30 PROCEDURE — 85610 PROTHROMBIN TIME: CPT

## 2024-12-30 PROCEDURE — G0463 HOSPITAL OUTPT CLINIC VISIT: HCPCS

## 2024-12-30 NOTE — PROGRESS NOTES
Anticoagulation Clinic Progress Note    Patient's visit was held in office today.    Anticoagulation Summary  As of 12/30/2024      INR goal:  2.0-3.0   TTR:  44.7% (4.8 mo)   INR used for dosing:  3.50 (12/30/2024)   Warfarin maintenance plan:  5 mg (5 mg x 1) every Tue, Thu, Sat; 2.5 mg (5 mg x 0.5) all other days   Weekly warfarin total:  25 mg   Plan last modified:  Elizabet Alvarez RPH (12/30/2024)   Next INR check:  1/13/2025   Target end date:  --             Anticoagulation Episode Summary       INR check location:  --    Preferred lab:  --    Send INR reminders to:   [facility]    Comments:  --            Clinic Interview:  Patient Findings     Negatives:  Signs/symptoms of thrombosis, Signs/symptoms of bleeding,   Laboratory test error suspected, Change in health, Change in alcohol use,   Change in activity, Upcoming invasive procedure, Emergency department   visit, Upcoming dental procedure, Missed doses, Extra doses, Change in   medications, Change in diet/appetite, Hospital admission, Bruising, Other   complaints      Clinical Outcomes     Negatives:  Major bleeding event, Thromboembolic event,   Anticoagulation-related hospital admission, Anticoagulation-related ED   visit, Anticoagulation-related fatality        INR History:      Latest Ref Rng & Units 11/4/2024    10:30 AM 11/18/2024     9:30 AM 11/18/2024     9:32 AM 12/2/2024    10:11 AM 12/2/2024    10:30 AM 12/30/2024    10:13 AM 12/30/2024    10:30 AM   Anticoagulation Monitoring   INR  3.10 4.20   2.70  3.50   INR Date  11/4/2024 11/18/2024 12/2/2024 12/30/2024   INR Goal  2.0-3.0 2.0-3.0   2.0-3.0  2.0-3.0   Trend  Down Down   Same  Down   Last Week Total  30 mg 30 mg   27.5 mg  27.5 mg   Next Week Total  30 mg 25 mg   27.5 mg  22.5 mg   Sun  5 mg 5 mg   5 mg  2.5 mg   Mon  2.5 mg Hold (11/18); Otherwise 2.5 mg   2.5 mg  2.5 mg   Tue  5 mg 5 mg   5 mg  2.5 mg (12/31); Otherwise 5 mg   Wed  5 mg 2.5 mg   2.5 mg  2.5 mg   Thu   5 mg 5 mg   5 mg  5 mg   Fri  2.5 mg 2.5 mg   2.5 mg  2.5 mg   Sat  5 mg 5 mg   5 mg  5 mg   Historical INR 0.90 - 1.10   4.20  2.70   3.50         Plan:  1. INR is Supratherapeutic today- see above in Anticoagulation Summary.  Will instruct Vikki Durham to take warfarin 2.5 mg tomorrow only, then decrease their warfarin regimen to 5 mg on Tues/Thurs/Sat and 2.5 mg all other days - see above in Anticoagulation Summary.  2. Follow up in 2 weeks  3. Patient declines warfarin refills.  4. Verbal and written information provided. Patient expresses understanding and has no further questions at this time.    Elizabet Alvarez [facility]

## 2025-01-08 ENCOUNTER — TELEPHONE (OUTPATIENT)
Dept: PHARMACY | Facility: HOSPITAL | Age: 30
End: 2025-01-08
Payer: COMMERCIAL

## 2025-01-08 NOTE — TELEPHONE ENCOUNTER
Received phone call from patient regarding warfarin dosing. She states she reversed her warfarin dosing to 2.5 mg Tu/Th/Sat 5 mg all other days instead of 5 mg Tu/Th/Sat 2.5 all other days. With this dosing patient will receive 27.5 mg over the course of the week instead of 25 mg so will reduce tomorrows dose to 2.5 mg instead of 5 mg. She will be in clinic on Monday to check INR.

## 2025-01-13 ENCOUNTER — LAB (OUTPATIENT)
Dept: LAB | Facility: HOSPITAL | Age: 30
End: 2025-01-13
Payer: COMMERCIAL

## 2025-01-13 ENCOUNTER — ANTICOAGULATION VISIT (OUTPATIENT)
Dept: ONCOLOGY | Facility: HOSPITAL | Age: 30
End: 2025-01-13
Payer: COMMERCIAL

## 2025-01-13 DIAGNOSIS — I82.621 ARM DVT (DEEP VENOUS THROMBOEMBOLISM), ACUTE, RIGHT: ICD-10-CM

## 2025-01-13 DIAGNOSIS — I26.99 RECURRENT PULMONARY EMBOLISM: ICD-10-CM

## 2025-01-13 LAB
INR PPP: 3 (ref 0.9–1.1)
PROTHROMBIN TIME: 36.4 SECONDS (ref 11–13.5)

## 2025-01-13 PROCEDURE — 36416 COLLJ CAPILLARY BLOOD SPEC: CPT

## 2025-01-13 PROCEDURE — 85610 PROTHROMBIN TIME: CPT

## 2025-01-13 PROCEDURE — G0463 HOSPITAL OUTPT CLINIC VISIT: HCPCS

## 2025-01-13 NOTE — PROGRESS NOTES
Anticoagulation Clinic Progress Note    Patient's visit was held in office today.    Anticoagulation Summary  As of 1/13/2025      INR goal:  2.0-3.0   TTR:  40.7% (5.3 mo)   INR used for dosing:  3.00 (1/13/2025)   Warfarin maintenance plan:  5 mg (5 mg x 1) every Tue, Thu, Sat; 2.5 mg (5 mg x 0.5) all other days   Weekly warfarin total:  25 mg   No change documented:  Elizabet Alvarez RPH   Plan last modified:  Elizabet Alvarez RPH (12/30/2024)   Next INR check:  1/27/2025   Target end date:  --             Anticoagulation Episode Summary       INR check location:  --    Preferred lab:  --    Send INR reminders to:   [facility] CBC ANTICOAG POOL    Comments:  --            Clinic Interview:  Patient Findings     Negatives:  Signs/symptoms of thrombosis, Signs/symptoms of bleeding,   Laboratory test error suspected, Change in health, Change in alcohol use,   Change in activity, Upcoming invasive procedure, Emergency department   visit, Upcoming dental procedure, Missed doses, Extra doses, Change in   medications, Change in diet/appetite, Hospital admission, Bruising, Other   complaints      Clinical Outcomes     Negatives:  Major bleeding event, Thromboembolic event,   Anticoagulation-related hospital admission, Anticoagulation-related ED   visit, Anticoagulation-related fatality        INR History:      Latest Ref Rng & Units 11/18/2024     9:32 AM 12/2/2024    10:11 AM 12/2/2024    10:30 AM 12/30/2024    10:13 AM 12/30/2024    10:30 AM 1/13/2025    10:24 AM 1/13/2025     1:00 PM   Anticoagulation Monitoring   INR    2.70  3.50  3.00   INR Date    12/2/2024 12/30/2024 1/13/2025   INR Goal    2.0-3.0  2.0-3.0  2.0-3.0   Trend    Same  Down  Same   Last Week Total    27.5 mg  27.5 mg  22.5 mg   Next Week Total    27.5 mg  25 mg  25 mg   Sun    5 mg  5 mg (1/5); Otherwise 2.5 mg  2.5 mg   Mon    2.5 mg  5 mg (1/6); Otherwise 2.5 mg  2.5 mg   Tue    5 mg  2.5 mg (12/31, 1/7)  5 mg   Wed    2.5 mg  5 mg (1/1); Otherwise  2.5 mg  2.5 mg   Thu    5 mg  2.5 mg (1/2, 1/9)  5 mg   Fri    2.5 mg  5 mg (1/3); Otherwise 2.5 mg  2.5 mg   Sat    5 mg  2.5 mg (1/4); Otherwise 5 mg  5 mg   Historical INR 0.90 - 1.10 4.20  2.70   3.50   3.00         Plan:  1. INR is Therapeutic today- see above in Anticoagulation Summary.  Will instruct Vikki Durham to Continue their warfarin regimen- see above in Anticoagulation Summary.  2. Follow up in 2 weeks  3. Patient declines warfarin refills.  4. Verbal and written information provided. Patient expresses understanding and has no further questions at this time.    Elizabet Alvarez [facility]

## 2025-01-27 ENCOUNTER — LAB (OUTPATIENT)
Dept: LAB | Facility: HOSPITAL | Age: 30
End: 2025-01-27
Payer: COMMERCIAL

## 2025-01-27 ENCOUNTER — ANTICOAGULATION VISIT (OUTPATIENT)
Dept: ONCOLOGY | Facility: HOSPITAL | Age: 30
End: 2025-01-27
Payer: COMMERCIAL

## 2025-01-27 DIAGNOSIS — I26.99 RECURRENT PULMONARY EMBOLISM: ICD-10-CM

## 2025-01-27 DIAGNOSIS — I82.621 ARM DVT (DEEP VENOUS THROMBOEMBOLISM), ACUTE, RIGHT: ICD-10-CM

## 2025-01-27 LAB
INR PPP: 2.1 (ref 0.9–1.1)
PROTHROMBIN TIME: 25.2 SECONDS (ref 11–13.5)

## 2025-01-27 PROCEDURE — 85610 PROTHROMBIN TIME: CPT

## 2025-01-27 PROCEDURE — G0463 HOSPITAL OUTPT CLINIC VISIT: HCPCS

## 2025-01-27 NOTE — PROGRESS NOTES
Anticoagulation Clinic Progress Note    Patient's visit was held in office today.    Anticoagulation Summary  As of 2025      INR goal:  2.0-3.0   TTR:  45.5% (5.7 mo)   INR used for dosin.10 (2025)   Warfarin maintenance plan:  5 mg (5 mg x 1) every Tue, Thu, Sat; 2.5 mg (5 mg x 0.5) all other days   Weekly warfarin total:  25 mg   No change documented:  Elizabet Alvarez RPH   Plan last modified:  Elizabet Alvarez RPH (2024)   Next INR check:  2025   Target end date:  --             Anticoagulation Episode Summary       INR check location:  --    Preferred lab:  --    Send INR reminders to:   [facility] CBC ANTICOAG POOL    Comments:  --            Clinic Interview:  Patient Findings     Positives:  Signs/symptoms of bleeding    Negatives:  Signs/symptoms of thrombosis, Laboratory test error   suspected, Change in health, Change in alcohol use, Change in activity,   Upcoming invasive procedure, Emergency department visit, Upcoming dental   procedure, Missed doses, Extra doses, Change in medications, Change in   diet/appetite, Hospital admission, Bruising, Other complaints    Comments:  Patient reports some bright red blood on toilet paper, but was   around the time of her period. She is currently on her period and will let   us know if this continues after her period has ended.        Clinical Outcomes     Negatives:  Major bleeding event, Thromboembolic event,   Anticoagulation-related hospital admission, Anticoagulation-related ED   visit, Anticoagulation-related fatality    Comments:  Patient reports some bright red blood on toilet paper, but was   around the time of her period. She is currently on her period and will let   us know if this continues after her period has ended.          INR History:      Latest Ref Rng & Units 2024    10:30 AM 2024    10:13 AM 2024    10:30 AM 2025    10:24 AM 2025     1:00 PM 2025    10:30 AM 2025    10:34 AM    Anticoagulation Monitoring   INR  2.70  3.50  3.00 2.10    INR Date  12/2/2024 12/30/2024 1/13/2025 1/27/2025    INR Goal  2.0-3.0  2.0-3.0  2.0-3.0 2.0-3.0    Trend  Same  Down  Same Same    Last Week Total  27.5 mg  27.5 mg  22.5 mg 25 mg    Next Week Total  27.5 mg  25 mg  25 mg 25 mg    Sun  5 mg  5 mg (1/5); Otherwise 2.5 mg  2.5 mg 2.5 mg    Mon  2.5 mg  5 mg (1/6); Otherwise 2.5 mg  2.5 mg 2.5 mg    Tue  5 mg  2.5 mg (12/31, 1/7)  5 mg 5 mg    Wed  2.5 mg  5 mg (1/1); Otherwise 2.5 mg  2.5 mg 2.5 mg    Thu  5 mg  2.5 mg (1/2, 1/9)  5 mg 5 mg    Fri  2.5 mg  5 mg (1/3); Otherwise 2.5 mg  2.5 mg 2.5 mg    Sat  5 mg  2.5 mg (1/4); Otherwise 5 mg  5 mg 5 mg    Historical INR 0.90 - 1.10  3.50   3.00    2.10        Plan:  1. INR is Therapeutic today- see above in Anticoagulation Summary.  Will instruct Vikki Durham to Continue their warfarin regimen- see above in Anticoagulation Summary.  2. Follow up in 1 month  3. Patient declines warfarin refills.  4. Verbal and written information provided. Patient expresses understanding and has no further questions at this time.    Elizabet Alvarez [facility]

## 2025-02-24 ENCOUNTER — ANTICOAGULATION VISIT (OUTPATIENT)
Dept: ONCOLOGY | Facility: HOSPITAL | Age: 30
End: 2025-02-24
Payer: COMMERCIAL

## 2025-02-24 ENCOUNTER — LAB (OUTPATIENT)
Dept: LAB | Facility: HOSPITAL | Age: 30
End: 2025-02-24
Payer: COMMERCIAL

## 2025-02-24 DIAGNOSIS — I82.621 ARM DVT (DEEP VENOUS THROMBOEMBOLISM), ACUTE, RIGHT: ICD-10-CM

## 2025-02-24 DIAGNOSIS — I26.99 RECURRENT PULMONARY EMBOLISM: ICD-10-CM

## 2025-02-24 LAB
INR PPP: 1.3 (ref 0.9–1.1)
PROTHROMBIN TIME: 15.6 SECONDS (ref 11–13.5)

## 2025-02-24 PROCEDURE — G0463 HOSPITAL OUTPT CLINIC VISIT: HCPCS

## 2025-02-24 PROCEDURE — 85610 PROTHROMBIN TIME: CPT

## 2025-02-24 NOTE — PROGRESS NOTES
Anticoagulation Clinic Progress Note    Patient's visit was held in office today.    Anticoagulation Summary  As of 2025      INR goal:  2.0-3.0   TTR:  40.9% (6.7 mo)   INR used for dosin.30 (2025)   Warfarin maintenance plan:  5 mg (5 mg x 1) every Tue, Thu, Sat; 2.5 mg (5 mg x 0.5) all other days   Weekly warfarin total:  25 mg   Plan last modified:  Elizabet Alvarez RPH (2024)   Next INR check:  3/3/2025   Target end date:  --             Anticoagulation Episode Summary       INR check location:  --    Preferred lab:  --    Send INR reminders to:   [facility] CBC ANTICOAG POOL    Comments:  --            Clinic Interview:  Patient Findings     Positives:  Missed doses    Negatives:  Signs/symptoms of thrombosis, Signs/symptoms of bleeding,   Laboratory test error suspected, Change in health, Change in alcohol use,   Change in activity, Upcoming invasive procedure, Emergency department   visit, Upcoming dental procedure, Extra doses, Change in medications,   Change in diet/appetite, Hospital admission, Bruising, Other complaints    Comments:  Possibly missed a dose of warfarin last week, but have been   decreasing warfarin dose due to elevated INR's the past few months. 5 mg   today (10% incr) recheck INR in one week.        Clinical Outcomes     Negatives:  Major bleeding event, Thromboembolic event,   Anticoagulation-related hospital admission, Anticoagulation-related ED   visit, Anticoagulation-related fatality    Comments:  Possibly missed a dose of warfarin last week, but have been   decreasing warfarin dose due to elevated INR's the past few months. 5 mg   today (10% incr) recheck INR in one week.          INR History:      Latest Ref Rng & Units 2024    10:30 AM 2025    10:24 AM 2025     1:00 PM 2025    10:30 AM 2025    10:34 AM 2025    10:10 AM 2025    10:30 AM   Anticoagulation Monitoring   INR  3.50  3.00 2.10   1.30   INR Date  2024  1/27/2025 2/24/2025   INR Goal  2.0-3.0  2.0-3.0 2.0-3.0   2.0-3.0   Trend  Down  Same Same   Same   Last Week Total  27.5 mg  22.5 mg 25 mg   25 mg   Next Week Total  25 mg  25 mg 25 mg   27.5 mg   Sun  5 mg (1/5); Otherwise 2.5 mg  2.5 mg 2.5 mg   2.5 mg   Mon  5 mg (1/6); Otherwise 2.5 mg  2.5 mg 2.5 mg   5 mg (2/24)   Tue  2.5 mg (12/31, 1/7)  5 mg 5 mg   5 mg   Wed  5 mg (1/1); Otherwise 2.5 mg  2.5 mg 2.5 mg   2.5 mg   Thu  2.5 mg (1/2, 1/9)  5 mg 5 mg   5 mg   Fri  5 mg (1/3); Otherwise 2.5 mg  2.5 mg 2.5 mg   2.5 mg   Sat  2.5 mg (1/4); Otherwise 5 mg  5 mg 5 mg   5 mg   Historical INR 0.90 - 1.10  3.00    2.10  1.30         Plan:  1. INR is Subtherapeutic today- see above in Anticoagulation Summary.  Will instruct Vikki Durham to take warfarin 5 mg today only, then continue their warfarin regimen of 5 mg on Tuesday, Thursday, and Saturday and 2.5 mg all other days - see above in Anticoagulation Summary.  2. Follow up in 1 week  3. Patient declines warfarin refills.  4. Verbal and written information provided. Patient expresses understanding and has no further questions at this time.    Elizabet Alvarez [facility]

## 2025-02-26 ENCOUNTER — TELEPHONE (OUTPATIENT)
Dept: PHARMACY | Facility: HOSPITAL | Age: 30
End: 2025-02-26
Payer: COMMERCIAL

## 2025-02-26 NOTE — TELEPHONE ENCOUNTER
Patient contacted anticoagulation clinic regarding missed  5 mg warfarin dose yesterday. Instructed patient to take 2.5 mg on Friday and 5 mg today, tomorrow, Saturday, and Sunday to make up for the missed dose. She will be in clinic on Monday for INR check.

## 2025-03-03 ENCOUNTER — LAB (OUTPATIENT)
Dept: LAB | Facility: HOSPITAL | Age: 30
End: 2025-03-03
Payer: COMMERCIAL

## 2025-03-03 ENCOUNTER — ANTICOAGULATION VISIT (OUTPATIENT)
Dept: ONCOLOGY | Facility: HOSPITAL | Age: 30
End: 2025-03-03
Payer: COMMERCIAL

## 2025-03-03 DIAGNOSIS — I82.621 ARM DVT (DEEP VENOUS THROMBOEMBOLISM), ACUTE, RIGHT: ICD-10-CM

## 2025-03-03 DIAGNOSIS — I26.99 RECURRENT PULMONARY EMBOLISM: ICD-10-CM

## 2025-03-03 LAB
INR PPP: 3.2 (ref 0.9–1.1)
PROTHROMBIN TIME: 38.4 SECONDS (ref 11–13.5)

## 2025-03-03 PROCEDURE — 36416 COLLJ CAPILLARY BLOOD SPEC: CPT

## 2025-03-03 PROCEDURE — 85610 PROTHROMBIN TIME: CPT

## 2025-03-03 PROCEDURE — G0463 HOSPITAL OUTPT CLINIC VISIT: HCPCS

## 2025-03-03 NOTE — PROGRESS NOTES
Anticoagulation Clinic Progress Note    Patient's visit was held in office today.    Anticoagulation Summary  As of 3/3/2025      INR goal:  2.0-3.0   TTR:  41.3% (6.9 mo)   INR used for dosing:  3.20 (3/3/2025)   Warfarin maintenance plan:  5 mg (5 mg x 1) every Tue, Thu, Sat; 2.5 mg (5 mg x 0.5) all other days   Weekly warfarin total:  25 mg   No change documented:  Billie Mir RP   Plan last modified:  Elizabet Alvarez RP (12/30/2024)   Next INR check:  3/7/2025   Target end date:  --             Anticoagulation Episode Summary       INR check location:  --    Preferred lab:  --    Send INR reminders to:   [facility] CBC ANTICOAG POOL    Comments:  --            Clinic Interview:  Patient Findings     Positives:  Missed doses    Negatives:  Signs/symptoms of thrombosis, Signs/symptoms of bleeding,   Laboratory test error suspected, Change in health, Change in alcohol use,   Change in activity, Upcoming invasive procedure, Emergency department   visit, Upcoming dental procedure, Extra doses, Change in medications,   Change in diet/appetite, Hospital admission, Bruising, Other complaints    Comments:  Did have missed dose this past week but she called and we   adjusted warfarin dosing. She will be leaviing after Friday for 10 day   mission trip in [facility] so we will recheck INR to be sure she is midrange.        Clinical Outcomes     Negatives:  Major bleeding event, Thromboembolic event,   Anticoagulation-related hospital admission, Anticoagulation-related ED   visit, Anticoagulation-related fatality    Comments:  Did have missed dose this past week but she called and we   adjusted warfarin dosing. She will be leaviing after Friday for 10 day   mission trip in [facility] so we will recheck INR to be sure she is midrange.          INR History:      Latest Ref Rng & Units 1/13/2025     1:00 PM 1/27/2025    10:30 AM 1/27/2025    10:34 AM 2/24/2025    10:10 AM 2/24/2025    10:30 AM 3/3/2025     2:08 PM 3/3/2025     2:30  PM   Anticoagulation Monitoring   INR  3.00 2.10   1.30  3.20   INR Date  1/13/2025 1/27/2025   2/24/2025  3/3/2025   INR Goal  2.0-3.0 2.0-3.0   2.0-3.0  2.0-3.0   Trend  Same Same   Same  Same   Last Week Total  22.5 mg 25 mg   25 mg  27.5 mg   Next Week Total  25 mg 25 mg   27.5 mg  25 mg   Sun  2.5 mg 2.5 mg   5 mg (3/2)  -   Mon  2.5 mg 2.5 mg   5 mg (2/24)  2.5 mg   Tue  5 mg 5 mg   Hold (2/25)  5 mg   Wed  2.5 mg 2.5 mg   5 mg (2/26)  2.5 mg   Thu  5 mg 5 mg   5 mg  5 mg   Fri  2.5 mg 2.5 mg   2.5 mg  -   Sat  5 mg 5 mg   5 mg  -   Historical INR 0.90 - 1.10   2.10  1.30   3.20         Plan:  1. INR is Supratherapeutic today- see above in Anticoagulation Summary. Due to missed dose earlier in week her warfarin dosing was adjusted.  Will instruct Vikki Durham to Continue their warfarin regimen- see above in Anticoagulation Summary.  2. Follow up in 4 days---to double check before her 10 day mission trip  3. Patient declines warfarin refills.  4. Verbal and written information provided. Patient expresses understanding and has no further questions at this time.    Billie Mir [facility]

## 2025-03-07 ENCOUNTER — ANTICOAGULATION VISIT (OUTPATIENT)
Dept: ONCOLOGY | Facility: HOSPITAL | Age: 30
End: 2025-03-07
Payer: COMMERCIAL

## 2025-03-07 ENCOUNTER — LAB (OUTPATIENT)
Dept: LAB | Facility: HOSPITAL | Age: 30
End: 2025-03-07
Payer: COMMERCIAL

## 2025-03-07 DIAGNOSIS — I26.99 RECURRENT PULMONARY EMBOLISM: ICD-10-CM

## 2025-03-07 DIAGNOSIS — I82.621 ARM DVT (DEEP VENOUS THROMBOEMBOLISM), ACUTE, RIGHT: ICD-10-CM

## 2025-03-07 LAB
INR PPP: 3 (ref 0.9–1.1)
PROTHROMBIN TIME: 36 SECONDS (ref 11–13.5)

## 2025-03-07 PROCEDURE — 85610 PROTHROMBIN TIME: CPT

## 2025-03-07 PROCEDURE — G0463 HOSPITAL OUTPT CLINIC VISIT: HCPCS

## 2025-03-07 PROCEDURE — 36416 COLLJ CAPILLARY BLOOD SPEC: CPT

## 2025-03-07 NOTE — PROGRESS NOTES
Anticoagulation Clinic Progress Note    Patient's visit was held in office today.    Anticoagulation Summary  As of 3/7/2025      INR goal:  2.0-3.0   TTR:  40.5% (7 mo)   INR used for dosing:  3.00 (3/7/2025)   Warfarin maintenance plan:  5 mg (5 mg x 1) every Tue, Thu, Sat; 2.5 mg (5 mg x 0.5) all other days   Weekly warfarin total:  25 mg   No change documented:  Elizabet Alvarez RPH   Plan last modified:  Elizabet Alvarez RPH (12/30/2024)   Next INR check:  3/24/2025   Target end date:  --             Anticoagulation Episode Summary       INR check location:  --    Preferred lab:  --    Send INR reminders to:   [facility] CBC ANTICOAG POOL    Comments:  --            Clinic Interview:  Patient Findings     Negatives:  Signs/symptoms of thrombosis, Signs/symptoms of bleeding,   Laboratory test error suspected, Change in health, Change in alcohol use,   Change in activity, Upcoming invasive procedure, Emergency department   visit, Upcoming dental procedure, Missed doses, Extra doses, Change in   medications, Change in diet/appetite, Hospital admission, Bruising, Other   complaints      Clinical Outcomes     Negatives:  Major bleeding event, Thromboembolic event,   Anticoagulation-related hospital admission, Anticoagulation-related ED   visit, Anticoagulation-related fatality        INR History:      Latest Ref Rng & Units 1/27/2025    10:34 AM 2/24/2025    10:10 AM 2/24/2025    10:30 AM 3/3/2025     2:08 PM 3/3/2025     2:30 PM 3/7/2025     2:02 PM 3/7/2025     2:15 PM   Anticoagulation Monitoring   INR    1.30  3.20  3.00   INR Date    2/24/2025  3/3/2025  3/7/2025   INR Goal    2.0-3.0  2.0-3.0  2.0-3.0   Trend    Same  Same  Same   Last Week Total    25 mg  27.5 mg  27.5 mg   Next Week Total    27.5 mg  25 mg  25 mg   Sun    5 mg (3/2)  -  2.5 mg   Mon    5 mg (2/24)  2.5 mg  2.5 mg   Tue    Hold (2/25)  5 mg  5 mg   Wed    5 mg (2/26)  2.5 mg  2.5 mg   Thu    5 mg  5 mg  5 mg   Fri    2.5 mg  -  2.5 mg   Sat    5  mg  -  5 mg   Historical INR 0.90 - 1.10 2.10  1.30   3.20   3.00         Plan:  1. INR is Therapeutic today- see above in Anticoagulation Summary.  Will instruct Vikki Durham to Continue their warfarin regimen- see above in Anticoagulation Summary.  2. Follow up in 2 weeks  3. Patient declines warfarin refills.  4. Verbal and written information provided. Patient expresses understanding and has no further questions at this time.    Elizabet Alvarez [facility]

## 2025-03-17 ENCOUNTER — TELEPHONE (OUTPATIENT)
Dept: PHARMACY | Facility: HOSPITAL | Age: 30
End: 2025-03-17
Payer: COMMERCIAL

## 2025-03-24 ENCOUNTER — LAB (OUTPATIENT)
Dept: LAB | Facility: HOSPITAL | Age: 30
End: 2025-03-24
Payer: COMMERCIAL

## 2025-03-24 ENCOUNTER — ANTICOAGULATION VISIT (OUTPATIENT)
Dept: ONCOLOGY | Facility: HOSPITAL | Age: 30
End: 2025-03-24
Payer: COMMERCIAL

## 2025-03-24 DIAGNOSIS — I82.621 ARM DVT (DEEP VENOUS THROMBOEMBOLISM), ACUTE, RIGHT: ICD-10-CM

## 2025-03-24 DIAGNOSIS — I26.99 RECURRENT PULMONARY EMBOLISM: ICD-10-CM

## 2025-03-24 LAB
INR PPP: 4.2 (ref 0.9–1.1)
PROTHROMBIN TIME: 50.3 SECONDS (ref 11–13.5)

## 2025-03-24 PROCEDURE — G0463 HOSPITAL OUTPT CLINIC VISIT: HCPCS

## 2025-03-24 PROCEDURE — 85610 PROTHROMBIN TIME: CPT

## 2025-03-24 PROCEDURE — 36416 COLLJ CAPILLARY BLOOD SPEC: CPT

## 2025-03-24 NOTE — PROGRESS NOTES
Anticoagulation Clinic Progress Note    Patient's visit was held in office today.    Anticoagulation Summary  As of 3/24/2025      INR goal:  2.0-3.0   TTR:  37.5% (7.6 mo)   INR used for dosin.20 (3/24/2025)   Warfarin maintenance plan:  5 mg (5 mg x 1) every Tue, Thu, Sat; 2.5 mg (5 mg x 0.5) all other days   Weekly warfarin total:  25 mg   Plan last modified:  Elizabet Alvarez RPH (2024)   Next INR check:  3/31/2025   Target end date:  --             Anticoagulation Episode Summary       INR check location:  --    Preferred lab:  --    Send INR reminders to:  [facility] CBC ANTICOAG POOL    Comments:  --            Clinic Interview:  Patient Findings     Positives:  Change in medications, Change in diet/appetite    Negatives:  Signs/symptoms of thrombosis, Signs/symptoms of bleeding,   Laboratory test error suspected, Change in health, Change in alcohol use,   Change in activity, Upcoming invasive procedure, Emergency department   visit, Upcoming dental procedure, Missed doses, Extra doses, Hospital   admission, Bruising, Other complaints    Comments:  She was on a mission trip out of the country from [place] and   did not eat any greens. Second dose of albendazole today (no DDI). Denies   any bleeding. Hold warfarin dose today and reduce tomorrow to 2.5 mg.       Clinical Outcomes     Negatives:  Major bleeding event, Thromboembolic event,   Anticoagulation-related hospital admission, Anticoagulation-related ED   visit, Anticoagulation-related fatality    Comments:  She was on a mission trip out of the [place] from [place] and   did not eat any greens. Second dose of albendazole today (no DDI). Denies   any bleeding. Hold warfarin dose today and reduce tomorrow to 2.5 mg.         INR History:      Latest Ref Rng & Units 2025    10:30 AM 3/3/2025     2:08 PM 3/3/2025     2:30 PM 3/7/2025     2:02 PM 3/7/2025     2:15 PM 3/24/2025     2:00 PM 3/24/2025     2:01 PM   Anticoagulation Monitoring   INR   1.30  3.20  3.00 4.20    INR Date  2/24/2025  3/3/2025  3/7/2025 3/24/2025    INR Goal  2.0-3.0  2.0-3.0  2.0-3.0 2.0-3.0    Trend  Same  Same  Same Same    Last Week Total  25 mg  27.5 mg  27.5 mg 25 mg    Next Week Total  27.5 mg  25 mg  25 mg 20 mg    Sun  5 mg (3/2)  -  2.5 mg 2.5 mg    Mon  5 mg (2/24)  2.5 mg  2.5 mg Hold (3/24)    Tue  Hold (2/25)  5 mg  5 mg 2.5 mg (3/25)    Wed  5 mg (2/26)  2.5 mg  2.5 mg 2.5 mg    Thu  5 mg  5 mg  5 mg 5 mg    Fri  2.5 mg  -  2.5 mg 2.5 mg    Sat  5 mg  -  5 mg 5 mg    Historical INR 0.90 - 1.10  3.20   3.00    4.20        Plan:  1. INR is Supratherapeutic today- see above in Anticoagulation Summary.  Will instruct Vikki Durham to hold warfarin dose today, then take 5 mg on Thursday and Saturday and 2.5 mg all other days x 1 week - see above in Anticoagulation Summary.  2. Follow up in 1 week  3. Patient declines warfarin refills.  4. Verbal and written information provided. Patient expresses understanding and has no further questions at this time.    Elizabet Alvarez [facility]

## 2025-03-31 ENCOUNTER — ANTICOAGULATION VISIT (OUTPATIENT)
Dept: ONCOLOGY | Facility: HOSPITAL | Age: 30
End: 2025-03-31
Payer: COMMERCIAL

## 2025-03-31 ENCOUNTER — LAB (OUTPATIENT)
Dept: LAB | Facility: HOSPITAL | Age: 30
End: 2025-03-31
Payer: COMMERCIAL

## 2025-03-31 DIAGNOSIS — I26.99 RECURRENT PULMONARY EMBOLISM: ICD-10-CM

## 2025-03-31 DIAGNOSIS — I82.621 ARM DVT (DEEP VENOUS THROMBOEMBOLISM), ACUTE, RIGHT: ICD-10-CM

## 2025-03-31 LAB
INR PPP: 4 (ref 0.9–1.1)
PROTHROMBIN TIME: 47 SECONDS (ref 11–13.5)

## 2025-03-31 PROCEDURE — 85610 PROTHROMBIN TIME: CPT

## 2025-03-31 PROCEDURE — G0463 HOSPITAL OUTPT CLINIC VISIT: HCPCS

## 2025-03-31 NOTE — PROGRESS NOTES
Anticoagulation Clinic Progress Note    Patient's visit was held in office today.    Anticoagulation Summary  As of 3/31/2025      INR goal:  2.0-3.0   TTR:  36.4% (7.8 mo)   INR used for dosin.00 (3/31/2025)   Warfarin maintenance plan:  5 mg (5 mg x 1) every e, Thu, Sat; 2.5 mg (5 mg x 0.5) all other days   Weekly warfarin total:  25 mg   Plan last modified:  Elizabet Alvarez RPH (2024)   Next INR check:  2025   Target end date:  --             Anticoagulation Episode Summary       INR check location:  --    Preferred lab:  --    Send INR reminders to:   [facility] CBC ANTICOAG POOL    Comments:  --            Clinic Interview:  Patient Findings     Negatives:  Signs/symptoms of thrombosis, Signs/symptoms of bleeding,   Laboratory test error suspected, Change in health, Change in alcohol use,   Change in activity, Upcoming invasive procedure, Emergency department   visit, Upcoming dental procedure, Missed doses, Extra doses, Change in   medications, Change in diet/appetite, Hospital admission, Bruising, Other   complaints    Comments:  Patient states that she ate her usual amount of greens the   past week. Denies bleeding (other than bleeding gums) or diarrhea. Unsure   if Albendazole could be cause of elevated INR, but no DDI with warfarin   reported. Hold warfarin dose today and take 5 mg only on Thursday this   week.       Clinical Outcomes     Negatives:  Major bleeding event, Thromboembolic event,   Anticoagulation-related hospital admission, Anticoagulation-related ED   visit, Anticoagulation-related fatality    Comments:  Patient states that she ate her usual amount of greens the   past week. Denies bleeding (other than bleeding gums) or diarrhea. Unsure   if Albendazole could be cause of elevated INR, but no DDI with warfarin   reported. Hold warfarin dose today and take 5 mg only on Thursday this   week.         INR History:      Latest Ref Rng & Units 3/3/2025     2:30 PM 3/7/2025     2:02  PM 3/7/2025     2:15 PM 3/24/2025     2:00 PM 3/24/2025     2:01 PM 3/31/2025     2:31 PM 3/31/2025     3:00 PM   Anticoagulation Monitoring   INR  3.20  3.00 4.20   4.00   INR Date  3/3/2025  3/7/2025 3/24/2025   3/31/2025   INR Goal  2.0-3.0  2.0-3.0 2.0-3.0   2.0-3.0   Trend  Same  Same Same   Same   Last Week Total  27.5 mg  27.5 mg 25 mg   20 mg   Next Week Total  25 mg  25 mg 20 mg   17.5 mg   Sun  -  2.5 mg 2.5 mg   2.5 mg   Mon  2.5 mg  2.5 mg Hold (3/24)   Hold (3/31)   Tue  5 mg  5 mg 2.5 mg (3/25)   2.5 mg (4/1)   Wed  2.5 mg  2.5 mg 2.5 mg   2.5 mg   Thu  5 mg  5 mg 5 mg   5 mg   Fri  -  2.5 mg 2.5 mg   2.5 mg   Sat  -  5 mg 5 mg   2.5 mg (4/5)   Historical INR 0.90 - 1.10  3.00    4.20  4.00         Plan:  1. INR is Supratherapeutic today- see above in Anticoagulation Summary.  Will instruct Vikki Durham to hold warfarin dose today, then take 5 mg on Thursday and 2.5 mg all other days - see above in Anticoagulation Summary.  2. Follow up in 1 week  3. Patient declines warfarin refills.  4. Verbal and written information provided. Patient expresses understanding and has no further questions at this time.    Elizabet Alvarez [facility]

## 2025-04-07 ENCOUNTER — LAB (OUTPATIENT)
Dept: LAB | Facility: HOSPITAL | Age: 30
End: 2025-04-07
Payer: COMMERCIAL

## 2025-04-07 ENCOUNTER — ANTICOAGULATION VISIT (OUTPATIENT)
Dept: ONCOLOGY | Facility: HOSPITAL | Age: 30
End: 2025-04-07
Payer: COMMERCIAL

## 2025-04-07 DIAGNOSIS — I26.99 RECURRENT PULMONARY EMBOLISM: ICD-10-CM

## 2025-04-07 DIAGNOSIS — I82.621 ARM DVT (DEEP VENOUS THROMBOEMBOLISM), ACUTE, RIGHT: ICD-10-CM

## 2025-04-07 LAB
INR PPP: 1.8 (ref 0.9–1.1)
PROTHROMBIN TIME: 22.1 SECONDS (ref 11–13.5)

## 2025-04-07 PROCEDURE — G0463 HOSPITAL OUTPT CLINIC VISIT: HCPCS

## 2025-04-07 PROCEDURE — 85610 PROTHROMBIN TIME: CPT

## 2025-04-07 NOTE — PROGRESS NOTES
Anticoagulation Clinic Progress Note    Patient's visit was held in office today.    Anticoagulation Summary  As of 2025      INR goal:  2.0-3.0   TTR:  36.7% (8.1 mo)   INR used for dosin.80 (2025)   Warfarin maintenance plan:  5 mg (5 mg x 1) every e, Thu, Sat; 2.5 mg (5 mg x 0.5) all other days   Weekly warfarin total:  25 mg   Plan last modified:  Elizabet Alvarez RPH (2024)   Next INR check:  2025   Target end date:  --             Anticoagulation Episode Summary       INR check location:  --    Preferred lab:  --    Send INR reminders to:  [facility] CBC ANTICOAG POOL    Comments:  --            Clinic Interview:  Patient Findings     Positives:  Change in health, Change in medications    Comments:  Albendazole is substrate of CY,CY so potentially could   effect warfarin metabolism or maybe there was an effect on gut bacteria   from diet which could explain increased INR after international trip. She   has completed doses of albendazole.      Clinical Outcomes     Comments:  Albendazole is substrate of CY,CY so potentially could   effect warfarin metabolism or maybe there was an effect on gut bacteria   from diet which could explain increased INR after international trip. She   has completed doses of albendazole.        INR History:      Latest Ref Rng & Units 3/7/2025     2:15 PM 3/24/2025     2:00 PM 3/24/2025     2:01 PM 3/31/2025     2:31 PM 3/31/2025     3:00 PM 2025     2:33 PM 2025     3:00 PM   Anticoagulation Monitoring   INR  3.00 4.20   4.00  1.80   INR Date  3/7/2025 3/24/2025   3/31/2025  2025   INR Goal  2.0-3.0 2.0-3.0   2.0-3.0  2.0-3.0   Trend  Same Same   Same  Same   Last Week Total  27.5 mg 25 mg   20 mg  17.5 mg   Next Week Total  25 mg 20 mg   17.5 mg  25 mg   Sun  2.5 mg 2.5 mg   2.5 mg  2.5 mg   Mon  2.5 mg Hold (3/24)   Hold (3/31)  5 mg (); Otherwise 2.5 mg   Tue  5 mg 2.5 mg (3/25)   2.5 mg ()  2.5 mg (); Otherwise 5 mg    Wed  2.5 mg 2.5 mg   2.5 mg  2.5 mg   Thu  5 mg 5 mg   5 mg  5 mg   Fri  2.5 mg 2.5 mg   2.5 mg  2.5 mg   Sat  5 mg 5 mg   2.5 mg (4/5)  5 mg   Historical INR 0.90 - 1.10   4.20  4.00   1.80         Plan:  1. INR is Subtherapeutic today- see above in Anticoagulation Summary.  Will instruct Vikki Durham to Change their warfarin regimen back to previously stable dosing of 5mg 3 x per week and 2.5mg on all other days- see above in Anticoagulation Summary.  2. Follow up in 2 weeks  3. Patient declines warfarin refills.  4. Verbal and written information provided. Patient expresses understanding and has no further questions at this time.    Billie Mir RP

## 2025-04-21 ENCOUNTER — LAB (OUTPATIENT)
Dept: LAB | Facility: HOSPITAL | Age: 30
End: 2025-04-21
Payer: COMMERCIAL

## 2025-04-21 ENCOUNTER — OFFICE VISIT (OUTPATIENT)
Dept: ONCOLOGY | Facility: CLINIC | Age: 30
End: 2025-04-21
Payer: COMMERCIAL

## 2025-04-21 ENCOUNTER — ANTICOAGULATION VISIT (OUTPATIENT)
Dept: ONCOLOGY | Facility: HOSPITAL | Age: 30
End: 2025-04-21
Payer: COMMERCIAL

## 2025-04-21 VITALS
BODY MASS INDEX: 32.09 KG/M2 | HEART RATE: 65 BPM | TEMPERATURE: 97.3 F | HEIGHT: 62 IN | SYSTOLIC BLOOD PRESSURE: 115 MMHG | OXYGEN SATURATION: 96 % | WEIGHT: 174.4 LBS | DIASTOLIC BLOOD PRESSURE: 72 MMHG | RESPIRATION RATE: 17 BRPM

## 2025-04-21 DIAGNOSIS — I26.99 RECURRENT PULMONARY EMBOLISM: ICD-10-CM

## 2025-04-21 DIAGNOSIS — Z85.72 HISTORY OF LYMPHOMA: Primary | ICD-10-CM

## 2025-04-21 DIAGNOSIS — I82.621 ARM DVT (DEEP VENOUS THROMBOEMBOLISM), ACUTE, RIGHT: ICD-10-CM

## 2025-04-21 DIAGNOSIS — Z79.01 CHRONIC ANTICOAGULATION: ICD-10-CM

## 2025-04-21 DIAGNOSIS — I26.99 PULMONARY EMBOLISM WITHOUT ACUTE COR PULMONALE, UNSPECIFIED CHRONICITY, UNSPECIFIED PULMONARY EMBOLISM TYPE: ICD-10-CM

## 2025-04-21 DIAGNOSIS — D64.9 NORMOCHROMIC NORMOCYTIC ANEMIA: ICD-10-CM

## 2025-04-21 DIAGNOSIS — D50.0 IRON DEFICIENCY ANEMIA DUE TO CHRONIC BLOOD LOSS: ICD-10-CM

## 2025-04-21 DIAGNOSIS — C85.28 MEDIASTINAL LARGE B-CELL LYMPHOMA OF LYMPH NODES OF MULTIPLE REGIONS: ICD-10-CM

## 2025-04-21 DIAGNOSIS — K90.9 MALABSORPTION OF IRON: ICD-10-CM

## 2025-04-21 PROBLEM — C85.20 MEDIASTINAL (THYMIC) LARGE B-CELL LYMPHOMA: Status: RESOLVED | Noted: 2018-10-08 | Resolved: 2025-04-21

## 2025-04-21 LAB
ALBUMIN SERPL-MCNC: 3.9 G/DL (ref 3.5–5.2)
ALBUMIN/GLOB SERPL: 1.8 G/DL
ALP SERPL-CCNC: 42 U/L (ref 39–117)
ALT SERPL W P-5'-P-CCNC: 22 U/L (ref 1–33)
ANION GAP SERPL CALCULATED.3IONS-SCNC: 8.7 MMOL/L (ref 5–15)
AST SERPL-CCNC: 25 U/L (ref 1–32)
BASOPHILS # BLD AUTO: 0.02 10*3/MM3 (ref 0–0.2)
BASOPHILS NFR BLD AUTO: 0.6 % (ref 0–1.5)
BILIRUB SERPL-MCNC: 0.2 MG/DL (ref 0–1.2)
BUN SERPL-MCNC: 13 MG/DL (ref 6–20)
BUN/CREAT SERPL: 16.3 (ref 7–25)
CALCIUM SPEC-SCNC: 8.8 MG/DL (ref 8.6–10.5)
CHLORIDE SERPL-SCNC: 104 MMOL/L (ref 98–107)
CO2 SERPL-SCNC: 23.3 MMOL/L (ref 22–29)
CREAT SERPL-MCNC: 0.8 MG/DL (ref 0.57–1)
DEPRECATED RDW RBC AUTO: 46 FL (ref 37–54)
EGFRCR SERPLBLD CKD-EPI 2021: 101.8 ML/MIN/1.73
EOSINOPHIL # BLD AUTO: 0.02 10*3/MM3 (ref 0–0.4)
EOSINOPHIL NFR BLD AUTO: 0.6 % (ref 0.3–6.2)
ERYTHROCYTE [DISTWIDTH] IN BLOOD BY AUTOMATED COUNT: 14.1 % (ref 12.3–15.4)
FERRITIN SERPL-MCNC: <8 NG/ML (ref 13–150)
GLOBULIN UR ELPH-MCNC: 2.2 GM/DL
GLUCOSE SERPL-MCNC: 94 MG/DL (ref 65–99)
HCT VFR BLD AUTO: 37.1 % (ref 34–46.6)
HGB BLD-MCNC: 11.7 G/DL (ref 12–15.9)
IMM GRANULOCYTES # BLD AUTO: 0.01 10*3/MM3 (ref 0–0.05)
IMM GRANULOCYTES NFR BLD AUTO: 0.3 % (ref 0–0.5)
INR PPP: 3.3 (ref 0.9–1.1)
IRON 24H UR-MRATE: 23 MCG/DL (ref 37–145)
IRON SATN MFR SERPL: 5 % (ref 20–50)
LYMPHOCYTES # BLD AUTO: 1.28 10*3/MM3 (ref 0.7–3.1)
LYMPHOCYTES NFR BLD AUTO: 36.9 % (ref 19.6–45.3)
MCH RBC QN AUTO: 28.3 PG (ref 26.6–33)
MCHC RBC AUTO-ENTMCNC: 31.5 G/DL (ref 31.5–35.7)
MCV RBC AUTO: 89.6 FL (ref 79–97)
MONOCYTES # BLD AUTO: 0.34 10*3/MM3 (ref 0.1–0.9)
MONOCYTES NFR BLD AUTO: 9.8 % (ref 5–12)
NEUTROPHILS NFR BLD AUTO: 1.8 10*3/MM3 (ref 1.7–7)
NEUTROPHILS NFR BLD AUTO: 51.8 % (ref 42.7–76)
NRBC BLD AUTO-RTO: 0 /100 WBC (ref 0–0.2)
PLATELET # BLD AUTO: 235 10*3/MM3 (ref 140–450)
PMV BLD AUTO: 9.3 FL (ref 6–12)
POTASSIUM SERPL-SCNC: 4 MMOL/L (ref 3.5–5.2)
PROT SERPL-MCNC: 6.1 G/DL (ref 6–8.5)
PROTHROMBIN TIME: 39.9 SECONDS (ref 11–13.5)
RBC # BLD AUTO: 4.14 10*6/MM3 (ref 3.77–5.28)
SODIUM SERPL-SCNC: 136 MMOL/L (ref 136–145)
TIBC SERPL-MCNC: 425 MCG/DL (ref 298–536)
TRANSFERRIN SERPL-MCNC: 285 MG/DL (ref 200–360)
WBC NRBC COR # BLD AUTO: 3.47 10*3/MM3 (ref 3.4–10.8)

## 2025-04-21 PROCEDURE — 80053 COMPREHEN METABOLIC PANEL: CPT

## 2025-04-21 PROCEDURE — 85610 PROTHROMBIN TIME: CPT

## 2025-04-21 PROCEDURE — 99214 OFFICE O/P EST MOD 30 MIN: CPT | Performed by: INTERNAL MEDICINE

## 2025-04-21 PROCEDURE — 85025 COMPLETE CBC W/AUTO DIFF WBC: CPT

## 2025-04-21 PROCEDURE — G0463 HOSPITAL OUTPT CLINIC VISIT: HCPCS

## 2025-04-21 PROCEDURE — 84466 ASSAY OF TRANSFERRIN: CPT

## 2025-04-21 PROCEDURE — 82728 ASSAY OF FERRITIN: CPT

## 2025-04-21 PROCEDURE — 36415 COLL VENOUS BLD VENIPUNCTURE: CPT

## 2025-04-21 PROCEDURE — 83540 ASSAY OF IRON: CPT

## 2025-04-21 RX ORDER — FERROUS SULFATE 325(65) MG
325 TABLET ORAL
Qty: 30 TABLET | Refills: 11 | Status: SHIPPED | OUTPATIENT
Start: 2025-04-21

## 2025-04-21 NOTE — PROGRESS NOTES
"Subjective     CHIEF COMPLAINT:      Chief Complaint   Patient presents with    Follow-up     Patient stare she is very depressed and tired in the past month, her score is 11      HISTORY OF PRESENT ILLNESS:     Vikki Durham is a 30 y.o. female patient who returns today for follow up on her history of lymphoma and anemia.  She reports that she is depressed today.  She lost a close friend last week.  Her friend was battling cancer and was ultimately placed on hospice care.  The  is next week in [place].    Patient reports significant fatigue.  She took the oral iron as instructed after the last visit.  She is not noticing blood in the urine or stool.  However, she continues to have heavy menstrual cycles.    ROS:  Pertinent ROS is in the HPI.     Past medical, surgical, social and family history were reviewed.     MEDICATIONS:    Current Outpatient Medications:     warfarin (COUMADIN) 5 MG tablet, Take 1 tablet (5mg) by mouth daily or as directed by clinic, Disp: 90 tablet, Rfl: 1    ferrous sulfate 325 (65 FE) MG tablet, Take 1 tablet by mouth Daily With Breakfast. (Patient not taking: Reported on 2025), Disp: 30 tablet, Rfl: 2  Objective     VITAL SIGNS:     Vitals:    25 0937   BP: 115/72   Pulse: 65   Resp: 17   Temp: 97.3 °F (36.3 °C)   TempSrc: Oral   SpO2: 96%   Weight: 79.1 kg (174 lb 6.4 oz)   Height: 157.5 cm (62.01\")   PainSc: 0-No pain     Body mass index is 31.89 kg/m².     Wt Readings from Last 5 Encounters:   25 79.1 kg (174 lb 6.4 oz)   24 78.8 kg (173 lb 12.8 oz)   10/28/24 77.1 kg (170 lb)   24 72.2 kg (159 lb 3.2 oz)   23 75 kg (165 lb 6.4 oz)     PHYSICAL EXAMINATION:   GENERAL: The patient appears in good general condition, not in acute distress.   SKIN: No Ecchymosis.  EYES: No jaundice. No Pallor.  LYMPHATIC: No cervical, supraclavicular or axillary lymphadenopathy.  CHEST: Normal respiratory effort. Normal breathing sounds bilaterally. No added " sounds.  CVS: Normal S1 and S2. No Murmur.  ABDOMEN: Soft. No tenderness. No Hepatomegaly. No Splenomegaly. No masses.  EXTREMITIES: No joint deformity.     DIAGNOSTIC DATA:     Results from last 7 days   Lab Units 04/21/25  0906   WBC 10*3/mm3 3.47   NEUTROS ABS 10*3/mm3 1.80   HEMOGLOBIN g/dL 11.7*   HEMATOCRIT % 37.1   PLATELETS 10*3/mm3 235     Results from last 7 days   Lab Units 04/21/25  0906   SODIUM mmol/L 136   POTASSIUM mmol/L 4.0   CHLORIDE mmol/L 104   CO2 mmol/L 23.3   BUN mg/dL 13   CREATININE mg/dL 0.80   CALCIUM mg/dL 8.8   ALBUMIN g/dL 3.9   BILIRUBIN mg/dL 0.2   ALK PHOS U/L 42   ALT (SGPT) U/L 22   AST (SGOT) U/L 25   GLUCOSE mg/dL 94     Results from last 7 days   Lab Units 04/21/25  0906   INR  3.30*         Lab 04/21/25  0906   IRON 23*   IRON SATURATION (TSAT) 5*   TIBC 425   TRANSFERRIN 285   FERRITIN <8.00*      Assessment & Plan    *History of lymphoma.    She was diagnosed with primary mediastinal large B-cell lymphoma.  She had abnormal chest x-ray and was initially suspected of having cardiomegaly.  CT revealed lymphadenopathy with conglomerate of lymph node masses displacing the pulmonary arteries and encasing and narrowing the SVC.  The large mass measured 19.8 x 13.7 x 14.0 cm.  She received dose adjusted R-EPOCH x 6 cycles between June 2018 and October 2018.  She is doing well with no evidence of recurrence.  Exam today revealed 1 lymphadenopathy.    *Pulmonary embolism.  She was diagnosed with pulmonary embolism involving the left upper lobe on 8/29/2022.  Hypercoagulable workup revealed factor VIII activity 125%.  Antiphospholipid antibody testing including lupus anticoagulant was negative.  Antithrombin %.  Protein C activity 150% protein S activity 123%.  Testing for prothrombin and factor V Leiden mutations was negative.  Patient was placed on anticoagulation with Eliquis.  New pulmonary embolism diagnosed on 3/1/2023.    It developed while on Eliquis.  She was switched  from Eliquis to Coumadin.  She is taking Coumadin regularly.  4/21/2025: INR 3.30.  She is tolerating Coumadin without problems with bleeding.    *Iron deficiency anemia.  In addition, she has history of low normal vitamin B12 level.  10/28/2024: Hemoglobin 11.5.  Iron studies revealed severe iron deficiency with ferritin <8 and transferrin saturation of 12%.  She was started on ferrous sulfate 325 mg daily.  4/21/2025: Hemoglobin 11.7.  Transferrin saturation decreased to 5%.  Ferritin remains very low at <8.  She has significant fatigue.  Since she did not benefit from the oral iron, I recommended IV iron therapy.  I recommended IV Venofer weekly x 3 doses.  I explained the side effects including the possibility of infusion reaction and the need to give Pepcid and Zyrtec before the infusion to decrease the risk of infusion reaction.  She decided proceed.    PLAN:    1.   We will arrange for the patient to receive IV Venofer 300 mg weekly x 3 doses.  2.  I asked her to start back on ferrous sulfate 1-2 months after completion of the IV iron.  3.  She will hold Coumadin today.  Coumadin dose will be then changed to 5 mg on Tuesdays and Thursdays and 2.5 mg a day all other days.  4.  PT/INR will be repeated in 2 weeks and monthly afterwards.  5.  Follow-up in 6 months.  Will obtain CBC CMP ferritin iron panel vitamin B12 folate and PT/INR.      Depression: At risk (4/21/2025)    PHQ-2     PHQ-2 Score: 11      Patient screened positive for depression based on a PHQ-9 score of 11 on 4/21/2025. Follow-up recommendations include:  The high depression score is attributed to recent loss of a close friend who is battling cancer.  She does not have suicidal ideations.  She does not feel that she needs to see her PCP and does not feel that she needs an antidepressant. .         Kimberley Melgar MD  04/21/25

## 2025-04-21 NOTE — PROGRESS NOTES
Anticoagulation Clinic Progress Note    Patient's visit was held in office today.    Anticoagulation Summary  As of 4/21/2025      INR goal:  2.0-3.0   TTR:  38.3% (8.5 mo)   INR used for dosing:  3.30 (4/21/2025)   Warfarin maintenance plan:  5 mg (5 mg x 1) every Tue, Thu; 2.5 mg (5 mg x 0.5) all other days   Weekly warfarin total:  22.5 mg   Plan last modified:  Billie Mir RPH (4/21/2025)   Next INR check:  5/5/2025   Target end date:  --             Anticoagulation Episode Summary       INR check location:  --    Preferred lab:  --    Send INR reminders to:  [facility] CBC ANTICOAG POOL    Comments:  --            Clinic Interview:  Patient Findings     Positives:  Change in health    Negatives:  Signs/symptoms of thrombosis, Signs/symptoms of bleeding,   Laboratory test error suspected, Change in alcohol use, Change in   activity, Upcoming invasive procedure, Emergency department visit,   Upcoming dental procedure, Missed doses, Extra doses, Change in   medications, Change in diet/appetite, Hospital admission, Bruising, Other   complaints    Comments:  Has been fatigued this past month so unsure if still any   GI/parasite issues or if allergies contributing--will discuss with MD.      Clinical Outcomes     Negatives:  Major bleeding event, Thromboembolic event,   Anticoagulation-related hospital admission, Anticoagulation-related ED   visit, Anticoagulation-related fatality    Comments:  Has been fatigued this past month so unsure if still any   GI/parasite issues or if allergies contributing--will discuss with MD.        INR History:      Latest Ref Rng & Units 3/24/2025     2:01 PM 3/31/2025     2:31 PM 3/31/2025     3:00 PM 4/7/2025     2:33 PM 4/7/2025     3:00 PM 4/21/2025     9:06 AM 4/21/2025     9:30 AM   Anticoagulation Monitoring   INR    4.00  1.80  3.30   INR Date    3/31/2025  4/7/2025  4/21/2025   INR Goal    2.0-3.0  2.0-3.0  2.0-3.0   Trend    Same  Same  Down   Last Week Total    20 mg  17.5  mg  25 mg   Next Week Total    17.5 mg  25 mg  20 mg   Sun    2.5 mg  2.5 mg  2.5 mg   Mon    Hold (3/31)  5 mg (4/7); Otherwise 2.5 mg  Hold (4/21); Otherwise 2.5 mg   Tue    2.5 mg (4/1)  2.5 mg (4/8); Otherwise 5 mg  5 mg   Wed    2.5 mg  2.5 mg  2.5 mg   Thu    5 mg  5 mg  5 mg   Fri    2.5 mg  2.5 mg  2.5 mg   Sat    2.5 mg (4/5)  5 mg  2.5 mg   Historical INR 0.90 - 1.10 4.20  4.00   1.80   3.30         Plan:  1. INR is Supratherapeutic today- see above in Anticoagulation Summary.  Will instruct Vikki Durham to HOLD warfarin dose today, then Change their warfarin regimen to 5mg on Tues/Thurs and 2.5mg on all other days- see above in Anticoagulation Summary.  2. Follow up in 2 weeks  3. Verbal and written information provided. Patient expresses understanding and has no further questions at this time.    Billie Mir [facility]

## 2025-04-23 PROBLEM — K90.9 MALABSORPTION OF IRON: Status: ACTIVE | Noted: 2025-04-23

## 2025-04-23 PROBLEM — D50.0 IRON DEFICIENCY ANEMIA DUE TO CHRONIC BLOOD LOSS: Status: ACTIVE | Noted: 2025-04-23

## 2025-04-23 RX ORDER — FAMOTIDINE 10 MG/ML
20 INJECTION, SOLUTION INTRAVENOUS ONCE
Status: CANCELLED | OUTPATIENT
Start: 2025-04-28

## 2025-04-23 RX ORDER — CETIRIZINE HYDROCHLORIDE 10 MG/1
10 TABLET ORAL ONCE
OUTPATIENT
Start: 2025-05-05

## 2025-04-23 RX ORDER — FAMOTIDINE 10 MG/ML
20 INJECTION, SOLUTION INTRAVENOUS AS NEEDED
OUTPATIENT
Start: 2025-05-05

## 2025-04-23 RX ORDER — SODIUM CHLORIDE 9 MG/ML
20 INJECTION, SOLUTION INTRAVENOUS ONCE
OUTPATIENT
Start: 2025-05-12

## 2025-04-23 RX ORDER — CETIRIZINE HYDROCHLORIDE 10 MG/1
10 TABLET ORAL ONCE
Status: CANCELLED | OUTPATIENT
Start: 2025-04-28

## 2025-04-23 RX ORDER — SODIUM CHLORIDE 9 MG/ML
20 INJECTION, SOLUTION INTRAVENOUS ONCE
Status: CANCELLED | OUTPATIENT
Start: 2025-04-28

## 2025-04-23 RX ORDER — HYDROCORTISONE SODIUM SUCCINATE 100 MG/2ML
100 INJECTION INTRAMUSCULAR; INTRAVENOUS AS NEEDED
Status: CANCELLED | OUTPATIENT
Start: 2025-04-28

## 2025-04-23 RX ORDER — DIPHENHYDRAMINE HYDROCHLORIDE 50 MG/ML
50 INJECTION, SOLUTION INTRAMUSCULAR; INTRAVENOUS AS NEEDED
OUTPATIENT
Start: 2025-05-12

## 2025-04-23 RX ORDER — FAMOTIDINE 10 MG/ML
20 INJECTION, SOLUTION INTRAVENOUS AS NEEDED
Status: CANCELLED | OUTPATIENT
Start: 2025-04-28

## 2025-04-23 RX ORDER — WARFARIN SODIUM 5 MG/1
TABLET ORAL
Qty: 60 TABLET | Refills: 1 | Status: SHIPPED | OUTPATIENT
Start: 2025-04-23

## 2025-04-23 RX ORDER — CETIRIZINE HYDROCHLORIDE 10 MG/1
10 TABLET ORAL ONCE
OUTPATIENT
Start: 2025-05-12

## 2025-04-23 RX ORDER — FAMOTIDINE 10 MG/ML
20 INJECTION, SOLUTION INTRAVENOUS ONCE
OUTPATIENT
Start: 2025-05-05

## 2025-04-23 RX ORDER — HYDROCORTISONE SODIUM SUCCINATE 100 MG/2ML
100 INJECTION INTRAMUSCULAR; INTRAVENOUS AS NEEDED
OUTPATIENT
Start: 2025-05-05

## 2025-04-23 RX ORDER — SODIUM CHLORIDE 9 MG/ML
20 INJECTION, SOLUTION INTRAVENOUS ONCE
OUTPATIENT
Start: 2025-05-05

## 2025-04-23 RX ORDER — DIPHENHYDRAMINE HYDROCHLORIDE 50 MG/ML
50 INJECTION, SOLUTION INTRAMUSCULAR; INTRAVENOUS AS NEEDED
Status: CANCELLED | OUTPATIENT
Start: 2025-04-28

## 2025-04-23 RX ORDER — FAMOTIDINE 10 MG/ML
20 INJECTION, SOLUTION INTRAVENOUS AS NEEDED
OUTPATIENT
Start: 2025-05-12

## 2025-04-23 RX ORDER — DIPHENHYDRAMINE HYDROCHLORIDE 50 MG/ML
50 INJECTION, SOLUTION INTRAMUSCULAR; INTRAVENOUS AS NEEDED
OUTPATIENT
Start: 2025-05-05

## 2025-04-23 RX ORDER — HYDROCORTISONE SODIUM SUCCINATE 100 MG/2ML
100 INJECTION INTRAMUSCULAR; INTRAVENOUS AS NEEDED
OUTPATIENT
Start: 2025-05-12

## 2025-04-23 RX ORDER — FAMOTIDINE 10 MG/ML
20 INJECTION, SOLUTION INTRAVENOUS ONCE
OUTPATIENT
Start: 2025-05-12

## 2025-04-28 ENCOUNTER — INFUSION (OUTPATIENT)
Dept: ONCOLOGY | Facility: HOSPITAL | Age: 30
End: 2025-04-28
Payer: COMMERCIAL

## 2025-04-28 VITALS
OXYGEN SATURATION: 97 % | DIASTOLIC BLOOD PRESSURE: 59 MMHG | TEMPERATURE: 98.2 F | WEIGHT: 161.2 LBS | RESPIRATION RATE: 16 BRPM | SYSTOLIC BLOOD PRESSURE: 96 MMHG | BODY MASS INDEX: 29.48 KG/M2 | HEART RATE: 57 BPM

## 2025-04-28 DIAGNOSIS — D50.0 IRON DEFICIENCY ANEMIA DUE TO CHRONIC BLOOD LOSS: Primary | ICD-10-CM

## 2025-04-28 DIAGNOSIS — K90.9 MALABSORPTION OF IRON: ICD-10-CM

## 2025-04-28 PROCEDURE — 96365 THER/PROPH/DIAG IV INF INIT: CPT

## 2025-04-28 PROCEDURE — 25010000002 FAMOTIDINE 10 MG/ML SOLUTION: Performed by: INTERNAL MEDICINE

## 2025-04-28 PROCEDURE — 25810000003 SODIUM CHLORIDE 0.9 % SOLUTION: Performed by: INTERNAL MEDICINE

## 2025-04-28 PROCEDURE — 96375 TX/PRO/DX INJ NEW DRUG ADDON: CPT

## 2025-04-28 PROCEDURE — 25010000002 IRON SUCROSE PER 1 MG: Performed by: INTERNAL MEDICINE

## 2025-04-28 PROCEDURE — 25810000003 SODIUM CHLORIDE 0.9 % SOLUTION 250 ML FLEX CONT: Performed by: INTERNAL MEDICINE

## 2025-04-28 PROCEDURE — 96366 THER/PROPH/DIAG IV INF ADDON: CPT

## 2025-04-28 RX ORDER — SODIUM CHLORIDE 9 MG/ML
20 INJECTION, SOLUTION INTRAVENOUS ONCE
Status: COMPLETED | OUTPATIENT
Start: 2025-04-28 | End: 2025-04-28

## 2025-04-28 RX ORDER — CETIRIZINE HYDROCHLORIDE 10 MG/1
10 TABLET ORAL ONCE
Status: COMPLETED | OUTPATIENT
Start: 2025-04-28 | End: 2025-04-28

## 2025-04-28 RX ORDER — FAMOTIDINE 10 MG/ML
20 INJECTION, SOLUTION INTRAVENOUS ONCE
Status: COMPLETED | OUTPATIENT
Start: 2025-04-28 | End: 2025-04-28

## 2025-04-28 RX ADMIN — IRON SUCROSE 300 MG: 20 INJECTION, SOLUTION INTRAVENOUS at 14:50

## 2025-04-28 RX ADMIN — FAMOTIDINE 20 MG: 10 INJECTION INTRAVENOUS at 14:20

## 2025-04-28 RX ADMIN — SODIUM CHLORIDE 20 ML/HR: 9 INJECTION, SOLUTION INTRAVENOUS at 14:50

## 2025-04-28 RX ADMIN — CETIRIZINE HYDROCHLORIDE 10 MG: 10 TABLET, FILM COATED ORAL at 14:20

## 2025-05-05 ENCOUNTER — ANTICOAGULATION VISIT (OUTPATIENT)
Dept: ONCOLOGY | Facility: HOSPITAL | Age: 30
End: 2025-05-05
Payer: COMMERCIAL

## 2025-05-05 ENCOUNTER — LAB (OUTPATIENT)
Dept: LAB | Facility: HOSPITAL | Age: 30
End: 2025-05-05
Payer: COMMERCIAL

## 2025-05-05 ENCOUNTER — INFUSION (OUTPATIENT)
Dept: ONCOLOGY | Facility: HOSPITAL | Age: 30
End: 2025-05-05
Payer: COMMERCIAL

## 2025-05-05 VITALS
WEIGHT: 163.2 LBS | TEMPERATURE: 97.3 F | OXYGEN SATURATION: 97 % | RESPIRATION RATE: 16 BRPM | HEART RATE: 46 BPM | BODY MASS INDEX: 29.84 KG/M2 | DIASTOLIC BLOOD PRESSURE: 69 MMHG | SYSTOLIC BLOOD PRESSURE: 100 MMHG

## 2025-05-05 DIAGNOSIS — I26.99 RECURRENT PULMONARY EMBOLISM: ICD-10-CM

## 2025-05-05 DIAGNOSIS — D50.0 IRON DEFICIENCY ANEMIA DUE TO CHRONIC BLOOD LOSS: Primary | ICD-10-CM

## 2025-05-05 DIAGNOSIS — K90.9 MALABSORPTION OF IRON: ICD-10-CM

## 2025-05-05 DIAGNOSIS — I82.621 ARM DVT (DEEP VENOUS THROMBOEMBOLISM), ACUTE, RIGHT: ICD-10-CM

## 2025-05-05 LAB
BASOPHILS # BLD AUTO: 0.03 10*3/MM3 (ref 0–0.2)
BASOPHILS NFR BLD AUTO: 0.6 % (ref 0–1.5)
DEPRECATED RDW RBC AUTO: 52.2 FL (ref 37–54)
EOSINOPHIL # BLD AUTO: 0.01 10*3/MM3 (ref 0–0.4)
EOSINOPHIL NFR BLD AUTO: 0.2 % (ref 0.3–6.2)
ERYTHROCYTE [DISTWIDTH] IN BLOOD BY AUTOMATED COUNT: 15.5 % (ref 12.3–15.4)
HCT VFR BLD AUTO: 38.4 % (ref 34–46.6)
HGB BLD-MCNC: 12.2 G/DL (ref 12–15.9)
IMM GRANULOCYTES # BLD AUTO: 0.02 10*3/MM3 (ref 0–0.05)
IMM GRANULOCYTES NFR BLD AUTO: 0.4 % (ref 0–0.5)
INR PPP: 3.5 (ref 0.9–1.1)
LYMPHOCYTES # BLD AUTO: 1.12 10*3/MM3 (ref 0.7–3.1)
LYMPHOCYTES NFR BLD AUTO: 21.8 % (ref 19.6–45.3)
MCH RBC QN AUTO: 29.4 PG (ref 26.6–33)
MCHC RBC AUTO-ENTMCNC: 31.8 G/DL (ref 31.5–35.7)
MCV RBC AUTO: 92.5 FL (ref 79–97)
MONOCYTES # BLD AUTO: 0.39 10*3/MM3 (ref 0.1–0.9)
MONOCYTES NFR BLD AUTO: 7.6 % (ref 5–12)
NEUTROPHILS NFR BLD AUTO: 3.57 10*3/MM3 (ref 1.7–7)
NEUTROPHILS NFR BLD AUTO: 69.4 % (ref 42.7–76)
NRBC BLD AUTO-RTO: 0 /100 WBC (ref 0–0.2)
PLATELET # BLD AUTO: 220 10*3/MM3 (ref 140–450)
PMV BLD AUTO: 10.3 FL (ref 6–12)
PROTHROMBIN TIME: 41.9 SECONDS (ref 11–13.5)
RBC # BLD AUTO: 4.15 10*6/MM3 (ref 3.77–5.28)
WBC NRBC COR # BLD AUTO: 5.14 10*3/MM3 (ref 3.4–10.8)

## 2025-05-05 PROCEDURE — 85025 COMPLETE CBC W/AUTO DIFF WBC: CPT | Performed by: INTERNAL MEDICINE

## 2025-05-05 PROCEDURE — 85610 PROTHROMBIN TIME: CPT

## 2025-05-05 PROCEDURE — 25010000002 IRON SUCROSE PER 1 MG: Performed by: INTERNAL MEDICINE

## 2025-05-05 PROCEDURE — G0463 HOSPITAL OUTPT CLINIC VISIT: HCPCS

## 2025-05-05 PROCEDURE — 25010000002 FAMOTIDINE 10 MG/ML SOLUTION: Performed by: INTERNAL MEDICINE

## 2025-05-05 PROCEDURE — 96365 THER/PROPH/DIAG IV INF INIT: CPT

## 2025-05-05 PROCEDURE — 96375 TX/PRO/DX INJ NEW DRUG ADDON: CPT

## 2025-05-05 PROCEDURE — 25010000002 HYDROCORTISONE SOD SUC (PF) 100 MG RECONSTITUTED SOLUTION: Performed by: INTERNAL MEDICINE

## 2025-05-05 PROCEDURE — 25810000003 SODIUM CHLORIDE 0.9 % SOLUTION 250 ML FLEX CONT: Performed by: INTERNAL MEDICINE

## 2025-05-05 RX ORDER — HYDROCORTISONE SODIUM SUCCINATE 100 MG/2ML
100 INJECTION INTRAMUSCULAR; INTRAVENOUS ONCE
Status: COMPLETED | OUTPATIENT
Start: 2025-05-05 | End: 2025-05-05

## 2025-05-05 RX ORDER — FAMOTIDINE 10 MG/ML
20 INJECTION, SOLUTION INTRAVENOUS ONCE
Status: COMPLETED | OUTPATIENT
Start: 2025-05-05 | End: 2025-05-05

## 2025-05-05 RX ORDER — CETIRIZINE HYDROCHLORIDE 10 MG/1
10 TABLET ORAL ONCE
Status: COMPLETED | OUTPATIENT
Start: 2025-05-05 | End: 2025-05-05

## 2025-05-05 RX ADMIN — CETIRIZINE HYDROCHLORIDE 10 MG: 10 TABLET, FILM COATED ORAL at 11:29

## 2025-05-05 RX ADMIN — HYDROCORTISONE SODIUM SUCCINATE 100 MG: 100 INJECTION, POWDER, FOR SOLUTION INTRAMUSCULAR; INTRAVENOUS at 11:32

## 2025-05-05 RX ADMIN — FAMOTIDINE 20 MG: 10 INJECTION INTRAVENOUS at 11:23

## 2025-05-05 RX ADMIN — SODIUM CHLORIDE 300 MG: 900 INJECTION, SOLUTION INTRAVENOUS at 12:01

## 2025-05-05 NOTE — NURSING NOTE
Upon arrival to infusion area, patient reported that on Wednesday of last she began having palpitations that started in the afternoon and lasted the remainder of the day. She states that she felt ill that day. She has also had episode of feeling out of breath off and on since last week. Discussed with Dr. Melgar and orders received for CBC and Solucortef as premed. Patient informed of such and is agreeable to Solucortef. Informed patient that if she does not feel that it helps with symptoms, she can decline it in the future.

## 2025-05-05 NOTE — PROGRESS NOTES
Anticoagulation Clinic Progress Note    Patient's visit was held in office today.    Anticoagulation Summary  As of 5/5/2025      INR goal:  2.0-3.0   TTR:  36.3% (9 mo)   INR used for dosing:  3.50 (5/5/2025)   Warfarin maintenance plan:  5 mg (5 mg x 1) every Tue, Thu; 2.5 mg (5 mg x 0.5) all other days   Weekly warfarin total:  22.5 mg   Plan last modified:  Billie Mir RPH (4/21/2025)   Next INR check:  5/12/2025   Target end date:  --             Anticoagulation Episode Summary       INR check location:  --    Preferred lab:  --    Send INR reminders to:   [facility] CBC ANTICOAG POOL    Comments:  --            Clinic Interview:  Patient Findings     Negatives:  Signs/symptoms of thrombosis, Signs/symptoms of bleeding,   Laboratory test error suspected, Change in health, Change in alcohol use,   Change in activity, Upcoming invasive procedure, Emergency department   visit, Upcoming dental procedure, Missed doses, Extra doses, Change in   medications, Change in diet/appetite, Hospital admission, Bruising, Other   complaints      Clinical Outcomes     Negatives:  Major bleeding event, Thromboembolic event,   Anticoagulation-related hospital admission, Anticoagulation-related ED   visit, Anticoagulation-related fatality     5/5 INR 3.5 heavy periods; iron def. Reduce warf again this week; pt prefers to keep 5mg on Tuesday and change Thursday dose to 2.5mg as it's easier for her to remember. Please see patient in infusion next wk w/ iron; no other changes    INR History:      Latest Ref Rng & Units 3/31/2025     3:00 PM 4/7/2025     2:33 PM 4/7/2025     3:00 PM 4/21/2025     9:06 AM 4/21/2025     9:30 AM 5/5/2025    10:15 AM 5/5/2025    10:25 AM   Anticoagulation Monitoring   INR  4.00  1.80  3.30 3.50    INR Date  3/31/2025  4/7/2025  4/21/2025 5/5/2025    INR Goal  2.0-3.0  2.0-3.0  2.0-3.0 2.0-3.0    Trend  Same  Same  Down Same    Last Week Total  20 mg  17.5 mg  25 mg 22.5 mg    Next Week Total  17.5 mg  25  mg  20 mg 17.5 mg    Sun  2.5 mg  2.5 mg  2.5 mg 2.5 mg    Mon  Hold (3/31)  5 mg (4/7); Otherwise 2.5 mg  Hold (4/21); Otherwise 2.5 mg Hold (5/5)    Tue  2.5 mg (4/1)  2.5 mg (4/8); Otherwise 5 mg  5 mg 5 mg    Wed  2.5 mg  2.5 mg  2.5 mg 2.5 mg    Thu  5 mg  5 mg  5 mg 2.5 mg (5/8)    Fri  2.5 mg  2.5 mg  2.5 mg 2.5 mg    Sat  2.5 mg (4/5)  5 mg  2.5 mg 2.5 mg    Historical INR 0.90 - 1.10  1.80   3.30    3.50    Visit Report     Report Report         Plan:  1. INR is Supratherapeutic today- see above in Anticoagulation Summary.  Will instruct Vikki Durham to HOLD warfarin tonight - see above in Anticoagulation Summary.  2. Follow up in 1 week - see patient in infusion   3. Verbal and written information provided. Patient expresses understanding and has no further questions at this time.    Ora Sandhu [facility]

## 2025-05-12 ENCOUNTER — ANTICOAGULATION VISIT (OUTPATIENT)
Dept: ONCOLOGY | Facility: HOSPITAL | Age: 30
End: 2025-05-12
Payer: COMMERCIAL

## 2025-05-12 ENCOUNTER — LAB (OUTPATIENT)
Dept: LAB | Facility: HOSPITAL | Age: 30
End: 2025-05-12
Payer: COMMERCIAL

## 2025-05-12 ENCOUNTER — INFUSION (OUTPATIENT)
Dept: ONCOLOGY | Facility: HOSPITAL | Age: 30
End: 2025-05-12
Payer: COMMERCIAL

## 2025-05-12 VITALS
OXYGEN SATURATION: 97 % | HEART RATE: 45 BPM | SYSTOLIC BLOOD PRESSURE: 106 MMHG | DIASTOLIC BLOOD PRESSURE: 74 MMHG | WEIGHT: 160.4 LBS | TEMPERATURE: 97 F | RESPIRATION RATE: 16 BRPM | BODY MASS INDEX: 29.33 KG/M2

## 2025-05-12 DIAGNOSIS — I26.99 RECURRENT PULMONARY EMBOLISM: ICD-10-CM

## 2025-05-12 DIAGNOSIS — K90.9 MALABSORPTION OF IRON: ICD-10-CM

## 2025-05-12 DIAGNOSIS — D50.0 IRON DEFICIENCY ANEMIA DUE TO CHRONIC BLOOD LOSS: Primary | ICD-10-CM

## 2025-05-12 DIAGNOSIS — I82.621 ARM DVT (DEEP VENOUS THROMBOEMBOLISM), ACUTE, RIGHT: ICD-10-CM

## 2025-05-12 LAB
INR PPP: 2 (ref 0.9–1.1)
PROTHROMBIN TIME: 23.5 SECONDS (ref 11–13.5)

## 2025-05-12 PROCEDURE — 96365 THER/PROPH/DIAG IV INF INIT: CPT

## 2025-05-12 PROCEDURE — G0463 HOSPITAL OUTPT CLINIC VISIT: HCPCS

## 2025-05-12 PROCEDURE — 25810000003 SODIUM CHLORIDE 0.9 % SOLUTION 250 ML FLEX CONT: Performed by: INTERNAL MEDICINE

## 2025-05-12 PROCEDURE — 96375 TX/PRO/DX INJ NEW DRUG ADDON: CPT

## 2025-05-12 PROCEDURE — 85610 PROTHROMBIN TIME: CPT

## 2025-05-12 PROCEDURE — 25010000002 HYDROCORTISONE SOD SUC (PF) 100 MG RECONSTITUTED SOLUTION: Performed by: INTERNAL MEDICINE

## 2025-05-12 PROCEDURE — 25010000002 FAMOTIDINE 10 MG/ML SOLUTION: Performed by: INTERNAL MEDICINE

## 2025-05-12 PROCEDURE — 25010000002 IRON SUCROSE PER 1 MG: Performed by: INTERNAL MEDICINE

## 2025-05-12 RX ORDER — HYDROCORTISONE SODIUM SUCCINATE 100 MG/2ML
100 INJECTION INTRAMUSCULAR; INTRAVENOUS ONCE
Status: COMPLETED | OUTPATIENT
Start: 2025-05-12 | End: 2025-05-12

## 2025-05-12 RX ORDER — SODIUM CHLORIDE 9 MG/ML
20 INJECTION, SOLUTION INTRAVENOUS ONCE
Status: DISCONTINUED | OUTPATIENT
Start: 2025-05-12 | End: 2025-05-12 | Stop reason: HOSPADM

## 2025-05-12 RX ORDER — FAMOTIDINE 10 MG/ML
20 INJECTION, SOLUTION INTRAVENOUS ONCE
Status: COMPLETED | OUTPATIENT
Start: 2025-05-12 | End: 2025-05-12

## 2025-05-12 RX ORDER — CETIRIZINE HYDROCHLORIDE 10 MG/1
10 TABLET ORAL ONCE
Status: COMPLETED | OUTPATIENT
Start: 2025-05-12 | End: 2025-05-12

## 2025-05-12 RX ADMIN — FAMOTIDINE 20 MG: 10 INJECTION INTRAVENOUS at 14:03

## 2025-05-12 RX ADMIN — HYDROCORTISONE SODIUM SUCCINATE 100 MG: 100 INJECTION, POWDER, FOR SOLUTION INTRAMUSCULAR; INTRAVENOUS at 14:02

## 2025-05-12 RX ADMIN — SODIUM CHLORIDE 300 MG: 900 INJECTION, SOLUTION INTRAVENOUS at 14:36

## 2025-05-12 RX ADMIN — CETIRIZINE HYDROCHLORIDE 10 MG: 10 TABLET, FILM COATED ORAL at 14:02

## 2025-05-12 NOTE — PROGRESS NOTES
Anticoagulation Clinic Progress Note    Patient's visit was held in office today.    Anticoagulation Summary  As of 2025      INR goal:  2.0-3.0   TTR:  37.1% (9.2 mo)   INR used for dosin.00 (2025)   Warfarin maintenance plan:  5 mg (5 mg x 1) every Tue; 2.5 mg (5 mg x 0.5) all other days   Weekly warfarin total:  20 mg   Plan last modified:  Elizabet Alvarez RPH (2025)   Next INR check:  2025   Target end date:  --             Anticoagulation Episode Summary       INR check location:  --    Preferred lab:  --    Send INR reminders to:   [facility] CBC ANTICOAG POOL    Comments:  --            Clinic Interview:  Patient Findings     Negatives:  Signs/symptoms of thrombosis, Signs/symptoms of bleeding,   Laboratory test error suspected, Change in health, Change in alcohol use,   Change in activity, Upcoming invasive procedure, Emergency department   visit, Upcoming dental procedure, Missed doses, Extra doses, Change in   medications, Change in diet/appetite, Hospital admission, Bruising, Other   complaints      Clinical Outcomes     Negatives:  Major bleeding event, Thromboembolic event,   Anticoagulation-related hospital admission, Anticoagulation-related ED   visit, Anticoagulation-related fatality        INR History:      Latest Ref Rng & Units 2025     3:00 PM 2025     9:06 AM 2025     9:30 AM 2025    10:15 AM 2025    10:25 AM 2025     1:00 PM 2025     1:09 PM   Anticoagulation Monitoring   INR  1.80  3.30 3.50  2.00    INR Date  2025    INR Goal  2.0-3.0  2.0-3.0 2.0-3.0  2.0-3.0    Trend  Same  Down Same  Down    Last Week Total  17.5 mg  25 mg 22.5 mg  17.5 mg    Next Week Total  25 mg  20 mg 17.5 mg  20 mg    Sun  2.5 mg  2.5 mg 2.5 mg  2.5 mg    Mon  5 mg (); Otherwise 2.5 mg  Hold (); Otherwise 2.5 mg Hold ()  2.5 mg    Tue  2.5 mg (); Otherwise 5 mg  5 mg 5 mg  5 mg    Wed  2.5 mg  2.5 mg 2.5 mg  2.5 mg     Thu  5 mg  5 mg 2.5 mg (5/8)  2.5 mg    Fri  2.5 mg  2.5 mg 2.5 mg  2.5 mg    Sat  5 mg  2.5 mg 2.5 mg  2.5 mg    Historical INR 0.90 - 1.10  3.30    3.50   2.00    Visit Report   Report Report           Plan:  1. INR is Therapeutic today- see above in Anticoagulation Summary.  Will instruct Vikki Durham to take warfarin 5 mg on Tuesdays and 2.5 mg on all other days - see above in Anticoagulation Summary.  2. Follow up in 3 weeks  3. Patient declines warfarin refills.  4. Verbal and written information provided. Patient expresses understanding and has no further questions at this time.    Elizabet Alvarez [facility]

## 2025-06-02 ENCOUNTER — LAB (OUTPATIENT)
Dept: LAB | Facility: HOSPITAL | Age: 30
End: 2025-06-02
Payer: COMMERCIAL

## 2025-06-02 ENCOUNTER — ANTICOAGULATION VISIT (OUTPATIENT)
Dept: ONCOLOGY | Facility: HOSPITAL | Age: 30
End: 2025-06-02
Payer: COMMERCIAL

## 2025-06-02 DIAGNOSIS — I26.99 RECURRENT PULMONARY EMBOLISM: ICD-10-CM

## 2025-06-02 DIAGNOSIS — I82.621 ARM DVT (DEEP VENOUS THROMBOEMBOLISM), ACUTE, RIGHT: ICD-10-CM

## 2025-06-02 LAB
INR PPP: 1.9 (ref 0.9–1.1)
PROTHROMBIN TIME: 22.6 SECONDS (ref 11–13.5)

## 2025-06-02 PROCEDURE — 85610 PROTHROMBIN TIME: CPT

## 2025-06-02 PROCEDURE — G0463 HOSPITAL OUTPT CLINIC VISIT: HCPCS

## 2025-06-02 PROCEDURE — 36416 COLLJ CAPILLARY BLOOD SPEC: CPT

## 2025-06-02 NOTE — PROGRESS NOTES
Anticoagulation Clinic Progress Note    Patient's visit was held in office today.    Anticoagulation Summary  As of 2025      INR goal:  2.0-3.0   TTR:  34.4% (9.9 mo)   INR used for dosin.90 (2025)   Warfarin maintenance plan:  5 mg (5 mg x 1) every Tue; 2.5 mg (5 mg x 0.5) all other days   Weekly warfarin total:  20 mg   Plan last modified:  Elizabet Alvarez RPH (2025)   Next INR check:  2025   Target end date:  --             Anticoagulation Episode Summary       INR check location:  --    Preferred lab:  --    Send INR reminders to:   [facility] CBC ANTICOAG POOL    Comments:  --            Clinic Interview:  Patient Findings     Negatives:  Signs/symptoms of thrombosis, Signs/symptoms of bleeding,   Laboratory test error suspected, Change in health, Change in alcohol use,   Change in activity, Upcoming invasive procedure, Emergency department   visit, Upcoming dental procedure, Missed doses, Extra doses, Change in   medications, Change in diet/appetite, Hospital admission, Bruising, Other   complaints      Clinical Outcomes     Negatives:  Major bleeding event, Thromboembolic event,   Anticoagulation-related hospital admission, Anticoagulation-related ED   visit, Anticoagulation-related fatality        INR History:      Latest Ref Rng & Units 2025     9:30 AM 2025    10:15 AM 2025    10:25 AM 2025     1:00 PM 2025     1:09 PM 2025     4:06 PM 2025     4:15 PM   Anticoagulation Monitoring   INR  3.30 3.50  2.00   1.90   INR Date  2025   INR Goal  2.0-3.0 2.0-3.0  2.0-3.0   2.0-3.0   Trend  Down Same  Down   Same   Last Week Total  25 mg 22.5 mg  17.5 mg   20 mg   Next Week Total  20 mg 17.5 mg  20 mg   22.5 mg   Sun  2.5 mg 2.5 mg  2.5 mg   2.5 mg   Mon  Hold (); Otherwise 2.5 mg Hold ()  2.5 mg   2.5 mg   Tue  5 mg 5 mg  5 mg   5 mg   Wed  2.5 mg 2.5 mg  2.5 mg   2.5 mg   Thu  5 mg 2.5 mg (5/8)  2.5 mg   5 mg (6/5);  Otherwise 2.5 mg   Fri  2.5 mg 2.5 mg  2.5 mg   2.5 mg   Sat  2.5 mg 2.5 mg  2.5 mg   2.5 mg   Historical INR 0.90 - 1.10   3.50   2.00  1.90     Visit Report  Report             Plan:  1. INR is Subtherapeutic today- see above in Anticoagulation Summary.  Will instruct Vikki Durham to change their warfarin dose only on Thursday 6/5 to 5 mg (instead of 2.5 mg) then continue their warfarin regimen all other days - see above in Anticoagulation Summary.  2. Follow up in 2 weeks  3. Patient declines warfarin refills.  4. Verbal and written information provided. Patient expresses understanding and has no further questions at this time.    Elizabet Alvarez [facility]

## 2025-06-16 ENCOUNTER — LAB (OUTPATIENT)
Dept: LAB | Facility: HOSPITAL | Age: 30
End: 2025-06-16
Payer: COMMERCIAL

## 2025-06-16 ENCOUNTER — ANTICOAGULATION VISIT (OUTPATIENT)
Dept: ONCOLOGY | Facility: HOSPITAL | Age: 30
End: 2025-06-16
Payer: COMMERCIAL

## 2025-06-16 DIAGNOSIS — I82.621 ARM DVT (DEEP VENOUS THROMBOEMBOLISM), ACUTE, RIGHT: ICD-10-CM

## 2025-06-16 DIAGNOSIS — I26.99 RECURRENT PULMONARY EMBOLISM: ICD-10-CM

## 2025-06-16 LAB
INR PPP: 2.5 (ref 0.9–1.1)
PROTHROMBIN TIME: 30.3 SECONDS (ref 11–13.5)

## 2025-06-16 PROCEDURE — G0463 HOSPITAL OUTPT CLINIC VISIT: HCPCS

## 2025-06-16 PROCEDURE — 85610 PROTHROMBIN TIME: CPT

## 2025-06-16 PROCEDURE — 36416 COLLJ CAPILLARY BLOOD SPEC: CPT

## 2025-06-16 NOTE — PROGRESS NOTES
Anticoagulation Clinic Progress Note    Patient's visit was held in office today.    Anticoagulation Summary  As of 2025      INR goal:  2.0-3.0   TTR:  36.6% (10.4 mo)   INR used for dosin.50 (2025)   Warfarin maintenance plan:  5 mg (5 mg x 1) every Tue; 2.5 mg (5 mg x 0.5) all other days   Weekly warfarin total:  20 mg   No change documented:  Elizabet Alvarez RPH   Plan last modified:  Elizabet Alvarez RPH (2025)   Next INR check:  2025   Target end date:  --             Anticoagulation Episode Summary       INR check location:  --    Preferred lab:  --    Send INR reminders to:  [facility] CBC ANTICOAG POOL    Comments:  --            Clinic Interview:  Patient Findings     Negatives:  Signs/symptoms of thrombosis, Signs/symptoms of bleeding,   Laboratory test error suspected, Change in health, Change in alcohol use,   Change in activity, Upcoming invasive procedure, Emergency department   visit, Upcoming dental procedure, Missed doses, Extra doses, Change in   medications, Change in diet/appetite, Hospital admission, Bruising, Other   complaints      Clinical Outcomes     Negatives:  Major bleeding event, Thromboembolic event,   Anticoagulation-related hospital admission, Anticoagulation-related ED   visit, Anticoagulation-related fatality        INR History:      Latest Ref Rng & Units 2025    10:25 AM 2025     1:00 PM 2025     1:09 PM 2025     4:06 PM 2025     4:15 PM 2025    10:27 AM 2025    11:30 AM   Anticoagulation Monitoring   INR   2.00   1.90  2.50   INR Date   2025   INR Goal   2.0-3.0   2.0-3.0  2.0-3.0   Trend   Down   Same  Same   Last Week Total   17.5 mg   20 mg  20 mg   Next Week Total   20 mg   22.5 mg  20 mg   Sun   2.5 mg   2.5 mg  2.5 mg   Mon   2.5 mg   2.5 mg  2.5 mg   Tue   5 mg   5 mg  5 mg   Wed   2.5 mg   2.5 mg  2.5 mg   Thu   2.5 mg   5 mg (); Otherwise 2.5 mg  2.5 mg   Fri   2.5 mg   2.5 mg  2.5 mg    Sat   2.5 mg   2.5 mg  2.5 mg   Historical INR 0.90 - 1.10 3.50   2.00  1.90   2.50         Plan:  1. INR is Therapeutic today- see above in Anticoagulation Summary.  Will instruct Vikki Durham to Continue their warfarin regimen- see above in Anticoagulation Summary.  2. Follow up in 2 weeks  3. Patient declines warfarin refills.  4. Verbal and written information provided. Patient expresses understanding and has no further questions at this time.    Elizabet Alvarez [facility]

## 2025-06-18 ENCOUNTER — TELEPHONE (OUTPATIENT)
Dept: ONCOLOGY | Facility: CLINIC | Age: 30
End: 2025-06-18
Payer: COMMERCIAL

## 2025-06-18 NOTE — TELEPHONE ENCOUNTER
Pt called stating she had rectal bleeding after her workout this morning accompanied by generalized abdominal pain that lasted about 3 hours. She typically has vaginal spotting after her workouts. She recently started back on oral iron daily, she takes with breakfast after her workout. I reviewed this with Dr. Melgar and he recommends increasing hydration and fiber in her diet, and to start colace if needed. If the rectal bleeding occurs again after these changes she will call our office for further instruction and a possible GI referral. She v/u to all and will call with any new symptoms.

## 2025-06-18 NOTE — TELEPHONE ENCOUNTER
Provider: Ramón  Caller: patient  Relationship to Patient: self  Call Back Phone Number: [phone]   Reason for Call: Pt has some anal bleeding . Also had some stomach pain earlier this morning.

## 2025-06-30 ENCOUNTER — ANTICOAGULATION VISIT (OUTPATIENT)
Dept: ONCOLOGY | Facility: HOSPITAL | Age: 30
End: 2025-06-30
Payer: COMMERCIAL

## 2025-06-30 ENCOUNTER — LAB (OUTPATIENT)
Dept: LAB | Facility: HOSPITAL | Age: 30
End: 2025-06-30
Payer: COMMERCIAL

## 2025-06-30 DIAGNOSIS — I26.99 RECURRENT PULMONARY EMBOLISM: ICD-10-CM

## 2025-06-30 DIAGNOSIS — I82.621 ARM DVT (DEEP VENOUS THROMBOEMBOLISM), ACUTE, RIGHT: ICD-10-CM

## 2025-06-30 LAB
INR PPP: 2.2 (ref 0.9–1.1)
PROTHROMBIN TIME: 27 SECONDS (ref 11–13.5)

## 2025-06-30 PROCEDURE — 85610 PROTHROMBIN TIME: CPT

## 2025-06-30 PROCEDURE — G0463 HOSPITAL OUTPT CLINIC VISIT: HCPCS

## 2025-06-30 PROCEDURE — 36416 COLLJ CAPILLARY BLOOD SPEC: CPT

## 2025-06-30 NOTE — PROGRESS NOTES
Anticoagulation Clinic Progress Note    Patient's visit was held in office today.    Anticoagulation Summary  As of 2025      INR goal:  2.0-3.0   TTR:  39.3% (10.9 mo)   INR used for dosin.20 (2025)   Warfarin maintenance plan:  5 mg (5 mg x 1) every Tue; 2.5 mg (5 mg x 0.5) all other days   Weekly warfarin total:  20 mg   No change documented:  Elizabet Alvarez RPH   Plan last modified:  Elizabet Alvarez RPH (2025)   Next INR check:  2025   Target end date:  --             Anticoagulation Episode Summary       INR check location:  --    Preferred lab:  --    Send INR reminders to:  [facility] CBC ANTICOAG POOL    Comments:  --            Clinic Interview:  Patient Findings     Negatives:  Signs/symptoms of thrombosis, Signs/symptoms of bleeding,   Laboratory test error suspected, Change in health, Change in alcohol use,   Change in activity, Upcoming invasive procedure, Emergency department   visit, Upcoming dental procedure, Missed doses, Extra doses, Change in   medications, Change in diet/appetite, Hospital admission, Bruising, Other   complaints      Clinical Outcomes     Negatives:  Major bleeding event, Thromboembolic event,   Anticoagulation-related hospital admission, Anticoagulation-related ED   visit, Anticoagulation-related fatality        INR History:      Latest Ref Rng & Units 2025     1:09 PM 2025     4:06 PM 2025     4:15 PM 2025    10:27 AM 2025    11:30 AM 2025    10:41 AM 2025    11:00 AM   Anticoagulation Monitoring   INR    1.90  2.50  2.20   INR Date    2025   INR Goal    2.0-3.0  2.0-3.0  2.0-3.0   Trend    Same  Same  Same   Last Week Total    20 mg  20 mg  20 mg   Next Week Total    22.5 mg  20 mg  20 mg   Sun    2.5 mg  2.5 mg  2.5 mg   Mon    2.5 mg  2.5 mg  2.5 mg   Tue    5 mg  5 mg  5 mg   Wed    2.5 mg  2.5 mg  2.5 mg   Thu    5 mg (); Otherwise 2.5 mg  2.5 mg  2.5 mg   Fri    2.5 mg  2.5 mg  2.5 mg    Sat    2.5 mg  2.5 mg  2.5 mg   Historical INR 0.90 - 1.10 2.00  1.90   2.50   2.20         Plan:  1. INR is Therapeutic today- see above in Anticoagulation Summary.  Will instruct Vikki Durham to Continue their warfarin regimen- see above in Anticoagulation Summary.  2. Follow up in 1 month  3. Patient declines warfarin refills.  4. Verbal and written information provided. Patient expresses understanding and has no further questions at this time.    Elizabet Alvarez [facility]

## 2025-07-28 ENCOUNTER — TELEPHONE (OUTPATIENT)
Dept: ONCOLOGY | Facility: CLINIC | Age: 30
End: 2025-07-28
Payer: COMMERCIAL

## 2025-07-28 DIAGNOSIS — R07.89 OTHER CHEST PAIN: ICD-10-CM

## 2025-07-28 DIAGNOSIS — C85.28 MEDIASTINAL LARGE B-CELL LYMPHOMA OF LYMPH NODES OF MULTIPLE REGIONS: Primary | ICD-10-CM

## 2025-07-28 NOTE — TELEPHONE ENCOUNTER
Provider: Ramón  Caller: patient  Relationship to Patient: self  Call Back Phone Number: [phone]   Reason for Call: Pt having pain in left chest area.

## 2025-08-01 NOTE — TELEPHONE ENCOUNTER
Kimberley faith MD to Me        8/1/25  7:50 AM  Please check with the patient and she is continuing to have the pain, I recommend obtaining CT scan of the chest with IV contrast to evaluate for recurrence of the lymphoma.     Thank you  Me to Kimberley Melgar MD        7/28/25  4:33 PM  Pt has been having brief intermittent chest pain (not lasting more than 5 seconds), says in the same spot as previous cancer, for the last two weeks. The most recent episode (Sunday) did radiate to her arm, but was again brief and no associated symptoms. I advised the pt to monitor and hydrate, but if she has any new or worsening symptoms to go to the ER. Anything else you would recommend?              8/1/2025 15:49 EDT  Returned call to pt, she states her last episode was 7/29/2025. Advised Dr. Melgar recommends at CT scan of the chest, she v/u. A message has been sent to scheduling.

## 2025-08-04 ENCOUNTER — ANTICOAGULATION VISIT (OUTPATIENT)
Dept: ONCOLOGY | Facility: HOSPITAL | Age: 30
End: 2025-08-04
Payer: COMMERCIAL

## 2025-08-04 ENCOUNTER — LAB (OUTPATIENT)
Dept: LAB | Facility: HOSPITAL | Age: 30
End: 2025-08-04
Payer: COMMERCIAL

## 2025-08-04 DIAGNOSIS — I82.621 ARM DVT (DEEP VENOUS THROMBOEMBOLISM), ACUTE, RIGHT: ICD-10-CM

## 2025-08-04 DIAGNOSIS — I26.99 RECURRENT PULMONARY EMBOLISM: ICD-10-CM

## 2025-08-04 LAB
INR PPP: 1.8 (ref 0.9–1.1)
PROTHROMBIN TIME: 21.3 SECONDS (ref 11–13.5)

## 2025-08-04 PROCEDURE — 85610 PROTHROMBIN TIME: CPT

## 2025-08-04 PROCEDURE — G0463 HOSPITAL OUTPT CLINIC VISIT: HCPCS

## 2025-08-04 PROCEDURE — 36416 COLLJ CAPILLARY BLOOD SPEC: CPT

## 2025-08-11 ENCOUNTER — HOSPITAL ENCOUNTER (OUTPATIENT)
Dept: CT IMAGING | Facility: HOSPITAL | Age: 30
Discharge: HOME | End: 2025-08-11
Admitting: INTERNAL MEDICINE
Payer: COMMERCIAL

## 2025-08-11 DIAGNOSIS — R07.89 OTHER CHEST PAIN: ICD-10-CM

## 2025-08-11 DIAGNOSIS — C85.28 MEDIASTINAL LARGE B-CELL LYMPHOMA OF LYMPH NODES OF MULTIPLE REGIONS: ICD-10-CM

## 2025-08-11 PROCEDURE — 71260 CT THORAX DX C+: CPT

## 2025-08-11 PROCEDURE — 25510000001 IOPAMIDOL 61 % SOLUTION: Performed by: INTERNAL MEDICINE

## 2025-08-11 RX ORDER — IOPAMIDOL 612 MG/ML
100 INJECTION, SOLUTION INTRAVASCULAR
Status: COMPLETED | OUTPATIENT
Start: 2025-08-11 | End: 2025-08-11

## 2025-08-11 RX ADMIN — IOPAMIDOL 75 ML: 612 INJECTION, SOLUTION INTRAVENOUS at 17:11

## 2025-08-18 ENCOUNTER — LAB (OUTPATIENT)
Dept: LAB | Facility: HOSPITAL | Age: 30
End: 2025-08-18
Payer: COMMERCIAL

## 2025-08-18 ENCOUNTER — ANTICOAGULATION VISIT (OUTPATIENT)
Dept: ONCOLOGY | Facility: HOSPITAL | Age: 30
End: 2025-08-18
Payer: COMMERCIAL

## 2025-08-18 DIAGNOSIS — I82.621 ARM DVT (DEEP VENOUS THROMBOEMBOLISM), ACUTE, RIGHT: ICD-10-CM

## 2025-08-18 DIAGNOSIS — I26.99 RECURRENT PULMONARY EMBOLISM: ICD-10-CM

## 2025-08-18 LAB
INR PPP: 1.8 (ref 0.9–1.1)
PROTHROMBIN TIME: 21 SECONDS (ref 11–13.5)

## 2025-08-18 PROCEDURE — 85610 PROTHROMBIN TIME: CPT

## 2025-08-18 PROCEDURE — 36416 COLLJ CAPILLARY BLOOD SPEC: CPT

## 2025-08-18 PROCEDURE — G0463 HOSPITAL OUTPT CLINIC VISIT: HCPCS

## 2025-08-25 ENCOUNTER — LAB (OUTPATIENT)
Dept: LAB | Facility: HOSPITAL | Age: 30
End: 2025-08-25
Payer: COMMERCIAL

## 2025-08-25 ENCOUNTER — ANTICOAGULATION VISIT (OUTPATIENT)
Dept: ONCOLOGY | Facility: HOSPITAL | Age: 30
End: 2025-08-25
Payer: COMMERCIAL

## 2025-08-25 DIAGNOSIS — I26.99 RECURRENT PULMONARY EMBOLISM: ICD-10-CM

## 2025-08-25 DIAGNOSIS — I82.621 ARM DVT (DEEP VENOUS THROMBOEMBOLISM), ACUTE, RIGHT: ICD-10-CM

## 2025-08-25 LAB
INR PPP: 2.7 (ref 0.9–1.1)
PROTHROMBIN TIME: 32.4 SECONDS (ref 11–13.5)

## 2025-08-25 PROCEDURE — 36416 COLLJ CAPILLARY BLOOD SPEC: CPT

## 2025-08-25 PROCEDURE — 85610 PROTHROMBIN TIME: CPT

## 2025-08-25 PROCEDURE — G0463 HOSPITAL OUTPT CLINIC VISIT: HCPCS

## (undated) DEVICE — DRP C/ARM 41X74IN

## (undated) DEVICE — SYR LUERLOK 20CC

## (undated) DEVICE — LOU MINOR PROCEDURE: Brand: MEDLINE INDUSTRIES, INC.

## (undated) DEVICE — BIOPATCH™ ANTIMICROBIAL DRESSING WITH CHLORHEXIDINE GLUCONATE IS A HYDROPHILLIC POLYURETHANE ABSORPTIVE FOAM WITH CHLORHEXIDINE GLUCONATE (CHG) WHICH INHIBITS BACTERIAL GROWTH UNDER THE DRESSING. THE DRESSING IS INTENDED TO BE USED TO ABSORB EXUDATE, COVER A WOUND CAUSED BY VASCULAR AND NONVASCULAR PERCUTANEOUS MEDICAL DEVICES DURING SURGERY, AS WELL AS REDUCE LOCAL INFECTION AND COLONIZATION OF MICROORGANISMS.: Brand: BIOPATCH

## (undated) DEVICE — GLV SURG BIOGEL M LTX PF 7 1/2

## (undated) DEVICE — SUT SILK 2/0 TIES 18IN A185H

## (undated) DEVICE — NDL HYPO PRECISIONGLIDE REG 25G 1 1/2

## (undated) DEVICE — INTENDED FOR TISSUE SEPARATION, AND OTHER PROCEDURES THAT REQUIRE A SHARP SURGICAL BLADE TO PUNCTURE OR CUT.: Brand: BARD-PARKER ® CARBON RIB-BACK BLADES

## (undated) DEVICE — ADHS SKIN DERMABOND TOP ADVANCED

## (undated) DEVICE — APPL CHLORAPREP W/TINT 26ML ORNG

## (undated) DEVICE — APPL CHLORAPREP W/TINT 10.5ML PERC STRL

## (undated) DEVICE — DRSNG SURESITE WNDW 2.38X2.75

## (undated) DEVICE — ANTIBACTERIAL UNDYED BRAIDED (POLYGLACTIN 910), SYNTHETIC ABSORBABLE SUTURE: Brand: COATED VICRYL

## (undated) DEVICE — ST. SORBAVIEW ULTIMATE IJ SYSTEM A,C: Brand: CENTURION

## (undated) DEVICE — DECANT BG O JET

## (undated) DEVICE — GOWN,NON-REINFORCED,SIRUS,SET IN SLV,XXL: Brand: MEDLINE

## (undated) DEVICE — SUT PROLN 3/0 SH D/A 36IN 8522H

## (undated) DEVICE — CVR PROB 96IN LF STRL

## (undated) DEVICE — DRSNG SURESITE WNDW 4X4.5

## (undated) DEVICE — SUT SILK 4/0 TIES 18IN A183H

## (undated) DEVICE — Device